# Patient Record
Sex: FEMALE | Race: WHITE | Employment: OTHER | URBAN - METROPOLITAN AREA
[De-identification: names, ages, dates, MRNs, and addresses within clinical notes are randomized per-mention and may not be internally consistent; named-entity substitution may affect disease eponyms.]

---

## 2017-01-23 ENCOUNTER — OFFICE VISIT (OUTPATIENT)
Dept: INTERNAL MEDICINE CLINIC | Age: 72
End: 2017-01-23

## 2017-01-23 VITALS
WEIGHT: 188 LBS | SYSTOLIC BLOOD PRESSURE: 130 MMHG | DIASTOLIC BLOOD PRESSURE: 78 MMHG | HEART RATE: 90 BPM | BODY MASS INDEX: 35.5 KG/M2 | OXYGEN SATURATION: 96 % | HEIGHT: 61 IN

## 2017-01-23 DIAGNOSIS — Z76.89 ENCOUNTER TO ESTABLISH CARE: Primary | ICD-10-CM

## 2017-01-23 DIAGNOSIS — N18.2 CHRONIC KIDNEY DISEASE, STAGE II (MILD): ICD-10-CM

## 2017-01-23 DIAGNOSIS — R21 RASH OF BODY: ICD-10-CM

## 2017-01-23 DIAGNOSIS — I10 ESSENTIAL HYPERTENSION: ICD-10-CM

## 2017-01-23 DIAGNOSIS — E78.00 HIGH CHOLESTEROL: ICD-10-CM

## 2017-01-23 DIAGNOSIS — E03.9 ACQUIRED HYPOTHYROIDISM: ICD-10-CM

## 2017-01-23 DIAGNOSIS — N18.2 DIABETES MELLITUS DUE TO UNDERLYING CONDITION WITH STAGE 2 CHRONIC KIDNEY DISEASE, WITHOUT LONG-TERM CURRENT USE OF INSULIN (HCC): ICD-10-CM

## 2017-01-23 DIAGNOSIS — E08.22 DIABETES MELLITUS DUE TO UNDERLYING CONDITION WITH STAGE 2 CHRONIC KIDNEY DISEASE, WITHOUT LONG-TERM CURRENT USE OF INSULIN (HCC): ICD-10-CM

## 2017-01-23 PROBLEM — E11.69 DIABETES MELLITUS TYPE 2 IN OBESE (HCC): Status: ACTIVE | Noted: 2017-01-23

## 2017-01-23 PROBLEM — E66.9 DIABETES MELLITUS TYPE 2 IN OBESE (HCC): Status: ACTIVE | Noted: 2017-01-23

## 2017-01-23 PROCEDURE — G0008 ADMIN INFLUENZA VIRUS VAC: HCPCS | Performed by: NURSE PRACTITIONER

## 2017-01-23 PROCEDURE — 90670 PCV13 VACCINE IM: CPT | Performed by: NURSE PRACTITIONER

## 2017-01-23 PROCEDURE — 90662 IIV NO PRSV INCREASED AG IM: CPT | Performed by: NURSE PRACTITIONER

## 2017-01-23 PROCEDURE — G0009 ADMIN PNEUMOCOCCAL VACCINE: HCPCS | Performed by: NURSE PRACTITIONER

## 2017-01-23 PROCEDURE — 99203 OFFICE O/P NEW LOW 30 MIN: CPT | Performed by: NURSE PRACTITIONER

## 2017-01-23 RX ORDER — SPIRONOLACTONE 25 MG/1
25 TABLET ORAL DAILY
COMMUNITY
End: 2017-01-23 | Stop reason: SDUPTHER

## 2017-01-23 RX ORDER — POTASSIUM CHLORIDE 20 MEQ/1
20 TABLET, EXTENDED RELEASE ORAL 2 TIMES DAILY
Qty: 180 TABLET | Refills: 1 | Status: SHIPPED | OUTPATIENT
Start: 2017-01-23 | End: 2017-12-19 | Stop reason: SDUPTHER

## 2017-01-23 RX ORDER — GABAPENTIN 100 MG/1
100 CAPSULE ORAL DAILY
Qty: 90 CAPSULE | Refills: 1 | Status: SHIPPED | OUTPATIENT
Start: 2017-01-23 | End: 2019-02-18

## 2017-01-23 RX ORDER — GABAPENTIN 100 MG/1
100 CAPSULE ORAL 3 TIMES DAILY
COMMUNITY
End: 2017-01-23 | Stop reason: SDUPTHER

## 2017-01-23 RX ORDER — TORSEMIDE 20 MG/1
20 TABLET ORAL DAILY
COMMUNITY
End: 2017-05-08 | Stop reason: SDUPTHER

## 2017-01-23 RX ORDER — GABAPENTIN 100 MG/1
100 CAPSULE ORAL DAILY
Qty: 90 CAPSULE | Refills: 3 | Status: SHIPPED | OUTPATIENT
Start: 2017-01-23 | End: 2017-12-19 | Stop reason: SDUPTHER

## 2017-01-23 RX ORDER — LEVOTHYROXINE SODIUM 0.15 MG/1
150 TABLET ORAL DAILY
COMMUNITY
End: 2017-03-06 | Stop reason: SDUPTHER

## 2017-01-23 RX ORDER — SPIRONOLACTONE 25 MG/1
25 TABLET ORAL DAILY
Qty: 90 TABLET | Refills: 1 | Status: SHIPPED | OUTPATIENT
Start: 2017-01-23 | End: 2017-07-17 | Stop reason: SDUPTHER

## 2017-01-23 RX ORDER — ALLOPURINOL 100 MG/1
100 TABLET ORAL DAILY
COMMUNITY
End: 2017-09-07 | Stop reason: SDUPTHER

## 2017-01-23 RX ORDER — ATENOLOL 25 MG/1
25 TABLET ORAL DAILY
Qty: 90 TABLET | Refills: 1 | Status: SHIPPED | OUTPATIENT
Start: 2017-01-23 | End: 2017-12-04 | Stop reason: SDUPTHER

## 2017-01-23 RX ORDER — IRBESARTAN AND HYDROCHLOROTHIAZIDE 150; 12.5 MG/1; MG/1
1 TABLET, FILM COATED ORAL DAILY
COMMUNITY
End: 2017-12-04 | Stop reason: SDUPTHER

## 2017-01-23 RX ORDER — ATENOLOL 25 MG/1
25 TABLET ORAL DAILY
COMMUNITY
End: 2017-01-23 | Stop reason: SDUPTHER

## 2017-01-24 ENCOUNTER — TELEPHONE (OUTPATIENT)
Dept: INTERNAL MEDICINE CLINIC | Age: 72
End: 2017-01-24

## 2017-01-25 DIAGNOSIS — N18.2 DIABETES MELLITUS DUE TO UNDERLYING CONDITION WITH STAGE 2 CHRONIC KIDNEY DISEASE, WITHOUT LONG-TERM CURRENT USE OF INSULIN (HCC): ICD-10-CM

## 2017-01-25 DIAGNOSIS — E03.9 ACQUIRED HYPOTHYROIDISM: ICD-10-CM

## 2017-01-25 DIAGNOSIS — Z76.89 ENCOUNTER TO ESTABLISH CARE: ICD-10-CM

## 2017-01-25 DIAGNOSIS — E08.22 DIABETES MELLITUS DUE TO UNDERLYING CONDITION WITH STAGE 2 CHRONIC KIDNEY DISEASE, WITHOUT LONG-TERM CURRENT USE OF INSULIN (HCC): ICD-10-CM

## 2017-01-25 DIAGNOSIS — N18.2 CHRONIC KIDNEY DISEASE, STAGE II (MILD): ICD-10-CM

## 2017-01-25 LAB
ALBUMIN SERPL-MCNC: 4.5 G/DL (ref 3.4–5)
ANION GAP SERPL CALCULATED.3IONS-SCNC: 18 MMOL/L (ref 3–16)
BASOPHILS ABSOLUTE: 0 K/UL (ref 0–0.2)
BASOPHILS RELATIVE PERCENT: 0.5 %
BUN BLDV-MCNC: 52 MG/DL (ref 7–20)
CALCIUM SERPL-MCNC: 9.7 MG/DL (ref 8.3–10.6)
CHLORIDE BLD-SCNC: 102 MMOL/L (ref 99–110)
CHOLESTEROL, TOTAL: 199 MG/DL (ref 0–199)
CO2: 21 MMOL/L (ref 21–32)
CREAT SERPL-MCNC: 1.7 MG/DL (ref 0.6–1.2)
CREATININE URINE: 67.6 MG/DL (ref 28–259)
EOSINOPHILS ABSOLUTE: 0.3 K/UL (ref 0–0.6)
EOSINOPHILS RELATIVE PERCENT: 3.6 %
ESTIMATED AVERAGE GLUCOSE: 142.7 MG/DL
GFR AFRICAN AMERICAN: 36
GFR NON-AFRICAN AMERICAN: 30
GLUCOSE BLD-MCNC: 125 MG/DL (ref 70–99)
HBA1C MFR BLD: 6.6 %
HCT VFR BLD CALC: 38.4 % (ref 36–48)
HDLC SERPL-MCNC: 38 MG/DL (ref 40–60)
HEMOGLOBIN: 12 G/DL (ref 12–16)
HEPATITIS C ANTIBODY INTERPRETATION: NORMAL
LDL CHOLESTEROL CALCULATED: ABNORMAL MG/DL
LDL CHOLESTEROL DIRECT: 108 MG/DL
LYMPHOCYTES ABSOLUTE: 1.8 K/UL (ref 1–5.1)
LYMPHOCYTES RELATIVE PERCENT: 22 %
MCH RBC QN AUTO: 27.8 PG (ref 26–34)
MCHC RBC AUTO-ENTMCNC: 31.3 G/DL (ref 31–36)
MCV RBC AUTO: 89 FL (ref 80–100)
MICROALBUMIN UR-MCNC: 23.7 MG/DL
MICROALBUMIN/CREAT UR-RTO: 350.6 MG/G (ref 0–30)
MONOCYTES ABSOLUTE: 0.7 K/UL (ref 0–1.3)
MONOCYTES RELATIVE PERCENT: 8.4 %
NEUTROPHILS ABSOLUTE: 5.4 K/UL (ref 1.7–7.7)
NEUTROPHILS RELATIVE PERCENT: 65.5 %
PDW BLD-RTO: 16.5 % (ref 12.4–15.4)
PHOSPHORUS: 5 MG/DL (ref 2.5–4.9)
PLATELET # BLD: 266 K/UL (ref 135–450)
PMV BLD AUTO: 9.1 FL (ref 5–10.5)
POTASSIUM SERPL-SCNC: 4.2 MMOL/L (ref 3.5–5.1)
RBC # BLD: 4.32 M/UL (ref 4–5.2)
SODIUM BLD-SCNC: 141 MMOL/L (ref 136–145)
T3 TOTAL: 1.05 NG/ML (ref 0.8–2)
T4 FREE: 1.7 NG/DL (ref 0.9–1.8)
TRIGL SERPL-MCNC: 442 MG/DL (ref 0–150)
TSH REFLEX: 0.11 UIU/ML (ref 0.27–4.2)
URIC ACID, SERUM: 6.3 MG/DL (ref 2.6–6)
VLDLC SERPL CALC-MCNC: ABNORMAL MG/DL
WBC # BLD: 8.3 K/UL (ref 4–11)

## 2017-01-26 ENCOUNTER — TELEPHONE (OUTPATIENT)
Dept: DERMATOLOGY | Age: 72
End: 2017-01-26

## 2017-01-31 ENCOUNTER — OFFICE VISIT (OUTPATIENT)
Dept: DERMATOLOGY | Age: 72
End: 2017-01-31

## 2017-01-31 DIAGNOSIS — L82.1 SK (SEBORRHEIC KERATOSIS): ICD-10-CM

## 2017-01-31 DIAGNOSIS — I83.813 VARICOSE VEINS OF BOTH LOWER EXTREMITIES WITH PAIN: ICD-10-CM

## 2017-01-31 DIAGNOSIS — D48.5 NEOPLASM OF UNCERTAIN BEHAVIOR OF SKIN: ICD-10-CM

## 2017-01-31 DIAGNOSIS — L57.0 AK (ACTINIC KERATOSIS): Primary | ICD-10-CM

## 2017-01-31 PROCEDURE — 11100 PR BIOPSY OF SKIN LESION: CPT | Performed by: DERMATOLOGY

## 2017-01-31 PROCEDURE — 11101 PR BIOPSY, EACH ADDED LESION: CPT | Performed by: DERMATOLOGY

## 2017-01-31 PROCEDURE — 99202 OFFICE O/P NEW SF 15 MIN: CPT | Performed by: DERMATOLOGY

## 2017-01-31 PROCEDURE — 17000 DESTRUCT PREMALG LESION: CPT | Performed by: DERMATOLOGY

## 2017-01-31 PROCEDURE — 17003 DESTRUCT PREMALG LES 2-14: CPT | Performed by: DERMATOLOGY

## 2017-02-03 RX ORDER — TRIAMCINOLONE ACETONIDE 1 MG/G
CREAM TOPICAL
Qty: 30 G | Refills: 0 | Status: SHIPPED | OUTPATIENT
Start: 2017-02-03 | End: 2017-02-28 | Stop reason: SDUPTHER

## 2017-02-28 RX ORDER — TRIAMCINOLONE ACETONIDE 1 MG/G
CREAM TOPICAL
Qty: 30 G | Refills: 0 | Status: SHIPPED | OUTPATIENT
Start: 2017-02-28 | End: 2017-03-03 | Stop reason: SDUPTHER

## 2017-03-03 ENCOUNTER — PROCEDURE VISIT (OUTPATIENT)
Dept: DERMATOLOGY | Age: 72
End: 2017-03-03

## 2017-03-03 VITALS — HEART RATE: 74 BPM | DIASTOLIC BLOOD PRESSURE: 70 MMHG | SYSTOLIC BLOOD PRESSURE: 126 MMHG

## 2017-03-03 DIAGNOSIS — C44.519 BCC (BASAL CELL CARCINOMA), TRUNK: Primary | ICD-10-CM

## 2017-03-03 DIAGNOSIS — L57.0 AK (ACTINIC KERATOSIS): ICD-10-CM

## 2017-03-03 DIAGNOSIS — D04.72 CARCINOMA IN SITU OF SKIN OF LEFT LEG: ICD-10-CM

## 2017-03-03 PROCEDURE — 17000 DESTRUCT PREMALG LESION: CPT | Performed by: DERMATOLOGY

## 2017-03-03 PROCEDURE — 17261 DSTRJ MAL LES T/A/L .6-1.0CM: CPT | Performed by: DERMATOLOGY

## 2017-03-03 PROCEDURE — 12032 INTMD RPR S/A/T/EXT 2.6-7.5: CPT | Performed by: DERMATOLOGY

## 2017-03-03 PROCEDURE — 11602 EXC TR-EXT MAL+MARG 1.1-2 CM: CPT | Performed by: DERMATOLOGY

## 2017-03-03 RX ORDER — TRIAMCINOLONE ACETONIDE 1 MG/G
CREAM TOPICAL
Qty: 60 G | Refills: 0 | Status: SHIPPED | OUTPATIENT
Start: 2017-03-03 | End: 2017-06-06 | Stop reason: SDUPTHER

## 2017-03-06 ENCOUNTER — OFFICE VISIT (OUTPATIENT)
Dept: INTERNAL MEDICINE CLINIC | Age: 72
End: 2017-03-06

## 2017-03-06 VITALS
SYSTOLIC BLOOD PRESSURE: 136 MMHG | OXYGEN SATURATION: 99 % | HEIGHT: 61 IN | WEIGHT: 189 LBS | DIASTOLIC BLOOD PRESSURE: 70 MMHG | HEART RATE: 74 BPM | BODY MASS INDEX: 35.68 KG/M2

## 2017-03-06 DIAGNOSIS — I10 ESSENTIAL HYPERTENSION: ICD-10-CM

## 2017-03-06 DIAGNOSIS — E66.9 DIABETES MELLITUS TYPE 2 IN OBESE (HCC): ICD-10-CM

## 2017-03-06 DIAGNOSIS — Z00.00 WELL ADULT EXAM: Primary | ICD-10-CM

## 2017-03-06 DIAGNOSIS — K58.0 IRRITABLE BOWEL SYNDROME WITH DIARRHEA: ICD-10-CM

## 2017-03-06 DIAGNOSIS — M54.2 CERVICAL PAIN (NECK): ICD-10-CM

## 2017-03-06 DIAGNOSIS — E03.9 ACQUIRED HYPOTHYROIDISM: ICD-10-CM

## 2017-03-06 DIAGNOSIS — E11.69 DIABETES MELLITUS TYPE 2 IN OBESE (HCC): ICD-10-CM

## 2017-03-06 PROCEDURE — 99214 OFFICE O/P EST MOD 30 MIN: CPT | Performed by: NURSE PRACTITIONER

## 2017-03-06 RX ORDER — LEVOTHYROXINE SODIUM 175 UG/1
175 TABLET ORAL DAILY
Qty: 30 TABLET | Refills: 3 | Status: SHIPPED | OUTPATIENT
Start: 2017-03-06 | End: 2017-05-08 | Stop reason: SDUPTHER

## 2017-03-06 RX ORDER — LANCETS 23 GAUGE
1 EACH MISCELLANEOUS 2 TIMES DAILY
Qty: 100 EACH | Refills: 3 | Status: SHIPPED | OUTPATIENT
Start: 2017-03-06 | End: 2021-03-22 | Stop reason: ALTCHOICE

## 2017-03-06 RX ORDER — BLOOD-GLUCOSE METER
1 KIT MISCELLANEOUS DAILY PRN
Qty: 1 KIT | Refills: 0 | Status: SHIPPED | OUTPATIENT
Start: 2017-03-06 | End: 2019-10-28

## 2017-03-06 ASSESSMENT — ENCOUNTER SYMPTOMS: DIARRHEA: 1

## 2017-03-08 ENCOUNTER — TELEPHONE (OUTPATIENT)
Dept: DERMATOLOGY | Age: 72
End: 2017-03-08

## 2017-03-17 ENCOUNTER — NURSE ONLY (OUTPATIENT)
Dept: DERMATOLOGY | Age: 72
End: 2017-03-17

## 2017-03-17 DIAGNOSIS — K58.0 IRRITABLE BOWEL SYNDROME WITH DIARRHEA: ICD-10-CM

## 2017-03-17 DIAGNOSIS — L98.9 SKIN LESION: Primary | ICD-10-CM

## 2017-03-27 PROBLEM — Z00.00 WELL ADULT EXAM: Status: ACTIVE | Noted: 2017-03-27

## 2017-05-08 DIAGNOSIS — N18.2 CHRONIC KIDNEY DISEASE, STAGE II (MILD): ICD-10-CM

## 2017-05-08 RX ORDER — TORSEMIDE 20 MG/1
20 TABLET ORAL DAILY
Qty: 30 TABLET | Refills: 2 | Status: SHIPPED | OUTPATIENT
Start: 2017-05-08 | End: 2017-05-31 | Stop reason: SDUPTHER

## 2017-05-08 RX ORDER — LEVOTHYROXINE SODIUM 175 UG/1
175 TABLET ORAL DAILY
Qty: 30 TABLET | Refills: 3 | Status: SHIPPED | OUTPATIENT
Start: 2017-05-08 | End: 2017-07-14 | Stop reason: SDUPTHER

## 2017-05-31 ENCOUNTER — TELEPHONE (OUTPATIENT)
Dept: INTERNAL MEDICINE CLINIC | Age: 72
End: 2017-05-31

## 2017-05-31 ENCOUNTER — OFFICE VISIT (OUTPATIENT)
Dept: INTERNAL MEDICINE CLINIC | Age: 72
End: 2017-05-31

## 2017-05-31 VITALS
WEIGHT: 191 LBS | OXYGEN SATURATION: 98 % | HEART RATE: 82 BPM | SYSTOLIC BLOOD PRESSURE: 134 MMHG | BODY MASS INDEX: 36.69 KG/M2 | DIASTOLIC BLOOD PRESSURE: 80 MMHG

## 2017-05-31 DIAGNOSIS — N18.2 CHRONIC KIDNEY DISEASE, STAGE II (MILD): ICD-10-CM

## 2017-05-31 DIAGNOSIS — M19.90 ARTHRITIS: ICD-10-CM

## 2017-05-31 DIAGNOSIS — E11.9 DIABETES MELLITUS TYPE 2 IN NONOBESE (HCC): Primary | ICD-10-CM

## 2017-05-31 LAB — GLUCOSE BLD-MCNC: ABNORMAL MG/DL

## 2017-05-31 PROCEDURE — 99213 OFFICE O/P EST LOW 20 MIN: CPT | Performed by: NURSE PRACTITIONER

## 2017-05-31 PROCEDURE — 82962 GLUCOSE BLOOD TEST: CPT | Performed by: NURSE PRACTITIONER

## 2017-05-31 RX ORDER — TORSEMIDE 20 MG/1
20 TABLET ORAL DAILY
Qty: 90 TABLET | Refills: 1 | Status: SHIPPED | OUTPATIENT
Start: 2017-05-31 | End: 2017-12-04 | Stop reason: SDUPTHER

## 2017-05-31 RX ORDER — TORSEMIDE 20 MG/1
20 TABLET ORAL DAILY
Qty: 10 TABLET | Refills: 0 | Status: SHIPPED | OUTPATIENT
Start: 2017-05-31 | End: 2017-12-04 | Stop reason: SDUPTHER

## 2017-06-01 LAB
ESTIMATED AVERAGE GLUCOSE: 139.9 MG/DL
HBA1C MFR BLD: 6.5 %

## 2017-06-06 RX ORDER — TRIAMCINOLONE ACETONIDE 1 MG/G
CREAM TOPICAL
Qty: 60 G | Refills: 0 | Status: SHIPPED | OUTPATIENT
Start: 2017-06-06 | End: 2017-07-21 | Stop reason: SDUPTHER

## 2017-07-10 ENCOUNTER — OFFICE VISIT (OUTPATIENT)
Dept: INTERNAL MEDICINE CLINIC | Age: 72
End: 2017-07-10

## 2017-07-10 ENCOUNTER — TELEPHONE (OUTPATIENT)
Dept: INTERNAL MEDICINE CLINIC | Age: 72
End: 2017-07-10

## 2017-07-10 ENCOUNTER — HOSPITAL ENCOUNTER (OUTPATIENT)
Dept: OTHER | Age: 72
Discharge: HOME | End: 2017-07-10
Attending: FAMILY MEDICINE | Admitting: FAMILY MEDICINE

## 2017-07-10 VITALS
WEIGHT: 191 LBS | DIASTOLIC BLOOD PRESSURE: 80 MMHG | HEIGHT: 60 IN | HEART RATE: 83 BPM | TEMPERATURE: 98.3 F | OXYGEN SATURATION: 98 % | BODY MASS INDEX: 37.5 KG/M2 | SYSTOLIC BLOOD PRESSURE: 140 MMHG | RESPIRATION RATE: 22 BRPM

## 2017-07-10 DIAGNOSIS — S93.601A FOOT SPRAIN, RIGHT, INITIAL ENCOUNTER: ICD-10-CM

## 2017-07-10 DIAGNOSIS — M84.474A METATARSAL FRACTURE, PATHOLOGIC, RIGHT, INITIAL ENCOUNTER: Primary | ICD-10-CM

## 2017-07-10 DIAGNOSIS — S93.601A FOOT SPRAIN, RIGHT, INITIAL ENCOUNTER: Primary | ICD-10-CM

## 2017-07-10 PROCEDURE — 99213 OFFICE O/P EST LOW 20 MIN: CPT | Performed by: FAMILY MEDICINE

## 2017-07-12 ENCOUNTER — OFFICE VISIT (OUTPATIENT)
Dept: ORTHOPEDIC SURGERY | Age: 72
End: 2017-07-12

## 2017-07-12 VITALS — HEIGHT: 60 IN | RESPIRATION RATE: 15 BRPM | WEIGHT: 191 LBS | BODY MASS INDEX: 37.5 KG/M2

## 2017-07-12 DIAGNOSIS — M79.671 RIGHT FOOT PAIN: ICD-10-CM

## 2017-07-12 DIAGNOSIS — S92.354A CLOSED NONDISPLACED FRACTURE OF FIFTH METATARSAL BONE OF RIGHT FOOT, INITIAL ENCOUNTER: Primary | ICD-10-CM

## 2017-07-12 PROCEDURE — E0114 CRUTCH UNDERARM PAIR NO WOOD: HCPCS | Performed by: INTERNAL MEDICINE

## 2017-07-12 PROCEDURE — 99203 OFFICE O/P NEW LOW 30 MIN: CPT | Performed by: INTERNAL MEDICINE

## 2017-07-12 PROCEDURE — L4361 PNEUMA/VAC WALK BOOT PRE OTS: HCPCS | Performed by: INTERNAL MEDICINE

## 2017-07-14 DIAGNOSIS — E03.9 ACQUIRED HYPOTHYROIDISM: Primary | ICD-10-CM

## 2017-07-16 RX ORDER — SPIRONOLACTONE 25 MG/1
25 TABLET ORAL DAILY
Qty: 90 TABLET | Refills: 1 | OUTPATIENT
Start: 2017-07-16

## 2017-07-16 RX ORDER — LEVOTHYROXINE SODIUM 175 UG/1
175 TABLET ORAL DAILY
Qty: 30 TABLET | Refills: 3 | OUTPATIENT
Start: 2017-07-16

## 2017-07-17 RX ORDER — SPIRONOLACTONE 25 MG/1
25 TABLET ORAL DAILY
Qty: 90 TABLET | Refills: 1 | Status: SHIPPED | OUTPATIENT
Start: 2017-07-17 | End: 2017-12-04 | Stop reason: SDUPTHER

## 2017-07-17 RX ORDER — LEVOTHYROXINE SODIUM 175 UG/1
175 TABLET ORAL DAILY
Qty: 30 TABLET | Refills: 3 | Status: SHIPPED | OUTPATIENT
Start: 2017-07-17 | End: 2017-08-25 | Stop reason: SDUPTHER

## 2017-07-18 ENCOUNTER — TELEPHONE (OUTPATIENT)
Dept: INTERNAL MEDICINE CLINIC | Age: 72
End: 2017-07-18

## 2017-07-21 RX ORDER — TRIAMCINOLONE ACETONIDE 1 MG/G
CREAM TOPICAL
Qty: 60 G | Refills: 0 | Status: SHIPPED | OUTPATIENT
Start: 2017-07-21 | End: 2017-10-06 | Stop reason: SDUPTHER

## 2017-07-24 ENCOUNTER — OFFICE VISIT (OUTPATIENT)
Dept: ORTHOPEDIC SURGERY | Age: 72
End: 2017-07-24

## 2017-07-24 VITALS — WEIGHT: 195 LBS | BODY MASS INDEX: 38.28 KG/M2 | HEIGHT: 60 IN

## 2017-07-24 DIAGNOSIS — M79.671 FOOT PAIN, RIGHT: ICD-10-CM

## 2017-07-24 DIAGNOSIS — S92.354A CLOSED NONDISPLACED FRACTURE OF FIFTH METATARSAL BONE OF RIGHT FOOT, INITIAL ENCOUNTER: Primary | ICD-10-CM

## 2017-07-24 PROCEDURE — 99213 OFFICE O/P EST LOW 20 MIN: CPT | Performed by: INTERNAL MEDICINE

## 2017-07-24 PROCEDURE — 73630 X-RAY EXAM OF FOOT: CPT | Performed by: INTERNAL MEDICINE

## 2017-07-25 PROBLEM — S92.354A CLOSED NONDISPLACED FRACTURE OF FIFTH METATARSAL BONE OF RIGHT FOOT: Status: ACTIVE | Noted: 2017-07-25

## 2017-08-09 ENCOUNTER — OFFICE VISIT (OUTPATIENT)
Dept: ORTHOPEDIC SURGERY | Age: 72
End: 2017-08-09

## 2017-08-09 DIAGNOSIS — S92.354D CLOSED NONDISPLACED FRACTURE OF FIFTH METATARSAL BONE OF RIGHT FOOT WITH ROUTINE HEALING, SUBSEQUENT ENCOUNTER: Primary | ICD-10-CM

## 2017-08-09 PROCEDURE — 99213 OFFICE O/P EST LOW 20 MIN: CPT | Performed by: INTERNAL MEDICINE

## 2017-08-09 PROCEDURE — MISC968 TURF TOE STEEL SHANKS EACH: Performed by: INTERNAL MEDICINE

## 2017-08-09 PROCEDURE — 73630 X-RAY EXAM OF FOOT: CPT | Performed by: INTERNAL MEDICINE

## 2017-08-23 ENCOUNTER — OFFICE VISIT (OUTPATIENT)
Dept: INTERNAL MEDICINE CLINIC | Age: 72
End: 2017-08-23

## 2017-08-23 VITALS
TEMPERATURE: 97.8 F | OXYGEN SATURATION: 98 % | WEIGHT: 193 LBS | BODY MASS INDEX: 37.69 KG/M2 | DIASTOLIC BLOOD PRESSURE: 78 MMHG | SYSTOLIC BLOOD PRESSURE: 140 MMHG | HEART RATE: 85 BPM

## 2017-08-23 DIAGNOSIS — R30.0 DYSURIA: ICD-10-CM

## 2017-08-23 DIAGNOSIS — J30.2 SEASONAL ALLERGIC RHINITIS, UNSPECIFIED ALLERGIC RHINITIS TRIGGER: ICD-10-CM

## 2017-08-23 DIAGNOSIS — N30.01 ACUTE CYSTITIS WITH HEMATURIA: Primary | ICD-10-CM

## 2017-08-23 DIAGNOSIS — R53.83 FATIGUE, UNSPECIFIED TYPE: ICD-10-CM

## 2017-08-23 LAB
BILIRUBIN, POC: ABNORMAL
BLOOD URINE, POC: ABNORMAL
CLARITY, POC: ABNORMAL
COLOR, POC: YELLOW
GLUCOSE URINE, POC: ABNORMAL
KETONES, POC: ABNORMAL
LEUKOCYTE EST, POC: ABNORMAL
NITRITE, POC: ABNORMAL
PH, POC: 6.5
PROTEIN, POC: ABNORMAL
SPECIFIC GRAVITY, POC: 1.02
UROBILINOGEN, POC: 0.2

## 2017-08-23 PROCEDURE — 99213 OFFICE O/P EST LOW 20 MIN: CPT | Performed by: NURSE PRACTITIONER

## 2017-08-23 PROCEDURE — 81002 URINALYSIS NONAUTO W/O SCOPE: CPT | Performed by: NURSE PRACTITIONER

## 2017-08-23 RX ORDER — CIPROFLOXACIN 500 MG/1
500 TABLET, FILM COATED ORAL 2 TIMES DAILY
Qty: 20 TABLET | Refills: 0 | Status: SHIPPED | OUTPATIENT
Start: 2017-08-23 | End: 2017-09-02

## 2017-08-23 ASSESSMENT — PATIENT HEALTH QUESTIONNAIRE - PHQ9
2. FEELING DOWN, DEPRESSED OR HOPELESS: 0
SUM OF ALL RESPONSES TO PHQ9 QUESTIONS 1 & 2: 0
1. LITTLE INTEREST OR PLEASURE IN DOING THINGS: 0
SUM OF ALL RESPONSES TO PHQ QUESTIONS 1-9: 0

## 2017-08-24 DIAGNOSIS — R53.83 FATIGUE, UNSPECIFIED TYPE: ICD-10-CM

## 2017-08-24 LAB
BASOPHILS ABSOLUTE: 0 K/UL (ref 0–0.2)
BASOPHILS RELATIVE PERCENT: 0.4 %
EOSINOPHILS ABSOLUTE: 0.2 K/UL (ref 0–0.6)
EOSINOPHILS RELATIVE PERCENT: 1.8 %
HCT VFR BLD CALC: 37.1 % (ref 36–48)
HEMOGLOBIN: 11.9 G/DL (ref 12–16)
LYMPHOCYTES ABSOLUTE: 1.7 K/UL (ref 1–5.1)
LYMPHOCYTES RELATIVE PERCENT: 14.5 %
MCH RBC QN AUTO: 28.2 PG (ref 26–34)
MCHC RBC AUTO-ENTMCNC: 32.1 G/DL (ref 31–36)
MCV RBC AUTO: 87.9 FL (ref 80–100)
MONOCYTES ABSOLUTE: 1.1 K/UL (ref 0–1.3)
MONOCYTES RELATIVE PERCENT: 9.5 %
NEUTROPHILS ABSOLUTE: 8.4 K/UL (ref 1.7–7.7)
NEUTROPHILS RELATIVE PERCENT: 73.8 %
PDW BLD-RTO: 17.1 % (ref 12.4–15.4)
PLATELET # BLD: 309 K/UL (ref 135–450)
PMV BLD AUTO: 8.8 FL (ref 5–10.5)
RBC # BLD: 4.22 M/UL (ref 4–5.2)
T3 TOTAL: 1 NG/ML (ref 0.8–2)
T4 FREE: 1.7 NG/DL (ref 0.9–1.8)
TSH REFLEX: 0.01 UIU/ML (ref 0.27–4.2)
WBC # BLD: 11.4 K/UL (ref 4–11)

## 2017-08-25 ENCOUNTER — TELEPHONE (OUTPATIENT)
Dept: INTERNAL MEDICINE CLINIC | Age: 72
End: 2017-08-25

## 2017-08-25 RX ORDER — LEVOTHYROXINE SODIUM 88 UG/1
TABLET ORAL
Qty: 30 TABLET | Refills: 3 | Status: SHIPPED | OUTPATIENT
Start: 2017-08-25 | End: 2017-12-04 | Stop reason: SDUPTHER

## 2017-09-07 RX ORDER — ALLOPURINOL 100 MG/1
100 TABLET ORAL DAILY
Qty: 30 TABLET | Refills: 2 | Status: SHIPPED | OUTPATIENT
Start: 2017-09-07 | End: 2017-11-14 | Stop reason: SDUPTHER

## 2017-09-27 ENCOUNTER — OFFICE VISIT (OUTPATIENT)
Dept: DERMATOLOGY | Age: 72
End: 2017-09-27

## 2017-09-27 DIAGNOSIS — R21 RASH: Primary | ICD-10-CM

## 2017-09-27 DIAGNOSIS — L57.0 AK (ACTINIC KERATOSIS): ICD-10-CM

## 2017-09-27 PROCEDURE — 11100 PR BIOPSY OF SKIN LESION: CPT | Performed by: DERMATOLOGY

## 2017-09-27 PROCEDURE — 11101 PR BIOPSY, EACH ADDED LESION: CPT | Performed by: DERMATOLOGY

## 2017-09-27 PROCEDURE — 99213 OFFICE O/P EST LOW 20 MIN: CPT | Performed by: DERMATOLOGY

## 2017-09-27 RX ORDER — BETAMETHASONE DIPROPIONATE 0.5 MG/G
CREAM TOPICAL
Qty: 90 G | Refills: 1 | Status: SHIPPED | OUTPATIENT
Start: 2017-09-27 | End: 2017-10-02 | Stop reason: SDUPTHER

## 2017-10-02 ENCOUNTER — TELEPHONE (OUTPATIENT)
Dept: DERMATOLOGY | Age: 72
End: 2017-10-02

## 2017-10-02 RX ORDER — BETAMETHASONE DIPROPIONATE 0.5 MG/G
CREAM TOPICAL
Qty: 180 G | Refills: 1 | Status: SHIPPED | OUTPATIENT
Start: 2017-10-02 | End: 2019-10-28

## 2017-10-04 ENCOUNTER — TELEPHONE (OUTPATIENT)
Dept: DERMATOLOGY | Age: 72
End: 2017-10-04

## 2017-10-04 NOTE — TELEPHONE ENCOUNTER
Pt c/b [phone]  Pt states:   - returning call for biopsy results  Please call to discuss thanks

## 2017-10-05 ENCOUNTER — TELEPHONE (OUTPATIENT)
Dept: DERMATOLOGY | Age: 72
End: 2017-10-05

## 2017-10-06 RX ORDER — TRIAMCINOLONE ACETONIDE 1 MG/G
CREAM TOPICAL
Qty: 160 G | Refills: 1 | Status: SHIPPED | OUTPATIENT
Start: 2017-10-06 | End: 2018-01-05 | Stop reason: SDUPTHER

## 2017-10-06 NOTE — TELEPHONE ENCOUNTER
Let's go back to the triamcinolone cream.  Does she have enough at home?
Patient called and the betamethasone dipropionate (DIPROLENE) 0.05 % cream is over $300. She can't afford it.     Call back # [phone]
Rx signed.
Spoke to pt and advised her that she should go back to using TAC, pt states she has some at home but would like the refill sent to [facility] [place], [place], please sign rx
no white count  not pregnant  urine = wnl

## 2017-11-01 ENCOUNTER — OFFICE VISIT (OUTPATIENT)
Dept: INTERNAL MEDICINE CLINIC | Age: 72
End: 2017-11-01

## 2017-11-01 VITALS
WEIGHT: 188.4 LBS | HEIGHT: 60 IN | DIASTOLIC BLOOD PRESSURE: 60 MMHG | TEMPERATURE: 97.8 F | OXYGEN SATURATION: 98 % | RESPIRATION RATE: 18 BRPM | BODY MASS INDEX: 36.99 KG/M2 | SYSTOLIC BLOOD PRESSURE: 140 MMHG | HEART RATE: 66 BPM

## 2017-11-01 DIAGNOSIS — G89.29 CHRONIC PAIN OF RIGHT KNEE: Primary | ICD-10-CM

## 2017-11-01 DIAGNOSIS — Z13.820 SCREENING FOR OSTEOPOROSIS: ICD-10-CM

## 2017-11-01 DIAGNOSIS — Z23 NEED FOR INFLUENZA VACCINATION: ICD-10-CM

## 2017-11-01 DIAGNOSIS — M89.9 DISORDER OF BONE: ICD-10-CM

## 2017-11-01 DIAGNOSIS — M25.561 CHRONIC PAIN OF RIGHT KNEE: Primary | ICD-10-CM

## 2017-11-01 PROCEDURE — 4040F PNEUMOC VAC/ADMIN/RCVD: CPT | Performed by: FAMILY MEDICINE

## 2017-11-01 PROCEDURE — 3017F COLORECTAL CA SCREEN DOC REV: CPT | Performed by: FAMILY MEDICINE

## 2017-11-01 PROCEDURE — G8417 CALC BMI ABV UP PARAM F/U: HCPCS | Performed by: FAMILY MEDICINE

## 2017-11-01 PROCEDURE — G8427 DOCREV CUR MEDS BY ELIG CLIN: HCPCS | Performed by: FAMILY MEDICINE

## 2017-11-01 PROCEDURE — 1090F PRES/ABSN URINE INCON ASSESS: CPT | Performed by: FAMILY MEDICINE

## 2017-11-01 PROCEDURE — 1123F ACP DISCUSS/DSCN MKR DOCD: CPT | Performed by: FAMILY MEDICINE

## 2017-11-01 PROCEDURE — G0008 ADMIN INFLUENZA VIRUS VAC: HCPCS | Performed by: FAMILY MEDICINE

## 2017-11-01 PROCEDURE — 99213 OFFICE O/P EST LOW 20 MIN: CPT | Performed by: FAMILY MEDICINE

## 2017-11-01 PROCEDURE — G8400 PT W/DXA NO RESULTS DOC: HCPCS | Performed by: FAMILY MEDICINE

## 2017-11-01 PROCEDURE — 90662 IIV NO PRSV INCREASED AG IM: CPT | Performed by: FAMILY MEDICINE

## 2017-11-01 PROCEDURE — 3014F SCREEN MAMMO DOC REV: CPT | Performed by: FAMILY MEDICINE

## 2017-11-01 PROCEDURE — 1036F TOBACCO NON-USER: CPT | Performed by: FAMILY MEDICINE

## 2017-11-01 PROCEDURE — G8484 FLU IMMUNIZE NO ADMIN: HCPCS | Performed by: FAMILY MEDICINE

## 2017-11-01 RX ORDER — HYDROCODONE BITARTRATE AND ACETAMINOPHEN 7.5; 325 MG/1; MG/1
1 TABLET ORAL EVERY 6 HOURS PRN
Qty: 28 TABLET | Refills: 0 | Status: SHIPPED | OUTPATIENT
Start: 2017-11-01 | End: 2017-11-08

## 2017-11-01 NOTE — PROGRESS NOTES
medications, allergies, past medical, surgical, social and family histories were reviewed and updated as appropriate. Medications side effects were discussed with patient. Pt confirms understanding. Educational material was given for pt to read on diagnosis.

## 2017-11-03 ENCOUNTER — HOSPITAL ENCOUNTER (OUTPATIENT)
Dept: OTHER | Age: 72
Discharge: HOME | End: 2017-11-03
Attending: FAMILY MEDICINE | Admitting: FAMILY MEDICINE

## 2017-11-03 DIAGNOSIS — G89.29 CHRONIC PAIN OF RIGHT KNEE: ICD-10-CM

## 2017-11-03 DIAGNOSIS — M25.561 CHRONIC PAIN OF RIGHT KNEE: ICD-10-CM

## 2017-11-08 ENCOUNTER — OFFICE VISIT (OUTPATIENT)
Dept: DERMATOLOGY | Age: 72
End: 2017-11-08

## 2017-11-08 ENCOUNTER — HOSPITAL ENCOUNTER (OUTPATIENT)
Dept: GENERAL RADIOLOGY | Age: 72
Discharge: HOME | End: 2017-11-08
Attending: FAMILY MEDICINE | Admitting: FAMILY MEDICINE

## 2017-11-08 DIAGNOSIS — D48.5 NEOPLASM OF UNCERTAIN BEHAVIOR OF SKIN: ICD-10-CM

## 2017-11-08 DIAGNOSIS — Z13.820 SCREENING FOR OSTEOPOROSIS: ICD-10-CM

## 2017-11-08 DIAGNOSIS — M89.9 DISORDER OF BONE: ICD-10-CM

## 2017-11-08 DIAGNOSIS — L40.9 PSORIASIS: Primary | ICD-10-CM

## 2017-11-08 DIAGNOSIS — L72.0 EPIDERMOID CYST: ICD-10-CM

## 2017-11-08 DIAGNOSIS — M89.9 BONE DISORDER: ICD-10-CM

## 2017-11-08 PROCEDURE — 11100 PR BIOPSY OF SKIN LESION: CPT | Performed by: DERMATOLOGY

## 2017-11-08 PROCEDURE — 99213 OFFICE O/P EST LOW 20 MIN: CPT | Performed by: DERMATOLOGY

## 2017-11-08 PROCEDURE — G8427 DOCREV CUR MEDS BY ELIG CLIN: HCPCS | Performed by: DERMATOLOGY

## 2017-11-08 PROCEDURE — G8417 CALC BMI ABV UP PARAM F/U: HCPCS | Performed by: DERMATOLOGY

## 2017-11-08 PROCEDURE — 1123F ACP DISCUSS/DSCN MKR DOCD: CPT | Performed by: DERMATOLOGY

## 2017-11-08 PROCEDURE — 3014F SCREEN MAMMO DOC REV: CPT | Performed by: DERMATOLOGY

## 2017-11-08 PROCEDURE — G8484 FLU IMMUNIZE NO ADMIN: HCPCS | Performed by: DERMATOLOGY

## 2017-11-08 PROCEDURE — 1090F PRES/ABSN URINE INCON ASSESS: CPT | Performed by: DERMATOLOGY

## 2017-11-08 PROCEDURE — G8399 PT W/DXA RESULTS DOCUMENT: HCPCS | Performed by: DERMATOLOGY

## 2017-11-08 PROCEDURE — 4040F PNEUMOC VAC/ADMIN/RCVD: CPT | Performed by: DERMATOLOGY

## 2017-11-08 PROCEDURE — 3017F COLORECTAL CA SCREEN DOC REV: CPT | Performed by: DERMATOLOGY

## 2017-11-08 PROCEDURE — 1036F TOBACCO NON-USER: CPT | Performed by: DERMATOLOGY

## 2017-11-08 NOTE — PATIENT INSTRUCTIONS
Biopsy Wound Care Instructions    · Keep the bandage in place for 24 hours. · Cleanse the wound with mild soapy water daily   Gently dry the area.  Apply Vaseline or petroleum jelly to the wound using a cotton tipped applicator.  Cover with a clean bandage.  Repeat this process until the biopsy site is healed.  If you had stitches placed, continue treating the site until the stitches are removed. Remember to make an appointment to return to have your stitches removed by our staff.  You may shower and bathe as usual.       ** Biopsy results generally take around 7 business days to come back. If you have not heard from us by then, please call the office at [phone] between 8AM and 4PM Monday through Friday.

## 2017-11-08 NOTE — PROGRESS NOTES
[facility]  Bebe York MD  [phone]      Susan Anna  1945    66 y.o. female     Date of Visit: 11/8/2017    Chief Complaint: psoriasis and skin lesion    History of Present Illness:    1. She returns today to follow-up for a several month history of a burning and pruritic eruption on the lower extremities. Biopsy at last visit on the right leg was highly suggestive of acute evolving psoriasis. She markedly improved with Diprolene cream but has switched back to triamcinolone cream with maintained improvement. 2.  She complains of a new onset painful lesion on the right lower leg. 3.  She also complains of a newly noted lesion on the back of the neck. Review of Systems:  Skin: No new or changing moles. Past Medical History, Family History, Surgical History, Medications and Allergies reviewed. Past Medical History:   Diagnosis Date    Diabetes ([address]     Hypertension     Kidney disease      Past Surgical History:   Procedure Laterality Date    APPENDECTOMY      CHOLECYSTECTOMY      HYSTERECTOMY      MASTECTOMY, BILATERAL      preventive    ROTATOR CUFF REPAIR         Allergies   Allergen Reactions    Tetracyclines & Related Anaphylaxis    Amoxicillin     Codeine     Penicillins      Outpatient Prescriptions Marked as Taking for the 11/8/17 encounter (Office Visit) with Zaria Govea MD   Medication Sig Dispense Refill    HYDROcodone-acetaminophen ([address]) 7.5-325 MG per tablet Take 1 tablet by mouth every 6 hours as needed for Pain . 28 tablet 0    triamcinolone (KENALOG) 0.1 % cream Apply topically 2 times daily. 160 g 1    betamethasone dipropionate (DIPROLENE) 0.05 % cream Apply to affected areas on the legs twice daily until improved.  180 g 1    allopurinol (ZYLOPRIM) 100 MG tablet Take 1 tablet by mouth daily 30 tablet 2    levothyroxine (SYNTHROID) 88 MCG tablet 1 tab daily before breakfast 30 tablet 3    spironolactone (ALDACTONE) 25 MG tablet Take 1 tablet by mouth daily 90 tablet 1    torsemide (DEMADEX) 20 MG tablet Take 1 tablet by mouth daily 90 tablet 1    metFORMIN (GLUCOPHAGE) 500 MG tablet Take 1 tablet by mouth 2 times daily (with meals) 180 tablet 1    torsemide (DEMADEX) 20 MG tablet Take 1 tablet by mouth daily 10 tablet 0    Dulaglutide (TRULICITY) 0.62 DN/4.5JA SOPN Inject 0.75 mg into the skin once a week 4 Pen 0    Dulaglutide (TRULICITY) 2.13 JF/9.4VT SOPN Inject 0.75 mg into the skin once a week 4 Pen 5    Eluxadoline (VIBERZI) 75 MG TABS Take 1 tablet by mouth 2 times daily 16 tablet 1    glucose monitoring kit (FREESTYLE) monitoring kit 1 kit by Does not apply route daily as needed (one touch preferred) 1 kit 0    Lancets 65N MISC 1 applicator by Does not apply route 2 times daily 100 each 3    glucose blood VI test strips (ASCENSIA AUTODISC VI;ONE TOUCH ULTRA TEST VI) strip 1 each by In Vitro route 2 times daily One Touch preferred 100 each 3    Multiple Vitamin (MULTI-VITAMIN PO) Take by mouth      irbesartan-hydrochlorothiazide (AVALIDE) 150-12.5 MG per tablet Take 1 tablet by mouth daily      INDOMETHACIN PO Take by mouth      gabapentin (NEURONTIN) 100 MG capsule Take 1 capsule by mouth daily 90 capsule 1    atenolol (TENORMIN) 25 MG tablet Take 1 tablet by mouth daily 90 tablet 1    potassium chloride (KLOR-CON M) 20 MEQ extended release tablet Take 1 tablet by mouth 2 times daily 180 tablet 1    gabapentin (NEURONTIN) 100 MG capsule Take 1 capsule by mouth daily 90 capsule 3         Physical Examination       The following were examined and determined to be normal: Psych/Neuro, Head/face, Conjunctivae/eyelids, Gums/teeth/lips and Neck. The following were examined and determined to be abnormal: RLE and LLE. Well appearing. 1.  Legs with few scattered scaly erythematous patches and thin plaques. 2.  Right posterior lower leg with a 1.2 cm keratotic pink plaque.         3.  Left posterior lateral neck with a round skin colored to white papule with overlying punctum. Assessment and Plan     1. Psoriasis of the legs - markedly improved, 1% BSA    Continue use of triamcinolone 0.1% cream.       2. Neoplasm of uncertain behavior of skin, right lower leg - r/o SCC    Discussed possible diagnosis; patient agreeable to biopsy (verbal consent obtained). The area(s) to be biopsied were marked with a surgical pen. Alcohol was used to cleanse the site. Local anesthesia was acheived with 1% lidocaine with epinephrine. Shave biopsy was performed using a razor blade. Hemostasis was achieved with aluminum chloride. The wound(s) were dressed with petrolatum and covered with a bandage. Wound care instructions were reviewed. 1 Specimen (s) sent to pathology. The specimen bottles were appropriately labeled. We also reviewed the risks of bleeding, scar, and infection. 3. Epidermoid cyst     Reassurance.

## 2017-11-13 ENCOUNTER — TELEPHONE (OUTPATIENT)
Dept: DERMATOLOGY | Age: 72
End: 2017-11-13

## 2017-11-14 RX ORDER — ALLOPURINOL 100 MG/1
100 TABLET ORAL DAILY
Qty: 30 TABLET | Refills: 2 | Status: SHIPPED | OUTPATIENT
Start: 2017-11-14 | End: 2017-12-04 | Stop reason: SDUPTHER

## 2017-11-15 ENCOUNTER — OFFICE VISIT (OUTPATIENT)
Dept: ORTHOPEDIC SURGERY | Age: 72
End: 2017-11-15

## 2017-11-15 DIAGNOSIS — G89.29 CHRONIC PAIN OF RIGHT KNEE: ICD-10-CM

## 2017-11-15 DIAGNOSIS — M25.561 CHRONIC PAIN OF RIGHT KNEE: ICD-10-CM

## 2017-11-15 DIAGNOSIS — M70.51 BURSITIS OF OTHER BURSA OF RIGHT KNEE: ICD-10-CM

## 2017-11-15 DIAGNOSIS — M25.561 ACUTE PAIN OF BOTH KNEES: ICD-10-CM

## 2017-11-15 DIAGNOSIS — M25.461 EFFUSION OF RIGHT KNEE: ICD-10-CM

## 2017-11-15 DIAGNOSIS — M25.562 ACUTE PAIN OF BOTH KNEES: ICD-10-CM

## 2017-11-15 DIAGNOSIS — M17.0 PRIMARY OSTEOARTHRITIS OF BOTH KNEES: Primary | ICD-10-CM

## 2017-11-15 PROBLEM — M17.10 ARTHRITIS OF KNEE: Status: ACTIVE | Noted: 2017-11-15

## 2017-11-15 PROBLEM — M71.9 BURSITIS: Status: ACTIVE | Noted: 2017-11-15

## 2017-11-15 PROBLEM — M17.11 ARTHRITIS OF RIGHT KNEE: Status: ACTIVE | Noted: 2017-11-15

## 2017-11-15 PROCEDURE — G8484 FLU IMMUNIZE NO ADMIN: HCPCS | Performed by: INTERNAL MEDICINE

## 2017-11-15 PROCEDURE — 20611 DRAIN/INJ JOINT/BURSA W/US: CPT | Performed by: INTERNAL MEDICINE

## 2017-11-15 PROCEDURE — 3014F SCREEN MAMMO DOC REV: CPT | Performed by: INTERNAL MEDICINE

## 2017-11-15 PROCEDURE — 1123F ACP DISCUSS/DSCN MKR DOCD: CPT | Performed by: INTERNAL MEDICINE

## 2017-11-15 PROCEDURE — 73562 X-RAY EXAM OF KNEE 3: CPT | Performed by: INTERNAL MEDICINE

## 2017-11-15 PROCEDURE — 1090F PRES/ABSN URINE INCON ASSESS: CPT | Performed by: INTERNAL MEDICINE

## 2017-11-15 PROCEDURE — G8417 CALC BMI ABV UP PARAM F/U: HCPCS | Performed by: INTERNAL MEDICINE

## 2017-11-15 PROCEDURE — L1812 KO ELASTIC W/JOINTS PRE OTS: HCPCS | Performed by: INTERNAL MEDICINE

## 2017-11-15 PROCEDURE — 1036F TOBACCO NON-USER: CPT | Performed by: INTERNAL MEDICINE

## 2017-11-15 PROCEDURE — G8399 PT W/DXA RESULTS DOCUMENT: HCPCS | Performed by: INTERNAL MEDICINE

## 2017-11-15 PROCEDURE — 4040F PNEUMOC VAC/ADMIN/RCVD: CPT | Performed by: INTERNAL MEDICINE

## 2017-11-15 PROCEDURE — G8427 DOCREV CUR MEDS BY ELIG CLIN: HCPCS | Performed by: INTERNAL MEDICINE

## 2017-11-15 PROCEDURE — 3017F COLORECTAL CA SCREEN DOC REV: CPT | Performed by: INTERNAL MEDICINE

## 2017-11-15 PROCEDURE — 99214 OFFICE O/P EST MOD 30 MIN: CPT | Performed by: INTERNAL MEDICINE

## 2017-11-15 RX ORDER — TRAMADOL HYDROCHLORIDE 50 MG/1
50 TABLET ORAL EVERY 6 HOURS PRN
Qty: 30 TABLET | Refills: 0 | Status: SHIPPED | OUTPATIENT
Start: 2017-11-15 | End: 2017-11-25

## 2017-11-16 ENCOUNTER — TELEPHONE (OUTPATIENT)
Dept: ORTHOPEDIC SURGERY | Age: 72
End: 2017-11-16

## 2017-11-16 NOTE — TELEPHONE ENCOUNTER
due to MRI not being authorized yet and schedule conflict next week patient would like to reschedule MRI for after Thanksgiving.

## 2017-11-27 ENCOUNTER — TELEPHONE (OUTPATIENT)
Dept: ORTHOPEDIC SURGERY | Age: 72
End: 2017-11-27

## 2017-12-04 ENCOUNTER — OFFICE VISIT (OUTPATIENT)
Dept: INTERNAL MEDICINE CLINIC | Age: 72
End: 2017-12-04

## 2017-12-04 VITALS
OXYGEN SATURATION: 98 % | DIASTOLIC BLOOD PRESSURE: 82 MMHG | HEART RATE: 64 BPM | SYSTOLIC BLOOD PRESSURE: 138 MMHG | BODY MASS INDEX: 36.91 KG/M2 | WEIGHT: 189 LBS

## 2017-12-04 DIAGNOSIS — E66.9 DIABETES MELLITUS TYPE 2 IN OBESE (HCC): Primary | ICD-10-CM

## 2017-12-04 DIAGNOSIS — M94.8X9 CHONDRITIS: ICD-10-CM

## 2017-12-04 DIAGNOSIS — N18.2 CHRONIC KIDNEY DISEASE, STAGE II (MILD): ICD-10-CM

## 2017-12-04 DIAGNOSIS — E11.69 DIABETES MELLITUS TYPE 2 IN OBESE (HCC): Primary | ICD-10-CM

## 2017-12-04 PROCEDURE — G8417 CALC BMI ABV UP PARAM F/U: HCPCS | Performed by: NURSE PRACTITIONER

## 2017-12-04 PROCEDURE — 1123F ACP DISCUSS/DSCN MKR DOCD: CPT | Performed by: NURSE PRACTITIONER

## 2017-12-04 PROCEDURE — 3017F COLORECTAL CA SCREEN DOC REV: CPT | Performed by: NURSE PRACTITIONER

## 2017-12-04 PROCEDURE — 3044F HG A1C LEVEL LT 7.0%: CPT | Performed by: NURSE PRACTITIONER

## 2017-12-04 PROCEDURE — G8484 FLU IMMUNIZE NO ADMIN: HCPCS | Performed by: NURSE PRACTITIONER

## 2017-12-04 PROCEDURE — 1036F TOBACCO NON-USER: CPT | Performed by: NURSE PRACTITIONER

## 2017-12-04 PROCEDURE — 1090F PRES/ABSN URINE INCON ASSESS: CPT | Performed by: NURSE PRACTITIONER

## 2017-12-04 PROCEDURE — 99213 OFFICE O/P EST LOW 20 MIN: CPT | Performed by: NURSE PRACTITIONER

## 2017-12-04 PROCEDURE — 4040F PNEUMOC VAC/ADMIN/RCVD: CPT | Performed by: NURSE PRACTITIONER

## 2017-12-04 PROCEDURE — G8399 PT W/DXA RESULTS DOCUMENT: HCPCS | Performed by: NURSE PRACTITIONER

## 2017-12-04 PROCEDURE — G8427 DOCREV CUR MEDS BY ELIG CLIN: HCPCS | Performed by: NURSE PRACTITIONER

## 2017-12-04 PROCEDURE — 3014F SCREEN MAMMO DOC REV: CPT | Performed by: NURSE PRACTITIONER

## 2017-12-04 RX ORDER — ATENOLOL 25 MG/1
25 TABLET ORAL DAILY
Qty: 90 TABLET | Refills: 1 | Status: SHIPPED | OUTPATIENT
Start: 2017-12-04 | End: 2018-03-12 | Stop reason: ALTCHOICE

## 2017-12-04 RX ORDER — IRBESARTAN AND HYDROCHLOROTHIAZIDE 150; 12.5 MG/1; MG/1
1 TABLET, FILM COATED ORAL DAILY
Qty: 30 TABLET | Refills: 11 | Status: SHIPPED | OUTPATIENT
Start: 2017-12-04 | End: 2018-11-06 | Stop reason: ALTCHOICE

## 2017-12-04 RX ORDER — SPIRONOLACTONE 25 MG/1
25 TABLET ORAL DAILY
Qty: 90 TABLET | Refills: 1 | Status: SHIPPED | OUTPATIENT
Start: 2017-12-04 | End: 2017-12-04 | Stop reason: SDUPTHER

## 2017-12-04 RX ORDER — TORSEMIDE 20 MG/1
20 TABLET ORAL DAILY
Qty: 10 TABLET | Refills: 0 | Status: SHIPPED | OUTPATIENT
Start: 2017-12-04 | End: 2017-12-04 | Stop reason: SDUPTHER

## 2017-12-04 RX ORDER — SPIRONOLACTONE 25 MG/1
25 TABLET ORAL DAILY
Qty: 90 TABLET | Refills: 1 | Status: SHIPPED | OUTPATIENT
Start: 2017-12-04 | End: 2018-01-11 | Stop reason: SDUPTHER

## 2017-12-04 RX ORDER — TORSEMIDE 20 MG/1
20 TABLET ORAL DAILY
Qty: 10 TABLET | Refills: 0 | Status: SHIPPED | OUTPATIENT
Start: 2017-12-04 | End: 2017-12-19 | Stop reason: SDUPTHER

## 2017-12-04 RX ORDER — ALLOPURINOL 100 MG/1
100 TABLET ORAL DAILY
Qty: 30 TABLET | Refills: 2 | Status: SHIPPED | OUTPATIENT
Start: 2017-12-04 | End: 2017-12-04 | Stop reason: SDUPTHER

## 2017-12-04 RX ORDER — ATENOLOL 25 MG/1
25 TABLET ORAL DAILY
Qty: 90 TABLET | Refills: 1 | Status: SHIPPED | OUTPATIENT
Start: 2017-12-04 | End: 2017-12-04 | Stop reason: SDUPTHER

## 2017-12-04 RX ORDER — LEVOTHYROXINE SODIUM 88 UG/1
TABLET ORAL
Qty: 30 TABLET | Refills: 3 | Status: SHIPPED | OUTPATIENT
Start: 2017-12-04 | End: 2017-12-04 | Stop reason: SDUPTHER

## 2017-12-04 RX ORDER — LEVOTHYROXINE SODIUM 88 UG/1
TABLET ORAL
Qty: 30 TABLET | Refills: 3 | Status: SHIPPED | OUTPATIENT
Start: 2017-12-04 | End: 2018-01-11 | Stop reason: SDUPTHER

## 2017-12-04 RX ORDER — IRBESARTAN AND HYDROCHLOROTHIAZIDE 150; 12.5 MG/1; MG/1
1 TABLET, FILM COATED ORAL DAILY
Qty: 30 TABLET | Refills: 11 | Status: SHIPPED | OUTPATIENT
Start: 2017-12-04 | End: 2017-12-04 | Stop reason: SDUPTHER

## 2017-12-04 RX ORDER — ALLOPURINOL 100 MG/1
100 TABLET ORAL DAILY
Qty: 30 TABLET | Refills: 2 | Status: SHIPPED | OUTPATIENT
Start: 2017-12-04 | End: 2018-03-22 | Stop reason: SDUPTHER

## 2017-12-04 NOTE — PROGRESS NOTES
Subjective:      Patient ID: Fallon Summers is a 66 y.o. female. Patient presents with:  Results: Pt would like to go over Bone Density test and MRI  Referral - General: Pt need a referral for a colonoscopy    Presents today to discuss bone density tests, MRI, and lab work. States that she is concerned about the metformin due to the side effect noted, will discontinue has not been taken her Trulicity due to cost will locate another agent to provide for her for management of her diabetes        Review of Systems   Musculoskeletal: Positive for joint swelling (right knee). Vitals:    12/04/17 0948   BP: 138/82   Pulse: 64   SpO2: 98%     Wt Readings from Last 3 Encounters:   12/04/17 189 lb (85.7 kg)   11/01/17 188 lb 6.4 oz (85.5 kg)   08/23/17 193 lb (87.5 kg)       Objective:   Physical Exam   Constitutional: She is oriented to person, place, and time. She appears well-developed. Musculoskeletal:        Legs:  Neurological: She is alert and oriented to person, place, and time.        Assessment:      Chondritis right knee  Diabetes 2: Metformin discontinued due to low GFR      Plan:     chondritis right knee    Over the counter Osteo Biflex  Extra blanket/wrap over knees at night     diabetes type 2    Stop Metformin, watch diet and exercise  Watch starch intake and portion sizes  Call next week for Trulicity

## 2017-12-05 ENCOUNTER — TELEPHONE (OUTPATIENT)
Dept: INTERNAL MEDICINE CLINIC | Age: 72
End: 2017-12-05

## 2017-12-11 ENCOUNTER — TELEPHONE (OUTPATIENT)
Dept: INTERNAL MEDICINE CLINIC | Age: 72
End: 2017-12-11

## 2017-12-11 NOTE — TELEPHONE ENCOUNTER
Please inform pt that we are out of Trulicity 7.98 mcg. Therefore, we do not have any samples to provide her.

## 2017-12-15 ENCOUNTER — PROCEDURE VISIT (OUTPATIENT)
Dept: DERMATOLOGY | Age: 72
End: 2017-12-15

## 2017-12-15 DIAGNOSIS — C44.722 SCC (SQUAMOUS CELL CARCINOMA), LEG, RIGHT: Primary | ICD-10-CM

## 2017-12-15 DIAGNOSIS — L57.0 AK (ACTINIC KERATOSIS): ICD-10-CM

## 2017-12-15 PROCEDURE — 17261 DSTRJ MAL LES T/A/L .6-1.0CM: CPT | Performed by: DERMATOLOGY

## 2017-12-15 PROCEDURE — 17003 DESTRUCT PREMALG LES 2-14: CPT | Performed by: DERMATOLOGY

## 2017-12-15 PROCEDURE — 17000 DESTRUCT PREMALG LESION: CPT | Performed by: DERMATOLOGY

## 2017-12-15 NOTE — PROGRESS NOTES
[facility]  Chanell Briggs MD  [phone]      Vivi Ge  1945    66 y.o. female     Date of Visit: 12/15/2017    Chief Complaint: SCC    History of Present Illness:    1. 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 2.  She also complains of 2 painful lesions on the left leg. Review of Systems:  Gen: Feels well, good sense of health. Heme: No abnormal bruising or bleeding. Past Medical History, Family History, Surgical History, Medications and Allergies reviewed. Past Medical History:   Diagnosis Date    Diabetes ([facility] [address]     Hypertension     Kidney disease      Past Surgical History:   Procedure Laterality Date    APPENDECTOMY      CHOLECYSTECTOMY      HYSTERECTOMY      MASTECTOMY, BILATERAL      preventive    ROTATOR CUFF REPAIR         Allergies   Allergen Reactions    Tetracyclines & Related Anaphylaxis    Amoxicillin     Codeine     Penicillins      Outpatient Prescriptions Marked as Taking for the 12/15/17 encounter (Procedure visit) with Edvin Garcia MD   Medication Sig Dispense Refill    allopurinol (ZYLOPRIM) 100 MG tablet Take 1 tablet by mouth daily 30 tablet 2    levothyroxine (SYNTHROID) 88 MCG tablet 1 tab daily before breakfast 30 tablet 3    atenolol (TENORMIN) 25 MG tablet Take 1 tablet by mouth daily 90 tablet 1    torsemide (DEMADEX) 20 MG tablet Take 1 tablet by mouth daily 10 tablet 0    spironolactone (ALDACTONE) 25 MG tablet Take 1 tablet by mouth daily 90 tablet 1    irbesartan-hydrochlorothiazide (AVALIDE) 150-12.5 MG per tablet Take 1 tablet by mouth daily 30 tablet 11    triamcinolone (KENALOG) 0.1 % cream Apply topically 2 times daily. 160 g 1    betamethasone dipropionate (DIPROLENE) 0.05 % cream Apply to affected areas on the legs twice daily until improved.  180 g 1    Dulaglutide (TRULICITY) 8.30 BW/4.6EH SOPN Inject 0.75 mg into the skin once

## 2017-12-19 ENCOUNTER — TELEPHONE (OUTPATIENT)
Dept: INTERNAL MEDICINE CLINIC | Age: 72
End: 2017-12-19

## 2017-12-19 RX ORDER — TORSEMIDE 20 MG/1
20 TABLET ORAL DAILY
Qty: 10 TABLET | Refills: 0 | Status: CANCELLED | OUTPATIENT
Start: 2017-12-19

## 2017-12-19 RX ORDER — TORSEMIDE 20 MG/1
20 TABLET ORAL DAILY
Qty: 10 TABLET | Refills: 0 | Status: SHIPPED | OUTPATIENT
Start: 2017-12-19 | End: 2017-12-29 | Stop reason: SDUPTHER

## 2017-12-19 RX ORDER — TORSEMIDE 20 MG/1
20 TABLET ORAL DAILY
Qty: 10 TABLET | Refills: 0 | Status: SHIPPED | OUTPATIENT
Start: 2017-12-19 | End: 2017-12-19 | Stop reason: SDUPTHER

## 2017-12-19 RX ORDER — POTASSIUM CHLORIDE 20 MEQ/1
20 TABLET, EXTENDED RELEASE ORAL 2 TIMES DAILY
Qty: 180 TABLET | Refills: 1 | Status: SHIPPED | OUTPATIENT
Start: 2017-12-19 | End: 2019-02-18

## 2017-12-19 RX ORDER — GABAPENTIN 100 MG/1
100 CAPSULE ORAL DAILY
Qty: 90 CAPSULE | Refills: 3 | Status: SHIPPED | OUTPATIENT
Start: 2017-12-19 | End: 2019-02-18

## 2017-12-19 NOTE — TELEPHONE ENCOUNTER
Patient called and stated that she was to  a sample of TRUCILITY. Patient also stated that she is leaving out of town tomorrow and needs her refill for torsemide (DEMADEX) 20 MG tablet called into [facility]. Patient needs refills on all other medications and she stated this was discussed at her last appointment.

## 2018-01-03 RX ORDER — TORSEMIDE 20 MG/1
TABLET ORAL
Qty: 10 TABLET | Refills: 0 | Status: SHIPPED | OUTPATIENT
Start: 2018-01-03 | End: 2018-01-11 | Stop reason: SDUPTHER

## 2018-01-05 ENCOUNTER — TELEPHONE (OUTPATIENT)
Dept: DERMATOLOGY | Age: 73
End: 2018-01-05

## 2018-01-05 RX ORDER — TRIAMCINOLONE ACETONIDE 1 MG/G
CREAM TOPICAL
Qty: 160 G | Refills: 1 | Status: SHIPPED | OUTPATIENT
Start: 2018-01-05 | End: 2018-04-21 | Stop reason: SDUPTHER

## 2018-01-10 ENCOUNTER — OFFICE VISIT (OUTPATIENT)
Dept: ORTHOPEDIC SURGERY | Age: 73
End: 2018-01-10

## 2018-01-10 VITALS — BODY MASS INDEX: 30.82 KG/M2 | HEIGHT: 65 IN | WEIGHT: 185 LBS

## 2018-01-10 DIAGNOSIS — M17.0 PRIMARY OSTEOARTHRITIS OF BOTH KNEES: Primary | ICD-10-CM

## 2018-01-10 DIAGNOSIS — I83.92 VARICOSE VEINS OF LEFT LOWER EXTREMITY: ICD-10-CM

## 2018-01-10 DIAGNOSIS — R60.0 LOWER EXTREMITY EDEMA: ICD-10-CM

## 2018-01-10 DIAGNOSIS — M70.51 BURSITIS OF OTHER BURSA OF RIGHT KNEE: ICD-10-CM

## 2018-01-10 PROCEDURE — G8399 PT W/DXA RESULTS DOCUMENT: HCPCS | Performed by: INTERNAL MEDICINE

## 2018-01-10 PROCEDURE — 3017F COLORECTAL CA SCREEN DOC REV: CPT | Performed by: INTERNAL MEDICINE

## 2018-01-10 PROCEDURE — 76882 US LMTD JT/FCL EVL NVASC XTR: CPT | Performed by: INTERNAL MEDICINE

## 2018-01-10 PROCEDURE — 1036F TOBACCO NON-USER: CPT | Performed by: INTERNAL MEDICINE

## 2018-01-10 PROCEDURE — 1090F PRES/ABSN URINE INCON ASSESS: CPT | Performed by: INTERNAL MEDICINE

## 2018-01-10 PROCEDURE — 4040F PNEUMOC VAC/ADMIN/RCVD: CPT | Performed by: INTERNAL MEDICINE

## 2018-01-10 PROCEDURE — 99214 OFFICE O/P EST MOD 30 MIN: CPT | Performed by: INTERNAL MEDICINE

## 2018-01-10 PROCEDURE — G8417 CALC BMI ABV UP PARAM F/U: HCPCS | Performed by: INTERNAL MEDICINE

## 2018-01-10 PROCEDURE — G8427 DOCREV CUR MEDS BY ELIG CLIN: HCPCS | Performed by: INTERNAL MEDICINE

## 2018-01-10 PROCEDURE — G8484 FLU IMMUNIZE NO ADMIN: HCPCS | Performed by: INTERNAL MEDICINE

## 2018-01-10 PROCEDURE — 3014F SCREEN MAMMO DOC REV: CPT | Performed by: INTERNAL MEDICINE

## 2018-01-10 PROCEDURE — 1123F ACP DISCUSS/DSCN MKR DOCD: CPT | Performed by: INTERNAL MEDICINE

## 2018-01-10 NOTE — PROGRESS NOTES
Constitutional: Patient is adequately groomed with no evidence of malnutrition    Physical Exam: bilateral knee      Primary Exam:    Inspection:  Mild asymmetric soft tissue swelling left lower extremity/proximal calf there are varicosities evident in the proximal calf-right resolution of intra-articular effusion-left knee      Palpation:  No focal tenderness      Range of Motion:  Symmetric and full range low-grade discomfort and range flexion-      Strength:  Normal      Special Tests:  Medial lateral Salome's negative bilateral      Skin: There are no rashes, ulcerations or lesions. Gait: Nonantalgic      Reflex intact lower     Additional Comments:        Additional Examinations:         Neurolgic -Light touch sensation and manual muscle testing normal L2-S1. No fasiculations. Pattella tendon and Achilles tendon reflexes +2 bilaterally. Seated SLR negative  Vascular-varicosities proximal calf-left           Office Imaging Results/Procedures PerformedToday:     Limited ultrasound evaluation-left knee    Logic E ultrasound  8-10 MHz    The patient was position prone on examination table and the knee hyperextended. The popliteal fossa was evaluated extensively using short axis along axis technique. There was evidence of varicosities superficially within the subcutaneous tissue and these were easily collapsible. No evidence of thrombosis. The neurovascular bundle was identified in the popliteal fossa the popliteal artery was identified there is no evidence of aneurysmal change. Specifically the posterior articular recess of the knee was evaluated medially and laterally no evidence of interarticular effusion. There was no evidence of Bakers cyst in the popliteal fossa. No evidence of soft tissue mass or other cystic lesions.   No other soft tissue or osseous abnormalities       Office Procedures:     Orders Placed This Encounter   Procedures    US Extremity Non Vascular Limited     Order Specific Question:   Reason for exam:     Answer:   pain           Other Outside Imaging and Testing Personally Reviewed:      MRI reviewed by myself and corroborated with the report       [address]   null   [place], [address]           Patient Name: Saundra Peterson   Case ID: [id number]   Patient : 1945   Referring Physician: Kaveh Tejeda MD   Exam Date: 2017   Exam Description: MR Right Knee w/o Contrast            HISTORY: 27-year-old female with knee pain, evaluate medial meniscal tear.        TECHNICAL FACTORS: Long- and short-axis fat- and water-weighted images were performed. 1.5T    High Field Oval.        FINDINGS: No acute bony macrofracture or contusion. Approximately 1cm thin chronic flap tear    involving outer half of midbody medial meniscus. Lateral meniscus intact. No high grade    chondromalacia in the femorotibial compartments.        No acute sprain of ACL or PCL. No acute sprain of medial or lateral collateral ligament    complexes.        Lateral tilting and mild subluxation of the patella. Near full thickness chondral loss of the    apex and medial facet of the patella. No high grade chondromalacia of trochlea. Quadriceps and    patellar tendons intact.        A small to moderate amount of joint effusion. No Baker's cyst. No acute muscle strain.        CONCLUSION:    1. 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 2. A small thin chronic flap tear involving outer half of midbody medial meniscus. 3. Mild capsulitis with a small effusion.            Thank you for the opportunity to provide your interpretation.       Mohinder Bourgeois M.D.       RACHANA/ana cristina   D: [address] 2017 1:59 PM           Assessment   Impression: . Encounter Diagnoses   Name Primary?     Primary osteoarthritis of both knees Yes    Bursitis of other bursa of right knee     Varicose veins of left lower extremity     Lower

## 2018-01-11 RX ORDER — SPIRONOLACTONE 25 MG/1
25 TABLET ORAL DAILY
Qty: 90 TABLET | Refills: 1 | Status: SHIPPED | OUTPATIENT
Start: 2018-01-11 | End: 2018-06-27 | Stop reason: SDUPTHER

## 2018-01-11 RX ORDER — TORSEMIDE 20 MG/1
TABLET ORAL
Qty: 90 TABLET | Refills: 1 | Status: SHIPPED | OUTPATIENT
Start: 2018-01-11 | End: 2018-05-31 | Stop reason: SDUPTHER

## 2018-01-11 RX ORDER — LEVOTHYROXINE SODIUM 88 UG/1
TABLET ORAL
Qty: 30 TABLET | Refills: 3 | Status: SHIPPED | OUTPATIENT
Start: 2018-01-11 | End: 2018-04-09 | Stop reason: SDUPTHER

## 2018-01-12 ENCOUNTER — HOSPITAL ENCOUNTER (OUTPATIENT)
Dept: ULTRASOUND IMAGING | Age: 73
Discharge: HOME | End: 2018-01-12
Attending: INTERNAL MEDICINE | Admitting: INTERNAL MEDICINE

## 2018-01-12 DIAGNOSIS — I10 ESSENTIAL HYPERTENSION: ICD-10-CM

## 2018-01-12 DIAGNOSIS — N18.30 CKD (CHRONIC KIDNEY DISEASE), STAGE III (HCC): ICD-10-CM

## 2018-01-12 DIAGNOSIS — N18.30 CHRONIC KIDNEY DISEASE, STAGE III (MODERATE) (HCC): ICD-10-CM

## 2018-01-12 LAB
ANION GAP SERPL CALCULATED.3IONS-SCNC: 15 MMOL/L (ref 3–16)
BASOPHILS ABSOLUTE: 0.1 K/UL (ref 0–0.2)
BASOPHILS RELATIVE PERCENT: 1.1 %
BUN BLDV-MCNC: 41 MG/DL (ref 7–20)
CALCIUM SERPL-MCNC: 9.4 MG/DL (ref 8.3–10.6)
CHLORIDE BLD-SCNC: 104 MMOL/L (ref 99–110)
CO2: 23 MMOL/L (ref 21–32)
CREAT SERPL-MCNC: 1.8 MG/DL (ref 0.6–1.2)
CREATININE URINE: 95.9 MG/DL (ref 28–259)
EOSINOPHILS ABSOLUTE: 0.2 K/UL (ref 0–0.6)
EOSINOPHILS RELATIVE PERCENT: 2.6 %
GFR AFRICAN AMERICAN: 33
GFR NON-AFRICAN AMERICAN: 28
GLUCOSE BLD-MCNC: 116 MG/DL (ref 70–99)
HCT VFR BLD CALC: 39.7 % (ref 36–48)
HEMOGLOBIN: 13 G/DL (ref 12–16)
LYMPHOCYTES ABSOLUTE: 2 K/UL (ref 1–5.1)
LYMPHOCYTES RELATIVE PERCENT: 21.3 %
MCH RBC QN AUTO: 29.1 PG (ref 26–34)
MCHC RBC AUTO-ENTMCNC: 32.8 G/DL (ref 31–36)
MCV RBC AUTO: 88.6 FL (ref 80–100)
MICROALBUMIN UR-MCNC: 174.1 MG/DL
MICROALBUMIN/CREAT UR-RTO: 1815.4 MG/G (ref 0–30)
MONOCYTES ABSOLUTE: 0.8 K/UL (ref 0–1.3)
MONOCYTES RELATIVE PERCENT: 8.3 %
NEUTROPHILS ABSOLUTE: 6.2 K/UL (ref 1.7–7.7)
NEUTROPHILS RELATIVE PERCENT: 66.7 %
PDW BLD-RTO: 15.6 % (ref 12.4–15.4)
PLATELET # BLD: 317 K/UL (ref 135–450)
PMV BLD AUTO: 9.4 FL (ref 5–10.5)
POTASSIUM SERPL-SCNC: 4.7 MMOL/L (ref 3.5–5.1)
RBC # BLD: 4.48 M/UL (ref 4–5.2)
SODIUM BLD-SCNC: 142 MMOL/L (ref 136–145)
WBC # BLD: 9.3 K/UL (ref 4–11)

## 2018-01-30 NOTE — PROGRESS NOTES
Results for the following test have been finalized and entered into the patients chart
Site: AMMY knee     Betamethasone  Lot MIQUZ  NDC#  3060-7300-71([id number])    Dayna  NDC# 2985-4870-94  Lot number: [id number]
process      The nature of the finding, probable diagnosis and likely treatment was thoroughly discussed with the patient. The options, risks, complications, alternative treatment as well as some of the differential diagnosis was discussed. The patient was thoroughly informed and all questions were answered. the patient indicated understanding and satisfaction with the discussion. Orders:        Orders Placed This Encounter   Procedures    Knee Bilateral Standard     Order Specific Question:   Reason for exam:     Answer:   pain    US Guided Needle Placement     Order Specific Question:   Reason for exam:     Answer:   R knee pain    MRI Knee Right WO Contrast     Standing Status:   Future     Standing Expiration Date:   11/15/2018     Scheduling Instructions:      Irwin Xavier 11/17/17 @ 7am     Order Specific Question:   Reason for exam:     Answer:   r/o OCD and MMT    MA BETAMETHASONE ACET&SOD PHOSP    MA ARTHROCENTESIS ASPIR&/INJ MAJOR JT/BURSA W/O US    DJO Economy Hinged Knee Brace     Patient was prescribed a DJO Hinged Knee brace. The right knee will require stabilization / immobilization from this semi-rigid / rigid orthosis to improve their function. The orthosis will assist in protecting the affected area, provide functional support and facilitate healing. The patient was educated and fit by a healthcare professional with expert knowledge and specialization in brace application while under the direct supervision of the physician. Verbal and written instructions for the use of and application of this item were provided. They were instructed to contact the office immediately should the brace result in increased pain, decreased sensation, increased swelling or worsening of the condition. Disclaimer: \"This note was dictated with voice recognition software. Though review and correction are routine, we apologize for any errors. \"

## 2018-03-06 ENCOUNTER — HOSPITAL ENCOUNTER (OUTPATIENT)
Dept: OTHER | Age: 73
Discharge: HOME | End: 2018-03-06
Attending: INTERNAL MEDICINE | Admitting: INTERNAL MEDICINE

## 2018-03-06 DIAGNOSIS — I10 ESSENTIAL HYPERTENSION: ICD-10-CM

## 2018-03-06 DIAGNOSIS — N18.30 CKD (CHRONIC KIDNEY DISEASE), STAGE III (HCC): ICD-10-CM

## 2018-03-06 LAB
PARATHYROID HORMONE INTACT: 94.4 PG/ML (ref 14–72)
PHOSPHORUS: 4.6 MG/DL (ref 2.5–4.9)

## 2018-03-23 RX ORDER — ALLOPURINOL 100 MG/1
TABLET ORAL
Qty: 90 TABLET | Refills: 2 | Status: SHIPPED | OUTPATIENT
Start: 2018-03-23 | End: 2018-11-05 | Stop reason: SDUPTHER

## 2018-03-29 ENCOUNTER — HOSPITAL ENCOUNTER (OUTPATIENT)
Dept: OTHER | Age: 73
Discharge: HOME | End: 2018-03-29
Attending: INTERNAL MEDICINE | Admitting: INTERNAL MEDICINE

## 2018-03-29 DIAGNOSIS — N18.30 CKD (CHRONIC KIDNEY DISEASE), STAGE III (HCC): ICD-10-CM

## 2018-03-29 LAB
ANION GAP SERPL CALCULATED.3IONS-SCNC: 14 MMOL/L (ref 3–16)
BUN BLDV-MCNC: 37 MG/DL (ref 7–20)
CALCIUM SERPL-MCNC: 9.7 MG/DL (ref 8.3–10.6)
CHLORIDE BLD-SCNC: 100 MMOL/L (ref 99–110)
CO2: 25 MMOL/L (ref 21–32)
CREAT SERPL-MCNC: 1.9 MG/DL (ref 0.6–1.2)
GFR AFRICAN AMERICAN: 31
GFR NON-AFRICAN AMERICAN: 26
GLUCOSE BLD-MCNC: 126 MG/DL (ref 70–99)
HCT VFR BLD CALC: 38.1 % (ref 36–48)
HEMOGLOBIN: 12.6 G/DL (ref 12–16)
IRON SATURATION: 18 % (ref 15–50)
IRON: 64 UG/DL (ref 37–145)
MCH RBC QN AUTO: 29.9 PG (ref 26–34)
MCHC RBC AUTO-ENTMCNC: 33 G/DL (ref 31–36)
MCV RBC AUTO: 90.4 FL (ref 80–100)
PDW BLD-RTO: 15.6 % (ref 12.4–15.4)
PLATELET # BLD: 307 K/UL (ref 135–450)
PMV BLD AUTO: 9.5 FL (ref 5–10.5)
POTASSIUM SERPL-SCNC: 4.6 MMOL/L (ref 3.5–5.1)
RBC # BLD: 4.21 M/UL (ref 4–5.2)
SODIUM BLD-SCNC: 139 MMOL/L (ref 136–145)
T4 FREE: 1.1 NG/DL (ref 0.9–1.8)
TOTAL IRON BINDING CAPACITY: 351 UG/DL (ref 260–445)
TSH REFLEX: 5.83 UIU/ML (ref 0.27–4.2)
WBC # BLD: 9.5 K/UL (ref 4–11)

## 2018-04-02 ENCOUNTER — TELEPHONE (OUTPATIENT)
Dept: INTERNAL MEDICINE CLINIC | Age: 73
End: 2018-04-02

## 2018-04-03 ENCOUNTER — TELEPHONE (OUTPATIENT)
Dept: ADMINISTRATIVE | Age: 73
End: 2018-04-03

## 2018-04-04 ENCOUNTER — OFFICE VISIT (OUTPATIENT)
Dept: INTERNAL MEDICINE CLINIC | Age: 73
End: 2018-04-04

## 2018-04-04 VITALS
OXYGEN SATURATION: 96 % | HEART RATE: 72 BPM | BODY MASS INDEX: 36.03 KG/M2 | SYSTOLIC BLOOD PRESSURE: 126 MMHG | DIASTOLIC BLOOD PRESSURE: 70 MMHG | WEIGHT: 197 LBS

## 2018-04-04 DIAGNOSIS — N30.01 ACUTE CYSTITIS WITH HEMATURIA: Primary | ICD-10-CM

## 2018-04-04 DIAGNOSIS — R30.0 DYSURIA: ICD-10-CM

## 2018-04-04 PROCEDURE — 1090F PRES/ABSN URINE INCON ASSESS: CPT | Performed by: NURSE PRACTITIONER

## 2018-04-04 PROCEDURE — G8399 PT W/DXA RESULTS DOCUMENT: HCPCS | Performed by: NURSE PRACTITIONER

## 2018-04-04 PROCEDURE — G8417 CALC BMI ABV UP PARAM F/U: HCPCS | Performed by: NURSE PRACTITIONER

## 2018-04-04 PROCEDURE — 1036F TOBACCO NON-USER: CPT | Performed by: NURSE PRACTITIONER

## 2018-04-04 PROCEDURE — 81002 URINALYSIS NONAUTO W/O SCOPE: CPT | Performed by: NURSE PRACTITIONER

## 2018-04-04 PROCEDURE — 3014F SCREEN MAMMO DOC REV: CPT | Performed by: NURSE PRACTITIONER

## 2018-04-04 PROCEDURE — 3017F COLORECTAL CA SCREEN DOC REV: CPT | Performed by: NURSE PRACTITIONER

## 2018-04-04 PROCEDURE — 99213 OFFICE O/P EST LOW 20 MIN: CPT | Performed by: NURSE PRACTITIONER

## 2018-04-04 PROCEDURE — G8427 DOCREV CUR MEDS BY ELIG CLIN: HCPCS | Performed by: NURSE PRACTITIONER

## 2018-04-04 PROCEDURE — 4040F PNEUMOC VAC/ADMIN/RCVD: CPT | Performed by: NURSE PRACTITIONER

## 2018-04-04 PROCEDURE — 1123F ACP DISCUSS/DSCN MKR DOCD: CPT | Performed by: NURSE PRACTITIONER

## 2018-04-04 RX ORDER — SULFAMETHOXAZOLE AND TRIMETHOPRIM 800; 160 MG/1; MG/1
1 TABLET ORAL 2 TIMES DAILY
Qty: 6 TABLET | Refills: 0 | Status: SHIPPED | OUTPATIENT
Start: 2018-04-04 | End: 2018-04-07

## 2018-04-06 LAB
ORGANISM: ABNORMAL
URINE CULTURE, ROUTINE: ABNORMAL
URINE CULTURE, ROUTINE: ABNORMAL

## 2018-04-10 RX ORDER — LEVOTHYROXINE SODIUM 88 UG/1
TABLET ORAL
Qty: 90 TABLET | Refills: 3 | Status: SHIPPED | OUTPATIENT
Start: 2018-04-10 | End: 2019-05-13 | Stop reason: SDUPTHER

## 2018-04-23 RX ORDER — TRIAMCINOLONE ACETONIDE 1 MG/G
CREAM TOPICAL
Qty: 160 G | Refills: 1 | Status: SHIPPED | OUTPATIENT
Start: 2018-04-23 | End: 2018-05-14 | Stop reason: SDUPTHER

## 2018-05-14 ENCOUNTER — OFFICE VISIT (OUTPATIENT)
Dept: DERMATOLOGY | Age: 73
End: 2018-05-14

## 2018-05-14 DIAGNOSIS — L40.0 PLAQUE PSORIASIS: ICD-10-CM

## 2018-05-14 DIAGNOSIS — Z85.828 HISTORY OF SCC (SQUAMOUS CELL CARCINOMA) OF SKIN: ICD-10-CM

## 2018-05-14 DIAGNOSIS — D48.5 NEOPLASM OF UNCERTAIN BEHAVIOR OF SKIN: ICD-10-CM

## 2018-05-14 DIAGNOSIS — L57.0 AK (ACTINIC KERATOSIS): Primary | ICD-10-CM

## 2018-05-14 DIAGNOSIS — L82.1 SK (SEBORRHEIC KERATOSIS): ICD-10-CM

## 2018-05-14 PROCEDURE — 1090F PRES/ABSN URINE INCON ASSESS: CPT | Performed by: DERMATOLOGY

## 2018-05-14 PROCEDURE — G8417 CALC BMI ABV UP PARAM F/U: HCPCS | Performed by: DERMATOLOGY

## 2018-05-14 PROCEDURE — 1036F TOBACCO NON-USER: CPT | Performed by: DERMATOLOGY

## 2018-05-14 PROCEDURE — 99213 OFFICE O/P EST LOW 20 MIN: CPT | Performed by: DERMATOLOGY

## 2018-05-14 PROCEDURE — G8399 PT W/DXA RESULTS DOCUMENT: HCPCS | Performed by: DERMATOLOGY

## 2018-05-14 PROCEDURE — 3017F COLORECTAL CA SCREEN DOC REV: CPT | Performed by: DERMATOLOGY

## 2018-05-14 PROCEDURE — 4040F PNEUMOC VAC/ADMIN/RCVD: CPT | Performed by: DERMATOLOGY

## 2018-05-14 PROCEDURE — 17000 DESTRUCT PREMALG LESION: CPT | Performed by: DERMATOLOGY

## 2018-05-14 PROCEDURE — G8427 DOCREV CUR MEDS BY ELIG CLIN: HCPCS | Performed by: DERMATOLOGY

## 2018-05-14 PROCEDURE — 1123F ACP DISCUSS/DSCN MKR DOCD: CPT | Performed by: DERMATOLOGY

## 2018-05-14 PROCEDURE — 17003 DESTRUCT PREMALG LES 2-14: CPT | Performed by: DERMATOLOGY

## 2018-05-14 PROCEDURE — 11100 PR BIOPSY OF SKIN LESION: CPT | Performed by: DERMATOLOGY

## 2018-05-14 RX ORDER — TRIAMCINOLONE ACETONIDE 1 MG/G
CREAM TOPICAL
Qty: 160 G | Refills: 1 | Status: SHIPPED | OUTPATIENT
Start: 2018-05-14 | End: 2019-10-03 | Stop reason: SDUPTHER

## 2018-05-17 ENCOUNTER — TELEPHONE (OUTPATIENT)
Dept: DERMATOLOGY | Age: 73
End: 2018-05-17

## 2018-05-30 ENCOUNTER — TELEPHONE (OUTPATIENT)
Dept: INTERNAL MEDICINE CLINIC | Age: 73
End: 2018-05-30

## 2018-05-30 NOTE — TELEPHONE ENCOUNTER
Patient called and stated Human contacted her and stated that she needs a new script for her torsemide (DEMADEX) 20 MG tablet     Patient Phone  [phone]

## 2018-05-31 RX ORDER — TORSEMIDE 20 MG/1
TABLET ORAL
Qty: 90 TABLET | Refills: 1 | Status: SHIPPED | OUTPATIENT
Start: 2018-05-31 | End: 2019-01-10 | Stop reason: SDUPTHER

## 2018-06-08 RX ORDER — DULAGLUTIDE 0.75 MG/.5ML
INJECTION, SOLUTION SUBCUTANEOUS
Qty: 12 PEN | Refills: 4 | Status: SHIPPED | OUTPATIENT
Start: 2018-06-08 | End: 2018-08-08 | Stop reason: SDUPTHER

## 2018-06-20 ENCOUNTER — OFFICE VISIT (OUTPATIENT)
Dept: INTERNAL MEDICINE CLINIC | Age: 73
End: 2018-06-20

## 2018-06-20 VITALS
BODY MASS INDEX: 35.3 KG/M2 | HEART RATE: 96 BPM | DIASTOLIC BLOOD PRESSURE: 72 MMHG | SYSTOLIC BLOOD PRESSURE: 140 MMHG | TEMPERATURE: 97.6 F | WEIGHT: 193 LBS | OXYGEN SATURATION: 97 %

## 2018-06-20 DIAGNOSIS — J02.9 PHARYNGITIS, UNSPECIFIED ETIOLOGY: Primary | ICD-10-CM

## 2018-06-20 DIAGNOSIS — J01.40 SUBACUTE PANSINUSITIS: ICD-10-CM

## 2018-06-20 LAB — S PYO AG THROAT QL: NORMAL

## 2018-06-20 PROCEDURE — G8417 CALC BMI ABV UP PARAM F/U: HCPCS | Performed by: NURSE PRACTITIONER

## 2018-06-20 PROCEDURE — 4040F PNEUMOC VAC/ADMIN/RCVD: CPT | Performed by: NURSE PRACTITIONER

## 2018-06-20 PROCEDURE — 1036F TOBACCO NON-USER: CPT | Performed by: NURSE PRACTITIONER

## 2018-06-20 PROCEDURE — 1123F ACP DISCUSS/DSCN MKR DOCD: CPT | Performed by: NURSE PRACTITIONER

## 2018-06-20 PROCEDURE — G8427 DOCREV CUR MEDS BY ELIG CLIN: HCPCS | Performed by: NURSE PRACTITIONER

## 2018-06-20 PROCEDURE — 1090F PRES/ABSN URINE INCON ASSESS: CPT | Performed by: NURSE PRACTITIONER

## 2018-06-20 PROCEDURE — G8399 PT W/DXA RESULTS DOCUMENT: HCPCS | Performed by: NURSE PRACTITIONER

## 2018-06-20 PROCEDURE — 3017F COLORECTAL CA SCREEN DOC REV: CPT | Performed by: NURSE PRACTITIONER

## 2018-06-20 PROCEDURE — 99213 OFFICE O/P EST LOW 20 MIN: CPT | Performed by: NURSE PRACTITIONER

## 2018-06-20 PROCEDURE — 87880 STREP A ASSAY W/OPTIC: CPT | Performed by: NURSE PRACTITIONER

## 2018-06-20 RX ORDER — DEXTROMETHORPHAN POLISTIREX 30 MG/5ML
60 SUSPENSION ORAL 2 TIMES DAILY PRN
Qty: 148 ML | Refills: 0 | Status: SHIPPED | OUTPATIENT
Start: 2018-06-20 | End: 2018-06-30

## 2018-06-20 RX ORDER — SULFAMETHOXAZOLE AND TRIMETHOPRIM 800; 160 MG/1; MG/1
1 TABLET ORAL 2 TIMES DAILY
Qty: 20 TABLET | Refills: 0 | Status: SHIPPED | OUTPATIENT
Start: 2018-06-20 | End: 2018-06-30

## 2018-06-20 ASSESSMENT — ENCOUNTER SYMPTOMS: COUGH: 1

## 2018-06-27 ENCOUNTER — TELEPHONE (OUTPATIENT)
Dept: INTERNAL MEDICINE CLINIC | Age: 73
End: 2018-06-27

## 2018-06-27 RX ORDER — SPIRONOLACTONE 25 MG/1
TABLET ORAL
Qty: 90 TABLET | Refills: 1 | Status: SHIPPED | OUTPATIENT
Start: 2018-06-27 | End: 2018-11-05 | Stop reason: SDUPTHER

## 2018-07-02 ENCOUNTER — OFFICE VISIT (OUTPATIENT)
Dept: INTERNAL MEDICINE CLINIC | Age: 73
End: 2018-07-02

## 2018-07-02 VITALS
WEIGHT: 194 LBS | DIASTOLIC BLOOD PRESSURE: 60 MMHG | SYSTOLIC BLOOD PRESSURE: 120 MMHG | TEMPERATURE: 98 F | OXYGEN SATURATION: 96 % | HEART RATE: 80 BPM | BODY MASS INDEX: 35.48 KG/M2

## 2018-07-02 DIAGNOSIS — J01.40 SUBACUTE PANSINUSITIS: Primary | ICD-10-CM

## 2018-07-02 DIAGNOSIS — J20.9 ACUTE BRONCHITIS, UNSPECIFIED ORGANISM: ICD-10-CM

## 2018-07-02 DIAGNOSIS — E03.9 ACQUIRED HYPOTHYROIDISM: ICD-10-CM

## 2018-07-02 PROCEDURE — 1123F ACP DISCUSS/DSCN MKR DOCD: CPT | Performed by: INTERNAL MEDICINE

## 2018-07-02 PROCEDURE — 4040F PNEUMOC VAC/ADMIN/RCVD: CPT | Performed by: INTERNAL MEDICINE

## 2018-07-02 PROCEDURE — 1036F TOBACCO NON-USER: CPT | Performed by: INTERNAL MEDICINE

## 2018-07-02 PROCEDURE — 1090F PRES/ABSN URINE INCON ASSESS: CPT | Performed by: INTERNAL MEDICINE

## 2018-07-02 PROCEDURE — 36415 COLL VENOUS BLD VENIPUNCTURE: CPT | Performed by: INTERNAL MEDICINE

## 2018-07-02 PROCEDURE — 99213 OFFICE O/P EST LOW 20 MIN: CPT | Performed by: INTERNAL MEDICINE

## 2018-07-02 PROCEDURE — G8399 PT W/DXA RESULTS DOCUMENT: HCPCS | Performed by: INTERNAL MEDICINE

## 2018-07-02 PROCEDURE — G8427 DOCREV CUR MEDS BY ELIG CLIN: HCPCS | Performed by: INTERNAL MEDICINE

## 2018-07-02 PROCEDURE — 3017F COLORECTAL CA SCREEN DOC REV: CPT | Performed by: INTERNAL MEDICINE

## 2018-07-02 PROCEDURE — G8417 CALC BMI ABV UP PARAM F/U: HCPCS | Performed by: INTERNAL MEDICINE

## 2018-07-02 RX ORDER — FLUCONAZOLE 150 MG/1
150 TABLET ORAL ONCE
Qty: 3 TABLET | Refills: 0 | Status: SHIPPED | OUTPATIENT
Start: 2018-07-02 | End: 2018-07-02

## 2018-07-02 RX ORDER — LEVOFLOXACIN 750 MG/1
750 TABLET ORAL DAILY
Qty: 10 TABLET | Refills: 0 | Status: SHIPPED | OUTPATIENT
Start: 2018-07-02 | End: 2018-07-12

## 2018-07-02 RX ORDER — BENZONATATE 200 MG/1
200 CAPSULE ORAL 3 TIMES DAILY PRN
Qty: 30 CAPSULE | Refills: 0 | Status: SHIPPED | OUTPATIENT
Start: 2018-07-02 | End: 2018-07-09

## 2018-07-02 ASSESSMENT — ENCOUNTER SYMPTOMS
COUGH: 1
SHORTNESS OF BREATH: 1
RHINORRHEA: 0
WHEEZING: 1
EYES NEGATIVE: 1
GASTROINTESTINAL NEGATIVE: 1
CHEST TIGHTNESS: 1
SINUS PRESSURE: 1
SINUS PAIN: 1

## 2018-07-02 NOTE — PROGRESS NOTES
glucose monitoring kit (FREESTYLE) monitoring kit 1 kit by Does not apply route daily as needed (one touch preferred) 1 kit 0    Lancets 61A MISC 1 applicator by Does not apply route 2 times daily 100 each 3    glucose blood VI test strips (ASCENSIA AUTODISC VI;ONE TOUCH ULTRA TEST VI) strip 1 each by In Vitro route 2 times daily One Touch preferred 100 each 3    Multiple Vitamin (MULTI-VITAMIN PO) Take by mouth      gabapentin (NEURONTIN) 100 MG capsule Take 1 capsule by mouth daily 90 capsule 1     No current facility-administered medications on file prior to visit. Review of Systems:    Review of Systems   Constitutional: Positive for fatigue. HENT: Positive for congestion, sinus pain and sinus pressure. Negative for rhinorrhea. Eyes: Negative. Respiratory: Positive for cough, chest tightness, shortness of breath and wheezing. Cardiovascular: Negative. Gastrointestinal: Negative. Genitourinary: Negative. Musculoskeletal: Negative. Objective:    Vitals:    07/02/18 1610   BP: 120/60   Pulse: 80   Temp: 98 °F (36.7 °C)   TempSrc: Oral   SpO2: 96%   Weight: 194 lb (88 kg)     Wt Readings from Last 3 Encounters:   07/02/18 194 lb (88 kg)   06/20/18 193 lb (87.5 kg)   06/18/18 195 lb 9.6 oz (88.7 kg)       Body mass index is 35.48 kg/m². Physical Exam   Constitutional: She appears well-developed and well-nourished. She does not have a sickly appearance. HENT:   Head: Atraumatic. Right Ear: Hearing, tympanic membrane, external ear and ear canal normal.   Left Ear: Hearing, tympanic membrane, external ear and ear canal normal.   Nose: No mucosal edema or rhinorrhea. Right sinus exhibits maxillary sinus tenderness and frontal sinus tenderness. Left sinus exhibits maxillary sinus tenderness and frontal sinus tenderness. Eyes: Pupils are equal, round, and reactive to light. No scleral icterus. Neck: Trachea normal. No thyroid mass and no thyromegaly present. Cardiovascular: Normal rate, regular rhythm, S1 normal, S2 normal, normal heart sounds and intact distal pulses. No murmur heard. Pulmonary/Chest: Effort normal and breath sounds normal. No respiratory distress. She has no wheezes. She has no rhonchi. She has no rales. Abdominal: Soft. Bowel sounds are normal. There is no hepatosplenomegaly. There is no tenderness. Musculoskeletal: She exhibits no edema. Lymphadenopathy:     She has no cervical adenopathy. Neurological: She is alert. No cranial nerve deficit. She exhibits normal muscle tone. Gait normal.   Skin: Skin is warm and dry. No rash noted. Assessment:    1. Subacute pansinusitis  Start levofloxacin  - levofloxacin (LEVAQUIN) 750 MG tablet; Take 1 tablet by mouth daily for 10 days  Dispense: 10 tablet; Refill: 0    2. Acute bronchitis, unspecified organism  Since patient has had a cough for 3 weeks, recommend checking chest x-ray  Start Tessalon and levofloxacin  - XR CHEST STANDARD (2 VW); Future  - levofloxacin (LEVAQUIN) 750 MG tablet; Take 1 tablet by mouth daily for 10 days  Dispense: 10 tablet; Refill: 0  - benzonatate (TESSALON) 200 MG capsule; Take 1 capsule by mouth 3 times daily as needed for Cough  Dispense: 30 capsule; Refill: 0    3. Acquired hypothyroidism  Patient reporting fatigue. Most recent TSH was slightly elevated. Repeat today.  - TSH with Reflex         Plan/Patient Instructions:    Patient Instructions   Start Levofloxacin   Take tessalon Capsules as needed   Take fluconazole for ANY signs of yeast infection    Fatigue  Check TSH       Return in about 2 months (around 9/2/2018) for Diabetes . [address]       Documentation was done using voice recognition dragon software. Every effort was made to ensure accuracy; however, inadvertent, unintentional computerized transcription errors may be present.

## 2018-07-02 NOTE — PATIENT INSTRUCTIONS
Start Levofloxacin   Take tessalon Capsules as needed   Take fluconazole for ANY signs of yeast infection    Fatigue  Check TSH

## 2018-07-03 LAB — TSH REFLEX: 4.09 UIU/ML (ref 0.27–4.2)

## 2018-07-05 ENCOUNTER — HOSPITAL ENCOUNTER (OUTPATIENT)
Dept: OTHER | Age: 73
Discharge: HOME | End: 2018-07-05
Attending: INTERNAL MEDICINE | Admitting: INTERNAL MEDICINE

## 2018-07-05 DIAGNOSIS — J20.9 ACUTE BRONCHITIS, UNSPECIFIED ORGANISM: ICD-10-CM

## 2018-07-12 ENCOUNTER — TELEPHONE (OUTPATIENT)
Dept: INTERNAL MEDICINE CLINIC | Age: 73
End: 2018-07-12

## 2018-07-12 NOTE — TELEPHONE ENCOUNTER
Pt calling to let Dr. Yadira Pena know she is still having headaches and severe coughing spells and problems breathing that wait her up at night. Please advise pt of what to do. LOV: 7-3-18. This has been going on past 3 wks.   Contact pt at [phone]

## 2018-08-10 ENCOUNTER — HOSPITAL ENCOUNTER (OUTPATIENT)
Dept: OTHER | Age: 73
Discharge: HOME | End: 2018-08-10
Attending: INTERNAL MEDICINE | Admitting: INTERNAL MEDICINE

## 2018-08-10 DIAGNOSIS — N18.30 CKD (CHRONIC KIDNEY DISEASE), STAGE III (HCC): ICD-10-CM

## 2018-08-10 LAB
ANION GAP SERPL CALCULATED.3IONS-SCNC: 11 MMOL/L (ref 3–16)
BACTERIA: ABNORMAL /HPF
BILIRUBIN URINE: NEGATIVE
BLOOD, URINE: NEGATIVE
BUN BLDV-MCNC: 39 MG/DL (ref 7–20)
CALCIUM SERPL-MCNC: 10.1 MG/DL (ref 8.3–10.6)
CHLORIDE BLD-SCNC: 101 MMOL/L (ref 99–110)
CLARITY: CLEAR
CO2: 27 MMOL/L (ref 21–32)
COLOR: YELLOW
CREAT SERPL-MCNC: 1.8 MG/DL (ref 0.6–1.2)
EPITHELIAL CELLS, UA: 3 /HPF (ref 0–5)
GFR AFRICAN AMERICAN: 33
GFR NON-AFRICAN AMERICAN: 28
GLUCOSE BLD-MCNC: 140 MG/DL (ref 70–99)
GLUCOSE URINE: NEGATIVE MG/DL
HCT VFR BLD CALC: 36.6 % (ref 36–48)
HEMOGLOBIN: 12.2 G/DL (ref 12–16)
HYALINE CASTS: 5 /LPF (ref 0–8)
KETONES, URINE: NEGATIVE MG/DL
LEUKOCYTE ESTERASE, URINE: NEGATIVE
MCH RBC QN AUTO: 30 PG (ref 26–34)
MCHC RBC AUTO-ENTMCNC: 33.2 G/DL (ref 31–36)
MCV RBC AUTO: 90.4 FL (ref 80–100)
MICROSCOPIC EXAMINATION: YES
NITRITE, URINE: NEGATIVE
PDW BLD-RTO: 15.9 % (ref 12.4–15.4)
PH UA: 6
PLATELET # BLD: 329 K/UL (ref 135–450)
PMV BLD AUTO: 9.2 FL (ref 5–10.5)
POTASSIUM SERPL-SCNC: 4.6 MMOL/L (ref 3.5–5.1)
PROTEIN UA: 100 MG/DL
RBC # BLD: 4.05 M/UL (ref 4–5.2)
RBC UA: 2 /HPF (ref 0–4)
SODIUM BLD-SCNC: 139 MMOL/L (ref 136–145)
SPECIFIC GRAVITY UA: 1.01
UROBILINOGEN, URINE: 0.2 E.U./DL
WBC # BLD: 9.9 K/UL (ref 4–11)
WBC UA: 1 /HPF (ref 0–5)

## 2018-08-15 ENCOUNTER — OFFICE VISIT (OUTPATIENT)
Dept: DERMATOLOGY | Age: 73
End: 2018-08-15

## 2018-08-15 DIAGNOSIS — D48.5 NEOPLASM OF UNCERTAIN BEHAVIOR OF SKIN: Primary | ICD-10-CM

## 2018-08-15 PROCEDURE — 11100 PR BIOPSY OF SKIN LESION: CPT | Performed by: DERMATOLOGY

## 2018-08-15 NOTE — PROGRESS NOTES
[facility]  Marleen Mcmillan MD  [phone]      Jamie Barnes  1945    67 y.o. female     Date of Visit: 8/15/2018    Chief Complaint: skin lesion    History of Present Illness:    1. She complains of a newly noted painful lesion on the right calf. 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 Review of Systems:  None. Past Medical History, Family History, Surgical History, Medications and Allergies reviewed.     Past Medical History:   Diagnosis Date    Diabetes ([address]     Hypertension     Kidney disease      Past Surgical History:   Procedure Laterality Date    APPENDECTOMY      CHOLECYSTECTOMY      HYSTERECTOMY      MASTECTOMY, BILATERAL      preventive    ROTATOR CUFF REPAIR         Allergies   Allergen Reactions    Demeclocycline Anaphylaxis    Tetracyclines & Related Anaphylaxis    Amoxicillin     Codeine     Penicillins      Outpatient Prescriptions Marked as Taking for the 8/15/18 encounter (Office Visit) with Leanna Li MD   Medication Sig Dispense Refill    Dulaglutide (TRULICITY) 1.02 TA/5.9RZ SOPN INJECT 0.75 MG SUBCUTANEOUSLY ONE TIME WEEKLY 12 pen 4    spironolactone (ALDACTONE) 25 MG tablet TAKE 1 TABLET EVERY DAY 90 tablet 1    metoprolol succinate (TOPROL XL) 25 MG extended release tablet Take 1 tablet by mouth daily 90 tablet 0    torsemide (DEMADEX) 20 MG tablet TAKE 1 TABLET EVERY DAY 90 tablet 1    triamcinolone (KENALOG) 0.1 % cream APPLY TOPICALLY TWICE DAILY AS NEEDED FOR PSORIASIS 160 g 1    levothyroxine (SYNTHROID) 88 MCG tablet TAKE 1 TABLET EVERY DAY BEFORE BREAKFAST 90 tablet 3    allopurinol (ZYLOPRIM) 100 MG tablet TAKE 1 TABLET EVERY DAY 90 tablet 2    potassium chloride (KLOR-CON M) 20 MEQ extended release tablet Take 1 tablet by mouth 2 times daily 180 tablet 1    gabapentin (NEURONTIN) 100 MG capsule Take 1 capsule by mouth daily 90 capsule 3    irbesartan-hydrochlorothiazide (AVALIDE) 150-12.5 MG per tablet Take 1 tablet by mouth daily 30 tablet 11    betamethasone dipropionate (DIPROLENE) 0.05 % cream Apply to affected areas on the legs twice daily until improved. 180 g 1    Eluxadoline (VIBERZI) 75 MG TABS Take 1 tablet by mouth 2 times daily 16 tablet 1    glucose monitoring kit (FREESTYLE) monitoring kit 1 kit by Does not apply route daily as needed (one touch preferred) 1 kit 0    Lancets 15E MISC 1 applicator by Does not apply route 2 times daily 100 each 3    glucose blood VI test strips (ASCENSIA AUTODISC VI;ONE TOUCH ULTRA TEST VI) strip 1 each by In Vitro route 2 times daily One Touch preferred 100 each 3    Multiple Vitamin (MULTI-VITAMIN PO) Take by mouth      gabapentin (NEURONTIN) 100 MG capsule Take 1 capsule by mouth daily 90 capsule 1         Physical Examination       Well appearing. 1.  Right lateral calf - tender keratotic erythematous nodule. Assessment and Plan     1. Neoplasm of uncertain behavior of skin, right calf - r/o SCC    Discussed possible diagnosis; patient agreeable to biopsy (verbal consent obtained). The area(s) to be biopsied were marked with a surgical pen. Alcohol was used to cleanse the site. Local anesthesia was acheived with 1% lidocaine with epinephrine. Shave biopsy was performed using a razor blade. Hemostasis was achieved with aluminum chloride. The wound(s) were dressed with petrolatum and covered with a bandage. Wound care instructions were reviewed. 1 Specimen (s) sent to pathology. The specimen bottles were appropriately labeled. We also reviewed the risks of bleeding, scar, and infection.

## 2018-08-17 LAB — DERMATOLOGY PATHOLOGY REPORT: NORMAL

## 2018-09-06 ENCOUNTER — OFFICE VISIT (OUTPATIENT)
Dept: INTERNAL MEDICINE CLINIC | Age: 73
End: 2018-09-06

## 2018-09-06 VITALS
DIASTOLIC BLOOD PRESSURE: 70 MMHG | HEART RATE: 86 BPM | WEIGHT: 186 LBS | OXYGEN SATURATION: 99 % | SYSTOLIC BLOOD PRESSURE: 114 MMHG | BODY MASS INDEX: 36.33 KG/M2

## 2018-09-06 DIAGNOSIS — N10 ACUTE PYELONEPHRITIS: Primary | ICD-10-CM

## 2018-09-06 PROCEDURE — 1036F TOBACCO NON-USER: CPT | Performed by: NURSE PRACTITIONER

## 2018-09-06 PROCEDURE — 1123F ACP DISCUSS/DSCN MKR DOCD: CPT | Performed by: NURSE PRACTITIONER

## 2018-09-06 PROCEDURE — 99213 OFFICE O/P EST LOW 20 MIN: CPT | Performed by: NURSE PRACTITIONER

## 2018-09-06 PROCEDURE — 1090F PRES/ABSN URINE INCON ASSESS: CPT | Performed by: NURSE PRACTITIONER

## 2018-09-06 PROCEDURE — 4040F PNEUMOC VAC/ADMIN/RCVD: CPT | Performed by: NURSE PRACTITIONER

## 2018-09-06 PROCEDURE — G8427 DOCREV CUR MEDS BY ELIG CLIN: HCPCS | Performed by: NURSE PRACTITIONER

## 2018-09-06 PROCEDURE — G8399 PT W/DXA RESULTS DOCUMENT: HCPCS | Performed by: NURSE PRACTITIONER

## 2018-09-06 PROCEDURE — 1111F DSCHRG MED/CURRENT MED MERGE: CPT | Performed by: NURSE PRACTITIONER

## 2018-09-06 PROCEDURE — 1101F PT FALLS ASSESS-DOCD LE1/YR: CPT | Performed by: NURSE PRACTITIONER

## 2018-09-06 PROCEDURE — G8417 CALC BMI ABV UP PARAM F/U: HCPCS | Performed by: NURSE PRACTITIONER

## 2018-09-06 PROCEDURE — 3017F COLORECTAL CA SCREEN DOC REV: CPT | Performed by: NURSE PRACTITIONER

## 2018-09-06 ASSESSMENT — PATIENT HEALTH QUESTIONNAIRE - PHQ9
SUM OF ALL RESPONSES TO PHQ QUESTIONS 1-9: 0
1. LITTLE INTEREST OR PLEASURE IN DOING THINGS: 0
2. FEELING DOWN, DEPRESSED OR HOPELESS: 0
SUM OF ALL RESPONSES TO PHQ9 QUESTIONS 1 & 2: 0
SUM OF ALL RESPONSES TO PHQ QUESTIONS 1-9: 0

## 2018-09-06 ASSESSMENT — ENCOUNTER SYMPTOMS: BACK PAIN: 1

## 2018-09-06 NOTE — PROGRESS NOTES
gabapentin (NEURONTIN) 100 MG capsule  Take 1 capsule by mouth daily             gabapentin (NEURONTIN) 100 MG capsule  Take 1 capsule by mouth daily             glucose blood VI test strips (ASCENSIA AUTODISC VI;ONE TOUCH ULTRA TEST VI) strip  1 each by In Vitro route 2 times daily One Touch preferred             glucose monitoring kit (FREESTYLE) monitoring kit  1 kit by Does not apply route daily as needed (one touch preferred)             irbesartan-hydrochlorothiazide (AVALIDE) 150-12.5 MG per tablet  Take 1 tablet by mouth daily             Lancets 94M MISC  1 applicator by Does not apply route 2 times daily             levothyroxine (SYNTHROID) 88 MCG tablet  TAKE 1 TABLET EVERY DAY BEFORE BREAKFAST             metoprolol succinate (TOPROL XL) 25 MG extended release tablet  Take 1 tablet by mouth daily             Multiple Vitamin (MULTI-VITAMIN PO)  Take by mouth             ondansetron (ZOFRAN ODT) 4 MG disintegrating tablet  Take 1 tablet by mouth every 8 hours as needed for Nausea or Vomiting             potassium chloride (KLOR-CON M) 20 MEQ extended release tablet  Take 1 tablet by mouth 2 times daily             spironolactone (ALDACTONE) 25 MG tablet  TAKE 1 TABLET EVERY DAY             torsemide (DEMADEX) 20 MG tablet  TAKE 1 TABLET EVERY DAY             triamcinolone (KENALOG) 0.1 % cream  APPLY TOPICALLY TWICE DAILY AS NEEDED FOR PSORIASIS                   Medications marked \"taking\" at this time  Outpatient Prescriptions Marked as Taking for the 9/6/18 encounter (Office Visit) with MARCIE Virk CNP   Medication Sig Dispense Refill    cefUROXime (CEFTIN) 250 MG tablet Take 1 tablet by mouth 2 times daily for 7 days 14 tablet 0    ondansetron (ZOFRAN ODT) 4 MG disintegrating tablet Take 1 tablet by mouth every 8 hours as needed for Nausea or Vomiting 20 tablet 0    Dulaglutide (TRULICITY) 0.00 PF/7.5EK SOPN INJECT 0.75 MG SUBCUTANEOUSLY ONE TIME WEEKLY 12 pen 4    spironolactone (ALDACTONE) 25 MG tablet TAKE 1 TABLET EVERY DAY 90 tablet 1    metoprolol succinate (TOPROL XL) 25 MG extended release tablet Take 1 tablet by mouth daily 90 tablet 0    torsemide (DEMADEX) 20 MG tablet TAKE 1 TABLET EVERY DAY 90 tablet 1    triamcinolone (KENALOG) 0.1 % cream APPLY TOPICALLY TWICE DAILY AS NEEDED FOR PSORIASIS 160 g 1    levothyroxine (SYNTHROID) 88 MCG tablet TAKE 1 TABLET EVERY DAY BEFORE BREAKFAST 90 tablet 3    allopurinol (ZYLOPRIM) 100 MG tablet TAKE 1 TABLET EVERY DAY 90 tablet 2    potassium chloride (KLOR-CON M) 20 MEQ extended release tablet Take 1 tablet by mouth 2 times daily 180 tablet 1    gabapentin (NEURONTIN) 100 MG capsule Take 1 capsule by mouth daily 90 capsule 3    irbesartan-hydrochlorothiazide (AVALIDE) 150-12.5 MG per tablet Take 1 tablet by mouth daily 30 tablet 11    betamethasone dipropionate (DIPROLENE) 0.05 % cream Apply to affected areas on the legs twice daily until improved. 180 g 1    Eluxadoline (VIBERZI) 75 MG TABS Take 1 tablet by mouth 2 times daily 16 tablet 1    glucose monitoring kit (FREESTYLE) monitoring kit 1 kit by Does not apply route daily as needed (one touch preferred) 1 kit 0    Lancets 04E MISC 1 applicator by Does not apply route 2 times daily 100 each 3    glucose blood VI test strips (ASCENSIA AUTODISC VI;ONE TOUCH ULTRA TEST VI) strip 1 each by In Vitro route 2 times daily One Touch preferred 100 each 3    Multiple Vitamin (MULTI-VITAMIN PO) Take by mouth      gabapentin (NEURONTIN) 100 MG capsule Take 1 capsule by mouth daily 90 capsule 1        Medications patient taking as of now reconciled against medications ordered at time of hospital discharge: Yes    Chief Complaint   Patient presents with    Follow-Up from Hospital     F/U-Hospital       History of Present illness - Follow up of Hospital diagnosis(es): pyleonephritis    Inpatient course: Discharge summary reviewed- see chart.     Interval history/Current

## 2018-09-11 ENCOUNTER — OFFICE VISIT (OUTPATIENT)
Dept: INTERNAL MEDICINE CLINIC | Age: 73
End: 2018-09-11

## 2018-09-11 VITALS
HEART RATE: 100 BPM | WEIGHT: 186 LBS | SYSTOLIC BLOOD PRESSURE: 120 MMHG | OXYGEN SATURATION: 98 % | DIASTOLIC BLOOD PRESSURE: 70 MMHG | BODY MASS INDEX: 36.33 KG/M2

## 2018-09-11 DIAGNOSIS — R53.83 FATIGUE, UNSPECIFIED TYPE: Primary | ICD-10-CM

## 2018-09-11 DIAGNOSIS — J98.11 ATELECTASIS OF LEFT LUNG: ICD-10-CM

## 2018-09-11 DIAGNOSIS — R05.9 COUGH: ICD-10-CM

## 2018-09-11 PROCEDURE — 1036F TOBACCO NON-USER: CPT | Performed by: NURSE PRACTITIONER

## 2018-09-11 PROCEDURE — 3017F COLORECTAL CA SCREEN DOC REV: CPT | Performed by: NURSE PRACTITIONER

## 2018-09-11 PROCEDURE — 99213 OFFICE O/P EST LOW 20 MIN: CPT | Performed by: NURSE PRACTITIONER

## 2018-09-11 PROCEDURE — 1101F PT FALLS ASSESS-DOCD LE1/YR: CPT | Performed by: NURSE PRACTITIONER

## 2018-09-11 PROCEDURE — G8399 PT W/DXA RESULTS DOCUMENT: HCPCS | Performed by: NURSE PRACTITIONER

## 2018-09-11 PROCEDURE — 1123F ACP DISCUSS/DSCN MKR DOCD: CPT | Performed by: NURSE PRACTITIONER

## 2018-09-11 PROCEDURE — 1090F PRES/ABSN URINE INCON ASSESS: CPT | Performed by: NURSE PRACTITIONER

## 2018-09-11 PROCEDURE — 4040F PNEUMOC VAC/ADMIN/RCVD: CPT | Performed by: NURSE PRACTITIONER

## 2018-09-11 PROCEDURE — G8417 CALC BMI ABV UP PARAM F/U: HCPCS | Performed by: NURSE PRACTITIONER

## 2018-09-11 PROCEDURE — G8427 DOCREV CUR MEDS BY ELIG CLIN: HCPCS | Performed by: NURSE PRACTITIONER

## 2018-09-11 RX ORDER — DEXTROMETHORPHAN POLISTIREX 30 MG/5ML
60 SUSPENSION ORAL 2 TIMES DAILY PRN
Qty: 148 ML | Refills: 0 | Status: SHIPPED | OUTPATIENT
Start: 2018-09-11 | End: 2018-09-21

## 2018-09-11 RX ORDER — AZITHROMYCIN 250 MG/1
TABLET, FILM COATED ORAL
Qty: 1 PACKET | Refills: 0 | Status: SHIPPED | OUTPATIENT
Start: 2018-09-11 | End: 2018-09-15

## 2018-09-11 ASSESSMENT — ENCOUNTER SYMPTOMS: COUGH: 1

## 2018-09-11 NOTE — PATIENT INSTRUCTIONS
Rest for the next 3 days  Cough medicine as needed  Drink plenty of water  Take 10 deep breathes every 4 hours

## 2018-09-20 ENCOUNTER — OFFICE VISIT (OUTPATIENT)
Dept: INTERNAL MEDICINE CLINIC | Age: 73
End: 2018-09-20

## 2018-09-20 VITALS
BODY MASS INDEX: 37.11 KG/M2 | OXYGEN SATURATION: 98 % | SYSTOLIC BLOOD PRESSURE: 138 MMHG | WEIGHT: 190 LBS | HEART RATE: 77 BPM | DIASTOLIC BLOOD PRESSURE: 70 MMHG

## 2018-09-20 DIAGNOSIS — R14.0 ABDOMINAL BLOATING: ICD-10-CM

## 2018-09-20 DIAGNOSIS — R53.83 FATIGUE, UNSPECIFIED TYPE: Primary | ICD-10-CM

## 2018-09-20 PROCEDURE — 1123F ACP DISCUSS/DSCN MKR DOCD: CPT | Performed by: NURSE PRACTITIONER

## 2018-09-20 PROCEDURE — 4040F PNEUMOC VAC/ADMIN/RCVD: CPT | Performed by: NURSE PRACTITIONER

## 2018-09-20 PROCEDURE — 1090F PRES/ABSN URINE INCON ASSESS: CPT | Performed by: NURSE PRACTITIONER

## 2018-09-20 PROCEDURE — 99213 OFFICE O/P EST LOW 20 MIN: CPT | Performed by: NURSE PRACTITIONER

## 2018-09-20 PROCEDURE — 1101F PT FALLS ASSESS-DOCD LE1/YR: CPT | Performed by: NURSE PRACTITIONER

## 2018-09-20 PROCEDURE — G8399 PT W/DXA RESULTS DOCUMENT: HCPCS | Performed by: NURSE PRACTITIONER

## 2018-09-20 PROCEDURE — G8427 DOCREV CUR MEDS BY ELIG CLIN: HCPCS | Performed by: NURSE PRACTITIONER

## 2018-09-20 PROCEDURE — G8417 CALC BMI ABV UP PARAM F/U: HCPCS | Performed by: NURSE PRACTITIONER

## 2018-09-20 PROCEDURE — 1036F TOBACCO NON-USER: CPT | Performed by: NURSE PRACTITIONER

## 2018-09-20 PROCEDURE — 3017F COLORECTAL CA SCREEN DOC REV: CPT | Performed by: NURSE PRACTITIONER

## 2018-09-20 ASSESSMENT — ENCOUNTER SYMPTOMS
ASSOCIATED PULMONARY SYMPTOMS: FATIGUE
DIFFICULTY BREATHING: 1

## 2018-09-20 NOTE — PROGRESS NOTES
Subjective:      Patient ID: Adam Rocha is a 67 y.o. female. Pneumonia   She complains of difficulty breathing. This is a new problem. The current episode started more than 1 month ago. The problem occurs intermittently. The problem has been gradually improving. Associated symptoms comments: fatigue. Her symptoms are aggravated by any activity. Her symptoms are alleviated by rest. She reports moderate improvement on treatment. There are no known risk factors for lung disease. Review of Systems  BP Readings from Last 3 Encounters:   09/20/18 138/70   09/11/18 120/70   09/10/18 (!) 148/77     Wt Readings from Last 3 Encounters:   09/20/18 190 lb (86.2 kg)   09/11/18 186 lb (84.4 kg)   09/10/18 187 lb 3.2 oz (84.9 kg)       Objective:   Physical Exam   Constitutional: She is oriented to person, place, and time. She appears well-developed and well-nourished. Cardiovascular: Normal rate, regular rhythm and normal heart sounds. Pulmonary/Chest: Effort normal and breath sounds normal.   Abdominal: She exhibits distension. She exhibits no shifting dullness, no pulsatile liver, no abdominal bruit, no pulsatile midline mass and no mass. Bowel sounds are increased. There is tenderness in the right lower quadrant, left upper quadrant and left lower quadrant. There is no rebound. States having involuntary liquid yellow stools and decreased appetite presently is only eat soup today   Neurological: She is alert and oriented to person, place, and time. Skin: Skin is warm and dry.        Assessment:      Pneumonia: Resolved  Abdominal pain: Generalized   fatigue   Plan:    referral to GI  Eat light foods and liquids for now  Eat yogurt daily    Fatigue    CBC is ordered        MARCIE Shen - CNP

## 2018-09-26 PROBLEM — Z00.00 WELL ADULT EXAM: Status: RESOLVED | Noted: 2017-03-27 | Resolved: 2018-09-26

## 2018-09-27 LAB
BASOPHILS ABSOLUTE: 0.1 K/UL (ref 0–0.2)
BASOPHILS RELATIVE PERCENT: 0.7 %
EOSINOPHILS ABSOLUTE: 0.3 K/UL (ref 0–0.6)
EOSINOPHILS RELATIVE PERCENT: 3 %
HCT VFR BLD CALC: 34.3 % (ref 36–48)
HEMOGLOBIN: 11 G/DL (ref 12–16)
LYMPHOCYTES ABSOLUTE: 1.9 K/UL (ref 1–5.1)
LYMPHOCYTES RELATIVE PERCENT: 19.9 %
MCH RBC QN AUTO: 29.3 PG (ref 26–34)
MCHC RBC AUTO-ENTMCNC: 32.2 G/DL (ref 31–36)
MCV RBC AUTO: 91.1 FL (ref 80–100)
MONOCYTES ABSOLUTE: 0.7 K/UL (ref 0–1.3)
MONOCYTES RELATIVE PERCENT: 7.6 %
NEUTROPHILS ABSOLUTE: 6.6 K/UL (ref 1.7–7.7)
NEUTROPHILS RELATIVE PERCENT: 68.8 %
PDW BLD-RTO: 17.1 % (ref 12.4–15.4)
PLATELET # BLD: 265 K/UL (ref 135–450)
PMV BLD AUTO: 9.1 FL (ref 5–10.5)
RBC # BLD: 3.77 M/UL (ref 4–5.2)
WBC # BLD: 9.5 K/UL (ref 4–11)

## 2018-10-01 ENCOUNTER — TELEPHONE (OUTPATIENT)
Dept: INTERNAL MEDICINE CLINIC | Age: 73
End: 2018-10-01

## 2018-10-03 ENCOUNTER — HOSPITAL ENCOUNTER (OUTPATIENT)
Age: 73
Discharge: HOME | End: 2018-10-03
Payer: MEDICARE

## 2018-10-03 DIAGNOSIS — N18.30 CKD (CHRONIC KIDNEY DISEASE), STAGE III (HCC): ICD-10-CM

## 2018-10-03 LAB
ANION GAP SERPL CALCULATED.3IONS-SCNC: 18 MMOL/L (ref 3–16)
BUN BLDV-MCNC: 70 MG/DL (ref 7–20)
CALCIUM SERPL-MCNC: 9.5 MG/DL (ref 8.3–10.6)
CHLORIDE BLD-SCNC: 105 MMOL/L (ref 99–110)
CO2: 21 MMOL/L (ref 21–32)
CREAT SERPL-MCNC: 2.3 MG/DL (ref 0.6–1.2)
GFR AFRICAN AMERICAN: 25
GFR NON-AFRICAN AMERICAN: 21
GLUCOSE BLD-MCNC: 116 MG/DL (ref 70–99)
POTASSIUM SERPL-SCNC: 4.2 MMOL/L (ref 3.5–5.1)
SODIUM BLD-SCNC: 144 MMOL/L (ref 136–145)

## 2018-10-03 PROCEDURE — 80048 BASIC METABOLIC PNL TOTAL CA: CPT

## 2018-10-03 PROCEDURE — 36415 COLL VENOUS BLD VENIPUNCTURE: CPT

## 2018-11-06 RX ORDER — ALLOPURINOL 100 MG/1
TABLET ORAL
Qty: 90 TABLET | Refills: 2 | Status: SHIPPED | OUTPATIENT
Start: 2018-11-06 | End: 2019-05-10 | Stop reason: SDUPTHER

## 2018-11-06 RX ORDER — SPIRONOLACTONE 25 MG/1
TABLET ORAL
Qty: 90 TABLET | Refills: 1 | Status: SHIPPED | OUTPATIENT
Start: 2018-11-06 | End: 2019-03-25 | Stop reason: SDUPTHER

## 2018-11-08 ENCOUNTER — TELEPHONE (OUTPATIENT)
Dept: DERMATOLOGY | Age: 73
End: 2018-11-08

## 2018-11-08 RX ORDER — TORSEMIDE 20 MG/1
TABLET ORAL
Qty: 90 TABLET | Refills: 1 | OUTPATIENT
Start: 2018-11-08

## 2018-11-08 NOTE — TELEPHONE ENCOUNTER
Pt c/b [phone]  Pt states:   - not able to make the appt schd for 11.20.18   - will be out of town   - will be returning after Thanksgiving 11.24.18   - had 2 spots that has come back in surface    - the spots was pre cancerous   - would like to be seen sooner than the next avail in Feb   Please call to discuss thanks

## 2018-12-03 ENCOUNTER — OFFICE VISIT (OUTPATIENT)
Dept: DERMATOLOGY | Age: 73
End: 2018-12-03
Payer: MEDICARE

## 2018-12-03 DIAGNOSIS — L57.0 AK (ACTINIC KERATOSIS): Primary | ICD-10-CM

## 2018-12-03 PROCEDURE — 17003 DESTRUCT PREMALG LES 2-14: CPT | Performed by: DERMATOLOGY

## 2018-12-03 PROCEDURE — 17000 DESTRUCT PREMALG LESION: CPT | Performed by: DERMATOLOGY

## 2019-01-10 ENCOUNTER — OFFICE VISIT (OUTPATIENT)
Dept: INTERNAL MEDICINE CLINIC | Age: 74
End: 2019-01-10
Payer: MEDICARE

## 2019-01-10 VITALS
SYSTOLIC BLOOD PRESSURE: 142 MMHG | TEMPERATURE: 97.6 F | BODY MASS INDEX: 37.11 KG/M2 | OXYGEN SATURATION: 98 % | DIASTOLIC BLOOD PRESSURE: 78 MMHG | WEIGHT: 190 LBS | HEART RATE: 79 BPM

## 2019-01-10 DIAGNOSIS — E11.69 DIABETES MELLITUS TYPE 2 IN OBESE (HCC): Primary | ICD-10-CM

## 2019-01-10 DIAGNOSIS — J30.89 ENVIRONMENTAL AND SEASONAL ALLERGIES: ICD-10-CM

## 2019-01-10 DIAGNOSIS — E66.9 DIABETES MELLITUS TYPE 2 IN OBESE (HCC): Primary | ICD-10-CM

## 2019-01-10 DIAGNOSIS — H60.502 ACUTE OTITIS EXTERNA OF LEFT EAR, UNSPECIFIED TYPE: ICD-10-CM

## 2019-01-10 PROCEDURE — G8399 PT W/DXA RESULTS DOCUMENT: HCPCS | Performed by: NURSE PRACTITIONER

## 2019-01-10 PROCEDURE — 1123F ACP DISCUSS/DSCN MKR DOCD: CPT | Performed by: NURSE PRACTITIONER

## 2019-01-10 PROCEDURE — 2022F DILAT RTA XM EVC RTNOPTHY: CPT | Performed by: NURSE PRACTITIONER

## 2019-01-10 PROCEDURE — 4040F PNEUMOC VAC/ADMIN/RCVD: CPT | Performed by: NURSE PRACTITIONER

## 2019-01-10 PROCEDURE — 3046F HEMOGLOBIN A1C LEVEL >9.0%: CPT | Performed by: NURSE PRACTITIONER

## 2019-01-10 PROCEDURE — 1101F PT FALLS ASSESS-DOCD LE1/YR: CPT | Performed by: NURSE PRACTITIONER

## 2019-01-10 PROCEDURE — G8417 CALC BMI ABV UP PARAM F/U: HCPCS | Performed by: NURSE PRACTITIONER

## 2019-01-10 PROCEDURE — G8484 FLU IMMUNIZE NO ADMIN: HCPCS | Performed by: NURSE PRACTITIONER

## 2019-01-10 PROCEDURE — 4130F TOPICAL PREP RX AOE: CPT | Performed by: NURSE PRACTITIONER

## 2019-01-10 PROCEDURE — 3017F COLORECTAL CA SCREEN DOC REV: CPT | Performed by: NURSE PRACTITIONER

## 2019-01-10 PROCEDURE — G8427 DOCREV CUR MEDS BY ELIG CLIN: HCPCS | Performed by: NURSE PRACTITIONER

## 2019-01-10 PROCEDURE — 1090F PRES/ABSN URINE INCON ASSESS: CPT | Performed by: NURSE PRACTITIONER

## 2019-01-10 PROCEDURE — 99214 OFFICE O/P EST MOD 30 MIN: CPT | Performed by: NURSE PRACTITIONER

## 2019-01-10 PROCEDURE — 1036F TOBACCO NON-USER: CPT | Performed by: NURSE PRACTITIONER

## 2019-01-10 RX ORDER — TORSEMIDE 20 MG/1
20 TABLET ORAL DAILY
Qty: 30 TABLET | Refills: 0 | Status: SHIPPED | OUTPATIENT
Start: 2019-01-10 | End: 2019-02-21 | Stop reason: SDUPTHER

## 2019-01-10 RX ORDER — TORSEMIDE 20 MG/1
TABLET ORAL
Qty: 60 TABLET | Refills: 1 | Status: SHIPPED | OUTPATIENT
Start: 2019-01-10 | End: 2019-02-25 | Stop reason: SDUPTHER

## 2019-01-10 RX ORDER — ALLOPURINOL 100 MG/1
TABLET ORAL
Qty: 90 TABLET | Refills: 2 | Status: CANCELLED | OUTPATIENT
Start: 2019-01-10

## 2019-01-10 ASSESSMENT — ENCOUNTER SYMPTOMS
COUGH: 1
ALLERGIC/IMMUNOLOGIC NEGATIVE: 1

## 2019-04-01 RX ORDER — SPIRONOLACTONE 25 MG/1
TABLET ORAL
Qty: 90 TABLET | Refills: 1 | Status: SHIPPED | OUTPATIENT
Start: 2019-04-01 | End: 2019-05-10 | Stop reason: SDUPTHER

## 2019-04-09 ENCOUNTER — APPOINTMENT (OUTPATIENT)
Dept: GENERAL RADIOLOGY | Age: 74
End: 2019-04-09
Payer: MEDICARE

## 2019-04-09 ENCOUNTER — HOSPITAL ENCOUNTER (EMERGENCY)
Age: 74
Discharge: HOME | End: 2019-04-09
Attending: EMERGENCY MEDICINE
Payer: MEDICARE

## 2019-04-09 VITALS
OXYGEN SATURATION: 98 % | RESPIRATION RATE: 21 BRPM | BODY MASS INDEX: 34.6 KG/M2 | DIASTOLIC BLOOD PRESSURE: 67 MMHG | TEMPERATURE: 98 F | HEIGHT: 62 IN | WEIGHT: 188 LBS | HEART RATE: 89 BPM | SYSTOLIC BLOOD PRESSURE: 181 MMHG

## 2019-04-09 DIAGNOSIS — R05.9 COUGH: Primary | ICD-10-CM

## 2019-04-09 DIAGNOSIS — M25.562 POSTERIOR KNEE PAIN, LEFT: ICD-10-CM

## 2019-04-09 LAB
A/G RATIO: 1.4 (ref 1.1–2.2)
ALBUMIN SERPL-MCNC: 4.4 G/DL (ref 3.4–5)
ALP BLD-CCNC: 83 U/L (ref 40–129)
ALT SERPL-CCNC: 19 U/L (ref 10–40)
ANION GAP SERPL CALCULATED.3IONS-SCNC: 17 MMOL/L (ref 3–16)
APTT: 30.3 SEC (ref 26–36)
AST SERPL-CCNC: 18 U/L (ref 15–37)
BASOPHILS ABSOLUTE: 0.1 K/UL (ref 0–0.2)
BASOPHILS RELATIVE PERCENT: 0.6 %
BILIRUB SERPL-MCNC: 0.3 MG/DL (ref 0–1)
BUN BLDV-MCNC: 36 MG/DL (ref 7–20)
CALCIUM SERPL-MCNC: 9.2 MG/DL (ref 8.3–10.6)
CHLORIDE BLD-SCNC: 104 MMOL/L (ref 99–110)
CO2: 19 MMOL/L (ref 21–32)
CREAT SERPL-MCNC: 2.1 MG/DL (ref 0.6–1.2)
EKG ATRIAL RATE: 88 BPM
EKG DIAGNOSIS: NORMAL
EKG P AXIS: 50 DEGREES
EKG P-R INTERVAL: 176 MS
EKG Q-T INTERVAL: 394 MS
EKG QRS DURATION: 84 MS
EKG QTC CALCULATION (BAZETT): 476 MS
EKG R AXIS: -13 DEGREES
EKG T AXIS: 71 DEGREES
EKG VENTRICULAR RATE: 88 BPM
EOSINOPHILS ABSOLUTE: 0.2 K/UL (ref 0–0.6)
EOSINOPHILS RELATIVE PERCENT: 2.8 %
GFR AFRICAN AMERICAN: 28
GFR NON-AFRICAN AMERICAN: 23
GLOBULIN: 3.1 G/DL
GLUCOSE BLD-MCNC: 98 MG/DL (ref 70–99)
HCT VFR BLD CALC: 36.5 % (ref 36–48)
HEMOGLOBIN: 12.1 G/DL (ref 12–16)
INR BLD: 0.96 (ref 0.86–1.14)
LACTIC ACID: 0.7 MMOL/L (ref 0.4–2)
LYMPHOCYTES ABSOLUTE: 0.9 K/UL (ref 1–5.1)
LYMPHOCYTES RELATIVE PERCENT: 10.5 %
MCH RBC QN AUTO: 29.6 PG (ref 26–34)
MCHC RBC AUTO-ENTMCNC: 33.2 G/DL (ref 31–36)
MCV RBC AUTO: 89.3 FL (ref 80–100)
MONOCYTES ABSOLUTE: 0.7 K/UL (ref 0–1.3)
MONOCYTES RELATIVE PERCENT: 8 %
NEUTROPHILS ABSOLUTE: 6.9 K/UL (ref 1.7–7.7)
NEUTROPHILS RELATIVE PERCENT: 78.1 %
PDW BLD-RTO: 15.7 % (ref 12.4–15.4)
PLATELET # BLD: 293 K/UL (ref 135–450)
PMV BLD AUTO: 8.6 FL (ref 5–10.5)
POTASSIUM SERPL-SCNC: 3.8 MMOL/L (ref 3.5–5.1)
PROTHROMBIN TIME: 11 SEC (ref 9.8–13)
RBC # BLD: 4.09 M/UL (ref 4–5.2)
SODIUM BLD-SCNC: 140 MMOL/L (ref 136–145)
TOTAL PROTEIN: 7.5 G/DL (ref 6.4–8.2)
TROPONIN: <0.01 NG/ML
WBC # BLD: 8.8 K/UL (ref 4–11)

## 2019-04-09 PROCEDURE — 80053 COMPREHEN METABOLIC PANEL: CPT

## 2019-04-09 PROCEDURE — 6370000000 HC RX 637 (ALT 250 FOR IP): Performed by: PHYSICIAN ASSISTANT

## 2019-04-09 PROCEDURE — 83605 ASSAY OF LACTIC ACID: CPT

## 2019-04-09 PROCEDURE — 93005 ELECTROCARDIOGRAM TRACING: CPT | Performed by: EMERGENCY MEDICINE

## 2019-04-09 PROCEDURE — 85025 COMPLETE CBC W/AUTO DIFF WBC: CPT

## 2019-04-09 PROCEDURE — 71046 X-RAY EXAM CHEST 2 VIEWS: CPT

## 2019-04-09 PROCEDURE — 85610 PROTHROMBIN TIME: CPT

## 2019-04-09 PROCEDURE — 93971 EXTREMITY STUDY: CPT

## 2019-04-09 PROCEDURE — 85730 THROMBOPLASTIN TIME PARTIAL: CPT

## 2019-04-09 PROCEDURE — 87040 BLOOD CULTURE FOR BACTERIA: CPT

## 2019-04-09 PROCEDURE — 84484 ASSAY OF TROPONIN QUANT: CPT

## 2019-04-09 PROCEDURE — 93010 ELECTROCARDIOGRAM REPORT: CPT | Performed by: INTERNAL MEDICINE

## 2019-04-09 PROCEDURE — 99285 EMERGENCY DEPT VISIT HI MDM: CPT

## 2019-04-09 RX ORDER — BENZONATATE 100 MG/1
100 CAPSULE ORAL 3 TIMES DAILY PRN
Qty: 20 CAPSULE | Refills: 0 | Status: SHIPPED | OUTPATIENT
Start: 2019-04-09 | End: 2019-10-28 | Stop reason: ALTCHOICE

## 2019-04-09 RX ORDER — ACETAMINOPHEN 500 MG
1000 TABLET ORAL ONCE
Status: COMPLETED | OUTPATIENT
Start: 2019-04-09 | End: 2019-04-09

## 2019-04-09 RX ADMIN — ACETAMINOPHEN 1000 MG: 500 TABLET ORAL at 15:53

## 2019-04-09 ASSESSMENT — ENCOUNTER SYMPTOMS
NAUSEA: 0
COUGH: 1
SHORTNESS OF BREATH: 1
DIARRHEA: 0
ABDOMINAL PAIN: 0
VOMITING: 0
WHEEZING: 0
BACK PAIN: 0
COLOR CHANGE: 0

## 2019-04-09 ASSESSMENT — PAIN SCALES - GENERAL: PAINLEVEL_OUTOF10: 10

## 2019-04-09 NOTE — ED NOTES
Pt returned from xray. Placed on monitors and BP set to cycle. Pt's daughter at bedside.       Riky Infante RN  04/09/19 [id number]

## 2019-04-09 NOTE — ED PROVIDER NOTES
I independently performed a history and physical on [facility]. All diagnostic, treatment, and disposition decisions were made by myself in conjunction with the advanced practice provider. I have participated in the medical decision making and directed the treatment plan and disposition of the patient. For further details of [facility] emergency department encounter, please see the advanced practice provider's documentation. CHIEF COMPLAINT  Chief Complaint   Patient presents with    Shortness of Breath     pt states was seen by PCP for possible broncitis; sent here for further workup to r/o DVT in left leg pain/ swelling- there for one month- feeling sob- for 2-3 days;        Briefly, [facility] is a 75 y.o. female  who presents to the ED complaining of several days of some mild cough and congestion. Sent here from urgent care. She was concerned about bronchitis. She denies any significant chest pains except for sometimes when coughing. She also reports that her left popliteal fossa has been a little swollen and tender for several months. She reports that has been going on particularly over the past month but no history of DVT in the past.    FOCUSED PHYSICAL EXAMINATION  BP (!) 159/65   Pulse 92   Temp 98 °F (36.7 °C) (Oral)   Resp 20   Ht 5' 2\" (1.575 m)   Wt 188 lb (85.3 kg)   SpO2 98%   BMI 34.39 kg/m²    Focused physical examination notable for no acute distress, well-appearing, well-nourished, normal speech and mentation without obvious facial droop, no obvious rash. No obvious cranial nerve deficits on my initial exam. L popliteal fossa with no palpable mass or cord but has mild tenderness.     The 12 lead EKG was interpreted by me as follows:  Rate: normal with a rate of 88  Rhythm: sinus  Axis: normal  Intervals: normal OR, narrow QRS, normal QTc  ST segments: no ST elevations or depressions  T waves: no abnormal inversions  Non-specific T wave changes: present  Prior EKG comparison: No prior is currently available for comparison    MDM:  Diagnostic considerations included DVT, dependent peripheral edema, CHF, lymphedema, venous stasis, cellulitis, abscess, trauma    ED course was notable for it appears to be a mild concomitant upper respiratory tract infection with no evidence of bacterial pneumonia, and a negative Doppler for DVT. Suspect she may have a subclinical popliteal fossa Baker's cyst.  Additionally, I clinically doubt pulmonary embolism given lack of tachycardia significant tachypnea or hypoxia or notable shortness of breath. Patient will be discharged with symptomatic management. During the patient's ED course, the patient was given:  Medications   acetaminophen (TYLENOL) tablet 1,000 mg (1,000 mg Oral Given 4/9/19 9533)        CLINICAL IMPRESSION  1. Cough    2. Posterior knee pain, left        DISPOSITION  Jg Kaplan was discharged to home in stable condition. I have discussed the findings of today's workup with the patient and addressed the patient's questions and concerns. Important warning signs as well as new or worsening symptoms which would necessitate immediate return to the ED were discussed. The plan is to discharge from the ED at this time, and the patient is in stable condition. The patient acknowledged understanding is agreeable with this plan. Patient was given scripts for the following medications. I counseled patient how to take these medications. New Prescriptions    BENZONATATE (TESSALON PERLES) 100 MG CAPSULE    Take 1 capsule by mouth 3 times daily as needed for Cough     Follow-up with:  MARCIE Piper - CNP  [facility]  [phone]          [facility] Emergency Department  [address] [facility]  [phone]    If symptoms worsen      This chart was created using Dragon dictation software.   Efforts were made by me to ensure accuracy, however some errors may be present due to limitations of this technology.             Rodger Miller MD  04/09/19 [id number]

## 2019-04-09 NOTE — ED PROVIDER NOTES
[address] [facility]  eMERGENCY dEPARTMENT eNCOUnter        Pt Name: Tyrone Burton  MRN: [id number]  [place] 1945  Date of evaluation: 4/9/2019  Provider: Anabela Emanuel PA-C  PCP: Gualberto Paris, APRN - CNP    This patient was seen and evaluated by the attending physician No att. providers found. CHIEF COMPLAINT       Chief Complaint   Patient presents with    Shortness of Breath     pt states was seen by PCP for possible broncitis; sent here for further workup to r/o DVT in left leg pain/ swelling- there for one month- feeling sob- for 2-3 days;        HISTORY OF PRESENT ILLNESS   (Location/Symptom, Timing/Onset, Context/Setting, Quality, Duration, Modifying Factors, Severity)  Note limiting factors. Tyrone Burton is a 75 y.o. female patient presents the emergency department from urgent care for possible DVT in her left leg. Patient states she's had pain and swelling in her left calf hurts worse with walking for the past month. Patient states she has recently become short of breath in the last week. Patient denies any cough, recent infection. Patient does not use anything containing estrogen. Patient states she drove here from [facility] which took 12 hours on Sunday. Patient states she has stage IV CKD and diabetes. Nursing Notes were all reviewed and agreed with or any disagreements were addressed  in the HPI. REVIEW OF SYSTEMS    (2-9 systems for level 4, 10 or more for level 5)     Review of Systems   Constitutional: Negative for fatigue and fever. HENT: Positive for congestion. Eyes: Negative for visual disturbance. Respiratory: Positive for cough and shortness of breath. Negative for wheezing. Cardiovascular: Negative for chest pain. Gastrointestinal: Negative for abdominal pain, diarrhea, nausea and vomiting. Genitourinary: Negative for dysuria. Musculoskeletal: Positive for myalgias.  Negative for arthralgias, back pain, gait problem, joint swelling, neck pain and neck stiffness. Skin: Negative for color change, pallor, rash and wound. Neurological: Negative for dizziness, syncope, light-headedness and headaches. Positives and Pertinent negatives as per HPI. Except as noted abovein the ROS, all other systems were reviewed and negative. PAST MEDICAL HISTORY     Past Medical History:   Diagnosis Date    Diabetes ([address]     Hypertension     Kidney disease          SURGICAL HISTORY     Past Surgical History:   Procedure Laterality Date    APPENDECTOMY      CHOLECYSTECTOMY      HYSTERECTOMY      MASTECTOMY, BILATERAL      preventive    ROTATOR CUFF REPAIR           CURRENTMEDICATIONS       Previous Medications    ALLOPURINOL (ZYLOPRIM) 100 MG TABLET    TAKE 1 TABLET EVERY DAY    BETAMETHASONE DIPROPIONATE (DIPROLENE) 0.05 % CREAM    Apply to affected areas on the legs twice daily until improved.     DULAGLUTIDE (TRULICITY) 9.76 OP/8.3DJ SOPN    INJECT 0.75 MG SUBCUTANEOUSLY ONE TIME WEEKLY    ELUXADOLINE (VIBERZI) 75 MG TABS    Take 1 tablet by mouth 2 times daily    EXENATIDE (BYDUREON) 2 MG PEN    Inject 1 pen into the skin once a week    GABAPENTIN (NEURONTIN) 100 MG CAPSULE    TAKE 1 CAPSULE EVERY DAY    GLUCOSE BLOOD VI TEST STRIPS (ASCENSIA AUTODISC VI;ONE TOUCH ULTRA TEST VI) STRIP    1 each by In Vitro route 2 times daily One Touch preferred    GLUCOSE MONITORING KIT (FREESTYLE) MONITORING KIT    1 kit by Does not apply route daily as needed (one touch preferred)    IRBESARTAN-HYDROCHLOROTHIAZIDE (AVALIDE) 150-12.5 MG PER TABLET    TAKE 1 TABLET EVERY DAY    LANCETS 63M MISC    1 applicator by Does not apply route 2 times daily    LEVOTHYROXINE (SYNTHROID) 88 MCG TABLET    TAKE 1 TABLET EVERY DAY BEFORE BREAKFAST    METOPROLOL SUCCINATE (TOPROL XL) 25 MG EXTENDED RELEASE TABLET    Take 1 tablet by mouth daily    MULTIPLE VITAMIN (MULTI-VITAMIN PO)    Take by mouth    ONDANSETRON (ZOFRAN ODT) 4 MG DISINTEGRATING TABLET    Take 1 tablet by mouth every 8 hours as needed for Nausea or Vomiting    SPIRONOLACTONE (ALDACTONE) 25 MG TABLET    TAKE 1 TABLET EVERY DAY    TORSEMIDE (DEMADEX) 20 MG TABLET    TAKE 1 TABLET BY MOUTH EVERY DAY    TRIAMCINOLONE (KENALOG) 0.1 % CREAM    APPLY TOPICALLY TWICE DAILY AS NEEDED FOR PSORIASIS         ALLERGIES     Demeclocycline; Tetracyclines & related; Amoxicillin; Codeine; and Penicillins    FAMILYHISTORY       Family History   Problem Relation Age of Onset   [place] Cancer Mother         lung, breast, liver    Stroke Father     Cancer Brother     Cancer Maternal Grandmother     No Known Problems Paternal Grandmother     No Known Problems Paternal Grandfather           SOCIAL HISTORY       Social History     Socioeconomic History    Marital status:      Spouse name: None    Number of children: None    Years of education: None    Highest education level: None   Occupational History    None   Social Needs    Financial resource strain: None    Food insecurity:     Worry: None     Inability: None    Transportation needs:     Medical: None     Non-medical: None   Tobacco Use    Smoking status: Former Smoker     Packs/day: 0.50     Years: 10.00     Pack years: 5.00     Last attempt to quit: 1978     Years since quittin.7    Smokeless tobacco: Never Used   Substance and Sexual Activity    Alcohol use:  Yes     Alcohol/week: 0.6 oz     Types: 1 Glasses of wine per week    Drug use: No    Sexual activity: Never   Lifestyle    Physical activity:     Days per week: None     Minutes per session: None    Stress: None   Relationships    Social connections:     Talks on phone: None     Gets together: None     Attends Nondenominational service: None     Active member of club or organization: None     Attends meetings of clubs or organizations: None     Relationship status: None    Intimate partner violence:     Fear of current or ex partner: None     Emotionally abused: None Physically abused: None     Forced sexual activity: None   Other Topics Concern    None   Social History Narrative    None       SCREENINGS             PHYSICAL EXAM    (up to 7 for level 4, 8 or more for level 5)     ED Triage Vitals [04/09/19 1402]   BP Temp Temp Source Pulse Resp SpO2 Height Weight   (!) 206/100 98.1 °F (36.7 °C) Infrared 95 16 98 % 5' 2\" (1.575 m) 188 lb (85.3 kg)       Physical Exam   Constitutional: She is oriented to person, place, and time. She appears well-developed and well-nourished. HENT:   Head: Normocephalic and atraumatic. Nose: Nose normal.   Mouth/Throat: Oropharynx is clear and moist.   Eyes: Conjunctivae and EOM are normal. Right eye exhibits no discharge. Left eye exhibits no discharge. Neck: Normal range of motion. Neck supple. Cardiovascular: Normal rate, regular rhythm and normal heart sounds. Exam reveals no gallop. No murmur heard. Pulses:       Dorsalis pedis pulses are 2+ on the right side, and 2+ on the left side. Posterior tibial pulses are 2+ on the right side, and 2+ on the left side. Pulmonary/Chest: Effort normal and breath sounds normal. No respiratory distress. She has no wheezes. She has no rales. Abdominal: Soft. There is no tenderness. Musculoskeletal: Normal range of motion. Right lower leg: Normal.        Left lower leg: She exhibits tenderness. She exhibits no swelling. Right foot: Normal.        Left foot: Normal.   Negative Homans sign. Tenderness to the left popliteal fossa/upper calf. No mass or palpable cord. Neurological: She is alert and oriented to person, place, and time. Skin: Skin is warm and dry. Capillary refill takes less than 2 seconds. No rash noted. She is not diaphoretic. No erythema. No pallor. Psychiatric: She has a normal mood and affect. Her behavior is normal.   Nursing note and vitals reviewed.       DIAGNOSTIC RESULTS   LABS:    Labs Reviewed   CBC WITH AUTO DIFFERENTIAL - Abnormal; Notable for the following components:       Result Value    RDW 15.7 (*)     Lymphocytes # 0.9 (*)     All other components within normal limits    Narrative:     Performed at:  [facility]  [place], [place]   Phone [phone]   COMPREHENSIVE METABOLIC PANEL - Abnormal; Notable for the following components:    CO2 19 (*)     Anion Gap 17 (*)     BUN 36 (*)     CREATININE 2.1 (*)     GFR Non- 23 (*)     GFR  28 (*)     All other components within normal limits    Narrative:     Performed at:  [facility] [address], [place]   Phone [phone]   CULTURE BLOOD #1   CULTURE BLOOD #2   PROTIME-INR    Narrative:     Performed at:  [facility] [address], [place]   Phone [phone]   APTT    Narrative:     Performed at:  [facility] [address], [place]   Phone [phone]   TROPONIN    Narrative:     Performed at:  [facility] [address], [place]   Phone [phone]   LACTIC ACID, PLASMA       All other labs were within normal range or not returned as of this dictation. EKG: All EKG's are interpreted by the Emergency Department Physician who either signs orCo-signs this chart in the absence of a cardiologist.  Please see their note for interpretation of EKG. RADIOLOGY:   Non-plain film images such as CT, Ultrasound and MRI are read by the radiologist. Maneul Granda radiographic images are visualized andpreliminarily interpreted by the  ED Provider with the below findings:        Interpretation perthe Radiologist below, if available at the time of this note:    VL Extremity Venous Left         XR CHEST STANDARD (2 VW)   Final Result   No acute process. No results found.       PROCEDURES   Unless otherwise noted below, Prescriptions    BENZONATATE (TESSALON PERLES) 100 MG CAPSULE    Take 1 capsule by mouth 3 times daily as needed for Cough       DISCONTINUED MEDICATIONS:  Discontinued Medications    No medications on file              (Please note that portions ofthis note were completed with a voice recognition program.  Efforts were made to edit the dictations but occasionally words are mis-transcribed.)    Debbie Mcbride PA-C (electronically signed)            Debbie Mcbride PA-C  04/09/19 [address]  04/13/19 8072

## 2019-04-13 ASSESSMENT — ENCOUNTER SYMPTOMS
BACK PAIN: 0
NAUSEA: 0
VOMITING: 0
SHORTNESS OF BREATH: 1
WHEEZING: 0
COLOR CHANGE: 0
COUGH: 1
DIARRHEA: 0
ABDOMINAL PAIN: 0

## 2019-04-14 LAB
BLOOD CULTURE, ROUTINE: NORMAL
CULTURE, BLOOD 2: NORMAL

## 2019-04-15 ENCOUNTER — OFFICE VISIT (OUTPATIENT)
Dept: DERMATOLOGY | Age: 74
End: 2019-04-15
Payer: MEDICARE

## 2019-04-15 DIAGNOSIS — L72.0 EPIDERMOID CYST: ICD-10-CM

## 2019-04-15 DIAGNOSIS — I83.892 VARICOSE VEINS OF LEFT LEG WITH EDEMA: ICD-10-CM

## 2019-04-15 DIAGNOSIS — L57.0 ACTINIC KERATOSIS: Primary | ICD-10-CM

## 2019-04-15 PROCEDURE — G8427 DOCREV CUR MEDS BY ELIG CLIN: HCPCS | Performed by: DERMATOLOGY

## 2019-04-15 PROCEDURE — 3017F COLORECTAL CA SCREEN DOC REV: CPT | Performed by: DERMATOLOGY

## 2019-04-15 PROCEDURE — 1090F PRES/ABSN URINE INCON ASSESS: CPT | Performed by: DERMATOLOGY

## 2019-04-15 PROCEDURE — 99213 OFFICE O/P EST LOW 20 MIN: CPT | Performed by: DERMATOLOGY

## 2019-04-15 PROCEDURE — 4040F PNEUMOC VAC/ADMIN/RCVD: CPT | Performed by: DERMATOLOGY

## 2019-04-15 PROCEDURE — 1123F ACP DISCUSS/DSCN MKR DOCD: CPT | Performed by: DERMATOLOGY

## 2019-04-15 PROCEDURE — 17000 DESTRUCT PREMALG LESION: CPT | Performed by: DERMATOLOGY

## 2019-04-15 PROCEDURE — 1036F TOBACCO NON-USER: CPT | Performed by: DERMATOLOGY

## 2019-04-15 PROCEDURE — G8417 CALC BMI ABV UP PARAM F/U: HCPCS | Performed by: DERMATOLOGY

## 2019-04-15 PROCEDURE — 17003 DESTRUCT PREMALG LES 2-14: CPT | Performed by: DERMATOLOGY

## 2019-04-15 PROCEDURE — G8399 PT W/DXA RESULTS DOCUMENT: HCPCS | Performed by: DERMATOLOGY

## 2019-04-15 NOTE — PROGRESS NOTES
[facility] Dermatology  [facility]  [phone]      Angelique Vazquez  1945    75 y.o. female     Date of Visit: 4/15/2019    Chief Complaint: f/u for AKs and skin cancer    History of Present Illness:    1. She presents today for persistent scaly lesions on the extremities. She has a history of actinic keratoses. 2.  She also complains of a lesion on the left side of the back of the neck. 3.  She complains of some tenderness to palpation on the left leg. Has been present for about 4 weeks now. Had venous duplex U/S on 4/9/19: no evidence of superficial or deep vein thrombosis. 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 Review of Systems:  Gen: Feels well, good sense of health. Skin: No new or changing moles, no history of keloids or hypertrophic scars. Heme: No abnormal bruising or bleeding. Past Medical History, Family History, Surgical History, Medications and Allergies reviewed.     Past Medical History:   Diagnosis Date    Diabetes ([address]     Hypertension     Kidney disease      Past Surgical History:   Procedure Laterality Date    APPENDECTOMY      CHOLECYSTECTOMY      HYSTERECTOMY      MASTECTOMY, BILATERAL      preventive    ROTATOR CUFF REPAIR         Allergies   Allergen Reactions    Demeclocycline Anaphylaxis    Tetracyclines & Related Anaphylaxis    Amoxicillin     Codeine     Penicillins      Outpatient Medications Marked as Taking for the 4/15/19 encounter (Office Visit) with Jackie Barbour MD   Medication Sig Dispense Refill    benzonatate (TESSALON PERLES) 100 MG capsule Take 1 capsule by mouth 3 times daily as needed for Cough 20 capsule 0    spironolactone (ALDACTONE) 25 MG tablet TAKE 1 TABLET EVERY DAY 90 tablet 1    irbesartan-hydrochlorothiazide (AVALIDE) 150-12.5 MG per tablet TAKE 1 TABLET EVERY DAY 90 tablet 3    gabapentin (NEURONTIN) 100 MG capsule TAKE 1 CAPSULE EVERY DAY 90 capsule 3    torsemide (DEMADEX) 20 MG tablet TAKE 1 TABLET BY MOUTH EVERY DAY 30 tablet 1    Exenatide (BYDUREON) 2 MG PEN Inject 1 pen into the skin once a week 4 pen 3    allopurinol (ZYLOPRIM) 100 MG tablet TAKE 1 TABLET EVERY DAY 90 tablet 2    metoprolol succinate (TOPROL XL) 25 MG extended release tablet Take 1 tablet by mouth daily 90 tablet 3    Dulaglutide (TRULICITY) 5.00 KT/4.1FQ SOPN INJECT 0.75 MG SUBCUTANEOUSLY ONE TIME WEEKLY 12 pen 4    triamcinolone (KENALOG) 0.1 % cream APPLY TOPICALLY TWICE DAILY AS NEEDED FOR PSORIASIS 160 g 1    levothyroxine (SYNTHROID) 88 MCG tablet TAKE 1 TABLET EVERY DAY BEFORE BREAKFAST 90 tablet 3    betamethasone dipropionate (DIPROLENE) 0.05 % cream Apply to affected areas on the legs twice daily until improved. 180 g 1    Eluxadoline (VIBERZI) 75 MG TABS Take 1 tablet by mouth 2 times daily 16 tablet 1    glucose monitoring kit (FREESTYLE) monitoring kit 1 kit by Does not apply route daily as needed (one touch preferred) 1 kit 0    Lancets 62P MISC 1 applicator by Does not apply route 2 times daily 100 each 3    glucose blood VI test strips (ASCENSIA AUTODISC VI;ONE TOUCH ULTRA TEST VI) strip 1 each by In Vitro route 2 times daily One Touch preferred 100 each 3    Multiple Vitamin (MULTI-VITAMIN PO) Take by mouth           Physical Examination       The following were examined and determined to be normal: Psych/Neuro, Scalp/hair, Head/face, Conjunctivae/eyelids, Gums/teeth/lips, Neck, Breast/axilla/chest, Abdomen, Back and LUE. The following were examined and determined to be abnormal: RUE, RLE and LLE. Well-appearing. 1.  Right elbow-1, right forearm-2, right hand-2, left leg-2, right shin-1: Several keratotic erythematous macules. 2.  Left posterior lateral lower neck with a small whitish smooth papule. 3.  Left upper calf/popliteal fossa - with multiple varicosities and tenderness to palpation. Assessment and Plan     1. Actinic keratoses - several    2 cycles of liquid nitrogen applied to 8 AKs: Right elbow-1, right forearm-2, right hand-2, left leg-2, right shin-1. Patient was educated regarding the potential risks of blister formation, discomfort, hypopigmentation, and scar. Wound care was discussed. 2. Epidermoid cyst of the left posterolateral neck -     Reassurance. 3. Varicose veins of left leg with edema and pain    Refer to Dr. Tami Abernathy. Return in about 6 months (around 10/15/2019).

## 2019-04-18 ENCOUNTER — TELEPHONE (OUTPATIENT)
Dept: DERMATOLOGY | Age: 74
End: 2019-04-18

## 2019-04-22 ENCOUNTER — TELEPHONE (OUTPATIENT)
Dept: VASCULAR SURGERY | Age: 74
End: 2019-04-22

## 2019-04-22 NOTE — TELEPHONE ENCOUNTER
----- Message from Helen Peralta MD sent at 2019  8:37 PM EDT -----  No.   ----- Message -----  From: Carole Cervantes LPN  Sent:    8:56 AM  To: Helen Peralta MD    This is patient I talked to you about yesterday. Had venous study (19) on left leg only. Would you want any other testing? [facility]- called to schedule a new patient appointment with Dr. Allan Gonzalez. The patient was originally given a referral from Alba Mcgowan MD to see Dr. Tami Yepez, but is not able to be seen for 2 months. The patient then called Alba Mcgowan MD back to see what she should do. She was told to see Dr. Allan Gonzalez. The referral and phone notes are in Andrea. Please call her back at [phone].

## 2019-04-25 ENCOUNTER — OFFICE VISIT (OUTPATIENT)
Dept: VASCULAR SURGERY | Age: 74
End: 2019-04-25
Payer: MEDICARE

## 2019-04-25 VITALS
SYSTOLIC BLOOD PRESSURE: 152 MMHG | TEMPERATURE: 96.9 F | WEIGHT: 193 LBS | HEIGHT: 62 IN | DIASTOLIC BLOOD PRESSURE: 75 MMHG | BODY MASS INDEX: 35.51 KG/M2

## 2019-04-25 DIAGNOSIS — N18.2 CHRONIC KIDNEY DISEASE, STAGE II (MILD): Primary | ICD-10-CM

## 2019-04-25 DIAGNOSIS — L02.416 ABSCESS OF LEG, LEFT: ICD-10-CM

## 2019-04-25 PROCEDURE — 1090F PRES/ABSN URINE INCON ASSESS: CPT | Performed by: SURGERY

## 2019-04-25 PROCEDURE — G8427 DOCREV CUR MEDS BY ELIG CLIN: HCPCS | Performed by: SURGERY

## 2019-04-25 PROCEDURE — 4040F PNEUMOC VAC/ADMIN/RCVD: CPT | Performed by: SURGERY

## 2019-04-25 PROCEDURE — G8417 CALC BMI ABV UP PARAM F/U: HCPCS | Performed by: SURGERY

## 2019-04-25 PROCEDURE — 1036F TOBACCO NON-USER: CPT | Performed by: SURGERY

## 2019-04-25 PROCEDURE — 3017F COLORECTAL CA SCREEN DOC REV: CPT | Performed by: SURGERY

## 2019-04-25 PROCEDURE — 99204 OFFICE O/P NEW MOD 45 MIN: CPT | Performed by: SURGERY

## 2019-04-25 PROCEDURE — G8399 PT W/DXA RESULTS DOCUMENT: HCPCS | Performed by: SURGERY

## 2019-04-25 PROCEDURE — 1123F ACP DISCUSS/DSCN MKR DOCD: CPT | Performed by: SURGERY

## 2019-04-25 NOTE — PROGRESS NOTES
[facility] Vascular Surgery  Ewa Powell MD, [facility], [address]    OUTPATIENT CONSULTATION    Date of Consultation:  4/25/2019    PCP:  MARCIE Schultz CNP       Chief Complaint: Left leg pain    History of Present Illness: We are asked to see this patient in consultation by Dr. Kaveh Paul regarding left leg pain. Nataly Esposito is a 75 y.o. female who presents today with her son and daughter-in-law. She reports a several month history of fullness and discomfort directly behind the left knee which culminated in her going to the emergency department on 4/9/2019. She had a venous duplex of the left leg which was negative for DVT or SVT. No indication of a Baker's cyst.  She does have some varicosities but does not complain of these. She does not wear compression stockings. She has occasional swelling in the legs when she is up on them and some discomfort related to this, but again this is not her complaint with which she presents. She now states that the \"knot\" and fullness are new longer present behind the left knee and symptoms are improved. As a side note, son states that patient recently moved from [place] and needs a new primary care physician and I suggested several local PCPs in the [address] [place] [address] system. Past Medical History:  Past Medical History:   Diagnosis Date    Diabetes ([address]     Hypertension     Kidney disease        Past Surgical History:  Past Surgical History:   Procedure Laterality Date    APPENDECTOMY      CHOLECYSTECTOMY      HYSTERECTOMY      MASTECTOMY, BILATERAL      preventive    ROTATOR CUFF REPAIR         Home Medications:   Prior to Admission medications    Medication Sig Start Date End Date Taking?  Authorizing Provider   benzonatate (TESSALON PERLES) 100 MG capsule Take 1 capsule by mouth 3 times daily as needed for Cough 4/9/19   Ching Singh PA-C   spironolactone (ALDACTONE) 25 MG tablet TAKE 1 TABLET EVERY DAY 4/1/19   Svetlana Marylee Schultze, APRN - CNP   irbesartan-hydrochlorothiazide (AVALIDE) 150-12.5 MG per tablet TAKE 1 TABLET EVERY DAY 3/6/19   MARCIE Smallwood - CNP   gabapentin (NEURONTIN) 100 MG capsule TAKE 1 CAPSULE EVERY DAY 3/6/19 6/4/19  Lucianne Spotted, MARCIE - CNP   torsemide (DEMADEX) 20 MG tablet TAKE 1 TABLET BY MOUTH EVERY DAY 2/25/19   Namrata Davila, MARCIE - CNP   Exenatide (BYDUREON) 2 MG PEN Inject 1 pen into the skin once a week 1/10/19   Jameelne Lola, MARCIE - CNP   allopurinol (ZYLOPRIM) 100 MG tablet TAKE 1 TABLET EVERY DAY 11/6/18   MARCIE Smallwood - CNP   metoprolol succinate (TOPROL XL) 25 MG extended release tablet Take 1 tablet by mouth daily 9/10/18   Bob Hsu MD   ondansetron (ZOFRAN ODT) 4 MG disintegrating tablet Take 1 tablet by mouth every 8 hours as needed for Nausea or Vomiting 9/1/18   Angelita Burgess PA-C   Dulaglutide (TRULICITY) 8.77 QH/8.9RI SOPN INJECT 0.75 MG SUBCUTANEOUSLY ONE TIME WEEKLY 8/8/18   MARCIE Smallwood CNP   triamcinolone (KENALOG) 0.1 % cream APPLY TOPICALLY TWICE DAILY AS NEEDED FOR PSORIASIS 5/14/18   Fabricio Ang MD   levothyroxine (SYNTHROID) 88 MCG tablet TAKE 1 TABLET EVERY DAY BEFORE BREAKFAST 4/10/18   MARCIE Smallwood - IVY   betamethasone dipropionate (DIPROLENE) 0.05 % cream Apply to affected areas on the legs twice daily until improved.  10/2/17   Fabricio Ang MD   Eluxadoline (VIBERZI) 75 MG TABS Take 1 tablet by mouth 2 times daily 3/17/17   MARCIE Smallwood - IVY   glucose monitoring kit (FREESTYLE) monitoring kit 1 kit by Does not apply route daily as needed (one touch preferred) 3/6/17   Luccassidyne MARCIE Davila - CNP   Lancets 51A MISC 1 applicator by Does not apply route 2 times daily 3/6/17   Luccassidyne SOBEIDA DavilaN - CNP   glucose blood VI test strips (ASCENSIA AUTODISC VI;ONE TOUCH ULTRA TEST VI) strip 1 each by In Vitro route 2 times daily One Touch preferred 3/6/17   Ivonne Narrow, APRN - CNP   Multiple Vitamin (MULTI-VITAMIN PO) Take by mouth    Historical Provider, MD        Allergies:  Demeclocycline; Tetracyclines & related; Amoxicillin; Codeine; and Penicillins     Social History:  She is , retired. Recently moved from [place] to live with son and daughter-in-law. Remote smoker. Fairly active. Family History:      Problem Relation Age of Onset   [facility] Cancer Mother         lung, breast, liver    Stroke Father     Cancer Brother     Cancer Maternal Grandmother     No Known Problems Paternal Grandmother     No Known Problems Paternal Grandfather        Review of Systems:  Review of Systems   All other systems reviewed and are negative. PhysicalExamination:    Vitals:    04/25/19 0920 04/25/19 0927   BP: (!) 147/74 (!) 152/75   Site: Left Upper Arm    Position: Sitting    Cuff Size: Large Adult    Temp: 96.9 °F (36.1 °C)    Weight: 193 lb (87.5 kg)    Height: 5' 2\" (1.575 m)      Body mass index is 35.3 kg/m². Physical Exam   Constitutional: She is oriented to person, place, and time. She appears well-developed and well-nourished. No distress. Appears younger than stated age. Cardiovascular: Normal rate, regular rhythm and normal heart sounds. Pulmonary/Chest: Effort normal and breath sounds normal. No respiratory distress. Neurological: She is alert and oriented to person, place, and time. Skin: Skin is warm and dry. Capillary refill takes less than 2 seconds. Psychiatric: She has a normal mood and affect. Her behavior is normal. Judgment and thought content normal.   Extremities: She is moderately obese. Bilateral lower extremities with chronic 1+ nonpitting edema, mild pigmentation and stasis dermatitis likely related to venous insufficiency. Multiple scattered telangiectasias throughout the lower legs, no bulging varicosities.   In the bilateral posterior popliteal spaces, there is no significant asymmetrical fullness and no masses. Pulses:    femoral popliteal DP PT   RIGHT 2 2 2 1   LEFT 2 2 2 1       MEDICAL DECISION MAKING/TESTING--I have personally reviewed images of the following surgery. Radiology Studies:  VL Extremity Venous Left 4/12/2019  Summary       Marlon De La Cruz is no evidence of acute DVT or SVT of the left lower extremity or the    right common femoral vein. Diagnosis:     Left leg pain--resolved      Plan:   She does have some evidence of chronic venous insufficiency, though her symptoms and exam were not consistent with pain secondary to this. She describes a fullness in her left posterior popliteal space, which may have been a Baker cyst versus other. It is not currently present. Regarding the chronic venous insufficiency, I have given her a prescription for compression stockings. I will be happy to see her on a when necessary basis.

## 2019-05-10 ENCOUNTER — OFFICE VISIT (OUTPATIENT)
Dept: FAMILY MEDICINE CLINIC | Age: 74
End: 2019-05-10
Payer: MEDICARE

## 2019-05-10 VITALS
DIASTOLIC BLOOD PRESSURE: 70 MMHG | SYSTOLIC BLOOD PRESSURE: 130 MMHG | HEART RATE: 72 BPM | BODY MASS INDEX: 35.88 KG/M2 | OXYGEN SATURATION: 98 % | WEIGHT: 195 LBS | HEIGHT: 62 IN

## 2019-05-10 DIAGNOSIS — E78.00 HIGH CHOLESTEROL: ICD-10-CM

## 2019-05-10 DIAGNOSIS — E66.9 DIABETES MELLITUS TYPE 2 IN OBESE (HCC): ICD-10-CM

## 2019-05-10 DIAGNOSIS — I10 ESSENTIAL HYPERTENSION: ICD-10-CM

## 2019-05-10 DIAGNOSIS — E11.69 DIABETES MELLITUS TYPE 2 IN OBESE (HCC): ICD-10-CM

## 2019-05-10 DIAGNOSIS — E11.22 CKD STAGE 4 DUE TO TYPE 2 DIABETES MELLITUS (HCC): ICD-10-CM

## 2019-05-10 DIAGNOSIS — R41.3 MEMORY LOSS: ICD-10-CM

## 2019-05-10 DIAGNOSIS — Z23 NEED FOR VACCINATION FOR STREP PNEUMONIAE: ICD-10-CM

## 2019-05-10 DIAGNOSIS — E03.9 ACQUIRED HYPOTHYROIDISM: ICD-10-CM

## 2019-05-10 DIAGNOSIS — R41.3 MEMORY CHANGE: ICD-10-CM

## 2019-05-10 DIAGNOSIS — R53.83 FATIGUE, UNSPECIFIED TYPE: Primary | ICD-10-CM

## 2019-05-10 DIAGNOSIS — N18.4 CKD STAGE 4 DUE TO TYPE 2 DIABETES MELLITUS (HCC): ICD-10-CM

## 2019-05-10 PROCEDURE — 1090F PRES/ABSN URINE INCON ASSESS: CPT | Performed by: FAMILY MEDICINE

## 2019-05-10 PROCEDURE — 3046F HEMOGLOBIN A1C LEVEL >9.0%: CPT | Performed by: FAMILY MEDICINE

## 2019-05-10 PROCEDURE — G8417 CALC BMI ABV UP PARAM F/U: HCPCS | Performed by: FAMILY MEDICINE

## 2019-05-10 PROCEDURE — G8427 DOCREV CUR MEDS BY ELIG CLIN: HCPCS | Performed by: FAMILY MEDICINE

## 2019-05-10 PROCEDURE — 4040F PNEUMOC VAC/ADMIN/RCVD: CPT | Performed by: FAMILY MEDICINE

## 2019-05-10 PROCEDURE — 99213 OFFICE O/P EST LOW 20 MIN: CPT | Performed by: FAMILY MEDICINE

## 2019-05-10 PROCEDURE — 1123F ACP DISCUSS/DSCN MKR DOCD: CPT | Performed by: FAMILY MEDICINE

## 2019-05-10 PROCEDURE — 3017F COLORECTAL CA SCREEN DOC REV: CPT | Performed by: FAMILY MEDICINE

## 2019-05-10 PROCEDURE — G8399 PT W/DXA RESULTS DOCUMENT: HCPCS | Performed by: FAMILY MEDICINE

## 2019-05-10 PROCEDURE — 1036F TOBACCO NON-USER: CPT | Performed by: FAMILY MEDICINE

## 2019-05-10 PROCEDURE — 2022F DILAT RTA XM EVC RTNOPTHY: CPT | Performed by: FAMILY MEDICINE

## 2019-05-10 PROCEDURE — G0009 ADMIN PNEUMOCOCCAL VACCINE: HCPCS | Performed by: FAMILY MEDICINE

## 2019-05-10 PROCEDURE — 90732 PPSV23 VACC 2 YRS+ SUBQ/IM: CPT | Performed by: FAMILY MEDICINE

## 2019-05-10 RX ORDER — METOPROLOL SUCCINATE 25 MG/1
25 TABLET, EXTENDED RELEASE ORAL DAILY
Qty: 90 TABLET | Refills: 3 | Status: SHIPPED | OUTPATIENT
Start: 2019-05-10 | End: 2020-09-01

## 2019-05-10 RX ORDER — SPIRONOLACTONE 25 MG/1
TABLET ORAL
Qty: 90 TABLET | Refills: 3 | Status: SHIPPED | OUTPATIENT
Start: 2019-05-10 | End: 2020-08-31

## 2019-05-10 RX ORDER — PRAVASTATIN SODIUM 10 MG
10 TABLET ORAL DAILY
COMMUNITY
End: 2021-03-22 | Stop reason: ALTCHOICE

## 2019-05-10 RX ORDER — ALLOPURINOL 100 MG/1
TABLET ORAL
Qty: 90 TABLET | Refills: 3 | Status: SHIPPED | OUTPATIENT
Start: 2019-05-10 | End: 2020-09-01

## 2019-05-10 RX ORDER — INDOMETHACIN 25 MG/1
25 CAPSULE ORAL 2 TIMES DAILY WITH MEALS
COMMUNITY
End: 2019-11-25

## 2019-05-10 RX ORDER — TORSEMIDE 20 MG/1
TABLET ORAL
Qty: 90 TABLET | Refills: 3 | Status: SHIPPED | OUTPATIENT
Start: 2019-05-10 | End: 2019-11-25 | Stop reason: DRUGHIGH

## 2019-05-10 NOTE — PROGRESS NOTES
Subjective:     Patient ID:Judy Peres is a 75 y.o. female. HPI      Treatment Adherence:   Medication compliance:  compliant most of the time  Diet compliance:  compliant most of the time  Weight trend: stable  Current exercise: walks 3 time(s) per week  Barriers: none    Diabetes Mellitus Type 2: Current symptoms/problems include none. Home blood sugar records: fasting range: 110  Any episodes of hypoglycemia? no  Daily Aspirin? Yes    Hypertension:  Home blood pressure monitoring: No.  She is adherent to a low sodium diet. Patient denies chest pain, shortness of breath, headache, lightheadedness, blurred vision, peripheral edema, palpitations, dry cough and fatigue. Antihypertensive medication side effects: no medication side effects noted. Use of agents associated with hypertension: none. Hyperlipidemia:  No new myalgias or GI upset on simvastatin (Zocor).     Allergies   Allergen Reactions    Demeclocycline Anaphylaxis    Tetracyclines & Related Anaphylaxis    Amoxicillin     Codeine     Penicillins        Current Outpatient Medications   Medication Sig Dispense Refill    indomethacin (INDOCIN) 25 MG capsule Take 25 mg by mouth 2 times daily (with meals)      pravastatin (PRAVACHOL) 10 MG tablet Take 10 mg by mouth daily      spironolactone (ALDACTONE) 25 MG tablet TAKE 1 TABLET EVERY DAY 90 tablet 3    torsemide (DEMADEX) 20 MG tablet TAKE 1 TABLET BY MOUTH EVERY DAY 90 tablet 3    allopurinol (ZYLOPRIM) 100 MG tablet TAKE 1 TABLET EVERY DAY 90 tablet 3    metoprolol succinate (TOPROL XL) 25 MG extended release tablet Take 1 tablet by mouth daily 90 tablet 3    irbesartan-hydrochlorothiazide (AVALIDE) 150-12.5 MG per tablet TAKE 1 TABLET EVERY DAY 90 tablet 3    levothyroxine (SYNTHROID) 88 MCG tablet TAKE 1 TABLET EVERY DAY BEFORE BREAKFAST 90 tablet 3    benzonatate (TESSALON PERLES) 100 MG capsule Take 1 capsule by mouth 3 times daily as needed for Cough 20 capsule 0    ondansetron (ZOFRAN ODT) 4 MG disintegrating tablet Take 1 tablet by mouth every 8 hours as needed for Nausea or Vomiting 20 tablet 0    Dulaglutide (TRULICITY) 7.29 VD/6.2NW SOPN INJECT 0.75 MG SUBCUTANEOUSLY ONE TIME WEEKLY 12 pen 4    triamcinolone (KENALOG) 0.1 % cream APPLY TOPICALLY TWICE DAILY AS NEEDED FOR PSORIASIS 160 g 1    betamethasone dipropionate (DIPROLENE) 0.05 % cream Apply to affected areas on the legs twice daily until improved. 180 g 1    Eluxadoline (VIBERZI) 75 MG TABS Take 1 tablet by mouth 2 times daily 16 tablet 1    glucose monitoring kit (FREESTYLE) monitoring kit 1 kit by Does not apply route daily as needed (one touch preferred) 1 kit 0    Lancets 52R MISC 1 applicator by Does not apply route 2 times daily 100 each 3    glucose blood VI test strips (ASCENSIA AUTODISC VI;ONE TOUCH ULTRA TEST VI) strip 1 each by In Vitro route 2 times daily One Touch preferred 100 each 3    Multiple Vitamin (MULTI-VITAMIN PO) Take by mouth       No current facility-administered medications for this visit. Past Medical History:   Diagnosis Date    Diabetes ([facility] [address]     Hypertension     Kidney disease        Past Surgical History:   Procedure Laterality Date    APPENDECTOMY      CHOLECYSTECTOMY      HYSTERECTOMY      MASTECTOMY, BILATERAL      preventive    ROTATOR CUFF REPAIR         Social History     Socioeconomic History    Marital status:       Spouse name: Not on file    Number of children: Not on file    Years of education: Not on file    Highest education level: Not on file   Occupational History    Not on file   Social Needs    Financial resource strain: Not on file    Food insecurity:     Worry: Not on file     Inability: Not on file    Transportation needs:     Medical: Not on file     Non-medical: Not on file   Tobacco Use    Smoking status: Former Smoker     Packs/day: 0.50     Years: 10.00     Pack years: 5.00     Last attempt to quit: 7/2/1978     Years since quittin.8    Smokeless tobacco: Never Used   Substance and Sexual Activity    Alcohol use: Yes     Alcohol/week: 0.6 oz     Types: 1 Glasses of wine per week    Drug use: No    Sexual activity: Never   Lifestyle    Physical activity:     Days per week: Not on file     Minutes per session: Not on file    Stress: Not on file   Relationships    Social connections:     Talks on phone: Not on file     Gets together: Not on file     Attends Religion service: Not on file     Active member of club or organization: Not on file     Attends meetings of clubs or organizations: Not on file     Relationship status: Not on file    Intimate partner violence:     Fear of current or ex partner: Not on file     Emotionally abused: Not on file     Physically abused: Not on file     Forced sexual activity: Not on file   Other Topics Concern    Not on file   Social History Narrative    Not on file       Family History   Problem Relation Age of Onset    Cancer Mother         lung, breast, liver    Stroke Father     Cancer Brother     Cancer Maternal Grandmother     No Known Problems Paternal Grandmother     No Known Problems Paternal Grandfather        Immunization History   Administered Date(s) Administered    Influenza, High Dose (Fluzone 65 yrs and older) 2017, 2017    Pneumococcal 13-valent Conjugate (Tiffany Habermann) 2017       Review of Systems  Review of Systems   Psychiatric/Behavioral:        Memory issues       Objective:   Physical Exam  Physical Exam   Constitutional: She is oriented to person, place, and time. She appears well-developed and well-nourished. No distress. HENT:   Mouth/Throat: Oropharynx is clear and moist.   Eyes: Pupils are equal, round, and reactive to light. Conjunctivae are normal.   Neck: No JVD present. No tracheal deviation present. No thyromegaly present. Cardiovascular: Normal rate, regular rhythm, normal heart sounds and intact distal pulses.  Exam reveals

## 2019-05-13 ENCOUNTER — TELEPHONE (OUTPATIENT)
Dept: FAMILY MEDICINE CLINIC | Age: 74
End: 2019-05-13

## 2019-05-13 RX ORDER — LEVOTHYROXINE SODIUM 88 UG/1
TABLET ORAL
Qty: 90 TABLET | Refills: 3 | Status: SHIPPED | OUTPATIENT
Start: 2019-05-13 | End: 2019-05-23

## 2019-05-13 NOTE — TELEPHONE ENCOUNTER
Synthroid 88mcg tabs daily is what she is taking. Cannot take the torsemide for kidney. Bumetanide solution shot or pill form, furosemide injection solution as well-either one would work per pt. Pt also forget to tell dr that she takes a vitamin b tablet for the kidneys. Is that ok? If not what is recommended.

## 2019-05-22 ENCOUNTER — HOSPITAL ENCOUNTER (OUTPATIENT)
Age: 74
Discharge: HOME | End: 2019-05-22
Payer: MEDICARE

## 2019-05-22 DIAGNOSIS — E66.9 DIABETES MELLITUS TYPE 2 IN OBESE (HCC): ICD-10-CM

## 2019-05-22 DIAGNOSIS — N18.4 CKD STAGE 4 DUE TO TYPE 2 DIABETES MELLITUS (HCC): ICD-10-CM

## 2019-05-22 DIAGNOSIS — E78.00 HIGH CHOLESTEROL: ICD-10-CM

## 2019-05-22 DIAGNOSIS — R41.3 MEMORY CHANGE: ICD-10-CM

## 2019-05-22 DIAGNOSIS — E11.22 CKD STAGE 4 DUE TO TYPE 2 DIABETES MELLITUS (HCC): ICD-10-CM

## 2019-05-22 DIAGNOSIS — N18.4 CKD (CHRONIC KIDNEY DISEASE), STAGE IV (HCC): ICD-10-CM

## 2019-05-22 DIAGNOSIS — E11.69 DIABETES MELLITUS TYPE 2 IN OBESE (HCC): ICD-10-CM

## 2019-05-22 DIAGNOSIS — E03.9 ACQUIRED HYPOTHYROIDISM: ICD-10-CM

## 2019-05-22 DIAGNOSIS — I10 ESSENTIAL HYPERTENSION: ICD-10-CM

## 2019-05-22 LAB
A/G RATIO: 1.2 (ref 1.1–2.2)
ALBUMIN SERPL-MCNC: 4.1 G/DL (ref 3.4–5)
ALP BLD-CCNC: 70 U/L (ref 40–129)
ALT SERPL-CCNC: 19 U/L (ref 10–40)
ANION GAP SERPL CALCULATED.3IONS-SCNC: 13 MMOL/L (ref 3–16)
AST SERPL-CCNC: 17 U/L (ref 15–37)
BASOPHILS ABSOLUTE: 0.1 K/UL (ref 0–0.2)
BASOPHILS RELATIVE PERCENT: 0.6 %
BILIRUB SERPL-MCNC: <0.2 MG/DL (ref 0–1)
BUN BLDV-MCNC: 35 MG/DL (ref 7–20)
CALCIUM SERPL-MCNC: 9.9 MG/DL (ref 8.3–10.6)
CHLORIDE BLD-SCNC: 107 MMOL/L (ref 99–110)
CHOLESTEROL, TOTAL: 299 MG/DL (ref 0–199)
CO2: 21 MMOL/L (ref 21–32)
CREAT SERPL-MCNC: 2.3 MG/DL (ref 0.6–1.2)
CREATININE URINE: 93.7 MG/DL (ref 28–259)
EOSINOPHILS ABSOLUTE: 0.3 K/UL (ref 0–0.6)
EOSINOPHILS RELATIVE PERCENT: 3.4 %
ESTIMATED AVERAGE GLUCOSE: 145.6 MG/DL
GFR AFRICAN AMERICAN: 25
GFR NON-AFRICAN AMERICAN: 21
GLOBULIN: 3.4 G/DL
GLUCOSE BLD-MCNC: 118 MG/DL (ref 70–99)
HBA1C MFR BLD: 6.7 %
HCT VFR BLD CALC: 38 % (ref 36–48)
HDLC SERPL-MCNC: 41 MG/DL (ref 40–60)
HEMOGLOBIN: 12.5 G/DL (ref 12–16)
LDL CHOLESTEROL CALCULATED: ABNORMAL MG/DL
LDL CHOLESTEROL DIRECT: 190 MG/DL
LYMPHOCYTES ABSOLUTE: 2.1 K/UL (ref 1–5.1)
LYMPHOCYTES RELATIVE PERCENT: 21.4 %
MCH RBC QN AUTO: 30.1 PG (ref 26–34)
MCHC RBC AUTO-ENTMCNC: 33 G/DL (ref 31–36)
MCV RBC AUTO: 91.3 FL (ref 80–100)
MONOCYTES ABSOLUTE: 0.7 K/UL (ref 0–1.3)
MONOCYTES RELATIVE PERCENT: 7.3 %
NEUTROPHILS ABSOLUTE: 6.4 K/UL (ref 1.7–7.7)
NEUTROPHILS RELATIVE PERCENT: 67.3 %
PDW BLD-RTO: 15.5 % (ref 12.4–15.4)
PLATELET # BLD: 344 K/UL (ref 135–450)
PMV BLD AUTO: 8.8 FL (ref 5–10.5)
POTASSIUM SERPL-SCNC: 4.4 MMOL/L (ref 3.5–5.1)
PROTEIN PROTEIN: 595 MG/DL
RBC # BLD: 4.16 M/UL (ref 4–5.2)
SODIUM BLD-SCNC: 141 MMOL/L (ref 136–145)
TOTAL PROTEIN: 7.5 G/DL (ref 6.4–8.2)
TRIGL SERPL-MCNC: 428 MG/DL (ref 0–150)
TSH SERPL DL<=0.05 MIU/L-ACNC: 11.31 UIU/ML (ref 0.27–4.2)
VITAMIN B-12: 571 PG/ML (ref 211–911)
VLDLC SERPL CALC-MCNC: ABNORMAL MG/DL
WBC # BLD: 9.6 K/UL (ref 4–11)

## 2019-05-22 PROCEDURE — 80061 LIPID PANEL: CPT

## 2019-05-22 PROCEDURE — 84156 ASSAY OF PROTEIN URINE: CPT

## 2019-05-22 PROCEDURE — 83036 HEMOGLOBIN GLYCOSYLATED A1C: CPT

## 2019-05-22 PROCEDURE — 85025 COMPLETE CBC W/AUTO DIFF WBC: CPT

## 2019-05-22 PROCEDURE — 83921 ORGANIC ACID SINGLE QUANT: CPT

## 2019-05-22 PROCEDURE — 82570 ASSAY OF URINE CREATININE: CPT

## 2019-05-22 PROCEDURE — 36415 COLL VENOUS BLD VENIPUNCTURE: CPT

## 2019-05-22 PROCEDURE — 82607 VITAMIN B-12: CPT

## 2019-05-22 PROCEDURE — 84443 ASSAY THYROID STIM HORMONE: CPT

## 2019-05-22 PROCEDURE — 80053 COMPREHEN METABOLIC PANEL: CPT

## 2019-05-22 PROCEDURE — 83721 ASSAY OF BLOOD LIPOPROTEIN: CPT

## 2019-05-23 RX ORDER — LEVOTHYROXINE SODIUM 0.1 MG/1
TABLET ORAL
Qty: 90 TABLET | Refills: 3 | Status: SHIPPED | OUTPATIENT
Start: 2019-05-23 | End: 2020-05-27 | Stop reason: SDUPTHER

## 2019-05-24 LAB — METHYLMALONIC ACID: 0.25 UMOL/L (ref 0–0.4)

## 2019-06-04 ENCOUNTER — HOSPITAL ENCOUNTER (OUTPATIENT)
Dept: NON INVASIVE DIAGNOSTICS | Age: 74
Discharge: HOME | End: 2019-06-04
Payer: MEDICARE

## 2019-06-04 DIAGNOSIS — R53.83 FATIGUE, UNSPECIFIED TYPE: ICD-10-CM

## 2019-06-04 LAB
LEFT VENTRICULAR EJECTION FRACTION HIGH VALUE: 55 %
LEFT VENTRICULAR EJECTION FRACTION MODE: NORMAL
LV EF: 55 %
LVEF MODALITY: NORMAL

## 2019-06-04 PROCEDURE — 93306 TTE W/DOPPLER COMPLETE: CPT

## 2019-06-10 ENCOUNTER — OFFICE VISIT (OUTPATIENT)
Dept: FAMILY MEDICINE CLINIC | Age: 74
End: 2019-06-10
Payer: MEDICARE

## 2019-06-10 VITALS
HEART RATE: 92 BPM | WEIGHT: 193 LBS | RESPIRATION RATE: 14 BRPM | HEIGHT: 62 IN | DIASTOLIC BLOOD PRESSURE: 84 MMHG | OXYGEN SATURATION: 96 % | SYSTOLIC BLOOD PRESSURE: 142 MMHG | BODY MASS INDEX: 35.51 KG/M2

## 2019-06-10 DIAGNOSIS — E11.69 DIABETES MELLITUS TYPE 2 IN OBESE (HCC): ICD-10-CM

## 2019-06-10 DIAGNOSIS — E78.00 HIGH CHOLESTEROL: ICD-10-CM

## 2019-06-10 DIAGNOSIS — E03.9 ACQUIRED HYPOTHYROIDISM: Primary | ICD-10-CM

## 2019-06-10 DIAGNOSIS — R60.0 BILATERAL LEG EDEMA: ICD-10-CM

## 2019-06-10 DIAGNOSIS — E66.9 DIABETES MELLITUS TYPE 2 IN OBESE (HCC): ICD-10-CM

## 2019-06-10 DIAGNOSIS — N18.4 CKD STAGE 4 DUE TO TYPE 2 DIABETES MELLITUS (HCC): ICD-10-CM

## 2019-06-10 DIAGNOSIS — R41.3 MEMORY LOSS: ICD-10-CM

## 2019-06-10 DIAGNOSIS — I10 ESSENTIAL HYPERTENSION: ICD-10-CM

## 2019-06-10 DIAGNOSIS — E11.22 CKD STAGE 4 DUE TO TYPE 2 DIABETES MELLITUS (HCC): ICD-10-CM

## 2019-06-10 PROCEDURE — G8417 CALC BMI ABV UP PARAM F/U: HCPCS | Performed by: FAMILY MEDICINE

## 2019-06-10 PROCEDURE — 1036F TOBACCO NON-USER: CPT | Performed by: FAMILY MEDICINE

## 2019-06-10 PROCEDURE — 2022F DILAT RTA XM EVC RTNOPTHY: CPT | Performed by: FAMILY MEDICINE

## 2019-06-10 PROCEDURE — 99214 OFFICE O/P EST MOD 30 MIN: CPT | Performed by: FAMILY MEDICINE

## 2019-06-10 PROCEDURE — 4040F PNEUMOC VAC/ADMIN/RCVD: CPT | Performed by: FAMILY MEDICINE

## 2019-06-10 PROCEDURE — G8399 PT W/DXA RESULTS DOCUMENT: HCPCS | Performed by: FAMILY MEDICINE

## 2019-06-10 PROCEDURE — 1090F PRES/ABSN URINE INCON ASSESS: CPT | Performed by: FAMILY MEDICINE

## 2019-06-10 PROCEDURE — G8427 DOCREV CUR MEDS BY ELIG CLIN: HCPCS | Performed by: FAMILY MEDICINE

## 2019-06-10 PROCEDURE — 3044F HG A1C LEVEL LT 7.0%: CPT | Performed by: FAMILY MEDICINE

## 2019-06-10 PROCEDURE — 3017F COLORECTAL CA SCREEN DOC REV: CPT | Performed by: FAMILY MEDICINE

## 2019-06-10 PROCEDURE — 1123F ACP DISCUSS/DSCN MKR DOCD: CPT | Performed by: FAMILY MEDICINE

## 2019-06-10 ASSESSMENT — ENCOUNTER SYMPTOMS
BLURRED VISION: 0
ORTHOPNEA: 0
SHORTNESS OF BREATH: 0

## 2019-06-10 ASSESSMENT — PATIENT HEALTH QUESTIONNAIRE - PHQ9
SUM OF ALL RESPONSES TO PHQ9 QUESTIONS 1 & 2: 0
SUM OF ALL RESPONSES TO PHQ QUESTIONS 1-9: 0
2. FEELING DOWN, DEPRESSED OR HOPELESS: 0
1. LITTLE INTEREST OR PLEASURE IN DOING THINGS: 0
SUM OF ALL RESPONSES TO PHQ QUESTIONS 1-9: 0

## 2019-06-10 NOTE — PROGRESS NOTES
Subjective:     Patient ID:Judy Martinez is a 75 y.o. female. Hypertension   This is a chronic problem. The current episode started today. The problem is unchanged. The problem is controlled. Pertinent negatives include no anxiety, blurred vision, chest pain, headaches, malaise/fatigue, neck pain, orthopnea, palpitations, peripheral edema, PND, shortness of breath or sweats. There are no associated agents to hypertension. There are no known risk factors for coronary artery disease. Past treatments include nothing. The current treatment provides moderate improvement. There are no compliance problems. There is no history of angina, kidney disease, CAD/MI, CVA, heart failure, left ventricular hypertrophy, PVD or retinopathy. Identifiable causes of hypertension include chronic renal disease and a thyroid problem. There is no history of coarctation of the aorta, hyperaldosteronism, hypercortisolism, hyperparathyroidism, a hypertension causing med, pheochromocytoma, renovascular disease or sleep apnea.        Allergies   Allergen Reactions    Demeclocycline Anaphylaxis    Tetracyclines & Related Anaphylaxis    Amoxicillin     Codeine     Penicillins        Current Outpatient Medications   Medication Sig Dispense Refill    levothyroxine (SYNTHROID) 100 MCG tablet TAKE 1 TABLET EVERY DAY BEFORE BREAKFAST 90 tablet 3    indomethacin (INDOCIN) 25 MG capsule Take 25 mg by mouth 2 times daily (with meals)      pravastatin (PRAVACHOL) 10 MG tablet Take 10 mg by mouth daily      spironolactone (ALDACTONE) 25 MG tablet TAKE 1 TABLET EVERY DAY 90 tablet 3    torsemide (DEMADEX) 20 MG tablet TAKE 1 TABLET BY MOUTH EVERY DAY 90 tablet 3    allopurinol (ZYLOPRIM) 100 MG tablet TAKE 1 TABLET EVERY DAY 90 tablet 3    metoprolol succinate (TOPROL XL) 25 MG extended release tablet Take 1 tablet by mouth daily 90 tablet 3    benzonatate (TESSALON PERLES) 100 MG capsule Take 1 capsule by mouth 3 times daily as needed for Cough 20 capsule 0    irbesartan-hydrochlorothiazide (AVALIDE) 150-12.5 MG per tablet TAKE 1 TABLET EVERY DAY 90 tablet 3    ondansetron (ZOFRAN ODT) 4 MG disintegrating tablet Take 1 tablet by mouth every 8 hours as needed for Nausea or Vomiting 20 tablet 0    triamcinolone (KENALOG) 0.1 % cream APPLY TOPICALLY TWICE DAILY AS NEEDED FOR PSORIASIS 160 g 1    betamethasone dipropionate (DIPROLENE) 0.05 % cream Apply to affected areas on the legs twice daily until improved. 180 g 1    Eluxadoline (VIBERZI) 75 MG TABS Take 1 tablet by mouth 2 times daily 16 tablet 1    glucose monitoring kit (FREESTYLE) monitoring kit 1 kit by Does not apply route daily as needed (one touch preferred) 1 kit 0    Lancets 62T MISC 1 applicator by Does not apply route 2 times daily 100 each 3    glucose blood VI test strips (ASCENSIA AUTODISC VI;ONE TOUCH ULTRA TEST VI) strip 1 each by In Vitro route 2 times daily One Touch preferred 100 each 3    Multiple Vitamin (MULTI-VITAMIN PO) Take by mouth       No current facility-administered medications for this visit. Past Medical History:   Diagnosis Date    Diabetes ([facility] [address]     Hypertension     Kidney disease        Past Surgical History:   Procedure Laterality Date    APPENDECTOMY      CHOLECYSTECTOMY      HYSTERECTOMY      MASTECTOMY, BILATERAL      preventive    ROTATOR CUFF REPAIR         Social History     Socioeconomic History    Marital status:       Spouse name: Not on file    Number of children: 3    Years of education: Not on file    Highest education level: Not on file   Occupational History    Occupation: retired clerical   Social Needs    Financial resource strain: Not on file    Food insecurity:     Worry: Not on file     Inability: Not on file   schoox needs:     Medical: Not on file     Non-medical: Not on file   Tobacco Use    Smoking status: Former Smoker     Packs/day: 0.50     Years: 10.00     Pack years: 5.00     Last attempt to quit: 1978     Years since quittin.9    Smokeless tobacco: Never Used   Substance and Sexual Activity    Alcohol use: Yes     Alcohol/week: 0.6 oz     Types: 1 Glasses of wine per week    Drug use: No    Sexual activity: Never   Lifestyle    Physical activity:     Days per week: Not on file     Minutes per session: Not on file    Stress: Not on file   Relationships    Social connections:     Talks on phone: Not on file     Gets together: Not on file     Attends Mu-ism service: Not on file     Active member of club or organization: Not on file     Attends meetings of clubs or organizations: Not on file     Relationship status:     Intimate partner violence:     Fear of current or ex partner: Not on file     Emotionally abused: Not on file     Physically abused: Not on file     Forced sexual activity: Not on file   Other Topics Concern    Not on file   Social History Narrative    Lives with son--recently moved from [facility] and now here    [facility]       Family History   Problem Relation Age of Onset    Cancer Mother         lung, breast, liver    Stroke Father     Stomach Cancer Brother     Cancer Maternal Grandmother     No Known Problems Paternal Grandmother     No Known Problems Paternal Grandfather        Immunization History   Administered Date(s) Administered    Influenza, High Dose (Fluzone 65 yrs and older) 2017, 2017    Pneumococcal 13-valent Conjugate ([id number]) 2017    Pneumococcal Polysaccharide ([id number]) 05/10/2019       Review of Systems  Review of Systems   Constitutional: Negative for malaise/fatigue. Eyes: Negative for blurred vision. Respiratory: Negative for shortness of breath. Cardiovascular: Negative for chest pain, palpitations, orthopnea and PND. Musculoskeletal: Negative for neck pain. Neurological: Negative for headaches.        Objective:   Physical Exam  Physical Exam   Constitutional: She is oriented to person, place, and time. She appears well-developed and well-nourished. No distress. HENT:   Mouth/Throat: Oropharynx is clear and moist.   Eyes: Pupils are equal, round, and reactive to light. Conjunctivae are normal.   Neck: No JVD present. No tracheal deviation present. No thyromegaly present. Cardiovascular: Normal rate, regular rhythm, normal heart sounds and intact distal pulses. Exam reveals no gallop. No murmur heard. Pulmonary/Chest: Effort normal and breath sounds normal. No stridor. No respiratory distress. She has no wheezes. She has no rales. She exhibits no tenderness. Abdominal: Soft. Bowel sounds are normal. She exhibits no distension and no mass. There is no tenderness. Musculoskeletal: She exhibits no edema or tenderness. Lymphadenopathy:     She has no cervical adenopathy. Neurological: She is alert and oriented to person, place, and time. She displays normal reflexes. No cranial nerve deficit. She exhibits normal muscle tone. Coordination normal.   Skin: Skin is warm and dry. No rash noted. No erythema. No pallor. Psychiatric: She has a normal mood and affect. Her behavior is normal. Judgment and thought content normal.   Vitals reviewed.       Assessment and Plan:     Memory loss  Better since on bigger dose of thyroid    Essential hypertension  better    Diabetes mellitus type 2 in obese ([address]  Can't afford trulicity and will try ozempic    CKD stage 4 due to type 2 diabetes mellitus ([address]  Also related to recurrent UTIs    Bilateral leg edema  Needs to wear support hose

## 2019-06-17 ENCOUNTER — TELEPHONE (OUTPATIENT)
Dept: FAMILY MEDICINE CLINIC | Age: 74
End: 2019-06-17

## 2019-06-17 NOTE — TELEPHONE ENCOUNTER
Pt called stating that Brent Alcala. BLAIR FOR NEW PRESCRIPTION FOR DIABETES (pt don't know the name of drug).  Pls contact [facility]

## 2019-06-20 ENCOUNTER — TELEPHONE (OUTPATIENT)
Dept: FAMILY MEDICINE CLINIC | Age: 74
End: 2019-06-20

## 2019-06-20 NOTE — TELEPHONE ENCOUNTER
[facility] is calling to get clarification on the Ozempic 1mg directions. Pen only delivers 1mg per dose rep said.  Pls contact at #[phone]

## 2019-06-20 NOTE — TELEPHONE ENCOUNTER
The order is 0.5mg per week. It is a once weekly dosing. The pen is a multidose and should last the patient 1 month. I do not understand the question.

## 2019-06-21 NOTE — TELEPHONE ENCOUNTER
[facility] called stating that pen only injects 1mg per dose for the script that was sent in. If pt needs 0.5mg per dose then script needs to be sent in that way for the other drug name ozempic 0.25mg/0.5mg.  Pls contact at #[phone]

## 2019-06-24 ENCOUNTER — TELEPHONE (OUTPATIENT)
Dept: FAMILY MEDICINE CLINIC | Age: 74
End: 2019-06-24

## 2019-07-01 ENCOUNTER — TELEPHONE (OUTPATIENT)
Dept: FAMILY MEDICINE CLINIC | Age: 74
End: 2019-07-01

## 2019-08-15 ENCOUNTER — TELEPHONE (OUTPATIENT)
Dept: FAMILY MEDICINE CLINIC | Age: 74
End: 2019-08-15

## 2019-08-15 ENCOUNTER — HOSPITAL ENCOUNTER (OUTPATIENT)
Age: 74
Discharge: HOME | End: 2019-08-15
Payer: MEDICARE

## 2019-08-15 DIAGNOSIS — E78.00 HIGH CHOLESTEROL: ICD-10-CM

## 2019-08-15 DIAGNOSIS — I10 ESSENTIAL HYPERTENSION: ICD-10-CM

## 2019-08-15 DIAGNOSIS — E03.9 ACQUIRED HYPOTHYROIDISM: ICD-10-CM

## 2019-08-15 LAB
ANION GAP SERPL CALCULATED.3IONS-SCNC: 17 MMOL/L (ref 3–16)
BUN BLDV-MCNC: 98 MG/DL (ref 7–20)
CALCIUM SERPL-MCNC: 9.8 MG/DL (ref 8.3–10.6)
CHLORIDE BLD-SCNC: 96 MMOL/L (ref 99–110)
CHOLESTEROL, TOTAL: 216 MG/DL (ref 0–199)
CO2: 21 MMOL/L (ref 21–32)
CREAT SERPL-MCNC: 3.9 MG/DL (ref 0.6–1.2)
CREATININE URINE: 105.1 MG/DL (ref 28–259)
GFR AFRICAN AMERICAN: 14
GFR NON-AFRICAN AMERICAN: 11
GLUCOSE BLD-MCNC: 114 MG/DL (ref 70–99)
HDLC SERPL-MCNC: 34 MG/DL (ref 40–60)
LDL CHOLESTEROL CALCULATED: ABNORMAL MG/DL
LDL CHOLESTEROL DIRECT: 124 MG/DL
POTASSIUM SERPL-SCNC: 3.6 MMOL/L (ref 3.5–5.1)
PROTEIN PROTEIN: 212 MG/DL
SODIUM BLD-SCNC: 134 MMOL/L (ref 136–145)
T4 FREE: 1.3 NG/DL (ref 0.9–1.8)
TRIGL SERPL-MCNC: 432 MG/DL (ref 0–150)
TSH SERPL DL<=0.05 MIU/L-ACNC: 2.64 UIU/ML (ref 0.27–4.2)
VLDLC SERPL CALC-MCNC: ABNORMAL MG/DL

## 2019-08-15 PROCEDURE — 82570 ASSAY OF URINE CREATININE: CPT

## 2019-08-15 PROCEDURE — 84156 ASSAY OF PROTEIN URINE: CPT

## 2019-08-15 PROCEDURE — 36415 COLL VENOUS BLD VENIPUNCTURE: CPT

## 2019-08-15 PROCEDURE — 80048 BASIC METABOLIC PNL TOTAL CA: CPT

## 2019-08-15 PROCEDURE — 83721 ASSAY OF BLOOD LIPOPROTEIN: CPT

## 2019-08-15 PROCEDURE — 84443 ASSAY THYROID STIM HORMONE: CPT

## 2019-08-15 PROCEDURE — 84439 ASSAY OF FREE THYROXINE: CPT

## 2019-08-15 PROCEDURE — 80061 LIPID PANEL: CPT

## 2019-08-16 ENCOUNTER — OFFICE VISIT (OUTPATIENT)
Dept: FAMILY MEDICINE CLINIC | Age: 74
End: 2019-08-16
Payer: MEDICARE

## 2019-08-16 VITALS
DIASTOLIC BLOOD PRESSURE: 70 MMHG | HEART RATE: 97 BPM | SYSTOLIC BLOOD PRESSURE: 112 MMHG | WEIGHT: 181.2 LBS | OXYGEN SATURATION: 98 % | BODY MASS INDEX: 34.24 KG/M2

## 2019-08-16 DIAGNOSIS — M17.12 PRIMARY OSTEOARTHRITIS OF LEFT KNEE: ICD-10-CM

## 2019-08-16 DIAGNOSIS — I10 ESSENTIAL HYPERTENSION: ICD-10-CM

## 2019-08-16 DIAGNOSIS — N18.4 CKD STAGE 4 DUE TO TYPE 2 DIABETES MELLITUS (HCC): ICD-10-CM

## 2019-08-16 DIAGNOSIS — E11.22 CKD STAGE 4 DUE TO TYPE 2 DIABETES MELLITUS (HCC): ICD-10-CM

## 2019-08-16 PROCEDURE — 1123F ACP DISCUSS/DSCN MKR DOCD: CPT | Performed by: FAMILY MEDICINE

## 2019-08-16 PROCEDURE — 1036F TOBACCO NON-USER: CPT | Performed by: FAMILY MEDICINE

## 2019-08-16 PROCEDURE — G8427 DOCREV CUR MEDS BY ELIG CLIN: HCPCS | Performed by: FAMILY MEDICINE

## 2019-08-16 PROCEDURE — 3017F COLORECTAL CA SCREEN DOC REV: CPT | Performed by: FAMILY MEDICINE

## 2019-08-16 PROCEDURE — G8399 PT W/DXA RESULTS DOCUMENT: HCPCS | Performed by: FAMILY MEDICINE

## 2019-08-16 PROCEDURE — 2022F DILAT RTA XM EVC RTNOPTHY: CPT | Performed by: FAMILY MEDICINE

## 2019-08-16 PROCEDURE — G8417 CALC BMI ABV UP PARAM F/U: HCPCS | Performed by: FAMILY MEDICINE

## 2019-08-16 PROCEDURE — 3044F HG A1C LEVEL LT 7.0%: CPT | Performed by: FAMILY MEDICINE

## 2019-08-16 PROCEDURE — 4040F PNEUMOC VAC/ADMIN/RCVD: CPT | Performed by: FAMILY MEDICINE

## 2019-08-16 PROCEDURE — 99213 OFFICE O/P EST LOW 20 MIN: CPT | Performed by: FAMILY MEDICINE

## 2019-08-16 PROCEDURE — 1090F PRES/ABSN URINE INCON ASSESS: CPT | Performed by: FAMILY MEDICINE

## 2019-08-16 ASSESSMENT — ENCOUNTER SYMPTOMS
BLURRED VISION: 0
SHORTNESS OF BREATH: 0
ORTHOPNEA: 0

## 2019-08-16 NOTE — ASSESSMENT & PLAN NOTE
Worse recently--will recheck in 2 w--this is due to recent doubling of diuretic--will go back to old dose and will discuss with Dr Kolton Cross

## 2019-08-23 ENCOUNTER — TELEPHONE (OUTPATIENT)
Dept: FAMILY MEDICINE CLINIC | Age: 74
End: 2019-08-23

## 2019-08-23 RX ORDER — LANCETS
EACH MISCELLANEOUS
Qty: 100 EACH | Refills: 3 | Status: SHIPPED | OUTPATIENT
Start: 2019-08-23 | End: 2021-03-22 | Stop reason: ALTCHOICE

## 2019-08-23 RX ORDER — BLOOD-GLUCOSE METER
EACH MISCELLANEOUS
Qty: 1 KIT | Refills: 5 | Status: SHIPPED | OUTPATIENT
Start: 2019-08-23 | End: 2020-02-04

## 2019-08-26 ENCOUNTER — HOSPITAL ENCOUNTER (OUTPATIENT)
Age: 74
Discharge: HOME | End: 2019-08-26
Payer: MEDICARE

## 2019-08-26 DIAGNOSIS — N18.4 CKD (CHRONIC KIDNEY DISEASE), STAGE IV (HCC): ICD-10-CM

## 2019-08-26 LAB
ANION GAP SERPL CALCULATED.3IONS-SCNC: 16 MMOL/L (ref 3–16)
BUN BLDV-MCNC: 72 MG/DL (ref 7–20)
CALCIUM SERPL-MCNC: 10 MG/DL (ref 8.3–10.6)
CHLORIDE BLD-SCNC: 101 MMOL/L (ref 99–110)
CO2: 22 MMOL/L (ref 21–32)
CREAT SERPL-MCNC: 3 MG/DL (ref 0.6–1.2)
GFR AFRICAN AMERICAN: 18
GFR NON-AFRICAN AMERICAN: 15
GLUCOSE BLD-MCNC: 99 MG/DL (ref 70–99)
POTASSIUM SERPL-SCNC: 4.1 MMOL/L (ref 3.5–5.1)
SODIUM BLD-SCNC: 139 MMOL/L (ref 136–145)

## 2019-08-26 PROCEDURE — 80048 BASIC METABOLIC PNL TOTAL CA: CPT

## 2019-08-26 PROCEDURE — 36415 COLL VENOUS BLD VENIPUNCTURE: CPT

## 2019-10-03 RX ORDER — TRIAMCINOLONE ACETONIDE 1 MG/G
CREAM TOPICAL
Qty: 160 G | Refills: 1 | Status: SHIPPED | OUTPATIENT
Start: 2019-10-03 | End: 2021-03-22

## 2019-10-28 ENCOUNTER — OFFICE VISIT (OUTPATIENT)
Dept: FAMILY MEDICINE CLINIC | Age: 74
End: 2019-10-28
Payer: MEDICARE

## 2019-10-28 VITALS
WEIGHT: 173 LBS | HEART RATE: 87 BPM | DIASTOLIC BLOOD PRESSURE: 84 MMHG | OXYGEN SATURATION: 99 % | BODY MASS INDEX: 32.69 KG/M2 | SYSTOLIC BLOOD PRESSURE: 120 MMHG

## 2019-10-28 DIAGNOSIS — R63.4 WEIGHT LOSS, UNINTENTIONAL: ICD-10-CM

## 2019-10-28 DIAGNOSIS — N30.01 ACUTE CYSTITIS WITH HEMATURIA: Primary | ICD-10-CM

## 2019-10-28 DIAGNOSIS — E11.22 CKD STAGE 4 DUE TO TYPE 2 DIABETES MELLITUS (HCC): ICD-10-CM

## 2019-10-28 DIAGNOSIS — Z23 NEED FOR INFLUENZA VACCINATION: ICD-10-CM

## 2019-10-28 DIAGNOSIS — E03.9 ACQUIRED HYPOTHYROIDISM: ICD-10-CM

## 2019-10-28 DIAGNOSIS — E66.9 DIABETES MELLITUS TYPE 2 IN OBESE (HCC): ICD-10-CM

## 2019-10-28 DIAGNOSIS — N18.4 CKD STAGE 4 DUE TO TYPE 2 DIABETES MELLITUS (HCC): ICD-10-CM

## 2019-10-28 DIAGNOSIS — E11.69 DIABETES MELLITUS TYPE 2 IN OBESE (HCC): ICD-10-CM

## 2019-10-28 PROCEDURE — 3044F HG A1C LEVEL LT 7.0%: CPT | Performed by: NURSE PRACTITIONER

## 2019-10-28 PROCEDURE — 2022F DILAT RTA XM EVC RTNOPTHY: CPT | Performed by: NURSE PRACTITIONER

## 2019-10-28 PROCEDURE — G8427 DOCREV CUR MEDS BY ELIG CLIN: HCPCS | Performed by: NURSE PRACTITIONER

## 2019-10-28 PROCEDURE — 90653 IIV ADJUVANT VACCINE IM: CPT | Performed by: NURSE PRACTITIONER

## 2019-10-28 PROCEDURE — 1123F ACP DISCUSS/DSCN MKR DOCD: CPT | Performed by: NURSE PRACTITIONER

## 2019-10-28 PROCEDURE — G0008 ADMIN INFLUENZA VIRUS VAC: HCPCS | Performed by: NURSE PRACTITIONER

## 2019-10-28 PROCEDURE — 99214 OFFICE O/P EST MOD 30 MIN: CPT | Performed by: NURSE PRACTITIONER

## 2019-10-28 PROCEDURE — 4040F PNEUMOC VAC/ADMIN/RCVD: CPT | Performed by: NURSE PRACTITIONER

## 2019-10-28 PROCEDURE — 81000 URINALYSIS NONAUTO W/SCOPE: CPT | Performed by: NURSE PRACTITIONER

## 2019-10-28 PROCEDURE — 1036F TOBACCO NON-USER: CPT | Performed by: NURSE PRACTITIONER

## 2019-10-28 PROCEDURE — G8482 FLU IMMUNIZE ORDER/ADMIN: HCPCS | Performed by: NURSE PRACTITIONER

## 2019-10-28 PROCEDURE — G8417 CALC BMI ABV UP PARAM F/U: HCPCS | Performed by: NURSE PRACTITIONER

## 2019-10-28 PROCEDURE — 1090F PRES/ABSN URINE INCON ASSESS: CPT | Performed by: NURSE PRACTITIONER

## 2019-10-28 PROCEDURE — G8399 PT W/DXA RESULTS DOCUMENT: HCPCS | Performed by: NURSE PRACTITIONER

## 2019-10-28 PROCEDURE — 3017F COLORECTAL CA SCREEN DOC REV: CPT | Performed by: NURSE PRACTITIONER

## 2019-10-28 RX ORDER — CIPROFLOXACIN 500 MG/1
500 TABLET, FILM COATED ORAL DAILY
Qty: 5 TABLET | Refills: 0 | Status: SHIPPED | OUTPATIENT
Start: 2019-10-28 | End: 2019-11-02

## 2019-10-28 RX ORDER — IRBESARTAN AND HYDROCHLOROTHIAZIDE 150; 12.5 MG/1; MG/1
TABLET, FILM COATED ORAL
COMMUNITY
Start: 2019-10-23 | End: 2020-05-27 | Stop reason: SDUPTHER

## 2019-10-29 LAB
CREATININE URINE: 201.9 MG/DL (ref 28–259)
MICROALBUMIN UR-MCNC: 109.6 MG/DL
MICROALBUMIN/CREAT UR-RTO: 542.8 MG/G (ref 0–30)

## 2019-10-30 ENCOUNTER — HOSPITAL ENCOUNTER (OUTPATIENT)
Age: 74
Discharge: HOME | End: 2019-10-30
Payer: MEDICARE

## 2019-10-30 ENCOUNTER — TELEPHONE (OUTPATIENT)
Dept: FAMILY MEDICINE CLINIC | Age: 74
End: 2019-10-30

## 2019-10-30 DIAGNOSIS — E11.22 CKD STAGE 4 DUE TO TYPE 2 DIABETES MELLITUS (HCC): ICD-10-CM

## 2019-10-30 DIAGNOSIS — N18.4 CKD STAGE 4 DUE TO TYPE 2 DIABETES MELLITUS (HCC): ICD-10-CM

## 2019-10-30 DIAGNOSIS — R63.4 WEIGHT LOSS, UNINTENTIONAL: ICD-10-CM

## 2019-10-30 DIAGNOSIS — E03.9 ACQUIRED HYPOTHYROIDISM: ICD-10-CM

## 2019-10-30 DIAGNOSIS — E66.9 DIABETES MELLITUS TYPE 2 IN OBESE (HCC): ICD-10-CM

## 2019-10-30 DIAGNOSIS — E11.69 DIABETES MELLITUS TYPE 2 IN OBESE (HCC): ICD-10-CM

## 2019-10-30 LAB
A/G RATIO: 1.3 (ref 1.1–2.2)
ALBUMIN SERPL-MCNC: 4.2 G/DL (ref 3.4–5)
ALP BLD-CCNC: 77 U/L (ref 40–129)
ALT SERPL-CCNC: 15 U/L (ref 10–40)
AMYLASE: 146 U/L (ref 25–115)
ANION GAP SERPL CALCULATED.3IONS-SCNC: 20 MMOL/L (ref 3–16)
AST SERPL-CCNC: 18 U/L (ref 15–37)
BASOPHILS ABSOLUTE: 0.1 K/UL (ref 0–0.2)
BASOPHILS RELATIVE PERCENT: 0.8 %
BILIRUB SERPL-MCNC: 0.3 MG/DL (ref 0–1)
BUN BLDV-MCNC: 92 MG/DL (ref 7–20)
CALCIUM SERPL-MCNC: 9.7 MG/DL (ref 8.3–10.6)
CHLORIDE BLD-SCNC: 95 MMOL/L (ref 99–110)
CO2: 23 MMOL/L (ref 21–32)
CREAT SERPL-MCNC: 3.9 MG/DL (ref 0.6–1.2)
EOSINOPHILS ABSOLUTE: 0.3 K/UL (ref 0–0.6)
EOSINOPHILS RELATIVE PERCENT: 2.7 %
GFR AFRICAN AMERICAN: 14
GFR NON-AFRICAN AMERICAN: 11
GLOBULIN: 3.3 G/DL
GLUCOSE BLD-MCNC: 113 MG/DL (ref 70–99)
HCT VFR BLD CALC: 33.2 % (ref 36–48)
HEMOGLOBIN: 11.1 G/DL (ref 12–16)
LIPASE: 235 U/L (ref 13–60)
LYMPHOCYTES ABSOLUTE: 1.5 K/UL (ref 1–5.1)
LYMPHOCYTES RELATIVE PERCENT: 16.2 %
MCH RBC QN AUTO: 30 PG (ref 26–34)
MCHC RBC AUTO-ENTMCNC: 33.3 G/DL (ref 31–36)
MCV RBC AUTO: 90.1 FL (ref 80–100)
MONOCYTES ABSOLUTE: 0.8 K/UL (ref 0–1.3)
MONOCYTES RELATIVE PERCENT: 8.4 %
NEUTROPHILS ABSOLUTE: 6.9 K/UL (ref 1.7–7.7)
NEUTROPHILS RELATIVE PERCENT: 71.9 %
PDW BLD-RTO: 15.6 % (ref 12.4–15.4)
PLATELET # BLD: 298 K/UL (ref 135–450)
PMV BLD AUTO: 9.3 FL (ref 5–10.5)
POTASSIUM SERPL-SCNC: 3.6 MMOL/L (ref 3.5–5.1)
RBC # BLD: 3.69 M/UL (ref 4–5.2)
SODIUM BLD-SCNC: 138 MMOL/L (ref 136–145)
TOTAL PROTEIN: 7.5 G/DL (ref 6.4–8.2)
TSH REFLEX FT4: 1.05 UIU/ML (ref 0.27–4.2)
WBC # BLD: 9.6 K/UL (ref 4–11)

## 2019-10-30 PROCEDURE — 84443 ASSAY THYROID STIM HORMONE: CPT

## 2019-10-30 PROCEDURE — 82150 ASSAY OF AMYLASE: CPT

## 2019-10-30 PROCEDURE — 83690 ASSAY OF LIPASE: CPT

## 2019-10-30 PROCEDURE — 83036 HEMOGLOBIN GLYCOSYLATED A1C: CPT

## 2019-10-30 PROCEDURE — 85025 COMPLETE CBC W/AUTO DIFF WBC: CPT

## 2019-10-30 PROCEDURE — 36415 COLL VENOUS BLD VENIPUNCTURE: CPT

## 2019-10-30 PROCEDURE — 80053 COMPREHEN METABOLIC PANEL: CPT

## 2019-10-31 LAB
ESTIMATED AVERAGE GLUCOSE: 122.6 MG/DL
HBA1C MFR BLD: 5.9 %
ORGANISM: ABNORMAL
URINE CULTURE, ROUTINE: ABNORMAL

## 2019-10-31 ASSESSMENT — ENCOUNTER SYMPTOMS
RESPIRATORY NEGATIVE: 1
ABDOMINAL PAIN: 0
VOMITING: 0
EYES NEGATIVE: 1
NAUSEA: 1

## 2019-11-11 ENCOUNTER — OFFICE VISIT (OUTPATIENT)
Dept: FAMILY MEDICINE CLINIC | Age: 74
End: 2019-11-11
Payer: MEDICARE

## 2019-11-11 VITALS
OXYGEN SATURATION: 97 % | BODY MASS INDEX: 33.14 KG/M2 | SYSTOLIC BLOOD PRESSURE: 134 MMHG | DIASTOLIC BLOOD PRESSURE: 70 MMHG | WEIGHT: 175.4 LBS | HEART RATE: 87 BPM

## 2019-11-11 DIAGNOSIS — E11.22 CKD STAGE 4 DUE TO TYPE 2 DIABETES MELLITUS (HCC): ICD-10-CM

## 2019-11-11 DIAGNOSIS — E11.22 CKD STAGE 4 DUE TO TYPE 2 DIABETES MELLITUS (HCC): Primary | ICD-10-CM

## 2019-11-11 DIAGNOSIS — N18.4 CKD STAGE 4 DUE TO TYPE 2 DIABETES MELLITUS (HCC): ICD-10-CM

## 2019-11-11 DIAGNOSIS — N18.4 CKD STAGE 4 DUE TO TYPE 2 DIABETES MELLITUS (HCC): Primary | ICD-10-CM

## 2019-11-11 LAB
ANION GAP SERPL CALCULATED.3IONS-SCNC: 15 MMOL/L (ref 3–16)
BUN BLDV-MCNC: 62 MG/DL (ref 7–20)
CALCIUM SERPL-MCNC: 9.5 MG/DL (ref 8.3–10.6)
CHLORIDE BLD-SCNC: 101 MMOL/L (ref 99–110)
CO2: 24 MMOL/L (ref 21–32)
CREAT SERPL-MCNC: 2.5 MG/DL (ref 0.6–1.2)
GFR AFRICAN AMERICAN: 23
GFR NON-AFRICAN AMERICAN: 19
GLUCOSE BLD-MCNC: 98 MG/DL (ref 70–99)
POTASSIUM SERPL-SCNC: 4.3 MMOL/L (ref 3.5–5.1)
SODIUM BLD-SCNC: 140 MMOL/L (ref 136–145)

## 2019-11-11 PROCEDURE — G8482 FLU IMMUNIZE ORDER/ADMIN: HCPCS | Performed by: NURSE PRACTITIONER

## 2019-11-11 PROCEDURE — G8399 PT W/DXA RESULTS DOCUMENT: HCPCS | Performed by: NURSE PRACTITIONER

## 2019-11-11 PROCEDURE — 3017F COLORECTAL CA SCREEN DOC REV: CPT | Performed by: NURSE PRACTITIONER

## 2019-11-11 PROCEDURE — 1123F ACP DISCUSS/DSCN MKR DOCD: CPT | Performed by: NURSE PRACTITIONER

## 2019-11-11 PROCEDURE — 2022F DILAT RTA XM EVC RTNOPTHY: CPT | Performed by: NURSE PRACTITIONER

## 2019-11-11 PROCEDURE — G8417 CALC BMI ABV UP PARAM F/U: HCPCS | Performed by: NURSE PRACTITIONER

## 2019-11-11 PROCEDURE — 4040F PNEUMOC VAC/ADMIN/RCVD: CPT | Performed by: NURSE PRACTITIONER

## 2019-11-11 PROCEDURE — 3044F HG A1C LEVEL LT 7.0%: CPT | Performed by: NURSE PRACTITIONER

## 2019-11-11 PROCEDURE — 1090F PRES/ABSN URINE INCON ASSESS: CPT | Performed by: NURSE PRACTITIONER

## 2019-11-11 PROCEDURE — 99213 OFFICE O/P EST LOW 20 MIN: CPT | Performed by: NURSE PRACTITIONER

## 2019-11-11 PROCEDURE — G8427 DOCREV CUR MEDS BY ELIG CLIN: HCPCS | Performed by: NURSE PRACTITIONER

## 2019-11-11 PROCEDURE — 1036F TOBACCO NON-USER: CPT | Performed by: NURSE PRACTITIONER

## 2019-11-12 ENCOUNTER — TELEPHONE (OUTPATIENT)
Dept: FAMILY MEDICINE CLINIC | Age: 74
End: 2019-11-12

## 2019-11-22 ENCOUNTER — HOSPITAL ENCOUNTER (OUTPATIENT)
Age: 74
Discharge: HOME | End: 2019-11-22
Payer: MEDICARE

## 2019-11-22 DIAGNOSIS — N18.4 CKD (CHRONIC KIDNEY DISEASE), STAGE IV (HCC): ICD-10-CM

## 2019-11-22 DIAGNOSIS — I10 ESSENTIAL HYPERTENSION: ICD-10-CM

## 2019-11-22 LAB
ANION GAP SERPL CALCULATED.3IONS-SCNC: 16 MMOL/L (ref 3–16)
BUN BLDV-MCNC: 51 MG/DL (ref 7–20)
CALCIUM SERPL-MCNC: 9.7 MG/DL (ref 8.3–10.6)
CHLORIDE BLD-SCNC: 103 MMOL/L (ref 99–110)
CO2: 21 MMOL/L (ref 21–32)
CREAT SERPL-MCNC: 2.4 MG/DL (ref 0.6–1.2)
GFR AFRICAN AMERICAN: 24
GFR NON-AFRICAN AMERICAN: 20
GLUCOSE BLD-MCNC: 105 MG/DL (ref 70–99)
POTASSIUM SERPL-SCNC: 4.1 MMOL/L (ref 3.5–5.1)
SODIUM BLD-SCNC: 140 MMOL/L (ref 136–145)

## 2019-11-22 PROCEDURE — 80048 BASIC METABOLIC PNL TOTAL CA: CPT

## 2019-11-22 PROCEDURE — 36415 COLL VENOUS BLD VENIPUNCTURE: CPT

## 2020-01-27 RX ORDER — TORSEMIDE 20 MG/1
TABLET ORAL
Qty: 90 TABLET | Refills: 3 | Status: SHIPPED | OUTPATIENT
Start: 2020-01-27 | End: 2021-03-22 | Stop reason: ALTCHOICE

## 2020-01-27 NOTE — TELEPHONE ENCOUNTER
Last appointment: 11/11/2019  Next appointment:   Future Appointments   Date Time Provider Rod Omeri   2/11/2020  1:45 PM Sigifredo Roa MD [facility]rolo

## 2020-02-04 ENCOUNTER — TELEPHONE (OUTPATIENT)
Dept: FAMILY MEDICINE CLINIC | Age: 75
End: 2020-02-04

## 2020-02-04 RX ORDER — BLOOD-GLUCOSE METER
EACH MISCELLANEOUS
Qty: 1 DEVICE | Refills: 0 | Status: SHIPPED | OUTPATIENT
Start: 2020-02-04 | End: 2021-03-22 | Stop reason: ALTCHOICE

## 2020-02-04 RX ORDER — LANCING DEVICE
EACH MISCELLANEOUS
Qty: 1 EACH | Refills: 1 | Status: SHIPPED | OUTPATIENT
Start: 2020-02-04 | End: 2021-03-22 | Stop reason: ALTCHOICE

## 2020-02-04 RX ORDER — BLOOD-GLUCOSE CONTROL, LOW
EACH MISCELLANEOUS
Qty: 1 EACH | Refills: 3 | Status: SHIPPED | OUTPATIENT
Start: 2020-02-04 | End: 2021-03-22 | Stop reason: ALTCHOICE

## 2020-02-04 NOTE — TELEPHONE ENCOUNTER
[facility] pharmacy calling stating patient is needs True Metrix Air-Glucose Meter Kit, True Draw lancing devise, True metrix Level 2 solution. States Accu-check will no longer be supported by insurance.      Please advise

## 2020-02-10 ENCOUNTER — HOSPITAL ENCOUNTER (OUTPATIENT)
Age: 75
Discharge: HOME | End: 2020-02-10
Payer: MEDICARE

## 2020-02-10 LAB
ANION GAP SERPL CALCULATED.3IONS-SCNC: 16 MMOL/L (ref 3–16)
BUN BLDV-MCNC: 66 MG/DL (ref 7–20)
CALCIUM SERPL-MCNC: 9.7 MG/DL (ref 8.3–10.6)
CHLORIDE BLD-SCNC: 103 MMOL/L (ref 99–110)
CO2: 23 MMOL/L (ref 21–32)
CREAT SERPL-MCNC: 3.1 MG/DL (ref 0.6–1.2)
GFR AFRICAN AMERICAN: 18
GFR NON-AFRICAN AMERICAN: 15
GLUCOSE BLD-MCNC: 117 MG/DL (ref 70–99)
PARATHYROID HORMONE INTACT: 124.3 PG/ML (ref 14–72)
POTASSIUM SERPL-SCNC: 4.6 MMOL/L (ref 3.5–5.1)
SODIUM BLD-SCNC: 142 MMOL/L (ref 136–145)

## 2020-02-10 PROCEDURE — 80048 BASIC METABOLIC PNL TOTAL CA: CPT

## 2020-02-10 PROCEDURE — 36415 COLL VENOUS BLD VENIPUNCTURE: CPT

## 2020-02-10 PROCEDURE — 83970 ASSAY OF PARATHORMONE: CPT

## 2020-03-23 ENCOUNTER — TELEPHONE (OUTPATIENT)
Dept: BARIATRICS/WEIGHT MGMT | Age: 75
End: 2020-03-23

## 2020-03-26 ENCOUNTER — INITIAL CONSULT (OUTPATIENT)
Dept: BARIATRICS/WEIGHT MGMT | Age: 75
End: 2020-03-26
Payer: MEDICARE

## 2020-03-26 VITALS
SYSTOLIC BLOOD PRESSURE: 128 MMHG | TEMPERATURE: 97.2 F | BODY MASS INDEX: 35.27 KG/M2 | HEART RATE: 89 BPM | DIASTOLIC BLOOD PRESSURE: 78 MMHG | HEIGHT: 61 IN | WEIGHT: 186.8 LBS

## 2020-03-26 PROCEDURE — 3017F COLORECTAL CA SCREEN DOC REV: CPT | Performed by: SURGERY

## 2020-03-26 PROCEDURE — 99203 OFFICE O/P NEW LOW 30 MIN: CPT | Performed by: SURGERY

## 2020-03-26 PROCEDURE — 1123F ACP DISCUSS/DSCN MKR DOCD: CPT | Performed by: SURGERY

## 2020-03-26 PROCEDURE — G8399 PT W/DXA RESULTS DOCUMENT: HCPCS | Performed by: SURGERY

## 2020-03-26 PROCEDURE — 1090F PRES/ABSN URINE INCON ASSESS: CPT | Performed by: SURGERY

## 2020-03-26 PROCEDURE — G8428 CUR MEDS NOT DOCUMENT: HCPCS | Performed by: SURGERY

## 2020-03-26 PROCEDURE — 1036F TOBACCO NON-USER: CPT | Performed by: SURGERY

## 2020-03-26 PROCEDURE — G8482 FLU IMMUNIZE ORDER/ADMIN: HCPCS | Performed by: SURGERY

## 2020-03-26 PROCEDURE — G8417 CALC BMI ABV UP PARAM F/U: HCPCS | Performed by: SURGERY

## 2020-03-26 PROCEDURE — 4040F PNEUMOC VAC/ADMIN/RCVD: CPT | Performed by: SURGERY

## 2020-03-27 NOTE — PROGRESS NOTES
[facility] / [facility] ([facility]) [address] [facility], [address]    Acknowledgment of Informed Consent for Surgical or Medical Procedure and Sedation  I agree to allow doctor(s) Shahab Roth and his/her associates or assistants, including residents and/or other qualified medical practitioner to perform the following medical treatment or procedure and to administer or direct the administration of sedation as necessary:  Procedure(s): Miriam 31  My doctor has explained the following regarding the proposed procedure:   the explanation of the procedure   the benefits of the procedure   the potential problems that might occur during recuperation   the risks and side effects of the procedure which could include but are not limited to severe blood loss, infection, stroke or death   the benefits, risks and side effect of alternative procedures including the consequences of declining this procedure or any alternative procedures   the likelihood of achieving satisfactory results. I acknowledge no guarantee or assurance has been made to me regarding the results. I understand that during the course of this treatment/procedure, unforeseen conditions can occur which require an additional or different procedure. I agree to allow my physician or assistants to perform such extension of the original procedure as they may find necessary. I understand that sedation will often result in temporary impairment of memory and fine motor skills and that sedation can occasionally progress to a state of deep sedation or general anesthesia. I understand the risks of anesthesia for surgery include, but are not limited to, sore throat, hoarseness, injury to face, mouth, or teeth; nausea; headache; injury to blood vessels or nerves; death, brain damage, or paralysis.     I understand that if I have a Limitation of Treatment order in effect during my hospitalization, the order

## 2020-03-30 ENCOUNTER — OFFICE VISIT (OUTPATIENT)
Dept: FAMILY MEDICINE CLINIC | Age: 75
End: 2020-03-30
Payer: MEDICARE

## 2020-03-30 VITALS
OXYGEN SATURATION: 97 % | WEIGHT: 184.4 LBS | BODY MASS INDEX: 34.81 KG/M2 | DIASTOLIC BLOOD PRESSURE: 80 MMHG | SYSTOLIC BLOOD PRESSURE: 130 MMHG | HEIGHT: 61 IN | HEART RATE: 82 BPM

## 2020-03-30 PROBLEM — R07.89 ATYPICAL CHEST PAIN: Status: ACTIVE | Noted: 2020-03-30

## 2020-03-30 PROBLEM — Z01.818 PRE-OP EXAM: Status: ACTIVE | Noted: 2020-03-30

## 2020-03-30 PROCEDURE — 1036F TOBACCO NON-USER: CPT | Performed by: FAMILY MEDICINE

## 2020-03-30 PROCEDURE — 1090F PRES/ABSN URINE INCON ASSESS: CPT | Performed by: FAMILY MEDICINE

## 2020-03-30 PROCEDURE — G8482 FLU IMMUNIZE ORDER/ADMIN: HCPCS | Performed by: FAMILY MEDICINE

## 2020-03-30 PROCEDURE — G8427 DOCREV CUR MEDS BY ELIG CLIN: HCPCS | Performed by: FAMILY MEDICINE

## 2020-03-30 PROCEDURE — 3017F COLORECTAL CA SCREEN DOC REV: CPT | Performed by: FAMILY MEDICINE

## 2020-03-30 PROCEDURE — 3046F HEMOGLOBIN A1C LEVEL >9.0%: CPT | Performed by: FAMILY MEDICINE

## 2020-03-30 PROCEDURE — 99214 OFFICE O/P EST MOD 30 MIN: CPT | Performed by: FAMILY MEDICINE

## 2020-03-30 PROCEDURE — 2022F DILAT RTA XM EVC RTNOPTHY: CPT | Performed by: FAMILY MEDICINE

## 2020-03-30 PROCEDURE — 1123F ACP DISCUSS/DSCN MKR DOCD: CPT | Performed by: FAMILY MEDICINE

## 2020-03-30 PROCEDURE — 4040F PNEUMOC VAC/ADMIN/RCVD: CPT | Performed by: FAMILY MEDICINE

## 2020-03-30 PROCEDURE — G8399 PT W/DXA RESULTS DOCUMENT: HCPCS | Performed by: FAMILY MEDICINE

## 2020-03-30 PROCEDURE — G8417 CALC BMI ABV UP PARAM F/U: HCPCS | Performed by: FAMILY MEDICINE

## 2020-03-30 ASSESSMENT — PATIENT HEALTH QUESTIONNAIRE - PHQ9
2. FEELING DOWN, DEPRESSED OR HOPELESS: 0
SUM OF ALL RESPONSES TO PHQ QUESTIONS 1-9: 0
1. LITTLE INTEREST OR PLEASURE IN DOING THINGS: 0
SUM OF ALL RESPONSES TO PHQ QUESTIONS 1-9: 0
SUM OF ALL RESPONSES TO PHQ9 QUESTIONS 1 & 2: 0

## 2020-03-30 NOTE — PROGRESS NOTES
Negative for activity change, appetite change, diaphoresis, fatigue, fever and unexpected weight change. HENT: Negative for congestion, facial swelling, mouth sores and nosebleeds. Eyes: Negative for discharge and visual disturbance. Respiratory: Positive for shortness of breath. Negative for cough, chest tightness and wheezing. Cardiovascular: Negative for chest pain, palpitations and leg swelling. Gastrointestinal: Negative for abdominal distention, abdominal pain, blood in stool and vomiting. Endocrine: Negative for cold intolerance, heat intolerance and polyuria. Genitourinary: Negative for difficulty urinating, dysuria, frequency and hematuria. Musculoskeletal: Negative for back pain, joint swelling, myalgias and neck pain. Skin: Negative for color change, pallor and rash. Allergic/Immunologic: Negative for immunocompromised state. Neurological: Negative for dizziness, syncope, weakness, light-headedness, numbness and headaches. Hematological: Negative for adenopathy. Does not bruise/bleed easily. Psychiatric/Behavioral: Negative for behavioral problems, confusion, decreased concentration and suicidal ideas. The patient is not nervous/anxious. Vitals:    04/01/20 1054   BP: 120/60   Pulse: 84   Temp: 97.8 °F (36.6 °C)    Weight: 185 lb (83.9 kg)       Vitals:    04/01/20 1054   BP: 120/60   Pulse: 84   Temp: 97.8 °F (36.6 °C)   Weight: 185 lb (83.9 kg)       BP Readings from Last 3 Encounters:   04/01/20 120/60   03/30/20 130/80   03/26/20 128/78       Wt Readings from Last 3 Encounters:   04/01/20 185 lb (83.9 kg)   03/30/20 184 lb 6.4 oz (83.6 kg)   03/26/20 186 lb 12.8 oz (84.7 kg)       Physical Exam  Constitutional:       General: She is not in acute distress. Appearance: She is well-developed. She is not diaphoretic. HENT:      Head: Normocephalic and atraumatic. Eyes:      Pupils: Pupils are equal, round, and reactive to light.    Neck:      Musculoskeletal: Normal range of motion. Thyroid: No thyromegaly. Vascular: No JVD. Cardiovascular:      Rate and Rhythm: Normal rate and regular rhythm. Chest Wall: PMI is not displaced. Heart sounds: Normal heart sounds, S1 normal and S2 normal. No murmur. No friction rub. No gallop. Pulmonary:      Effort: Pulmonary effort is normal. No respiratory distress. Breath sounds: Normal breath sounds. No stridor. No wheezing or rales. Chest:      Chest wall: No tenderness. Abdominal:      General: Bowel sounds are normal. There is no distension. Palpations: Abdomen is soft. Tenderness: There is no abdominal tenderness. There is no guarding or rebound. Musculoskeletal: Normal range of motion. General: No tenderness. Lymphadenopathy:      Cervical: No cervical adenopathy. Skin:     General: Skin is warm and dry. Findings: No erythema or rash. Neurological:      Mental Status: She is alert and oriented to person, place, and time. Coordination: Coordination normal.   Psychiatric:         Behavior: Behavior normal.         Thought Content:  Thought content normal.         Judgment: Judgment normal.         Labs:       Lab Results   Component Value Date    WBC 9.6 10/30/2019    HGB 11.1 (L) 10/30/2019    HCT 33.2 (L) 10/30/2019    MCV 90.1 10/30/2019     10/30/2019     Lab Results   Component Value Date     02/10/2020    K 4.6 02/10/2020     02/10/2020    CO2 23 02/10/2020    BUN 66 (H) 02/10/2020    CREATININE 3.1 (H) 02/10/2020    GLUCOSE 117 (H) 02/10/2020    CALCIUM 9.7 02/10/2020    PROT 7.5 10/30/2019    LABALBU 4.2 10/30/2019    BILITOT 0.3 10/30/2019    ALKPHOS 77 10/30/2019    AST 18 10/30/2019    ALT 15 10/30/2019    LABGLOM 15 (A) 02/10/2020    GFRAA 18 (A) 02/10/2020    AGRATIO 1.3 10/30/2019    GLOB 3.3 10/30/2019         Lab Results   Component Value Date    CHOL 216 (H) 08/15/2019    CHOL 299 (H) 05/22/2019    CHOL 199 01/25/2017     Lab

## 2020-03-30 NOTE — Clinical Note
Mahesh Le- Thanks for your kind care--she should be OK for above procedure hoping that she passes her GXT- isabel

## 2020-03-30 NOTE — PROGRESS NOTES
Subjective:      Shahbaz Avalos is a 75 y.o. female who presents to the office today for a preoperative consultation at the request of surgeon Dr Francisco Woody who plans on performing placement of peritoneal dialysis catheter(has progressive renal failure) on April 7. This consultation is requested for the specific conditions prompting preoperative evaluation (i.e. because of potential affect on operative risk): DM, CRF. Planned anesthesia is General.  The patient and family have no known anesthesia issues nor bleeding risks. Past Medical History:   Diagnosis Date    Diabetes ([address]     Hypertension     Kidney disease      Patient Active Problem List    Diagnosis Date Noted    Pre-op exam 03/30/2020    Atypical chest pain 03/30/2020    Kidney disease     Primary osteoarthritis of left knee 08/16/2019    Bilateral leg edema 06/10/2019    Memory loss 05/10/2019    Arthritis of right knee 11/15/2017    Acute pain of both knees 11/15/2017    Primary osteoarthritis of both knees 11/15/2017    Bursitis 11/15/2017    Effusion of right knee 11/15/2017    Closed nondisplaced fracture of fifth metatarsal bone of right foot 07/25/2017    Essential hypertension 01/23/2017    High cholesterol 01/23/2017    Acquired hypothyroidism 01/23/2017    Diabetes mellitus type 2 in obese ([address] 01/23/2017    CKD stage 4 due to type 2 diabetes mellitus ([address] 01/23/2017     Past Surgical History:   Procedure Laterality Date    APPENDECTOMY      CHOLECYSTECTOMY      HYSTERECTOMY      MASTECTOMY, BILATERAL      preventive    ROTATOR CUFF REPAIR       Family History   Problem Relation Age of Onset    Cancer Mother         lung, breast, liver    Stroke Father     Stomach Cancer Brother     Cancer Maternal Grandmother     No Known Problems Paternal Grandmother     No Known Problems Paternal Grandfather      Social History     Socioeconomic History    Marital status:       Spouse name: None    Number of children: respirations unlabored   Breasts:  No masses or tenderness   Heart:  Regular rate and rhythm, S1 and S2 normal, no murmur, rub, or gallop   Abdomen:   Soft, non-tender, bowel sounds active all four quadrants,  no masses, no organomegaly   Pelvic: Deferred   Extremities: Extremities normal, atraumatic, no cyanosis or edema   Pulses: 2+ and symmetric   Skin: Skin color, texture, turgor normal, no rashes or lesions   Lymph nodes: Cervical, supraclavicular, and axillary nodes normal   Neurologic: Normal         Predictors of intubation difficulty:   Morbid obesity? no   Anatomically abnormal facies? no   Prominent incisors? no   Receding mandible? no   Short, thick neck? no   Neck range of motion: normal   Mallampati score: I (soft palate, uvula, fauces, tonsillar pillars visible)   Thyromental distance: < 6cm   Mouth openincm   Dentition: No chipped, loose, or missing teeth. Cardiographics  ECG: await result of GXT  Echocardiogram: not done        Lab Review   Hospital Outpatient Visit on 02/10/2020   Component Date Value    PTH 02/10/2020 124.3*    Sodium 02/10/2020 142     Potassium 02/10/2020 4.6     Chloride 02/10/2020 103     CO2 02/10/2020 23     Anion Gap 02/10/2020 16     Glucose 02/10/2020 117*    BUN 02/10/2020 66*    CREATININE 02/10/2020 3.1*    GFR Non- 02/10/2020 15*    GFR  02/10/2020 18*    Calcium 02/10/2020 9.7          Assessment:        75 y.o. female with planned surgery as above--OKd for planned procedure as long as GXT is negative  Known risk factors for perioperative complications: Renal dysfunction    Difficulty with intubation is not anticipated. Cardiac Risk Estimation: per the Revised Cardiac Risk Index (Circ. 100:1043, 1999), the patient's risk factors for cardiac complications include serum Cr > 2.0 mg/dL, putting her in: RCI RISK CLASS II (1 risk factor, risk of major cardiac compl. appr.  1.3%)    Current medications which may produce

## 2020-04-01 ENCOUNTER — OFFICE VISIT (OUTPATIENT)
Dept: CARDIOLOGY CLINIC | Age: 75
End: 2020-04-01
Payer: MEDICARE

## 2020-04-01 VITALS
TEMPERATURE: 97.8 F | WEIGHT: 185 LBS | SYSTOLIC BLOOD PRESSURE: 120 MMHG | HEART RATE: 84 BPM | BODY MASS INDEX: 34.96 KG/M2 | DIASTOLIC BLOOD PRESSURE: 60 MMHG

## 2020-04-01 PROBLEM — Z01.810 PREOP CARDIOVASCULAR EXAM: Status: ACTIVE | Noted: 2020-04-01

## 2020-04-01 PROCEDURE — 1123F ACP DISCUSS/DSCN MKR DOCD: CPT | Performed by: INTERNAL MEDICINE

## 2020-04-01 PROCEDURE — 3017F COLORECTAL CA SCREEN DOC REV: CPT | Performed by: INTERNAL MEDICINE

## 2020-04-01 PROCEDURE — G8428 CUR MEDS NOT DOCUMENT: HCPCS | Performed by: INTERNAL MEDICINE

## 2020-04-01 PROCEDURE — G8399 PT W/DXA RESULTS DOCUMENT: HCPCS | Performed by: INTERNAL MEDICINE

## 2020-04-01 PROCEDURE — 93000 ELECTROCARDIOGRAM COMPLETE: CPT | Performed by: INTERNAL MEDICINE

## 2020-04-01 PROCEDURE — G8417 CALC BMI ABV UP PARAM F/U: HCPCS | Performed by: INTERNAL MEDICINE

## 2020-04-01 PROCEDURE — 1090F PRES/ABSN URINE INCON ASSESS: CPT | Performed by: INTERNAL MEDICINE

## 2020-04-01 PROCEDURE — 99203 OFFICE O/P NEW LOW 30 MIN: CPT | Performed by: INTERNAL MEDICINE

## 2020-04-01 PROCEDURE — 4040F PNEUMOC VAC/ADMIN/RCVD: CPT | Performed by: INTERNAL MEDICINE

## 2020-04-01 PROCEDURE — 1036F TOBACCO NON-USER: CPT | Performed by: INTERNAL MEDICINE

## 2020-04-01 ASSESSMENT — ENCOUNTER SYMPTOMS
VOMITING: 0
COLOR CHANGE: 0
ABDOMINAL PAIN: 0
EYE DISCHARGE: 0
ABDOMINAL DISTENTION: 0
COUGH: 0
SHORTNESS OF BREATH: 1
WHEEZING: 0
CHEST TIGHTNESS: 0
FACIAL SWELLING: 0
BACK PAIN: 0
BLOOD IN STOOL: 0

## 2020-04-02 ENCOUNTER — TELEPHONE (OUTPATIENT)
Dept: BARIATRICS/WEIGHT MGMT | Age: 75
End: 2020-04-02

## 2020-04-02 ENCOUNTER — HOSPITAL ENCOUNTER (OUTPATIENT)
Dept: NON INVASIVE DIAGNOSTICS | Age: 75
Discharge: HOME | End: 2020-04-02
Payer: MEDICARE

## 2020-04-02 ENCOUNTER — TELEPHONE (OUTPATIENT)
Dept: CARDIOLOGY CLINIC | Age: 75
End: 2020-04-02

## 2020-04-02 LAB
LV EF: 69 %
LVEF MODALITY: NORMAL

## 2020-04-02 PROCEDURE — A9502 TC99M TETROFOSMIN: HCPCS | Performed by: INTERNAL MEDICINE

## 2020-04-02 PROCEDURE — 3430000000 HC RX DIAGNOSTIC RADIOPHARMACEUTICAL: Performed by: INTERNAL MEDICINE

## 2020-04-02 PROCEDURE — 6360000002 HC RX W HCPCS: Performed by: INTERNAL MEDICINE

## 2020-04-02 PROCEDURE — 93017 CV STRESS TEST TRACING ONLY: CPT

## 2020-04-02 PROCEDURE — 2580000003 HC RX 258: Performed by: INTERNAL MEDICINE

## 2020-04-02 PROCEDURE — 78452 HT MUSCLE IMAGE SPECT MULT: CPT

## 2020-04-02 RX ORDER — SODIUM CHLORIDE 0.9 % (FLUSH) 0.9 %
10 SYRINGE (ML) INJECTION PRN
Status: DISCONTINUED | OUTPATIENT
Start: 2020-04-02 | End: 2020-04-03 | Stop reason: HOSPADM

## 2020-04-02 RX ADMIN — TETROFOSMIN 33.1 MILLICURIE: 1.38 INJECTION, POWDER, LYOPHILIZED, FOR SOLUTION INTRAVENOUS at 10:15

## 2020-04-02 RX ADMIN — TETROFOSMIN 9.8 MILLICURIE: 1.38 INJECTION, POWDER, LYOPHILIZED, FOR SOLUTION INTRAVENOUS at 08:30

## 2020-04-02 RX ADMIN — Medication 10 ML: at 10:16

## 2020-04-02 RX ADMIN — Medication 10 ML: at 08:30

## 2020-04-02 RX ADMIN — REGADENOSON 0.4 MG: 0.08 INJECTION, SOLUTION INTRAVENOUS at 10:15

## 2020-04-02 NOTE — LETTER
[facility] Cardiology - [address]  Phone: [phone]  Fax: [phone]    Ariel Epps MD        4/2/2020      Adali Ramsey YOB: 1945 has an acceptable risk for a non cardiac surgery. No further cardiac work up needed at this time. If there are any questions, please feel free to contact my office at [phone].       Sincerely,            Ariel Epps MD

## 2020-04-02 NOTE — TELEPHONE ENCOUNTER
Called and LVM with patient. Spoke with Nura Vega from Dr. Michele Mena office and let her know patient's stress test was normal and she is good to go for surgery. Letter placed.

## 2020-04-03 ENCOUNTER — HOSPITAL ENCOUNTER (OUTPATIENT)
Age: 75
Setting detail: OUTPATIENT SURGERY
Discharge: HOME | End: 2020-04-03
Attending: SURGERY | Admitting: SURGERY
Payer: MEDICARE

## 2020-04-03 ENCOUNTER — ANESTHESIA (OUTPATIENT)
Dept: OPERATING ROOM | Age: 75
End: 2020-04-03
Payer: MEDICARE

## 2020-04-03 ENCOUNTER — ANESTHESIA EVENT (OUTPATIENT)
Dept: OPERATING ROOM | Age: 75
End: 2020-04-03
Payer: MEDICARE

## 2020-04-03 VITALS — SYSTOLIC BLOOD PRESSURE: 118 MMHG | OXYGEN SATURATION: 98 % | TEMPERATURE: 92.7 F | DIASTOLIC BLOOD PRESSURE: 66 MMHG

## 2020-04-03 VITALS
RESPIRATION RATE: 22 BRPM | BODY MASS INDEX: 34.17 KG/M2 | SYSTOLIC BLOOD PRESSURE: 114 MMHG | HEART RATE: 80 BPM | WEIGHT: 181 LBS | OXYGEN SATURATION: 92 % | DIASTOLIC BLOOD PRESSURE: 63 MMHG | HEIGHT: 61 IN | TEMPERATURE: 97.6 F

## 2020-04-03 LAB
ANION GAP SERPL CALCULATED.3IONS-SCNC: 21 MMOL/L (ref 3–16)
BUN BLDV-MCNC: 85 MG/DL (ref 7–20)
CALCIUM SERPL-MCNC: 9.6 MG/DL (ref 8.3–10.6)
CHLORIDE BLD-SCNC: 103 MMOL/L (ref 99–110)
CO2: 17 MMOL/L (ref 21–32)
CREAT SERPL-MCNC: 3.5 MG/DL (ref 0.6–1.2)
GFR AFRICAN AMERICAN: 15
GFR NON-AFRICAN AMERICAN: 13
GLUCOSE BLD-MCNC: 113 MG/DL (ref 70–99)
GLUCOSE BLD-MCNC: 119 MG/DL (ref 70–99)
GLUCOSE BLD-MCNC: 141 MG/DL (ref 70–99)
PERFORMED ON: ABNORMAL
PERFORMED ON: ABNORMAL
POTASSIUM SERPL-SCNC: 4.6 MMOL/L (ref 3.5–5.1)
SODIUM BLD-SCNC: 141 MMOL/L (ref 136–145)

## 2020-04-03 PROCEDURE — 7100000001 HC PACU RECOVERY - ADDTL 15 MIN: Performed by: SURGERY

## 2020-04-03 PROCEDURE — 49326 LAP W/OMENTOPEXY ADD-ON: CPT | Performed by: SURGERY

## 2020-04-03 PROCEDURE — 49435 INSERT SUBQ EXTEN TO IP CATH: CPT | Performed by: SURGERY

## 2020-04-03 PROCEDURE — 6360000002 HC RX W HCPCS: Performed by: SURGERY

## 2020-04-03 PROCEDURE — 2580000003 HC RX 258: Performed by: ANESTHESIOLOGY

## 2020-04-03 PROCEDURE — 49324 LAP INSERT TUNNEL IP CATH: CPT | Performed by: SURGERY

## 2020-04-03 PROCEDURE — 80048 BASIC METABOLIC PNL TOTAL CA: CPT

## 2020-04-03 PROCEDURE — 2500000003 HC RX 250 WO HCPCS: Performed by: NURSE ANESTHETIST, CERTIFIED REGISTERED

## 2020-04-03 PROCEDURE — C1713 ANCHOR/SCREW BN/BN,TIS/BN: HCPCS | Performed by: SURGERY

## 2020-04-03 PROCEDURE — 2580000003 HC RX 258: Performed by: SURGERY

## 2020-04-03 PROCEDURE — 2720000010 HC SURG SUPPLY STERILE: Performed by: SURGERY

## 2020-04-03 PROCEDURE — 2709999900 HC NON-CHARGEABLE SUPPLY: Performed by: SURGERY

## 2020-04-03 PROCEDURE — 2500000003 HC RX 250 WO HCPCS: Performed by: SURGERY

## 2020-04-03 PROCEDURE — 3600000014 HC SURGERY LEVEL 4 ADDTL 15MIN: Performed by: SURGERY

## 2020-04-03 PROCEDURE — 7100000000 HC PACU RECOVERY - FIRST 15 MIN: Performed by: SURGERY

## 2020-04-03 PROCEDURE — 6360000002 HC RX W HCPCS: Performed by: NURSE ANESTHETIST, CERTIFIED REGISTERED

## 2020-04-03 PROCEDURE — 6370000000 HC RX 637 (ALT 250 FOR IP): Performed by: SURGERY

## 2020-04-03 PROCEDURE — 3700000001 HC ADD 15 MINUTES (ANESTHESIA): Performed by: SURGERY

## 2020-04-03 PROCEDURE — 3600000004 HC SURGERY LEVEL 4 BASE: Performed by: SURGERY

## 2020-04-03 PROCEDURE — C1750 CATH, HEMODIALYSIS,LONG-TERM: HCPCS | Performed by: SURGERY

## 2020-04-03 PROCEDURE — 3700000000 HC ANESTHESIA ATTENDED CARE: Performed by: SURGERY

## 2020-04-03 RX ORDER — HEPARIN SODIUM 5000 [USP'U]/ML
5000 INJECTION, SOLUTION INTRAVENOUS; SUBCUTANEOUS ONCE
Status: COMPLETED | OUTPATIENT
Start: 2020-04-03 | End: 2020-04-03

## 2020-04-03 RX ORDER — LIDOCAINE HYDROCHLORIDE 20 MG/ML
INJECTION, SOLUTION INFILTRATION; PERINEURAL PRN
Status: DISCONTINUED | OUTPATIENT
Start: 2020-04-03 | End: 2020-04-03 | Stop reason: SDUPTHER

## 2020-04-03 RX ORDER — SUCCINYLCHOLINE CHLORIDE 20 MG/ML
INJECTION INTRAMUSCULAR; INTRAVENOUS PRN
Status: DISCONTINUED | OUTPATIENT
Start: 2020-04-03 | End: 2020-04-03 | Stop reason: SDUPTHER

## 2020-04-03 RX ORDER — ROCURONIUM BROMIDE 10 MG/ML
INJECTION, SOLUTION INTRAVENOUS PRN
Status: DISCONTINUED | OUTPATIENT
Start: 2020-04-03 | End: 2020-04-03 | Stop reason: SDUPTHER

## 2020-04-03 RX ORDER — MAGNESIUM CARB/ALUMINUM HYDROX 105-160MG
TABLET,CHEWABLE ORAL PRN
Status: DISCONTINUED | OUTPATIENT
Start: 2020-04-03 | End: 2020-04-03 | Stop reason: ALTCHOICE

## 2020-04-03 RX ORDER — BUPIVACAINE HYDROCHLORIDE 5 MG/ML
INJECTION, SOLUTION EPIDURAL; INTRACAUDAL PRN
Status: DISCONTINUED | OUTPATIENT
Start: 2020-04-03 | End: 2020-04-03 | Stop reason: ALTCHOICE

## 2020-04-03 RX ORDER — GLYCOPYRROLATE 0.2 MG/ML
INJECTION INTRAMUSCULAR; INTRAVENOUS PRN
Status: DISCONTINUED | OUTPATIENT
Start: 2020-04-03 | End: 2020-04-03 | Stop reason: SDUPTHER

## 2020-04-03 RX ORDER — PROPOFOL 10 MG/ML
INJECTION, EMULSION INTRAVENOUS PRN
Status: DISCONTINUED | OUTPATIENT
Start: 2020-04-03 | End: 2020-04-03 | Stop reason: SDUPTHER

## 2020-04-03 RX ORDER — OXYCODONE HYDROCHLORIDE AND ACETAMINOPHEN 5; 325 MG/1; MG/1
1 TABLET ORAL EVERY 4 HOURS PRN
Qty: 12 TABLET | Refills: 0 | Status: SHIPPED | OUTPATIENT
Start: 2020-04-03 | End: 2020-04-06

## 2020-04-03 RX ORDER — ONDANSETRON 2 MG/ML
INJECTION INTRAMUSCULAR; INTRAVENOUS PRN
Status: DISCONTINUED | OUTPATIENT
Start: 2020-04-03 | End: 2020-04-03 | Stop reason: SDUPTHER

## 2020-04-03 RX ORDER — HEPARIN SODIUM (PORCINE) LOCK FLUSH IV SOLN 100 UNIT/ML 100 UNIT/ML
SOLUTION INTRAVENOUS PRN
Status: DISCONTINUED | OUTPATIENT
Start: 2020-04-03 | End: 2020-04-03 | Stop reason: ALTCHOICE

## 2020-04-03 RX ORDER — SODIUM CHLORIDE 9 MG/ML
INJECTION, SOLUTION INTRAVENOUS CONTINUOUS
Status: DISCONTINUED | OUTPATIENT
Start: 2020-04-03 | End: 2020-04-03 | Stop reason: HOSPADM

## 2020-04-03 RX ORDER — DOCUSATE SODIUM 100 MG/1
100 CAPSULE, LIQUID FILLED ORAL 2 TIMES DAILY PRN
Qty: 60 CAPSULE | Refills: 0 | Status: SHIPPED | OUTPATIENT
Start: 2020-04-03 | End: 2020-05-03

## 2020-04-03 RX ADMIN — LIDOCAINE HYDROCHLORIDE 100 MG: 20 INJECTION, SOLUTION INFILTRATION; PERINEURAL at 11:01

## 2020-04-03 RX ADMIN — GLYCOPYRROLATE 0.4 MG: 0.2 INJECTION, SOLUTION INTRAMUSCULAR; INTRAVENOUS at 11:49

## 2020-04-03 RX ADMIN — ONDANSETRON 4 MG: 2 INJECTION INTRAMUSCULAR; INTRAVENOUS at 11:45

## 2020-04-03 RX ADMIN — SUGAMMADEX 200 MG: 100 INJECTION, SOLUTION INTRAVENOUS at 12:16

## 2020-04-03 RX ADMIN — PHENYLEPHRINE HYDROCHLORIDE 100 MCG: 10 INJECTION, SOLUTION INTRAMUSCULAR; INTRAVENOUS; SUBCUTANEOUS at 11:49

## 2020-04-03 RX ADMIN — PROPOFOL 25 MG: 10 INJECTION, EMULSION INTRAVENOUS at 12:16

## 2020-04-03 RX ADMIN — SUCCINYLCHOLINE CHLORIDE 100 MG: 20 INJECTION, SOLUTION INTRAMUSCULAR; INTRAVENOUS; PARENTERAL at 11:06

## 2020-04-03 RX ADMIN — PROPOFOL 100 MG: 10 INJECTION, EMULSION INTRAVENOUS at 11:06

## 2020-04-03 RX ADMIN — HEPARIN SODIUM 5000 UNITS: 5000 INJECTION INTRAVENOUS; SUBCUTANEOUS at 10:45

## 2020-04-03 RX ADMIN — VANCOMYCIN HYDROCHLORIDE 1000 G: 10 INJECTION, POWDER, LYOPHILIZED, FOR SOLUTION INTRAVENOUS at 11:13

## 2020-04-03 RX ADMIN — ROCURONIUM BROMIDE 30 MG: 10 INJECTION, SOLUTION INTRAVENOUS at 11:15

## 2020-04-03 RX ADMIN — SODIUM CHLORIDE: 9 INJECTION, SOLUTION INTRAVENOUS at 10:58

## 2020-04-03 ASSESSMENT — PULMONARY FUNCTION TESTS
PIF_VALUE: 19
PIF_VALUE: 22
PIF_VALUE: 25
PIF_VALUE: 6
PIF_VALUE: 19
PIF_VALUE: 22
PIF_VALUE: 19
PIF_VALUE: 23
PIF_VALUE: 26
PIF_VALUE: 27
PIF_VALUE: 19
PIF_VALUE: 25
PIF_VALUE: 19
PIF_VALUE: 25
PIF_VALUE: 19
PIF_VALUE: 26
PIF_VALUE: 28
PIF_VALUE: 19
PIF_VALUE: 26
PIF_VALUE: 1
PIF_VALUE: 24
PIF_VALUE: 19
PIF_VALUE: 3
PIF_VALUE: 1
PIF_VALUE: 24
PIF_VALUE: 26
PIF_VALUE: 1
PIF_VALUE: 26
PIF_VALUE: 26
PIF_VALUE: 19
PIF_VALUE: 19
PIF_VALUE: 21
PIF_VALUE: 20
PIF_VALUE: 2
PIF_VALUE: 19
PIF_VALUE: 21
PIF_VALUE: 19
PIF_VALUE: 19
PIF_VALUE: 1
PIF_VALUE: 23
PIF_VALUE: 19
PIF_VALUE: 19
PIF_VALUE: 1
PIF_VALUE: 21
PIF_VALUE: 4
PIF_VALUE: 3
PIF_VALUE: 1
PIF_VALUE: 19
PIF_VALUE: 21
PIF_VALUE: 22
PIF_VALUE: 20
PIF_VALUE: 2
PIF_VALUE: 1
PIF_VALUE: 21
PIF_VALUE: 25
PIF_VALUE: 24
PIF_VALUE: 16
PIF_VALUE: 26
PIF_VALUE: 20
PIF_VALUE: 25
PIF_VALUE: 25
PIF_VALUE: 2
PIF_VALUE: 21
PIF_VALUE: 19
PIF_VALUE: 21
PIF_VALUE: 19
PIF_VALUE: 1
PIF_VALUE: 19
PIF_VALUE: 18
PIF_VALUE: 26
PIF_VALUE: 19
PIF_VALUE: 19
PIF_VALUE: 20
PIF_VALUE: 27
PIF_VALUE: 19
PIF_VALUE: 3
PIF_VALUE: 21
PIF_VALUE: 19
PIF_VALUE: 19
PIF_VALUE: 3
PIF_VALUE: 2
PIF_VALUE: 24
PIF_VALUE: 19
PIF_VALUE: 21

## 2020-04-03 ASSESSMENT — PAIN - FUNCTIONAL ASSESSMENT: PAIN_FUNCTIONAL_ASSESSMENT: 0-10

## 2020-04-03 ASSESSMENT — PAIN SCALES - GENERAL
PAINLEVEL_OUTOF10: 3

## 2020-04-03 NOTE — H&P
Milton Hogan    [id number]    [facility] Same Day Surgery Update H & P  Department of General Surgery   Surgical Service   Pre-operative History and Physical  Last H & P within the last 30 days. DIAGNOSIS:   Chronic kidney disease, stage IV (severe) (HCC) [N18.4]  Type 2 diabetes mellitus with stage 4 chronic kidney disease, unspecified whether long term insulin use ([address] [E11.22, N18.4]    PROCEDURE:  IA LAP INSERTION TUNNELED INTRAPERITONEAL CATHETER [26338] (LAPAROSCOPIC PERITONEAL DIALYSIS CATHETER PLACEMENT)      HISTORY OF PRESENT ILLNESS:   Patient with CKD presents today for PD catheter placement. Past Medical History:        Diagnosis Date    Diabetes ([address]     Hypertension     Kidney disease     Thyroid disease      Past Surgical History:        Procedure Laterality Date    APPENDECTOMY      CHOLECYSTECTOMY      HYSTERECTOMY      MASTECTOMY, BILATERAL      preventive    ROTATOR CUFF REPAIR       Past Social History:  Social History     Socioeconomic History    Marital status:      Spouse name: None    Number of children: 3    Years of education: None    Highest education level: None   Occupational History    Occupation: retired clerical   Social Needs    Financial resource strain: None    Food insecurity     Worry: None     Inability: None    Transportation needs     Medical: None     Non-medical: None   Tobacco Use    Smoking status: Former Smoker     Packs/day: 0.50     Years: 10.00     Pack years: 5.00     Last attempt to quit: 1978     Years since quittin.7    Smokeless tobacco: Never Used   Substance and Sexual Activity    Alcohol use:  Yes     Alcohol/week: 1.0 standard drinks     Types: 1 Glasses of wine per week    Drug use: No    Sexual activity: Never   Lifestyle    Physical activity     Days per week: None     Minutes per session: None    Stress: None   Relationships    Social connections     Talks on phone: None     Gets together: None Attends Anabaptism service: None     Active member of club or organization: None     Attends meetings of clubs or organizations: None     Relationship status:     Intimate partner violence     Fear of current or ex partner: None     Emotionally abused: None     Physically abused: None     Forced sexual activity: None   Other Topics Concern    None   Social History Narrative    Lives with son--recently moved from [address] and now here    sews         Medications Prior to Admission:      Prior to Admission medications    Medication Sig Start Date End Date Taking?  Authorizing Provider   torsemide (DEMADEX) 20 MG tablet TAKE 1 TABLET BY MOUTH EVERY DAY 1/27/20  Yes Arun Montgomery MD   irbesartan-hydrochlorothiazide (AVALIDE) 150-12.5 MG per tablet  10/23/19  Yes Historical Provider, MD   levothyroxine (SYNTHROID) 100 MCG tablet TAKE 1 TABLET EVERY DAY BEFORE BREAKFAST 5/23/19  Yes Arun Montgomery MD   pravastatin (PRAVACHOL) 10 MG tablet Take 10 mg by mouth daily   Yes Historical Provider, MD   spironolactone (ALDACTONE) 25 MG tablet TAKE 1 TABLET EVERY DAY 5/10/19  Yes Arun Montgomery MD   allopurinol (ZYLOPRIM) 100 MG tablet TAKE 1 TABLET EVERY DAY 5/10/19  Yes Arun Montgomery MD   metoprolol succinate (TOPROL XL) 25 MG extended release tablet Take 1 tablet by mouth daily 5/10/19  Yes Arun Montgomery MD   TRUE METRIX BLOOD GLUCOSE TEST strip CHECK FOUR TIMES DAILY 2/19/20   Arun Montgomery MD   calcitRIOL (ROCALTROL) 0.25 MCG capsule Take 1 capsule by mouth daily 2/11/20   Iggy Rivero MD   Blood Glucose Monitoring Suppl (TRUE METRIX AIR GLUCOSE METER) BRYAN Check bid E11.69 2/4/20   Arun Montgomery MD   Blood Glucose Calibration (TRUE METRIX LEVEL 2) Normal SOLN Check weekly 2/4/20   Arun Montgomery MD   Lancet Devices (TRUEDRAW LANCING DEVICE) MISC Check bid 2/4/20   Arun Montgomery MD   triamcinolone (KENALOG) 0.1 % cream APPLY TOPICALLY TWICE DAILY AS NEEDED

## 2020-04-03 NOTE — BRIEF OP NOTE
Brief Postoperative Note      Patient: Quintin Medley  YOB: 1945  MRN: [id number]    Date of Procedure: 4/3/2020    Pre-Op Diagnosis: Chronic kidney disease, stage IV (severe) ([facility] [N18.4] Type 2 diabetes mellitus with stage 4 chronic kidney disease, unspecified whether long term insulin use ([facility] [E11.22,    Post-Op Diagnosis: Same       Procedure(s):  LAPAROSCOPIC LYSIS OF ADHESIONS, LAPAROSCOPIC PERITONEAL DIALYSIS CATHETER PLACEMENT, LAPAROSCOPIC OMENTOPEXY    Surgeon(s):   Sybil Kennedy DO    Assistant:  Resident: Germain Diana MD    Anesthesia: General    Estimated Blood Loss (mL): Minimal    Complications: None    Specimens:   * No specimens in log *    Implants:  * No implants in log *      Drains: * No LDAs found *    Findings: SEE OP NOTE    Electronically signed by Germain Diana MD on 4/3/2020 at 12:21 PM

## 2020-04-10 NOTE — PROGRESS NOTES
[facility]) Physicians   General & Laparoscopic Surgery  [employer]       HPI:  Yanet Gregorio is a very pleasant 75 y.o. female who is here to discuss PD catheter placement      Past Medical History:   Diagnosis Date    Diabetes ([address]     Hypertension     Kidney disease     Thyroid disease      Past Surgical History:   Procedure Laterality Date    APPENDECTOMY      CHOLECYSTECTOMY      HYSTERECTOMY      LAPAROSCOPY INSERTION PERITONEAL CATHETER N/A 4/3/2020    LAPAROSCOPIC LYSIS OF ADHESIONS, LAPAROSCOPIC PERITONEAL DIALYSIS CATHETER PLACEMENT, LAPAROSCOPIC OMENTOPEXY performed by Alex Muñiz DO at [facility], BILATERAL      preventive    ROTATOR CUFF REPAIR       Family History   Problem Relation Age of Onset    Cancer Mother         lung, breast, liver    Stroke Father     Stomach Cancer Brother     Cancer Maternal Grandmother     No Known Problems Paternal Grandmother     No Known Problems Paternal Grandfather      Social History     Tobacco Use    Smoking status: Former Smoker     Packs/day: 0.50     Years: 10.00     Pack years: 5.00     Last attempt to quit: 1978     Years since quittin.8    Smokeless tobacco: Never Used   Substance Use Topics    Alcohol use: Yes     Alcohol/week: 1.0 standard drinks     Types: 1 Glasses of wine per week     I counseled the patient on the importance of not smoking and risks of ETOH. Allergies   Allergen Reactions    Demeclocycline Anaphylaxis    Tetracyclines & Related Anaphylaxis    Amoxicillin     Codeine     Ozempic (0.25 Or 0.5 Mg-Dose) [Semaglutide(0.25 Or 0.5mg-Dos)]     Penicillins      Vitals:    20 1326   BP: 128/78   Pulse: 89   Temp: 97.2 °F (36.2 °C)   Weight: 186 lb 12.8 oz (84.7 kg)   Height: 5' 1\" (1.549 m)       Body mass index is 35.3 kg/m².       Current Outpatient Medications:     TRUE METRIX BLOOD GLUCOSE TEST strip, CHECK FOUR TIMES DAILY, Disp: 350 strip, Rfl: 3    calcitRIOL (ROCALTROL) 0.25 MCG capsule, Take 1 capsule by mouth daily, Disp: 30 capsule, Rfl: 3    Blood Glucose Monitoring Suppl (TRUE METRIX AIR GLUCOSE METER) BRYAN, Check bid E11.69, Disp: 1 Device, Rfl: 0    Blood Glucose Calibration (TRUE METRIX LEVEL 2) Normal SOLN, Check weekly, Disp: 1 each, Rfl: 3    Lancet Devices (TRUEDRAW LANCING DEVICE) MISC, Check bid, Disp: 1 each, Rfl: 1    torsemide (DEMADEX) 20 MG tablet, TAKE 1 TABLET BY MOUTH EVERY DAY, Disp: 90 tablet, Rfl: 3    irbesartan-hydrochlorothiazide (AVALIDE) 150-12.5 MG per tablet, , Disp: , Rfl:     triamcinolone (KENALOG) 0.1 % cream, APPLY TOPICALLY TWICE DAILY AS NEEDED FOR PSORIASIS, Disp: 160 g, Rfl: 1    ACCU-CHEK SOFTCLIX LANCETS MISC, Check BID, Disp: 100 each, Rfl: 3    diclofenac sodium 1 % GEL, Apply 4 g topically 4 times daily, Disp: 2 Tube, Rfl: 5    levothyroxine (SYNTHROID) 100 MCG tablet, TAKE 1 TABLET EVERY DAY BEFORE BREAKFAST, Disp: 90 tablet, Rfl: 3    pravastatin (PRAVACHOL) 10 MG tablet, Take 10 mg by mouth daily, Disp: , Rfl:     spironolactone (ALDACTONE) 25 MG tablet, TAKE 1 TABLET EVERY DAY, Disp: 90 tablet, Rfl: 3    allopurinol (ZYLOPRIM) 100 MG tablet, TAKE 1 TABLET EVERY DAY, Disp: 90 tablet, Rfl: 3    metoprolol succinate (TOPROL XL) 25 MG extended release tablet, Take 1 tablet by mouth daily, Disp: 90 tablet, Rfl: 3    Lancets 94F MISC, 1 applicator by Does not apply route 2 times daily, Disp: 100 each, Rfl: 3    docusate sodium (COLACE) 100 MG capsule, Take 1 capsule by mouth 2 times daily as needed for Constipation, Disp: 60 capsule, Rfl: 0    ROS  Review of Systems - History obtained from the patient  General ROS: negative  Psychological ROS: negative  Ophthalmic ROS: negative  Neurological ROS: negative  Respiratory ROS: negative  Cardiovascular ROS: negative  Gastrointestinal ROS:negative  Genito-Urinary ROS: negative  Musculoskeletal ROS: negative   Skin ROS: negative        Physical Exam   Constitutional: Patient is oriented to person, place, and time. Vital signs are normal. Patient  appears well-developed and well-nourished. Patient  is active and cooperative. Non-toxic appearance. No distress. HENT:   Head: Normocephalic and atraumatic. Head is without laceration. Right Ear: External ear normal. No lacerations. No drainage, swelling or tenderness. Left Ear: External ear normal. No lacerations. No drainage, swelling or tenderness. Nose: Nose normal. No nose lacerations or nasal deformity. Mouth/Throat: Uvula is midline, oropharynx is clear and moist and mucous membranes are normal. No oropharyngeal exudate. Eyes: Conjunctivae, EOM and lids are normal. Pupils are equal, round, and reactive to light. Right eye exhibits no discharge. No foreign body present in the right eye. Left eye exhibits no discharge. No foreign body present in the left eye. No scleral icterus. Neck: Trachea normal and normal range of motion. Neck supple. No JVD present. No tracheal tenderness present. Carotid bruit is not present. No rigidity. No tracheal deviation and no edema present. No thyromegaly present. Cardiovascular: Normal rate, regular rhythm, normal heart sounds, intact distal pulses and normal pulses. Pulmonary/Chest: Effort normal and breath sounds normal. No stridor. No respiratory distress. Patient  has no wheezes. Patient has no rales. Patient exhibits no tenderness and no crepitus. Abdominal: Soft. Normal appearance and bowel sounds are normal. Patient exhibits no distension, no abdominal bruit, no ascites and no mass. There is no hepatosplenomegaly. There is no tenderness. There is no rigidity, no rebound, no guarding and no CVA tenderness. No hernia. Hernia confirmed negative in the ventral area. Musculoskeletal: Normal range of motion. Patient exhibits no edema or tenderness. .   Neurological: Patient is alert and oriented to person, place, and time. Patient has normal strength.  Coordination and gait

## 2020-04-16 ENCOUNTER — OFFICE VISIT (OUTPATIENT)
Dept: BARIATRICS/WEIGHT MGMT | Age: 75
End: 2020-04-16

## 2020-04-16 PROCEDURE — 99024 POSTOP FOLLOW-UP VISIT: CPT | Performed by: SURGERY

## 2020-04-16 NOTE — PROGRESS NOTES
Telephone visit    Patient doing well  No N/V/F/C  Tolerating diet  Having regular BM's    Pain well controlled    Incisions C/D/I    2 weeks s/p PD cath placement    OK to use    F/U ROSAN    Ludmila Valerio

## 2020-04-29 PROBLEM — Z01.818 PRE-OP EXAM: Status: RESOLVED | Noted: 2020-03-30 | Resolved: 2020-04-29

## 2020-05-01 PROBLEM — Z01.810 PREOP CARDIOVASCULAR EXAM: Status: RESOLVED | Noted: 2020-04-01 | Resolved: 2020-05-01

## 2020-05-27 RX ORDER — LEVOTHYROXINE SODIUM 0.1 MG/1
TABLET ORAL
Qty: 90 TABLET | Refills: 3 | Status: SHIPPED | OUTPATIENT
Start: 2020-05-27 | End: 2021-03-22 | Stop reason: ALTCHOICE

## 2020-05-27 RX ORDER — IRBESARTAN AND HYDROCHLOROTHIAZIDE 150; 12.5 MG/1; MG/1
1 TABLET, FILM COATED ORAL DAILY
Qty: 90 TABLET | Refills: 3 | Status: SHIPPED | OUTPATIENT
Start: 2020-05-27 | End: 2021-03-22 | Stop reason: ALTCHOICE

## 2020-07-08 ENCOUNTER — VIRTUAL VISIT (OUTPATIENT)
Dept: FAMILY MEDICINE CLINIC | Age: 75
End: 2020-07-08
Payer: MEDICARE

## 2020-07-08 PROBLEM — R51.9 UNILATERAL HEADACHE: Status: ACTIVE | Noted: 2020-07-08

## 2020-07-08 PROCEDURE — G8427 DOCREV CUR MEDS BY ELIG CLIN: HCPCS | Performed by: FAMILY MEDICINE

## 2020-07-08 PROCEDURE — 4040F PNEUMOC VAC/ADMIN/RCVD: CPT | Performed by: FAMILY MEDICINE

## 2020-07-08 PROCEDURE — 3017F COLORECTAL CA SCREEN DOC REV: CPT | Performed by: FAMILY MEDICINE

## 2020-07-08 PROCEDURE — 99213 OFFICE O/P EST LOW 20 MIN: CPT | Performed by: FAMILY MEDICINE

## 2020-07-08 PROCEDURE — 1123F ACP DISCUSS/DSCN MKR DOCD: CPT | Performed by: FAMILY MEDICINE

## 2020-07-08 PROCEDURE — 1090F PRES/ABSN URINE INCON ASSESS: CPT | Performed by: FAMILY MEDICINE

## 2020-07-08 PROCEDURE — G8399 PT W/DXA RESULTS DOCUMENT: HCPCS | Performed by: FAMILY MEDICINE

## 2020-07-08 RX ORDER — AMLODIPINE BESYLATE 10 MG/1
TABLET ORAL
COMMUNITY
Start: 2020-05-07 | End: 2020-09-03

## 2020-07-08 ASSESSMENT — ENCOUNTER SYMPTOMS
FACIAL SWEATING: 0
BACK PAIN: 0
SCALP TENDERNESS: 0
EYE REDNESS: 0
SWOLLEN GLANDS: 0
RHINORRHEA: 0
NAUSEA: 0
EYE PAIN: 0
SINUS PRESSURE: 0
VOMITING: 0
ABDOMINAL PAIN: 0
EYE WATERING: 0
PHOTOPHOBIA: 0
BLURRED VISION: 0
COUGH: 0
VISUAL CHANGE: 0
SORE THROAT: 0
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
## 2020-07-18 ENCOUNTER — HOSPITAL ENCOUNTER (OUTPATIENT)
Dept: MRI IMAGING | Age: 75
Discharge: HOME | End: 2020-07-18
Payer: MEDICARE

## 2020-07-18 PROCEDURE — 70551 MRI BRAIN STEM W/O DYE: CPT

## 2020-07-20 ENCOUNTER — TELEPHONE (OUTPATIENT)
Dept: FAMILY MEDICINE CLINIC | Age: 75
End: 2020-07-20
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
## 2020-07-20 NOTE — TELEPHONE ENCOUNTER
NANY: Patient's son called and stated that his mother had an  MRI done on Saturday and wanted to let you know that she is still having memory loss, now she is not wanting to take showers and keep up with personal hygiene.

## 2020-08-17 ENCOUNTER — TELEPHONE (OUTPATIENT)
Dept: FAMILY MEDICINE CLINIC | Age: 75
End: 2020-08-17

## 2020-08-27 RX ORDER — GLUCOSAM/CHON-MSM1/C/MANG/BOSW 500-416.6
TABLET ORAL
Qty: 100 EACH | Refills: 3 | Status: SHIPPED | OUTPATIENT
Start: 2020-08-27 | End: 2021-03-22 | Stop reason: ALTCHOICE

## 2020-08-28 ENCOUNTER — TELEPHONE (OUTPATIENT)
Dept: FAMILY MEDICINE CLINIC | Age: 75
End: 2020-08-28

## 2020-08-28 NOTE — TELEPHONE ENCOUNTER
Called and spoke to patient, Dr. Neal Acevedo @ [phone] was given, patient verbally understood.

## 2020-08-31 RX ORDER — SPIRONOLACTONE 25 MG/1
TABLET ORAL
Qty: 90 TABLET | Refills: 3 | Status: ON HOLD | OUTPATIENT
Start: 2020-08-31 | End: 2021-08-31 | Stop reason: HOSPADM

## 2020-09-01 RX ORDER — METOPROLOL SUCCINATE 25 MG/1
TABLET, EXTENDED RELEASE ORAL
Qty: 90 TABLET | Refills: 3 | Status: SHIPPED | OUTPATIENT
Start: 2020-09-01 | End: 2021-03-22 | Stop reason: ALTCHOICE

## 2020-09-01 RX ORDER — ALLOPURINOL 100 MG/1
TABLET ORAL
Qty: 90 TABLET | Refills: 3 | Status: SHIPPED | OUTPATIENT
Start: 2020-09-01 | End: 2021-09-08

## 2020-10-02 ENCOUNTER — NURSE TRIAGE (OUTPATIENT)
Dept: OTHER | Facility: CLINIC | Age: 75
End: 2020-10-02

## 2020-10-03 LAB
AVERAGE GLUCOSE: NORMAL
HBA1C MFR BLD: 6 %

## 2020-10-15 ENCOUNTER — HOSPITAL ENCOUNTER (OUTPATIENT)
Dept: CT IMAGING | Age: 75
Discharge: HOME | End: 2020-10-15
Payer: MEDICARE

## 2020-10-15 PROCEDURE — 73700 CT LOWER EXTREMITY W/O DYE: CPT

## 2020-10-24 ENCOUNTER — HOSPITAL ENCOUNTER (OUTPATIENT)
Dept: GENERAL RADIOLOGY | Age: 75
Discharge: HOME | End: 2020-10-24
Payer: MEDICARE

## 2020-10-24 ENCOUNTER — HOSPITAL ENCOUNTER (OUTPATIENT)
Age: 75
Discharge: HOME | End: 2020-10-24
Payer: MEDICARE

## 2020-10-24 PROCEDURE — 74018 RADEX ABDOMEN 1 VIEW: CPT

## 2020-11-20 NOTE — PROGRESS NOTES
Patient not reached. Preop instructions left on voice mail. [id number]_[phone]________  Patient instructed to get their COVID-19 test done as directed by their doctor (5-7 days prior to procedure)  or patient states will get on ___recommended 11/18-19_______. Patient was notified that they need to have an appointment,number to call provided. The day the COVID test is done is considered day one. Instructed to self quarantine after test until DOS. There is a one visitor policy at [facility] for all surgeries and endoscopies. Whether the visitor can stay or will be asked to wait in the car will depend on the current policy and if social distancing can be maintained. The policy is subject to change at any time. Please make sure the visitor has a cell phone that is on,charged and able to accept calls, as this may be the way that the staff communicates with them. Pain management is NO VISITOR policyThe patients ride is expected to remain in the car with a cell phone for communication. If the ride is leaving the hospital grounds please make sure they are back in time for pickup. Have the patient inform the staff on arrival what their rides plans are while the patient is in the facility. At the MAIN there is one visitor allowed. Please note that the visitor policy is subject to change. DATE__11/24/20______ TIME__0730______ARRIVAL__0600_______      Nothing to eat or drink after midnight the night before. Follow any instructions your doctors office has given you including what medications to take the AM of your procedure and which ones to hold. You may use your inhalers. If you take a long acting insulin the jess prior please cut the dose in half and take no diabetic medications that AM.Follow specific doctors office instructions regarding blood thinners and if they want you to hold and for how long. If you are on a blood thinner and have no instructions please contact the office and ask.     Dress comfortably,bring your insurance card,picture ID,and a complete list of medications, including supplements. You must have a responsible adult to stay with you during the procedure,drive you home and stay with you. [facility] phone number [phone] for any questions.

## 2020-11-24 ENCOUNTER — ANESTHESIA (OUTPATIENT)
Dept: ENDOSCOPY | Age: 75
End: 2020-11-24
Payer: MEDICARE

## 2020-11-24 ENCOUNTER — ANESTHESIA EVENT (OUTPATIENT)
Dept: ENDOSCOPY | Age: 75
End: 2020-11-24
Payer: MEDICARE

## 2020-11-24 ENCOUNTER — HOSPITAL ENCOUNTER (OUTPATIENT)
Age: 75
Setting detail: OUTPATIENT SURGERY
Discharge: HOME | End: 2020-11-24
Attending: INTERNAL MEDICINE | Admitting: INTERNAL MEDICINE
Payer: MEDICARE

## 2020-11-24 VITALS
HEART RATE: 98 BPM | RESPIRATION RATE: 16 BRPM | DIASTOLIC BLOOD PRESSURE: 72 MMHG | HEIGHT: 62 IN | OXYGEN SATURATION: 94 % | TEMPERATURE: 98.4 F | SYSTOLIC BLOOD PRESSURE: 130 MMHG | BODY MASS INDEX: 33.31 KG/M2 | WEIGHT: 181 LBS

## 2020-11-24 VITALS
DIASTOLIC BLOOD PRESSURE: 80 MMHG | OXYGEN SATURATION: 100 % | RESPIRATION RATE: 20 BRPM | SYSTOLIC BLOOD PRESSURE: 136 MMHG

## 2020-11-24 LAB
GLUCOSE BLD-MCNC: 117 MG/DL (ref 70–99)
GLUCOSE BLD-MCNC: 121 MG/DL (ref 70–99)
PERFORMED ON: ABNORMAL
PERFORMED ON: ABNORMAL
SARS-COV-2, NAAT: NOT DETECTED

## 2020-11-24 PROCEDURE — 2709999900 HC NON-CHARGEABLE SUPPLY: Performed by: INTERNAL MEDICINE

## 2020-11-24 PROCEDURE — 3700000000 HC ANESTHESIA ATTENDED CARE: Performed by: INTERNAL MEDICINE

## 2020-11-24 PROCEDURE — 2500000003 HC RX 250 WO HCPCS: Performed by: NURSE ANESTHETIST, CERTIFIED REGISTERED

## 2020-11-24 PROCEDURE — 88305 TISSUE EXAM BY PATHOLOGIST: CPT

## 2020-11-24 PROCEDURE — 3609012400 HC EGD TRANSORAL BIOPSY SINGLE/MULTIPLE: Performed by: INTERNAL MEDICINE

## 2020-11-24 PROCEDURE — 3609010600 HC COLONOSCOPY POLYPECTOMY SNARE/COLD BIOPSY: Performed by: INTERNAL MEDICINE

## 2020-11-24 PROCEDURE — 7100000010 HC PHASE II RECOVERY - FIRST 15 MIN: Performed by: INTERNAL MEDICINE

## 2020-11-24 PROCEDURE — U0002 COVID-19 LAB TEST NON-CDC: HCPCS

## 2020-11-24 PROCEDURE — 6360000002 HC RX W HCPCS: Performed by: NURSE ANESTHETIST, CERTIFIED REGISTERED

## 2020-11-24 PROCEDURE — 7100000011 HC PHASE II RECOVERY - ADDTL 15 MIN: Performed by: INTERNAL MEDICINE

## 2020-11-24 PROCEDURE — 3700000001 HC ADD 15 MINUTES (ANESTHESIA): Performed by: INTERNAL MEDICINE

## 2020-11-24 PROCEDURE — 2580000003 HC RX 258: Performed by: ANESTHESIOLOGY

## 2020-11-24 PROCEDURE — 6370000000 HC RX 637 (ALT 250 FOR IP): Performed by: INTERNAL MEDICINE

## 2020-11-24 RX ORDER — SODIUM CHLORIDE 9 MG/ML
INJECTION, SOLUTION INTRAVENOUS CONTINUOUS
Status: DISCONTINUED | OUTPATIENT
Start: 2020-11-24 | End: 2020-11-24 | Stop reason: HOSPADM

## 2020-11-24 RX ORDER — GABAPENTIN 100 MG/1
100 CAPSULE ORAL NIGHTLY
COMMUNITY
End: 2021-04-08

## 2020-11-24 RX ORDER — PROPOFOL 10 MG/ML
INJECTION, EMULSION INTRAVENOUS PRN
Status: DISCONTINUED | OUTPATIENT
Start: 2020-11-24 | End: 2020-11-24 | Stop reason: SDUPTHER

## 2020-11-24 RX ORDER — LIDOCAINE HYDROCHLORIDE 20 MG/ML
INJECTION, SOLUTION INFILTRATION; PERINEURAL PRN
Status: DISCONTINUED | OUTPATIENT
Start: 2020-11-24 | End: 2020-11-24 | Stop reason: SDUPTHER

## 2020-11-24 RX ADMIN — PROPOFOL 50 MG: 10 INJECTION, EMULSION INTRAVENOUS at 07:44

## 2020-11-24 RX ADMIN — PROPOFOL 50 MG: 10 INJECTION, EMULSION INTRAVENOUS at 07:49

## 2020-11-24 RX ADMIN — SODIUM CHLORIDE: 9 INJECTION, SOLUTION INTRAVENOUS at 06:57

## 2020-11-24 RX ADMIN — PROPOFOL 50 MG: 10 INJECTION, EMULSION INTRAVENOUS at 07:36

## 2020-11-24 RX ADMIN — PROPOFOL 50 MG: 10 INJECTION, EMULSION INTRAVENOUS at 07:33

## 2020-11-24 RX ADMIN — PROPOFOL 50 MG: 10 INJECTION, EMULSION INTRAVENOUS at 07:58

## 2020-11-24 RX ADMIN — PROPOFOL 80 MG: 10 INJECTION, EMULSION INTRAVENOUS at 07:53

## 2020-11-24 RX ADMIN — LIDOCAINE HYDROCHLORIDE 100 MG: 20 INJECTION, SOLUTION INFILTRATION; PERINEURAL at 07:30

## 2020-11-24 RX ADMIN — PROPOFOL 50 MG: 10 INJECTION, EMULSION INTRAVENOUS at 07:39

## 2020-11-24 RX ADMIN — PROPOFOL 50 MG: 10 INJECTION, EMULSION INTRAVENOUS at 08:03

## 2020-11-24 RX ADMIN — PROPOFOL 100 MG: 10 INJECTION, EMULSION INTRAVENOUS at 07:30

## 2020-11-24 RX ADMIN — PHENYLEPHRINE HYDROCHLORIDE 100 MCG: 10 INJECTION INTRAVENOUS at 07:38

## 2020-11-24 RX ADMIN — PROPOFOL 70 MG: 10 INJECTION, EMULSION INTRAVENOUS at 07:43

## 2020-11-24 ASSESSMENT — PAIN - FUNCTIONAL ASSESSMENT: PAIN_FUNCTIONAL_ASSESSMENT: 0-10

## 2020-11-24 NOTE — ANESTHESIA POSTPROCEDURE EVALUATION
Department of Anesthesiology  Postprocedure Note    Patient: Karuna Warner  MRN: [id number]  YOB: 1945  Date of evaluation: 11/24/2020  Time:  8:27 AM     Procedure Summary     Date:  11/24/20 Room / Location:  [address]    Anesthesia Start:  5984 Anesthesia Stop:  0815    Procedures:       EGD BIOPSY (N/A Abdomen)      COLONOSCOPY POLYPECTOMY SNARE/COLD BIOPSY Diagnosis:  (PRE-TRANSPLANT EVALUATION FOR END STAGE RENAL DISEASE Z01.818)    Surgeon:  Jessi Olivo MD Responsible Provider:  Neal Guardado MD    Anesthesia Type:  MAC ASA Status:  2          Anesthesia Type: MAC    Nanette Phase I: Nanette Score: 10    Nanette Phase II: Nanette Score: 9    Last vitals: Reviewed and per EMR flowsheets.        Anesthesia Post Evaluation    Level of consciousness: awake  Complications: no

## 2020-11-24 NOTE — PROGRESS NOTES
R. Hip grounding site clean following removal of hot snare in right colon, no redness or skin redness

## 2020-11-24 NOTE — PROGRESS NOTES
Received in Endo Phase 2 Recovery from Procedure Room. Drowsy. Responds to verbal command. Respirations easy on room air. VSS. Denies any discomfort.

## 2020-11-24 NOTE — H&P
[facility]    Pre-operative History and Physical    Patient: Cullen Cannon  : 1945  CSN:     History Obtained From:   Patient or guardian. HISTORY OF PRESENT ILLNESS:    The patient is a 75 y.o. female here for Endoscopy. Past Medical History:    Past Medical History:   Diagnosis Date    Arthritis     Diabetes ([address]     Hyperlipidemia     Hypertension     Kidney disease     Thyroid disease      Past Surgical History:    Past Surgical History:   Procedure Laterality Date    APPENDECTOMY      CHOLECYSTECTOMY      HYSTERECTOMY      LAPAROSCOPY INSERTION PERITONEAL CATHETER N/A 4/3/2020    LAPAROSCOPIC LYSIS OF ADHESIONS, LAPAROSCOPIC PERITONEAL DIALYSIS CATHETER PLACEMENT, LAPAROSCOPIC OMENTOPEXY performed by Angel Morse DO at [facility], BILATERAL      preventive    ROTATOR CUFF REPAIR       Medications Prior to Admission:   No current facility-administered medications on file prior to encounter. Current Outpatient Medications on File Prior to Encounter   Medication Sig Dispense Refill    INDOMETHACIN PO Take by mouth daily      gabapentin (NEURONTIN) 100 MG capsule Take 100 mg by mouth 3 times daily.       amLODIPine (NORVASC) 10 MG tablet TAKE 1 TABLET BY MOUTH EVERY DAY 90 tablet 1    allopurinol (ZYLOPRIM) 100 MG tablet TAKE 1 TABLET EVERY DAY 90 tablet 3    spironolactone (ALDACTONE) 25 MG tablet TAKE 1 TABLET EVERY DAY 90 tablet 3    TRUEplus Lancets 30G MISC CHECK FOUR TIMES DAILY 100 each 3    levothyroxine (SYNTHROID) 100 MCG tablet TAKE 1 TABLET EVERY DAY BEFORE BREAKFAST (Patient taking differently: 150 mcg TAKE 1 TABLET EVERY DAY BEFORE BREAKFAST) 90 tablet 3    irbesartan-hydrochlorothiazide (AVALIDE) 150-12.5 MG per tablet Take 1 tablet by mouth daily 90 tablet 3    TRUE METRIX BLOOD GLUCOSE TEST strip CHECK FOUR TIMES DAILY 350 strip 3    Blood Glucose Monitoring Suppl (TRUE METRIX AIR GLUCOSE METER) BRYAN Check bid E11.69 1 MAC sedation. 2.  Procedure options, risks and benefits reviewed with patient and/or guardian. They express understanding.

## 2020-11-24 NOTE — ANESTHESIA PRE PROCEDURE
Department of Anesthesiology  Preprocedure Note       Name:  Endy Nunez   Age:  75 y.o.  :  1945                                          MRN:  [id number]         Date:  2020      Surgeon: Poppy Dorantes):  Cassie Estrada MD    Procedure: Procedure(s):  EGD DIAGNOSTIC ONLY  COLONOSCOPY DIAGNOSTIC    Medications prior to admission:   Prior to Admission medications    Medication Sig Start Date End Date Taking?  Authorizing Provider   amLODIPine (NORVASC) 10 MG tablet TAKE 1 TABLET BY MOUTH EVERY DAY 9/3/20   Lul Benítez MD   allopurinol (ZYLOPRIM) 100 MG tablet TAKE 1 TABLET EVERY DAY 20   Marcia Arnold MD   metoprolol succinate (TOPROL XL) 25 MG extended release tablet TAKE 1 TABLET EVERY DAY 20   Marcia Arnold MD   spironolactone (ALDACTONE) 25 MG tablet TAKE 1 TABLET EVERY DAY 20   Marcia Arnold MD   TRUEplus Lancets 30G MISC CHECK FOUR TIMES DAILY 20   Marcia Arnold MD   levothyroxine (SYNTHROID) 100 MCG tablet TAKE 1 TABLET EVERY DAY BEFORE BREAKFAST 20   Marcia Arnold MD   irbesartan-hydrochlorothiazide (AVALIDE) 150-12.5 MG per tablet Take 1 tablet by mouth daily 20  Marcia Arnold MD   calcitRIOL (ROCALTROL) 0.25 MCG capsule TAKE 1 CAPSULE BY MOUTH EVERY DAY 20   Lul Benítez MD   TRUE METRIX BLOOD GLUCOSE TEST strip CHECK FOUR TIMES DAILY 20   Marcia Arnold MD   Blood Glucose Monitoring Suppl (TRUE METRIX AIR GLUCOSE METER) BRYAN Check bid E11.69 20   Marcia Arnold MD   Blood Glucose Calibration (TRUE METRIX LEVEL 2) Normal SOLN Check weekly 20   Marcia Arnold MD   Lancet Devices (TRUEDRAW LANCING DEVICE) MISC Check bid 20   Marcia Arnold MD   torsemide (DEMADEX) 20 MG tablet TAKE 1 TABLET BY MOUTH EVERY DAY 20   Marcia Arnold MD   triamcinolone (KENALOG) 0.1 % cream APPLY TOPICALLY TWICE DAILY AS NEEDED FOR PSORIASIS 10/3/19   MARCIE Amaya - CNP ACCU-CHEK SOFTCLIX LANCETS MISC Check BID 8/23/19   Marcin Cerda MD   diclofenac sodium 1 % GEL Apply 4 g topically 4 times daily 8/16/19   Marcin Cerda MD   pravastatin (PRAVACHOL) 10 MG tablet Take 10 mg by mouth daily    Historical Provider, MD   Lancets 43U MISC 1 applicator by Does not apply route 2 times daily 3/6/17   Сергей Rendon, APRN - CNP       Current medications:    Current Facility-Administered Medications   Medication Dose Route Frequency Provider Last Rate Last Dose    0.9 % sodium chloride infusion   Intravenous Continuous Eugenio Curiel MD           Allergies:     Allergies   Allergen Reactions    Demeclocycline Anaphylaxis    Tetracyclines & Related Anaphylaxis    Amoxicillin     Codeine     Ozempic (0.25 Or 0.5 Mg-Dose) [Semaglutide(0.25 Or 0.5mg-Dos)]     Penicillins        Problem List:    Patient Active Problem List   Diagnosis Code    Essential hypertension I10    High cholesterol E78.00    Acquired hypothyroidism E03.9    Diabetes mellitus type 2 in obese ([facility] E11.69, E66.9    CKD stage 4 due to type 2 diabetes mellitus ([facility] E11.22, N18.4    Closed nondisplaced fracture of fifth metatarsal bone of right foot S92.354A    Arthritis of right knee M17.11    Acute pain of both knees M25.561, M25.562    Primary osteoarthritis of both knees M17.0    Bursitis M71.9    Effusion of right knee M25.461    Memory loss R41.3    Bilateral leg edema R60.0    Primary osteoarthritis of left knee M17.12    Kidney disease N28.9    Atypical chest pain R07.89    Unilateral headache R51.9       Past Medical History:        Diagnosis Date    Diabetes ([facility]     Hypertension     Kidney disease     Thyroid disease        Past Surgical History:        Procedure Laterality Date    APPENDECTOMY      CHOLECYSTECTOMY      HYSTERECTOMY      LAPAROSCOPY INSERTION PERITONEAL CATHETER N/A 4/3/2020    LAPAROSCOPIC LYSIS OF ADHESIONS, LAPAROSCOPIC PERITONEAL the last 72 hours. Coags:   Lab Results   Component Value Date    PROTIME 11.0 04/09/2019    INR 0.96 04/09/2019    APTT 30.3 04/09/2019       HCG (If Applicable): No results found for: PREGTESTUR, PREGSERUM, HCG, HCGQUANT     ABGs: No results found for: PHART, PO2ART, ATC3PBX, YLC4HNW, BEART, I0ZAKBGY     Type & Screen (If Applicable):  No results found for: LABABO, LABRH    Drug/Infectious Status (If Applicable):  No results found for: HIV, HEPCAB    COVID-19 Screening (If Applicable): No results found for: COVID19      Anesthesia Evaluation    Airway: Mallampati: II  TM distance: >3 FB   Neck ROM: full  Mouth opening: > = 3 FB Dental:          Pulmonary:                              Cardiovascular:    (+) hypertension:,         Rhythm: regular  Rate: normal                    Neuro/Psych:               GI/Hepatic/Renal:   (+) renal disease: ESRD,           Endo/Other:    (+) Diabetes, hypothyroidism::., .                 Abdominal:           Vascular:                                      Anesthesia Plan      MAC     ASA 3       Induction: intravenous. Anesthetic plan and risks discussed with patient. Plan discussed with CRNA.                 Mary Espitia MD   11/24/2020

## 2020-11-25 PROBLEM — D12.6 TUBULAR ADENOMA OF COLON: Status: ACTIVE | Noted: 2020-11-25

## 2020-12-07 ENCOUNTER — TELEPHONE (OUTPATIENT)
Dept: FAMILY MEDICINE CLINIC | Age: 75
End: 2020-12-07

## 2021-01-11 ENCOUNTER — TELEPHONE (OUTPATIENT)
Dept: FAMILY MEDICINE CLINIC | Age: 76
End: 2021-01-11

## 2021-02-04 ENCOUNTER — TELEPHONE (OUTPATIENT)
Dept: FAMILY MEDICINE CLINIC | Age: 76
End: 2021-02-04

## 2021-02-04 NOTE — TELEPHONE ENCOUNTER
Daughter is calling stating she has occulted vein in leg and is not throwing up, not waking up, not staying active, loss of appetite, hard time breathing and shaking of hands. Daughter is rushing home, mother has been sleep on and off for 24 hours. Tramadol was given for neck pain and Cipro for leg pain in leg. Daughter is very concerned and would like a call back asap.

## 2021-02-05 ENCOUNTER — TELEPHONE (OUTPATIENT)
Dept: FAMILY MEDICINE CLINIC | Age: 76
End: 2021-02-05

## 2021-02-12 ENCOUNTER — TELEPHONE (OUTPATIENT)
Dept: FAMILY MEDICINE CLINIC | Age: 76
End: 2021-02-12

## 2021-02-12 NOTE — TELEPHONE ENCOUNTER
Patient son, Hector Francis is calling stating patient is coming home next weekend and will drive back with Covid. Son does not know what to do with patient when she comes home because she lives with him. States they do not fully understand what is going on with [facility] while she's in the hospital. Health is declining with mother. Son would like you to  Call the hospital, believes maybe she should be in nursing home for a little while. Would like you to call hx, mother was JUST dx(while we were on the phone) with Gastroparesis. Kidneys are also failing. Patient [facility] in [address].     Please advise

## 2021-02-13 ENCOUNTER — TELEPHONE (OUTPATIENT)
Dept: ADMINISTRATIVE | Age: 76
End: 2021-02-13

## 2021-02-13 NOTE — TELEPHONE ENCOUNTER
Dr. Melissa Jansen called as requested by patient to give PCP Dr. Ray Moody a medical update. Patient was admitted into [address] 2/5/21, and is still admitted. Please return phone call to the personal line of Dr. Melissa Jansen at [address] [facility] at: [phone] as soon as possible.

## 2021-02-16 ENCOUNTER — TELEPHONE (OUTPATIENT)
Dept: FAMILY MEDICINE CLINIC | Age: 76
End: 2021-02-16

## 2021-02-16 NOTE — TELEPHONE ENCOUNTER
----- Message from [address] sent at 2/15/2021  3:24 PM EST -----  Subject: Appointment Request    Reason for Call: Urgent (Patient Request) Hospital Follow Up    QUESTIONS  Type of Appointment? Established Patient  Reason for appointment request? Other - no ecc scheduling  Additional Information for Provider? patient discharged from [address] [facility] in [facility] on 2/14   admitted 2/5   vomiting and diarrhea 3 weeks   leg pain and swelling   patient needs a script for Home Health Nurse care   and needs to speak with PCP Vanesa Damico immediately   asking for Vanesa Damico to call patient urgently  ---------------------------------------------------------------------------  --------------  CALL BACK INFO  What is the best way for the office to contact you? OK to leave message on   voicemail  Preferred Call Back Phone Number? [phone]  ---------------------------------------------------------------------------  --------------  SCRIPT ANSWERS  Relationship to Patient? Other  Representative Name? Gabriela  Additional information verified (besides Name and Date of Birth)? Address  Appointment reason? Well Care/Follow Ups  Select a Well Care/Follow Ups appointment reason? Adult Hospital Follow Up   [facility]  (Patient requests to see provider urgently. )? Yes  (Has the patient been discharged from the hospital within 2 business days   AND does not have a Telephone Encounter  Follow Up From [address] [place] [address]   documented in [address]?)? Yes  Do you have any questions for your primary care provider that need to be   answered prior to your appointment? (Use RN Triage if question pertains to   anything on the red flag list)? Yes  (Patient needs follow up visit after hospital discharge) Book first   available appointment within 7 days OF DISCHARGE   if no appt   proceed to book the next available time slot within 14 days OF DISCHARGE   AND Send Message to Provider.  [address] Follow Up appointment cannot be booked beyond 14 Days and should result in a Message to Provider. ? Yes   Have you been diagnosed with   tested for   or told that you are suspected of having COVID-19 (Coronavirus)? Yes  Did your symptoms begin or have you been tested for COVID-19 in the last   10 days?  Yes

## 2021-02-16 NOTE — TELEPHONE ENCOUNTER
[facility] received a call from:    Name of Caller: Melinda Cespedes    Relationship to patient:     Organization name: -    Best contact number: [phone]    Reason for call: Pt has been dcd from hospital-needs referral for home health care

## 2021-02-16 NOTE — TELEPHONE ENCOUNTER
----- Message from Gayle Sheridan sent at 2/15/2021  3:34 PM EST -----  Subject: Message to Provider    QUESTIONS  Information for Provider? pt's son Lucille Pittman called to f/u regarding Dr. Micaela Pallas contacting staff at the hospital that pt is in in [place]; wants to   know if there is any update (was not able to locate addtn'l notes per   encounter) unable to get answer at [facility]; will await callback  ---------------------------------------------------------------------------  --------------  [address]  What is the best way for the office to contact you? OK to leave message on   voicemail  Preferred Call Back Phone Number? [phone]  ---------------------------------------------------------------------------  --------------  SCRIPT ANSWERS  Relationship to Patient? Other  Representative Name? Dedra Pinon  Is the Representative on the appropriate HIPAA document in Epic?  Yes

## 2021-02-18 NOTE — TELEPHONE ENCOUNTER
Talked to DR BOBY BATRES [facility], . She is going to fax again the request for home health care in [facility] along with the names and fax numbers of two local agencies. We may have to do a telephone visit to qualify.

## 2021-03-08 ENCOUNTER — TELEPHONE (OUTPATIENT)
Dept: FAMILY MEDICINE CLINIC | Age: 76
End: 2021-03-08

## 2021-03-08 NOTE — TELEPHONE ENCOUNTER
----- Message from Milliejennifer Wells sent at 3/8/2021 10:05 AM EST -----  Subject: Appointment Request    Reason for Call: Urgent (Patient Request) No Script    QUESTIONS  Type of Appointment? Established Patient  Reason for appointment request? Available appointments did not meet   patient need  Additional Information for Provider? Patient is experiencing diarrhea for   the last 2-3 months and lost 30 pounds. She is currently in [facility] so   could not book a VV but needs advise on how to stop it. She comes home on   the 23rd of the month. She has COVID late February. Please advise.   ---------------------------------------------------------------------------  --------------  CALL BACK INFO  What is the best way for the office to contact you? OK to leave message on   voicemail  Preferred Call Back Phone Number? [phone]  ---------------------------------------------------------------------------  --------------  SCRIPT ANSWERS  Relationship to Patient? Self  Appointment reason? Symptomatic  Select script based on patient symptoms? Adult No Script   (Is the patient requesting to see the provider for a procedure?)? No  (Is the patient requesting to see the provider urgently  today or   tomorrow. )? Yes  Have you been diagnosed with   tested for   or told that you are suspected of having COVID-19 (Coronavirus)? Yes  Did your symptoms begin or have you been tested for COVID-19 in the last   10 days? No  Have you had a fever or taken medication to treat a fever within the past   3 days? No  Have you had a cough   shortness of breath or flu-like symptoms within the past 3 days? No  Do you currently have flu-like symptoms including fever or chills   cough   shortness of breath   or difficulty breathing   or new loss of taste or smell? No  (Service Expert  click yes below to proceed with [employer] As Usual   Scheduling)?  Yes

## 2021-03-22 ENCOUNTER — APPOINTMENT (OUTPATIENT)
Dept: GENERAL RADIOLOGY | Age: 76
DRG: 640 | End: 2021-03-22
Payer: MEDICARE

## 2021-03-22 ENCOUNTER — HOSPITAL ENCOUNTER (INPATIENT)
Age: 76
LOS: 1 days | Discharge: HOME | DRG: 640 | End: 2021-03-23
Attending: STUDENT IN AN ORGANIZED HEALTH CARE EDUCATION/TRAINING PROGRAM | Admitting: INTERNAL MEDICINE
Payer: MEDICARE

## 2021-03-22 DIAGNOSIS — R06.00 DYSPNEA, UNSPECIFIED TYPE: Primary | ICD-10-CM

## 2021-03-22 DIAGNOSIS — N18.6 ESRD (END STAGE RENAL DISEASE) (HCC): ICD-10-CM

## 2021-03-22 PROBLEM — T82.898A PROBLEM WITH DIALYSIS ACCESS (HCC): Status: ACTIVE | Noted: 2021-03-22

## 2021-03-22 LAB
ANION GAP SERPL CALCULATED.3IONS-SCNC: 18 MMOL/L (ref 3–16)
ANISOCYTOSIS: ABNORMAL
BANDED NEUTROPHILS RELATIVE PERCENT: 1 % (ref 0–7)
BASOPHILS ABSOLUTE: 0.1 K/UL (ref 0–0.2)
BASOPHILS RELATIVE PERCENT: 1 %
BUN BLDV-MCNC: 75 MG/DL (ref 7–20)
CALCIUM SERPL-MCNC: 9.8 MG/DL (ref 8.3–10.6)
CHLORIDE BLD-SCNC: 101 MMOL/L (ref 99–110)
CO2: 17 MMOL/L (ref 21–32)
CREAT SERPL-MCNC: 9.7 MG/DL (ref 0.6–1.2)
EOSINOPHILS ABSOLUTE: 0 K/UL (ref 0–0.6)
EOSINOPHILS RELATIVE PERCENT: 0 %
GFR AFRICAN AMERICAN: 5
GFR NON-AFRICAN AMERICAN: 4
GLUCOSE BLD-MCNC: 83 MG/DL (ref 70–99)
GLUCOSE BLD-MCNC: 94 MG/DL (ref 70–99)
HCT VFR BLD CALC: 24.4 % (ref 36–48)
HEMOGLOBIN: 7.7 G/DL (ref 12–16)
HYPOCHROMIA: ABNORMAL
LYMPHOCYTES ABSOLUTE: 1 K/UL (ref 1–5.1)
LYMPHOCYTES RELATIVE PERCENT: 8 %
MACROCYTES: ABNORMAL
MCH RBC QN AUTO: 31 PG (ref 26–34)
MCHC RBC AUTO-ENTMCNC: 31.4 G/DL (ref 31–36)
MCV RBC AUTO: 98.7 FL (ref 80–100)
METAMYELOCYTES RELATIVE PERCENT: 1 %
MONOCYTES ABSOLUTE: 1 K/UL (ref 0–1.3)
MONOCYTES RELATIVE PERCENT: 8 %
MYELOCYTE PERCENT: 1 %
NEUTROPHILS ABSOLUTE: 9.9 K/UL (ref 1.7–7.7)
NEUTROPHILS RELATIVE PERCENT: 80 %
PDW BLD-RTO: 18 % (ref 12.4–15.4)
PERFORMED ON: NORMAL
PLATELET # BLD: 174 K/UL (ref 135–450)
PLATELET SLIDE REVIEW: ADEQUATE
PMV BLD AUTO: 8.3 FL (ref 5–10.5)
POLYCHROMASIA: ABNORMAL
POTASSIUM REFLEX MAGNESIUM: 4.9 MMOL/L (ref 3.5–5.1)
PRO-BNP: ABNORMAL PG/ML (ref 0–449)
RBC # BLD: 2.47 M/UL (ref 4–5.2)
SLIDE REVIEW: ABNORMAL
SODIUM BLD-SCNC: 136 MMOL/L (ref 136–145)
TEAR DROP CELLS: ABNORMAL
TROPONIN: 0.02 NG/ML
WBC # BLD: 11.9 K/UL (ref 4–11)

## 2021-03-22 PROCEDURE — G0378 HOSPITAL OBSERVATION PER HR: HCPCS

## 2021-03-22 PROCEDURE — 6360000002 HC RX W HCPCS: Performed by: STUDENT IN AN ORGANIZED HEALTH CARE EDUCATION/TRAINING PROGRAM

## 2021-03-22 PROCEDURE — 96372 THER/PROPH/DIAG INJ SC/IM: CPT

## 2021-03-22 PROCEDURE — 84484 ASSAY OF TROPONIN QUANT: CPT

## 2021-03-22 PROCEDURE — 80048 BASIC METABOLIC PNL TOTAL CA: CPT

## 2021-03-22 PROCEDURE — 96374 THER/PROPH/DIAG INJ IV PUSH: CPT

## 2021-03-22 PROCEDURE — 5A1D70Z PERFORMANCE OF URINARY FILTRATION, INTERMITTENT, LESS THAN 6 HOURS PER DAY: ICD-10-PCS | Performed by: STUDENT IN AN ORGANIZED HEALTH CARE EDUCATION/TRAINING PROGRAM

## 2021-03-22 PROCEDURE — 71045 X-RAY EXAM CHEST 1 VIEW: CPT

## 2021-03-22 PROCEDURE — 6370000000 HC RX 637 (ALT 250 FOR IP): Performed by: PHYSICIAN ASSISTANT

## 2021-03-22 PROCEDURE — 6360000002 HC RX W HCPCS: Performed by: PHYSICIAN ASSISTANT

## 2021-03-22 PROCEDURE — 99283 EMERGENCY DEPT VISIT LOW MDM: CPT

## 2021-03-22 PROCEDURE — 2500000003 HC RX 250 WO HCPCS: Performed by: STUDENT IN AN ORGANIZED HEALTH CARE EDUCATION/TRAINING PROGRAM

## 2021-03-22 PROCEDURE — 83880 ASSAY OF NATRIURETIC PEPTIDE: CPT

## 2021-03-22 PROCEDURE — 85025 COMPLETE CBC W/AUTO DIFF WBC: CPT

## 2021-03-22 PROCEDURE — 93005 ELECTROCARDIOGRAM TRACING: CPT | Performed by: STUDENT IN AN ORGANIZED HEALTH CARE EDUCATION/TRAINING PROGRAM

## 2021-03-22 RX ORDER — LEVOTHYROXINE SODIUM 0.15 MG/1
150 TABLET ORAL
COMMUNITY
End: 2021-09-08 | Stop reason: SDUPTHER

## 2021-03-22 RX ORDER — ATENOLOL 25 MG/1
25 TABLET ORAL DAILY
Status: DISCONTINUED | OUTPATIENT
Start: 2021-03-23 | End: 2021-03-23 | Stop reason: HOSPADM

## 2021-03-22 RX ORDER — SODIUM CHLORIDE 0.9 % (FLUSH) 0.9 %
10 SYRINGE (ML) INJECTION EVERY 12 HOURS SCHEDULED
Status: DISCONTINUED | OUTPATIENT
Start: 2021-03-22 | End: 2021-03-23 | Stop reason: HOSPADM

## 2021-03-22 RX ORDER — PRAVASTATIN SODIUM 20 MG
20 TABLET ORAL NIGHTLY
Status: ON HOLD | COMMUNITY
End: 2021-07-08 | Stop reason: HOSPADM

## 2021-03-22 RX ORDER — BUMETANIDE 0.25 MG/ML
4 INJECTION, SOLUTION INTRAMUSCULAR; INTRAVENOUS ONCE
Status: DISCONTINUED | OUTPATIENT
Start: 2021-03-22 | End: 2021-03-23 | Stop reason: HOSPADM

## 2021-03-22 RX ORDER — ONDANSETRON 2 MG/ML
4 INJECTION INTRAMUSCULAR; INTRAVENOUS EVERY 6 HOURS PRN
Status: DISCONTINUED | OUTPATIENT
Start: 2021-03-22 | End: 2021-03-23 | Stop reason: HOSPADM

## 2021-03-22 RX ORDER — TORSEMIDE 20 MG/1
80 TABLET ORAL DAILY
Status: DISCONTINUED | OUTPATIENT
Start: 2021-03-23 | End: 2021-03-23 | Stop reason: HOSPADM

## 2021-03-22 RX ORDER — ALLOPURINOL 100 MG/1
100 TABLET ORAL DAILY
Status: DISCONTINUED | OUTPATIENT
Start: 2021-03-23 | End: 2021-03-23 | Stop reason: HOSPADM

## 2021-03-22 RX ORDER — ATENOLOL 25 MG/1
25 TABLET ORAL DAILY
COMMUNITY
End: 2021-04-08 | Stop reason: ALTCHOICE

## 2021-03-22 RX ORDER — SENNA PLUS 8.6 MG/1
1 TABLET ORAL DAILY PRN
Status: DISCONTINUED | OUTPATIENT
Start: 2021-03-22 | End: 2021-03-23 | Stop reason: HOSPADM

## 2021-03-22 RX ORDER — SEVELAMER CARBONATE 800 MG/1
1600 TABLET, FILM COATED ORAL
Status: DISCONTINUED | OUTPATIENT
Start: 2021-03-23 | End: 2021-03-23 | Stop reason: HOSPADM

## 2021-03-22 RX ORDER — CALCITRIOL 0.25 UG/1
0.25 CAPSULE, LIQUID FILLED ORAL DAILY
Status: DISCONTINUED | OUTPATIENT
Start: 2021-03-23 | End: 2021-03-23 | Stop reason: HOSPADM

## 2021-03-22 RX ORDER — HEPARIN SODIUM 5000 [USP'U]/ML
5000 INJECTION, SOLUTION INTRAVENOUS; SUBCUTANEOUS EVERY 8 HOURS SCHEDULED
Status: DISCONTINUED | OUTPATIENT
Start: 2021-03-22 | End: 2021-03-23 | Stop reason: HOSPADM

## 2021-03-22 RX ORDER — PRAVASTATIN SODIUM 20 MG
20 TABLET ORAL NIGHTLY
Status: DISCONTINUED | OUTPATIENT
Start: 2021-03-22 | End: 2021-03-23 | Stop reason: HOSPADM

## 2021-03-22 RX ORDER — AMLODIPINE BESYLATE 5 MG/1
10 TABLET ORAL DAILY
Status: DISCONTINUED | OUTPATIENT
Start: 2021-03-23 | End: 2021-03-23 | Stop reason: HOSPADM

## 2021-03-22 RX ORDER — TORSEMIDE 20 MG/1
20 TABLET ORAL
Status: ON HOLD | COMMUNITY
End: 2021-08-31 | Stop reason: HOSPADM

## 2021-03-22 RX ORDER — SPIRONOLACTONE 25 MG/1
25 TABLET ORAL DAILY
Status: DISCONTINUED | OUTPATIENT
Start: 2021-03-23 | End: 2021-03-23 | Stop reason: HOSPADM

## 2021-03-22 RX ORDER — BUTALBITAL, ACETAMINOPHEN AND CAFFEINE 50; 325; 40 MG/1; MG/1; MG/1
1 TABLET ORAL ONCE
Status: COMPLETED | OUTPATIENT
Start: 2021-03-23 | End: 2021-03-22

## 2021-03-22 RX ORDER — SODIUM CHLORIDE 0.9 % (FLUSH) 0.9 %
10 SYRINGE (ML) INJECTION PRN
Status: DISCONTINUED | OUTPATIENT
Start: 2021-03-22 | End: 2021-03-23 | Stop reason: HOSPADM

## 2021-03-22 RX ORDER — GABAPENTIN 100 MG/1
100 CAPSULE ORAL NIGHTLY
Status: DISCONTINUED | OUTPATIENT
Start: 2021-03-22 | End: 2021-03-23 | Stop reason: HOSPADM

## 2021-03-22 RX ORDER — ACETAMINOPHEN 325 MG/1
650 TABLET ORAL EVERY 6 HOURS PRN
Status: DISCONTINUED | OUTPATIENT
Start: 2021-03-22 | End: 2021-03-23 | Stop reason: HOSPADM

## 2021-03-22 RX ORDER — LEVOTHYROXINE SODIUM 0.15 MG/1
150 TABLET ORAL
Status: DISCONTINUED | OUTPATIENT
Start: 2021-03-23 | End: 2021-03-23 | Stop reason: HOSPADM

## 2021-03-22 RX ORDER — METOCLOPRAMIDE 5 MG/1
5 TABLET ORAL
COMMUNITY
Start: 2021-02-14 | End: 2021-03-22 | Stop reason: ALTCHOICE

## 2021-03-22 RX ORDER — METOCLOPRAMIDE 10 MG/1
5 TABLET ORAL
Status: CANCELLED | OUTPATIENT
Start: 2021-03-22

## 2021-03-22 RX ORDER — SEVELAMER CARBONATE FOR ORAL SUSPENSION 800 MG/1
1600 POWDER, FOR SUSPENSION ORAL
Status: ON HOLD | COMMUNITY
Start: 2021-02-14 | End: 2021-03-22

## 2021-03-22 RX ADMIN — ACETAMINOPHEN 650 MG: 325 TABLET ORAL at 21:22

## 2021-03-22 RX ADMIN — GABAPENTIN 100 MG: 100 CAPSULE ORAL at 23:20

## 2021-03-22 RX ADMIN — SODIUM BICARBONATE 50 MEQ: 84 INJECTION, SOLUTION INTRAVENOUS at 20:09

## 2021-03-22 RX ADMIN — ALTEPLASE 1 MG: 2.2 INJECTION, POWDER, LYOPHILIZED, FOR SOLUTION INTRAVENOUS at 18:03

## 2021-03-22 RX ADMIN — HEPARIN SODIUM 5000 UNITS: 5000 INJECTION INTRAVENOUS; SUBCUTANEOUS at 23:20

## 2021-03-22 RX ADMIN — PRAVASTATIN SODIUM 20 MG: 20 TABLET ORAL at 23:20

## 2021-03-22 RX ADMIN — BUTALBITAL, ACETAMINOPHEN, AND CAFFEINE 1 TABLET: 50; 325; 40 TABLET ORAL at 23:56

## 2021-03-22 ASSESSMENT — ENCOUNTER SYMPTOMS
SORE THROAT: 0
NAUSEA: 0
ABDOMINAL PAIN: 0
SINUS PRESSURE: 0
PHOTOPHOBIA: 0
VOMITING: 0
SHORTNESS OF BREATH: 1
COUGH: 0

## 2021-03-22 ASSESSMENT — PAIN SCALES - GENERAL: PAINLEVEL_OUTOF10: 8

## 2021-03-22 ASSESSMENT — PAIN DESCRIPTION - PAIN TYPE: TYPE: ACUTE PAIN

## 2021-03-22 NOTE — ED NOTES
Patient with complaints of SOB with audible wheezing heard at bedside. Patient with vasc cath that had cath tracey administered by previous RN. This RN not comfortable or certified to access vasc cath. Charge RN Chrissy notified. ICU RN called to get info/advice. Info given to have nephrology care for vasc cath at this time.  RN will notify MD.     Nora Rendon RN  03/22/21 1948       Nora Rendon RN  03/22/21 0281

## 2021-03-22 NOTE — ED NOTES
Bed: 22  Expected date:   Expected time:   Means of arrival:   Comments:  coby Salazar, RN  03/22/21 3231

## 2021-03-22 NOTE — H&P
Hospital Medicine History & Physical      Patient Name: Jerri Ahumada    : 1945    PCP: Nohemi Singh MD    Date of Service:  Patient seen and examined on 3/22/2021     Chief Complaint:  Shortness of breath    History Of Present Illness:    Jerri Ahumada is a 67 y.o. female who presented to ED with complaint of shortness of breath which has progressively worsened for the last few days. Patient has ESRD on HD MWF. She has not received dialysis since last , due to clotted vascath. She also reports increased swelling in both her legs. She reports shortness of breath is primarily with exertion and improves with rest. She states she spoke with Dr. Lissy Carbone today and was told to go to the ED. She denies fever, chills, dizziness, chest pain, cough, abdominal pain, N/V/D/C, urinary symptoms. Patient does still make urine. Past Medical History:    Patient has a past medical history of Arthritis, Diabetes ([address] Hyperlipidemia, Hypertension, Kidney disease, and Thyroid disease. Past Surgical History:    Patient has a past surgical history that includes Appendectomy; Mastectomy, bilateral; Hysterectomy; Rotator cuff repair; Cholecystectomy; LAPAROSCOPY INSERTION PERITONEAL CATHETER (N/A, 4/3/2020); Upper gastrointestinal endoscopy (N/A, 2020); and Colonoscopy (2020). Medications Prior to Admission:      Prior to Admission medications    Medication Sig Start Date End Date Taking? Authorizing Provider   INDOMETHACIN PO Take by mouth daily   Yes Historical Provider, MD   gabapentin (NEURONTIN) 100 MG capsule Take 100 mg by mouth 3 times daily.    Yes Historical Provider, MD   amLODIPine (NORVASC) 10 MG tablet TAKE 1 TABLET BY MOUTH EVERY DAY 9/3/20  Yes Clement Springer MD   allopurinol (ZYLOPRIM) 100 MG tablet TAKE 1 TABLET EVERY DAY 20  Yes Nohemi Singh MD   metoprolol succinate (TOPROL XL) 25 MG extended release tablet TAKE 1 TABLET EVERY DAY 20 Yes Kevin Doherty MD   spironolactone (ALDACTONE) 25 MG tablet TAKE 1 TABLET EVERY DAY 8/31/20  Yes Kevin Doherty MD   TRUEplus Lancets 30G MISC CHECK FOUR TIMES DAILY 8/27/20  Yes Kevin Doherty MD   levothyroxine (SYNTHROID) 100 MCG tablet TAKE 1 TABLET EVERY DAY BEFORE BREAKFAST  Patient taking differently: 150 mcg TAKE 1 TABLET EVERY DAY BEFORE BREAKFAST 5/27/20  Yes Kevin Doherty MD   calcitRIOL (ROCALTROL) 0.25 MCG capsule TAKE 1 CAPSULE BY MOUTH EVERY DAY 4/22/20  Yes Pierre Sotelo MD   Blood Glucose Monitoring Suppl (TRUE METRIX AIR GLUCOSE METER) BRYAN Check bid E11.69 2/4/20  Yes Kevin Doherty MD   Lancet Devices (TRUEDRAW LANCING DEVICE) MISC Check bid 2/4/20  Yes Kevin Doherty MD   torsemide (DEMADEX) 20 MG tablet TAKE 1 TABLET BY MOUTH EVERY DAY 1/27/20  Yes Kevin Doherty MD   ACCU-CHEK SOFTCLIX LANCETS MISC Check BID 8/23/19  Yes Kevin Doherty MD   pravastatin (PRAVACHOL) 10 MG tablet Take 10 mg by mouth daily   Yes Stan Fajardo MD   Lancets 37G MISC 1 applicator by Does not apply route 2 times daily 3/6/17  Yes MARCIE Issa - CNP   irbesartan-hydrochlorothiazide (AVALIDE) 150-12.5 MG per tablet Take 1 tablet by mouth daily 5/27/20 11/24/20  Kevin Doherty MD   TRUE METRIX BLOOD GLUCOSE TEST strip CHECK FOUR TIMES DAILY 2/19/20   Kevin Doherty MD   Blood Glucose Calibration (TRUE METRIX LEVEL 2) Normal SOLN Check weekly 2/4/20   Kevin Doherty MD   triamcinolone (KENALOG) 0.1 % cream APPLY TOPICALLY TWICE DAILY AS NEEDED FOR PSORIASIS 10/3/19   MARCIE Camilo CNP   diclofenac sodium 1 % GEL Apply 4 g topically 4 times daily 8/16/19   Kevin Doherty MD       Allergies:  Demeclocycline, Tetracyclines & related, Amoxicillin, Codeine, Ozempic (0.25 or 0.5 mg-dose) [semaglutide(0.25 or 0.5mg-dos)], and Penicillins    Social History:      TOBACCO:   reports that she quit smoking about 42 years ago. She has a 5.00 pack-year smoking history. She has never used smokeless tobacco.  ETOH:   reports previous alcohol use. DRUGS:  reports no history of drug use. Family History:      Reviewed in detail positive as follows:        Problem Relation Age of Onset    Cancer Mother         lung, breast, liver    Stroke Father     Stomach Cancer Brother     Cancer Maternal Grandmother     No Known Problems Paternal Grandmother     No Known Problems Paternal Grandfather        REVIEW OF SYSTEMS:   Pertinent positives as noted in the HPI. All other systems reviewed and negative. PHYSICAL EXAM PERFORMED:    BP (!) 164/85   Pulse 85   Temp 97.3 °F (36.3 °C) (Temporal)   Resp 28   Ht 5' 2\" (1.575 m)   Wt 181 lb (82.1 kg)   LMP  (LMP Unknown)   SpO2 92%   BMI 33.11 kg/m²     General appearance:  Awake, alert, no apparent distress  HEENT:  Normocephalic, atraumatic without obvious deformity. PERRL. EOM intact. Conjunctivae/corneas clear. Neck: Supple, with full range of motion. No JVD. Trachea midline. Respiratory:  Clear to auscultation bilaterally without rales, wheezes, or rhonchi. Normal respiratory effort. Cardiovascular:  Regular rate and rhythm without murmurs, rubs or gallops. Right chest vascath in place without surrounding tenderness or erythema  Abdomen: Soft, NT, ND, without rebound or guarding. Normal bowel sounds. Extremities:  +BLE edema. No clubbing or cyanosis bilaterally. Full range of motion without deformity. +2 palpable pulses, equal bilaterally. Capillary refill brisk,< 3 seconds   Skin: No rashes or lesions. Warm/dry. Neurologic:  Neurovascularly intact without any focal sensory/motor deficits. Cranial nerves: II-XII intact, grossly non-focal. Alert and oriented x 3. Normal speech. Psychiatric:  Thought content appropriate, normal insight.     Labs:   CBC   Recent Labs     03/22/21  1705   WBC 11.9*   HGB 7.7*   HCT 24.4*           RENAL  Recent Labs     03/22/21  1705    K 4.9      CO2 17*   BUN 75*   CREATININE 9.7*       LFTS  No results for input(s): AST, ALT, ALB, BILIDIR, BILITOT, ALKPHOS in the last 72 hours. COAG  No results for input(s): INR in the last 72 hours. CARDIAC ENZYMES  Recent Labs     03/22/21  1705   TROPONINI 0.02*       LIPIDS  Cholesterol, Total   Date/Time Value Ref Range Status   08/15/2019 09:26  (H) 0 - 199 mg/dL Final     Triglycerides   Date/Time Value Ref Range Status   08/15/2019 09:26  (H) 0 - 150 mg/dL Final     HDL   Date/Time Value Ref Range Status   08/15/2019 09:26 AM 34 (L) 40 - 60 mg/dL Final     LDL Calculated   Date/Time Value Ref Range Status   08/15/2019 09:26 AM see below <100 mg/dL Final     Comment:     When the triglyceride is <30 mg/dL or >300 mg/dL the  calculated LDL and  VLDL are not valid and a measured  LDL is performed. Radiology:     XR CHEST PORTABLE   Final Result   1. Cardiomegaly with vascular congestion and interstitial infiltrates likely   representing edema and congestive failure. IR REPLACE TUNNELED CVC W SQ PORT SAME ACCESS    (Results Pending)       EKG:   Read by ED physician in the absence of a cardiologist:  \"normal sinus rhythm with a rate of 84  Axis is   Normal  QTc is  normal  Intervals and Durations are unremarkable. No specific ST-T wave changes appreciated. No evidence of acute ischemia. No significant change from prior EKG dated 4/1/20\"      ASSESSMENT/PLAN:    Problem with dialysis access  Vascath clotted. Did not resolve with alteplase  IR consulted in ED with plans for Vascath replacement in AM    ESRD on HD  HD MWF. Last HD 3/17/21 due to above  Continue home sevelamer, torsemide, spironolactone, calcitriol  Nephrology consulted in ED. Appreciate recommendations  Clinically appears fluid overloaded. Bumex 4 mg IV administered in ED  Plan to dialyze in AM after new vascath placed    High anion gap metabolic acidosis  AG 18.  Bicarb 17. 1 amp bicarb

## 2021-03-22 NOTE — ED PROVIDER NOTES
vaginal pain. Musculoskeletal: Negative for arthralgias and neck stiffness. Skin: Negative for rash and wound. Neurological: Negative for dizziness and headaches. Psychiatric/Behavioral: Negative for agitation and confusion. Except as noted above the remainder of the review of systems was reviewed and negative. PAST MEDICAL HISTORY     Past Medical History:   Diagnosis Date    Arthritis     Diabetes ([facility]     Hyperlipidemia     Hypertension     Kidney disease     Thyroid disease          SURGICAL HISTORY       Past Surgical History:   Procedure Laterality Date    APPENDECTOMY      CHOLECYSTECTOMY      COLONOSCOPY  11/24/2020    COLONOSCOPY POLYPECTOMY SNARE/COLD BIOPSY performed by Raleigh Scott MD at [place] [address] N/A 4/3/2020    LAPAROSCOPIC LYSIS OF ADHESIONS, LAPAROSCOPIC PERITONEAL DIALYSIS CATHETER PLACEMENT, LAPAROSCOPIC OMENTOPEXY performed by Artur Castro DO at [facility], BILATERAL      preventive    ROTATOR CUFF REPAIR      UPPER GASTROINTESTINAL ENDOSCOPY N/A 11/24/2020    EGD BIOPSY performed by Raleigh Scott MD at [facility]       Previous Medications    ACCU-CHEK SOFTCLIX LANCETS MISC    Check BID    ALLOPURINOL (ZYLOPRIM) 100 MG TABLET    TAKE 1 TABLET EVERY DAY    AMLODIPINE (NORVASC) 10 MG TABLET    TAKE 1 TABLET BY MOUTH EVERY DAY    BLOOD GLUCOSE CALIBRATION (TRUE METRIX LEVEL 2) NORMAL SOLN    Check weekly    BLOOD GLUCOSE MONITORING SUPPL (TRUE METRIX AIR GLUCOSE METER) BRYAN    Check bid E11.69    CALCITRIOL (ROCALTROL) 0.25 MCG CAPSULE    TAKE 1 CAPSULE BY MOUTH EVERY DAY    DICLOFENAC SODIUM 1 % GEL    Apply 4 g topically 4 times daily    GABAPENTIN (NEURONTIN) 100 MG CAPSULE    Take 100 mg by mouth 3 times daily.     INDOMETHACIN PO    Take by mouth daily    IRBESARTAN-HYDROCHLOROTHIAZIDE (AVALIDE) 150-12.5 MG PER TABLET    Take 1 tablet by mouth daily    LANCET DEVICES (TRUEDRAW LANCING DEVICE) MISC    Check bid    LANCETS 00G MISC    1 applicator by Does not apply route 2 times daily    LEVOTHYROXINE (SYNTHROID) 100 MCG TABLET    TAKE 1 TABLET EVERY DAY BEFORE BREAKFAST    METOPROLOL SUCCINATE (TOPROL XL) 25 MG EXTENDED RELEASE TABLET    TAKE 1 TABLET EVERY DAY    PRAVASTATIN (PRAVACHOL) 10 MG TABLET    Take 10 mg by mouth daily    SPIRONOLACTONE (ALDACTONE) 25 MG TABLET    TAKE 1 TABLET EVERY DAY    TORSEMIDE (DEMADEX) 20 MG TABLET    TAKE 1 TABLET BY MOUTH EVERY DAY    TRIAMCINOLONE (KENALOG) 0.1 % CREAM    APPLY TOPICALLY TWICE DAILY AS NEEDED FOR PSORIASIS    TRUE METRIX BLOOD GLUCOSE TEST STRIP    CHECK FOUR TIMES DAILY    TRUEPLUS LANCETS 30G MISC    CHECK FOUR TIMES DAILY       ALLERGIES     Demeclocycline, Tetracyclines & related, Amoxicillin, Codeine, Ozempic (0.25 or 0.5 mg-dose) [semaglutide(0.25 or 0.5mg-dos)], and Penicillins    FAMILY HISTORY       Family History   Problem Relation Age of Onset    Cancer Mother         lung, breast, liver    Stroke Father     Stomach Cancer Brother     Cancer Maternal Grandmother     No Known Problems Paternal Grandmother     No Known Problems Paternal Grandfather           SOCIAL HISTORY       Social History     Socioeconomic History    Marital status:       Spouse name: None    Number of children: 3    Years of education: None    Highest education level: None   Occupational History    Occupation: retired clerical   Social Needs    Financial resource strain: None    Food insecurity     Worry: None     Inability: None    Transportation needs     Medical: None     Non-medical: None   Tobacco Use    Smoking status: Former Smoker     Packs/day: 0.50     Years: 10.00     Pack years: 5.00     Quit date: 1978     Years since quittin.7    Smokeless tobacco: Never Used   Substance and Sexual Activity    Alcohol use: Not Currently    Drug use: No    Sexual activity: Never Lifestyle    Physical activity     Days per week: None     Minutes per session: None    Stress: None   Relationships    Social connections     Talks on phone: None     Gets together: None     Attends Christian service: None     Active member of club or organization: None     Attends meetings of clubs or organizations: None     Relationship status:     Intimate partner violence     Fear of current or ex partner: None     Emotionally abused: None     Physically abused: None     Forced sexual activity: None   Other Topics Concern    None   Social History Narrative    Lives with son--recently moved from [facility] and now here    sews       SCREENINGS                        PHYSICAL EXAM    (up to 7 for level 4, 8 or more for level 5)     ED Triage Vitals [03/22/21 1627]   BP Temp Temp Source Pulse Resp SpO2 Height Weight   (!) 182/97 97.3 °F (36.3 °C) Temporal 84 16 95 % 5' 2\" (1.575 m) 181 lb (82.1 kg)       Physical Exam  Constitutional:       Appearance: Normal appearance. HENT:      Head: Normocephalic and atraumatic. Eyes:      Conjunctiva/sclera: Conjunctivae normal.   Neck:      Musculoskeletal: Normal range of motion. No neck rigidity. Cardiovascular:      Rate and Rhythm: Normal rate and regular rhythm. Pulses: Normal pulses. Heart sounds: Normal heart sounds. Comments: Right chest Vas-Cath in place without surrounding tenderness or mass  Pulmonary:      Effort: Pulmonary effort is normal.      Comments: Crackles at bilateral bases, no increased work of breathing. No accessory muscle use. Patient remains on room air. Abdominal:      General: Abdomen is flat. There is no distension. Palpations: Abdomen is soft. There is no mass. Tenderness: There is no abdominal tenderness. Musculoskeletal:      Comments: Symmetric pitting edema to bilateral calfs without tenderness   Skin:     General: Skin is warm and dry.       Capillary Refill: Capillary refill takes less than 2 seconds. Neurological:      Mental Status: She is alert and oriented to person, place, and time. Psychiatric:         Mood and Affect: Mood normal.         Behavior: Behavior normal.         DIAGNOSTIC RESULTS     EKG: All EKG's are interpreted by the Emergency Department Physician who either signs or Co-signs this chart in the absence of a cardiologist.  The Ekg interpreted by me in the absence of a cardiologist shows. normal sinus rhythm with a rate of 84  Axis is   Normal  QTc is  normal  Intervals and Durations are unremarkable. No specific ST-T wave changes appreciated. No evidence of acute ischemia. No significant change from prior EKG dated 4/1/20        RADIOLOGY:   Non-plain film images such as CT, Ultrasound and MRI are read by the radiologist. Plain radiographic images are visualized and preliminarily interpreted by the emergency physician with the below findings:      Interpretation per the Radiologist below, if available at the time of this note:    XR CHEST PORTABLE   Final Result   1. Cardiomegaly with vascular congestion and interstitial infiltrates likely   representing edema and congestive failure.          IR REPLACE TUNNELED CVC W SQ PORT SAME ACCESS    (Results Pending)         LABS:  Labs Reviewed   CBC WITH AUTO DIFFERENTIAL - Abnormal; Notable for the following components:       Result Value    WBC 11.9 (*)     RBC 2.47 (*)     Hemoglobin 7.7 (*)     Hematocrit 24.4 (*)     RDW 18.0 (*)     Neutrophils Absolute 9.9 (*)     Metamyelocytes Relative 1 (*)     Myelocyte Percent 1 (*)     Anisocytosis Occasional (*)     Macrocytes Occasional (*)     Polychromasia Occasional (*)     Hypochromia Occasional (*)     Tear Drop Cells Occasional (*)     All other components within normal limits    Narrative:     Performed at:  [facility] [address], [place], Formerly named [facility] [address]   Phone [phone]   BASIC METABOLIC PANEL W/ REFLEX TO MG FOR LOW K - exertion. At rest she is comfortable and remains on room air. She does appear clinically overloaded. She has not had dialysis since last Wednesday. She does not have acute hyperkalemia. We did trial Cathflo here to try to declog the Vas-Cath without success. I spoke with Dr. Ophelia Li with interventional radiology 6:54 PM who states that they can place a new catheter first thing in the morning. I did speak with nephrology as well Dr. Gene Desir who recommends 1 amp bicarb here and will plan to dialyze tomorrow am.  We will admit patient for establishment of dialysis access and dialysis. Patient and daughter are agreeable. FINAL IMPRESSION      1. Dyspnea, unspecified type    2. ESRD (end stage renal disease) ([facility] [address]          DISPOSITION/PLAN   DISPOSITION        PATIENT REFERRED TO:  No follow-up provider specified. DISCHARGE MEDICATIONS:  New Prescriptions    No medications on file     Controlled Substances Monitoring:     No flowsheet data found.     (Please note that portions of this note were completed with a voice recognition program.  Efforts were made to edit the dictations but occasionally words are mis-transcribed.)    Debbei Kay MD (electronically signed)  Attending Emergency Physician         Radha Rangel MD  03/22/21 2002

## 2021-03-22 NOTE — ED NOTES
Attempted to aspirate cath tracey from each lumen. Unsuccessful.   Notified dr Trent Reddy, RN  03/22/21 1910

## 2021-03-23 ENCOUNTER — APPOINTMENT (OUTPATIENT)
Dept: INTERVENTIONAL RADIOLOGY/VASCULAR | Age: 76
DRG: 640 | End: 2021-03-23
Payer: MEDICARE

## 2021-03-23 VITALS
DIASTOLIC BLOOD PRESSURE: 74 MMHG | BODY MASS INDEX: 32.25 KG/M2 | WEIGHT: 175.27 LBS | SYSTOLIC BLOOD PRESSURE: 151 MMHG | HEIGHT: 62 IN | HEART RATE: 86 BPM | OXYGEN SATURATION: 91 % | RESPIRATION RATE: 20 BRPM | TEMPERATURE: 98.5 F

## 2021-03-23 PROBLEM — T82.49XA CLOTTED DIALYSIS ACCESS (HCC): Status: ACTIVE | Noted: 2021-03-23

## 2021-03-23 LAB
ALBUMIN SERPL-MCNC: 3.6 G/DL (ref 3.4–5)
ANION GAP SERPL CALCULATED.3IONS-SCNC: 18 MMOL/L (ref 3–16)
APTT: 27 SEC (ref 24.2–36.2)
BUN BLDV-MCNC: 77 MG/DL (ref 7–20)
CALCIUM SERPL-MCNC: 9.6 MG/DL (ref 8.3–10.6)
CHLORIDE BLD-SCNC: 102 MMOL/L (ref 99–110)
CO2: 17 MMOL/L (ref 21–32)
CREAT SERPL-MCNC: 9.9 MG/DL (ref 0.6–1.2)
EKG ATRIAL RATE: 84 BPM
EKG DIAGNOSIS: NORMAL
EKG P AXIS: 35 DEGREES
EKG P-R INTERVAL: 142 MS
EKG Q-T INTERVAL: 404 MS
EKG QRS DURATION: 76 MS
EKG QTC CALCULATION (BAZETT): 477 MS
EKG R AXIS: -10 DEGREES
EKG T AXIS: 42 DEGREES
EKG VENTRICULAR RATE: 84 BPM
GFR AFRICAN AMERICAN: 5
GFR NON-AFRICAN AMERICAN: 4
GLUCOSE BLD-MCNC: 71 MG/DL (ref 70–99)
GLUCOSE BLD-MCNC: 71 MG/DL (ref 70–99)
GLUCOSE BLD-MCNC: 82 MG/DL (ref 70–99)
GLUCOSE BLD-MCNC: 84 MG/DL (ref 70–99)
GLUCOSE BLD-MCNC: 86 MG/DL (ref 70–99)
HBV SURFACE AB TITR SER: 465.6 MIU/ML
HCT VFR BLD CALC: 23.5 % (ref 36–48)
HEMOGLOBIN: 7.4 G/DL (ref 12–16)
HEPATITIS B SURFACE ANTIGEN INTERPRETATION: NORMAL
INR BLD: 1.03 (ref 0.86–1.14)
MCH RBC QN AUTO: 31.1 PG (ref 26–34)
MCHC RBC AUTO-ENTMCNC: 31.5 G/DL (ref 31–36)
MCV RBC AUTO: 98.6 FL (ref 80–100)
PDW BLD-RTO: 18 % (ref 12.4–15.4)
PERFORMED ON: NORMAL
PHOSPHORUS: 8.8 MG/DL (ref 2.5–4.9)
PLATELET # BLD: 162 K/UL (ref 135–450)
PMV BLD AUTO: 8 FL (ref 5–10.5)
POTASSIUM SERPL-SCNC: 5.8 MMOL/L (ref 3.5–5.1)
PROTHROMBIN TIME: 11.9 SEC (ref 10–13.2)
RBC # BLD: 2.38 M/UL (ref 4–5.2)
SODIUM BLD-SCNC: 137 MMOL/L (ref 136–145)
WBC # BLD: 12.5 K/UL (ref 4–11)

## 2021-03-23 PROCEDURE — 6360000002 HC RX W HCPCS: Performed by: RADIOLOGY

## 2021-03-23 PROCEDURE — 2500000003 HC RX 250 WO HCPCS: Performed by: RADIOLOGY

## 2021-03-23 PROCEDURE — 99152 MOD SED SAME PHYS/QHP 5/>YRS: CPT

## 2021-03-23 PROCEDURE — 36581 REPLACE TUNNELED CV CATH: CPT

## 2021-03-23 PROCEDURE — 2500000003 HC RX 250 WO HCPCS: Performed by: INTERNAL MEDICINE

## 2021-03-23 PROCEDURE — 36415 COLL VENOUS BLD VENIPUNCTURE: CPT

## 2021-03-23 PROCEDURE — 86704 HEP B CORE ANTIBODY TOTAL: CPT

## 2021-03-23 PROCEDURE — 6360000002 HC RX W HCPCS: Performed by: INTERNAL MEDICINE

## 2021-03-23 PROCEDURE — 85610 PROTHROMBIN TIME: CPT

## 2021-03-23 PROCEDURE — 77001 FLUOROGUIDE FOR VEIN DEVICE: CPT

## 2021-03-23 PROCEDURE — 2580000003 HC RX 258: Performed by: PHYSICIAN ASSISTANT

## 2021-03-23 PROCEDURE — 80069 RENAL FUNCTION PANEL: CPT

## 2021-03-23 PROCEDURE — 90935 HEMODIALYSIS ONE EVALUATION: CPT

## 2021-03-23 PROCEDURE — 85730 THROMBOPLASTIN TIME PARTIAL: CPT

## 2021-03-23 PROCEDURE — 1200000000 HC SEMI PRIVATE

## 2021-03-23 PROCEDURE — 87340 HEPATITIS B SURFACE AG IA: CPT

## 2021-03-23 PROCEDURE — 85027 COMPLETE CBC AUTOMATED: CPT

## 2021-03-23 PROCEDURE — 6370000000 HC RX 637 (ALT 250 FOR IP): Performed by: PHYSICIAN ASSISTANT

## 2021-03-23 PROCEDURE — 86706 HEP B SURFACE ANTIBODY: CPT

## 2021-03-23 PROCEDURE — 93010 ELECTROCARDIOGRAM REPORT: CPT | Performed by: INTERNAL MEDICINE

## 2021-03-23 PROCEDURE — C1881 DIALYSIS ACCESS SYSTEM: HCPCS

## 2021-03-23 PROCEDURE — 96375 TX/PRO/DX INJ NEW DRUG ADDON: CPT

## 2021-03-23 RX ORDER — NICOTINE POLACRILEX 4 MG
15 LOZENGE BUCCAL PRN
Status: DISCONTINUED | OUTPATIENT
Start: 2021-03-23 | End: 2021-03-23 | Stop reason: HOSPADM

## 2021-03-23 RX ORDER — LIDOCAINE HYDROCHLORIDE AND EPINEPHRINE BITARTRATE 20; .01 MG/ML; MG/ML
20 INJECTION, SOLUTION SUBCUTANEOUS ONCE
Status: COMPLETED | OUTPATIENT
Start: 2021-03-23 | End: 2021-03-23

## 2021-03-23 RX ORDER — FENTANYL CITRATE 50 UG/ML
INJECTION, SOLUTION INTRAMUSCULAR; INTRAVENOUS
Status: COMPLETED | OUTPATIENT
Start: 2021-03-23 | End: 2021-03-23

## 2021-03-23 RX ORDER — HEPARIN SODIUM 200 [USP'U]/100ML
7 INJECTION, SOLUTION INTRAVENOUS CONTINUOUS
Status: DISPENSED | OUTPATIENT
Start: 2021-03-23 | End: 2021-03-23

## 2021-03-23 RX ORDER — HEPARIN SODIUM 1000 [USP'U]/ML
6000 INJECTION, SOLUTION INTRAVENOUS; SUBCUTANEOUS ONCE
Status: COMPLETED | OUTPATIENT
Start: 2021-03-23 | End: 2021-03-23

## 2021-03-23 RX ORDER — CLINDAMYCIN PHOSPHATE 300 MG/50ML
INJECTION INTRAVENOUS CONTINUOUS PRN
Status: COMPLETED | OUTPATIENT
Start: 2021-03-23 | End: 2021-03-23

## 2021-03-23 RX ORDER — DEXTROSE MONOHYDRATE 50 MG/ML
100 INJECTION, SOLUTION INTRAVENOUS PRN
Status: DISCONTINUED | OUTPATIENT
Start: 2021-03-23 | End: 2021-03-23 | Stop reason: HOSPADM

## 2021-03-23 RX ORDER — MIDAZOLAM HYDROCHLORIDE 1 MG/ML
INJECTION INTRAMUSCULAR; INTRAVENOUS
Status: COMPLETED | OUTPATIENT
Start: 2021-03-23 | End: 2021-03-23

## 2021-03-23 RX ORDER — LIDOCAINE HYDROCHLORIDE 10 MG/ML
20 INJECTION, SOLUTION EPIDURAL; INFILTRATION; INTRACAUDAL; PERINEURAL ONCE
Status: COMPLETED | OUTPATIENT
Start: 2021-03-23 | End: 2021-03-23

## 2021-03-23 RX ORDER — DEXTROSE MONOHYDRATE 25 G/50ML
12.5 INJECTION, SOLUTION INTRAVENOUS PRN
Status: DISCONTINUED | OUTPATIENT
Start: 2021-03-23 | End: 2021-03-23 | Stop reason: HOSPADM

## 2021-03-23 RX ORDER — HEPARIN SODIUM 1000 [USP'U]/ML
3800 INJECTION, SOLUTION INTRAVENOUS; SUBCUTANEOUS PRN
Status: DISCONTINUED | OUTPATIENT
Start: 2021-03-23 | End: 2021-03-23 | Stop reason: HOSPADM

## 2021-03-23 RX ADMIN — TORSEMIDE 80 MG: 20 TABLET ORAL at 08:10

## 2021-03-23 RX ADMIN — ALLOPURINOL 100 MG: 100 TABLET ORAL at 08:10

## 2021-03-23 RX ADMIN — SPIRONOLACTONE 25 MG: 25 TABLET ORAL at 08:10

## 2021-03-23 RX ADMIN — HEPARIN SODIUM 3800 UNITS: 1000 INJECTION INTRAVENOUS; SUBCUTANEOUS at 15:44

## 2021-03-23 RX ADMIN — SEVELAMER CARBONATE 1600 MG: 800 TABLET, FILM COATED ORAL at 08:10

## 2021-03-23 RX ADMIN — HEPARIN SODIUM 7 UNITS/KG/HR: 200 INJECTION, SOLUTION INTRAVENOUS at 09:20

## 2021-03-23 RX ADMIN — LIDOCAINE HYDROCHLORIDE 8 ML: 10 INJECTION, SOLUTION EPIDURAL; INFILTRATION; INTRACAUDAL; PERINEURAL at 09:33

## 2021-03-23 RX ADMIN — MIDAZOLAM 0.5 MG: 1 INJECTION INTRAMUSCULAR; INTRAVENOUS at 09:10

## 2021-03-23 RX ADMIN — CALCITRIOL 0.25 MCG: 0.25 CAPSULE ORAL at 08:10

## 2021-03-23 RX ADMIN — ATENOLOL 25 MG: 25 TABLET ORAL at 08:10

## 2021-03-23 RX ADMIN — AMLODIPINE BESYLATE 10 MG: 5 TABLET ORAL at 08:10

## 2021-03-23 RX ADMIN — LEVOTHYROXINE SODIUM 150 MCG: 0.15 TABLET ORAL at 06:06

## 2021-03-23 RX ADMIN — LIDOCAINE HYDROCHLORIDE,EPINEPHRINE BITARTRATE 20 ML: 20; .01 INJECTION, SOLUTION INFILTRATION; PERINEURAL at 12:05

## 2021-03-23 RX ADMIN — Medication 10 ML: at 08:18

## 2021-03-23 RX ADMIN — HEPARIN SODIUM 3800 UNITS: 1000 INJECTION INTRAVENOUS; SUBCUTANEOUS at 09:28

## 2021-03-23 RX ADMIN — CLINDAMYCIN PHOSPHATE 600 MG: 300 INJECTION, SOLUTION INTRAVENOUS at 09:02

## 2021-03-23 RX ADMIN — FENTANYL CITRATE 25 MCG: 50 INJECTION, SOLUTION INTRAMUSCULAR; INTRAVENOUS at 09:10

## 2021-03-23 ASSESSMENT — PAIN SCALES - GENERAL
PAINLEVEL_OUTOF10: 0

## 2021-03-23 NOTE — CARE COORDINATION
CM called [facility] Nephrology clinic and spoke to Jamin Ashton and informed her pt will d/c today but is still in HD.      Rashaun Denis RN, BSN  [phone]

## 2021-03-23 NOTE — DISCHARGE INSTR - COC
Continuity of Care Form    Patient Name: Fernanod Lamb   :  1945  MRN:  [id number]    Admit date:  3/22/2021  Discharge date:  ***    Code Status Order: Full Code   Advance Directives:   Advance Care Flowsheet Documentation     Date/Time Healthcare Directive Type of Healthcare Directive Copy in [address] Agent's Name Healthcare Agent's Phone Number    [phone]  Yes, patient has an advance directive for healthcare treatment  Durable power of  for health care; Health care treatment directive  Yes, copy in chart  Adult [address]          Admitting Physician:  Kathe Wolf MD  PCP: Addison Morales MD    Discharging Nurse: [facility] Unit/Room#: 0QN-8840/0521-70  Discharging Unit Phone Number: ***    Emergency Contact:   Extended Emergency Contact Information  Primary Emergency Contact: [address] [place] [address] Phone: [phone]  Relation: Child    Past Surgical History:  Past Surgical History:   Procedure Laterality Date    APPENDECTOMY      CHOLECYSTECTOMY      COLONOSCOPY  2020    COLONOSCOPY POLYPECTOMY SNARE/COLD BIOPSY performed by Rosalind Childress MD at [place] [address] N/A 4/3/2020    LAPAROSCOPIC LYSIS OF ADHESIONS, LAPAROSCOPIC PERITONEAL DIALYSIS CATHETER PLACEMENT, LAPAROSCOPIC OMENTOPEXY performed by Frida Gan DO at [facility], BILATERAL      preventive    ROTATOR CUFF REPAIR      UPPER GASTROINTESTINAL ENDOSCOPY N/A 2020    EGD BIOPSY performed by Rosalind Childress MD at [address] [place] [address] ENDOSCOPY       Immunization History:   Immunization History   Administered Date(s) Administered    Influenza, High Dose (Fluzone 65 yrs and older) 2017, 2017    Influenza, Triv, inactivated, subunit, adjuvanted, IM (Fluad 65 yrs and older) 10/28/2019    Pneumococcal Conjugate 13-valent ([id number]) 2017    Pneumococcal Polysaccharide ([id number]) 05/10/2019       Active Problems:  Patient Active Problem List   Diagnosis Code    Essential hypertension I10    High cholesterol E78.00    Acquired hypothyroidism E03.9    Diabetes mellitus type 2 in obese ([facility]) E11.69, E66.9    CKD stage 4 due to type 2 diabetes mellitus ([facility] [address] E11.22, N18.4    Closed nondisplaced fracture of fifth metatarsal bone of right foot S92.354A    Arthritis of right knee M17.11    Acute pain of both knees M25.561, M25.562    Primary osteoarthritis of both knees M17.0    Bursitis M71.9    Effusion of right knee M25.461    Memory loss R41.3    Bilateral leg edema R60.0    Primary osteoarthritis of left knee M17.12    Kidney disease N28.9    Atypical chest pain R07.89    Unilateral headache R51.9    Tubular adenoma of colon D12.6    Problem with dialysis access [facility]) T82.898A       Isolation/Infection:   Isolation          C Diff Contact        Patient Infection Status     Infection Onset Added Last Indicated Last Indicated By Review Planned Expiration Resolved Resolved By    C-diff Rule Out 03/22/21 03/22/21 03/22/21 Clostridium difficile toxin/antigen (Ordered)        Resolved    COVID-19 Rule Out 11/24/20 11/24/20 11/24/20 COVID-19 (Ordered)   11/24/20 Rule-Out Test Resulted          Nurse Assessment:  Last Vital Signs: BP (!) 153/91   Pulse 82   Temp 98.4 °F (36.9 °C) (Oral)   Resp 16   Ht 5' 2\" (1.575 m)   Wt 196 lb 8 oz (89.1 kg)   LMP  (LMP Unknown)   SpO2 94%   BMI 35.94 kg/m²     Last documented pain score (0-10 scale): Pain Level: 0  Last Weight:   Wt Readings from Last 1 Encounters:   03/23/21 196 lb 8 oz (89.1 kg)     Mental Status:  {IP PT MENTAL STATUS:27528}    IV Access:  {[facility] IV ACCESS:414889485}    Nursing Mobility/ADLs:  Walking   {P DME GZZY:160667531}  Transfer  {P DME ZNNF:284114109}  Bathing  {P DME FBEY:109205567}  Dressing  {P DME RAFQ:944990120}  Toileting  {CHP DME MJGU:844358335}  Feeding  {CHUCK HOFFMAN NTDX:445642127} Med Admin  {P DME AEBP:427638140}  Med Delivery   [address] MED Delivery:756884024}    Wound Care Documentation and Therapy:        Elimination:  Continence:   · Bowel: {YES / GA:17149}  · Bladder: {YES / MK:10946}  Urinary Catheter: {Urinary Catheter:212773472}   Colostomy/Ileostomy/Ileal Conduit: {YES / ZV:59244}       Date of Last BM: ***  No intake or output data in the 24 hours ending 21 1128  No intake/output data recorded. Safety Concerns:     [address] Safety Concerns:462285471}    Impairments/Disabilities:      [address] Impairments/Disabilities:754039573}    Nutrition Therapy:  Current Nutrition Therapy:   [address] Diet List:900880427}    Routes of Feeding: {CHP DME Other Feedings:044017148}  Liquids: {Slp liquid thickness:92005}  Daily Fluid Restriction: {CHP DME Yes amt example:434909256}  Last Modified Barium Swallow with Video (Video Swallowing Test): {Done Not Done JQIL:207409779}    Treatments at the Time of Hospital Discharge:   Respiratory Treatments: ***  Oxygen Therapy:  {Therapy; copd oxygen:43610}  Ventilator:    { CC Vent BHJJ:630967119}    Rehab Therapies: {THERAPEUTIC INTERVENTION:7328848489}  Weight Bearing Status/Restrictions: [address]  Weight Bearin}  Other Medical Equipment (for information only, NOT a DME order):  {EQUIPMENT:190215030}  Other Treatments: ***    Patient's personal belongings (please select all that are sent with patient):  {[facility] DME Belongings:769609401}    RN SIGNATURE:  {Esignature:882630463}    CASE MANAGEMENT/SOCIAL WORK SECTION    Inpatient Status Date: 3/23/2021    Readmission Risk Assessment Score:  Readmission Risk              Risk of Unplanned Readmission:        0           Discharging to Facility/ Agency   · Name:   · Address:  · Phone:  · Fax:    Dialysis Facility (if applicable)   · [facility]   · [place] [address], [address]  · Dialysis Schedule: Mon/Wed/Fri at 12:15   · Phone:[phone]  · Fax:[phone]    / signature: Edyta Best RN, BSN  [phone]       PHYSICIAN SECTION    Prognosis: {Prognosis:9458682862}    Condition at Discharge: Casey Jon Patient Condition:372834790}    Rehab Potential (if transferring to Rehab): {Prognosis:2131898970}    Recommended Labs or Other Treatments After Discharge: ***    Physician Certification: I certify the above information and transfer of Rosemarie De La Cruz  is necessary for the continuing treatment of the diagnosis listed and that she requires {Admit to Appropriate Level of Care:40682} for {GREATER/LESS:166157031} 30 days.      Update Admission H&P: {CHP DME Changes in GSAEP:134903762}    PHYSICIAN SIGNATURE:  {Esignature:679265662}

## 2021-03-23 NOTE — CONSULTS
Office : [phone]     Fax :[phone]       Nephrology Consult Note      Patient's Name: Renee Orellana  9:50 AM  3/23/2021    Reason for Consult:  ESRD      Requesting Physician:  Jean Hernandez MD      Chief Complaint:    Chief Complaint   Patient presents with    Shortness of Breath     pt sent for sob - pt states has not had dialysis since last Wed d/t rt chest vascath being clogged -- states sees Dr. Beni Ramirez-          History of Present Ilness:    Renee Orellana is a 67 y.o. female with history of end-stage renal disease secondary to diabetes who has an dialysis last 1 year and was doing peritoneal dialysis at home. She went to stay with her daughter in [facility] and got sick with COVID-19 infection. She ended up staying in [facility] for almost 3 months and was not able to do her peritoneal dialysis so was switched to hemodialysis. She came in yesterday because of shortness of breath and has not got any dialysis since Wednesday. Last dialysis was on Wednesday last week in [facility]. She had nonfunctioning tunneled dialysis catheter. No intake/output data recorded.     Past Medical History:   Diagnosis Date    Arthritis     Diabetes ([address]     Hyperlipidemia     Hypertension     Kidney disease     Thyroid disease        Past Surgical History:   Procedure Laterality Date    APPENDECTOMY      CHOLECYSTECTOMY      COLONOSCOPY  11/24/2020    COLONOSCOPY POLYPECTOMY SNARE/COLD BIOPSY performed by Kentrell Young MD at [facility] 94 N/A 4/3/2020    LAPAROSCOPIC LYSIS OF ADHESIONS, LAPAROSCOPIC PERITONEAL DIALYSIS CATHETER PLACEMENT, LAPAROSCOPIC OMENTOPEXY performed by Zaid Duran DO at [address] MASTECTOMY, BILATERAL      preventive    ROTATOR CUFF REPAIR      UPPER GASTROINTESTINAL ENDOSCOPY N/A 11/24/2020    EGD BIOPSY performed by Maurice Johnson MD at [facility]       Family History   Problem Relation Age of Onset   [facility] Cancer Mother         lung, breast, liver    Stroke Father     Stomach Cancer Brother     Cancer Maternal Grandmother     No Known Problems Paternal Grandmother     No Known Problems Paternal Grandfather         reports that she quit smoking about 42 years ago. She has a 5.00 pack-year smoking history. She has never used smokeless tobacco. She reports previous alcohol use. She reports that she does not use drugs.         Allergies:  Demeclocycline, Tetracyclines & related, Amoxicillin, Codeine, Ozempic (0.25 or 0.5 mg-dose) [semaglutide(0.25 or 0.5mg-dos)], and Penicillins    Current Medications:    glucose (GLUTOSE) 40 % oral gel 15 g, PRN  dextrose 50 % IV solution, PRN  glucagon (rDNA) injection 1 mg, PRN  dextrose 5 % solution, PRN  lidocaine-EPINEPHrine 2 percent-1:[id number] injection 20 mL, Once  heparin infusion 2 units/mL in 0.9% NaCl, Continuous  sodium chloride flush 0.9 % injection 10 mL, 2 times per day  sodium chloride flush 0.9 % injection 10 mL, PRN  heparin (porcine) injection 5,000 Units, 3 times per day  acetaminophen (TYLENOL) tablet 650 mg, Q6H PRN  ondansetron (ZOFRAN) injection 4 mg, Q6H PRN  senna (SENOKOT) tablet 8.6 mg, Daily PRN  allopurinol (ZYLOPRIM) tablet 100 mg, Daily  gabapentin (NEURONTIN) capsule 100 mg, Nightly  levothyroxine (SYNTHROID) tablet 150 mcg, QAM AC  pravastatin (PRAVACHOL) tablet 20 mg, Nightly  sevelamer (RENVELA) tablet 1,600 mg, TID WC  bumetanide (BUMEX) injection 4 mg, Once  amLODIPine (NORVASC) tablet 10 mg, Daily  atenolol (TENORMIN) tablet 25 mg, Daily  calcitRIOL (ROCALTROL) capsule 0.25 mcg, Daily  spironolactone (ALDACTONE) tablet 25 mg, Daily  torsemide (DEMADEX) tablet 80 mg, Daily        Review of Systems:   14 point ROS obtained but were negative except mentioned in HPI      Physical exam:     Vitals:  BP (!) 153/91   Pulse 82   Temp 98.4 °F (36.9 °C) (Oral)   Resp 16   Ht 5' 2\" (1.575 m)   Wt 196 lb 8 oz (89.1 kg)   LMP  (LMP Unknown)   SpO2 94%   BMI 35.94 kg/m²   Constitutional:  OAA X3 NAD  Skin: no rash, turgor wnl  Heent:  eomi, mmm  Neck: no bruits or jvd noted  Cardiovascular:  S1, S2 without m/r/g  Respiratory: CTA B without w/r/r  Abdomen:  +bs, soft, nt, nd  Ext: Mild lower extremity edema  Psychiatric: mood and affect appropriate  Musculoskeletal:  Rom, muscular strength intact    Labs:  CBC:   Recent Labs     03/22/21  1705 03/23/21  0458   WBC 11.9* 12.5*   HGB 7.7* 7.4*    162     BMP:    Recent Labs     03/22/21  1705 03/23/21  0458    137   K 4.9 5.8*    102   CO2 17* 17*   BUN 75* 77*   CREATININE 9.7* 9.9*   GLUCOSE 94 86     Ca/Mg/Phos:   Recent Labs     03/22/21  1705 03/23/21  0458   CALCIUM 9.8 9.6   PHOS  --  8.8*     Hepatic: No results for input(s): AST, ALT, ALB, BILITOT, ALKPHOS in the last 72 hours. Troponin:   Recent Labs     03/22/21  1705   TROPONINI 0.02*     BNP: No results for input(s): BNP in the last 72 hours. Lipids: No results for input(s): CHOL, TRIG, HDL, LDLCALC, LABVLDL in the last 72 hours. ABGs: No results for input(s): PHART, PO2ART, SZF6EDS in the last 72 hours. INR:   Recent Labs     03/23/21  0756   INR 1.03     UA:No results for input(s): Rozella Grace, GLUCOSEU, BILIRUBINUR, Syl Ephr[place], BLOODU, PHUR, PROTEINU, UROBILINOGEN, NITRU, LEUKOCYTESUR, Glee Kappa in the last 72 hours. Urine Microscopic: No results for input(s): LABCAST, BACTERIA, COMU, HYALCAST, WBCUA, RBCUA, EPIU in the last 72 hours. Urine Culture: No results for input(s): LABURIN in the last 72 hours. Urine Chemistry: No results for input(s): Olga Tovar, PROTEINSHIVA, NAUR in the last 72 hours. IMAGING:  XR CHEST PORTABLE   Final Result   1.  Cardiomegaly with vascular congestion and interstitial infiltrates likely   representing edema and congestive failure. IR REPLACE TUNNELED CVC W SQ PORT SAME ACCESS    (Results Pending)                       Assessment/Plan :      1. ESRD secondary to diabetic nephropathy. Now on hemodialysis. Still has PD catheter. Site looks clean. Had nonfunctioning right tunneled dialysis catheter. Sent to IR to change it    We will do dialysis after that    2. HTN. Controlled continue with current medications    3. Anemia of chronic kidney disease. Will give Aranesp 100 mcg today    4. Acid- base disorder. Will do correction with dialysis    5. Electrolytes. Potassium 5.8. Will correct with dialysis      Discussed with dialysis nurse. She already has outpatient dialysis unit set up [facility].   Will start dialysis there tomorrow    Okay to discharge from nephrology standpoint after dialysis today    Thank you for allowing us to participate in care of [facility]         Electronically signed by: Cinthya Bynum MD, 3/23/2021, 9:50 AM      [facility] [address]  Office : [phone]  Fax :[phone]

## 2021-03-23 NOTE — PROGRESS NOTES
Patient admitted to room 557 at this time. Ambulated to bathroom at this time and tolerated well. Oriented to room and call light system at this time. Assessment complete. See flow sheet. Patient noted to have SOB with ambulation. 90% on RA. O2 sats % with O2 on at 2L per NC. Vas cath noted to right chest and peritoneal dialysis access noted to abdomen. Patient states is no longer receiving peritoneal dialysis. Patient still complaining of headache at this time. Message sent to MD and awaiting response. Patient denies needs at this time. The care plan and education has been reviewed and mutually agreed upon with the patient.

## 2021-03-23 NOTE — FLOWSHEET NOTE
03/23/21 1140 03/23/21 1540   Vital Signs   /64 (!) 138/46   Temp 97.3 °F (36.3 °C) 98.4 °F (36.9 °C)   Pulse 77 91   Resp 20 20     Treatment time: 4 hours    Net UF: 2 K    Pre weight: 81.6 kg (standing scale)  Post weight: 79.5 kg (standing scale)  EDW: TBD    Access used: RIJ HD tunneled cath  Access function: No problems    Medications or blood products given: None    Regular outpatient schedule: [facility] MWF upon discharge    Summary of response to treatment: Tolerated tx without difficulty. VSS. Copy of dialysis treatment record placed in chart, to be scanned into EMR. Report called to Albert Barney RN.

## 2021-03-23 NOTE — CARE COORDINATION
CM notified by Louis Luis with [facility] that pt needs to be upgraded to inpt status. CM sent message to physician and he upgraded pt status.      Isiah Bauer RN, BSN  [phone]

## 2021-03-23 NOTE — PROGRESS NOTES
4 Eyes Skin Assessment     NAME:  Rajeev Smith  YOB: 1945  MEDICAL RECORD NUMBER:  [id number]    The patient is being assess for  Admission    I agree that 2 RN's have performed a thorough Head to Toe Skin Assessment on the patient. ALL assessment sites listed below have been assessed. Areas assessed by both nurses:    Head, Face, Ears, Shoulders, Back, Chest, Arms, Elbows, Hands, Sacrum. Buttock, Coccyx, Ischium and Legs. Feet and Heels        Does the Patient have a Wound?  No noted wound(s)       Zhou Prevention initiated:  NA   Wound Care Orders initiated:  NA    Pressure Injury (Stage 3,4, Unstageable, DTI, NWPT, and Complex wounds) if present place consult order under [de-identified] NA    New and Established Ostomies if present place consult order under : NA      Nurse 1 eSignature: Electronically signed by Evelyn Akhtar RN on 3/23/21 at 4:18 AM EDT    **SHARE this note so that the co-signing nurse is able to place an eSignature**    Nurse 2 eSignature: {Esignature:[id number]}

## 2021-03-23 NOTE — PROGRESS NOTES
Shift assessment complete. Vital signs obtained. AM medications administered per MAR. Pt denies pain and is resting in bed at this time. Pt expresses no further needs. Daughter at bedside. Call light in reach. Fall precautions in place.

## 2021-03-23 NOTE — ED NOTES
RN to RN report provided to Ratna Oconnor on 5T via virtual.     Nallely Stephenson RN  03/22/21 1798

## 2021-03-23 NOTE — PROGRESS NOTES
Pt back up to unit from HD. Vitals obtained. Pt denies pain and expresses no needs at this time. Daughter at bedside.

## 2021-03-23 NOTE — ED NOTES
Patient expressed wishes to have a fistula placed vs vasc cath.      Santa Barker, RN  03/22/21 1232

## 2021-03-23 NOTE — PRE SEDATION
Sedation Pre-Procedure Note    Patient Name: Jana Godoy   YOB: 1945  Room/Bed: [facility]-8596/4803-41  Medical Record Number: [id number]  Date: 3/23/2021   Time: 8:51 AM       Indication:  Replace tunneled dialysis catheter    Consent: I have discussed with the patient and/or the patient representative the indication, alternatives, and the possible risks and/or complications of the planned procedure and the anesthesia methods. The patient and/or patient representative appear to understand and agree to proceed. Vital Signs:   Vitals:    03/23/21 0745   BP: 129/82   Pulse: 88   Resp: 18   Temp: 98.3 °F (36.8 °C)   SpO2: 96%       Past Medical History:   has a past medical history of Arthritis, Diabetes ([address] Hyperlipidemia, Hypertension, Kidney disease, and Thyroid disease. Past Surgical History:   has a past surgical history that includes Appendectomy; Mastectomy, bilateral; Hysterectomy; Rotator cuff repair; Cholecystectomy; LAPAROSCOPY INSERTION PERITONEAL CATHETER (N/A, 4/3/2020); Upper gastrointestinal endoscopy (N/A, 11/24/2020); and Colonoscopy (11/24/2020). Medications:   Scheduled Meds:    sodium chloride flush  10 mL Intravenous 2 times per day    heparin (porcine)  5,000 Units Subcutaneous 3 times per day    allopurinol  100 mg Oral Daily    gabapentin  100 mg Oral Nightly    levothyroxine  150 mcg Oral QAM AC    pravastatin  20 mg Oral Nightly    sevelamer  1,600 mg Oral TID WC    bumetanide  4 mg Intravenous Once    amLODIPine  10 mg Oral Daily    atenolol  25 mg Oral Daily    calcitRIOL  0.25 mcg Oral Daily    spironolactone  25 mg Oral Daily    torsemide  80 mg Oral Daily     Continuous Infusions:    dextrose       PRN Meds: glucose, dextrose, glucagon (rDNA), dextrose, sodium chloride flush, acetaminophen, ondansetron, senna  Home Meds:   Prior to Admission medications    Medication Sig Start Date End Date Taking?  Authorizing Provider   levothyroxine (SYNTHROID) 150 MCG tablet Take 150 mcg by mouth every morning (before breakfast)   Yes Historical Provider, MD   pravastatin (PRAVACHOL) 20 MG tablet Take 20 mg by mouth nightly   Yes Historical Provider, MD   atenolol (TENORMIN) 25 MG tablet Take 25 mg by mouth daily   Yes Historical Provider, MD   torsemide (DEMADEX) 20 MG tablet Take 80 mg by mouth daily   Yes Historical Provider, MD   gabapentin (NEURONTIN) 100 MG capsule Take 100 mg by mouth nightly. Yes Historical Provider, MD   allopurinol (ZYLOPRIM) 100 MG tablet TAKE 1 TABLET EVERY DAY 9/1/20  Yes Cricket Fung MD   spironolactone (ALDACTONE) 25 MG tablet TAKE 1 TABLET EVERY DAY 8/31/20  Yes Cricket Fung MD     Coumadin Use Last 7 Days:  no  Antiplatelet drug therapy use last 7 days: no  Other anticoagulant use last 7 days: no  Additional Medication Information:  na      Pre-Sedation Documentation and Exam:   I have reviewed the patient's history and review of systems.     Mallampati Airway Assessment:  Mallampati Class II - (soft palate, fauces & uvula are visible)    Prior History of Anesthesia Complications:   none    ASA Classification:  Class 2 - A normal healthy patient with mild systemic disease    Sedation/ Anesthesia Plan:   intravenous sedation    Medications Planned:   midazolam (Versed) intravenously and fentanyl intravenously    Patient is an appropriate candidate for plan of sedation: yes    Electronically signed by Anil Shields MD on 3/23/2021 at 8:51 AM

## 2021-03-23 NOTE — PROGRESS NOTES
Pt back to unit from IR. Vitals obtained. Pt expresses no needs at this time. Vas cath dressing c/d/i.

## 2021-03-24 LAB — HEPATITIS B CORE TOTAL ANTIBODY: NEGATIVE

## 2021-03-24 NOTE — PROGRESS NOTES
Physician Progress Note      PATIENT:               Vincenzo Xavier  CSN #:                  [id number]  :                       1945  ADMIT DATE:       3/22/2021 4:22 PM  [address] DATE:        3/23/2021 5:01 PM  RESPONDING  PROVIDER #:        Myriam Shafer MD          QUERY TEXT:    Dear Provider,    Pt admitted with SOB, increased LE edema with last HD treatment on 3/17/2021   due to clotted vascath. Pt noted to have CXR finding of vascular congestion   and interstitial infiltrates likely representing edema and congestive failure. If possible, please document in the progress notes and discharge summary if   you are evaluating and/or treating any of the following: The medical record reflects the following:  Risk Factors: Hx HFpEF, Hypertension, ESRD on HD MWF, Missed Hemodialysis for   2 treatments ( last treatment 6 days prior to admission)  Clinical Indicators:  Patient w/ SOB, increased Bilat LE edema. She has not   received dialysis since last , due to clotted vascath. CXR:.Cardiomegaly with vascular congestion and interstitial infiltrates likely   representing edema and congestive failure  Pro BNP 31,609  Per care every where  Cardiology 20  HFpEF, Echo: LVEF 55-60%, Grade I   Diastolic Dysfunction  Treatment: HD treatment,Norvasc, Tenormin,  Demadex 80 mg daily, Aldactone,Pt   refused IV Bumex x 1 ordered, I & O per nursing  Options provided:  -- Acute pulmonary edema due to HFpEF exacerbation, POA  -- Chronic pulmonary edema due to HFpEF exacerbation, POA  -- Noncardiogenic acute pulmonary edema due to ESRD, POA  -- Other - I will add my own diagnosis  -- Disagree - Not applicable / Not valid  -- Disagree - Clinically unable to determine / Unknown  -- Refer to Clinical Documentation Reviewer    PROVIDER RESPONSE TEXT:    This patient has noncardiogenic acute pulmonary edema due to ESRD, POA.     Query created by: Lanora Dubin on 3/23/2021 1:37 PM      Electronically signed by:  Natalie John MD 3/24/2021 1:29 PM

## 2021-03-24 NOTE — PROGRESS NOTES
Patients chart was reviewed post Tunneled HD catheter placement procedure. No complications were noted post procedure.

## 2021-03-25 ENCOUNTER — TELEPHONE (OUTPATIENT)
Dept: FAMILY MEDICINE CLINIC | Age: 76
End: 2021-03-25

## 2021-03-25 NOTE — TELEPHONE ENCOUNTER
[facility] Transitions Initial Follow Up Call    Outreach made within 2 business days of discharge: Yes    Patient: Dennie Edouard Patient : 1945   MRN: [id number]  Reason for Admission: There are no discharge diagnoses documented for the most recent discharge.   Discharge Date: 3/23/21       Spoke with: Left voicemail for patient to call office with questions,  Discharge department/facility: [facility]    Scheduled appointment with PCP within 7-14 days    Follow Up  Future Appointments   Date Time Provider Rod Plummer   2021  1:00 PM Suly Fung MD [address]

## 2021-03-25 NOTE — DISCHARGE SUMMARY
Rales/Wheezes/Rhonchi. Cardiovascular:  Regular rate and rhythm with normal S1/S2 without murmurs, rubs or gallops. Abdomen: Soft, non-tender, non-distended with normal bowel sounds. Musculoskeletal:  No clubbing, cyanosis or edema bilaterally. Full range of motion without deformity. Skin: Skin color, texture, turgor normal.  No rashes or lesions. Neurologic:  Neurovascularly intact without any focal sensory/motor deficits. Cranial nerves: II-XII intact, grossly non-focal.  Psychiatric:  Alert and oriented, thought content appropriate, normal insight  Capillary Refill: Brisk,< 3 seconds   Peripheral Pulses: +2 palpable, equal bilaterally       Labs: For convenience and continuity at follow-up the following most recent labs are provided:      CBC:    Lab Results   Component Value Date    WBC 12.5 03/23/2021    HGB 7.4 03/23/2021    HCT 23.5 03/23/2021     03/23/2021       Renal:    Lab Results   Component Value Date     03/23/2021    K 5.8 03/23/2021    K 4.9 03/22/2021     03/23/2021    CO2 17 03/23/2021    BUN 77 03/23/2021    CREATININE 9.9 03/23/2021    CALCIUM 9.6 03/23/2021    PHOS 8.8 03/23/2021         Significant Diagnostic Studies    Radiology:   IR REPLACE TUNNELED CVC W SQ PORT SAME ACCESS   Final Result   Successful fluoroscopic guided exchange of a right IJ tunneled dialysis   catheter. XR CHEST PORTABLE   Final Result   1. Cardiomegaly with vascular congestion and interstitial infiltrates likely   representing edema and congestive failure.                 Consults:     IP CONSULT TO NEPHROLOGY  IP CONSULT TO INTERVENTIONAL RADIOLOGY  IP CONSULT TO SOCIAL WORK    Disposition: Home    Condition at Discharge: Stable    Discharge Instructions/Follow-up: PCP  Nephrology    Code Status:  Prior     Activity: activity as tolerated    Diet: renal diet      Discharge Medications:     Discharge Medication List as of 3/23/2021  4:22 PM           Details   levothyroxine (SYNTHROID) 150 MCG tablet Take 150 mcg by mouth every morning (before breakfast)Historical Med      pravastatin (PRAVACHOL) 20 MG tablet Take 20 mg by mouth nightlyHistorical Med      atenolol (TENORMIN) 25 MG tablet Take 25 mg by mouth dailyHistorical Med      torsemide (DEMADEX) 20 MG tablet Take 80 mg by mouth dailyHistorical Med      gabapentin (NEURONTIN) 100 MG capsule Take 100 mg by mouth nightly. Historical Med      allopurinol (ZYLOPRIM) 100 MG tablet TAKE 1 TABLET EVERY DAY, Disp-90 tablet,R-3Normal      spironolactone (ALDACTONE) 25 MG tablet TAKE 1 TABLET EVERY DAY, Disp-90 tablet,R-3Normal             Time Spent on discharge is more than 30 minutes in the examination, evaluation, counseling and review of medications and discharge plan.       Signed:    Electronically signed by Meliza Valverde MD on 3/25/2021 at 6:11 PM

## 2021-04-05 ENCOUNTER — TELEPHONE (OUTPATIENT)
Dept: FAMILY MEDICINE CLINIC | Age: 76
End: 2021-04-05

## 2021-04-05 NOTE — TELEPHONE ENCOUNTER
----- Message from Dariel Jj sent at 4/2/2021 10:23 AM EDT -----  Subject: Referral Request    QUESTIONS   Reason for referral request? Patient would like to get a referral to an   opthamologist. She would like to get one close to home if possible. Has the physician seen you for this condition before? No   Preferred Specialist (if applicable)? Do you already have an appointment scheduled? No  Additional Information for Provider?   ---------------------------------------------------------------------------  --------------  CALL BACK INFO  What is the best way for the office to contact you? OK to leave message on   voicemail  Preferred Call Back Phone Number?  [phone]

## 2021-04-08 ENCOUNTER — VIRTUAL VISIT (OUTPATIENT)
Dept: FAMILY MEDICINE CLINIC | Age: 76
End: 2021-04-08
Payer: MEDICARE

## 2021-04-08 DIAGNOSIS — R19.7 DIARRHEA OF PRESUMED INFECTIOUS ORIGIN: ICD-10-CM

## 2021-04-08 DIAGNOSIS — E11.22 CHRONIC KIDNEY DISEASE WITH END STAGE RENAL DISEASE ON DIALYSIS DUE TO TYPE 2 DIABETES MELLITUS (HCC): ICD-10-CM

## 2021-04-08 DIAGNOSIS — E66.9 DIABETES MELLITUS TYPE 2 IN OBESE (HCC): ICD-10-CM

## 2021-04-08 DIAGNOSIS — N18.6 CHRONIC KIDNEY DISEASE WITH END STAGE RENAL DISEASE ON DIALYSIS DUE TO TYPE 2 DIABETES MELLITUS (HCC): ICD-10-CM

## 2021-04-08 DIAGNOSIS — E11.42 DIABETIC POLYNEUROPATHY ASSOCIATED WITH TYPE 2 DIABETES MELLITUS (HCC): ICD-10-CM

## 2021-04-08 DIAGNOSIS — R41.3 MEMORY LOSS: ICD-10-CM

## 2021-04-08 DIAGNOSIS — Z99.2 CHRONIC KIDNEY DISEASE WITH END STAGE RENAL DISEASE ON DIALYSIS DUE TO TYPE 2 DIABETES MELLITUS (HCC): ICD-10-CM

## 2021-04-08 DIAGNOSIS — F51.01 PRIMARY INSOMNIA: ICD-10-CM

## 2021-04-08 DIAGNOSIS — E11.69 DIABETES MELLITUS TYPE 2 IN OBESE (HCC): ICD-10-CM

## 2021-04-08 DIAGNOSIS — U07.1 COVID-19 VIRUS INFECTION: ICD-10-CM

## 2021-04-08 PROBLEM — M25.461 EFFUSION OF RIGHT KNEE: Status: RESOLVED | Noted: 2017-11-15 | Resolved: 2021-04-08

## 2021-04-08 PROBLEM — T82.898A PROBLEM WITH DIALYSIS ACCESS (HCC): Status: RESOLVED | Noted: 2021-03-22 | Resolved: 2021-04-08

## 2021-04-08 PROBLEM — R51.9 UNILATERAL HEADACHE: Status: RESOLVED | Noted: 2020-07-08 | Resolved: 2021-04-08

## 2021-04-08 PROBLEM — T82.49XA CLOTTED DIALYSIS ACCESS (HCC): Status: RESOLVED | Noted: 2021-03-23 | Resolved: 2021-04-08

## 2021-04-08 PROCEDURE — 1123F ACP DISCUSS/DSCN MKR DOCD: CPT | Performed by: FAMILY MEDICINE

## 2021-04-08 PROCEDURE — 4040F PNEUMOC VAC/ADMIN/RCVD: CPT | Performed by: FAMILY MEDICINE

## 2021-04-08 PROCEDURE — 1111F DSCHRG MED/CURRENT MED MERGE: CPT | Performed by: FAMILY MEDICINE

## 2021-04-08 PROCEDURE — 1090F PRES/ABSN URINE INCON ASSESS: CPT | Performed by: FAMILY MEDICINE

## 2021-04-08 PROCEDURE — G8427 DOCREV CUR MEDS BY ELIG CLIN: HCPCS | Performed by: FAMILY MEDICINE

## 2021-04-08 PROCEDURE — 99214 OFFICE O/P EST MOD 30 MIN: CPT | Performed by: FAMILY MEDICINE

## 2021-04-08 PROCEDURE — G8399 PT W/DXA RESULTS DOCUMENT: HCPCS | Performed by: FAMILY MEDICINE

## 2021-04-08 RX ORDER — GABAPENTIN 100 MG/1
200 CAPSULE ORAL NIGHTLY
Qty: 60 CAPSULE | Refills: 3 | Status: SHIPPED | OUTPATIENT
Start: 2021-04-08 | End: 2021-07-07 | Stop reason: SDUPTHER

## 2021-04-08 RX ORDER — METOCLOPRAMIDE 5 MG/1
5 TABLET ORAL 3 TIMES DAILY
COMMUNITY
End: 2021-05-10

## 2021-04-08 SDOH — ECONOMIC STABILITY: FOOD INSECURITY: WITHIN THE PAST 12 MONTHS, THE FOOD YOU BOUGHT JUST DIDN'T LAST AND YOU DIDN'T HAVE MONEY TO GET MORE.: NEVER TRUE

## 2021-04-08 SDOH — ECONOMIC STABILITY: TRANSPORTATION INSECURITY
IN THE PAST 12 MONTHS, HAS LACK OF TRANSPORTATION KEPT YOU FROM MEETINGS, WORK, OR FROM GETTING THINGS NEEDED FOR DAILY LIVING?: NO

## 2021-04-08 SDOH — ECONOMIC STABILITY: TRANSPORTATION INSECURITY
IN THE PAST 12 MONTHS, HAS THE LACK OF TRANSPORTATION KEPT YOU FROM MEDICAL APPOINTMENTS OR FROM GETTING MEDICATIONS?: NO

## 2021-04-08 SDOH — ECONOMIC STABILITY: FOOD INSECURITY: WITHIN THE PAST 12 MONTHS, YOU WORRIED THAT YOUR FOOD WOULD RUN OUT BEFORE YOU GOT MONEY TO BUY MORE.: NEVER TRUE

## 2021-04-08 ASSESSMENT — ENCOUNTER SYMPTOMS
FLATUS: 0
VOMITING: 0
BLOATING: 0
BACK PAIN: 0
SHORTNESS OF BREATH: 0
BOWEL INCONTINENCE: 0
COUGH: 0
ABDOMINAL PAIN: 0
DIARRHEA: 1
NAUSEA: 0
VISUAL CHANGE: 0

## 2021-04-08 ASSESSMENT — PATIENT HEALTH QUESTIONNAIRE - PHQ9
SUM OF ALL RESPONSES TO PHQ QUESTIONS 1-9: 0
1. LITTLE INTEREST OR PLEASURE IN DOING THINGS: 0
SUM OF ALL RESPONSES TO PHQ QUESTIONS 1-9: 0

## 2021-04-08 NOTE — LETTER
[address] [place]  Phone: [phone]  Fax: [phone]    Doc Palma MD        April 8, 2021     Patient: Ivonne Grimes   YOB: 1945   Date of Visit: 4/8/2021       To Whom It May Concern: It is my medical opinion that Manny Fink requires a disability parking placard for the following reasons:  She cannot walk without assistance from another person or the use of an assistance device (cane, crutch, prosthetic device, wheelchair, etc.). Duration of need: permanent    If you have any questions or concerns, please don't hesitate to call.     Sincerely,          Doc Palma MD

## 2021-04-09 ENCOUNTER — TELEPHONE (OUTPATIENT)
Dept: BARIATRICS/WEIGHT MGMT | Age: 76
End: 2021-04-09

## 2021-04-09 NOTE — TELEPHONE ENCOUNTER
Attempted to contact pt about PD Cath removal consult. Phone just rang and rang.  Will try on Monday 4/12/21

## 2021-04-15 ENCOUNTER — HOSPITAL ENCOUNTER (OUTPATIENT)
Dept: VASCULAR LAB | Age: 76
Discharge: HOME | End: 2021-04-15
Payer: MEDICARE

## 2021-04-15 DIAGNOSIS — N18.6 END STAGE RENAL DISEASE (HCC): ICD-10-CM

## 2021-04-15 PROCEDURE — 93985 DUP-SCAN HEMO COMPL BI STD: CPT

## 2021-04-29 ENCOUNTER — OFFICE VISIT (OUTPATIENT)
Dept: BARIATRICS/WEIGHT MGMT | Age: 76
End: 2021-04-29
Payer: MEDICARE

## 2021-04-29 VITALS
HEART RATE: 79 BPM | OXYGEN SATURATION: 98 % | WEIGHT: 172.2 LBS | SYSTOLIC BLOOD PRESSURE: 162 MMHG | BODY MASS INDEX: 31.69 KG/M2 | DIASTOLIC BLOOD PRESSURE: 78 MMHG | RESPIRATION RATE: 16 BRPM | HEIGHT: 62 IN

## 2021-04-29 DIAGNOSIS — N18.6 ESRD ON DIALYSIS (HCC): Primary | ICD-10-CM

## 2021-04-29 DIAGNOSIS — Z99.2 ESRD ON DIALYSIS (HCC): Primary | ICD-10-CM

## 2021-04-29 PROCEDURE — 99213 OFFICE O/P EST LOW 20 MIN: CPT | Performed by: SURGERY

## 2021-04-29 PROCEDURE — 1090F PRES/ABSN URINE INCON ASSESS: CPT | Performed by: SURGERY

## 2021-04-29 PROCEDURE — 4040F PNEUMOC VAC/ADMIN/RCVD: CPT | Performed by: SURGERY

## 2021-04-29 PROCEDURE — 1036F TOBACCO NON-USER: CPT | Performed by: SURGERY

## 2021-04-29 PROCEDURE — 1123F ACP DISCUSS/DSCN MKR DOCD: CPT | Performed by: SURGERY

## 2021-04-29 PROCEDURE — G8399 PT W/DXA RESULTS DOCUMENT: HCPCS | Performed by: SURGERY

## 2021-04-29 PROCEDURE — G8428 CUR MEDS NOT DOCUMENT: HCPCS | Performed by: SURGERY

## 2021-04-29 PROCEDURE — G8417 CALC BMI ABV UP PARAM F/U: HCPCS | Performed by: SURGERY

## 2021-04-29 NOTE — PROGRESS NOTES
[facility]   General & Laparoscopic Surgery  [employer]       HPI:  Lizandro Kapoor is a very pleasant 67 y.o. female who is here to discuss PD catheter removal    Has been on HD and prefers HD over PD now              Past Medical History:   Diagnosis Date    Arthritis     Diabetes ([address]     Hyperlipidemia     Hypertension     Kidney disease     Thyroid disease      Past Surgical History:   Procedure Laterality Date    APPENDECTOMY      CHOLECYSTECTOMY      COLONOSCOPY  2020    COLONOSCOPY POLYPECTOMY SNARE/COLD BIOPSY performed by Tracey Nichole MD at [place] [address] N/A 4/3/2020    LAPAROSCOPIC LYSIS OF ADHESIONS, LAPAROSCOPIC PERITONEAL DIALYSIS CATHETER PLACEMENT, LAPAROSCOPIC OMENTOPEXY performed by Rosalinda Fragoso DO at [facility], BILATERAL      preventive    ROTATOR CUFF REPAIR      UPPER GASTROINTESTINAL ENDOSCOPY N/A 2020    EGD BIOPSY performed by Tracey Nichole MD at [address] [place] [address]     Family History   Problem Relation Age of Onset    Cancer Mother         lung, breast, liver    Stroke Father     Stomach Cancer Brother     Cancer Maternal Grandmother     No Known Problems Paternal Grandmother     No Known Problems Paternal Grandfather      Social History     Tobacco Use    Smoking status: Former Smoker     Packs/day: 0.50     Years: 10.00     Pack years: 5.00     Quit date: 1978     Years since quittin.8    Smokeless tobacco: Never Used   Substance Use Topics    Alcohol use: Not Currently     I counseled the patient on the importance of not smoking and risks of ETOH.    Allergies   Allergen Reactions    Demeclocycline Anaphylaxis    Tetracyclines & Related Anaphylaxis    Amoxicillin     Codeine     Ozempic (0.25 Or 0.5 Mg-Dose) [Semaglutide(0.25 Or 0.5mg-Dos)]     Penicillins      Vitals:    21 1116   BP: (!) 162/78   Pulse: 79   Resp: 16 SpO2: 98%   Weight: 172 lb 3.2 oz (78.1 kg)   Height: 5' 2\" (1.575 m)       Body mass index is 31.5 kg/m². Current Outpatient Medications:     metoprolol tartrate (LOPRESSOR) 25 MG tablet, Take 12.5 mg by mouth 2 times daily, Disp: , Rfl:     metoclopramide (REGLAN) 5 MG tablet, Take 5 mg by mouth 3 times daily, Disp: , Rfl:     gabapentin (NEURONTIN) 100 MG capsule, Take 2 capsules by mouth nightly for 30 days. , Disp: 60 capsule, Rfl: 3    levothyroxine (SYNTHROID) 150 MCG tablet, Take 150 mcg by mouth every morning (before breakfast), Disp: , Rfl:     pravastatin (PRAVACHOL) 20 MG tablet, Take 20 mg by mouth nightly, Disp: , Rfl:     torsemide (DEMADEX) 20 MG tablet, Take 80 mg by mouth daily, Disp: , Rfl:     allopurinol (ZYLOPRIM) 100 MG tablet, TAKE 1 TABLET EVERY DAY, Disp: 90 tablet, Rfl: 3    spironolactone (ALDACTONE) 25 MG tablet, TAKE 1 TABLET EVERY DAY, Disp: 90 tablet, Rfl: 3    ROS  Review of Systems - History obtained from the patient  General ROS: negative  Psychological ROS: negative  Endocrine ROS: negative  Respiratory ROS: negative  Cardiovascular ROS: negative  Gastrointestinal ROS:negative  Genito-Urinary ROS: negative  Musculoskeletal ROS: negative   Skin ROS:negative        Physical Exam   Constitutional: Patient is oriented to person, place, and time. Vital signs are normal. Patient  appears well-developed and well-nourished. Patient  is active and cooperative. Non-toxic appearance. No distress. Neck: Trachea normal and normal range of motion. Neck supple. No JVD present. No tracheal tenderness present. Carotid bruit is not present. No rigidity. No tracheal deviation and no edema present. No thyromegaly present. Cardiovascular: Normal rate, regular rhythm, normal heart sounds, intact distal pulses and normal pulses. Pulmonary/Chest: Effort normal and breath sounds normal. No stridor. No respiratory distress. Patient  has no wheezes. Patient has no rales.  Patient exhibits

## 2021-05-07 LAB
CATARACTS: POSITIVE
DIABETIC RETINOPATHY: NEGATIVE
GLAUCOMA: NEGATIVE
INTRAOCULAR PRESSURE EYE: NORMAL
VISUAL ACUITY DISTANCE LEFT EYE: NORMAL
VISUAL ACUITY DISTANCE RIGHT EYE: NORMAL

## 2021-05-10 ENCOUNTER — TELEPHONE (OUTPATIENT)
Dept: BARIATRICS/WEIGHT MGMT | Age: 76
End: 2021-05-10

## 2021-05-10 RX ORDER — AMLODIPINE BESYLATE 10 MG/1
10 TABLET ORAL DAILY
COMMUNITY
Start: 2020-05-07 | End: 2021-06-29

## 2021-05-10 NOTE — PROGRESS NOTES
Abnormal labs from 3/23/21 K level 5.8 Bun/crea 77/9.9 along with H&H 7.4/23.5 routed to Dr. Marisol Barnhart and anesthesia Dr. Jean Moraes informed. Dr. Jean Moraes wanted mainly repeat K level but informed Dr. Marisol Barnhart has an order for BMP day of surgery of which he was ok with; no other new orders.  -DB

## 2021-05-10 NOTE — PROGRESS NOTES
PAT call done with patient. Patient will be going to [place] 5/12/21 for covid testing. -db    Regarding patient having required H & P pre  Op exam she states she went from \"Dr. Dasia Briceno office and walked over to Dr. Carole Carbajal office and dropped it off\". I explained we needed an actual exam done (within 30days of surgery) to clear her for surgery as requested by Dr. Corbin Mcleod. She was not aware that a pre op EXAM needed to take place and stated it was not made clear to her and with her schedule with dialysis and she does not drive it would be difficult for her and for me to contact either Dr. Carole Carbajal office and/or Dr. Dasia Briceno office. -DB    I contacted Holli Potter at Dr. Dasia Briceno office and informed her of above.  (there is a request in epic for her to obtain H & P pre op exam within 30 days) she will look into this.  -DB

## 2021-05-10 NOTE — PROGRESS NOTES
[address] patients having surgery or anesthesia are required to be Covid tested. You will need to quarantine from the time you are tested until your surgery. PRIOR TO PROCEDURE DATE:        1. PLEASE FOLLOW ANY  GUIDELINES/ INSTRUCTIONS PRIOR TO YOUR PROCEDURE AS ADVISED BY YOUR SURGEON. 2. Arrange for someone to drive you home and be with you for the first 24 hours after discharge for your safety after your procedure for which you received sedation. Ensure it is someone we can share information with regarding your discharge. 3. You must contact your surgeon for instructions IF:   You are taking any blood thinners, aspirin, anti-inflammatory or vitamin E.   There is a change in your physical condition such as a cold, fever, rash, cuts, sores or any other infection, especially near your surgical site. 4. Do not drink alcohol the day before or day of your procedure. 5. A Pre-op History and Physical for surgery MUST be completed by your Physician or Urgent Care within 30 days of your procedure date. Please bring a copy with you on the day of your procedure and along with any other testing performed. THE DAY OF YOUR PROCEDURE:  1. Follow instructions for ARRIVAL TIME as DIRECTED BY YOUR SURGEON. 2. Enter the MAIN entrance from [address] [place] [address] and follow the signs to the free [employer] or [employer] parking (offered free of charge 6am-5pm). 3. Enter the Main Entrance of the hospital (do not enter from the lower level of the parking garage). Upon entrance, check in with the  at the main desk on your left. If no one is available at the desk, proceed into the [facility] Waiting Room and go through the door directly into the [facility]. There is a Check-in desk ACROSS from Room 5 (marked with a sign hanging from the ceiling).  The phone number for the surgery center is [phone]. 4. Please call [phone] option #2 option #2 if you have not been preregistered yet. On the day of your procedure bring your insurance card and photo ID. You will be registered at your bedside once brought back to your room. 5. DO NOT EAT ANYTHING eight hours prior to your arrival for surgery. May have 8 ounces of water 4 hours prior to your arrival for surgery. NOTE: ALL Gastric, Bariatric and Bowel surgery patients MUST follow their surgeon's instructions. 6. MEDICATIONS    Take the following medications with a SMALL sip of water: amlodipine, levothyroxine, metoprolol    Use your usual dose of inhalers the morning of surgery. BRING your rescue inhaler with you to hospital.    Anesthesia does NOT want you to take insulin the morning of surgery. They will control your blood sugar while you are at the hospital. Please contact your ordering physician for instructions regarding your insulin the night before your procedure. If you have an insulin pump, please keep it set on basal rate. 7. Do not swallow water when brushing teeth. No gum, candy, mints or ice chips. Refrain from smoking or at least decrease the amount. 8. Dress in loose, comfortable clothing appropriate for redressing after your procedure. Do not wear jewelry (including body piercings), make-up (especially NO eye make-up), fingernail polish (NO toenail polish if foot/leg surgery), lotion, powders or metal hairclips. 9. Dentures, glasses, or contacts will need to be removed before your procedure. Bring cases for your glasses, contacts, dentures, or hearing aids to protect them while you are in surgery. 10. If you use a CPAP, please bring it with you on the day of your procedure. 11. We recommend that valuable personal  belongings such as cash, cell phones, e-tablets or jewelry, be left at home during your stay. The hospital will not be responsible for valuables that are not secured in the hospital safe. However, if your insurance requires a co-pay, you may want to bring a method of payment, i.e. Check or credit card, if you wish to pay your co-pay the day of surgery. 12. If you are to stay overnight, you may bring a bag with personal items. Please have any large items you may need brought in by your family after your arrival to your hospital room. 15. If you have a Living Will or Durable Power of , please bring a copy on the day of your procedure. 15. With your permission, one family member may accompany you while you are being prepared for surgery. Once you are ready, additional family members may join you. HOW WE KEEP YOU SAFE and WORK TO PREVENT SURGICAL SITE INFECTIONS:  1. Health care workers should always check your ID bracelet to verify your name and birth date. You will be asked many times to state your name, date of birth, and allergies. 2. Health care workers should always clean their hands with soap or alcohol gel before providing care to you. It is okay to ask anyone if they cleaned their hands before they touch you. 3. You will be actively involved in verifying the type of procedure you are having and ensuring the correct surgical site. This will be confirmed multiple times prior to your procedure. Do NOT yamila your surgery site UNLESS instructed to by your surgeon. 4. Do not shave or wax for 72 hours prior to procedure near your operative site. Shaving with a razor can irritate your skin and make it easier to develop an infection. On the day of your procedure, any hair that needs to be removed near the surgical site will be clipped by a healthcare worker using a special clippers designed to avoid skin irritation. 5. When you are in the operating room, your surgical site will be cleansed with a special soap, and in most cases, you will be given an antibiotic before the surgery begins. What to expect AFTER YOUR PROCEDURE:  1.  Immediately following your procedure, your will be taken to the PACU for the first phase of your recovery. Your nurse will help you recover from any potential side effects of anesthesia, such as extreme drowsiness, changes in your vital signs or breathing patterns. Nausea, headache, muscle aches, or sore throat may also occur after anesthesia. Your nurse will help you manage these potential side effects. 2. For comfort and safety, arrange to have someone at home with you for the first 24 hours after discharge. 3. You and your family will be given written instructions about your diet, activity, dressing care, medications, and return visits. 4. Once at home, should issues with nausea, pain, or bleeding occur, or should you notice any signs of infection, you should call your surgeon. 5. Always clean your hands before and after caring for your wound. Do not let your family touch your surgery site without cleaning their hands. 6. Narcotic pain medications can cause significant constipation. You may want to add a stool softener to your postoperative medication schedule or speak to your surgeon on how best to manage this SIDE EFFECT. SPECIAL INSTRUCTIONS patient acknowledges understanding of pre op instructions. Thank you for allowing us to care for you. We strive to exceed your expectations in the delivery of care and service provided to you and your family. If you need to contact the [place] [address] staff for any reason, please call us at [phone]    Instructions reviewed with patient during preadmission testing phone interview. Seth Peck. 5/10/2021 .2:43 PM      ADDITIONAL EDUCATIONAL INFORMATION REVIEWED PER PHONE WITH YOU AND/OR YOUR FAMILY:  No Hibiclens® Bathing Instructions   Yes Antibacterial Soap

## 2021-05-12 ENCOUNTER — OFFICE VISIT (OUTPATIENT)
Dept: PRIMARY CARE CLINIC | Age: 76
End: 2021-05-12
Payer: MEDICARE

## 2021-05-12 ENCOUNTER — TELEPHONE (OUTPATIENT)
Dept: BARIATRICS/WEIGHT MGMT | Age: 76
End: 2021-05-12

## 2021-05-12 ENCOUNTER — OFFICE VISIT (OUTPATIENT)
Dept: FAMILY MEDICINE CLINIC | Age: 76
End: 2021-05-12
Payer: MEDICARE

## 2021-05-12 VITALS
BODY MASS INDEX: 33.23 KG/M2 | TEMPERATURE: 97.7 F | WEIGHT: 176 LBS | DIASTOLIC BLOOD PRESSURE: 72 MMHG | HEIGHT: 61 IN | OXYGEN SATURATION: 99 % | HEART RATE: 79 BPM | SYSTOLIC BLOOD PRESSURE: 142 MMHG

## 2021-05-12 DIAGNOSIS — Z99.2 CHRONIC KIDNEY DISEASE WITH END STAGE RENAL DISEASE ON DIALYSIS DUE TO TYPE 2 DIABETES MELLITUS (HCC): Primary | ICD-10-CM

## 2021-05-12 DIAGNOSIS — Z01.818 PRE-OP EXAMINATION: Primary | ICD-10-CM

## 2021-05-12 DIAGNOSIS — E11.22 CHRONIC KIDNEY DISEASE WITH END STAGE RENAL DISEASE ON DIALYSIS DUE TO TYPE 2 DIABETES MELLITUS (HCC): Primary | ICD-10-CM

## 2021-05-12 DIAGNOSIS — N18.6 CHRONIC KIDNEY DISEASE WITH END STAGE RENAL DISEASE ON DIALYSIS DUE TO TYPE 2 DIABETES MELLITUS (HCC): Primary | ICD-10-CM

## 2021-05-12 DIAGNOSIS — Z01.810 PREOP CARDIOVASCULAR EXAM: ICD-10-CM

## 2021-05-12 DIAGNOSIS — Z99.2 PERITONEAL DIALYSIS CATHETER IN PLACE (HCC): ICD-10-CM

## 2021-05-12 PROCEDURE — G8399 PT W/DXA RESULTS DOCUMENT: HCPCS | Performed by: NURSE PRACTITIONER

## 2021-05-12 PROCEDURE — 1090F PRES/ABSN URINE INCON ASSESS: CPT | Performed by: NURSE PRACTITIONER

## 2021-05-12 PROCEDURE — 99214 OFFICE O/P EST MOD 30 MIN: CPT | Performed by: NURSE PRACTITIONER

## 2021-05-12 PROCEDURE — 99421 OL DIG E/M SVC 5-10 MIN: CPT | Performed by: NURSE PRACTITIONER

## 2021-05-12 PROCEDURE — 1036F TOBACCO NON-USER: CPT | Performed by: NURSE PRACTITIONER

## 2021-05-12 PROCEDURE — G8417 CALC BMI ABV UP PARAM F/U: HCPCS | Performed by: NURSE PRACTITIONER

## 2021-05-12 PROCEDURE — 4040F PNEUMOC VAC/ADMIN/RCVD: CPT | Performed by: NURSE PRACTITIONER

## 2021-05-12 PROCEDURE — G8427 DOCREV CUR MEDS BY ELIG CLIN: HCPCS | Performed by: NURSE PRACTITIONER

## 2021-05-12 PROCEDURE — 1123F ACP DISCUSS/DSCN MKR DOCD: CPT | Performed by: NURSE PRACTITIONER

## 2021-05-12 ASSESSMENT — PATIENT HEALTH QUESTIONNAIRE - PHQ9
1. LITTLE INTEREST OR PLEASURE IN DOING THINGS: 0
SUM OF ALL RESPONSES TO PHQ9 QUESTIONS 1 & 2: 0
SUM OF ALL RESPONSES TO PHQ QUESTIONS 1-9: 0
2. FEELING DOWN, DEPRESSED OR HOPELESS: 0

## 2021-05-12 NOTE — PROGRESS NOTES
Requesting surgeon: Dr Yasir Dominguez  Reason for Consult: Preoperative Evaluation of Risk  Surgery location: [facility]  Surgery date:5/17/21    HPI:   Myranda Kam is a 67 y.o. female with history of ESRD, DM2. Presents for pre op evaluation for removal of PD catheter under MAC sedation   Denies fever, chills, or current illness. Chronic diarrhea IF NOT TAKING IMMODIUM. 2-3x day x 8 months  Denies personal or family history of anesthesia complications. Medications:  Current Outpatient Medications   Medication Sig Dispense Refill    amLODIPine (NORVASC) 10 MG tablet Take 10 mg by mouth daily      metoprolol tartrate (LOPRESSOR) 25 MG tablet Take 12.5 mg by mouth 2 times daily      levothyroxine (SYNTHROID) 150 MCG tablet Take 150 mcg by mouth every morning (before breakfast)      pravastatin (PRAVACHOL) 20 MG tablet Take 20 mg by mouth nightly      torsemide (DEMADEX) 20 MG tablet Take 80 mg by mouth daily      allopurinol (ZYLOPRIM) 100 MG tablet TAKE 1 TABLET EVERY DAY 90 tablet 3    spironolactone (ALDACTONE) 25 MG tablet TAKE 1 TABLET EVERY DAY (Patient taking differently: nightly TAKE 1 TABLET EVERY DAY) 90 tablet 3    gabapentin (NEURONTIN) 100 MG capsule Take 2 capsules by mouth nightly for 30 days. 60 capsule 3     No current facility-administered medications for this visit.       Allergies:  Amoxicillin, Demeclocycline, Penicillins, Tetracyclines & related, Ozempic (0.25 or 0.5 mg-dose) [semaglutide(0.25 or 0.5mg-dos)], and Codeine  History:  Past Medical History:   Diagnosis Date    Arthritis     Diabetes ([facility] [address]     Hemodialysis patient ([facility]     Hyperlipidemia     Hypertension     Kidney disease     Thyroid disease      Family:  Family History   Problem Relation Age of Onset   Diamond Mons Cancer Mother         lung, breast, liver    Stroke Father     Stomach Cancer Brother     Cancer Maternal Grandmother     No Known Problems Paternal Grandmother     No Known Problems Paternal Grandfather      Social history:  Social History     Socioeconomic History    Marital status:      Spouse name: Not on file    Number of children: 3    Years of education: Not on file    Highest education level: Not on file   Occupational History    Occupation: retired clerical   Social Needs    Financial resource strain: Not hard at all   Niels-Sergio insecurity     Worry: Never true     Inability: Never true   [employer] needs     Medical: No     Non-medical: No   Tobacco Use    Smoking status: Former Smoker     Packs/day: 0.50     Years: 10.00     Pack years: 5.00     Types: Cigarettes     Quit date: 1978     Years since quittin.8    Smokeless tobacco: Never Used   Substance and Sexual Activity    Alcohol use: Not Currently    Drug use: No    Sexual activity: Not Currently   Lifestyle    Physical activity     Days per week: Not on file     Minutes per session: Not on file    Stress: Not on file   Relationships    Social connections     Talks on phone: Not on file     Gets together: Not on file     Attends Orthodox service: Not on file     Active member of club or organization: Not on file     Attends meetings of clubs or organizations: Not on file     Relationship status:      Intimate partner violence     Fear of current or ex partner: Not on file     Emotionally abused: Not on file     Physically abused: Not on file     Forced sexual activity: Not on file   Other Topics Concern    Not on file   Social History Narrative    Lives with son--recently moved from [address] and now here    [facility]     Surgical history:  Past Surgical History:   Procedure Laterality Date    APPENDECTOMY      CHOLECYSTECTOMY      COLONOSCOPY  2020    COLONOSCOPY POLYPECTOMY SNARE/COLD BIOPSY performed by Lisa Mix MD at [facility] N/A 4/3/2020    LAPAROSCOPIC LYSIS OF ADHESIONS, Miriam Pope, LAPAROSCOPIC OMENTOPEXY performed by Keny Patterson DO at [facility], BILATERAL      preventive    ROTATOR CUFF REPAIR Left     UPPER GASTROINTESTINAL ENDOSCOPY N/A 11/24/2020    EGD BIOPSY performed by Lela Macario MD at [address]:  Constitutional: Denies unexplained weight loss. Skin-Denies rashes or unhealing wounds  Neuro- Denies dizziness, headache, or seizures. HEENT- Denies vision disturbances, tinnitus, vertigo, sinus congestion, or sore throat  Cardiovascular: Denies chest pain, palpitations, dyspnea, or syncope. Respiratory- Denies SOB, wheezing, hemoptysis, or difficulty breathing. - Denies dysuria or hematuria   GI- Denies abdominal pain, nausea/vomiting, or dysphagia. Musculoskeletal: Denies joint pain  Other-No recent increase in SOB or activity intolerance  Physical Exam:  Vital signs:   Vitals:    05/12/21 1035   BP: (!) 142/72   Pulse: 79   Temp: 97.7 °F (36.5 °C)   SpO2: 99%   Weight: 176 lb (79.8 kg)   Height: 5' 1\" (1.549 m)     Constitutional: Alert and oriented x 3, no apparent distress  HEENT: PERRL, EOMI, moist mucus membranes  Neck: Supple. Resp: CTA bilaterally, no wheezes or rhonchi  Cardio: RRR without MRG. GI: Soft, nontender, nondistended, BS+  Extremities: No edema  Neurological: Grossly intact. Skin: Warm & dry    Additional testing:         Assessment:   Diagnosis Orders   1. Chronic kidney 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 ([facility] [address]     2. Peritoneal dialysis catheter in place [facility])     3. Preop cardiovascular exam         Functional capacity: > 4 METs    Inherent cardiac risk of planned procedure: High (>5%): Intermediate (1-5%); Low (<1%)    Revised Cardiac Index Score:   1 point for each of the following: high-risk surgery, CAD, insulin, CKD/Cr>2, history of stroke/TIA, and CHF.  Risk for cardiac complications (ischemia, MI, arrhythmia)    0 points=0.4%, 1 point=0.9%, 2 points=4%, 3+ points=9%. Patient score: 0.9%  . Labs to be drawn at dialysis today  EKG on file- SR      Plan:   1. - Risk/benefit analysis favors proceding with the planned operation.      Electronically signed by: Michelle Tong, 5/12/2021, 4:03 PM

## 2021-05-12 NOTE — TELEPHONE ENCOUNTER
Called pt to remind her to get covid testing completed for DOS 5/17/21. Pt states she is at her PCP office now getting her H&P done and will stop over afterwards.

## 2021-05-13 LAB — SARS-COV-2: NOT DETECTED

## 2021-05-13 NOTE — PROGRESS NOTES
[facility] / [facility] [place], [address]    Acknowledgment of Informed Consent for Surgical or Medical Procedure and Sedation  I agree to allow doctor(s)  Kajal Mcadams and his/her associates or assistants, including residents and/or other qualified medical practitioner to perform the following medical treatment or procedure and to administer or direct the administration of sedation as necessary:  Procedure(s): PERITONEAL DIALYSIS CATHETER REMOVAL    My doctor has explained the following regarding the proposed procedure:   the explanation of the procedure   the benefits of the procedure   the potential problems that might occur during recuperation   the risks and side effects of the procedure which could include but are not limited to severe blood loss, infection, stroke or death   the benefits, risks and side effect of alternative procedures including the consequences of declining this procedure or any alternative procedures   the likelihood of achieving satisfactory results. I acknowledge no guarantee or assurance has been made to me regarding the results. I understand that during the course of this treatment/procedure, unforeseen conditions can occur which require an additional or different procedure. I agree to allow my physician or assistants to perform such extension of the original procedure as they may find necessary. I understand that sedation will often result in temporary impairment of memory and fine motor skills and that sedation can occasionally progress to a state of deep sedation or general anesthesia. I understand the risks of anesthesia for surgery include, but are not limited to, sore throat, hoarseness, injury to face, mouth, or teeth; nausea; headache; injury to blood vessels or nerves; death, brain damage, or paralysis.     I understand that if I have a Limitation of Treatment order in effect during my hospitalization, the order may or may not be in effect during this procedure. I give my doctor permission to give me blood or blood products. I understand that there are risks with receiving blood such as hepatitis, AIDS, fever, or allergic reaction. I acknowledge that the risks, benefits, and alternatives of this treatment have been explained to me and that no express or implied warranty has been given by the hospital, any blood bank, or any person or entity as to the blood or blood components transfused. At the discretion of my doctor, I agree to allow observers, equipment/product representatives and allow photographing, and/or televising of the procedure, provided my name or identity is maintained confidentially. I agree the hospital may dispose of or use for scientific or educational purposes any tissue, fluid, or body parts which may be removed.     ________________________________Date________Time______ am/pm  ([place] [facility])  Patient or Signature of Closest Relative or Legal Guardian    ________________________________Date________Time______am/pm      Page 1 of  1  Witness

## 2021-05-14 ENCOUNTER — ANESTHESIA EVENT (OUTPATIENT)
Dept: OPERATING ROOM | Age: 76
End: 2021-05-14
Payer: MEDICARE

## 2021-05-14 ENCOUNTER — TELEPHONE (OUTPATIENT)
Dept: BARIATRICS/WEIGHT MGMT | Age: 76
End: 2021-05-14

## 2021-05-14 NOTE — TELEPHONE ENCOUNTER
LM for pt to confirm her 5:30 am arrival for her 7:15 am surgery on 5/17/21. Pt reminded to be NPO. Pt obtained PCP clearance on 5/12/21. Pt was asked to call the office to confirm receipt of this message.

## 2021-05-17 ENCOUNTER — HOSPITAL ENCOUNTER (OUTPATIENT)
Age: 76
Setting detail: OUTPATIENT SURGERY
Discharge: HOME | End: 2021-05-17
Attending: SURGERY | Admitting: SURGERY
Payer: MEDICARE

## 2021-05-17 ENCOUNTER — ANESTHESIA (OUTPATIENT)
Dept: OPERATING ROOM | Age: 76
End: 2021-05-17
Payer: MEDICARE

## 2021-05-17 VITALS
OXYGEN SATURATION: 95 % | HEART RATE: 77 BPM | SYSTOLIC BLOOD PRESSURE: 131 MMHG | RESPIRATION RATE: 14 BRPM | DIASTOLIC BLOOD PRESSURE: 70 MMHG | HEIGHT: 61 IN | WEIGHT: 171 LBS | BODY MASS INDEX: 32.28 KG/M2 | TEMPERATURE: 97.8 F

## 2021-05-17 VITALS — SYSTOLIC BLOOD PRESSURE: 115 MMHG | OXYGEN SATURATION: 100 % | DIASTOLIC BLOOD PRESSURE: 56 MMHG

## 2021-05-17 DIAGNOSIS — E11.22 CHRONIC KIDNEY DISEASE WITH END STAGE RENAL DISEASE ON DIALYSIS DUE TO TYPE 2 DIABETES MELLITUS (HCC): Primary | ICD-10-CM

## 2021-05-17 DIAGNOSIS — N18.6 END STAGE RENAL DISEASE (HCC): ICD-10-CM

## 2021-05-17 DIAGNOSIS — N18.6 CHRONIC KIDNEY DISEASE WITH END STAGE RENAL DISEASE ON DIALYSIS DUE TO TYPE 2 DIABETES MELLITUS (HCC): Primary | ICD-10-CM

## 2021-05-17 DIAGNOSIS — Z99.2 CHRONIC KIDNEY DISEASE WITH END STAGE RENAL DISEASE ON DIALYSIS DUE TO TYPE 2 DIABETES MELLITUS (HCC): Primary | ICD-10-CM

## 2021-05-17 LAB
ANION GAP SERPL CALCULATED.3IONS-SCNC: 18 MMOL/L (ref 3–16)
BUN BLDV-MCNC: 63 MG/DL (ref 7–20)
CALCIUM SERPL-MCNC: 10.3 MG/DL (ref 8.3–10.6)
CHLORIDE BLD-SCNC: 102 MMOL/L (ref 99–110)
CO2: 21 MMOL/L (ref 21–32)
CREAT SERPL-MCNC: 9.2 MG/DL (ref 0.6–1.2)
GFR AFRICAN AMERICAN: 5
GFR NON-AFRICAN AMERICAN: 4
GLUCOSE BLD-MCNC: 100 MG/DL (ref 70–99)
GLUCOSE BLD-MCNC: 85 MG/DL (ref 70–99)
GLUCOSE BLD-MCNC: 93 MG/DL (ref 70–99)
PERFORMED ON: NORMAL
PERFORMED ON: NORMAL
POTASSIUM SERPL-SCNC: 5.7 MMOL/L (ref 3.5–5.1)
SODIUM BLD-SCNC: 141 MMOL/L (ref 136–145)

## 2021-05-17 PROCEDURE — 3700000000 HC ANESTHESIA ATTENDED CARE: Performed by: SURGERY

## 2021-05-17 PROCEDURE — 7100000010 HC PHASE II RECOVERY - FIRST 15 MIN: Performed by: SURGERY

## 2021-05-17 PROCEDURE — 80048 BASIC METABOLIC PNL TOTAL CA: CPT

## 2021-05-17 PROCEDURE — 3600000002 HC SURGERY LEVEL 2 BASE: Performed by: SURGERY

## 2021-05-17 PROCEDURE — 6370000000 HC RX 637 (ALT 250 FOR IP)

## 2021-05-17 PROCEDURE — 6360000002 HC RX W HCPCS: Performed by: NURSE ANESTHETIST, CERTIFIED REGISTERED

## 2021-05-17 PROCEDURE — 7100000011 HC PHASE II RECOVERY - ADDTL 15 MIN: Performed by: SURGERY

## 2021-05-17 PROCEDURE — 2500000003 HC RX 250 WO HCPCS: Performed by: SURGERY

## 2021-05-17 PROCEDURE — 2500000003 HC RX 250 WO HCPCS: Performed by: NURSE ANESTHETIST, CERTIFIED REGISTERED

## 2021-05-17 PROCEDURE — 7100000000 HC PACU RECOVERY - FIRST 15 MIN: Performed by: SURGERY

## 2021-05-17 PROCEDURE — 6370000000 HC RX 637 (ALT 250 FOR IP): Performed by: ANESTHESIOLOGY

## 2021-05-17 PROCEDURE — 3600000012 HC SURGERY LEVEL 2 ADDTL 15MIN: Performed by: SURGERY

## 2021-05-17 PROCEDURE — 49422 REMOVE TUNNELED IP CATH: CPT | Performed by: SURGERY

## 2021-05-17 PROCEDURE — 2580000003 HC RX 258: Performed by: ANESTHESIOLOGY

## 2021-05-17 PROCEDURE — 6360000002 HC RX W HCPCS: Performed by: SURGERY

## 2021-05-17 PROCEDURE — 88300 SURGICAL PATH GROSS: CPT

## 2021-05-17 PROCEDURE — 2709999900 HC NON-CHARGEABLE SUPPLY: Performed by: SURGERY

## 2021-05-17 PROCEDURE — 88305 TISSUE EXAM BY PATHOLOGIST: CPT

## 2021-05-17 PROCEDURE — 3700000001 HC ADD 15 MINUTES (ANESTHESIA): Performed by: SURGERY

## 2021-05-17 PROCEDURE — 7100000001 HC PACU RECOVERY - ADDTL 15 MIN: Performed by: SURGERY

## 2021-05-17 PROCEDURE — 2580000003 HC RX 258: Performed by: SURGERY

## 2021-05-17 RX ORDER — ENALAPRILAT 2.5 MG/2ML
1.25 INJECTION INTRAVENOUS
Status: DISCONTINUED | OUTPATIENT
Start: 2021-05-17 | End: 2021-05-17 | Stop reason: HOSPADM

## 2021-05-17 RX ORDER — PROPOFOL 10 MG/ML
INJECTION, EMULSION INTRAVENOUS PRN
Status: DISCONTINUED | OUTPATIENT
Start: 2021-05-17 | End: 2021-05-17 | Stop reason: SDUPTHER

## 2021-05-17 RX ORDER — LIDOCAINE HCL/PF 100 MG/5ML
SYRINGE (ML) INJECTION PRN
Status: DISCONTINUED | OUTPATIENT
Start: 2021-05-17 | End: 2021-05-17 | Stop reason: SDUPTHER

## 2021-05-17 RX ORDER — LIDOCAINE HYDROCHLORIDE 10 MG/ML
INJECTION, SOLUTION EPIDURAL; INFILTRATION; INTRACAUDAL; PERINEURAL PRN
Status: DISCONTINUED | OUTPATIENT
Start: 2021-05-17 | End: 2021-05-17 | Stop reason: ALTCHOICE

## 2021-05-17 RX ORDER — OXYCODONE HYDROCHLORIDE 5 MG/1
5 TABLET ORAL EVERY 6 HOURS PRN
Qty: 20 TABLET | Refills: 0 | Status: SHIPPED | OUTPATIENT
Start: 2021-05-17 | End: 2021-05-22

## 2021-05-17 RX ORDER — SODIUM CHLORIDE 0.9 % (FLUSH) 0.9 %
10 SYRINGE (ML) INJECTION PRN
Status: DISCONTINUED | OUTPATIENT
Start: 2021-05-17 | End: 2021-05-17 | Stop reason: HOSPADM

## 2021-05-17 RX ORDER — HYDRALAZINE HYDROCHLORIDE 20 MG/ML
5 INJECTION INTRAMUSCULAR; INTRAVENOUS EVERY 5 MIN PRN
Status: DISCONTINUED | OUTPATIENT
Start: 2021-05-17 | End: 2021-05-17 | Stop reason: HOSPADM

## 2021-05-17 RX ORDER — OXYCODONE HYDROCHLORIDE 5 MG/1
TABLET ORAL
Status: COMPLETED
Start: 2021-05-17 | End: 2021-05-17

## 2021-05-17 RX ORDER — LIDOCAINE HYDROCHLORIDE 10 MG/ML
1 INJECTION, SOLUTION EPIDURAL; INFILTRATION; INTRACAUDAL; PERINEURAL
Status: DISCONTINUED | OUTPATIENT
Start: 2021-05-17 | End: 2021-05-17 | Stop reason: HOSPADM

## 2021-05-17 RX ORDER — SODIUM CHLORIDE, SODIUM LACTATE, POTASSIUM CHLORIDE, CALCIUM CHLORIDE 600; 310; 30; 20 MG/100ML; MG/100ML; MG/100ML; MG/100ML
INJECTION, SOLUTION INTRAVENOUS CONTINUOUS
Status: DISCONTINUED | OUTPATIENT
Start: 2021-05-17 | End: 2021-05-17 | Stop reason: HOSPADM

## 2021-05-17 RX ORDER — SODIUM CHLORIDE 0.9 % (FLUSH) 0.9 %
10 SYRINGE (ML) INJECTION EVERY 12 HOURS SCHEDULED
Status: DISCONTINUED | OUTPATIENT
Start: 2021-05-17 | End: 2021-05-17 | Stop reason: HOSPADM

## 2021-05-17 RX ORDER — SODIUM CHLORIDE 9 MG/ML
25 INJECTION, SOLUTION INTRAVENOUS PRN
Status: DISCONTINUED | OUTPATIENT
Start: 2021-05-17 | End: 2021-05-17 | Stop reason: HOSPADM

## 2021-05-17 RX ORDER — ATENOLOL 25 MG/1
1 TABLET ORAL DAILY
Status: ON HOLD | COMMUNITY
End: 2021-07-08 | Stop reason: HOSPADM

## 2021-05-17 RX ORDER — FENTANYL CITRATE 50 UG/ML
25 INJECTION, SOLUTION INTRAMUSCULAR; INTRAVENOUS EVERY 5 MIN PRN
Status: DISCONTINUED | OUTPATIENT
Start: 2021-05-17 | End: 2021-05-17 | Stop reason: HOSPADM

## 2021-05-17 RX ORDER — FENTANYL CITRATE 50 UG/ML
INJECTION, SOLUTION INTRAMUSCULAR; INTRAVENOUS PRN
Status: DISCONTINUED | OUTPATIENT
Start: 2021-05-17 | End: 2021-05-17 | Stop reason: SDUPTHER

## 2021-05-17 RX ORDER — LOPERAMIDE HYDROCHLORIDE 2 MG/1
2 CAPSULE ORAL 4 TIMES DAILY PRN
COMMUNITY
Start: 2021-02-14 | End: 2022-06-14

## 2021-05-17 RX ORDER — OXYCODONE HYDROCHLORIDE 5 MG/1
5 TABLET ORAL ONCE
Status: COMPLETED | OUTPATIENT
Start: 2021-05-17 | End: 2021-05-17

## 2021-05-17 RX ORDER — SODIUM CHLORIDE 9 MG/ML
INJECTION, SOLUTION INTRAVENOUS CONTINUOUS
Status: DISCONTINUED | OUTPATIENT
Start: 2021-05-17 | End: 2021-05-17 | Stop reason: HOSPADM

## 2021-05-17 RX ORDER — MAGNESIUM HYDROXIDE 1200 MG/15ML
LIQUID ORAL CONTINUOUS PRN
Status: COMPLETED | OUTPATIENT
Start: 2021-05-17 | End: 2021-05-17

## 2021-05-17 RX ORDER — FENTANYL CITRATE 50 UG/ML
50 INJECTION, SOLUTION INTRAMUSCULAR; INTRAVENOUS EVERY 5 MIN PRN
Status: DISCONTINUED | OUTPATIENT
Start: 2021-05-17 | End: 2021-05-17 | Stop reason: HOSPADM

## 2021-05-17 RX ORDER — DOCUSATE SODIUM 100 MG/1
100 CAPSULE, LIQUID FILLED ORAL 2 TIMES DAILY PRN
Qty: 28 CAPSULE | Refills: 0 | Status: SHIPPED | OUTPATIENT
Start: 2021-05-17 | End: 2021-05-31

## 2021-05-17 RX ORDER — ONDANSETRON 2 MG/ML
4 INJECTION INTRAMUSCULAR; INTRAVENOUS
Status: DISCONTINUED | OUTPATIENT
Start: 2021-05-17 | End: 2021-05-17 | Stop reason: HOSPADM

## 2021-05-17 RX ORDER — LABETALOL HYDROCHLORIDE 5 MG/ML
5 INJECTION, SOLUTION INTRAVENOUS EVERY 10 MIN PRN
Status: DISCONTINUED | OUTPATIENT
Start: 2021-05-17 | End: 2021-05-17 | Stop reason: HOSPADM

## 2021-05-17 RX ORDER — IRBESARTAN AND HYDROCHLOROTHIAZIDE 150; 12.5 MG/1; MG/1
1 TABLET, FILM COATED ORAL DAILY
COMMUNITY
End: 2021-05-28

## 2021-05-17 RX ADMIN — OXYCODONE HYDROCHLORIDE 5 MG: 5 TABLET ORAL at 09:50

## 2021-05-17 RX ADMIN — SODIUM CHLORIDE: 9 INJECTION, SOLUTION INTRAVENOUS at 06:42

## 2021-05-17 RX ADMIN — VANCOMYCIN HYDROCHLORIDE 1000 MG: 10 INJECTION, POWDER, LYOPHILIZED, FOR SOLUTION INTRAVENOUS at 07:04

## 2021-05-17 RX ADMIN — Medication 50 MG: at 07:16

## 2021-05-17 RX ADMIN — PROPOFOL 50 MG: 10 INJECTION, EMULSION INTRAVENOUS at 07:16

## 2021-05-17 RX ADMIN — FENTANYL CITRATE 50 MCG: 50 INJECTION, SOLUTION INTRAMUSCULAR; INTRAVENOUS at 07:16

## 2021-05-17 RX ADMIN — PROPOFOL 50 MCG/KG/MIN: 10 INJECTION, EMULSION INTRAVENOUS at 07:16

## 2021-05-17 RX ADMIN — FENTANYL CITRATE 25 MCG: 50 INJECTION, SOLUTION INTRAMUSCULAR; INTRAVENOUS at 07:26

## 2021-05-17 RX ADMIN — METOPROLOL TARTRATE 12.5 MG: 25 TABLET, FILM COATED ORAL at 06:57

## 2021-05-17 ASSESSMENT — PULMONARY FUNCTION TESTS
PIF_VALUE: 2
PIF_VALUE: 3
PIF_VALUE: 2
PIF_VALUE: 0
PIF_VALUE: 2
PIF_VALUE: 3
PIF_VALUE: 2
PIF_VALUE: 2
PIF_VALUE: 3
PIF_VALUE: 3
PIF_VALUE: 5
PIF_VALUE: 0
PIF_VALUE: 2
PIF_VALUE: 3
PIF_VALUE: 2
PIF_VALUE: 1
PIF_VALUE: 2

## 2021-05-17 ASSESSMENT — PAIN SCALES - GENERAL: PAINLEVEL_OUTOF10: 7

## 2021-05-17 NOTE — BRIEF OP NOTE
Brief Postoperative Note      Patient: Floy Romberg  YOB: 1945  MRN: [id number]    Date of Procedure: 5/17/2021    Pre-Op Diagnosis: End stage renal disease    Post-Op Diagnosis: Same       Procedure(s):  PERITONEAL DIALYSIS CATHETER REMOVAL. [address]. Surgeon(s): Italia Akins DO    Assistant:  Resident: Fadumo Austin DO    Anesthesia: Monitor Anesthesia Care    Estimated Blood Loss (mL): 10    Complications: None    Specimens:   ID Type Source Tests Collected by Time Destination   1 : 1) PD CATHETER Catheter Tip Tissue SURGICAL PATHOLOGY Italia Akins DO 5/17/2021 0740    A : A) ABDOMINAL CITRIX Tissue Tissue SURGICAL PATHOLOGY Italia Akins DO 5/17/2021 4897        Implants:  * No implants in log *      Drains: * No LDAs found *    Findings: PD catheter removed in its entirety. Abdominal wall cicatrix.     Electronically signed by Fadumo Austin DO on 5/17/2021 at 7:52 AM

## 2021-05-17 NOTE — PROGRESS NOTES
Ambulatory Surgery/Procedure Discharge Note  Pt alert and oriented  Julio Earing in place  Ice to OR area  Suture line clean dry and intact  Pain scale 7 (pain pill given as ordered)  IV dcd fluid taken without nausea  Verbal and written discharge instructions given to pt and family member  Pt stable , up getting dressed with help  dcd per wheelchair to car with family member present    Vitals:    05/17/21 0916   BP: 131/70   Pulse: 77   Resp: 14   Temp: 97.8 °F (36.6 °C)   SpO2: 95%       In: 70 [I.V.:70]  Out: -     Restroom use offered before discharge. Yes    Pain assessment:  level of pain (1-10, 10 severe),   Pain Level: 7        Patient discharged to home/self care.  Patient discharged via wheel chair by transporter to waiting family/S.O.       5/17/2021 9:58 AM

## 2021-05-17 NOTE — PROGRESS NOTES
Pt admitted to PACU s/p PERITONEAL DIALYSIS CATHETER REMOVAL.  EXCISSION OF ABDOMINAL CITRIX. resp fulleasy PO 96 % on RA, pr drowsy but responds approp, abd binder on - Old PD cath inc site CDI w surgi glue

## 2021-05-17 NOTE — ANESTHESIA PRE PROCEDURE
right foot S92.354A    Arthritis of right knee M17.11    Acute pain of both knees M25.561, M25.562    Primary osteoarthritis of both knees M17.0    Bursitis M71.9    Memory loss R41.3    Bilateral leg edema R60.0    Primary osteoarthritis of left knee M17.12    Atypical chest pain R07.89    Tubular adenoma of colon D12.6    Primary insomnia F51.01    Diarrhea of presumed infectious origin R19.7    Diabetic polyneuropathy associated with type 2 diabetes mellitus (HCC) E11.42    COVID-19 virus infection U07.1       Past Medical History:        Diagnosis Date    Arthritis     [facility]     Hemodialysis patient ([facility]     Hyperlipidemia     Hypertension     Kidney disease     Neuropathy     Pneumonia     IN , with COVID    Thyroid disease        Past Surgical History:        Procedure Laterality Date    APPENDECTOMY      CHOLECYSTECTOMY      COLONOSCOPY  2020    COLONOSCOPY POLYPECTOMY SNARE/COLD BIOPSY performed by Jake Judd MD at [address] [place] [address] N/A 4/3/2020    LAPAROSCOPIC LYSIS OF ADHESIONS, LAPAROSCOPIC PERITONEAL DIALYSIS CATHETER PLACEMENT, LAPAROSCOPIC OMENTOPEXY performed by Jose Macias DO at [facility], BILATERAL      preventive    ROTATOR CUFF REPAIR Left     UPPER GASTROINTESTINAL ENDOSCOPY N/A 2020    EGD BIOPSY performed by Jake Judd MD at [address] [place] [address]       Social History:    Social History     Tobacco Use    Smoking status: Former Smoker     Packs/day: 0.50     Years: 10.00     Pack years: 5.00     Types: Cigarettes     Quit date: 1978     Years since quittin.9    Smokeless tobacco: Never Used   Substance Use Topics    Alcohol use: Yes     Comment: every other week one glass of wine                                Counseling given: Not Answered      Vital Signs (Current):   Vitals:    05/10/21 1328 05/10/21 1359 21 0545   BP: [place] ) 146/81   Pulse:   75   Resp:   18   Temp:   98.1 °F (36.7 °C)   TempSrc:   Oral   SpO2:   99%   Weight: 172 lb (78 kg) 172 lb (78 kg) 171 lb (77.6 kg)   Height: 5' 2\" (1.575 m) 5' 1.5\" (1.562 m) 5' 1\" (1.549 m)                                              BP Readings from Last 3 Encounters:   05/17/21 (!) 146/81   05/12/21 (!) 142/72   04/29/21 (!) 162/78       NPO Status: Time of last liquid consumption: 2000                        Time of last solid consumption: 2000                        Date of last liquid consumption: 05/16/21                        Date of last solid food consumption: 05/16/21    BMI:   Wt Readings from Last 3 Encounters:   05/17/21 171 lb (77.6 kg)   05/12/21 176 lb (79.8 kg)   04/29/21 172 lb 3.2 oz (78.1 kg)     Body mass index is 32.31 kg/m².     CBC:   Lab Results   Component Value Date    WBC 12.5 03/23/2021    RBC 2.38 03/23/2021    HGB 7.4 03/23/2021    HCT 23.5 03/23/2021    MCV 98.6 03/23/2021    RDW 18.0 03/23/2021     03/23/2021       CMP:   Lab Results   Component Value Date     03/23/2021    K 5.8 03/23/2021    K 4.9 03/22/2021     03/23/2021    CO2 17 03/23/2021    BUN 77 03/23/2021    CREATININE 9.9 03/23/2021    GFRAA 5 03/23/2021    AGRATIO 1.3 10/30/2019    LABGLOM 4 03/23/2021    GLUCOSE 86 03/23/2021    PROT 7.5 10/30/2019    CALCIUM 9.6 03/23/2021    BILITOT 0.3 10/30/2019    ALKPHOS 77 10/30/2019    AST 18 10/30/2019    ALT 15 10/30/2019       POC Tests:   Recent Labs     05/17/21  0633   POCGLU 85       Coags:   Lab Results   Component Value Date    PROTIME 11.9 03/23/2021    INR 1.03 03/23/2021    APTT 27.0 03/23/2021       HCG (If Applicable): No results found for: PREGTESTUR, PREGSERUM, HCG, HCGQUANT     ABGs: No results found for: PHART, PO2ART, VFS1OZU, WHR0PSU, BEART, K1WGHIXL     Type & Screen (If Applicable):  No results found for: LABABO, LABRH    Drug/Infectious Status (If Applicable):  No results found for: HIV, HEPCAB    COVID-19 Screening (If Applicable):   Lab Results   Component Value Date    COVID19 Not Detected 05/12/2021           Anesthesia Evaluation  Patient summary reviewed history of anesthetic complications:   Airway: Mallampati: III  TM distance: >3 FB   Neck ROM: full  Mouth opening: > = 3 FB Dental: normal exam         Pulmonary:                              Cardiovascular:    (+) hypertension:,                   Neuro/Psych:                ROS comment: Neuropathy in feet  GI/Hepatic/Renal:   (+) renal disease: ESRD and dialysis,           Endo/Other:    (+) Diabetes, hypothyroidism: arthritis:., .                 Abdominal:           Vascular:                                        Anesthesia Plan      general     ASA 3       Induction: intravenous. Anesthetic plan and risks discussed with patient.                       Terrence Recinos MD   5/17/2021

## 2021-05-17 NOTE — ANESTHESIA POSTPROCEDURE EVALUATION
Department of Anesthesiology  Postprocedure Note    Patient: Jayson Fox  MRN: [id number]  YOB: 1945  Date of evaluation: 5/17/2021  Time:  1:21 PM     Procedure Summary     Date: 05/17/21 Room / Location: [facility] [address] [place] [address] / [facility]    Anesthesia Start: 2785 Anesthesia Stop: 0801    Procedure: PERITONEAL DIALYSIS CATHETER REMOVAL. EXCISSION OF ABDOMINAL CITRIX. (N/A Abdomen) Diagnosis:       End stage renal disease ([facility]      (End stage renal disease)    Surgeons: Mitchell Olivas DO Responsible Provider: Leela Phelps MD    Anesthesia Type: general ASA Status: 3          Anesthesia Type: general    Nanette Phase I: Nanette Score: 10    Nanette Phase II: Nanette Score: 10    Last vitals: Reviewed and per EMR flowsheets.        Anesthesia Post Evaluation    Patient participation: complete - patient participated  Level of consciousness: awake  Airway patency: patent  Nausea & Vomiting: no nausea and no vomiting  Complications: no  Cardiovascular status: hemodynamically stable  Respiratory status: acceptable  Hydration status: stable

## 2021-05-17 NOTE — H&P
Yecenia Youngblood    [id number]    [facility] Same Day Surgery Update H & P  Department of General Surgery   Surgical Service   Pre-operative History and Physical  Last H & P within the last 30 days. DIAGNOSIS:   End stage renal disease ([address] [N18.6]    Procedure(s):  PERITONEAL DIALYSIS CATHETER REMOVAL     HISTORY OF PRESENT ILLNESS:   Patient, who prefers hemodialysis to peritoneal dialysis, presents today for removal of PD catheter. Covid 19:  Patient denies fever, chills, cough or known exposure to Covid-19. Past Medical History:        Diagnosis Date    Arthritis     Diabetes ([address]     Hemodialysis patient ([facility] [facility]     Hyperlipidemia     Hypertension     Kidney disease     Pneumonia     IN , with COVID    Thyroid disease      Past Surgical History:        Procedure Laterality Date    APPENDECTOMY      CHOLECYSTECTOMY      COLONOSCOPY  2020    COLONOSCOPY POLYPECTOMY SNARE/COLD BIOPSY performed by Gary Higuera MD at [address] [place] [address] N/A 4/3/2020    LAPAROSCOPIC LYSIS OF ADHESIONS, LAPAROSCOPIC PERITONEAL DIALYSIS CATHETER PLACEMENT, LAPAROSCOPIC OMENTOPEXY performed by Patricia Diaz DO at [facility], BILATERAL      preventive    ROTATOR CUFF REPAIR Left     UPPER GASTROINTESTINAL ENDOSCOPY N/A 2020    EGD BIOPSY performed by Gary Higuera MD at [facility] ENDOSCOPY     Past Social History:  Social History     Socioeconomic History    Marital status:       Spouse name: None    Number of children: 3    Years of education: None    Highest education level: None   Occupational History    Occupation: retired clerical   Tobacco Use    Smoking status: Former Smoker     Packs/day: 0.50     Years: 10.00     Pack years: 5.00     Types: Cigarettes     Quit date: 1978     Years since quittin.9    Smokeless tobacco: Never Used   Vaping Use    Vaping Use: Never used   Substance and Sexual Activity    Alcohol use: Yes     Comment: every other week one glass of wine    Drug use: Never    Sexual activity: Not Currently   Other Topics Concern    None   Social History Narrative    Lives with son--recently moved from [facility] and now here    [facility]     Social Determinants of Health     Financial Resource Strain: Low Risk     Difficulty of Paying Living Expenses: Not hard at all   Food Insecurity: No Food Insecurity    Worried About Running Out of Food in the Last Year: Never true    Devante of Food in the Last Year: Never true   Transportation Needs: No Transportation Needs    Lack of Transportation (Medical): No    Lack of Transportation (Non-Medical): No   Physical Activity:     Days of Exercise per Week:     Minutes of Exercise per Session:    Stress:     Feeling of Stress :    Social Connections:     Frequency of Communication with Friends and Family:     Frequency of Social Gatherings with Friends and Family:     Attends Buddhist Services:     Active Member of Clubs or Organizations:     Attends Club or Organization Meetings:     Marital Status:    Intimate Partner Violence:     Fear of Current or Ex-Partner:     Emotionally Abused:     Physically Abused:     Sexually Abused:          Medications Prior to Admission:      Prior to Admission medications    Medication Sig Start Date End Date Taking? Authorizing Provider   irbesartan-hydroCHLOROthiazide (AVALIDE) 150-12.5 MG per tablet Take 1 tablet by mouth daily   Yes Historical Provider, MD   atenolol (TENORMIN) 25 MG tablet Take 1 tablet by mouth daily   Yes Historical Provider, MD   loperamide (IMODIUM) 2 MG capsule Take 2 mg by mouth 4 times daily as needed 2/14/21  Yes Historical Provider, MD   docusate sodium (COLACE) 100 MG capsule Take 1 capsule by mouth 2 times daily as needed for Constipation (Please take while taking narcotic pain medicine.  If you develop loose or watery stools, then stop sounds and no masses palpated    ASSESSMENT AND PLAN     Patient is a 67 y.o. female with above specified procedure planned. 1.  The patients history and physical was obtained and signed off by the pre-admission testing department. Patient seen and focused exam done today- no new changes since last physical exam on 5/12/21    2. Access to ancillary services are available per request of the provider.     Eduardo Nageotte, APRN - CNP     5/17/2021

## 2021-05-17 NOTE — OP NOTE
DATE OF PROCEDURE:  5/17/21     PREOPERATIVE DIAGNOSES:  1.  End-stage renal disease. POSTOPERATIVE DIAGNOSES:  1.  End-stage renal disease. 2.  Abdominal wall cicatrix     PROCEDURES PERFORMED:  1. Removal of tunneled intraperitoneal catheter. 2.  Excision of abdominal wall cicatrix measuring 3.5cm     SURGEON:  Obdulia Dinero DO     ASSISTANT:  Adal Katz     ANESTHESIA:  Local with sedation. ESTIMATED BLOOD LOSS:  10 mL. INDICATION FOR PROCEDURE:  The patient is a 67year old who's PD catheter needed removal as they no longer use it     DESCRIPTION OF PROCEDURE:  The patient was brought to the operating suite, laid in the supine position with arms outstretched, and after sedation was administered, the patient was prepped and draped in the usual sterile manner. Local anesthetic was injected around the exit site of the catheter, and an elliptical incision was made using a #15 blade to completely excise the old cicatrix. This was carried down to the subcutaneous tissue, and Bovie electrocautery was used to completely circumferentially dissect this free. Bilateral subcutaneous undermining was then performed to  allow for a tension-free closure. At that point, dissection was done to free up the superficial cuff of the peritoneal dialysis catheter as well as a deep cuff. A counter incision was needed close to umbilicus to free deep cuff. The PD catheter was then sent for gross pathology. A figure-of-eight 0 Vicryl suture was used to close the entrance into the fascia. Irrigation was performed. Closure of the wounds was again performed by doing bilateral subcutaneous undermining to assist with a tension-free closure and then closing the wound in multiple layers using 3-0 Vicryl, and 4-0 Vicryl suture. Skin glue was used on top. The patient tolerated the procedure well and was brought to the postanesthesia care unit in stable condition.      All sponge, needle, and instrument counts were correct x2.

## 2021-05-28 RX ORDER — IRBESARTAN AND HYDROCHLOROTHIAZIDE 150; 12.5 MG/1; MG/1
TABLET, FILM COATED ORAL
Qty: 90 TABLET | Refills: 1 | Status: SHIPPED | OUTPATIENT
Start: 2021-05-28 | End: 2021-07-26 | Stop reason: ALTCHOICE

## 2021-06-02 ENCOUNTER — CLINICAL DOCUMENTATION (OUTPATIENT)
Dept: OTHER | Age: 76
End: 2021-06-02

## 2021-06-07 ENCOUNTER — OFFICE VISIT (OUTPATIENT)
Dept: VASCULAR SURGERY | Age: 76
End: 2021-06-07
Payer: MEDICARE

## 2021-06-07 VITALS
HEIGHT: 62 IN | SYSTOLIC BLOOD PRESSURE: 118 MMHG | BODY MASS INDEX: 34.04 KG/M2 | WEIGHT: 185 LBS | DIASTOLIC BLOOD PRESSURE: 77 MMHG

## 2021-06-07 DIAGNOSIS — Z99.2 CHRONIC KIDNEY DISEASE WITH END STAGE RENAL DISEASE ON DIALYSIS DUE TO TYPE 2 DIABETES MELLITUS (HCC): Primary | ICD-10-CM

## 2021-06-07 DIAGNOSIS — N18.6 CHRONIC KIDNEY DISEASE WITH END STAGE RENAL DISEASE ON DIALYSIS DUE TO TYPE 2 DIABETES MELLITUS (HCC): Primary | ICD-10-CM

## 2021-06-07 DIAGNOSIS — E11.22 CHRONIC KIDNEY DISEASE WITH END STAGE RENAL DISEASE ON DIALYSIS DUE TO TYPE 2 DIABETES MELLITUS (HCC): Primary | ICD-10-CM

## 2021-06-07 PROCEDURE — 1036F TOBACCO NON-USER: CPT | Performed by: SURGERY

## 2021-06-07 PROCEDURE — 1090F PRES/ABSN URINE INCON ASSESS: CPT | Performed by: SURGERY

## 2021-06-07 PROCEDURE — G8399 PT W/DXA RESULTS DOCUMENT: HCPCS | Performed by: SURGERY

## 2021-06-07 PROCEDURE — G8417 CALC BMI ABV UP PARAM F/U: HCPCS | Performed by: SURGERY

## 2021-06-07 PROCEDURE — 4040F PNEUMOC VAC/ADMIN/RCVD: CPT | Performed by: SURGERY

## 2021-06-07 PROCEDURE — 1123F ACP DISCUSS/DSCN MKR DOCD: CPT | Performed by: SURGERY

## 2021-06-07 PROCEDURE — G8427 DOCREV CUR MEDS BY ELIG CLIN: HCPCS | Performed by: SURGERY

## 2021-06-07 PROCEDURE — 99214 OFFICE O/P EST MOD 30 MIN: CPT | Performed by: SURGERY

## 2021-06-07 NOTE — PROGRESS NOTES
[address] [facility] Vascular and Endovascular Surgery  Consultation Note    Nephrologist:  Quan Nunez  Dialysis center:  [place]    Chief Complaint / Reason for Consultation  ESRD    History of Present Illness  Patient is a 67 y.o. female with history of end-stage renal disease on hemodialysis, diabetes, hypertension and hyperlipidemia. Referred for new access creation. She was initially started on peritoneal dialysis approximately 1 year ago. She states she had issues with this and this was recently removed in May. She currently on hemodialysis with a right chest wall tunnel dialysis catheter since February of this year. She is right-hand dominant. She's had no arm access in the past.  Ultrasound vein mapping was performed in April. Review of Systems     Denies fevers, chills, chest pain, shortness of breath, nausea, vomiting, hematemesis, diarrhea, constipation, melena, hematochezia, wt changes, vision problems, blindness, hearing problems, facial droop, slurred speech, extremity weakness, extremity numbness, dysuria. Past Medical History:   Diagnosis Date    Arthritis     Diabetes ([address]     Hemodialysis patient ([facility]     Hyperlipidemia     Hypertension     Kidney disease     Neuropathy     Pneumonia     IN February, 2021, with COVID    Thyroid disease        Past Surgical History:   Procedure Laterality Date    APPENDECTOMY      CHOLECYSTECTOMY      COLONOSCOPY  11/24/2020    COLONOSCOPY POLYPECTOMY SNARE/COLD BIOPSY performed by Amador Andrade MD at [address] 5/17/2021    PERITONEAL DIALYSIS CATHETER REMOVAL.  [address] performed by Kevan Crowe DO at [address] N/A 4/3/2020    LAPAROSCOPIC LYSIS OF ADHESIONS, LAPAROSCOPIC PERITONEAL DIALYSIS CATHETER PLACEMENT, LAPAROSCOPIC OMENTOPEXY performed by Kevan Crowe DO at [facility], BILATERAL      preventive    Gatherings with Friends and Family:     Attends [employer] Services:     Active Member of Clubs or Organizations:     Attends Club or Organization Meetings:     Marital Status:    Intimate Partner Violence:     Fear of Current or Ex-Partner:     Emotionally Abused:     Physically Abused:     Sexually Abused:        Family History   Problem Relation Age of Onset    Cancer Mother         lung, breast, liver    Stroke Father     Stomach Cancer Brother     Cancer Maternal Grandmother     No Known Problems Paternal Grandmother     No Known Problems Paternal Grandfather      - No history of bleeding or clotting disorders    Vital Signs  Vitals:    06/07/21 0833   BP: 118/77   Weight: 185 lb (83.9 kg)   Height: 5' 2\" (1.575 m)       Physical Examination  General:  no apparent distress  Psychiatric: affect appropriate  Head/Eyes/Ears/Nose/Throat:  Atraumatic, vision and hearing intact, face symmetric  Neck:  supple  Chest/Lungs: clear to auscultation bilaterally  Cardiac:  Regular rate and rhythm  Abdomen: soft, nontender  Extremities: warm and well perfused  Palpable brachial and radial pulse. Labs  Lab Results   Component Value Date    WBC 12.5 03/23/2021    HGB 7.4 03/23/2021    HCT 23.5 03/23/2021    MCV 98.6 03/23/2021     03/23/2021     Lab Results   Component Value Date     05/17/2021    K 5.7 05/17/2021    K 4.9 03/22/2021     05/17/2021    CO2 21 05/17/2021    PHOS 8.8 03/23/2021    BUN 63 05/17/2021    CREATININE 9.2 05/17/2021      No components found for: GLU    Imaging:     Ultrasound vein mapping was reviewed    Assessment:   End-stage renal disease on hemodialysis      Plan:  76-year-old female with end-stage renal disease on hemodialysis with right IJ tunneled dialysis catheter. Her left arm vein mapping would suggest no suitable veins for primary fistula creation.   Would recommend repeat intraoperative duplex evaluation of the cephalic and basilic vein to ensure no options. Discussed with her today the likelihood of left forearm AV graft. All risks of graft and fistula placement were discussed in detail with the patient. All questions answered. Adamaris Damon M.D., SHRUTHI.  6/7/2021  8:40 AM

## 2021-06-07 NOTE — Clinical Note
I saw Karyna Peterson today and appreciate the referral.  Her ultrasound vein mapping would suggest no good veins for primary fistula in the left. However, she does have an apparently good upper arm cephalic on the right for fistula.   My thought would be to move forward with left forearm AV graft to limit contact catheter time but happy to place the fistula to think that would be best for her now  Thanks  rich

## 2021-06-08 ENCOUNTER — PREP FOR PROCEDURE (OUTPATIENT)
Dept: VASCULAR SURGERY | Age: 76
End: 2021-06-08

## 2021-06-08 DIAGNOSIS — N18.6 CHRONIC KIDNEY DISEASE WITH END STAGE RENAL DISEASE ON DIALYSIS DUE TO TYPE 2 DIABETES MELLITUS (HCC): Primary | ICD-10-CM

## 2021-06-08 DIAGNOSIS — Z99.2 CHRONIC KIDNEY DISEASE WITH END STAGE RENAL DISEASE ON DIALYSIS DUE TO TYPE 2 DIABETES MELLITUS (HCC): Primary | ICD-10-CM

## 2021-06-08 DIAGNOSIS — E11.22 CHRONIC KIDNEY DISEASE WITH END STAGE RENAL DISEASE ON DIALYSIS DUE TO TYPE 2 DIABETES MELLITUS (HCC): Primary | ICD-10-CM

## 2021-06-08 RX ORDER — SODIUM CHLORIDE 0.9 % (FLUSH) 0.9 %
10 SYRINGE (ML) INJECTION PRN
Status: CANCELLED | OUTPATIENT
Start: 2021-06-08

## 2021-06-08 RX ORDER — SODIUM CHLORIDE 9 MG/ML
25 INJECTION, SOLUTION INTRAVENOUS PRN
Status: CANCELLED | OUTPATIENT
Start: 2021-06-08

## 2021-06-08 RX ORDER — SODIUM CHLORIDE 9 MG/ML
INJECTION, SOLUTION INTRAVENOUS CONTINUOUS
Status: CANCELLED | OUTPATIENT
Start: 2021-06-08

## 2021-06-08 RX ORDER — SODIUM CHLORIDE 0.9 % (FLUSH) 0.9 %
10 SYRINGE (ML) INJECTION EVERY 12 HOURS SCHEDULED
Status: CANCELLED | OUTPATIENT
Start: 2021-06-08

## 2021-06-24 ENCOUNTER — TELEPHONE (OUTPATIENT)
Dept: VASCULAR SURGERY | Age: 76
End: 2021-06-24

## 2021-06-24 NOTE — TELEPHONE ENCOUNTER
I called patient to confirm her surgery is canceled today with Dr Venus Bowling. Patient has been having spells of \"passing out\". She will be getting a call to make an appointment with Dr. Jim Son.

## 2021-06-28 NOTE — PROGRESS NOTES
[facility]   Cardiac Consultation    Referring Provider:  Lise Ozuna MD     Chief Complaint   Patient presents with    New Patient    Hypotension        History of Present Illness:  Patient is a 67 y.o. female with history of end-stage renal disease on hemodialysis, diabetes, hypertension and hyperlipidemia. She reports she feels chest heaviness with steps, her grandson has many steps at his house, she will have to stop and rest due to the heaviness. Though still experiences heaviness after a few steps at her home. She has had some episodes of hypotension during dialysis, she has had a couple episodes where she was difficult to arouse, BP was low at the time. She reports she generally sleeps during dialysis. Women on her mom's side  of cancer,her aunt heart attack at 61. Hospitalized in [facility] in April for Covid. Previously denied for Kidney transplant through . Past Medical History:   has a past medical history of Arthritis, Diabetes ([facility] [address] Hemodialysis patient ([facility] [address] Hyperlipidemia, Hypertension, Kidney disease, Neuropathy, Pneumonia, and Thyroid disease. Surgical History:   has a past surgical history that includes Appendectomy; Mastectomy, bilateral; Rotator cuff repair (Left); Cholecystectomy; LAPAROSCOPY INSERTION PERITONEAL CATHETER (N/A, 4/3/2020); Upper gastrointestinal endoscopy (N/A, 2020); Colonoscopy (2020); Hysterectomy; and Dialysis Catheter Removal (N/A, 2021). Social History:   reports that she quit smoking about 43 years ago. Her smoking use included cigarettes. She has a 5.00 pack-year smoking history. She has never used smokeless tobacco. She reports current alcohol use. She reports that she does not use drugs. Family History:  family history includes Cancer in her maternal grandmother and mother; No Known Problems in her paternal grandfather and paternal grandmother; Stomach Cancer in her brother; Stroke in her father.      Home Medications:  Prior to Admission medications    Medication Sig Start Date End Date Taking? Authorizing Provider   UNABLE TO FIND Dialysis mon wed fri   Yes Historical Provider, MD   irbesartan-hydroCHLOROthiazide (AVALIDE) 150-12.5 MG per tablet TAKE 1 TABLET EVERY DAY  Patient taking differently: daily  5/28/21  Yes Rozina Sanchez MD   atenolol (TENORMIN) 25 MG tablet Take 1 tablet by mouth daily   Yes Historical Provider, MD   loperamide (IMODIUM) 2 MG capsule Take 2 mg by mouth 4 times daily as needed 2/14/21  Yes Historical Provider, MD   metoprolol tartrate (LOPRESSOR) 25 MG tablet Take 12.5 mg by mouth 2 times daily 2/14/21 2/14/22 Yes Historical Provider, MD   gabapentin (NEURONTIN) 100 MG capsule Take 2 capsules by mouth nightly for 30 days. 4/8/21 6/29/21 Yes Rozina Sanchez MD   levothyroxine (SYNTHROID) 150 MCG tablet Take 150 mcg by mouth every morning (before breakfast)   Yes Historical Provider, MD   pravastatin (PRAVACHOL) 20 MG tablet Take 20 mg by mouth nightly   Yes Historical Provider, MD   torsemide (DEMADEX) 20 MG tablet Take 20 mg by mouth 3 tablets in am, 3 tablets in pm   Yes Historical Provider, MD   allopurinol (ZYLOPRIM) 100 MG tablet TAKE 1 TABLET EVERY DAY 9/1/20  Yes Rozina Sanchez MD   spironolactone (ALDACTONE) 25 MG tablet TAKE 1 TABLET EVERY DAY  Patient taking differently: nightly TAKE 1 TABLET EVERY DAY 8/31/20  Yes Rozina Sanchez MD        Allergies:  Amoxicillin, Demeclocycline, Penicillins, Tetracyclines & related, Ozempic (0.25 or 0.5 mg-dose) [semaglutide(0.25 or 0.5mg-dos)], and Codeine     Review of Systems:   · Constitutional: there has been no unanticipated weight loss. There's been no change in energy level, sleep pattern, or activity level. · Eyes: No visual changes or diplopia. No scleral icterus. · ENT: No Headaches, hearing loss or vertigo. No mouth sores or sore throat.   · Cardiovascular: Reviewed in HPI  · Respiratory: No cough or

## 2021-06-29 ENCOUNTER — OFFICE VISIT (OUTPATIENT)
Dept: CARDIOLOGY CLINIC | Age: 76
End: 2021-06-29
Payer: MEDICARE

## 2021-06-29 VITALS
BODY MASS INDEX: 33.31 KG/M2 | WEIGHT: 181 LBS | DIASTOLIC BLOOD PRESSURE: 78 MMHG | OXYGEN SATURATION: 98 % | HEIGHT: 62 IN | SYSTOLIC BLOOD PRESSURE: 138 MMHG | HEART RATE: 90 BPM

## 2021-06-29 DIAGNOSIS — E78.5 HYPERLIPIDEMIA, UNSPECIFIED HYPERLIPIDEMIA TYPE: ICD-10-CM

## 2021-06-29 DIAGNOSIS — E11.69 DIABETES MELLITUS TYPE 2 IN OBESE (HCC): ICD-10-CM

## 2021-06-29 DIAGNOSIS — R07.9 CHEST PAIN, UNSPECIFIED TYPE: ICD-10-CM

## 2021-06-29 DIAGNOSIS — I10 ESSENTIAL HYPERTENSION: Primary | ICD-10-CM

## 2021-06-29 DIAGNOSIS — R06.02 SOB (SHORTNESS OF BREATH): ICD-10-CM

## 2021-06-29 DIAGNOSIS — E66.9 DIABETES MELLITUS TYPE 2 IN OBESE (HCC): ICD-10-CM

## 2021-06-29 PROCEDURE — 4040F PNEUMOC VAC/ADMIN/RCVD: CPT | Performed by: INTERNAL MEDICINE

## 2021-06-29 PROCEDURE — 1090F PRES/ABSN URINE INCON ASSESS: CPT | Performed by: INTERNAL MEDICINE

## 2021-06-29 PROCEDURE — 1123F ACP DISCUSS/DSCN MKR DOCD: CPT | Performed by: INTERNAL MEDICINE

## 2021-06-29 PROCEDURE — G8399 PT W/DXA RESULTS DOCUMENT: HCPCS | Performed by: INTERNAL MEDICINE

## 2021-06-29 PROCEDURE — G8427 DOCREV CUR MEDS BY ELIG CLIN: HCPCS | Performed by: INTERNAL MEDICINE

## 2021-06-29 PROCEDURE — G8417 CALC BMI ABV UP PARAM F/U: HCPCS | Performed by: INTERNAL MEDICINE

## 2021-06-29 PROCEDURE — 99204 OFFICE O/P NEW MOD 45 MIN: CPT | Performed by: INTERNAL MEDICINE

## 2021-06-29 PROCEDURE — 1036F TOBACCO NON-USER: CPT | Performed by: INTERNAL MEDICINE

## 2021-07-07 RX ORDER — GABAPENTIN 100 MG/1
200 CAPSULE ORAL NIGHTLY
Qty: 60 CAPSULE | Refills: 3 | Status: SHIPPED | OUTPATIENT
Start: 2021-07-07 | End: 2022-02-01 | Stop reason: SDUPTHER

## 2021-07-08 ENCOUNTER — HOSPITAL ENCOUNTER (OUTPATIENT)
Dept: CARDIAC CATH/INVASIVE PROCEDURES | Age: 76
Discharge: HOME | End: 2021-07-08
Attending: INTERNAL MEDICINE | Admitting: INTERNAL MEDICINE
Payer: MEDICARE

## 2021-07-08 ENCOUNTER — TELEPHONE (OUTPATIENT)
Dept: CARDIOLOGY CLINIC | Age: 76
End: 2021-07-08

## 2021-07-08 VITALS
WEIGHT: 181 LBS | HEIGHT: 62 IN | BODY MASS INDEX: 33.31 KG/M2 | DIASTOLIC BLOOD PRESSURE: 90 MMHG | SYSTOLIC BLOOD PRESSURE: 170 MMHG

## 2021-07-08 LAB
ANION GAP SERPL CALCULATED.3IONS-SCNC: 16 MMOL/L (ref 3–16)
BUN BLDV-MCNC: 39 MG/DL (ref 7–20)
CALCIUM SERPL-MCNC: 10.7 MG/DL (ref 8.3–10.6)
CHLORIDE BLD-SCNC: 98 MMOL/L (ref 99–110)
CO2: 24 MMOL/L (ref 21–32)
CREAT SERPL-MCNC: 6.2 MG/DL (ref 0.6–1.2)
EKG ATRIAL RATE: 90 BPM
EKG DIAGNOSIS: NORMAL
EKG P AXIS: 59 DEGREES
EKG P-R INTERVAL: 152 MS
EKG Q-T INTERVAL: 386 MS
EKG QRS DURATION: 80 MS
EKG QTC CALCULATION (BAZETT): 472 MS
EKG R AXIS: -17 DEGREES
EKG T AXIS: 61 DEGREES
EKG VENTRICULAR RATE: 90 BPM
GFR AFRICAN AMERICAN: 8
GFR NON-AFRICAN AMERICAN: 7
GLUCOSE BLD-MCNC: 101 MG/DL (ref 70–99)
HCT VFR BLD CALC: 36.6 % (ref 36–48)
HEMOGLOBIN: 11.9 G/DL (ref 12–16)
LEFT VENTRICULAR EJECTION FRACTION MODE: NORMAL
LV EF: 60 %
MCH RBC QN AUTO: 31.1 PG (ref 26–34)
MCHC RBC AUTO-ENTMCNC: 32.7 G/DL (ref 31–36)
MCV RBC AUTO: 95.2 FL (ref 80–100)
PDW BLD-RTO: 17.6 % (ref 12.4–15.4)
PLATELET # BLD: 262 K/UL (ref 135–450)
PMV BLD AUTO: 8.2 FL (ref 5–10.5)
POC ACT LR: 220 SEC
POC ACT LR: 228 SEC
POC ACT LR: 229 SEC
POTASSIUM SERPL-SCNC: 5.1 MMOL/L (ref 3.5–5.1)
RBC # BLD: 3.84 M/UL (ref 4–5.2)
SODIUM BLD-SCNC: 138 MMOL/L (ref 136–145)
WBC # BLD: 7.5 K/UL (ref 4–11)

## 2021-07-08 PROCEDURE — 2500000003 HC RX 250 WO HCPCS

## 2021-07-08 PROCEDURE — 85347 COAGULATION TIME ACTIVATED: CPT

## 2021-07-08 PROCEDURE — 6370000000 HC RX 637 (ALT 250 FOR IP)

## 2021-07-08 PROCEDURE — 93458 L HRT ARTERY/VENTRICLE ANGIO: CPT

## 2021-07-08 PROCEDURE — C1887 CATHETER, GUIDING: HCPCS

## 2021-07-08 PROCEDURE — 93010 ELECTROCARDIOGRAM REPORT: CPT | Performed by: INTERNAL MEDICINE

## 2021-07-08 PROCEDURE — C9600 PERC DRUG-EL COR STENT SING: HCPCS

## 2021-07-08 PROCEDURE — C1760 CLOSURE DEV, VASC: HCPCS

## 2021-07-08 PROCEDURE — 92928 PRQ TCAT PLMT NTRAC ST 1 LES: CPT

## 2021-07-08 PROCEDURE — 36415 COLL VENOUS BLD VENIPUNCTURE: CPT

## 2021-07-08 PROCEDURE — C1725 CATH, TRANSLUMIN NON-LASER: HCPCS

## 2021-07-08 PROCEDURE — 2709999900 HC NON-CHARGEABLE SUPPLY

## 2021-07-08 PROCEDURE — C1894 INTRO/SHEATH, NON-LASER: HCPCS

## 2021-07-08 PROCEDURE — 93005 ELECTROCARDIOGRAM TRACING: CPT | Performed by: INTERNAL MEDICINE

## 2021-07-08 PROCEDURE — C1874 STENT, COATED/COV W/DEL SYS: HCPCS

## 2021-07-08 PROCEDURE — 99152 MOD SED SAME PHYS/QHP 5/>YRS: CPT | Performed by: INTERNAL MEDICINE

## 2021-07-08 PROCEDURE — 93458 L HRT ARTERY/VENTRICLE ANGIO: CPT | Performed by: INTERNAL MEDICINE

## 2021-07-08 PROCEDURE — 99152 MOD SED SAME PHYS/QHP 5/>YRS: CPT

## 2021-07-08 PROCEDURE — 6360000002 HC RX W HCPCS

## 2021-07-08 PROCEDURE — C1769 GUIDE WIRE: HCPCS

## 2021-07-08 PROCEDURE — 92921 HC PRQ CARDIAC ANGIO ADDL ART: CPT

## 2021-07-08 PROCEDURE — 85027 COMPLETE CBC AUTOMATED: CPT

## 2021-07-08 PROCEDURE — 92928 PRQ TCAT PLMT NTRAC ST 1 LES: CPT | Performed by: INTERNAL MEDICINE

## 2021-07-08 PROCEDURE — 80048 BASIC METABOLIC PNL TOTAL CA: CPT

## 2021-07-08 PROCEDURE — 99153 MOD SED SAME PHYS/QHP EA: CPT

## 2021-07-08 PROCEDURE — 6360000004 HC RX CONTRAST MEDICATION: Performed by: INTERNAL MEDICINE

## 2021-07-08 RX ORDER — ROSUVASTATIN CALCIUM 20 MG/1
20 TABLET, COATED ORAL DAILY
Qty: 90 TABLET | Refills: 1 | Status: SHIPPED | OUTPATIENT
Start: 2021-07-08 | End: 2021-11-29

## 2021-07-08 RX ORDER — CLOPIDOGREL BISULFATE 75 MG/1
75 TABLET ORAL DAILY
Qty: 90 TABLET | Refills: 3 | Status: SHIPPED | OUTPATIENT
Start: 2021-07-08 | End: 2022-03-29 | Stop reason: SDUPTHER

## 2021-07-08 RX ORDER — ASPIRIN 81 MG/1
81 TABLET ORAL DAILY
Qty: 90 TABLET | Refills: 1 | Status: SHIPPED | OUTPATIENT
Start: 2021-07-08 | End: 2021-11-29

## 2021-07-08 RX ADMIN — IOPAMIDOL 178 ML: 755 INJECTION, SOLUTION INTRAVENOUS at 10:33

## 2021-07-08 NOTE — TELEPHONE ENCOUNTER
[facility] should be seen in 2 weeks with NP. NPTS has availability on 7/22 at 9:30. Please call patient and get her in.

## 2021-07-08 NOTE — TELEPHONE ENCOUNTER
Cath lab calling to make 1 week follow up with NP post cath [facility]. Patient must have next Tuesday or Thursday but there are no NP appts .  Please call patient to offer work in appt

## 2021-07-08 NOTE — OP NOTE
Patient:  Naa Amaro   :   1945    Procedural Summary  ~Consent:   Obtained written and verbal consent      Risks/benefits explained in detail  ~Procedure:    Left Heart Catheterization  ~Medications:    Procedural sedation with minimal conscious sedation  ~Complications:   None  ~Blood Loss:    <10cc  ~Specimens:    None obtained  ~Pre-sedation re-evaluation: Performed immediately prior to procedure. Medication and Procedural Reconciliation:  An independent trained observer pushed medications at my direction. We monitored the patient's level of consciousness and vital signs/physiologic status throughout the procedure duration (see start and stop times below). Sedation: 2.5 mg Versed, 100 mcg Fentanyl  Sedation start: 909  Sedation stop:     Cardiac Cath LVG, PCI:  Anatomy:   LM-distal 20%  LAD-mid 80%  Cx-nml  OM- nml  RCA-distal 80%  RPDA- nml  LVEF- 60  LVG- nml  LVEDP- 6    Intervention  ~Successful PCI to LAD with 2.75x38 MAX. PD with 3.0x15 NC. Prox D1 99% stent half-way. Resolved with 1.5x12 balloon angioplasty. Successful PCI to RCA with 3.25x23 MAX to 14atm. Excellent Result. Contrast: 178  Flouro Time: 15.7  Access: R CFA. Ultrasound guidance used to determine aforementioned artery patency, size (>2mm), anatomic variations and ideal puncture location. Real-time ultrasound utilized concurrent with vascular needle entry into the artery. Image(s) permanently recorded and reported in the patient chart. Perc stick safe zone    Impression  ~Coronary Angiography w/ severe 2VD. ~LVG with LVEF of 60 and no regional wall motion abnormalities  ~Successful complex angioplasty and stenting of LAD/RCA        Recommendation  ~Aggressive medical treatment and risk factor modification  ~1. Post cath IVF. Bedrest.   2. Recommend beta blocker, high potency statin, aspirin and plavix for 6-12 months. No ace/arb required given normal LVEF.   3. Referral to outpatient cardiac rehab phase II will be deferred until patient follow-up in office and then determine patient safety and appropriateness to proceed  4. Patient has been advised on the importance of regular exercise of at least 20-30 minutes daily. 5. Patient counseled about and offered assistance for smoking cessation   6.  Follow up in 2 weeks with cardiology            Mauricio Hoff MD, MD 7/8/2021 11:26 AM

## 2021-07-08 NOTE — PRE SEDATION
Brief Pre-Op Note/Sedation Assessment      Efren Sherman  1945  Cath/NONE      [id number]  9:04 AM    Planned Procedure: Cardiac Catheterization Procedure    Post Procedure Plan: Return to same level of care    Consent: I have discussed with the patient and/or the patient representative the indication, alternatives, and the possible risks and/or complications of the planned procedure and the anesthesia methods. The patient and/or patient representative appear to understand and agree to proceed. Chief Complaint: Chest Pain/Pressure  Anginal Equivalent      Indications for Cath Procedure:  New Onset Angina <= 2 months, Worsening Angina and Suspected CAD  Anginal Classification within 2 weeks:  CCS IV - Inability to perform any activity without angina or angina at rest, i.e., severe limitation  NYHA Heart Failure Class within 2 weeks: No symptoms  Is Cath Lab Visit Valve-related?: No  Surgical Risk: High Risk: Vascular  Functional Type: >= 4 METS with symptoms    Anti- Anginal Meds within 2 weeks:   Yes: Beta Blockers, Aspirin and Statin (Any)    Stress or Imaging Studies Performed:  None     Vital Signs:  BP (!) 170/90   Ht 5' 2\" (1.575 m)   Wt 181 lb (82.1 kg)   LMP  (LMP Unknown)   BMI 33.11 kg/m²     Allergies:   Allergies   Allergen Reactions    Amoxicillin Anaphylaxis    Demeclocycline Anaphylaxis    Penicillins Anaphylaxis    Tetracyclines & Related Anaphylaxis    Ozempic (0.25 Or 0.5 Mg-Dose) [Semaglutide(0.25 Or 0.5mg-Dos)] Other (See Comments)     Lost large amount weight and loss  Of appetite    Codeine Itching and Rash       Past Medical History:  Past Medical History:   Diagnosis Date    Arthritis     Diabetes ([facility] [address]     Hemodialysis patient ([facility] [address]     Hyperlipidemia     Hypertension     Kidney disease     Neuropathy     Pneumonia     IN February, 2021, with COVID    Thyroid disease          Surgical History:  Past Surgical History:   Procedure Laterality Date    APPENDECTOMY  CHOLECYSTECTOMY      COLONOSCOPY  11/24/2020    COLONOSCOPY POLYPECTOMY SNARE/COLD BIOPSY performed by Cassie Estrada MD at [address] 5/17/2021    PERITONEAL DIALYSIS CATHETER REMOVAL. EXCISSION OF ABDOMINAL CITRIX. performed by Brady Dobson DO at [address] N/A 4/3/2020    LAPAROSCOPIC LYSIS OF ADHESIONS, LAPAROSCOPIC PERITONEAL DIALYSIS CATHETER PLACEMENT, LAPAROSCOPIC OMENTOPEXY performed by Brady Dobson DO at [facility], BILATERAL      preventive    ROTATOR CUFF REPAIR Left     UPPER GASTROINTESTINAL ENDOSCOPY N/A 11/24/2020    EGD BIOPSY performed by Cassie Estrada MD at [address] [place] [address]         Medications:  No current facility-administered medications for this encounter. Pre-Sedation:    Pre-Sedation Documentation and Exam:  I have personally completed a history, physical exam & review of systems for this patient (see notes). Prior History of Anesthesia Complications:   none    Modified Mallampati:  II (soft palate, uvula, fauces visible)    ASA Classification:  Class 2 - A normal healthy patient with mild systemic disease      Nanette Scale: Activity:  2 - Able to move 4 extremities voluntarily on command  Respiration:  2 - Able to breathe deeply and cough freely  Circulation:  2 - BP+/- 20mmHg of normal  Consciousness:  2 - Fully awake  Oxygen Saturation (color):  2 - Able to maintain oxygen saturation >92% on room air    Sedation/Anesthesia Plan:  Guard the patient's safety and welfare. Minimize physical discomfort and pain. Minimize negative psychological responses to treatment by providing sedation and analgesia and maximize the potential amnesia. Patient to meet pre-procedure discharge plan.     Medication Planned:  midazolam intravenously and fentanyl intravenously    Patient is an appropriate candidate for plan of sedation: yes      Electronically signed by Robert Lowery MD on 7/8/2021 at 9:04 AM

## 2021-07-08 NOTE — H&P
[place] [address]   Cardiac Consultation    Referring Provider:  Brittney Kimbrough MD     No chief complaint on file. History of Present Illness:  Patient is a 67 y.o. female with history of end-stage renal disease on hemodialysis, diabetes, hypertension and hyperlipidemia. She reports she feels chest heaviness with steps, her grandson has many steps at his house, she will have to stop and rest due to the heaviness. Though still experiences heaviness after a few steps at her home. She has had some episodes of hypotension during dialysis, she has had a couple episodes where she was difficult to arouse, BP was low at the time. She reports she generally sleeps during dialysis. Women on her mom's side  of cancer,her aunt heart attack at 61. Hospitalized in [facility] in April for Covid. Previously denied for Kidney transplant through . Past Medical History:   has a past medical history of Arthritis, Diabetes ([facility] [address] Hemodialysis patient ([facility] [address] Hyperlipidemia, Hypertension, Kidney disease, Neuropathy, Pneumonia, and Thyroid disease. Surgical History:   has a past surgical history that includes Appendectomy; Mastectomy, bilateral; Rotator cuff repair (Left); Cholecystectomy; LAPAROSCOPY INSERTION PERITONEAL CATHETER (N/A, 4/3/2020); Upper gastrointestinal endoscopy (N/A, 2020); Colonoscopy (2020); Hysterectomy; and Dialysis Catheter Removal (N/A, 2021). Social History:   reports that she quit smoking about 43 years ago. Her smoking use included cigarettes. She has a 5.00 pack-year smoking history. She has never used smokeless tobacco. She reports current alcohol use. She reports that she does not use drugs. Family History:  family history includes Cancer in her maternal grandmother and mother; No Known Problems in her paternal grandfather and paternal grandmother; Stomach Cancer in her brother; Stroke in her father.      Home Medications:  Prior to Admission medications Medication Sig Start Date End Date Taking? Authorizing Provider   gabapentin (NEURONTIN) 100 MG capsule Take 2 capsules by mouth nightly for 30 days. 7/7/21 8/6/21 Yes Veronica Oquendo MD   UNABLE TO FIND Dialysis mon wed fri   Yes Historical Provider, MD   irbesartan-hydroCHLOROthiazide (AVALIDE) 150-12.5 MG per tablet TAKE 1 TABLET EVERY DAY  Patient taking differently: daily  5/28/21  Yes Veronica Oquendo MD   atenolol (TENORMIN) 25 MG tablet Take 1 tablet by mouth daily   Yes Historical Provider, MD   loperamide (IMODIUM) 2 MG capsule Take 2 mg by mouth 4 times daily as needed 2/14/21  Yes Historical Provider, MD   metoprolol tartrate (LOPRESSOR) 25 MG tablet Take 12.5 mg by mouth 2 times daily 2/14/21 2/14/22 Yes Historical Provider, MD   levothyroxine (SYNTHROID) 150 MCG tablet Take 150 mcg by mouth every morning (before breakfast)   Yes Historical Provider, MD   pravastatin (PRAVACHOL) 20 MG tablet Take 20 mg by mouth nightly   Yes Historical Provider, MD   torsemide (DEMADEX) 20 MG tablet Take 20 mg by mouth 3 tablets in am, 3 tablets in pm   Yes Historical Provider, MD   allopurinol (ZYLOPRIM) 100 MG tablet TAKE 1 TABLET EVERY DAY 9/1/20  Yes Veronica Oquendo MD   spironolactone (ALDACTONE) 25 MG tablet TAKE 1 TABLET EVERY DAY  Patient taking differently: nightly TAKE 1 TABLET EVERY DAY 8/31/20  Yes Veronica Oquendo MD        Allergies:  Amoxicillin, Demeclocycline, Penicillins, Tetracyclines & related, Ozempic (0.25 or 0.5 mg-dose) [semaglutide(0.25 or 0.5mg-dos)], and Codeine     Review of Systems:   · Constitutional: there has been no unanticipated weight loss. There's been no change in energy level, sleep pattern, or activity level. · Eyes: No visual changes or diplopia. No scleral icterus. · ENT: No Headaches, hearing loss or vertigo. No mouth sores or sore throat. · Cardiovascular: Reviewed in HPI  · Respiratory: No cough or wheezing, no sputum production. No hematemesis. · Gastrointestinal: No abdominal pain, appetite loss, blood in stools. No change in bowel or bladder habits. · Genitourinary: No dysuria, trouble voiding, or hematuria. · Musculoskeletal:  No gait disturbance, weakness or joint complaints. · Integumentary: No rash or pruritis. · Neurological: No headache, diplopia, change in muscle strength, numbness or tingling. No change in gait, balance, coordination, mood, affect, memory, mentation, behavior. · Psychiatric: No anxiety, no depression. · Endocrine: No malaise, fatigue or temperature intolerance. No excessive thirst, fluid intake, or urination. No tremor. · Hematologic/Lymphatic: No abnormal bruising or bleeding, blood clots or swollen lymph nodes. · Allergic/Immunologic: No nasal congestion or hives. Physical Examination:    Vitals:    07/08/21 0810   BP: (!) 170/90        Wt Readings from Last 1 Encounters:   07/08/21 181 lb (82.1 kg)       Constitutional and General Appearance: NAD   Respiratory:  · Normal excursion and expansion without use of accessory muscles  · Resp Auscultation: Normal breath sounds without dullness  Cardiovascular:  · The apical impulses not displaced  · Heart tones are crisp and normal  · Cervical veins are not engorged  · The carotid upstroke is normal in amplitude and contour without delay or bruit  · Peripheral pulses are symmetrical and full  · There is no clubbing, cyanosis of the extremities. · No edema  · Femoral Arteries: 2+ and equal  · Pedal Pulses: 2+ and equal   Abdomen:  · No masses or tenderness  · Liver/Spleen: No Abnormalities Noted  Neurological/Psychiatric:  · Alert and oriented in all spheres  · Moves all extremities well  · Exhibits normal gait balance and coordination  · No abnormalities of mood, affect, memory, mentation, or behavior are noted      Assessment/Plan:    1.  Hypertension - Blood pressure 138/78, pulse 90, height 5' 2\" (1.575 m), weight 181 lb (82.1 kg), SpO2 98 %  -no change in BP sitting

## 2021-07-19 ENCOUNTER — TELEPHONE (OUTPATIENT)
Dept: FAMILY MEDICINE CLINIC | Age: 76
End: 2021-07-19

## 2021-07-19 ENCOUNTER — NURSE TRIAGE (OUTPATIENT)
Dept: OTHER | Facility: CLINIC | Age: 76
End: 2021-07-19

## 2021-07-19 NOTE — TELEPHONE ENCOUNTER
----- Message from Ralph Potter sent at 7/19/2021 11:35 AM EDT -----  Subject: Appointment Request    Reason for Call: Routine Return from RN Triage    QUESTIONS  Type of Appointment? Established Patient  Reason for appointment request? Available appointments did not meet   patient need  Additional Information for Provider? Per NT pt needs to be seen within 2   weeks for chronic fatigue, weakness and leg issues. Screened Red due to   cough. Offered to schedule VV on 7/27 (pts preferred date), however pt   requested to be seen in office. ---------------------------------------------------------------------------  --------------  Elvi Grass INFO  What is the best way for the office to contact you? OK to leave message on   voicemail  Preferred Call Back Phone Number? [phone]  ---------------------------------------------------------------------------  --------------  SCRIPT ANSWERS  Patient can be seen for a routine visit? Yes  Nurse Name? Benjamin Merino  Have you been diagnosed with, awaiting test results for, or told that you   are suspected of having COVID-19 (Coronavirus)? (If patient has tested   negative or was tested as a requirement for work, school, or travel and   not based on symptoms, answer no)? Yes  Did your symptoms begin within the past 14 days or was your positive test   result within the past 14 days? No  Do you currently have flu-like symptoms including fever or chills, cough,   shortness of breath, difficulty breathing, or new loss of taste or smell?    Yes

## 2021-07-19 NOTE — TELEPHONE ENCOUNTER
Reason for Disposition   Fatigue is a chronic symptom (recurrent or ongoing AND present > 4 weeks)    Answer Assessment - Initial Assessment Questions  1. DESCRIPTION: \"Describe how you are feeling. \"      Feels \"tired just walking from the table to the bathroom\"    2. SEVERITY: \"How bad is it? \"  \"Can you stand and walk? \"    - MILD - Feels weak or tired, but does not interfere with work, school or normal activities    - [facility] to stand and walk; weakness interferes with work, school, or normal activities    - SEVERE - Unable to stand or walk      Mild/Moderate    3. ONSET:  \"When did the weakness begin? \"    Last night- kidney failure    4. CAUSE: \"What do you think is causing the weakness? \"      Vomiting and nausea last night    5. MEDICINES: Vedia Gather you recently started a new medicine or had a change in the amount of a medicine? \"      Skipped dialysis today, going to dialysis tomorrow. 6. OTHER SYMPTOMS: \"Do you have any other symptoms? \" (e.g., chest pain, fever, cough, SOB, vomiting, diarrhea, bleeding, other areas of pain)      Nausea and vomiting last night- has happened before. Lack of sleep. 7. PREGNANCY: \"Is there any chance you are pregnant? \" \"When was your last menstrual period? \"      N/A    Protocols used: WEAKNESS (GENERALIZED) AND FATIGUE-ADULT-OH     Received call from [facility] with Red Flag Complaint.     Brief description of triage: See above.     Triage indicates for patient to be seen in the next two weeks. Patient reporting chronic issues with left leg, fatigue, weakness, and difficulty sleeping.   Daughter is coming to visit next week and patient is requesting appointment for next Tuesday.     Care advice provided, patient verbalizes understanding; denies any other questions or concerns; instructed to call back for any new or worsening symptoms.     Writer provided warm transfer to [facility] for appointment scheduling.     Attention Provider: Thank you for allowing me to participate in the care of your patient. The patient was connected to triage in response to information provided to the ECC. Please do not respond through this encounter as the response is not directed to a shared pool.

## 2021-07-19 NOTE — TELEPHONE ENCOUNTER
Reason for Disposition   Fatigue is a chronic symptom (recurrent or ongoing AND present > 4 weeks)    Answer Assessment - Initial Assessment Questions  1. DESCRIPTION: \"Describe how you are feeling. \"      Feels \"tired just walking from the table to the bathroom\"    2. SEVERITY: \"How bad is it? \"  \"Can you stand and walk? \"    - MILD - Feels weak or tired, but does not interfere with work, school or normal activities    - [facility] to stand and walk; weakness interferes with work, school, or normal activities    - SEVERE - Unable to stand or walk      Mild/Moderate    3. ONSET:  \"When did the weakness begin? \"    Last night- kidney failure    4. CAUSE: \"What do you think is causing the weakness? \"      Vomiting and nausea last night    5. MEDICINES: Laverta Moriarty you recently started a new medicine or had a change in the amount of a medicine? \"      Skipped dialysis today, going to dialysis tomorrow. 6. OTHER SYMPTOMS: \"Do you have any other symptoms? \" (e.g., chest pain, fever, cough, SOB, vomiting, diarrhea, bleeding, other areas of pain)      Nausea and vomiting last night- has happened before. Lack of sleep. 7. PREGNANCY: \"Is there any chance you are pregnant? \" \"When was your last menstrual period? \"      N/A    Protocols used: WEAKNESS (GENERALIZED) AND FATIGUE-ADULT-OH    Received call from [facility] with Red Flag Complaint. Brief description of triage: See above. Triage indicates for patient to be seen in the next two weeks. Patient reporting chronic issues with left leg, fatigue, weakness, and difficulty sleeping. Daughter is coming to visit next week and patient is requesting appointment for next Tuesday. Care advice provided, patient verbalizes understanding; denies any other questions or concerns; instructed to call back for any new or worsening symptoms. Writer provided warm transfer to [place] [facility] for appointment scheduling.     Attention Provider: Thank you for allowing me to participate in the care of your patient. The patient was connected to triage in response to information provided to the ECC. Please do not respond through this encounter as the response is not directed to a shared pool.

## 2021-07-22 ENCOUNTER — OFFICE VISIT (OUTPATIENT)
Dept: CARDIOLOGY CLINIC | Age: 76
End: 2021-07-22
Payer: MEDICARE

## 2021-07-22 VITALS
HEART RATE: 80 BPM | WEIGHT: 179.8 LBS | SYSTOLIC BLOOD PRESSURE: 142 MMHG | DIASTOLIC BLOOD PRESSURE: 70 MMHG | OXYGEN SATURATION: 96 % | BODY MASS INDEX: 32.89 KG/M2

## 2021-07-22 DIAGNOSIS — I10 ESSENTIAL HYPERTENSION: ICD-10-CM

## 2021-07-22 DIAGNOSIS — I25.10 CORONARY ARTERY DISEASE INVOLVING NATIVE CORONARY ARTERY OF NATIVE HEART WITHOUT ANGINA PECTORIS: Primary | ICD-10-CM

## 2021-07-22 DIAGNOSIS — E78.2 MIXED HYPERLIPIDEMIA: ICD-10-CM

## 2021-07-22 PROCEDURE — 99214 OFFICE O/P EST MOD 30 MIN: CPT | Performed by: NURSE PRACTITIONER

## 2021-07-22 NOTE — PROGRESS NOTES
[facility]     Outpatient Follow Up Note    CHIEF COMPLAINT / HPI: Hospital Follow Up secondary to status post coronary artery stenting    Hospital record has been reviewed  Hospital Course progressed as follows per discharge summary: Elective procedure d/t complaints of chest heaviness. She had undergone a successful PTCA to the LAD & RCA with POBA diag-1     Kendal Lobato is 67 y.o. female who presents today for a routine follow up after a recent hospitalization related to the above mentioned issues. She recalls not feeling bad bad but every once in awhile she'd feel a heaviness in her chest  Subjective:   Since the time of discharge, the patient admits their symptoms have improved. She feels good. She denies recurrence of chest heaviness. She still feels winded after going up 8 steps. At times she feels SOB walking longer distances yet she did ok today walking in from the parking lot. She sleeps with 3 pillows to breath easier otherwise her sinus stop up. She denies PND. She hasn't slept well the past few nights. She thinks she may have RLS. She is unaware of snoring. She denies significant chest pain. The patient is not experiencing palpitations. She has times of feeling light headed when standing up. Her home BP : 160/80 to 118/78. She has not need to hold any of her meds on dialysis days. Her dry wt is around 81 kg. She doesn't gain much b/w runs. She continues to void daily in good amts. These symptoms are improving over the last many days. With regard to medication therapy the patient has been compliant with prescribed regimen. They have tolerated therapy to date.      Positive for COVID-19 April '21 with subsequent PNA and diarrhea    Past Medical History:   Diagnosis Date    Arthritis     [facility] ([facility] [address] [facility]     Hemodialysis patient ([facility]     Hyperlipidemia     Hypertension     Kidney disease     Neuropathy     Pneumonia     IN February, 2021, with COVID    Thyroid disease      Social History:    Social History     Tobacco Use   Smoking Status Former Smoker    Packs/day: 0.50    Years: 10.00    Pack years: 5.00    Types: Cigarettes    Quit date: 1978    Years since quittin.0   Smokeless Tobacco Never Used     Current Medications:  Current Outpatient Medications   Medication Sig Dispense Refill    rosuvastatin (CRESTOR) 20 MG tablet Take 1 tablet by mouth daily 90 tablet 1    clopidogrel (PLAVIX) 75 MG tablet Take 1 tablet by mouth daily 90 tablet 3    aspirin EC 81 MG EC tablet Take 1 tablet by mouth daily 90 tablet 1    gabapentin (NEURONTIN) 100 MG capsule Take 2 capsules by mouth nightly for 30 days. 60 capsule 3    UNABLE TO FIND Dialysis       irbesartan-hydroCHLOROthiazide (AVALIDE) 150-12.5 MG per tablet TAKE 1 TABLET EVERY DAY (Patient taking differently: daily ) 90 tablet 1    loperamide (IMODIUM) 2 MG capsule Take 2 mg by mouth 4 times daily as needed      metoprolol tartrate (LOPRESSOR) 25 MG tablet Take 12.5 mg by mouth 2 times daily      levothyroxine (SYNTHROID) 150 MCG tablet Take 150 mcg by mouth every morning (before breakfast)      torsemide (DEMADEX) 20 MG tablet Take 20 mg by mouth 3 tablets in am, 3 tablets in pm      allopurinol (ZYLOPRIM) 100 MG tablet TAKE 1 TABLET EVERY DAY 90 tablet 3    spironolactone (ALDACTONE) 25 MG tablet TAKE 1 TABLET EVERY DAY (Patient taking differently: 25 mg daily ) 90 tablet 3     No current facility-administered medications for this visit. REVIEW OF SYSTEMS:   CONSTITUTIONAL: No major weight gain or loss, fatigue, weakness, night sweats or fever. There's been no change in energy level, sleep pattern, or activity level. HEENT: No new vision difficulties or ringing in the ears. RESPIRATORY: No new SOB, PND, orthopnea or cough. CARDIOVASCULAR: See HPI  GI: No nausea, vomiting, + diarrhea, - constipation, abdominal pain or changes in bowel habits.   : No urinary frequency, urgency, incontinence hematuria or dysuria. SKIN: No cyanosis or skin lesions. MUSCULOSKELETAL: No new muscle or joint pain. NEUROLOGICAL: No syncope or TIA-like symptoms. PSYCHIATRIC: No anxiety, pain, insomnia or depression    Objective:   PHYSICAL EXAM:       Vitals:    07/22/21 0906 07/22/21 0925   BP: (!) 140/80 (!) 142/70   Site: Right Upper Arm    Position: Sitting    Cuff Size: Large Adult    Pulse: 80    SpO2: 96%    Weight: 179 lb 12.8 oz (81.6 kg)         VITALS:  BP (!) 140/80 (Site: Right Upper Arm, Position: Sitting, Cuff Size: Large Adult)   Pulse 80   Wt 179 lb 12.8 oz (81.6 kg)   LMP  (LMP Unknown)   SpO2 96%   BMI 32.89 kg/m²     CONSTITUTIONAL: Cooperative, no apparent distress, and appears well nourished / developed  NEUROLOGIC:  Awake and orientated to person, place and time. PSYCH: Calm affect. SKIN: Warm and dry; Rt groin unremarkable . HEENT: Sclera non-icteric, normocephalic, neck supple, no elevation of JVP, normal carotid pulses with no bruits and thyroid normal size. LUNGS:  No increased work of breathing and clear to auscultation, no crackles or wheezing. CARDIOVASCULAR:  Regular rate and rhythm with no murmurs, gallops, rubs, or abnormal heart sounds, normal PMI. The apical impulses not displaced. Heart tones are crisp and normal                                                                                            Cervical veins are not engorged                 JVP less than 8 cm H2O                                                                              The carotid upstroke is normal in amplitude and contour without delay or bruit    ABDOMEN:  Normal bowel sounds, non-distended and non-tender to palpation   EXT: No edema, no calf tenderness. Pulses are present bilaterally.     DATA:    Lab Results   Component Value Date    ALT 15 10/30/2019    AST 18 10/30/2019    ALKPHOS 77 10/30/2019    BILITOT 0.3 10/30/2019     Lab Results Component Value Date    CREATININE 6.2 (HH) 07/08/2021    BUN 39 (H) 07/08/2021     07/08/2021    K 5.1 07/08/2021    CL 98 (L) 07/08/2021    CO2 24 07/08/2021     Lab Results   Component Value Date    TSH 2.64 08/15/2019    K0ZANPP 1.00 08/24/2017     Lab Results   Component Value Date    WBC 7.5 07/08/2021    HGB 11.9 (L) 07/08/2021    HCT 36.6 07/08/2021    MCV 95.2 07/08/2021     07/08/2021     No components found for: CHLPL  Lab Results   Component Value Date    TRIG 432 (H) 08/15/2019    TRIG 428 (H) 05/22/2019    TRIG 442 (H) 01/25/2017     Lab Results   Component Value Date    HDL 34 (L) 08/15/2019    HDL 41 05/22/2019    HDL 38 (L) 01/25/2017     Lab Results   Component Value Date    LDLCALC see below 08/15/2019    [address] see below 05/22/2019    [address] see below 01/25/2017     Lab Results   Component Value Date    LABVLDL see below 08/15/2019    LABVLDL see below 05/22/2019    LABVLDL see below 01/25/2017     Radiology Review:  Pertinent images / reports were reviewed as a part of this visit and reveals the following:    Myoview: April '20:  Summary    There is normal isotope uptake at stress and rest. There is no evidence of    myocardial ischemia or scar.  LVEF is normal at 69% with normal LV wall motion.         Last Echo: Jun '19:   Summary   -Normal left ventricle size and systolic function with an estimated ejection   fraction of 55%. -There is mild concentric left ventricular hypertrophy.   - No regional wall motion abnormalities are seen. -E/e\"= 13.3 .   -Normal filling pressure. -Mild tricuspid regurgitation. Last Angiogram: 7/8/21:  LM-distal 20%  LAD-mid 80%  Cx-nml  OM- nml  RCA-distal 80%  RPDA- nml  LVEF- 60  LVG- nml  LVEDP- 6  Intervention  ~Successful PCI to LAD with 2.75x38 MAX. PD with 3.0x15 NC. Prox D1 99% stent correction. Resolved with 1.5x12 balloon angioplasty. Successful PCI to RCA with 3.25x23 MAX to 14atm.  Excellent Result    Assessment:      Diagnosis Orders   1. Coronary artery disease involving native coronary artery of native heart without angina pectoris   ~s/p PTCA LAD, POBA diag and PTCA RCA  ~denies recurrence of chest heaviness  ~continues to feel SOB at long distances and steps and ongoing since COVID PNA in April . Neg to exam for crackles and fluid management via hemodialysis  ~normal LVEF  ~DAPT / statin / BB  ~walks routinely     2. Essential hypertension   ~reasonably controlled in office today  ~variable by home readings: 118 - 160/     3. Mixed hyperlipidemia   ~crestor new  ~baseline lipid profile unavailable : we will attempt to obtain her results believed to be done at [facility]'s office Lipid Panel    Hepatic Function Panel     Patient  is stable since hospital discharge. Plan: Lipid profile / LFTs in the next couple of weeks    F/U in 3 months      I have addressed the patient's cardiac risk factors and adjusted pharmacologic treatment as needed. In addition, I have reinforced the need for patient directed risk factor modification. Further evaluation will be based upon the patient's clinical course and testing results. All questions and concerns were addressed to the patient. Alternatives to  treatment were discussed. The patient  currently  is not smoking: remote. The risks related to smoking were reviewed with the patient. Recommend maintaining a smoke-free lifestyle. Daily weight, low sodium diet were discussed. Patient instructed to call the office with a weight gain: > 3 # over night or 5# in one week; swelling, SOB/orthopnea/PND    Dual Antiplatelet therapy has been recommended / prescribed for this patient. Education conducted on adverse reactions including bleeding was discussed. The patient verbalizes understanding. Pt is on a BB  Pt is on an ace-i/ARB : ESRD / hemodialysis  Pt is on a statin      Saturated fat diet discussed : diet controlled diabetic.  Last A1c 4.1%  Exercise program discussed : walks daily ; defer CR II d/t limited transportation and amt of walking she does routinely    Thank you for allowing to us to participate in the care of [employer].       [address]  Documentation of today's visit sent to PCP

## 2021-07-22 NOTE — PATIENT INSTRUCTIONS
Fasting cholesterol levels in the next couple of weeks since you now take crestor    appt in 3 months

## 2021-07-23 ENCOUNTER — TELEPHONE (OUTPATIENT)
Dept: CARDIOLOGY CLINIC | Age: 76
End: 2021-07-23

## 2021-07-26 ENCOUNTER — OFFICE VISIT (OUTPATIENT)
Dept: FAMILY MEDICINE CLINIC | Age: 76
End: 2021-07-26
Payer: MEDICARE

## 2021-07-26 VITALS
HEART RATE: 71 BPM | SYSTOLIC BLOOD PRESSURE: 130 MMHG | DIASTOLIC BLOOD PRESSURE: 80 MMHG | OXYGEN SATURATION: 98 % | WEIGHT: 183 LBS | BODY MASS INDEX: 33.68 KG/M2 | HEIGHT: 62 IN

## 2021-07-26 DIAGNOSIS — M79.662 PAIN OF LEFT CALF: ICD-10-CM

## 2021-07-26 DIAGNOSIS — E78.00 HIGH CHOLESTEROL: ICD-10-CM

## 2021-07-26 DIAGNOSIS — E11.69 DIABETES MELLITUS TYPE 2 IN OBESE (HCC): ICD-10-CM

## 2021-07-26 DIAGNOSIS — E11.22 CHRONIC KIDNEY DISEASE WITH END STAGE RENAL DISEASE ON DIALYSIS DUE TO TYPE 2 DIABETES MELLITUS (HCC): ICD-10-CM

## 2021-07-26 DIAGNOSIS — N18.6 CHRONIC KIDNEY DISEASE WITH END STAGE RENAL DISEASE ON DIALYSIS DUE TO TYPE 2 DIABETES MELLITUS (HCC): ICD-10-CM

## 2021-07-26 DIAGNOSIS — I25.118 CORONARY ARTERY DISEASE OF NATIVE ARTERY OF NATIVE HEART WITH STABLE ANGINA PECTORIS (HCC): ICD-10-CM

## 2021-07-26 DIAGNOSIS — E11.42 DIABETIC POLYNEUROPATHY ASSOCIATED WITH TYPE 2 DIABETES MELLITUS (HCC): ICD-10-CM

## 2021-07-26 DIAGNOSIS — Z00.00 ROUTINE GENERAL MEDICAL EXAMINATION AT A HEALTH CARE FACILITY: Primary | ICD-10-CM

## 2021-07-26 DIAGNOSIS — Z99.2 CHRONIC KIDNEY DISEASE WITH END STAGE RENAL DISEASE ON DIALYSIS DUE TO TYPE 2 DIABETES MELLITUS (HCC): ICD-10-CM

## 2021-07-26 DIAGNOSIS — R41.3 MEMORY LOSS: ICD-10-CM

## 2021-07-26 DIAGNOSIS — I10 ESSENTIAL HYPERTENSION: ICD-10-CM

## 2021-07-26 DIAGNOSIS — E66.9 DIABETES MELLITUS TYPE 2 IN OBESE (HCC): ICD-10-CM

## 2021-07-26 PROCEDURE — 4040F PNEUMOC VAC/ADMIN/RCVD: CPT | Performed by: FAMILY MEDICINE

## 2021-07-26 PROCEDURE — 1123F ACP DISCUSS/DSCN MKR DOCD: CPT | Performed by: FAMILY MEDICINE

## 2021-07-26 PROCEDURE — G0438 PPPS, INITIAL VISIT: HCPCS | Performed by: FAMILY MEDICINE

## 2021-07-26 RX ORDER — NORTRIPTYLINE HYDROCHLORIDE 10 MG/1
10 CAPSULE ORAL NIGHTLY
Qty: 30 CAPSULE | Refills: 3 | Status: SHIPPED | OUTPATIENT
Start: 2021-07-26 | End: 2021-08-17

## 2021-07-26 ASSESSMENT — LIFESTYLE VARIABLES
HOW OFTEN DURING THE LAST YEAR HAVE YOU FAILED TO DO WHAT WAS NORMALLY EXPECTED FROM YOU BECAUSE OF DRINKING: 0
HOW OFTEN DO YOU HAVE A DRINK CONTAINING ALCOHOL: 1
AUDIT-C TOTAL SCORE: 1
HAVE YOU OR SOMEONE ELSE BEEN INJURED AS A RESULT OF YOUR DRINKING: 0
HOW OFTEN DURING THE LAST YEAR HAVE YOU NEEDED AN ALCOHOLIC DRINK FIRST THING IN THE MORNING TO GET YOURSELF GOING AFTER A NIGHT OF HEAVY DRINKING: 0
HOW OFTEN DURING THE LAST YEAR HAVE YOU FOUND THAT YOU WERE NOT ABLE TO STOP DRINKING ONCE YOU HAD STARTED: 0
HAS A RELATIVE, FRIEND, DOCTOR, OR ANOTHER HEALTH PROFESSIONAL EXPRESSED CONCERN ABOUT YOUR DRINKING OR SUGGESTED YOU CUT DOWN: 0
HOW OFTEN DURING THE LAST YEAR HAVE YOU HAD A FEELING OF GUILT OR REMORSE AFTER DRINKING: 0
HOW MANY STANDARD DRINKS CONTAINING ALCOHOL DO YOU HAVE ON A TYPICAL DAY: 0
HOW OFTEN DO YOU HAVE SIX OR MORE DRINKS ON ONE OCCASION: 0
HOW OFTEN DURING THE LAST YEAR HAVE YOU BEEN UNABLE TO REMEMBER WHAT HAPPENED THE NIGHT BEFORE BECAUSE YOU HAD BEEN DRINKING: 0
AUDIT TOTAL SCORE: 1

## 2021-07-26 ASSESSMENT — PATIENT HEALTH QUESTIONNAIRE - PHQ9
1. LITTLE INTEREST OR PLEASURE IN DOING THINGS: 0
SUM OF ALL RESPONSES TO PHQ QUESTIONS 1-9: 0
2. FEELING DOWN, DEPRESSED OR HOPELESS: 0
SUM OF ALL RESPONSES TO PHQ QUESTIONS 1-9: 0
SUM OF ALL RESPONSES TO PHQ9 QUESTIONS 1 & 2: 0
SUM OF ALL RESPONSES TO PHQ QUESTIONS 1-9: 0

## 2021-07-26 NOTE — PROGRESS NOTES
Medicare Annual Wellness Visit  Name: Sabas Short Date: 2021   MRN: [id number] Sex: Female   Age: 67 y.o. Ethnicity: Non- / Non    : 1945 Race: White (non-)      Endy Nunez is here for Medicare AWV, Other (lump on back of left leg- been there for over a year- changes in size), Insomnia (rls and neuropathy pain- ), and Other (couple weeks ago had a couple stents put in)    Screenings for behavioral, psychosocial and functional/safety risks, and cognitive dysfunction are all negative except as indicated below. These results, as well as other patient data from the [address] form, are documented in Flowsheets linked to this Encounter. Allergies   Allergen Reactions    Amoxicillin Anaphylaxis    Demeclocycline Anaphylaxis    Penicillins Anaphylaxis    Tetracyclines & Related Anaphylaxis    Ozempic (0.25 Or 0.5 Mg-Dose) [Semaglutide(0.25 Or 0.5mg-Dos)] Other (See Comments)     Lost large amount weight and loss  Of appetite    Codeine Itching and Rash       Prior to Visit Medications    Medication Sig Taking? Authorizing Provider   clopidogrel (PLAVIX) 75 MG tablet Take 1 tablet by mouth daily Yes Robert Lowery MD   aspirin EC 81 MG EC tablet Take 1 tablet by mouth daily Yes Robert Lowery MD   gabapentin (NEURONTIN) 100 MG capsule Take 2 capsules by mouth nightly for 30 days.  Yes Marcia Arnold MD   UNABLE TO FIND Dialysis  Yes Historical Provider, MD   loperamide (IMODIUM) 2 MG capsule Take 2 mg by mouth 4 times daily as needed Yes Historical Provider, MD   metoprolol tartrate (LOPRESSOR) 25 MG tablet Take 12.5 mg by mouth 2 times daily Yes Historical Provider, MD   levothyroxine (SYNTHROID) 150 MCG tablet Take 150 mcg by mouth every morning (before breakfast) Yes Historical Provider, MD   torsemide (DEMADEX) 20 MG tablet Take 20 mg by mouth 3 tablets in am, 3 tablets in pm Yes Historical Provider, MD   allopurinol (ZYLOPRIM) 100 MG tablet TAKE 1 TABLET EVERY DAY Yes Heladio Chance MD   spironolactone (ALDACTONE) 25 MG tablet TAKE 1 TABLET EVERY DAY  Patient taking differently: 25 mg daily  Yes Heladio Chance MD   rosuvastatin (CRESTOR) 20 MG tablet Take 1 tablet by mouth daily  Patient not taking: Reported on 7/26/2021  Bill Farias MD       Past Medical History:   Diagnosis Date    Arthritis     Diabetes [facility])     Hemod[facility] patient ([facility] [address]     Hyperlipidemia     Hypertension     Kidney disease     Neuropathy     Pneumonia     IN February, 2021, with COVID    Thyroid disease        Past Surgical History:   Procedure Laterality Date    APPENDECTOMY      CHOLECYSTECTOMY      COLONOSCOPY  11/24/2020    COLONOSCOPY POLYPECTOMY SNARE/COLD BIOPSY performed by Helio Solano MD at [address] 5/17/2021    PERITONEAL DIALYSIS CATHETER REMOVAL.  [address] performed by Angel Morse DO at [address] N/A 4/3/2020    LAPAROSCOPIC LYSIS OF ADHESIONS, LAPAROSCOPIC PERITONEAL DIALYSIS CATHETER PLACEMENT, LAPAROSCOPIC OMENTOPEXY performed by Angel Morse DO at [facility], BILATERAL      preventive    ROTATOR CUFF REPAIR Left     UPPER GASTROINTESTINAL ENDOSCOPY N/A 11/24/2020    EGD BIOPSY performed by Helio Solano MD at [facility]       Family History   Problem Relation Age of Onset    Cancer Mother         lung, breast, liver    Stroke Father     Stomach Cancer Brother     Cancer Maternal Grandmother     No Known Problems Paternal Grandmother     No Known Problems Paternal Grandfather        CareTeam (Including outside providers/suppliers regularly involved in providing care):   Patient Care Team:  Heladio Chance MD as PCP - General (Family Medicine)  Heladio Chance MD as PCP - [facility]  Anthony Sher MD as Consulting Physician (Nephrology)  Annette Hawley DO as Surgeon (Bariatric Surgery)  Brigida Villafuerte MD as Consulting Physician (Vascular Surgery)    Wt Readings from Last 3 Encounters:   07/26/21 183 lb (83 kg)   07/22/21 179 lb 12.8 oz (81.6 kg)   07/08/21 181 lb (82.1 kg)     Vitals:    07/26/21 1638   BP: 130/80   Pulse: 71   SpO2: 98%   Weight: 183 lb (83 kg)   Height: 5' 2\" (1.575 m)     Body mass index is 33.47 kg/m². Based upon direct observation of the patient, evaluation of cognition reveals remote memory intact, recent memory impaired. General Appearance: alert and oriented to person, place and time, well developed and well- nourished, in no acute distress  Skin: warm and dry, no rash or erythema  Head: normocephalic and atraumatic  Eyes: pupils equal, round, and reactive to light, extraocular eye movements intact, conjunctivae normal  ENT: tympanic membrane, external ear and ear canal normal bilaterally, nose without deformity, nasal mucosa and turbinates normal without polyps  Neck: supple and non-tender without mass, no thyromegaly or thyroid nodules, no cervical lymphadenopathy  Pulmonary/Chest: clear to auscultation bilaterally- no wheezes, rales or rhonchi, normal air movement, no respiratory distress  Cardiovascular: normal rate, regular rhythm, normal S1 and S2, no murmurs, rubs, clicks, or gallops, distal pulses intact, no carotid bruits  Abdomen: soft, non-tender, non-distended, normal bowel sounds, no masses or organomegaly  Extremities: no cyanosis, clubbing or edema  Musculoskeletal: normal range of motion, no joint swelling, deformity or tenderness  Neurologic: reflexes normal and symmetric, no cranial nerve deficit, gait, coordination and speech normal  Swollen and tender L calf    Patient's complete Health Risk Assessment and screening values have been reviewed and are found in Flowsheets.  The following problems were reviewed today and where indicated follow up appointments were made and/or referrals ordered. Positive Risk Factor Screenings with Interventions:     Fall Risk:  Timed Up and Go Test > 12 seconds? (Complete if either Fall Risk answers are Yes): no  2 or more falls in past year?: (!) yes  Fall with injury in past year?: no  Fall Risk Interventions:    · Home safety tips provided        General Health and ACP:  General  In general, how would you say your health is?: Fair  In the past 7 days, have you experienced any of the following? New or Increased Pain, New or Increased Fatigue, Loneliness, Social Isolation, Stress or Anger?: (!) New or Increased Pain, New or Increased Fatigue, Stress  Do you get the social and emotional support that you need?: Yes  Do you have a Living Will?: Yes  Advance Directives     Power of  Living Will ACP-Advance Directive ACP-Power of     Not on File Filed on 07/18/20 Filed Not on File      General Health Risk Interventions:  · none    Health Habits/Nutrition:  Health Habits/Nutrition  Do you exercise for at least 20 minutes 2-3 times per week?: (!) No  Have you lost any weight without trying in the past 3 months?: No  Do you eat only one meal per day?: (!) Yes (depends on her mood)  Have you seen the dentist within the past year?: Yes  Body mass index: (!) 33.47  Health Habits/Nutrition Interventions:  · none      ADL:  ADLs  In the past 7 days, did you need help from others to perform any of the following everyday activities? Eating, dressing, grooming, bathing, toileting, or walking/balance?: None  In the past 7 days, did you need help from others to take care of any of the following?  Laundry, housekeeping, banking/finances, shopping, telephone use, food preparation, transportation, or taking medications?: (!) Shopping  ADL Interventions:  · Referred for Care Coordination    Personalized Preventive Plan   Current Health Maintenance Status  Immunization History   Administered Date(s) Administered    Influenza, High Dose (Fluzone 65 yrs and older)

## 2021-07-26 NOTE — LETTER
[address] [address] Thea Hirsch  Phone: [phone]  Fax: [phone]    Pollo Miguel MD         July 26, 2021     Patient: Eddi Fowler   YOB: 1945   Date of Visit: 7/26/2021       To Whom It May Concern: It is my medical opinion that Nikolas Dupont requires a disability parking placard for the following reasons:  She cannot walk 200 feet without stopping to rest.  Duration of need: permanent    If you have any questions or concerns, please don't hesitate to call.     Sincerely,          Pollo Miguel MD

## 2021-07-26 NOTE — PATIENT INSTRUCTIONS
Personalized Preventive Plan for Janna Hull - 7/26/2021  Medicare offers a range of preventive health benefits. Some of the tests and screenings are paid in full while other may be subject to a deductible, co-insurance, and/or copay. Some of these benefits include a comprehensive review of your medical history including lifestyle, illnesses that may run in your family, and various assessments and screenings as appropriate. After reviewing your medical record and screening and assessments performed today your provider may have ordered immunizations, labs, imaging, and/or referrals for you. A list of these orders (if applicable) as well as your Preventive Care list are included within your After Visit Summary for your review. Other Preventive Recommendations:    · A preventive eye exam performed by an eye specialist is recommended every 1-2 years to screen for glaucoma; cataracts, macular degeneration, and other eye disorders. · A preventive dental visit is recommended every 6 months. · Try to get at least 150 minutes of exercise per week or 10,000 steps per day on a pedometer . · Order or download the FREE \"Exercise & Physical Activity: Your Everyday Guide\" from [employer]. Call 3-663.881.8871 or search [employer] online. · You need 5533-7893 mg of calcium and 8702-6310 IU of vitamin D per day. It is possible to meet your calcium requirement with diet alone, but a vitamin D supplement is usually necessary to meet this goal.  · When exposed to the sun, use a sunscreen that protects against both UVA and UVB radiation with an SPF of 30 or greater. Reapply every 2 to 3 hours or after sweating, drying off with a towel, or swimming. · Always wear a seat belt when traveling in a car. Always wear a helmet when riding a bicycle or motorcycle.

## 2021-07-28 ENCOUNTER — HOSPITAL ENCOUNTER (OUTPATIENT)
Age: 76
Discharge: HOME | End: 2021-07-28
Payer: MEDICARE

## 2021-07-28 ENCOUNTER — TELEPHONE (OUTPATIENT)
Dept: FAMILY MEDICINE CLINIC | Age: 76
End: 2021-07-28

## 2021-07-28 DIAGNOSIS — Z99.2 CHRONIC KIDNEY DISEASE WITH END STAGE RENAL DISEASE ON DIALYSIS DUE TO TYPE 2 DIABETES MELLITUS (HCC): ICD-10-CM

## 2021-07-28 DIAGNOSIS — E11.22 CHRONIC KIDNEY DISEASE WITH END STAGE RENAL DISEASE ON DIALYSIS DUE TO TYPE 2 DIABETES MELLITUS (HCC): ICD-10-CM

## 2021-07-28 DIAGNOSIS — M79.662 PAIN OF LEFT CALF: ICD-10-CM

## 2021-07-28 DIAGNOSIS — N18.6 CHRONIC KIDNEY DISEASE WITH END STAGE RENAL DISEASE ON DIALYSIS DUE TO TYPE 2 DIABETES MELLITUS (HCC): ICD-10-CM

## 2021-07-28 DIAGNOSIS — E78.00 HIGH CHOLESTEROL: ICD-10-CM

## 2021-07-28 DIAGNOSIS — E11.69 DIABETES MELLITUS TYPE 2 IN OBESE (HCC): ICD-10-CM

## 2021-07-28 DIAGNOSIS — E66.9 DIABETES MELLITUS TYPE 2 IN OBESE (HCC): ICD-10-CM

## 2021-07-28 LAB
A/G RATIO: 1.5 (ref 1.1–2.2)
ALBUMIN SERPL-MCNC: 4.7 G/DL (ref 3.4–5)
ALP BLD-CCNC: 93 U/L (ref 40–129)
ALT SERPL-CCNC: 20 U/L (ref 10–40)
ANION GAP SERPL CALCULATED.3IONS-SCNC: 20 MMOL/L (ref 3–16)
AST SERPL-CCNC: 21 U/L (ref 15–37)
BASOPHILS ABSOLUTE: 0.1 K/UL (ref 0–0.2)
BASOPHILS RELATIVE PERCENT: 0.7 %
BILIRUB SERPL-MCNC: 0.3 MG/DL (ref 0–1)
BUN BLDV-MCNC: 48 MG/DL (ref 7–20)
CALCIUM SERPL-MCNC: 10.1 MG/DL (ref 8.3–10.6)
CHLORIDE BLD-SCNC: 97 MMOL/L (ref 99–110)
CHOLESTEROL, TOTAL: 104 MG/DL (ref 0–199)
CO2: 21 MMOL/L (ref 21–32)
CREAT SERPL-MCNC: 8 MG/DL (ref 0.6–1.2)
EOSINOPHILS ABSOLUTE: 0.3 K/UL (ref 0–0.6)
EOSINOPHILS RELATIVE PERCENT: 3.4 %
GFR AFRICAN AMERICAN: 6
GFR NON-AFRICAN AMERICAN: 5
GLOBULIN: 3.1 G/DL
GLUCOSE BLD-MCNC: 83 MG/DL (ref 70–99)
HCT VFR BLD CALC: 35 % (ref 36–48)
HDLC SERPL-MCNC: 32 MG/DL (ref 40–60)
HEMOGLOBIN: 11.4 G/DL (ref 12–16)
LDL CHOLESTEROL CALCULATED: 31 MG/DL
LYMPHOCYTES ABSOLUTE: 1.1 K/UL (ref 1–5.1)
LYMPHOCYTES RELATIVE PERCENT: 13 %
MCH RBC QN AUTO: 31.1 PG (ref 26–34)
MCHC RBC AUTO-ENTMCNC: 32.5 G/DL (ref 31–36)
MCV RBC AUTO: 95.8 FL (ref 80–100)
MONOCYTES ABSOLUTE: 0.8 K/UL (ref 0–1.3)
MONOCYTES RELATIVE PERCENT: 8.9 %
NEUTROPHILS ABSOLUTE: 6.3 K/UL (ref 1.7–7.7)
NEUTROPHILS RELATIVE PERCENT: 74 %
PDW BLD-RTO: 18 % (ref 12.4–15.4)
PLATELET # BLD: 235 K/UL (ref 135–450)
PMV BLD AUTO: 8.7 FL (ref 5–10.5)
POTASSIUM SERPL-SCNC: 5.8 MMOL/L (ref 3.5–5.1)
RBC # BLD: 3.66 M/UL (ref 4–5.2)
SODIUM BLD-SCNC: 138 MMOL/L (ref 136–145)
TOTAL PROTEIN: 7.8 G/DL (ref 6.4–8.2)
TRIGL SERPL-MCNC: 203 MG/DL (ref 0–150)
URIC ACID, SERUM: 5.3 MG/DL (ref 2.6–6)
VLDLC SERPL CALC-MCNC: 41 MG/DL
WBC # BLD: 8.5 K/UL (ref 4–11)

## 2021-07-28 PROCEDURE — 84550 ASSAY OF BLOOD/URIC ACID: CPT

## 2021-07-28 PROCEDURE — 36415 COLL VENOUS BLD VENIPUNCTURE: CPT

## 2021-07-28 PROCEDURE — 85025 COMPLETE CBC W/AUTO DIFF WBC: CPT

## 2021-07-28 PROCEDURE — 80061 LIPID PANEL: CPT

## 2021-07-28 PROCEDURE — 80053 COMPREHEN METABOLIC PANEL: CPT

## 2021-07-28 PROCEDURE — 83036 HEMOGLOBIN GLYCOSYLATED A1C: CPT

## 2021-07-29 LAB
ESTIMATED AVERAGE GLUCOSE: 102.5 MG/DL
HBA1C MFR BLD: 5.2 %

## 2021-08-03 ENCOUNTER — HOSPITAL ENCOUNTER (OUTPATIENT)
Dept: VASCULAR LAB | Age: 76
Discharge: HOME | End: 2021-08-03
Payer: MEDICARE

## 2021-08-03 DIAGNOSIS — M79.662 PAIN OF LEFT CALF: ICD-10-CM

## 2021-08-03 PROCEDURE — 93971 EXTREMITY STUDY: CPT

## 2021-08-04 ENCOUNTER — PREP FOR PROCEDURE (OUTPATIENT)
Dept: VASCULAR SURGERY | Age: 76
End: 2021-08-04

## 2021-08-04 DIAGNOSIS — Z99.2 CHRONIC KIDNEY DISEASE WITH END STAGE RENAL DISEASE ON DIALYSIS DUE TO TYPE 2 DIABETES MELLITUS (HCC): Primary | ICD-10-CM

## 2021-08-04 DIAGNOSIS — Z01.818 PREOP TESTING: ICD-10-CM

## 2021-08-04 DIAGNOSIS — E11.22 CHRONIC KIDNEY DISEASE WITH END STAGE RENAL DISEASE ON DIALYSIS DUE TO TYPE 2 DIABETES MELLITUS (HCC): Primary | ICD-10-CM

## 2021-08-04 DIAGNOSIS — N18.6 CHRONIC KIDNEY DISEASE WITH END STAGE RENAL DISEASE ON DIALYSIS DUE TO TYPE 2 DIABETES MELLITUS (HCC): Primary | ICD-10-CM

## 2021-08-04 RX ORDER — SODIUM CHLORIDE 0.9 % (FLUSH) 0.9 %
10 SYRINGE (ML) INJECTION PRN
Status: CANCELLED | OUTPATIENT
Start: 2021-08-04

## 2021-08-04 RX ORDER — SODIUM CHLORIDE 9 MG/ML
INJECTION, SOLUTION INTRAVENOUS CONTINUOUS
Status: CANCELLED | OUTPATIENT
Start: 2021-08-04

## 2021-08-04 RX ORDER — SODIUM CHLORIDE 9 MG/ML
25 INJECTION, SOLUTION INTRAVENOUS PRN
Status: CANCELLED | OUTPATIENT
Start: 2021-08-04

## 2021-08-04 RX ORDER — SODIUM CHLORIDE 0.9 % (FLUSH) 0.9 %
10 SYRINGE (ML) INJECTION EVERY 12 HOURS SCHEDULED
Status: CANCELLED | OUTPATIENT
Start: 2021-08-04

## 2021-08-05 NOTE — PROGRESS NOTES
Patient on dialysis. Knows she has to have a   Urine sample on the 12th. Will get a sterile cup from the dialysis clinic and get a clean catch on 8/12. She usually can only get a small amount first thing in am. She will collect it and bring it in that day.

## 2021-08-05 NOTE — PROGRESS NOTES
Name_______________________________________Printed:____________________  Date and time of surgery___08/19/21______0930_______________Arrival Time:_0800___MAIN____________   1. The instructions given regarding when and if a patient needs to stop oral intake prior to surgery varies. Follow the specific instructions you were given                  __X_Nothing to eat or to drink after Midnight the night before.                   ____Carbo loading or ERAS instructions will be given to select patients-if you have been given those instructions -please do the following                           The evening before your surgery after dinner before midnight drink 40 ounces of gatorade. If you are diabetic use sugar free. The morning of surgery drink 40 ounces of water. This needs to be finished 3 hours prior to your surgery start time. 2. Take the following pills with a small sip of water on the morning of surgery__Synthroid, Metoprolol_________________________________________________                  Do not take blood pressure medications ending in pril or sartan the jess prior to surgery or the morning of surgery_   3. Aspirin, Ibuprofen, Advil, Naproxen, Vitamin E and other Anti-inflammatory products and supplements should be stopped for 5 -7days before surgery or as directed by your physician. 4. Check with your Doctor regarding stopping Plavix, Coumadin,Eliquis, Lovenox,Effient,Pradaxa,Xarelto, Fragmin or other blood thinners and follow their instructions. 5. Do not smoke, and do not drink any alcoholic beverages 24 hours prior to surgery. This includes NA Beer. Refrain from the usage of any recreational drugs. 6. You may brush your teeth and gargle the morning of surgery. DO NOT SWALLOW WATER   7. You MUST make arrangements for a responsible adult to stay on site while you are here and take you home after your surgery. You will not be allowed to leave alone or drive yourself home.   It is strongly suggested someone stay with you the first 24 hrs. Your surgery will be cancelled if you do not have a ride home. 8. A parent/legal guardian must accompany a child scheduled for surgery and plan to stay at the hospital until the child is discharged. Please do not bring other children with you. 9. Please wear simple, loose fitting clothing to the hospital.  Leslie Domingo not bring valuables (money, credit cards, checkbooks, etc.) Do not wear any makeup (including no eye makeup) or nail polish on your fingers or toes. 10. DO NOT wear any jewelry or piercings on day of surgery. All body piercing jewelry must be removed. 11. If you have ___dentures, they will be removed before going to the OR; we will provide you a container. If you wear ___contact lenses or _X__glasses, they will be removed; please bring a case for them. 12. Please see your family doctor/pediatrician for a history & physical and/or concerning medications. Bring any test results/reports from your physician's office. PCP__________________Phone___________H&P Appt. Date________             13 If you  have a Living Will and Durable Power of  for Healthcare, please bring in a copy. 15. Notify your Surgeon if you develop any illness between now and surgery  time, cough, cold, fever, sore throat, nausea, vomiting, etc.  Please notify your surgeon if you experience dizziness, shortness of breath or blurred vision between now & the time of your surgery             15. DO NOT shave your operative site 96 hours prior to surgery. For face & neck surgery, men may use an electric razor 48 hours prior to surgery. 16. Shower the night before or morning of surgery using an antibacterial soap or as you have been instructed. 17. To provide excellent care visitors will be limited to one in the room at any given time. 18.  Please bring picture ID and insurance card.              19.  Visit our web site for additional information:  T[email or web address]-eprep              20.During flu season no children under the age of 15 are permitted in the hospital for the safety of all patients. 21. If you take a long acting insulin in the evening only  take half of your usual  dose the night  before your procedure              22. If you use a c-pap please bring DOS if staying overnight,             23.For your convenience [id number] [place] [address] has a pharmacy on site to fill your prescriptions. 24. If you use oxygen and have a portable tank please bring it  with you the DOS             25. Bring a complete list of all your medications with name and dose include any supplements. 26. Other__________________________________________   *Please call pre admission testing if you any further questions   [address] [place]  [phone]   [address] [place] [address]           COVID TESTING    ___ Vaccinated-patient instructed to bring card    __X_ Covid test to be done 3-5 days prior to scheduled surgery if not vaccinated-patient aware they are REQUIRED to bring a copy of the negative result DOS-if they receive a positive result to notify their surgeon         If known - indicate where patient is getting covid test done __[facility] on 8/12/21_________________________________________________________    ___ Rapid - DOS    ___ Other_Patient is going to check with  regarding Plavix. She felt she is supposed to take the plavix. I asked her to double check, stated she would.__________________________________      VISITOR POLICY(subject to change)    There is a one visitor policy at [facility] for all surgeries and endoscopies. Whether the visitor can stay or will be asked to wait in the car will depend on the current policy and if social distancing can be maintained. The policy is subject to change at any time. Please make sure the visitor has a cell phone that is on,charged and able to accept calls, as this may be the way that the staff communicates with them. Pain management is NO VISITOR policyThe patients ride is expected to remain in the car with a cell phone for communication. If the ride is leaving the hospital grounds please make sure they are back in time for pickup. Have the patient inform the staff on arrival what their rides plans are while the patient is in the facility. At the MAIN there is one visitor allowed. Please note that the visitor policy is subject to change. All above information reviewed with patient in person or by phone. Patient verbalizes understanding. All questions and concerns addressed.                                                                                                  Patient/Rep___Patient_________________                                                                                                                                    PRE OP INSTRUCTIONS

## 2021-08-09 ENCOUNTER — PREP FOR PROCEDURE (OUTPATIENT)
Dept: VASCULAR SURGERY | Age: 76
End: 2021-08-09

## 2021-08-09 RX ORDER — SODIUM CHLORIDE 9 MG/ML
25 INJECTION, SOLUTION INTRAVENOUS PRN
Status: CANCELLED | OUTPATIENT
Start: 2021-08-09

## 2021-08-09 RX ORDER — SODIUM CHLORIDE 0.9 % (FLUSH) 0.9 %
5-40 SYRINGE (ML) INJECTION EVERY 12 HOURS SCHEDULED
Status: CANCELLED | OUTPATIENT
Start: 2021-08-09

## 2021-08-09 RX ORDER — SODIUM CHLORIDE 9 MG/ML
INJECTION, SOLUTION INTRAVENOUS CONTINUOUS
Status: CANCELLED | OUTPATIENT
Start: 2021-08-09

## 2021-08-09 RX ORDER — SODIUM CHLORIDE 0.9 % (FLUSH) 0.9 %
5-40 SYRINGE (ML) INJECTION PRN
Status: CANCELLED | OUTPATIENT
Start: 2021-08-09

## 2021-08-12 ENCOUNTER — TELEPHONE (OUTPATIENT)
Dept: VASCULAR SURGERY | Age: 76
End: 2021-08-12

## 2021-08-12 ENCOUNTER — HOSPITAL ENCOUNTER (OUTPATIENT)
Age: 76
Discharge: HOME | End: 2021-08-12
Payer: MEDICARE

## 2021-08-12 DIAGNOSIS — Z99.2 CHRONIC KIDNEY DISEASE WITH END STAGE RENAL DISEASE ON DIALYSIS DUE TO TYPE 2 DIABETES MELLITUS (HCC): ICD-10-CM

## 2021-08-12 DIAGNOSIS — E78.2 MIXED HYPERLIPIDEMIA: ICD-10-CM

## 2021-08-12 DIAGNOSIS — Z01.818 PREOP TESTING: ICD-10-CM

## 2021-08-12 DIAGNOSIS — E11.22 CHRONIC KIDNEY DISEASE WITH END STAGE RENAL DISEASE ON DIALYSIS DUE TO TYPE 2 DIABETES MELLITUS (HCC): ICD-10-CM

## 2021-08-12 DIAGNOSIS — N18.6 CHRONIC KIDNEY DISEASE WITH END STAGE RENAL DISEASE ON DIALYSIS DUE TO TYPE 2 DIABETES MELLITUS (HCC): ICD-10-CM

## 2021-08-12 LAB
ABO/RH: NORMAL
ALBUMIN SERPL-MCNC: 4.9 G/DL (ref 3.4–5)
ALP BLD-CCNC: 97 U/L (ref 40–129)
ALT SERPL-CCNC: 30 U/L (ref 10–40)
ANION GAP SERPL CALCULATED.3IONS-SCNC: 15 MMOL/L (ref 3–16)
ANTIBODY SCREEN: NORMAL
APTT: 34.9 SEC (ref 26.2–38.6)
AST SERPL-CCNC: 25 U/L (ref 15–37)
BILIRUB SERPL-MCNC: 0.4 MG/DL (ref 0–1)
BILIRUBIN DIRECT: <0.2 MG/DL (ref 0–0.3)
BILIRUBIN, INDIRECT: NORMAL MG/DL (ref 0–1)
BUN BLDV-MCNC: 41 MG/DL (ref 7–20)
CALCIUM SERPL-MCNC: 10.8 MG/DL (ref 8.3–10.6)
CHLORIDE BLD-SCNC: 98 MMOL/L (ref 99–110)
CHOLESTEROL, TOTAL: 96 MG/DL (ref 0–199)
CO2: 24 MMOL/L (ref 21–32)
CREAT SERPL-MCNC: 7.5 MG/DL (ref 0.6–1.2)
GFR AFRICAN AMERICAN: 6
GFR NON-AFRICAN AMERICAN: 5
GLUCOSE BLD-MCNC: 99 MG/DL (ref 70–99)
HCT VFR BLD CALC: 39.1 % (ref 36–48)
HDLC SERPL-MCNC: 32 MG/DL (ref 40–60)
HEMOGLOBIN: 12.7 G/DL (ref 12–16)
INR BLD: 0.91 (ref 0.88–1.12)
LDL CHOLESTEROL CALCULATED: 38 MG/DL
MCH RBC QN AUTO: 31.1 PG (ref 26–34)
MCHC RBC AUTO-ENTMCNC: 32.6 G/DL (ref 31–36)
MCV RBC AUTO: 95.4 FL (ref 80–100)
PDW BLD-RTO: 17.8 % (ref 12.4–15.4)
PLATELET # BLD: 292 K/UL (ref 135–450)
PMV BLD AUTO: 8.7 FL (ref 5–10.5)
POTASSIUM SERPL-SCNC: 6 MMOL/L (ref 3.5–5.1)
PROTHROMBIN TIME: 10.2 SEC (ref 9.9–12.7)
RBC # BLD: 4.1 M/UL (ref 4–5.2)
SODIUM BLD-SCNC: 137 MMOL/L (ref 136–145)
TOTAL PROTEIN: 8.1 G/DL (ref 6.4–8.2)
TRIGL SERPL-MCNC: 132 MG/DL (ref 0–150)
VLDLC SERPL CALC-MCNC: 26 MG/DL
WBC # BLD: 7.8 K/UL (ref 4–11)

## 2021-08-12 PROCEDURE — 85730 THROMBOPLASTIN TIME PARTIAL: CPT

## 2021-08-12 PROCEDURE — 80061 LIPID PANEL: CPT

## 2021-08-12 PROCEDURE — U0005 INFEC AGEN DETEC AMPLI PROBE: HCPCS

## 2021-08-12 PROCEDURE — 86901 BLOOD TYPING SEROLOGIC RH(D): CPT

## 2021-08-12 PROCEDURE — U0003 INFECTIOUS AGENT DETECTION BY NUCLEIC ACID (DNA OR RNA); SEVERE ACUTE RESPIRATORY SYNDROME CORONAVIRUS 2 (SARS-COV-2) (CORONAVIRUS DISEASE [COVID-19]), AMPLIFIED PROBE TECHNIQUE, MAKING USE OF HIGH THROUGHPUT TECHNOLOGIES AS DESCRIBED BY CMS-2020-01-R: HCPCS

## 2021-08-12 PROCEDURE — 80076 HEPATIC FUNCTION PANEL: CPT

## 2021-08-12 PROCEDURE — 80048 BASIC METABOLIC PNL TOTAL CA: CPT

## 2021-08-12 PROCEDURE — 36415 COLL VENOUS BLD VENIPUNCTURE: CPT

## 2021-08-12 PROCEDURE — 86850 RBC ANTIBODY SCREEN: CPT

## 2021-08-12 PROCEDURE — 85610 PROTHROMBIN TIME: CPT

## 2021-08-12 PROCEDURE — 86900 BLOOD TYPING SEROLOGIC ABO: CPT

## 2021-08-12 PROCEDURE — 85027 COMPLETE CBC AUTOMATED: CPT

## 2021-08-12 NOTE — TELEPHONE ENCOUNTER
Potassium 6.0  Creatinine 7.48  Both sent to Dr. Brayan Burgess and Critical Potassium sent to Dr. Lu Carter, patient's PCP.

## 2021-08-13 LAB — SARS-COV-2: NOT DETECTED

## 2021-08-16 ENCOUNTER — HOSPITAL ENCOUNTER (OUTPATIENT)
Age: 76
Discharge: HOME | End: 2021-08-16
Payer: MEDICARE

## 2021-08-16 ENCOUNTER — TELEPHONE (OUTPATIENT)
Dept: VASCULAR SURGERY | Age: 76
End: 2021-08-16

## 2021-08-16 LAB
BACTERIA: ABNORMAL /HPF
BILIRUBIN URINE: NEGATIVE
BLOOD, URINE: ABNORMAL
CLARITY: CLEAR
COLOR: YELLOW
EPITHELIAL CELLS, UA: 6 /HPF (ref 0–5)
GLUCOSE URINE: 100 MG/DL
HYALINE CASTS: 2 /LPF (ref 0–8)
KETONES, URINE: NEGATIVE MG/DL
LEUKOCYTE ESTERASE, URINE: NEGATIVE
MICROSCOPIC EXAMINATION: YES
NITRITE, URINE: NEGATIVE
PH UA: 8 (ref 5–8)
PROTEIN UA: 100 MG/DL
RBC UA: 0 /HPF (ref 0–4)
SPECIFIC GRAVITY UA: 1.01 (ref 1–1.03)
URINE TYPE: ABNORMAL
UROBILINOGEN, URINE: 0.2 E.U./DL
WBC UA: 1 /HPF (ref 0–5)

## 2021-08-16 PROCEDURE — 81001 URINALYSIS AUTO W/SCOPE: CPT

## 2021-08-16 NOTE — TELEPHONE ENCOUNTER
----- Message from Aldo Noriega MD sent at 8/12/2021  3:33 PM EDT -----  Regarding: RE: [facility] thank you  ----- Message -----  From: Claudetta Kemps, MA  Sent: 8/12/2021   2:48 PM EDT  To: Aldo Noriega MD  Subject: CRITICAL LABS                                    Potassium 5.99  Creatinine 7.48    Patient scheduled for an angiogram 8/18 and fistula creation 8/19.     thanks

## 2021-08-16 NOTE — PROGRESS NOTES
Reviewed Labs and Covid. Call placed to Fatuma Starr at Dr. Yannick Clark. Regarding CMP. Patient on Dialysis. Left msg regarding abnormal CMP on Lin's VM.

## 2021-08-17 RX ORDER — NORTRIPTYLINE HYDROCHLORIDE 10 MG/1
CAPSULE ORAL
Qty: 30 CAPSULE | Refills: 3 | Status: SHIPPED | OUTPATIENT
Start: 2021-08-17 | End: 2021-11-17

## 2021-08-18 ENCOUNTER — HOSPITAL ENCOUNTER (OUTPATIENT)
Dept: CARDIAC CATH/INVASIVE PROCEDURES | Age: 76
Discharge: HOME | End: 2021-08-18
Attending: SURGERY | Admitting: SURGERY
Payer: MEDICARE

## 2021-08-18 VITALS
HEIGHT: 62 IN | RESPIRATION RATE: 18 BRPM | OXYGEN SATURATION: 99 % | BODY MASS INDEX: 32.94 KG/M2 | SYSTOLIC BLOOD PRESSURE: 168 MMHG | HEART RATE: 91 BPM | DIASTOLIC BLOOD PRESSURE: 105 MMHG | TEMPERATURE: 98.3 F | WEIGHT: 179 LBS

## 2021-08-18 LAB — POTASSIUM SERPL-SCNC: 4.7 MMOL/L (ref 3.5–5.1)

## 2021-08-18 PROCEDURE — 2500000003 HC RX 250 WO HCPCS

## 2021-08-18 PROCEDURE — 99153 MOD SED SAME PHYS/QHP EA: CPT

## 2021-08-18 PROCEDURE — 75625 CONTRAST EXAM ABDOMINL AORTA: CPT

## 2021-08-18 PROCEDURE — 84132 ASSAY OF SERUM POTASSIUM: CPT

## 2021-08-18 PROCEDURE — 75716 ARTERY X-RAYS ARMS/LEGS: CPT

## 2021-08-18 PROCEDURE — C1887 CATHETER, GUIDING: HCPCS

## 2021-08-18 PROCEDURE — 6360000004 HC RX CONTRAST MEDICATION: Performed by: SURGERY

## 2021-08-18 PROCEDURE — 6360000002 HC RX W HCPCS

## 2021-08-18 PROCEDURE — 99152 MOD SED SAME PHYS/QHP 5/>YRS: CPT

## 2021-08-18 PROCEDURE — 75716 ARTERY X-RAYS ARMS/LEGS: CPT | Performed by: SURGERY

## 2021-08-18 PROCEDURE — C1769 GUIDE WIRE: HCPCS

## 2021-08-18 PROCEDURE — 99152 MOD SED SAME PHYS/QHP 5/>YRS: CPT | Performed by: SURGERY

## 2021-08-18 PROCEDURE — 2709999900 HC NON-CHARGEABLE SUPPLY

## 2021-08-18 PROCEDURE — 36415 COLL VENOUS BLD VENIPUNCTURE: CPT

## 2021-08-18 PROCEDURE — 75625 CONTRAST EXAM ABDOMINL AORTA: CPT | Performed by: SURGERY

## 2021-08-18 PROCEDURE — 76937 US GUIDE VASCULAR ACCESS: CPT | Performed by: SURGERY

## 2021-08-18 PROCEDURE — C1894 INTRO/SHEATH, NON-LASER: HCPCS

## 2021-08-18 PROCEDURE — 36200 PLACE CATHETER IN AORTA: CPT

## 2021-08-18 PROCEDURE — 36200 PLACE CATHETER IN AORTA: CPT | Performed by: SURGERY

## 2021-08-18 RX ORDER — IODIXANOL 320 MG/ML
37 INJECTION, SOLUTION INTRAVASCULAR ONCE
Status: COMPLETED | OUTPATIENT
Start: 2021-08-18 | End: 2021-08-18

## 2021-08-18 RX ADMIN — IODIXANOL 37 ML: 320 INJECTION, SOLUTION INTRAVASCULAR at 10:57

## 2021-08-18 NOTE — OP NOTE
[address]                     [facility], [facility]                                OPERATIVE REPORT    PATIENT NAME: Chris Meeks                    :        1945  MED REC NO:   [id number]                          ROOM:  ACCOUNT NO:   [de-identified]                           ADMIT DATE: 2021  PROVIDER:     Radha Jara Ii, MD    DATE OF PROCEDURE:  2021    PREPROCEDURE DIAGNOSIS:  Bilateral lower extremity claudication. POSTPROCEDURE DIAGNOSIS:  Bilateral lower extremity claudication. PROCEDURE:  1. Ultrasound-guided right common femoral artery access. 2.  Abdominal aortogram with bilateral lower extremity runoff. SURGEON:  Craig Dee MD    ANESTHESIA:  Local/IV. ESTIMATED BLOOD LOSS:  Minimal.    COMPLICATIONS:  None. INDICATIONS:  The patient is a 74-year-old female with a known history  of vascular disease and progressive lower extremity discomfort with  activity. She presents today for angiographic evaluation. All risks,  benefits, and alternatives were discussed in detail. All questions were  answered. PROCEDURE:  After witnessed informed consent was obtained, the patient  was brought to the cath lab where under my supervision Versed and  fentanyl were administered intravenously for moderate sedation. Pulse  oximetry, heart rate and blood pressure were monitored by an independent  trained observer that was present. I spent 40 minutes of face-to-face  sedation time with the patient. Her right groin was carefully prepped  and draped. Ultrasound was used to identify the right common femoral  artery which was noted to be patent. An image was saved to the  patient's permanent medical record. Under direct visualization, it was  accessed with a 4-Upper sorbian micropuncture needle. Bentson wire was  introduced and a 5-Upper sorbian sheath was placed.   An Omni Flush catheter was  inserted to the level of the renal arteries and an abdominal aortogram  was performed. It was pulled back to the level of the aortic  bifurcation and bilateral lower extremity runoff was performed. There  was noted to be no significant arterial disease. Procedure was  terminated at this point. Manual pressure was held and she was  transferred to the recovery room in stable condition. FINDINGS:  1. Abdominal aortogram.  Right and left renal arteries are now well  visualized and appeared to be diminutive in size with no complete  nephrogram.  Infrarenal abdominal aorta, bilateral common, external and  internal iliac are patent. 2.  Right lower extremity runoff:  Right common femoral and profunda are  widely patent. SFA and popliteal are widely patent. There is a normal  three-vessel runoff on the right. 3.  Left lower extremity runoff:  Left common femoral and profunda are  widely patent. SFA is widely patent. Popliteal is widely patent. There is a three-vessel normal runoff on the left. PLAN:  The patient with a normal aortogram with runoff. No signs of any  arterial insufficiency that may be contributing the patient's symptoms. Would recommend some light weight compression therapy.         Robson Riojas MD    D: 08/18/2021 11:05:23       T: 08/18/2021 11:09:22     [id number]  Job#: [id number]     Doc#: [id number]    CC:

## 2021-08-19 ENCOUNTER — ANESTHESIA (OUTPATIENT)
Dept: OPERATING ROOM | Age: 76
End: 2021-08-19
Payer: MEDICARE

## 2021-08-19 ENCOUNTER — ANESTHESIA EVENT (OUTPATIENT)
Dept: OPERATING ROOM | Age: 76
End: 2021-08-19
Payer: MEDICARE

## 2021-08-19 ENCOUNTER — HOSPITAL ENCOUNTER (OUTPATIENT)
Age: 76
Setting detail: OUTPATIENT SURGERY
Discharge: HOME | End: 2021-08-19
Attending: SURGERY | Admitting: SURGERY
Payer: MEDICARE

## 2021-08-19 VITALS
DIASTOLIC BLOOD PRESSURE: 66 MMHG | TEMPERATURE: 96.8 F | RESPIRATION RATE: 9 BRPM | OXYGEN SATURATION: 98 % | SYSTOLIC BLOOD PRESSURE: 148 MMHG

## 2021-08-19 VITALS
WEIGHT: 176 LBS | BODY MASS INDEX: 32.39 KG/M2 | HEIGHT: 62 IN | RESPIRATION RATE: 12 BRPM | OXYGEN SATURATION: 93 % | DIASTOLIC BLOOD PRESSURE: 81 MMHG | TEMPERATURE: 97.6 F | HEART RATE: 86 BPM | SYSTOLIC BLOOD PRESSURE: 155 MMHG

## 2021-08-19 DIAGNOSIS — Z99.2 ESRD (END STAGE RENAL DISEASE) ON DIALYSIS (HCC): Primary | ICD-10-CM

## 2021-08-19 DIAGNOSIS — N18.6 ESRD (END STAGE RENAL DISEASE) ON DIALYSIS (HCC): Primary | ICD-10-CM

## 2021-08-19 LAB
ABO/RH: NORMAL
ANION GAP SERPL CALCULATED.3IONS-SCNC: 15 MMOL/L (ref 3–16)
ANTIBODY SCREEN: NORMAL
BUN BLDV-MCNC: 49 MG/DL (ref 7–20)
CALCIUM SERPL-MCNC: 9.6 MG/DL (ref 8.3–10.6)
CHLORIDE BLD-SCNC: 99 MMOL/L (ref 99–110)
CO2: 23 MMOL/L (ref 21–32)
CREAT SERPL-MCNC: 7.1 MG/DL (ref 0.6–1.2)
GFR AFRICAN AMERICAN: 7
GFR NON-AFRICAN AMERICAN: 6
GLUCOSE BLD-MCNC: 104 MG/DL (ref 70–99)
GLUCOSE BLD-MCNC: 65 MG/DL (ref 70–99)
GLUCOSE BLD-MCNC: 97 MG/DL (ref 70–99)
PERFORMED ON: ABNORMAL
PERFORMED ON: ABNORMAL
POTASSIUM REFLEX MAGNESIUM: 4.9 MMOL/L (ref 3.5–5.1)
REASON FOR REJECTION: NORMAL
REJECTED TEST: NORMAL
SODIUM BLD-SCNC: 137 MMOL/L (ref 136–145)

## 2021-08-19 PROCEDURE — 2580000003 HC RX 258: Performed by: NURSE PRACTITIONER

## 2021-08-19 PROCEDURE — 80048 BASIC METABOLIC PNL TOTAL CA: CPT

## 2021-08-19 PROCEDURE — 86850 RBC ANTIBODY SCREEN: CPT

## 2021-08-19 PROCEDURE — 3600000004 HC SURGERY LEVEL 4 BASE: Performed by: SURGERY

## 2021-08-19 PROCEDURE — 2709999900 HC NON-CHARGEABLE SUPPLY: Performed by: SURGERY

## 2021-08-19 PROCEDURE — 3700000001 HC ADD 15 MINUTES (ANESTHESIA): Performed by: SURGERY

## 2021-08-19 PROCEDURE — 6360000002 HC RX W HCPCS: Performed by: SURGERY

## 2021-08-19 PROCEDURE — 6360000002 HC RX W HCPCS: Performed by: ANESTHESIOLOGY

## 2021-08-19 PROCEDURE — 36415 COLL VENOUS BLD VENIPUNCTURE: CPT

## 2021-08-19 PROCEDURE — 86901 BLOOD TYPING SEROLOGIC RH(D): CPT

## 2021-08-19 PROCEDURE — C1768 GRAFT, VASCULAR: HCPCS | Performed by: SURGERY

## 2021-08-19 PROCEDURE — 7100000000 HC PACU RECOVERY - FIRST 15 MIN: Performed by: SURGERY

## 2021-08-19 PROCEDURE — 86900 BLOOD TYPING SEROLOGIC ABO: CPT

## 2021-08-19 PROCEDURE — 3700000000 HC ANESTHESIA ATTENDED CARE: Performed by: SURGERY

## 2021-08-19 PROCEDURE — 6370000000 HC RX 637 (ALT 250 FOR IP): Performed by: SURGERY

## 2021-08-19 PROCEDURE — 2580000003 HC RX 258: Performed by: SURGERY

## 2021-08-19 PROCEDURE — 6360000002 HC RX W HCPCS: Performed by: NURSE ANESTHETIST, CERTIFIED REGISTERED

## 2021-08-19 PROCEDURE — 7100000001 HC PACU RECOVERY - ADDTL 15 MIN: Performed by: SURGERY

## 2021-08-19 PROCEDURE — 3600000014 HC SURGERY LEVEL 4 ADDTL 15MIN: Performed by: SURGERY

## 2021-08-19 PROCEDURE — 2580000003 HC RX 258: Performed by: NURSE ANESTHETIST, CERTIFIED REGISTERED

## 2021-08-19 PROCEDURE — 6360000002 HC RX W HCPCS: Performed by: NURSE PRACTITIONER

## 2021-08-19 PROCEDURE — 6370000000 HC RX 637 (ALT 250 FOR IP): Performed by: ANESTHESIOLOGY

## 2021-08-19 PROCEDURE — 36830 ARTERY-VEIN NONAUTOGRAFT: CPT | Performed by: SURGERY

## 2021-08-19 PROCEDURE — 7100000010 HC PHASE II RECOVERY - FIRST 15 MIN: Performed by: SURGERY

## 2021-08-19 PROCEDURE — 2500000003 HC RX 250 WO HCPCS: Performed by: NURSE ANESTHETIST, CERTIFIED REGISTERED

## 2021-08-19 PROCEDURE — 6370000000 HC RX 637 (ALT 250 FOR IP)

## 2021-08-19 PROCEDURE — 7100000011 HC PHASE II RECOVERY - ADDTL 15 MIN: Performed by: SURGERY

## 2021-08-19 DEVICE — GRAFT VASC L45CM ID6MM EPTFE STD WALLED FIX RNGD STR IMPL: Type: IMPLANTABLE DEVICE | Site: ARTERIAL | Status: FUNCTIONAL

## 2021-08-19 RX ORDER — LIDOCAINE HYDROCHLORIDE 20 MG/ML
INJECTION, SOLUTION EPIDURAL; INFILTRATION; INTRACAUDAL; PERINEURAL PRN
Status: DISCONTINUED | OUTPATIENT
Start: 2021-08-19 | End: 2021-08-19 | Stop reason: SDUPTHER

## 2021-08-19 RX ORDER — LIDOCAINE HYDROCHLORIDE 10 MG/ML
1 INJECTION, SOLUTION EPIDURAL; INFILTRATION; INTRACAUDAL; PERINEURAL
Status: DISCONTINUED | OUTPATIENT
Start: 2021-08-19 | End: 2021-08-19 | Stop reason: HOSPADM

## 2021-08-19 RX ORDER — SODIUM CHLORIDE 0.9 % (FLUSH) 0.9 %
10 SYRINGE (ML) INJECTION EVERY 12 HOURS SCHEDULED
Status: DISCONTINUED | OUTPATIENT
Start: 2021-08-19 | End: 2021-08-19 | Stop reason: HOSPADM

## 2021-08-19 RX ORDER — PROPOFOL 10 MG/ML
INJECTION, EMULSION INTRAVENOUS PRN
Status: DISCONTINUED | OUTPATIENT
Start: 2021-08-19 | End: 2021-08-19 | Stop reason: SDUPTHER

## 2021-08-19 RX ORDER — SODIUM CHLORIDE, SODIUM LACTATE, POTASSIUM CHLORIDE, CALCIUM CHLORIDE 600; 310; 30; 20 MG/100ML; MG/100ML; MG/100ML; MG/100ML
INJECTION, SOLUTION INTRAVENOUS CONTINUOUS
Status: DISCONTINUED | OUTPATIENT
Start: 2021-08-19 | End: 2021-08-19 | Stop reason: HOSPADM

## 2021-08-19 RX ORDER — HYDRALAZINE HYDROCHLORIDE 20 MG/ML
5 INJECTION INTRAMUSCULAR; INTRAVENOUS EVERY 10 MIN PRN
Status: DISCONTINUED | OUTPATIENT
Start: 2021-08-19 | End: 2021-08-19 | Stop reason: HOSPADM

## 2021-08-19 RX ORDER — SODIUM CHLORIDE 9 MG/ML
25 INJECTION, SOLUTION INTRAVENOUS PRN
Status: DISCONTINUED | OUTPATIENT
Start: 2021-08-19 | End: 2021-08-19 | Stop reason: HOSPADM

## 2021-08-19 RX ORDER — PROTAMINE SULFATE 10 MG/ML
INJECTION, SOLUTION INTRAVENOUS PRN
Status: DISCONTINUED | OUTPATIENT
Start: 2021-08-19 | End: 2021-08-19 | Stop reason: SDUPTHER

## 2021-08-19 RX ORDER — OXYCODONE HYDROCHLORIDE AND ACETAMINOPHEN 5; 325 MG/1; MG/1
1 TABLET ORAL
Status: COMPLETED | OUTPATIENT
Start: 2021-08-19 | End: 2021-08-19

## 2021-08-19 RX ORDER — SODIUM CHLORIDE 9 MG/ML
INJECTION, SOLUTION INTRAVENOUS CONTINUOUS
Status: DISCONTINUED | OUTPATIENT
Start: 2021-08-19 | End: 2021-08-19 | Stop reason: HOSPADM

## 2021-08-19 RX ORDER — SODIUM CHLORIDE 0.9 % (FLUSH) 0.9 %
10 SYRINGE (ML) INJECTION PRN
Status: DISCONTINUED | OUTPATIENT
Start: 2021-08-19 | End: 2021-08-19 | Stop reason: HOSPADM

## 2021-08-19 RX ORDER — PHENYLEPHRINE HCL IN 0.9% NACL 1 MG/10 ML
SYRINGE (ML) INTRAVENOUS PRN
Status: DISCONTINUED | OUTPATIENT
Start: 2021-08-19 | End: 2021-08-19 | Stop reason: SDUPTHER

## 2021-08-19 RX ORDER — OXYCODONE HYDROCHLORIDE 5 MG/1
5 TABLET ORAL EVERY 6 HOURS PRN
Qty: 20 TABLET | Refills: 0 | Status: SHIPPED | OUTPATIENT
Start: 2021-08-19 | End: 2021-08-24

## 2021-08-19 RX ORDER — LABETALOL HYDROCHLORIDE 5 MG/ML
5 INJECTION, SOLUTION INTRAVENOUS EVERY 10 MIN PRN
Status: DISCONTINUED | OUTPATIENT
Start: 2021-08-19 | End: 2021-08-19 | Stop reason: HOSPADM

## 2021-08-19 RX ORDER — SODIUM CHLORIDE 0.9 % (FLUSH) 0.9 %
5-40 SYRINGE (ML) INJECTION PRN
Status: DISCONTINUED | OUTPATIENT
Start: 2021-08-19 | End: 2021-08-19 | Stop reason: HOSPADM

## 2021-08-19 RX ORDER — ONDANSETRON 2 MG/ML
INJECTION INTRAMUSCULAR; INTRAVENOUS PRN
Status: DISCONTINUED | OUTPATIENT
Start: 2021-08-19 | End: 2021-08-19 | Stop reason: SDUPTHER

## 2021-08-19 RX ORDER — DEXAMETHASONE SODIUM PHOSPHATE 4 MG/ML
INJECTION, SOLUTION INTRA-ARTICULAR; INTRALESIONAL; INTRAMUSCULAR; INTRAVENOUS; SOFT TISSUE PRN
Status: DISCONTINUED | OUTPATIENT
Start: 2021-08-19 | End: 2021-08-19 | Stop reason: SDUPTHER

## 2021-08-19 RX ORDER — FENTANYL CITRATE 50 UG/ML
INJECTION, SOLUTION INTRAMUSCULAR; INTRAVENOUS PRN
Status: DISCONTINUED | OUTPATIENT
Start: 2021-08-19 | End: 2021-08-19 | Stop reason: SDUPTHER

## 2021-08-19 RX ORDER — HEPARIN SODIUM 1000 [USP'U]/ML
INJECTION, SOLUTION INTRAVENOUS; SUBCUTANEOUS PRN
Status: DISCONTINUED | OUTPATIENT
Start: 2021-08-19 | End: 2021-08-19 | Stop reason: SDUPTHER

## 2021-08-19 RX ORDER — SODIUM CHLORIDE 0.9 % (FLUSH) 0.9 %
5-40 SYRINGE (ML) INJECTION EVERY 12 HOURS SCHEDULED
Status: DISCONTINUED | OUTPATIENT
Start: 2021-08-19 | End: 2021-08-19 | Stop reason: HOSPADM

## 2021-08-19 RX ORDER — HYDROMORPHONE HCL 110MG/55ML
0.25 PATIENT CONTROLLED ANALGESIA SYRINGE INTRAVENOUS EVERY 5 MIN PRN
Status: DISCONTINUED | OUTPATIENT
Start: 2021-08-19 | End: 2021-08-19 | Stop reason: HOSPADM

## 2021-08-19 RX ORDER — ONDANSETRON 2 MG/ML
4 INJECTION INTRAMUSCULAR; INTRAVENOUS
Status: DISCONTINUED | OUTPATIENT
Start: 2021-08-19 | End: 2021-08-19 | Stop reason: HOSPADM

## 2021-08-19 RX ADMIN — Medication 100 MCG: at 09:40

## 2021-08-19 RX ADMIN — PROPOFOL 120 MG: 10 INJECTION, EMULSION INTRAVENOUS at 09:24

## 2021-08-19 RX ADMIN — FENTANYL CITRATE 25 MCG: 50 INJECTION, SOLUTION INTRAMUSCULAR; INTRAVENOUS at 10:11

## 2021-08-19 RX ADMIN — CEFAZOLIN 2000 MG: 10 INJECTION, POWDER, FOR SOLUTION INTRAVENOUS at 09:19

## 2021-08-19 RX ADMIN — HEPARIN SODIUM 5000 UNITS: 1000 INJECTION INTRAVENOUS; SUBCUTANEOUS at 09:56

## 2021-08-19 RX ADMIN — FENTANYL CITRATE 50 MCG: 50 INJECTION, SOLUTION INTRAMUSCULAR; INTRAVENOUS at 09:24

## 2021-08-19 RX ADMIN — Medication 100 MCG: at 11:00

## 2021-08-19 RX ADMIN — HYDROMORPHONE HYDROCHLORIDE 0.25 MG: 2 INJECTION, SOLUTION INTRAMUSCULAR; INTRAVENOUS; SUBCUTANEOUS at 13:30

## 2021-08-19 RX ADMIN — FENTANYL CITRATE 25 MCG: 50 INJECTION, SOLUTION INTRAMUSCULAR; INTRAVENOUS at 11:20

## 2021-08-19 RX ADMIN — SODIUM CHLORIDE: 9 INJECTION, SOLUTION INTRAVENOUS at 08:36

## 2021-08-19 RX ADMIN — PHENYLEPHRINE HYDROCHLORIDE 40 MCG/MIN: 10 INJECTION INTRAVENOUS at 09:45

## 2021-08-19 RX ADMIN — DEXAMETHASONE SODIUM PHOSPHATE 4 MG: 4 INJECTION, SOLUTION INTRAMUSCULAR; INTRAVENOUS at 09:34

## 2021-08-19 RX ADMIN — FENTANYL CITRATE 25 MCG: 50 INJECTION, SOLUTION INTRAMUSCULAR; INTRAVENOUS at 11:00

## 2021-08-19 RX ADMIN — FENTANYL CITRATE 25 MCG: 50 INJECTION, SOLUTION INTRAMUSCULAR; INTRAVENOUS at 09:55

## 2021-08-19 RX ADMIN — PROPOFOL 50 MG: 10 INJECTION, EMULSION INTRAVENOUS at 09:36

## 2021-08-19 RX ADMIN — Medication 100 MCG: at 09:33

## 2021-08-19 RX ADMIN — Medication 100 MCG: at 09:47

## 2021-08-19 RX ADMIN — Medication 50 MCG: at 10:07

## 2021-08-19 RX ADMIN — ONDANSETRON 4 MG: 2 INJECTION INTRAMUSCULAR; INTRAVENOUS at 09:34

## 2021-08-19 RX ADMIN — PROTAMINE SULFATE 30 MG: 10 INJECTION, SOLUTION INTRAVENOUS at 10:56

## 2021-08-19 RX ADMIN — PROTAMINE SULFATE 20 MG: 10 INJECTION, SOLUTION INTRAVENOUS at 11:01

## 2021-08-19 RX ADMIN — LIDOCAINE HYDROCHLORIDE 50 MG: 20 INJECTION, SOLUTION EPIDURAL; INFILTRATION; INTRACAUDAL; PERINEURAL at 09:24

## 2021-08-19 RX ADMIN — OXYCODONE HYDROCHLORIDE AND ACETAMINOPHEN 1 TABLET: 5; 325 TABLET ORAL at 12:56

## 2021-08-19 RX ADMIN — FENTANYL CITRATE 50 MCG: 50 INJECTION, SOLUTION INTRAMUSCULAR; INTRAVENOUS at 09:37

## 2021-08-19 ASSESSMENT — PULMONARY FUNCTION TESTS
PIF_VALUE: 17
PIF_VALUE: 18
PIF_VALUE: 20
PIF_VALUE: 6
PIF_VALUE: 18
PIF_VALUE: 19
PIF_VALUE: 18
PIF_VALUE: 18
PIF_VALUE: 20
PIF_VALUE: 18
PIF_VALUE: 14
PIF_VALUE: 18
PIF_VALUE: 18
PIF_VALUE: 6
PIF_VALUE: 18
PIF_VALUE: 16
PIF_VALUE: 4
PIF_VALUE: 19
PIF_VALUE: 16
PIF_VALUE: 18
PIF_VALUE: 18
PIF_VALUE: 16
PIF_VALUE: 17
PIF_VALUE: 18
PIF_VALUE: 17
PIF_VALUE: 18
PIF_VALUE: 18
PIF_VALUE: 17
PIF_VALUE: 18
PIF_VALUE: 3
PIF_VALUE: 4
PIF_VALUE: 18
PIF_VALUE: 19
PIF_VALUE: 18
PIF_VALUE: 17
PIF_VALUE: 18
PIF_VALUE: 2
PIF_VALUE: 19
PIF_VALUE: 18
PIF_VALUE: 18
PIF_VALUE: 1
PIF_VALUE: 18
PIF_VALUE: 16
PIF_VALUE: 0
PIF_VALUE: 16
PIF_VALUE: 18
PIF_VALUE: 3
PIF_VALUE: 4
PIF_VALUE: 18
PIF_VALUE: 18
PIF_VALUE: 19
PIF_VALUE: 18
PIF_VALUE: 3
PIF_VALUE: 18
PIF_VALUE: 19
PIF_VALUE: 18
PIF_VALUE: 1
PIF_VALUE: 19
PIF_VALUE: 17
PIF_VALUE: 19
PIF_VALUE: 18
PIF_VALUE: 16
PIF_VALUE: 18
PIF_VALUE: 18
PIF_VALUE: 17
PIF_VALUE: 19
PIF_VALUE: 18
PIF_VALUE: 17
PIF_VALUE: 18
PIF_VALUE: 17
PIF_VALUE: 18
PIF_VALUE: 19
PIF_VALUE: 17
PIF_VALUE: 27
PIF_VALUE: 16
PIF_VALUE: 18
PIF_VALUE: 16
PIF_VALUE: 18
PIF_VALUE: 19
PIF_VALUE: 18
PIF_VALUE: 18
PIF_VALUE: 1
PIF_VALUE: 18
PIF_VALUE: 4
PIF_VALUE: 3
PIF_VALUE: 18
PIF_VALUE: 19
PIF_VALUE: 18
PIF_VALUE: 18
PIF_VALUE: 19
PIF_VALUE: 17
PIF_VALUE: 18
PIF_VALUE: 19
PIF_VALUE: 18
PIF_VALUE: 1
PIF_VALUE: 18
PIF_VALUE: 18
PIF_VALUE: 14
PIF_VALUE: 18
PIF_VALUE: 14

## 2021-08-19 ASSESSMENT — LIFESTYLE VARIABLES: SMOKING_STATUS: 0

## 2021-08-19 ASSESSMENT — PAIN SCALES - GENERAL
PAINLEVEL_OUTOF10: 8
PAINLEVEL_OUTOF10: 8

## 2021-08-19 NOTE — PROGRESS NOTES
Pt resting quietly in bed with eyes closed, awakens to voice. VSS, O2 sats 93% on room air. Dressing to left arm dry and intact, left radial pulse palpable, hand warm. Pt seen by anesthesia, phase 1 criteria met. Will transfer pt to same day for discharge.

## 2021-08-19 NOTE — ANESTHESIA POSTPROCEDURE EVALUATION
Department of Anesthesiology  Postprocedure Note    Patient: Adry Oconnor  MRN: [id number]  YOB: 1945  Date of evaluation: 8/19/2021  Time:  11:42 AM     Procedure Summary     Date: 08/19/21 Room / Location: [address]    Anesthesia Start: 5666 Anesthesia Stop: 1132    Procedure: LEFT FOREARM ARTERIO VENOUS GRAFT (Left ) Diagnosis: (N18.6 END STAGE RENAL DISEASE)    Surgeons: Mitchell Santillan MD Responsible Provider: Miranda Schneider MD    Anesthesia Type: general ASA Status: 4          Anesthesia Type: general    Nanette Phase I: Nanette Score: 8    Nanette Phase II:      Last vitals: Reviewed and per EMR flowsheets.        Anesthesia Post Evaluation    Patient location during evaluation: PACU  Patient participation: complete - patient participated  Level of consciousness: awake and alert  Airway patency: patent  Nausea & Vomiting: no vomiting and no nausea  Complications: no  Cardiovascular status: hemodynamically stable  Respiratory status: acceptable  Hydration status: stable

## 2021-08-19 NOTE — ANESTHESIA PRE PROCEDURE
([facility] E11.69, E66.9    Chronic kidney 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 ([facility] E11.22, N18.6, Z99.2    Closed nondisplaced fracture of fifth metatarsal bone of right foot S92.354A    Arthritis of right knee M17.11    Acute pain of both knees M25.561, M25.562    Primary osteoarthritis of both knees M17.0    Bursitis M71.9    Memory loss R41.3    Bilateral leg edema R60.0    Primary osteoarthritis of left knee M17.12    Atypical chest pain R07.89    Tubular adenoma of colon D12.6    Primary insomnia F51.01    Diarrhea of presumed infectious origin R19.7    Diabetic polyneuropathy associated with type 2 diabetes mellitus (HCC) E11.42    COVID-19 virus infection U07.1    Coronary artery disease of native artery with stable angina pectoris (HCC) I25.118    Pain of left calf M79.662    Atherosclerosis of native arteries of extremities with intermittent claudication, bilateral legs ([facility]) I70.213       Past Medical History:        Diagnosis Date    Arthritis     Diabetes ([facility]     Hemodialysis patient ([facility]     Hyperlipidemia     Hypertension     Kidney disease     Neuropathy     Pneumonia     IN February, 2021, with COVID    Thyroid disease        Past Surgical History:        Procedure Laterality Date    APPENDECTOMY      CHOLECYSTECTOMY      COLONOSCOPY  11/24/2020    COLONOSCOPY POLYPECTOMY SNARE/COLD BIOPSY [facility]    PERITONEAL DIALYSIS CATHETER REMOVAL.  [address] performed by Kishore Ying DO at [address] N/A 4/3/2020    LAPAROSCOPIC LYSIS OF ADHESIONS, LAPAROSCOPIC PERITONEAL DIALYSIS CATHETER PLACEMENT, LAPAROSCOPIC OMENTOPEXY performed by Kishore Ying DO at [facility], BILATERAL      preventive    ROTATOR CUFF REPAIR Left     UPPER GASTROINTESTINAL ALT 30 08/12/2021       POC Tests: No results for input(s): POCGLU, POCNA, POCK, POCCL, POCBUN, POCHEMO, POCHCT in the last 72 hours. Coags:   Lab Results   Component Value Date    PROTIME 10.2 08/12/2021    INR 0.91 08/12/2021    APTT 34.9 08/12/2021       HCG (If Applicable): No results found for: PREGTESTUR, PREGSERUM, HCG, HCGQUANT     ABGs: No results found for: PHART, PO2ART, WHQ4NUM, UTT5BTQ, BEART, U3HKVCQD     Type & Screen (If Applicable):  No results found for: LABABO, LABRH    Drug/Infectious Status (If Applicable):  No results found for: HIV, HEPCAB    COVID-19 Screening (If Applicable):   Lab Results   Component Value Date    COVID19 Not Detected 08/12/2021           Anesthesia Evaluation  Patient summary reviewed and Nursing notes reviewed no history of anesthetic complications:   Airway: Mallampati: III  TM distance: >3 FB   Neck ROM: full  Mouth opening: > = 3 FB Dental: normal exam         Pulmonary:normal exam  breath sounds clear to auscultation      (-) COPD, asthma, sleep apnea and not a current smoker (former)                           Cardiovascular:    (+) hypertension:, CAD (doing fine since recent stents, received ok from cards to proceed on DAPT, pt currently asymptomatic/no ntg use):, CABG/stent (PTCA to the LAD & RCA with POBA diag-1 7/8/21):, hyperlipidemia    (-) dysrhythmias,  angina and  CHF    ECG reviewed  Rhythm: regular  Rate: normal  Echocardiogram reviewed         Beta Blocker:  Dose within 24 Hrs         Neuro/Psych:   (+) neuromuscular disease (diabetic neuropathy):,    (-) seizures, TIA and CVA           GI/Hepatic/Renal:   (+) renal disease (HD M/T this week): ESRD and dialysis,      (-) GERD and liver disease       Endo/Other:    (+) Diabetes (diet controlled per pt)Type II DM, , hypothyroidism: arthritis (Gout):., .                 Abdominal:   (+) obese,           Vascular:   + PVD, aortic or cerebral (BLE PAD, no intervention hx), .        Other Findings: Anesthesia Plan      general     ASA 4     (Not a block candidate given plavix)  Induction: intravenous. MIPS: Postoperative opioids intended and Prophylactic antiemetics administered. Anesthetic plan and risks discussed with patient. Plan discussed with CRNA.                   Mulugeta Lopez MD   8/19/2021

## 2021-08-19 NOTE — H&P
H&P Update    Patient's History and Physical from August 18,  was reviewed. Patient examined. There has been no change. Fredy Minaya M.D., FACS.   8/19/2021  8:08 AM

## 2021-08-19 NOTE — PROGRESS NOTES
Pt admitted to PACU, no yet fully awake, RR easy and regular, simple mask 6Lnc in place, vss, LFA av graft with sravani CD&I, NSR on tele, will monitor

## 2021-08-19 NOTE — OP NOTE
Operative Note      Patient: Joie Rhodes  YOB: 1945  MRN: [id number]    Date of Procedure: 8/19/2021    Pre-Op Diagnosis: N18.6 END STAGE RENAL DISEASE    Post-Op Diagnosis: Same       Procedure(s):  LEFT FOREARM ARTERIO VENOUS GRAFT    Surgeon(s):  Karlee Zamora MD    Assistant:   First Assistant: Augusto Albert    Anesthesia: General    Estimated Blood Loss (mL): less than 968     Complications: None    Specimens:   * No specimens in log *    Implants:  Implant Name Type Inv. Item Serial No.  Lot No. LRB No. Used Action   GRAFT VASC L45CM ID6MM EPTFE STD WALLED FIX RNGD STR IMPL - [id number]  GRAFT VASC L45CM ID6MM EPTFE STD WALLED FIX RNGD STR IMPL [id number] [employer]  Left 1 Implanted         Drains: * No LDAs found *    Findings: as above    Detailed Description of Procedure: Indications:   Joie Rhodes is a 67 y.o. female with ESRD on HD          Nephrologist:  Meeta           Hand Dominance:  right  Antibiotics: Ancef    Procedure:  Left forearm brachial artery to brachial vein AV graft    After witnessed informed consent was obtained the patient was brought to the operating room and placed in the supine position. GET anesthesia was administered and found to be adequate. The left upper extremity was prepped and draped in a sterile fashion. Local anesthesia was injected. An incision was made at the antecubital level to expose the underlying vascular structures. The brachial vein was exposed. The venous tributaries were ligated and divided to allow for complete mobilization of the vein over a 5 cm length. The brachial artery was identified and mobilized. Small branches were ligated and divided. A 6 mm propaten graft was brought into the operative field. The graft was placed in a loop configuration in the left forearm with the addition of a counter incision to facilitate the tunneling process.   A mechanical tunneling device was used to direct the superficial placement of the graft. Heparin 5000 units were given. Arterial and Venous anastomoses were made using 6-0 Prolene sutures in a parachute technique. The Vein anastomosis was done first and the graft was clamped. The Arterial anastomosis followed. Removal of the distal arterial clamp was to inspect for hemostasis. The proximal arterial clamp was removed to allow flow through the graft. Hemostasis was obtained. The course of the outflow vein was checked to assure the absence of compression, kinks, or twists. Herparin was reversed. Wound closure was accomplished using subcutaneous 3-0 Vicryl suture and cutaneous 4-0 Vicryl sutures in all incisions. Gauze and Tegaderm dressings were applied. The patient was transferred to the [address] [place] [address]. Unit in good condition. Kerby Baumgarten Mel Pollen M.D., FACS.   8/19/2021  11:11 AM      Electronically signed by Mitchell Santillan MD on 8/19/2021 at 11:10 AM

## 2021-08-19 NOTE — PROGRESS NOTES
Pt. Now with minimal pain, dressing intact, prescription delivered, vas cath deaccessed,  andDischarge instructions reviewed with patient/responsible adult. All home medications have been reviewed, questions answered and patient verbalized understanding. Discharge instructions signed and copies given.

## 2021-08-19 NOTE — PROGRESS NOTES
Pt discharged home per private vehicle with a  responsible adult who states they will be with them for the next 24 hours.   Wheeled to front of the hospital

## 2021-08-23 ENCOUNTER — HOSPITAL ENCOUNTER (INPATIENT)
Age: 76
LOS: 1 days | Discharge: HOME | DRG: 314 | End: 2021-08-24
Attending: STUDENT IN AN ORGANIZED HEALTH CARE EDUCATION/TRAINING PROGRAM
Payer: MEDICARE

## 2021-08-23 DIAGNOSIS — T82.7XXA INFECTION OF ARTERIOVENOUS FISTULA, INITIAL ENCOUNTER (HCC): Primary | ICD-10-CM

## 2021-08-23 PROCEDURE — 96367 TX/PROPH/DG ADDL SEQ IV INF: CPT

## 2021-08-23 PROCEDURE — 96365 THER/PROPH/DIAG IV INF INIT: CPT

## 2021-08-23 PROCEDURE — 5A1D70Z PERFORMANCE OF URINARY FILTRATION, INTERMITTENT, LESS THAN 6 HOURS PER DAY: ICD-10-PCS | Performed by: INTERNAL MEDICINE

## 2021-08-23 PROCEDURE — 99284 EMERGENCY DEPT VISIT MOD MDM: CPT

## 2021-08-23 ASSESSMENT — PAIN DESCRIPTION - PAIN TYPE: TYPE: ACUTE PAIN

## 2021-08-23 NOTE — Clinical Note
Patient Class: Inpatient [101]   REQUIRED: Diagnosis: Arteriovenous fistula infection, initial encounter ([facility] [[id number]]   Estimated Length of Stay: Estimated stay of more than 2 midnights

## 2021-08-24 VITALS
BODY MASS INDEX: 32.76 KG/M2 | DIASTOLIC BLOOD PRESSURE: 89 MMHG | OXYGEN SATURATION: 97 % | TEMPERATURE: 99 F | HEIGHT: 62 IN | RESPIRATION RATE: 16 BRPM | SYSTOLIC BLOOD PRESSURE: 133 MMHG | WEIGHT: 178 LBS | HEART RATE: 95 BPM

## 2021-08-24 PROBLEM — T82.7XXA ARTERIOVENOUS FISTULA INFECTION, INITIAL ENCOUNTER (HCC): Status: ACTIVE | Noted: 2021-08-24

## 2021-08-24 LAB
ANION GAP SERPL CALCULATED.3IONS-SCNC: 16 MMOL/L (ref 3–16)
APTT: 28.9 SEC (ref 26.2–38.6)
BASOPHILS ABSOLUTE: 0 K/UL (ref 0–0.2)
BASOPHILS RELATIVE PERCENT: 0.3 %
BUN BLDV-MCNC: 47 MG/DL (ref 7–20)
CALCIUM SERPL-MCNC: 9.9 MG/DL (ref 8.3–10.6)
CHLORIDE BLD-SCNC: 94 MMOL/L (ref 99–110)
CO2: 25 MMOL/L (ref 21–32)
CREAT SERPL-MCNC: 6.8 MG/DL (ref 0.6–1.2)
EOSINOPHILS ABSOLUTE: 0.3 K/UL (ref 0–0.6)
EOSINOPHILS RELATIVE PERCENT: 1.7 %
GFR AFRICAN AMERICAN: 7
GFR NON-AFRICAN AMERICAN: 6
GLUCOSE BLD-MCNC: 123 MG/DL (ref 70–99)
HCT VFR BLD CALC: 32.2 % (ref 36–48)
HEMOGLOBIN: 10.2 G/DL (ref 12–16)
INR BLD: 0.98 (ref 0.88–1.12)
LACTIC ACID, SEPSIS: 1.1 MMOL/L (ref 0.4–1.9)
LYMPHOCYTES ABSOLUTE: 1 K/UL (ref 1–5.1)
LYMPHOCYTES RELATIVE PERCENT: 6 %
MCH RBC QN AUTO: 30.2 PG (ref 26–34)
MCHC RBC AUTO-ENTMCNC: 31.6 G/DL (ref 31–36)
MCV RBC AUTO: 95.7 FL (ref 80–100)
MONOCYTES ABSOLUTE: 0.9 K/UL (ref 0–1.3)
MONOCYTES RELATIVE PERCENT: 5.7 %
NEUTROPHILS ABSOLUTE: 13.9 K/UL (ref 1.7–7.7)
NEUTROPHILS RELATIVE PERCENT: 86.3 %
PDW BLD-RTO: 17.8 % (ref 12.4–15.4)
PLATELET # BLD: 209 K/UL (ref 135–450)
PMV BLD AUTO: 8.2 FL (ref 5–10.5)
POTASSIUM REFLEX MAGNESIUM: 4.4 MMOL/L (ref 3.5–5.1)
PROTHROMBIN TIME: 11.1 SEC (ref 9.9–12.7)
RBC # BLD: 3.36 M/UL (ref 4–5.2)
SODIUM BLD-SCNC: 135 MMOL/L (ref 136–145)
WBC # BLD: 16.1 K/UL (ref 4–11)

## 2021-08-24 PROCEDURE — 6360000002 HC RX W HCPCS: Performed by: STUDENT IN AN ORGANIZED HEALTH CARE EDUCATION/TRAINING PROGRAM

## 2021-08-24 PROCEDURE — 83605 ASSAY OF LACTIC ACID: CPT

## 2021-08-24 PROCEDURE — APPSS60 APP SPLIT SHARED TIME 46-60 MINUTES: Performed by: NURSE PRACTITIONER

## 2021-08-24 PROCEDURE — 85730 THROMBOPLASTIN TIME PARTIAL: CPT

## 2021-08-24 PROCEDURE — 85025 COMPLETE CBC W/AUTO DIFF WBC: CPT

## 2021-08-24 PROCEDURE — 85610 PROTHROMBIN TIME: CPT

## 2021-08-24 PROCEDURE — 36415 COLL VENOUS BLD VENIPUNCTURE: CPT

## 2021-08-24 PROCEDURE — 1200000000 HC SEMI PRIVATE

## 2021-08-24 PROCEDURE — 87040 BLOOD CULTURE FOR BACTERIA: CPT

## 2021-08-24 PROCEDURE — 6370000000 HC RX 637 (ALT 250 FOR IP): Performed by: STUDENT IN AN ORGANIZED HEALTH CARE EDUCATION/TRAINING PROGRAM

## 2021-08-24 PROCEDURE — 2580000003 HC RX 258: Performed by: STUDENT IN AN ORGANIZED HEALTH CARE EDUCATION/TRAINING PROGRAM

## 2021-08-24 PROCEDURE — APPNB30 APP NON BILLABLE TIME 0-30 MINS: Performed by: NURSE PRACTITIONER

## 2021-08-24 PROCEDURE — 6370000000 HC RX 637 (ALT 250 FOR IP): Performed by: PEDIATRICS

## 2021-08-24 PROCEDURE — 80048 BASIC METABOLIC PNL TOTAL CA: CPT

## 2021-08-24 RX ORDER — SODIUM CHLORIDE 0.9 % (FLUSH) 0.9 %
5-40 SYRINGE (ML) INJECTION EVERY 12 HOURS SCHEDULED
Status: DISCONTINUED | OUTPATIENT
Start: 2021-08-24 | End: 2021-08-24 | Stop reason: HOSPADM

## 2021-08-24 RX ORDER — GABAPENTIN 100 MG/1
200 CAPSULE ORAL NIGHTLY
Status: DISCONTINUED | OUTPATIENT
Start: 2021-08-24 | End: 2021-08-24 | Stop reason: HOSPADM

## 2021-08-24 RX ORDER — SODIUM CHLORIDE 0.9 % (FLUSH) 0.9 %
5-40 SYRINGE (ML) INJECTION PRN
Status: DISCONTINUED | OUTPATIENT
Start: 2021-08-24 | End: 2021-08-24 | Stop reason: HOSPADM

## 2021-08-24 RX ORDER — CLOPIDOGREL BISULFATE 75 MG/1
75 TABLET ORAL DAILY
Status: DISCONTINUED | OUTPATIENT
Start: 2021-08-24 | End: 2021-08-24 | Stop reason: HOSPADM

## 2021-08-24 RX ORDER — LEVOTHYROXINE SODIUM 0.1 MG/1
150 TABLET ORAL
Status: DISCONTINUED | OUTPATIENT
Start: 2021-08-24 | End: 2021-08-24 | Stop reason: HOSPADM

## 2021-08-24 RX ORDER — TORSEMIDE 20 MG/1
80 TABLET ORAL DAILY
Status: DISCONTINUED | OUTPATIENT
Start: 2021-08-24 | End: 2021-08-24 | Stop reason: HOSPADM

## 2021-08-24 RX ORDER — OXYCODONE HYDROCHLORIDE AND ACETAMINOPHEN 5; 325 MG/1; MG/1
2 TABLET ORAL ONCE
Status: COMPLETED | OUTPATIENT
Start: 2021-08-24 | End: 2021-08-24

## 2021-08-24 RX ORDER — ASPIRIN 81 MG/1
81 TABLET ORAL DAILY
Status: DISCONTINUED | OUTPATIENT
Start: 2021-08-24 | End: 2021-08-24 | Stop reason: HOSPADM

## 2021-08-24 RX ORDER — SPIRONOLACTONE 25 MG/1
25 TABLET ORAL DAILY
Status: DISCONTINUED | OUTPATIENT
Start: 2021-08-24 | End: 2021-08-24 | Stop reason: HOSPADM

## 2021-08-24 RX ORDER — OXYCODONE HYDROCHLORIDE 5 MG/1
5 TABLET ORAL EVERY 6 HOURS PRN
Status: DISCONTINUED | OUTPATIENT
Start: 2021-08-24 | End: 2021-08-24 | Stop reason: HOSPADM

## 2021-08-24 RX ORDER — NORTRIPTYLINE HYDROCHLORIDE 10 MG/1
10 CAPSULE ORAL NIGHTLY
Status: DISCONTINUED | OUTPATIENT
Start: 2021-08-24 | End: 2021-08-24 | Stop reason: HOSPADM

## 2021-08-24 RX ORDER — ACETAMINOPHEN 650 MG/1
650 SUPPOSITORY RECTAL EVERY 6 HOURS PRN
Status: DISCONTINUED | OUTPATIENT
Start: 2021-08-24 | End: 2021-08-24 | Stop reason: HOSPADM

## 2021-08-24 RX ORDER — ALLOPURINOL 100 MG/1
100 TABLET ORAL DAILY
Status: DISCONTINUED | OUTPATIENT
Start: 2021-08-24 | End: 2021-08-24 | Stop reason: HOSPADM

## 2021-08-24 RX ORDER — VANCOMYCIN HYDROCHLORIDE 1 G/200ML
1000 INJECTION, SOLUTION INTRAVENOUS ONCE
Status: DISCONTINUED | OUTPATIENT
Start: 2021-08-24 | End: 2021-08-24 | Stop reason: DRUGHIGH

## 2021-08-24 RX ORDER — ACETAMINOPHEN 325 MG/1
650 TABLET ORAL EVERY 6 HOURS PRN
Status: DISCONTINUED | OUTPATIENT
Start: 2021-08-24 | End: 2021-08-24 | Stop reason: HOSPADM

## 2021-08-24 RX ORDER — ONDANSETRON 4 MG/1
4 TABLET, ORALLY DISINTEGRATING ORAL EVERY 8 HOURS PRN
Status: DISCONTINUED | OUTPATIENT
Start: 2021-08-24 | End: 2021-08-24 | Stop reason: HOSPADM

## 2021-08-24 RX ORDER — POLYETHYLENE GLYCOL 3350 17 G/17G
17 POWDER, FOR SOLUTION ORAL DAILY PRN
Status: DISCONTINUED | OUTPATIENT
Start: 2021-08-24 | End: 2021-08-24 | Stop reason: HOSPADM

## 2021-08-24 RX ORDER — ROSUVASTATIN CALCIUM 10 MG/1
20 TABLET, COATED ORAL NIGHTLY
Status: DISCONTINUED | OUTPATIENT
Start: 2021-08-24 | End: 2021-08-24 | Stop reason: HOSPADM

## 2021-08-24 RX ORDER — ONDANSETRON 2 MG/ML
4 INJECTION INTRAMUSCULAR; INTRAVENOUS EVERY 6 HOURS PRN
Status: DISCONTINUED | OUTPATIENT
Start: 2021-08-24 | End: 2021-08-24 | Stop reason: HOSPADM

## 2021-08-24 RX ORDER — TORSEMIDE 20 MG/1
40 TABLET ORAL 2 TIMES DAILY
Status: DISCONTINUED | OUTPATIENT
Start: 2021-08-24 | End: 2021-08-24

## 2021-08-24 RX ORDER — SODIUM CHLORIDE 9 MG/ML
25 INJECTION, SOLUTION INTRAVENOUS PRN
Status: DISCONTINUED | OUTPATIENT
Start: 2021-08-24 | End: 2021-08-24 | Stop reason: HOSPADM

## 2021-08-24 RX ADMIN — VANCOMYCIN HYDROCHLORIDE 1250 MG: 10 INJECTION, POWDER, LYOPHILIZED, FOR SOLUTION INTRAVENOUS at 01:16

## 2021-08-24 RX ADMIN — MEROPENEM 1000 MG: 1 INJECTION, POWDER, FOR SOLUTION INTRAVENOUS at 00:36

## 2021-08-24 RX ADMIN — OXYCODONE HYDROCHLORIDE AND ACETAMINOPHEN 2 TABLET: 5; 325 TABLET ORAL at 00:18

## 2021-08-24 RX ADMIN — OXYCODONE 5 MG: 5 TABLET ORAL at 11:47

## 2021-08-24 ASSESSMENT — PAIN SCALES - GENERAL
PAINLEVEL_OUTOF10: 0
PAINLEVEL_OUTOF10: 3
PAINLEVEL_OUTOF10: 7

## 2021-08-24 ASSESSMENT — ENCOUNTER SYMPTOMS: COLOR CHANGE: 1

## 2021-08-24 NOTE — PLAN OF CARE
Discharge instructions reviewed with patient/responsible adult. All home medications have been reviewed, questions answered and patient verbalized understanding. Discharge instructions signed and copies given. Patient discharged to home with belongings and vancomycin prescription to give to hemodialysis.

## 2021-08-24 NOTE — DISCHARGE SUMMARY
Hospital Medicine Discharge Summary    Patient ID: Romel Mcfadden      Patient's PCP: Rogerio Rivas MD    Admit Date: 8/23/2021     Discharge Date:  8/24/2021    Admitting Physician: No admitting provider for patient encounter. Discharge Physician: Seven Alejandro MD     Discharge Diagnoses: Active Hospital Problems    Diagnosis     [facility] infection, [facility] ([facility] Diane. 7XXA]        The patient was seen and examined on day of discharge and this discharge summary is in conjunction with any daily progress note from day of discharge. Hospital Course:     13-year-old female with a history of diabetes mellitus, coronary artery disease, Covid infection February 2021,  ESRD had   AV fistula placed 1 week ago. She came in as the AV fistula area was dark and little tender. No fever or leukocytosis upon admission. Vascular surgery consulted. Advised IV vancomycin with dialysis for a week. Plan was discussed with nephrology as well. Patient was discharged home in stable condition with a plan for IV antibiotics with dialysis      Physical Exam Performed:     /89   Pulse 95   Temp 99 °F (37.2 °C) (Oral)   Resp 16   Ht 5' 2\" (1.575 m)   Wt 178 lb (80.7 kg)   LMP  (LMP Unknown)   SpO2 97%   BMI 32.56 kg/m²       General appearance:  No apparent distress, appears stated age and cooperative. HEENT:  Normal cephalic, atraumatic without obvious deformity. Pupils equal, round, and reactive to light. Extra ocular muscles intact. Conjunctivae/corneas clear. Neck: Supple, with full range of motion. No jugular venous distention. Trachea midline. Respiratory:  Normal respiratory effort. Clear to auscultation, bilaterally without Rales/Wheezes/Rhonchi. Cardiovascular:  Regular rate and rhythm with normal S1/S2 without murmurs, rubs or gallops. Abdomen: Soft, non-tender, non-distended with normal bowel sounds.   Musculoskeletal:  No clubbing, cyanosis or nortriptyline (PAMELOR) 10 MG capsule TAKE 1 CAPSULE BY MOUTH EVERY EVENING  Qty: 30 capsule, Refills: 3      rosuvastatin (CRESTOR) 20 MG tablet Take 1 tablet by mouth daily  Qty: 90 tablet, Refills: 1      clopidogrel (PLAVIX) 75 MG tablet Take 1 tablet by mouth daily  Qty: 90 tablet, Refills: 3      aspirin EC 81 MG EC tablet Take 1 tablet by mouth daily  Qty: 90 tablet, Refills: 1      gabapentin (NEURONTIN) 100 MG capsule Take 2 capsules by mouth nightly for 30 days. Qty: 60 capsule, Refills: 3      UNABLE TO FIND Dialysis mon wed fri      loperamide (IMODIUM) 2 MG capsule Take 2 mg by mouth 4 times daily as needed      metoprolol tartrate (LOPRESSOR) 25 MG tablet Take 12.5 mg by mouth 2 times daily      levothyroxine (SYNTHROID) 150 MCG tablet Take 150 mcg by mouth every morning (before breakfast)      torsemide (DEMADEX) 20 MG tablet Take 20 mg by mouth 3 tablets in am, 3 tablets in pm      allopurinol (ZYLOPRIM) 100 MG tablet TAKE 1 TABLET EVERY DAY  Qty: 90 tablet, Refills: 3      spironolactone (ALDACTONE) 25 MG tablet TAKE 1 TABLET EVERY DAY  Qty: 90 tablet, Refills: 3             Time Spent on discharge is more than 20 minutes in the examination, evaluation, counseling and review of medications and discharge plan. Signed:    Seven Alejandro MD   8/24/2021      Thank you Rogerio Rivas MD for the opportunity to be involved in this patient's care. If you have any questions or concerns please feel free to contact me at [phone].

## 2021-08-24 NOTE — H&P
Hospital Medicine History & Physical      PCP: Sofi Ornelas MD    Date of Admission: 8/23/2021    Date of Service: Pt seen/examined on 08/24/21  and Admitted to Inpatient with expected LOS greater than two midnights due to medical therapy. Chief Complaint:  Her fistula got really swollen and turned black        History Of Present Illness:       67 y.o. female who presented to [facility] with ESRD, s/p fistula placement in her left arm about a week ago. She reports it initially appeared dark to begin with but yesterday she reports it got real dark 8/23, and reports pain at site increased 2 days ago. Pain has been mild but when she took percocet for the pain she reports she felt relaxed but it didn't help the pain at all. ROS:   No fevers   She reports numbness in her left hand   No nausea   + vomiting 2 days ago (Sunday)   No dyspnea   No dysuria     SocHx:   - does not drink   - does not smoke   - lives at home alone   - has 3 children       Past Medical History:          Diagnosis Date    Arthritis     [facility] ([facility]     Hemodialysis patient ([facility]     Hyperlipidemia     Hypertension     Kidney disease     Neuropathy     Pneumonia     IN February, 2021, with COVID    Thyroid disease        Past Surgical History:          Procedure Laterality Date    APPENDECTOMY      CHOLECYSTECTOMY      COLONOSCOPY  11/24/2020    COLONOSCOPY POLYPECTOMY SNARE/COLD BIOPSY performed by Yash Luis MD at [facility] 5/17/2021    PERITONEAL DIALYSIS CATHETER REMOVAL.  [address]. performed by Annette Hawley DO at [address] N/A 4/3/2020    LAPAROSCOPIC LYSIS OF ADHESIONS, LAPAROSCOPIC PERITONEAL DIALYSIS CATHETER PLACEMENT, LAPAROSCOPIC OMENTOPEXY performed by Annette Hawley DO at [facility], BILATERAL      preventive    ROTATOR CUFF REPAIR Left     SHUNT REVISION Left 8/19/2021    LEFT FOREARM ARTERIO VENOUS GRAFT performed by Talia Cardoso MD at [address] 11/24/2020    EGD BIOPSY performed by Татьяна Sims MD at [address] [place] [address]       Medications Prior to Admission:      Prior to Admission medications    Medication Sig Start Date End Date Taking? Authorizing Provider   oxyCODONE (ROXICODONE) 5 MG immediate release tablet Take 1 tablet by mouth every 6 hours as needed for Pain for up to 5 days. Intended supply: 5 days. Take lowest dose possible to manage pain 8/19/21 8/24/21 Yes Sharad Palencia II, MD   nortriptyline (PAMELOR) 10 MG capsule TAKE 1 CAPSULE BY MOUTH EVERY EVENING 8/17/21  Yes Ely Spatz, MD   rosuvastatin (CRESTOR) 20 MG tablet Take 1 tablet by mouth daily 7/8/21  Yes Nikki Rao MD   clopidogrel (PLAVIX) 75 MG tablet Take 1 tablet by mouth daily 7/8/21  Yes Nikki Rao MD   aspirin EC 81 MG EC tablet Take 1 tablet by mouth daily 7/8/21  Yes Nikki Rao MD   gabapentin (NEURONTIN) 100 MG capsule Take 2 capsules by mouth nightly for 30 days.  7/7/21 8/23/21 Yes Ely Spatz, MD   UNABLE TO FIND Dialysis mon wed fri   Yes Historical Provider, MD   loperamide (IMODIUM) 2 MG capsule Take 2 mg by mouth 4 times daily as needed 2/14/21  Yes Historical Provider, MD   metoprolol tartrate (LOPRESSOR) 25 MG tablet Take 12.5 mg by mouth 2 times daily 2/14/21 2/14/22 Yes Historical Provider, MD   levothyroxine (SYNTHROID) 150 MCG tablet Take 150 mcg by mouth every morning (before breakfast)   Yes Historical Provider, MD   torsemide (DEMADEX) 20 MG tablet Take 20 mg by mouth 3 tablets in am, 3 tablets in pm   Yes Historical Provider, MD   allopurinol (ZYLOPRIM) 100 MG tablet TAKE 1 TABLET EVERY DAY 9/1/20  Yes Ely Spatz, MD   spironolactone (ALDACTONE) 25 MG tablet TAKE 1 TABLET EVERY DAY  Patient taking differently: 25 mg daily  8/31/20  Yes Ely Spatz, MD       Allergies: Amoxicillin, Demeclocycline, Penicillins, Tetracyclines & related, Ozempic (0.25 or 0.5 mg-dose) [semaglutide(0.25 or 0.5mg-dos)], and Codeine    Social History:      The patient currently lives at home alone     TOBACCO:   reports that she quit smoking about 43 years ago. Her smoking use included cigarettes. She has a 5.00 pack-year smoking history. She has never used smokeless tobacco.  ETOH:   reports current alcohol use. E-Cigarettes/Vaping Use     Questions Responses    E-Cigarette/Vaping Use Never User    Start Date     Passive Exposure     Quit Date     Counseling Given Yes    Comments             Family History:        Problem Relation Age of Onset    Cancer Mother         lung, breast, liver    Stroke Father     Stomach Cancer Brother     Cancer Maternal Grandmother     No Known Problems Paternal Grandmother     No Known Problems Paternal Grandfather        REVIEW OF SYSTEMS COMPLETED:   Pertinent positives as noted in the HPI. All other systems reviewed and negative. PHYSICAL EXAM PERFORMED:    /63   Pulse 82   Temp 99 °F (37.2 °C) (Oral)   Resp 11   Ht 5' 2\" (1.575 m)   Wt 178 lb (80.7 kg)   LMP  (LMP Unknown)   SpO2 95%   BMI 32.56 kg/m²     General appearance:  No apparent distress, appears stated age and cooperative. HEENT:  Normal cephalic, atraumatic without obvious deformity. Pupils equal, round, and reactive to light. Extra ocular muscles intact. Conjunctivae/corneas clear. Neck: Supple, with full range of motion. No jugular venous distention. Trachea midline. Respiratory:  Normal respiratory effort. Clear to auscultation, bilaterally without Rales/Wheezes/Rhonchi. Cardiovascular:  Regular rate and rhythm with normal S1/S2 without murmurs, rubs or gallops. Abdomen: Soft, non-tender, non-distended with normal bowel sounds.   Musculoskeletal:  No clubbing, cyanosis or edema bilaterally   Skin: Skin color, texture, turgor normal   Neurologic:    Speech clear   No facial droop  Moves ext x 4 . Pt denies loss of sensation in her hands   Psychiatric:  Alert and oriented   Capillary Refill: Brisk,3 seconds, normal  Peripheral Pulses: +2 palpable, equal bilaterally       Labs:     Recent Labs     08/24/21 0034   WBC 16.1*   HGB 10.2*   HCT 32.2*        Recent Labs     08/24/21 0034   *   K 4.4   CL 94*   CO2 25   BUN 47*   CREATININE 6.8*   CALCIUM 9.9     No results for input(s): AST, ALT, BILIDIR, BILITOT, ALKPHOS in the last 72 hours. Recent Labs     08/24/21 0034   INR 0.98     No results for input(s): Victorino Altbabs in the last 72 hours. Urinalysis:      Lab Results   Component Value Date    NITRU Negative 08/16/2021    WBCUA 1 08/16/2021    BACTERIA RARE 08/16/2021    RBCUA 0 08/16/2021    BLOODU TRACE 08/16/2021    SPECGRAV 1.012 08/16/2021    GLUCOSEU 100 08/16/2021            No orders to display       ASSESSMENT:    Active Hospital Problems    Diagnosis Date Noted    Arteriovenous fistula infection, initial encounter ([facility] [T82. 7XXA] 08/24/2021     Joie Rhodes is a 67 y.o. female      Concern for Fistula site infection:   - meropenem IV   - vancomycin IV   - vascular surgery consulted (Dr. Pak Code placed her fistula 8/19)     ESRD on IHD:   - torsemide 20 mg po daily   - nephrology consulted     DM2:   - per hx   - last a1c of 5.2 in July 2021   - SSI     CAD:   - aspirin 81 mg po daily   - clopidogrel 75 mg po daily   - metoprolol 12.5 mg po BID   - spironolactone 25 mg po daily     Dyslipidemia:   - rosuvastatin --> formulary atorvastatin - daily     Neuropathy:   - gabapentin 100 mg po qhs     Hypothyroidism:   - levothyroxine 150 mcg po qAM     Gout:   - allopurinol 100 mg po daily     Sleep aid:   - nortriptyline 10 mg po qhs     Pain:   - oxycodone 5 mg po q6 hours prn     Note:   Had covid pneumonia in [facility] back in Jan-Feb 2021    DVT Prophylaxis:  SCD   Diet: No diet orders on file  Code Status: Prior - DNR - CCA     Alternative decision maker - daughter, Lexie Messer     PT/OT Eval Status: not consulted     Dispo - pending improvement        Adan Burroughs MD    Thank you Evelio Cr MD for the opportunity to be involved in this patient's care.

## 2021-08-24 NOTE — ED PROVIDER NOTES
[facility]      Pt Name: Babita Pedroza  MRN: [id number]  [place] 1945  Date of evaluation: 8/23/2021  Provider: Valentina Lopez MD    CHIEF COMPLAINT       Chief Complaint   Patient presents with    Post-op Problem     had fistula placed thursday by doctor Hattie John. c/o swelling and redness in left hand          HISTORY OF PRESENT ILLNESS   (Location/Symptom, Timing/Onset, Context/Setting, Quality, Duration, Modifying Factors, Severity)  Note limiting factors. Babita Pedroza is a 67 y.o. female who presents to the emergency department with arm pain and swelling over the last 24 hours. Patient had fistula paced by Dr. Hattie John on the 18th. She states that she has not taken down the dressing to this upper extremity but she is having swelling and severe pain to the forearm. No fevers or chills. She is getting dialysis currently through her right chest dialysis port. HPI    Nursing Notes were reviewed. REVIEW OF SYSTEMS    (2-9 systems for level 4, 10 or more for level 5)     Review of Systems   Cardiovascular: Negative for chest pain and palpitations. Upper extremity edema. Skin: Positive for color change and wound. Negative for rash. Psychiatric/Behavioral: Negative for agitation and confusion. Except as noted above the remainder of the review of systems was reviewed and negative.        PAST MEDICAL HISTORY     Past Medical History:   Diagnosis Date    Arthritis     [facility] ([facility]     Hemodialysis patient ([facility]     Hyperlipidemia     Hypertension     Kidney disease     Neuropathy     Pneumonia     IN February, 2021, with COVID    Thyroid disease          SURGICAL HISTORY       Past Surgical History:   Procedure Laterality Date    APPENDECTOMY      CHOLECYSTECTOMY      COLONOSCOPY  11/24/2020    COLONOSCOPY POLYPECTOMY SNARE/COLD BIOPSY performed by Sherrill Alvarez MD at [address] CATHETER REMOVAL N/A 5/17/2021    PERITONEAL DIALYSIS CATHETER REMOVAL. [address]. performed by Gaye Bloch, DO at [address] N/A 4/3/2020    LAPAROSCOPIC LYSIS OF ADHESIONS, LAPAROSCOPIC PERITONEAL DIALYSIS CATHETER PLACEMENT, LAPAROSCOPIC OMENTOPEXY performed by Gaye Bloch, DO at [address], BILATERAL      preventive    ROTATOR CUFF REPAIR Left     SHUNT REVISION Left 8/19/2021    LEFT FOREARM ARTERIO VENOUS GRAFT performed by Sharon Monge MD at [address] 11/24/2020    EGD BIOPSY performed by Dina Laguerre MD at [address]       Previous Medications    ALLOPURINOL (ZYLOPRIM) 100 MG TABLET    TAKE 1 TABLET EVERY DAY    ASPIRIN EC 81 MG EC TABLET    Take 1 tablet by mouth daily    CLOPIDOGREL (PLAVIX) 75 MG TABLET    Take 1 tablet by mouth daily    GABAPENTIN (NEURONTIN) 100 MG CAPSULE    Take 2 capsules by mouth nightly for 30 days. LEVOTHYROXINE (SYNTHROID) 150 MCG TABLET    Take 150 mcg by mouth every morning (before breakfast)    LOPERAMIDE (IMODIUM) 2 MG CAPSULE    Take 2 mg by mouth 4 times daily as needed    METOPROLOL TARTRATE (LOPRESSOR) 25 MG TABLET    Take 12.5 mg by mouth 2 times daily    NORTRIPTYLINE (PAMELOR) 10 MG CAPSULE    TAKE 1 CAPSULE BY MOUTH EVERY EVENING    OXYCODONE (ROXICODONE) 5 MG IMMEDIATE RELEASE TABLET    Take 1 tablet by mouth every 6 hours as needed for Pain for up to 5 days. Intended supply: 5 days.  Take lowest dose possible to manage pain    ROSUVASTATIN (CRESTOR) 20 MG TABLET    Take 1 tablet by mouth daily    SPIRONOLACTONE (ALDACTONE) 25 MG TABLET    TAKE 1 TABLET EVERY DAY    TORSEMIDE (DEMADEX) 20 MG TABLET    Take 20 mg by mouth 3 tablets in am, 3 tablets in pm    UNABLE TO FIND    Dialysis mon wed fri       ALLERGIES     Amoxicillin, Demeclocycline, Penicillins, Tetracyclines & related, Ozempic (0.25 or 0.5 mg-dose) [semaglutide(0.25 or 0.5mg-dos)], and Codeine    FAMILY HISTORY       Family History   Problem Relation Age of Onset    Cancer Mother         lung, breast, liver    Stroke Father     Stomach Cancer Brother     Cancer Maternal Grandmother     No Known Problems Paternal Grandmother     No Known Problems Paternal Grandfather           SOCIAL HISTORY       Social History     Socioeconomic History    Marital status:      Spouse name: None    Number of children: 3    Years of education: None    Highest education level: None   Occupational History    Occupation: retired clerical   Tobacco Use    Smoking status: Former Smoker     Packs/day: 0.50     Years: 10.00     Pack years: 5.00     Types: Cigarettes     Quit date: 1978     Years since quittin.1    Smokeless tobacco: Never Used   Vaping Use    Vaping Use: Never used   Substance and Sexual Activity    Alcohol use: Yes     Comment: every other week one glass of wine    Drug use: Never    Sexual activity: Not Currently   Other Topics Concern    None   Social History Narrative    Lives with son--recently moved from [facility] and now here    [facility]     Social Determinants of Health     Financial Resource Strain: Low Risk     Difficulty of Paying Living Expenses: Not hard at all   Food Insecurity: No Food Insecurity    Worried About [address] in the Last Year: Never true    [address] in the Last Year: Never true   Transportation Needs: No Transportation Needs    Lack of Transportation (Medical): No    Lack of Transportation (Non-Medical):  No   Physical Activity:     Days of Exercise per Week:     Minutes of Exercise per Session:    Stress:     Feeling of Stress :    Social Connections:     Frequency of Communication with Friends and Family:     Frequency of Social Gatherings with Friends and Family:     Attends [employer] Services:     Active Member of Clubs or Organizations:     Attends Club or Organization Meetings:     Marital Status:    Intimate Partner Violence:     Fear of Current or Ex-Partner:     Emotionally Abused:     Physically Abused:     Sexually Abused:        SCREENINGS                        PHYSICAL EXAM    (up to 7 for level 4, 8 or more for level 5)     ED Triage Vitals [08/23/21 2143]   BP Temp Temp Source Pulse Resp SpO2 Height Weight   (!) 149/71 99 °F (37.2 °C) Oral 119 16 96 % 5' 2\" (1.575 m) 178 lb (80.7 kg)       Physical Exam  Constitutional:       Appearance: Normal appearance. HENT:      Head: Normocephalic and atraumatic. Eyes:      Conjunctiva/sclera: Conjunctivae normal.   Cardiovascular:      Rate and Rhythm: Regular rhythm. Tachycardia present. Pulses: Normal pulses. Heart sounds: Normal heart sounds. Pulmonary:      Effort: Pulmonary effort is normal.      Breath sounds: Normal breath sounds. Musculoskeletal:      Cervical back: Normal range of motion. No rigidity. Skin:     Capillary Refill: Capillary refill takes less than 2 seconds. Comments: Left upper extremity AV fistula site is tender with firmness. There is edema circumferentially to the left forearm. There is no active bleeding. The left forearm is warm to the touch compared to the right forearm. There is no pain with passive extension or flexion of the fingers. There are intact distal pulses on the left. Neurological:      Mental Status: She is alert and oriented to person, place, and time.    Psychiatric:         Mood and Affect: Mood normal.         Behavior: Behavior normal.         DIAGNOSTIC RESULTS       RADIOLOGY:   Non-plain film images such as CT, Ultrasound and MRI are read by the radiologist. Plain radiographic images are visualized and preliminarily interpreted by the emergency physician with the below findings:        Interpretation per the Radiologist below, if available at the time of this note:    No orders to display         LABS:  Labs Reviewed   CULTURE, BLOOD 1   CULTURE, BLOOD 2   CBC WITH AUTO DIFFERENTIAL   BASIC METABOLIC PANEL W/ REFLEX TO MG FOR LOW K   LACTATE, SEPSIS   LACTATE, SEPSIS       All other labs were within normal range or not returned as of this dictation. EMERGENCY DEPARTMENT COURSE and DIFFERENTIAL DIAGNOSIS/MDM:   Vitals:    Vitals:    08/23/21 2143   BP: (!) 149/71   Pulse: 119   Resp: 16   Temp: 99 °F (37.2 °C)   TempSrc: Oral   SpO2: 96%   Weight: 178 lb (80.7 kg)   Height: 5' 2\" (1.575 m)     Medications   oxyCODONE-acetaminophen (PERCOCET) 5-325 MG per tablet 2 tablet (has no administration in time range)   vancomycin (VANCOCIN) 1000 mg in dextrose 5% 200 mL IVPB (has no administration in time range)   meropenem (MERREM) 1,000 mg in sodium chloride 0.9 % 100 mL IVPB (mini-bag) (has no administration in time range)       Course and MDM:    Patient is 21-year-old female status post left upper extremity fistula placement on the 18th presenting with fistula pain and left upper extremity edema over the last 24 hours. On arrival she is tachycardic with a stable blood pressure. Exam of the left arm is concerning for infected versus clotted fistula. she does have intact distal pulses though the left arm is warm to the touch and very tender. Currently her exam is not consistent with acute compartment syndrome. This case was discussed with Dr. Getachew Kiran with vascular surgery. she is agreeing with admission for broad-spectrum antibiotics, blood cultures with plan for further imaging and possible OR in the morning. She is agreeable to admission for the above. The total Critical Care time is 35 minutes which excludes separately billable procedures. PROCEDURES:  Unless otherwise noted below, none     Procedures        FINAL IMPRESSION      1. Infection [facility]ous [facility]          DISPOSITION/PLAN   DISPOSITION        PATIENT REFERRED TO:  No follow-up provider specified.     DISCHARGE MEDICATIONS:      New Prescriptions    No medications on file       Controlled Substances Monitoring:    If the patient was prescribed a controlled substance today, the PDMP was reviewed as documented below. No flowsheet data found.     (Please note that portions of this note were completed with a voice recognition program.  Efforts were made to edit the dictations but occasionally words are mis-transcribed.)    Irene Ruvalcaba MD (electronically signed)  Attending Emergency Physician         Jessica Quesada MD  08/24/21 5663

## 2021-08-24 NOTE — CONSULTS
[facility]  Consultation Note    Chief Complaint / Reason for Consultation  Possible AV graft infection     History of Present Illness  Patient is a 67 y.o. female with past medical history of ESRD on HD, HTN, HLD and DM who presented to the ED with complaints of pain, swelling and bruising to her left arm. Patient is s/p left forearm AV graft on 8/19/21 with Dr. Marylen Furnace. She reports since surgery she has had pain and swelling to her forearm with discoloration. She has a right IJ tunneled dialysis catheter that she is currently getting dialysis from. Her last treatment was yesterday. She is a M/W/F dialysis patient. She denies any fever or chills. She denies chest pain or SOB. She denies any drainage from surgical incisions. She denies numbness or weakness to her left hand. We have been consulted for further recommendations and plan. Review of Systems  + left forearm swelling and pain and bruising. Denies fevers, chills, chest pain, shortness of breath, nausea, vomiting, hematemesis, diarrhea, constipation, melena, hematochezia, wt changes, vision problems, blindness, hearing problems, facial droop, slurred speech, extremity weakness, extremity numbness, dysuria. Past Medical History:   Diagnosis Date    Arthritis     Diabetes [facility]     Hemodialysis patient ([facility]     Hyperlipidemia     Hypertension     Kidney disease     Neuropathy     Pneumonia     IN February, 2021, with COVID    Thyroid disease        Past Surgical History:   Procedure Laterality Date    APPENDECTOMY      CHOLECYSTECTOMY      COLONOSCOPY  11/24/2020    COLONOSCOPY POLYPECTOMY SNARE/COLD BIOPSY performed by Helio Solano MD at [address] 5/17/2021    PERITONEAL DIALYSIS CATHETER REMOVAL.  EXCISSION OF ABDOMINAL CITRIX. performed by Angel Morse DO at [address] N/A 4/3/2020 of Transportation (Medical): No    Lack of Transportation (Non-Medical): No   Physical Activity:     Days of Exercise per Week:     Minutes of Exercise per Session:    Stress:     Feeling of Stress :    Social Connections:     Frequency of Communication with Friends and Family:     Frequency of Social Gatherings with Friends and Family:     Attends Nondenominational Services:     Active Member of Clubs or Organizations:     Attends Club or Organization Meetings:     Marital Status:    Intimate Partner Violence:     Fear of Current or Ex-Partner:     Emotionally Abused:     Physically Abused:     Sexually Abused:        Family History   Problem Relation Age of Onset    Cancer Mother         lung, breast, liver    Stroke Father     Stomach Cancer Brother     Cancer Maternal Grandmother     No Known Problems Paternal Grandmother     No Known Problems Paternal Grandfather      - No history of bleeding or clotting disorders    Vital Signs  Vitals:    08/24/21 0900 08/24/21 0915 08/24/21 0930 08/24/21 0945   BP: 124/63      Pulse: 80 82 87 83   Resp: 13 13 17 18   Temp:       TempSrc:       SpO2:       Weight:       Height:           Physical Examination  General: no apparent distress, appears stated age  Psychiatric: affect appropriate  Head/Eyes/Ears/Nose/Throat:  Normocephalic, atraumatic, PERRLA, face symmetric  Neck:  no adenopathy, no carotid bruit, no JVD, supple, symmetrical, trachea midline, thyroid not enlarged, no tenderness/mass/nodules  Chest/Lungs: clear to auscultation bilaterally, no accessory muscle use  Cardiac:  regular rate and rhythm, S1, S2 normal, no murmur, click, rub or gallop  Abdomen: soft, nontender, active bowel sounds  Vascular exam:  - R radial: 2+  - L radial: 2+  Extremities: left hand warm with motor and sensory intact. Left forearm edema 1+ with some ecchymosis. No drainage noted from incisions. Left forearm AV graft + bruit/thrill.           Labs  Lab Results   Component Value Date    WBC 16.1 08/24/2021    HGB 10.2 08/24/2021    HCT 32.2 08/24/2021    MCV 95.7 08/24/2021     08/24/2021     Lab Results   Component Value Date     08/24/2021    K 4.4 08/24/2021    CL 94 08/24/2021    CO2 25 08/24/2021    PHOS 8.8 03/23/2021    BUN 47 08/24/2021    CREATININE 6.8 08/24/2021      No components found for: GLU    Scheduled Meds:    torsemide  20 mg Oral Daily    spironolactone  25 mg Oral Daily    rosuvastatin  20 mg Oral Daily    nortriptyline  10 mg Oral Nightly    metoprolol tartrate  12.5 mg Oral BID    levothyroxine  150 mcg Oral QAM AC    gabapentin  200 mg Oral Nightly    clopidogrel  75 mg Oral Daily    aspirin EC  81 mg Oral Daily    allopurinol  100 mg Oral Daily    sodium chloride flush  5-40 mL Intravenous 2 times per day     Continuous Infusions:    sodium chloride         Assessment:   S/p left forearm AV graft 8/19/21 - swelling and pain expected post-op, no drainage noted from incisions    ESRD on HD - MWF  HTN   DM type 2  HLD    Plan:  Recommend compression with ACE wrap to left arm from hand to elbow. Elevate left arm. Will start on course of abx - would like patient to get 1 gram of vancomycin IV with dialysis for the next week. Okay to discharge from vascular standpoint. Follow up with Dr. Ellen Paredes in 1 week. D/w Dr. Ellen Paredes. Thank you for the consultation. Patient educated on plan of care and disease process. All questions answered.         Electronically signed by MARCIE Pike CNP on 8/24/2021 at 10:35 AM

## 2021-08-25 ENCOUNTER — APPOINTMENT (OUTPATIENT)
Dept: CT IMAGING | Age: 76
DRG: 871 | End: 2021-08-25
Payer: MEDICARE

## 2021-08-25 ENCOUNTER — HOSPITAL ENCOUNTER (INPATIENT)
Age: 76
LOS: 7 days | Discharge: HOME | DRG: 871 | End: 2021-09-01
Attending: EMERGENCY MEDICINE | Admitting: INTERNAL MEDICINE
Payer: MEDICARE

## 2021-08-25 ENCOUNTER — APPOINTMENT (OUTPATIENT)
Dept: GENERAL RADIOLOGY | Age: 76
DRG: 871 | End: 2021-08-25
Payer: MEDICARE

## 2021-08-25 DIAGNOSIS — J18.9 PNEUMONIA DUE TO INFECTIOUS ORGANISM, UNSPECIFIED LATERALITY, UNSPECIFIED PART OF LUNG: ICD-10-CM

## 2021-08-25 DIAGNOSIS — A41.9 SEPTICEMIA (HCC): Primary | ICD-10-CM

## 2021-08-25 DIAGNOSIS — Z99.2 ESRD ON DIALYSIS (HCC): ICD-10-CM

## 2021-08-25 DIAGNOSIS — R29.6 FREQUENT FALLS: ICD-10-CM

## 2021-08-25 DIAGNOSIS — N18.6 ESRD ON DIALYSIS (HCC): ICD-10-CM

## 2021-08-25 DIAGNOSIS — E87.5 HYPERKALEMIA: ICD-10-CM

## 2021-08-25 LAB
A/G RATIO: 0.9 (ref 1.1–2.2)
ALBUMIN SERPL-MCNC: 3.6 G/DL (ref 3.4–5)
ALP BLD-CCNC: 149 U/L (ref 40–129)
ALT SERPL-CCNC: 30 U/L (ref 10–40)
ANION GAP SERPL CALCULATED.3IONS-SCNC: 18 MMOL/L (ref 3–16)
APTT: 24.7 SEC (ref 26.2–38.6)
AST SERPL-CCNC: 37 U/L (ref 15–37)
BASOPHILS ABSOLUTE: 0.1 K/UL (ref 0–0.2)
BASOPHILS RELATIVE PERCENT: 0.6 %
BILIRUB SERPL-MCNC: 0.5 MG/DL (ref 0–1)
BUN BLDV-MCNC: 71 MG/DL (ref 7–20)
CALCIUM SERPL-MCNC: 10 MG/DL (ref 8.3–10.6)
CHLORIDE BLD-SCNC: 91 MMOL/L (ref 99–110)
CO2: 19 MMOL/L (ref 21–32)
CREAT SERPL-MCNC: 8.6 MG/DL (ref 0.6–1.2)
EKG ATRIAL RATE: 110 BPM
EKG DIAGNOSIS: NORMAL
EKG P AXIS: 45 DEGREES
EKG P-R INTERVAL: 150 MS
EKG Q-T INTERVAL: 330 MS
EKG QRS DURATION: 86 MS
EKG QTC CALCULATION (BAZETT): 446 MS
EKG R AXIS: -26 DEGREES
EKG T AXIS: 20 DEGREES
EKG VENTRICULAR RATE: 110 BPM
EOSINOPHILS ABSOLUTE: 0.1 K/UL (ref 0–0.6)
EOSINOPHILS RELATIVE PERCENT: 0.4 %
GFR AFRICAN AMERICAN: 5
GFR NON-AFRICAN AMERICAN: 4
GLOBULIN: 3.9 G/DL
GLUCOSE BLD-MCNC: 146 MG/DL (ref 70–99)
HCT VFR BLD CALC: 31.4 % (ref 36–48)
HEMOGLOBIN: 10.2 G/DL (ref 12–16)
INR BLD: 0.97 (ref 0.88–1.12)
LACTIC ACID, SEPSIS: 1.5 MMOL/L (ref 0.4–1.9)
LYMPHOCYTES ABSOLUTE: 0.8 K/UL (ref 1–5.1)
LYMPHOCYTES RELATIVE PERCENT: 4.5 %
MCH RBC QN AUTO: 31.1 PG (ref 26–34)
MCHC RBC AUTO-ENTMCNC: 32.6 G/DL (ref 31–36)
MCV RBC AUTO: 95.4 FL (ref 80–100)
MONOCYTES ABSOLUTE: 1.1 K/UL (ref 0–1.3)
MONOCYTES RELATIVE PERCENT: 6 %
NEUTROPHILS ABSOLUTE: 16 K/UL (ref 1.7–7.7)
NEUTROPHILS RELATIVE PERCENT: 88.5 %
PDW BLD-RTO: 17.8 % (ref 12.4–15.4)
PLATELET # BLD: 231 K/UL (ref 135–450)
PMV BLD AUTO: 8.3 FL (ref 5–10.5)
POTASSIUM SERPL-SCNC: 4 MMOL/L (ref 3.5–5.1)
POTASSIUM SERPL-SCNC: 5.9 MMOL/L (ref 3.5–5.1)
POTASSIUM SERPL-SCNC: 6 MMOL/L (ref 3.5–5.1)
PRO-BNP: 5929 PG/ML (ref 0–449)
PROCALCITONIN: 1.08 NG/ML (ref 0–0.15)
PROTHROMBIN TIME: 11 SEC (ref 9.9–12.7)
RBC # BLD: 3.29 M/UL (ref 4–5.2)
REASON FOR REJECTION: NORMAL
REJECTED TEST: NORMAL
SODIUM BLD-SCNC: 128 MMOL/L (ref 136–145)
TOTAL CK: 243 U/L (ref 26–192)
TOTAL PROTEIN: 7.5 G/DL (ref 6.4–8.2)
TROPONIN: 0.02 NG/ML
TROPONIN: 0.03 NG/ML
WBC # BLD: 18 K/UL (ref 4–11)

## 2021-08-25 PROCEDURE — 70450 CT HEAD/BRAIN W/O DYE: CPT

## 2021-08-25 PROCEDURE — 93005 ELECTROCARDIOGRAM TRACING: CPT | Performed by: EMERGENCY MEDICINE

## 2021-08-25 PROCEDURE — 93005 ELECTROCARDIOGRAM TRACING: CPT | Performed by: PHYSICIAN ASSISTANT

## 2021-08-25 PROCEDURE — U0003 INFECTIOUS AGENT DETECTION BY NUCLEIC ACID (DNA OR RNA); SEVERE ACUTE RESPIRATORY SYNDROME CORONAVIRUS 2 (SARS-COV-2) (CORONAVIRUS DISEASE [COVID-19]), AMPLIFIED PROBE TECHNIQUE, MAKING USE OF HIGH THROUGHPUT TECHNOLOGIES AS DESCRIBED BY CMS-2020-01-R: HCPCS

## 2021-08-25 PROCEDURE — 72131 CT LUMBAR SPINE W/O DYE: CPT

## 2021-08-25 PROCEDURE — 82550 ASSAY OF CK (CPK): CPT

## 2021-08-25 PROCEDURE — 83605 ASSAY OF LACTIC ACID: CPT

## 2021-08-25 PROCEDURE — 6370000000 HC RX 637 (ALT 250 FOR IP): Performed by: PHYSICIAN ASSISTANT

## 2021-08-25 PROCEDURE — 84484 ASSAY OF TROPONIN QUANT: CPT

## 2021-08-25 PROCEDURE — 84132 ASSAY OF SERUM POTASSIUM: CPT

## 2021-08-25 PROCEDURE — 80053 COMPREHEN METABOLIC PANEL: CPT

## 2021-08-25 PROCEDURE — 2700000000 HC OXYGEN THERAPY PER DAY

## 2021-08-25 PROCEDURE — 6370000000 HC RX 637 (ALT 250 FOR IP): Performed by: INTERNAL MEDICINE

## 2021-08-25 PROCEDURE — 2580000003 HC RX 258: Performed by: INTERNAL MEDICINE

## 2021-08-25 PROCEDURE — 72125 CT NECK SPINE W/O DYE: CPT

## 2021-08-25 PROCEDURE — 99284 EMERGENCY DEPT VISIT MOD MDM: CPT

## 2021-08-25 PROCEDURE — 85730 THROMBOPLASTIN TIME PARTIAL: CPT

## 2021-08-25 PROCEDURE — 6360000002 HC RX W HCPCS: Performed by: INTERNAL MEDICINE

## 2021-08-25 PROCEDURE — 99223 1ST HOSP IP/OBS HIGH 75: CPT | Performed by: INTERNAL MEDICINE

## 2021-08-25 PROCEDURE — 71045 X-RAY EXAM CHEST 1 VIEW: CPT

## 2021-08-25 PROCEDURE — U0005 INFEC AGEN DETEC AMPLI PROBE: HCPCS

## 2021-08-25 PROCEDURE — 87040 BLOOD CULTURE FOR BACTERIA: CPT

## 2021-08-25 PROCEDURE — 84145 PROCALCITONIN (PCT): CPT

## 2021-08-25 PROCEDURE — 93010 ELECTROCARDIOGRAM REPORT: CPT | Performed by: INTERNAL MEDICINE

## 2021-08-25 PROCEDURE — 94760 N-INVAS EAR/PLS OXIMETRY 1: CPT

## 2021-08-25 PROCEDURE — 85025 COMPLETE CBC W/AUTO DIFF WBC: CPT

## 2021-08-25 PROCEDURE — 36415 COLL VENOUS BLD VENIPUNCTURE: CPT

## 2021-08-25 PROCEDURE — 83880 ASSAY OF NATRIURETIC PEPTIDE: CPT

## 2021-08-25 PROCEDURE — 1200000000 HC SEMI PRIVATE

## 2021-08-25 PROCEDURE — 85610 PROTHROMBIN TIME: CPT

## 2021-08-25 RX ORDER — HEPARIN SODIUM 1000 [USP'U]/ML
3800 INJECTION, SOLUTION INTRAVENOUS; SUBCUTANEOUS PRN
Status: DISCONTINUED | OUTPATIENT
Start: 2021-08-25 | End: 2021-09-01 | Stop reason: HOSPADM

## 2021-08-25 RX ORDER — DEXTROSE MONOHYDRATE 25 G/50ML
12.5 INJECTION, SOLUTION INTRAVENOUS PRN
Status: DISCONTINUED | OUTPATIENT
Start: 2021-08-25 | End: 2021-09-01 | Stop reason: HOSPADM

## 2021-08-25 RX ORDER — SODIUM CHLORIDE 9 MG/ML
25 INJECTION, SOLUTION INTRAVENOUS PRN
Status: DISCONTINUED | OUTPATIENT
Start: 2021-08-25 | End: 2021-09-01 | Stop reason: HOSPADM

## 2021-08-25 RX ORDER — LANOLIN ALCOHOL/MO/W.PET/CERES
3 CREAM (GRAM) TOPICAL NIGHTLY PRN
Status: DISCONTINUED | OUTPATIENT
Start: 2021-08-25 | End: 2021-09-01 | Stop reason: HOSPADM

## 2021-08-25 RX ORDER — DEXTROSE MONOHYDRATE 25 G/50ML
25 INJECTION, SOLUTION INTRAVENOUS ONCE
Status: DISCONTINUED | OUTPATIENT
Start: 2021-08-25 | End: 2021-08-25

## 2021-08-25 RX ORDER — 0.9 % SODIUM CHLORIDE 0.9 %
500 INTRAVENOUS SOLUTION INTRAVENOUS ONCE
Status: DISCONTINUED | OUTPATIENT
Start: 2021-08-25 | End: 2021-08-25

## 2021-08-25 RX ORDER — LEVOTHYROXINE SODIUM 0.15 MG/1
150 TABLET ORAL
Status: DISCONTINUED | OUTPATIENT
Start: 2021-08-26 | End: 2021-09-01 | Stop reason: HOSPADM

## 2021-08-25 RX ORDER — ACETAMINOPHEN 500 MG
1000 TABLET ORAL ONCE
Status: COMPLETED | OUTPATIENT
Start: 2021-08-25 | End: 2021-08-25

## 2021-08-25 RX ORDER — ROSUVASTATIN CALCIUM 20 MG/1
20 TABLET, COATED ORAL DAILY
Status: DISCONTINUED | OUTPATIENT
Start: 2021-08-26 | End: 2021-09-01 | Stop reason: HOSPADM

## 2021-08-25 RX ORDER — CLOPIDOGREL BISULFATE 75 MG/1
75 TABLET ORAL DAILY
Status: DISCONTINUED | OUTPATIENT
Start: 2021-08-26 | End: 2021-09-01 | Stop reason: HOSPADM

## 2021-08-25 RX ORDER — METHOCARBAMOL 500 MG/1
750 TABLET, FILM COATED ORAL ONCE
Status: COMPLETED | OUTPATIENT
Start: 2021-08-25 | End: 2021-08-25

## 2021-08-25 RX ORDER — DEXTROSE MONOHYDRATE 50 MG/ML
100 INJECTION, SOLUTION INTRAVENOUS PRN
Status: DISCONTINUED | OUTPATIENT
Start: 2021-08-25 | End: 2021-09-01 | Stop reason: HOSPADM

## 2021-08-25 RX ORDER — HEPARIN SODIUM 5000 [USP'U]/ML
5000 INJECTION, SOLUTION INTRAVENOUS; SUBCUTANEOUS EVERY 8 HOURS SCHEDULED
Status: DISCONTINUED | OUTPATIENT
Start: 2021-08-25 | End: 2021-09-01 | Stop reason: HOSPADM

## 2021-08-25 RX ORDER — NICOTINE POLACRILEX 4 MG
15 LOZENGE BUCCAL PRN
Status: DISCONTINUED | OUTPATIENT
Start: 2021-08-25 | End: 2021-09-01 | Stop reason: HOSPADM

## 2021-08-25 RX ORDER — SODIUM CHLORIDE 0.9 % (FLUSH) 0.9 %
5-40 SYRINGE (ML) INJECTION PRN
Status: DISCONTINUED | OUTPATIENT
Start: 2021-08-25 | End: 2021-09-01 | Stop reason: HOSPADM

## 2021-08-25 RX ORDER — ACETAMINOPHEN 650 MG/1
650 SUPPOSITORY RECTAL EVERY 6 HOURS PRN
Status: DISCONTINUED | OUTPATIENT
Start: 2021-08-25 | End: 2021-08-25

## 2021-08-25 RX ORDER — ASPIRIN 81 MG/1
81 TABLET ORAL DAILY
Status: DISCONTINUED | OUTPATIENT
Start: 2021-08-26 | End: 2021-09-01 | Stop reason: HOSPADM

## 2021-08-25 RX ORDER — SPIRONOLACTONE 25 MG/1
25 TABLET ORAL DAILY
Status: DISCONTINUED | OUTPATIENT
Start: 2021-08-26 | End: 2021-09-01 | Stop reason: HOSPADM

## 2021-08-25 RX ORDER — ALLOPURINOL 100 MG/1
100 TABLET ORAL DAILY
Status: DISCONTINUED | OUTPATIENT
Start: 2021-08-26 | End: 2021-09-01 | Stop reason: HOSPADM

## 2021-08-25 RX ORDER — SODIUM CHLORIDE 0.9 % (FLUSH) 0.9 %
5-40 SYRINGE (ML) INJECTION EVERY 12 HOURS SCHEDULED
Status: DISCONTINUED | OUTPATIENT
Start: 2021-08-25 | End: 2021-09-01 | Stop reason: HOSPADM

## 2021-08-25 RX ORDER — ACETAMINOPHEN 500 MG
1000 TABLET ORAL EVERY 6 HOURS PRN
Status: DISCONTINUED | OUTPATIENT
Start: 2021-08-25 | End: 2021-09-01 | Stop reason: HOSPADM

## 2021-08-25 RX ORDER — TORSEMIDE 20 MG/1
60 TABLET ORAL 2 TIMES DAILY
Status: DISCONTINUED | OUTPATIENT
Start: 2021-08-26 | End: 2021-09-01 | Stop reason: HOSPADM

## 2021-08-25 RX ORDER — ACETAMINOPHEN 325 MG/1
650 TABLET ORAL EVERY 6 HOURS PRN
Status: DISCONTINUED | OUTPATIENT
Start: 2021-08-25 | End: 2021-08-25

## 2021-08-25 RX ADMIN — ACETAMINOPHEN 1000 MG: 500 TABLET, FILM COATED ORAL at 21:19

## 2021-08-25 RX ADMIN — METHOCARBAMOL 750 MG: 500 TABLET ORAL at 21:19

## 2021-08-25 RX ADMIN — ACETAMINOPHEN 1000 MG: 500 TABLET, FILM COATED ORAL at 12:15

## 2021-08-25 RX ADMIN — METOPROLOL TARTRATE 12.5 MG: 25 TABLET, FILM COATED ORAL at 22:49

## 2021-08-25 RX ADMIN — MELATONIN TAB 3 MG 3 MG: 3 TAB at 22:49

## 2021-08-25 RX ADMIN — Medication 10 ML: at 21:20

## 2021-08-25 RX ADMIN — HEPARIN SODIUM 5000 UNITS: 5000 INJECTION INTRAVENOUS; SUBCUTANEOUS at 21:23

## 2021-08-25 RX ADMIN — VANCOMYCIN HYDROCHLORIDE 1000 MG: 1 INJECTION, POWDER, LYOPHILIZED, FOR SOLUTION INTRAVENOUS at 21:22

## 2021-08-25 RX ADMIN — MEROPENEM 1000 MG: 1 INJECTION, POWDER, FOR SOLUTION INTRAVENOUS at 22:47

## 2021-08-25 RX ADMIN — SODIUM CHLORIDE 25 ML: 9 INJECTION, SOLUTION INTRAVENOUS at 21:21

## 2021-08-25 ASSESSMENT — ENCOUNTER SYMPTOMS
SHORTNESS OF BREATH: 0
BACK PAIN: 1
NAUSEA: 0
ABDOMINAL PAIN: 0
COUGH: 1
RHINORRHEA: 0
DIARRHEA: 0
VOMITING: 0

## 2021-08-25 ASSESSMENT — PAIN DESCRIPTION - LOCATION
LOCATION: NECK;BACK
LOCATION: CHEST;BACK

## 2021-08-25 ASSESSMENT — PAIN DESCRIPTION - ORIENTATION: ORIENTATION: MID

## 2021-08-25 ASSESSMENT — PAIN SCALES - GENERAL
PAINLEVEL_OUTOF10: 0
PAINLEVEL_OUTOF10: 10
PAINLEVEL_OUTOF10: 8
PAINLEVEL_OUTOF10: 10

## 2021-08-25 ASSESSMENT — PAIN DESCRIPTION - PAIN TYPE: TYPE: ACUTE PAIN

## 2021-08-25 ASSESSMENT — PAIN DESCRIPTION - DESCRIPTORS: DESCRIPTORS: ACHING;SHOOTING

## 2021-08-25 NOTE — ED NOTES
Bed: 24  Expected date:   Expected time:   Means of arrival:   Comments:   Micky Rolon RN  08/25/21 4673

## 2021-08-25 NOTE — FLOWSHEET NOTE
with  ml/min    Medications or blood products given: none    Regular outpatient schedule: MWF    Summary of response to treatment: Tolerated well. Copy of dialysis treatment record placed in chart, to be scanned into EMR.

## 2021-08-25 NOTE — ED PROVIDER NOTES
[facility]        Pt Name: Coreen Manzo  MRN: [id number]  [place] 1945  Date of evaluation: 8/25/2021  Provider: Dee Baumgarten, PA-C  PCP: Kory Ibrahim MD  Note Started: 12:45 PM EDT        I have seen and evaluated this patient with my supervising physician Elvi Devi DO.    CHIEF COMPLAINT       Chief Complaint   Patient presents with    Altered Mental Status     Pt. arrived via EMS. Pt. states that she slipped out of bed this am and paramedics came and assisted pt. back into bed. Pt. had another fall this am.  Son arrived and called EMS who brought pt. her. Pt. is on dialysis Q Mon, Wed, and Fri. and was not able to go to dialysis today D/T confusion. Pt. also states neck and back pain       HISTORY OF PRESENT ILLNESS   (Location, Timing/Onset, Context/Setting, Quality, Duration, Modifying Factors, Severity, Associated Signs and Symptoms)  Note limiting factors. Chief Complaint: Sisi Franklin is a 67 y.o. female who presents to the emergency department today for evaluation for a fall. The patient tells me that she slipped out of bed this morning. Son is at bedside and states that the patient actually is fallen multiple times today, he states that he the paramedics came, assisted the patient back to bed, and he states that when he went over to give her her medications she was found lying on the floor in the family room, and the patient was unsure how she got there. He states that he has not seen the patient several weeks but apparently patient has had frequent falls over the past several days. Patient is a dialysis patient, she receives dialysis Monday Wednesday Friday, she did not get dialysis today. The patient's history of hypertension, hyperlipidemia as well as diabetes.   The patient states that she has had a cough, and she states that she is just overall not been feeling well for the past DIALYSIS CATHETER REMOVAL N/A 5/17/2021    PERITONEAL DIALYSIS CATHETER REMOVAL. [address]. performed by Yesenia Carlisle DO at [address] [facility] [address] N/A 4/3/2020    LAPAROSCOPIC LYSIS OF ADHESIONS, LAPAROSCOPIC PERITONEAL DIALYSIS CATHETER PLACEMENT, LAPAROSCOPIC OMENTOPEXY performed by Yesenia Carlisle DO at [place], BILATERAL      preventive    ROTATOR CUFF REPAIR Left     SHUNT REVISION Left 8/19/2021    LEFT FOREARM ARTERIO VENOUS GRAFT performed by Shalini Izaguirre MD at [address] [place] [address] 11/24/2020    EGD BIOPSY performed by Sherrill Alvarez MD at [address]       Previous Medications    ALLOPURINOL (ZYLOPRIM) 100 MG TABLET    TAKE 1 TABLET EVERY DAY    ASPIRIN EC 81 MG EC TABLET    Take 1 tablet by mouth daily    CLOPIDOGREL (PLAVIX) 75 MG TABLET    Take 1 tablet by mouth daily    GABAPENTIN (NEURONTIN) 100 MG CAPSULE    Take 2 capsules by mouth nightly for 30 days. LEVOTHYROXINE (SYNTHROID) 150 MCG TABLET    Take 150 mcg by mouth every morning (before breakfast)    LOPERAMIDE (IMODIUM) 2 MG CAPSULE    Take 2 mg by mouth 4 times daily as needed    METOPROLOL TARTRATE (LOPRESSOR) 25 MG TABLET    Take 12.5 mg by mouth 2 times daily    NORTRIPTYLINE (PAMELOR) 10 MG CAPSULE    TAKE 1 CAPSULE BY MOUTH EVERY EVENING    ROSUVASTATIN (CRESTOR) 20 MG TABLET    Take 1 tablet by mouth daily    SPIRONOLACTONE (ALDACTONE) 25 MG TABLET    TAKE 1 TABLET EVERY DAY    TORSEMIDE (DEMADEX) 20 MG TABLET    Take 20 mg by mouth 3 tablets in am, 3 tablets in pm    UNABLE TO FIND    Dialysis mon wed fri    VANCOMYCIN (VANCOCIN) INFUSION    Infuse 1,000 mg intravenously every 48 hours for 7 days Please infuse 1,000 mg Vancomycin with hemodialysis on Wednesday (8/25), Friday (8/27) and Monday (8/30). Compound per protocol.          ALLERGIES     Amoxicillin, Demeclocycline, Penicillins, Tetracyclines & related, Ozempic (0.25 or 0.5 mg-dose) [semaglutide(0.25 or 0.5mg-dos)], and Codeine    FAMILYHISTORY       Family History   Problem Relation Age of Onset    Cancer Mother         lung, breast, liver    Stroke Father     Stomach Cancer Brother     Cancer Maternal Grandmother     No Known Problems Paternal Grandmother     No Known Problems Paternal Grandfather           SOCIAL HISTORY       Social History     Tobacco Use    Smoking status: Former Smoker     Packs/day: 0.50     Years: 10.00     Pack years: 5.00     Types: Cigarettes     Quit date: 1978     Years since quittin.1    Smokeless tobacco: Never Used   Vaping Use    Vaping Use: Never used   Substance Use Topics    Alcohol use: Yes     Comment: every other week one glass of wine    Drug use: Never       SCREENINGS             PHYSICAL EXAM    (up to 7 for level 4, 8 or more for level 5)     ED Triage Vitals [21 1048]   BP Temp Temp Source Pulse Resp SpO2 Height Weight   (!) 156/65 99.5 °F (37.5 °C) Oral 118 18 98 % 5' 2\" (1.575 m) 176 lb (79.8 kg)       Physical Exam  Vitals and nursing note reviewed. Constitutional:       Appearance: She is well-developed. She is not diaphoretic. HENT:      Head: Normocephalic and atraumatic. Right Ear: External ear normal.      Left Ear: External ear normal.      Nose: Nose normal.   Eyes:      General:         Right eye: No discharge. Left eye: No discharge. Neck:      Trachea: No tracheal deviation. Cardiovascular:      Rate and Rhythm: Regular rhythm. Tachycardia present. Pulmonary:      Effort: Pulmonary effort is normal. No respiratory distress. Breath sounds: Rales present. Comments: Rales noted to bilateral bases  Abdominal:      General: Bowel sounds are normal. There is no distension. Palpations: Abdomen is soft. Tenderness: There is no abdominal tenderness. There is no guarding.    Musculoskeletal:         General: Normal range of motion. Cervical back: Normal range of motion and neck supple. Comments: There is no midline cervical spine tenderness. She does have diffuse tenderness across the lower lumbar spine, increasing over the paraspinous muscles on the right. No bony step-offs or crepitus   Skin:     General: Skin is warm and dry. Neurological:      General: No focal deficit present. Mental Status: She is alert and oriented to person, place, and time.    Psychiatric:         Behavior: Behavior normal.         DIAGNOSTIC RESULTS   LABS:    Labs Reviewed   COMPREHENSIVE METABOLIC PANEL - Abnormal; Notable for the following components:       Result Value    Sodium 128 (*)     Potassium 5.9 (*)     Chloride 91 (*)     CO2 19 (*)     Anion Gap 18 (*)     Glucose 146 (*)     BUN 71 (*)     CREATININE 8.6 (*)     GFR Non- 4 (*)     GFR African American 5 (*)     Albumin/Globulin Ratio 0.9 (*)     Alkaline Phosphatase 149 (*)     All other components within normal limits    Narrative:     CALL  [place]  [facility] tel. [phone],  Rejected Test cbc/Called to:tatiana stovall, 08/25/2021 12:35, by Goldy Rollbritton  [facility] tel. [phone],  Previous panic on this admission - call not needed per SOP, 08/25/2021 12:47,  by [facility]  Performed at:  [facility] [address], [facility], [address]   Phone [phone]   TROPONIN - Abnormal; Notable for the following components:    Troponin 0.03 (*)     All other components within normal limits    Narrative:     CALL  [place]  [facility] tel. [phone],  Rejected Test cbc/Called to:tatiana stovall, 08/25/2021 12:35, by Goldy Rollbritton  [facility] tel. [phone],  Previous panic on this admission - call not needed per SOP, 08/25/2021 12:47,  by [facility]  Performed at:  [facility] [address], [facility], [address]   Phone [phone]   BRAIN NATRIURETIC PEPTIDE - Abnormal; Notable for the following components:    Pro-BNP 5,929 (*)     All other components within normal limits    Narrative:     CALL  Wilson  [facility] tel. [phone],  Rejected Test cbc/Called to:tatiana stovall, 08/25/2021 12:35, by Odalys Sarah  [facility] tel. [phone],  Previous panic on this admission - call not needed per SOP, 08/25/2021 12:47,  by [facility]  Performed at:  [facility] [address] [address] [place] [place]   Phone [phone]   APTT - Abnormal; Notable for the following components:    aPTT 24.7 (*)     All other components within normal limits    Narrative:     Ruel Millan  [facility] tel. [phone],  Rejected Test cbc/Called to:tatiana stovall, 08/25/2021 12:35, by Fabio Chisholm  Performed at:  [facility] [address], [place], [address]   Phone [phone]   CK - Abnormal; Notable for the following components:     Total  (*)     All other components within normal limits    Narrative:     CALL  Wilson  [facility] tel. [phone],  Rejected Test cbc/Called to:tatiana stovall, 08/25/2021 12:35, by Odalys Sarah  [facility] tel. [phone],  Previous panic on this admission - call not needed per SOP, 08/25/2021 12:47,  by [facility]  Performed at:  [facility] [address], [place], [address]   Phone [phone]   CBC WITH AUTO DIFFERENTIAL - Abnormal; Notable for the following components:    WBC 18.0 (*)     RBC 3.29 (*)     Hemoglobin 10.2 (*)     Hematocrit 31.4 (*)     RDW 17.8 (*)     Neutrophils Absolute 16.0 (*)     Lymphocytes Absolute 0.8 (*)     All other components within normal limits    Narrative:     Performed at:  [facility] [address] [facility] [place]   Phone [phone]   POTASSIUM - Abnormal; Notable for the following components:    Potassium 6.0 (*)     All other components within normal limits    Narrative:     Ruel Millan  [facility] tel. [phone],  Chemistry results called findings:        Interpretation per the Radiologist below, if available at the time of this note:    Williamrt   Final Result   Negative for lumbar spine fracture         CT CERVICAL SPINE WO CONTRAST   Final Result   No acute abnormality of the cervical spine. CT HEAD WO CONTRAST   Final Result   No acute intracranial abnormality. Diffuse atrophic changes with findings suggesting chronic microvascular   ischemia         XR CHEST PORTABLE   Final Result   Improved aeration of the lungs bilaterally. A few scattered opacities remain. CT HEAD WO CONTRAST    Result Date: 8/25/2021  EXAMINATION: CT OF THE HEAD WITHOUT CONTRAST  8/25/2021 11:31 am TECHNIQUE: CT of the head was performed without the administration of intravenous contrast. Dose modulation, iterative reconstruction, and/or weight based adjustment of the mA/kV was utilized to reduce the radiation dose to as low as reasonably achievable. COMPARISON: None. HISTORY: ORDERING SYSTEM PROVIDED HISTORY: fall TECHNOLOGIST PROVIDED HISTORY: Reason for exam:->fall Has a \"code stroke\" or \"stroke alert\" been called? ->No Decision Support Exception - unselect if not a suspected or confirmed emergency medical condition->Emergency Medical Condition (MA) Reason for Exam: fall Acuity: Acute Type of Exam: Initial FINDINGS: BRAIN/VENTRICLES: The ventricles and sulci are diffusely enlarged. Low attenuation is seen in the periventricular and subcortical white matter. No acute intracranial hemorrhage or acute infarct is identified. ORBITS: The visualized portion of the orbits demonstrate no acute abnormality. SINUSES: The visualized paranasal sinuses and mastoid air cells demonstrate no acute abnormality. SOFT TISSUES/SKULL:  No acute abnormality of the visualized skull or soft tissues. No acute intracranial abnormality.  Diffuse atrophic changes with findings suggesting chronic microvascular ischemia     CT CERVICAL SPINE WO resulting in minimal L4 anterolisthesis and mild central canal stenosis due to facet hypertrophy/ligamentum flavum buckling SOFT TISSUES/RETROPERITONEUM: No paraspinal mass is seen. Negative for lumbar spine fracture     XR CHEST PORTABLE    Result Date: 8/25/2021  EXAMINATION: ONE XRAY VIEW OF THE CHEST 8/25/2021 11:28 am COMPARISON: 03/22/2021 HISTORY: ORDERING SYSTEM PROVIDED HISTORY: SOB TECHNOLOGIST PROVIDED HISTORY: Reason for exam:->SOB FINDINGS: Lung volumes are low. Heart size is enlarged. There is heterogeneous opacity seen throughout the lungs bilaterally, decreased compared to prior Tip of dialysis catheter projects in the region of the right ventricle. This low lying position may be exaggerated by patient expiration Spurring is seen in the spine. Spurring is seen in the shoulder joints. Improved aeration of the lungs bilaterally. A few scattered opacities remain. PROCEDURES   Unless otherwise noted below, none     Procedures    CRITICAL CARE TIME   Critical Care  There was a high probability of life-threatening clinical deterioration in the patient's condition requiring my urgent intervention. Total critical care time with the patient was 45 minutes excluding separately reportable procedures.   Critical care required due to patients sepsis, requiring extensive work-up, broad-spectrum antibiotics, hyperkalemia requiring reversal medication, consultation and nephrology and admission    CONSULTS:  IP CONSULT TO NEPHROLOGY  IP Rosette Lang and DIFFERENTIAL DIAGNOSIS/MDM:   Vitals:    Vitals:    08/25/21 1048 08/25/21 1400   BP: (!) 156/65 116/88   Pulse: 118    Resp: 18    Temp: 99.5 °F (37.5 °C)    TempSrc: Oral    SpO2: 98%    Weight: 176 lb (79.8 kg)    Height: 5' 2\" (1.575 m)        Patient was given the following medications:  Medications   vancomycin 1000 mg IVPB in 250 mL D5W addavial (has no administration in time range)   cefepime (MAXIPIME) 1000 mg IVPB minibag (has no administration in time range)   sodium bicarbonate 8.4 % injection 50 mEq (has no administration in time range)   insulin regular (HUMULIN R;NOVOLIN R) injection 10 Units (has no administration in time range)     And   dextrose 50 % IV solution (has no administration in time range)   glucose (GLUTOSE) 40 % oral gel 15 g (has no administration in time range)   dextrose 50 % IV solution (has no administration in time range)   glucagon (rDNA) injection 1 mg (has no administration in time range)   dextrose 5 % solution (has no administration in time range)   sodium zirconium cyclosilicate (LOKELMA) oral suspension 10 g (has no administration in time range)   acetaminophen (TYLENOL) tablet 1,000 mg (1,000 mg Oral Given 8/25/21 1215)           Briefly, this is a 58-year-old female who presents to the emergency department today with her son for evaluation for frequent falls. Son states that the patient slipped out of bed this morning and he states that when he went to go give her medication he found her lying on the floor in the family room. Seems to be a little more confused, unsure of last known normal    On examination, patient is alert and oriented x3. She is tachycardic, she does have rales to bilateral bases. EKG is documented by my attending, please see his note for further details    She has a leukocytosis of 18,000, there is a mild anemia, stable. Her lactic acid is normal.  CMP does show chronic kidney disease, potassium was 6.0 this was disc troponin is elevated likely secondary to patient's renal disease. Chest x-ray ussed with Dr. Christiane Torres, nephrology, plan to give bicarb, insulin low, he will arrange for dialysis later today. Chest x-ray does show few scattered opacities remaining, will treat with hospital-acquired pneumonia with vancomycin and cefepime.   The patient CTs are normal.  She will need to be admitted for further care and evaluation, hospitalist has agreed for admission, stable for admission    SEP-1 CORE MEASURE DATA    Classification: sepsis    Amount of fluids ordered: less than 30mL/kg because of a history of ESRD or stage IV/V CKD. Instead, 500 ml was ordered. More fluid initially would be potentially detrimental to the patient    Time at which sepsis was identified: 3:24 PM    Broad-spectrum antibiotics chosen: vancomycin and cefepime based on suspected source of: Pulmonary - Nosocomial    Repeat lactate level: not indicated due to initial lactate < 2    On reassessment after fluid resuscitation:   pending, to be completed by inpatient team      FINAL IMPRESSION      1. Septicemia ([facility] [address]    2. Pneumonia due to infectious organism, unspecified laterality, unspecified part of lung    3. Frequent falls    4. Hyperkalemia    5. ESRD on dialysis [facility])          DISPOSITION/PLAN   DISPOSITION Admitted 08/25/2021 03:18:36 PM      PATIENT REFERRED TO:  No follow-up provider specified.     DISCHARGE MEDICATIONS:  New Prescriptions    No medications on file       DISCONTINUED MEDICATIONS:  Discontinued Medications    No medications on file              (Please note that portions of this note were completed with a voice recognition program.  Efforts were made to edit the dictations but occasionally words are mis-transcribed.)    Jodi Valdez PA-C (electronically signed)            Jodi Valdez PA-C  08/25/21 8892

## 2021-08-25 NOTE — ED NOTES
Spoke with dialysis nurse about treatment. No further questions at this time.       Darnell De La Fuente RN  08/25/21 5964

## 2021-08-25 NOTE — CONSULTS
Office : [phone]     Fax :[phone]       Nephrology Consult Note      Patient's Name: Anu Bellamy  5:31 PM  8/25/2021    Reason for Consult:  ESRD management , hyperkalemia       Requesting Physician:  Brittney Kimbrough MD      Chief Complaint:    Chief Complaint   Patient presents with    Altered Mental Status     Pt. arrived via EMS. Pt. states that she slipped out of bed this am and paramedics came and assisted pt. back into bed. Pt. had another fall this am.  Son arrived and called EMS who brought pt. her. Pt. is on dialysis Q Mon, Wed, and Fri. and was not able to go to dialysis today D/T confusion. Pt. also states neck and back pain         History of Present iIlness:    Anu Bellamy is a 67 y.o. female with past h/o ESRD, diabetes, hypertension, hyperlipidemia, thyroid disease recently admitted for new fistula/graft formed earlier this month. She comes in from home in view of several falls. ED note states that patient altered, however, patient is alert and oriented to the time of my interaction. Patient states that she was trying to  the phone and slid out of bed. Was not able to get up after that due to weakness. She denies any confusion loss of consciousness headache blurry vision palpitations chest pain shortness of breath. In the ED patient found to have significant leukocytosis. Broad-spectrum antibiotics initiated. Has leukocytosis   K is 6.0       No intake/output data recorded.     Past Medical History:   Diagnosis Date    Arthritis     [facility]     Hemodialysis patient ([facility]     Hyperlipidemia     Hypertension     Kidney disease     Neuropathy     Pneumonia     IN February, 2021, with COVID    Thyroid disease        Past Surgical History:   Procedure Laterality Date    APPENDECTOMY      CHOLECYSTECTOMY      COLONOSCOPY  11/24/2020    COLONOSCOPY POLYPECTOMY SNARE/COLD BIOPSY performed by Keegan Ramos MD at [address]  5/17/2021    PERITONEAL DIALYSIS CATHETER REMOVAL. EXCISSION OF ABDOMINAL CITRIX. performed by Eder Talley DO at [address] N/A 4/3/2020    LAPAROSCOPIC LYSIS OF ADHESIONS, LAPAROSCOPIC PERITONEAL DIALYSIS CATHETER PLACEMENT, LAPAROSCOPIC OMENTOPEXY performed by Eder Talley DO at [place], BILATERAL      preventive    ROTATOR CUFF REPAIR Left     SHUNT REVISION Left 8/19/2021    LEFT FOREARM ARTERIO VENOUS GRAFT performed by Viviana Vigil MD at [address] N/A 11/24/2020    EGD BIOPSY performed by Keegan Ramos MD at [facility]       Family History   Problem Relation Age of Onset    Cancer Mother         lung, breast, liver    Stroke Father     Stomach Cancer Brother     Cancer Maternal Grandmother     No Known Problems Paternal Grandmother     No Known Problems Paternal Grandfather         reports that she quit smoking about 43 years ago. Her smoking use included cigarettes. She has a 5.00 pack-year smoking history. She has never used smokeless tobacco. She reports current alcohol use. She reports that she does not use drugs.         Allergies:  Amoxicillin, Demeclocycline, Penicillins, Tetracyclines & related, Ozempic (0.25 or 0.5 mg-dose) [semaglutide(0.25 or 0.5mg-dos)], and Codeine    Current Medications:    vancomycin 1000 mg IVPB in 250 mL D5W addavial, Once  cefepime (MAXIPIME) 1000 mg IVPB minibag, Once  sodium bicarbonate 8.4 % injection 50 mEq, Once  insulin regular (HUMULIN R;NOVOLIN R) injection 10 Units, Once   And  dextrose 50 % IV solution, Once  glucose (GLUTOSE) 40 % oral gel 15 g, PRN  dextrose 50 % IV solution, PRN  glucagon (rDNA) injection 1 mg, PRN  dextrose 5 % solution, PRN  sodium zirconium cyclosilicate (LOKELMA) oral suspension 10 g, Once  0.9 % sodium chloride bolus, Once        Review of Systems:   14 point ROS obtained but were negative except mentioned in HPI      Physical exam:     Vitals:  /64   Pulse 105   Temp 97.7 °F (36.5 °C) (Temporal)   Resp 18   Ht 5' 2\" (1.575 m)   Wt 179 lb 0.2 oz (81.2 kg)   LMP  (LMP Unknown)   SpO2 98%   BMI 32.74 kg/m²   Constitutional:  OAA X3 NAD  Skin: no rash, turgor wnl  Heent:  eomi, mmm  Neck: no bruits or jvd noted  Cardiovascular:  S1, S2 without m/r/g  Respiratory: CTA B without w/r/r  Abdomen:  +bs, soft, nt, nd  Ext:no  lower extremity edema  Psychiatric: mood and affect appropriate  Musculoskeletal:  Rom, muscular strength intact    Labs:  CBC:   Recent Labs     08/24/21  0034 08/25/21  1344   WBC 16.1* 18.0*   HGB 10.2* 10.2*    231     BMP:    Recent Labs     08/24/21  0034 08/25/21  1215 08/25/21  1344   * 128*  --    K 4.4 5.9* 6.0*   CL 94* 91*  --    CO2 25 19*  --    BUN 47* 71*  --    CREATININE 6.8* 8.6*  --    GLUCOSE 123* 146*  --      Ca/Mg/Phos:   Recent Labs     08/24/21  0034 08/25/21  1215   CALCIUM 9.9 10.0     Hepatic:   Recent Labs     08/25/21  1215   AST 37   ALT 30   BILITOT 0.5   ALKPHOS 149*     Troponin:   Recent Labs     08/25/21  1215   TROPONINI 0.03*     BNP: No results for input(s): BNP in the last 72 hours. Lipids: No results for input(s): CHOL, TRIG, HDL, LDLCALC, LABVLDL in the last 72 hours. ABGs: No results for input(s): PHART, PO2ART, FNS8RQR in the last 72 hours. INR:   Recent Labs     08/24/21  0034 08/25/21  1215   INR 0.98 0.97     UA:No results for input(s): Odalys Cover, GLUCOSEU, BILIRUBINUR, KETUA, SPECGRAV, BLOODU, PHUR, PROTEINU, UROBILINOGEN, NITRU, LEUKOCYTESUR, [place] in the last 72 hours.    Urine Microscopic: No results for input(s): LABCAST, BACTERIA, COMU, HYALCAST,

## 2021-08-25 NOTE — H&P
Hospital Medicine History & Physical      PCP: Hasmukh Salazar MD    Date of Admission: 8/25/2021    Date of Service: Pt seen/examined on 8/25/2021 and Admitted to Inpatient with expected LOS greater than two midnights due to medical therapy. Chief Complaint: Fall      History Of Present Illness: 67 y.o. female with past medical history ESRD, diabetes, hypertension, hyperlipidemia, thyroid disease recently admitted for suspected infection of the dialysis fistula, new fistula/graft formed earlier this month. She comes in from home in view of several falls. ED note states that patient altered, however, patient is alert and oriented to the time of my interaction. Patient states that she was trying to  the phone and slid out of bed. Was not able to get up after that due to weakness. She denies any confusion loss of consciousness headache blurry vision palpitations chest pain shortness of breath. In the ED patient found to have significant leukocytosis. Broad-spectrum antibiotics initiated. Patient also with hyperkalemia, nephrology contacted and dialysis planned for later today. Past Medical History:          Diagnosis Date    Arthritis     Diabetes ([address]     Hemodialysis patient ([address]     Hyperlipidemia     Hypertension     Kidney disease     Neuropathy     Pneumonia     IN February, 2021, with COVID    Thyroid disease        Past Surgical History:          Procedure Laterality Date    APPENDECTOMY      CHOLECYSTECTOMY      COLONOSCOPY  11/24/2020    COLONOSCOPY POLYPECTOMY SNARE/COLD BIOPSY performed by Donta Woods MD at [address] 5/17/2021    PERITONEAL DIALYSIS CATHETER REMOVAL.  [address]. performed by Sara Palacios DO at [address] N/A 4/3/2020    LAPAROSCOPIC LYSIS OF ADHESIONS, LAPAROSCOPIC PERITONEAL DIALYSIS CATHETER PLACEMENT, LAPAROSCOPIC OMENTOPEXY performed by Kishore Ying DO at [facility], BILATERAL      preventive    ROTATOR CUFF REPAIR Left     SHUNT REVISION Left 8/19/2021    LEFT FOREARM ARTERIO VENOUS GRAFT performed by Eloy Morales MD at [address] 11/24/2020    EGD BIOPSY performed by Rachel Meier MD at [address] [place] [address]       Medications Prior to Admission:      Prior to Admission medications    Medication Sig Start Date End Date Taking? Authorizing Provider   nortriptyline (PAMELOR) 10 MG capsule TAKE 1 CAPSULE BY MOUTH EVERY EVENING 8/17/21  Yes Lu Carter MD   rosuvastatin (CRESTOR) 20 MG tablet Take 1 tablet by mouth daily 7/8/21  Yes Manisha Rodriguez MD   clopidogrel (PLAVIX) 75 MG tablet Take 1 tablet by mouth daily 7/8/21  Yes Manisha Rodriguez MD   aspirin EC 81 MG EC tablet Take 1 tablet by mouth daily 7/8/21  Yes Manisha Rodriguez MD   gabapentin (NEURONTIN) 100 MG capsule Take 2 capsules by mouth nightly for 30 days.  7/7/21 8/25/21 Yes Lu Carter MD   UNABLE TO FIND Dialysis mon wed fri   Yes Historical Provider, MD   loperamide (IMODIUM) 2 MG capsule Take 2 mg by mouth 4 times daily as needed 2/14/21  Yes Historical Provider, MD   metoprolol tartrate (LOPRESSOR) 25 MG tablet Take 12.5 mg by mouth 2 times daily 2/14/21 2/14/22 Yes Historical Provider, MD   levothyroxine (SYNTHROID) 150 MCG tablet Take 150 mcg by mouth every morning (before breakfast)   Yes Historical Provider, MD   torsemide (DEMADEX) 20 MG tablet Take 20 mg by mouth 3 tablets in am, 3 tablets in pm   Yes Historical Provider, MD   allopurinol (ZYLOPRIM) 100 MG tablet TAKE 1 TABLET EVERY DAY 9/1/20  Yes Lu Carter MD   spironolactone (ALDACTONE) 25 MG tablet TAKE 1 TABLET EVERY DAY  Patient taking differently: 25 mg daily  8/31/20  Yes Lu Carter MD   vancomycin (VANCOCIN) infusion Infuse 1,000 mg intravenously every 48 hours for 7 days Please infuse 1,000

## 2021-08-26 PROBLEM — E78.5 HYPERLIPIDEMIA: Status: ACTIVE | Noted: 2017-01-23

## 2021-08-26 LAB
BILIRUBIN URINE: NEGATIVE
BLOOD, URINE: ABNORMAL
CLARITY: CLEAR
COLOR: YELLOW
COMMENT UA: ABNORMAL
EKG ATRIAL RATE: 102 BPM
EKG DIAGNOSIS: NORMAL
EKG P AXIS: 71 DEGREES
EKG P-R INTERVAL: 148 MS
EKG Q-T INTERVAL: 360 MS
EKG QRS DURATION: 84 MS
EKG QTC CALCULATION (BAZETT): 469 MS
EKG R AXIS: -9 DEGREES
EKG T AXIS: 66 DEGREES
EKG VENTRICULAR RATE: 102 BPM
EPITHELIAL CELLS, UA: 2 /HPF (ref 0–5)
GLUCOSE BLD-MCNC: 60 MG/DL (ref 70–99)
GLUCOSE BLD-MCNC: 86 MG/DL (ref 70–99)
GLUCOSE URINE: NEGATIVE MG/DL
HYALINE CASTS: 2 /LPF (ref 0–8)
KETONES, URINE: NEGATIVE MG/DL
LEUKOCYTE ESTERASE, URINE: NEGATIVE
MICROSCOPIC EXAMINATION: YES
NITRITE, URINE: NEGATIVE
PERFORMED ON: ABNORMAL
PERFORMED ON: NORMAL
PH UA: 7.5 (ref 5–8)
PROTEIN UA: 100 MG/DL
RBC UA: 1 /HPF (ref 0–4)
SARS-COV-2: NOT DETECTED
SPECIFIC GRAVITY UA: 1.01 (ref 1–1.03)
URINE REFLEX TO CULTURE: ABNORMAL
URINE TYPE: ABNORMAL
UROBILINOGEN, URINE: 0.2 E.U./DL
VANCOMYCIN RANDOM: 25 UG/ML
WBC UA: 1 /HPF (ref 0–5)
YEAST: PRESENT /HPF

## 2021-08-26 PROCEDURE — 81001 URINALYSIS AUTO W/SCOPE: CPT

## 2021-08-26 PROCEDURE — 1200000000 HC SEMI PRIVATE

## 2021-08-26 PROCEDURE — 99233 SBSQ HOSP IP/OBS HIGH 50: CPT | Performed by: INTERNAL MEDICINE

## 2021-08-26 PROCEDURE — 93010 ELECTROCARDIOGRAM REPORT: CPT | Performed by: INTERNAL MEDICINE

## 2021-08-26 PROCEDURE — 99223 1ST HOSP IP/OBS HIGH 75: CPT | Performed by: INTERNAL MEDICINE

## 2021-08-26 PROCEDURE — 36415 COLL VENOUS BLD VENIPUNCTURE: CPT

## 2021-08-26 PROCEDURE — 6370000000 HC RX 637 (ALT 250 FOR IP): Performed by: PHYSICIAN ASSISTANT

## 2021-08-26 PROCEDURE — 6370000000 HC RX 637 (ALT 250 FOR IP): Performed by: INTERNAL MEDICINE

## 2021-08-26 PROCEDURE — 6360000002 HC RX W HCPCS: Performed by: INTERNAL MEDICINE

## 2021-08-26 PROCEDURE — 94760 N-INVAS EAR/PLS OXIMETRY 1: CPT

## 2021-08-26 PROCEDURE — 2580000003 HC RX 258: Performed by: INTERNAL MEDICINE

## 2021-08-26 PROCEDURE — 80202 ASSAY OF VANCOMYCIN: CPT

## 2021-08-26 RX ORDER — DIPHENHYDRAMINE HCL 25 MG
25 TABLET ORAL EVERY 6 HOURS PRN
Status: DISCONTINUED | OUTPATIENT
Start: 2021-08-26 | End: 2021-09-01 | Stop reason: HOSPADM

## 2021-08-26 RX ADMIN — CLOPIDOGREL BISULFATE 75 MG: 75 TABLET ORAL at 09:13

## 2021-08-26 RX ADMIN — MELATONIN TAB 3 MG 3 MG: 3 TAB at 21:02

## 2021-08-26 RX ADMIN — HEPARIN SODIUM 5000 UNITS: 5000 INJECTION INTRAVENOUS; SUBCUTANEOUS at 13:17

## 2021-08-26 RX ADMIN — ASPIRIN 81 MG: 81 TABLET, COATED ORAL at 09:13

## 2021-08-26 RX ADMIN — TORSEMIDE 60 MG: 20 TABLET ORAL at 09:13

## 2021-08-26 RX ADMIN — SPIRONOLACTONE 25 MG: 25 TABLET ORAL at 09:13

## 2021-08-26 RX ADMIN — ROSUVASTATIN 20 MG: 20 TABLET, FILM COATED ORAL at 09:13

## 2021-08-26 RX ADMIN — ACETAMINOPHEN 1000 MG: 500 TABLET, FILM COATED ORAL at 13:19

## 2021-08-26 RX ADMIN — METOPROLOL TARTRATE 12.5 MG: 25 TABLET, FILM COATED ORAL at 09:13

## 2021-08-26 RX ADMIN — DIPHENHYDRAMINE HYDROCHLORIDE 25 MG: 25 TABLET ORAL at 21:48

## 2021-08-26 RX ADMIN — MEROPENEM 500 MG: 500 INJECTION, POWDER, FOR SOLUTION INTRAVENOUS at 21:07

## 2021-08-26 RX ADMIN — ALLOPURINOL 100 MG: 100 TABLET ORAL at 21:48

## 2021-08-26 RX ADMIN — HEPARIN SODIUM 5000 UNITS: 5000 INJECTION INTRAVENOUS; SUBCUTANEOUS at 05:43

## 2021-08-26 RX ADMIN — TORSEMIDE 60 MG: 20 TABLET ORAL at 21:02

## 2021-08-26 RX ADMIN — HEPARIN SODIUM 5000 UNITS: 5000 INJECTION INTRAVENOUS; SUBCUTANEOUS at 20:58

## 2021-08-26 RX ADMIN — LEVOTHYROXINE SODIUM 150 MCG: 0.15 TABLET ORAL at 05:43

## 2021-08-26 RX ADMIN — METOPROLOL TARTRATE 12.5 MG: 25 TABLET, FILM COATED ORAL at 21:03

## 2021-08-26 RX ADMIN — Medication 10 ML: at 09:12

## 2021-08-26 RX ADMIN — ACETAMINOPHEN 1000 MG: 500 TABLET, FILM COATED ORAL at 21:02

## 2021-08-26 RX ADMIN — Medication 10 ML: at 21:03

## 2021-08-26 ASSESSMENT — PAIN DESCRIPTION - LOCATION
LOCATION: CHEST

## 2021-08-26 ASSESSMENT — PAIN SCALES - GENERAL
PAINLEVEL_OUTOF10: 0
PAINLEVEL_OUTOF10: 7
PAINLEVEL_OUTOF10: 3
PAINLEVEL_OUTOF10: 0
PAINLEVEL_OUTOF10: 8

## 2021-08-26 ASSESSMENT — ENCOUNTER SYMPTOMS
CHOKING: 0
TROUBLE SWALLOWING: 0
COUGH: 0
PHOTOPHOBIA: 0
ABDOMINAL PAIN: 0
APNEA: 0
COLOR CHANGE: 0
EYE REDNESS: 0
BLOOD IN STOOL: 0
STRIDOR: 0
RHINORRHEA: 0
SHORTNESS OF BREATH: 0
DIARRHEA: 0
NAUSEA: 0
CHEST TIGHTNESS: 0
EYE DISCHARGE: 0
FACIAL SWELLING: 0

## 2021-08-26 ASSESSMENT — PAIN DESCRIPTION - FREQUENCY
FREQUENCY: INTERMITTENT
FREQUENCY: INTERMITTENT

## 2021-08-26 ASSESSMENT — PAIN DESCRIPTION - PAIN TYPE
TYPE: ACUTE PAIN

## 2021-08-26 ASSESSMENT — PAIN DESCRIPTION - ORIENTATION
ORIENTATION: MID

## 2021-08-26 ASSESSMENT — PAIN DESCRIPTION - ONSET
ONSET: ON-GOING
ONSET: ON-GOING

## 2021-08-26 ASSESSMENT — PAIN DESCRIPTION - DESCRIPTORS
DESCRIPTORS: CRUSHING
DESCRIPTORS: CONSTANT

## 2021-08-26 NOTE — CONSULTS
Infectious Diseases   Consult Note        Admission Date: 8/25/2021  Hospital Day: Hospital Day: 2   Attending: Jackie Bynum MD  Date of service: 8/26/21     Reason for admission: Hyperkalemia [E87.5]  Septicemia ([facility] [A41.9]  ESRD on dialysis ([facility] [N18.6, Z99.2]  Frequent falls [R29.6]  Sepsis ([facility] [A41.9]  Pneumonia due to infectious organism, unspecified laterality, unspecified part of lung [J18.9]    Chief complaint/ Reason for consult: Severe sepsis    Microbiology:        I have reviewed allavailable micro lab data and cultures    · Blood culture (2/2) - collected on 8/25/2021: In process      Antibiotics and immunizations:       Current antibiotics: All antibiotics and their doses were reviewed by me    Recent Abx Admin                   meropenem (MERREM) 1,000 mg in sodium chloride 0.9 % 100 mL IVPB (mini-bag) (mg) 1,000 mg New Bag 08/25/21 2247    vancomycin 1000 mg IVPB in 250 mL D5W addavial (mg) 1,000 mg New Bag 08/25/21 2122                  Immunization History: All immunization history was reviewed by me today. Immunization History   Administered Date(s) Administered    Influenza, High Dose (Fluzone 65 yrs and older) 01/23/2017, 11/01/2017    Influenza, Triv, inactivated, subunit, adjuvanted, IM (Fluad 65 yrs and older) 10/28/2019    Pneumococcal Conjugate 13-valent ([id number]) 01/23/2017    Pneumococcal Polysaccharide ([id number]) 05/10/2019       Known drug allergies: All allergies were reviewed and updated    Allergies   Allergen Reactions    Amoxicillin Anaphylaxis    Demeclocycline Anaphylaxis    Penicillins Anaphylaxis    Tetracyclines & Related Anaphylaxis    Ozempic (0.25 Or 0.5 Mg-Dose) [Semaglutide(0.25 Or 0.5mg-Dos)] Other (See Comments)     Lost large amount weight and loss  Of appetite    Codeine Itching and Rash       Social history:     Social History:  All social andepidemiologic history was reviewed and updated by me today as needed.      · Tobacco use: has a new AV fistula in the right wrist area. Possibility of dialysis catheter associated bacteremia cannot be ruled out    Her encephalopathy is improving and she is quite awake at this time    Plan:     Diagnostic Workup:    · Agree with blood cultures  · Continue to follow fever curve, WBC count and blood cultures  · Follow up on liverand renal functions closely    Antimicrobials:    · Will continue empiric IV vancomycin  · Target vancomycin random level of 15-20  · The patient has a listed allergy of anaphylaxis with penicillins and tetracyclines, however, has been tolerating meropenem okay  · Will continue IV meropenem. Will change meropenem dose to dialysis dose of 500 mg every 24 hours  · We will follow up on the culture results and clinical progress and will make further recommendations accordingly. · Continue close vitals monitoring. · Maintain good glycemic control. · Fall precautions. Aspiration precautions. · Continue to watch for new fever or diarrhea. · DVT prophylaxis. · Discussed all above with patient and RN. Drug Monitoring:    · Continue serial monitoring for antibiotic toxicity as follows: Vancomycin level, CBC, CMP  · Continue to watch for following: new or worsening fever, hypotension, hives, lip swelling and redness or purulence at vascular access sites. I/v access Management:    · Continue to monitor i.v access sites for erythema, induration, discharge or tenderness. · As always, continue efforts to minimizetubes/lines/drains as clinically appropriate to reduce chances of line associated infections. Current isolation precautions: There are no current isolations documented for this patient. Level of complexity of consult: High     Risk of Complications/Morbidity: High     · Illness(es)/ Infection present that pose threat to life/bodily function. · There is potential for severe exacerbation of infection/side effects of treatment.   · Therapy requires intensive monitoring for antimicrobial agent toxicity. Thank you for involving me in the care of your patient. I will continue to follow. If you have any additional questions, please do not hesitate to contact me. Subjective:     Presenting complaint in ER:     Chief Complaint   Patient presents with    Altered Mental Status     Pt. arrived via EMS. Pt. states that she slipped out of bed this am and paramedics came and assisted pt. back into bed. Pt. had another fall this am.  Son arrived and called EMS who brought pt. her. Pt. is on dialysis Q Mon, Wed, and Fri. and was not able to go to dialysis today D/T confusion. Pt. also states neck and back pain        HPI: Kya Talley is a 67 y.o. female patient, who was seen at the request of Dr. Paco Talamantes MD.    History was obtained from chart review and the patient. The patient was admitted on 8/25/2021. I have been consulted to see the patient for above mentioned reason(s). The patient has multiple medical comorbidities, and presented to the ER originally after a fall. She is a dialysis patient and had reportedly fallen multiple times at home before presentation to the ER. The patient was also hospitalized just before that on 8/24/2021, was seen by vascular surgery and was discharged on IV vancomycin with dialysis for 1 week. She worsened after going home, had multiple falls and was brought back to the ER yesterday. In the ER, he was noted to have elevated white cell count of 18,100 and a fever of 99.5. She was also encephalopathic. The patient was hospitalized. Blood cultures were sent. She was started on IV vancomycin and IV meropenem. I have been asked for my opinion for management for this patient. Past Medical History: All past medical history reviewed today.     Past Medical History:   Diagnosis Date    Arthritis     [facility] ([facility]     Hemodialysis patient ([facility]     Hyperlipidemia     Hypertension     daily  aspirin EC 81 MG EC tablet, Take 1 tablet by mouth daily  gabapentin (NEURONTIN) 100 MG capsule, Take 2 capsules by mouth nightly for 30 days. UNABLE TO FIND, Dialysis mon wed fri  loperamide (IMODIUM) 2 MG capsule, Take 2 mg by mouth 4 times daily as needed  metoprolol tartrate (LOPRESSOR) 25 MG tablet, Take 12.5 mg by mouth 2 times daily  levothyroxine (SYNTHROID) 150 MCG tablet, Take 150 mcg by mouth every morning (before breakfast)  torsemide (DEMADEX) 20 MG tablet, Take 20 mg by mouth 3 tablets in am, 3 tablets in pm  allopurinol (ZYLOPRIM) 100 MG tablet, TAKE 1 TABLET EVERY DAY  spironolactone (ALDACTONE) 25 MG tablet, TAKE 1 TABLET EVERY DAY (Patient taking differently: 25 mg daily )     meropenem  500 mg IntraVENous Q24H    allopurinol  100 mg Oral Daily    aspirin EC  81 mg Oral Daily    clopidogrel  75 mg Oral Daily    levothyroxine  150 mcg Oral QAM AC    metoprolol tartrate  12.5 mg Oral BID    rosuvastatin  20 mg Oral Daily    spironolactone  25 mg Oral Daily    torsemide  60 mg Oral BID    sodium chloride flush  5-40 mL IntraVENous 2 times per day    heparin (porcine)  5,000 Units Subcutaneous 3 times per day    vancomycin (VANCOCIN) intermittent dosing (placeholder)   Other RX Placeholder          REVIEW OF SYSTEMS:       Review of Systems   Constitutional: Positive for fatigue. Negative for chills, diaphoresis and unexpected weight change. HENT: Negative for congestion, ear discharge, ear pain, facial swelling, hearing loss, rhinorrhea and trouble swallowing. Eyes: Negative for photophobia, discharge, redness and visual disturbance. Respiratory: Negative for apnea, cough, choking, chest tightness, shortness of breath and stridor. Cardiovascular: Negative for chest pain and palpitations. Gastrointestinal: Negative for abdominal pain, blood in stool, diarrhea and nausea. Endocrine: Negative for polydipsia, polyphagia and polyuria.    Genitourinary: Negative for difficulty urinating, dysuria, frequency, hematuria, menstrual problem and vaginal discharge. Musculoskeletal: Negative for arthralgias, joint swelling, myalgias and neck stiffness. Skin: Negative for color change and rash. Allergic/Immunologic: Negative for immunocompromised state. Neurological: Negative for dizziness, seizures, speech difficulty, light-headedness and headaches. Hematological: Negative for adenopathy. Psychiatric/Behavioral: Negative for agitation, hallucinations and suicidal ideas. Objective:       PHYSICAL EXAM:      Vitals:   Vitals:    08/25/21 2259 08/26/21 0530 08/26/21 0845 08/26/21 0935   BP: (!) 97/54 102/63 117/69    Pulse: 97 91 95    Resp: 16 16 16 16   Temp: 97.2 °F (36.2 °C) 97.8 °F (36.6 °C) 98.6 °F (37 °C)    TempSrc: Tympanic Oral Oral    SpO2: 96% 92% 94% 94%   Weight:  181 lb 4.8 oz (82.2 kg)     Height:           Physical Exam  Vitals and nursing note reviewed. Constitutional:       General: She is not in acute distress. Appearance: She is well-developed. She is not diaphoretic. HENT:      Head: Normocephalic. Right Ear: External ear normal.      Left Ear: External ear normal.      Nose: Nose normal.   Eyes:      General: No scleral icterus. Right eye: No discharge. Left eye: No discharge. Conjunctiva/sclera: Conjunctivae normal.      Pupils: Pupils are equal, round, and reactive to light. Cardiovascular:      Rate and Rhythm: Normal rate and regular rhythm. Heart sounds: No murmur heard. No friction rub. Pulmonary:      Effort: Pulmonary effort is normal.      Breath sounds: No stridor. No wheezing or rales. Chest:      Chest wall: No tenderness. Abdominal:      Palpations: Abdomen is soft. There is no mass. Tenderness: There is no abdominal tenderness. There is no guarding or rebound. Musculoskeletal:         General: No tenderness. Cervical back: Normal range of motion and neck supple. Lymphadenopathy:      Cervical: No cervical adenopathy. Skin:     General: Skin is warm and dry. Findings: No erythema or rash. Comments:   Dialysis catheter on right side of the chest, new AV fistula at the left wrist area. Neurological:      Mental Status: She is alert and oriented to person, place, and time. Motor: No abnormal muscle tone. Psychiatric:         Judgment: Judgment normal.           Lines and drains: All vascular access sites are healthy with no local erythema, discharge or tenderness. Intake and output:     I/O last 3 completed shifts: In: 1324.9 [I.V.:74.8; IV Piggyback:350]  Out: 900     Lab Data:   All available labs were reviewed by me today. CBC:   Recent Labs     08/24/21  0034 08/25/21  1344   WBC 16.1* 18.0*   RBC 3.36* 3.29*   HGB 10.2* 10.2*   HCT 32.2* 31.4*    231   MCV 95.7 95.4   MCH 30.2 31.1   MCHC 31.6 32.6   RDW 17.8* 17.8*        BMP:  Recent Labs     08/24/21  0034 08/25/21  1215 08/25/21  1344 08/25/21  2057   * 128*  --   --    K 4.4 5.9* 6.0* 4.0   CL 94* 91*  --   --    CO2 25 19*  --   --    BUN 47* 71*  --   --    CREATININE 6.8* 8.6*  --   --    CALCIUM 9.9 10.0  --   --    GLUCOSE 123* 146*  --   --         Hepatic FunctionPanel:   Lab Results   Component Value Date    ALKPHOS 149 08/25/2021    ALT 30 08/25/2021    AST 37 08/25/2021    PROT 7.5 08/25/2021    BILITOT 0.5 08/25/2021    BILIDIR <0.2 08/12/2021    IBILI see below 08/12/2021    LABALBU 3.6 08/25/2021       CPK:   Lab Results   Component Value Date    CKTOTAL 243 (H) 08/25/2021     ESR: No results found for: SEDRATE  CRP: No results found for: CRP      Imaging: All pertinent images and reports for the current visit were reviewed by meduring this visit. CT LUMBAR SPINE WO CONTRAST   Final Result   Negative for lumbar spine fracture         CT CERVICAL SPINE WO CONTRAST   Final Result   No acute abnormality of the cervical spine.          CT HEAD WO CONTRAST Final Result   No acute intracranial abnormality. Diffuse atrophic changes with findings suggesting chronic microvascular   ischemia         XR CHEST PORTABLE   Final Result   Improved aeration of the lungs bilaterally. A few scattered opacities remain. Outside records:    Labs, Microbiology, Radiology and pertinent results from Care everywhere, if available, were reviewed as a part ofthe consultation. Problem list:       Patient Active Problem List   Diagnosis Code    Essential hypertension I10    Hyperlipidemia E78.5    Acquired hypothyroidism E03.9    Diabetes mellitus type 2 in obese ([facility]) E11.69, E66.9    Chronic kidney 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 [facility] E11.22, N18.6, Z99.2    Closed nondisplaced fracture of fifth metatarsal bone of right foot S92.354A    Arthritis of right knee M17.11    Acute pain of both knees M25.561, M25.562    Primary osteoarthritis of both knees M17.0    Bursitis M71.9    Memory loss R41.3    Bilateral leg edema R60.0    Primary osteoarthritis of left knee M17.12    Atypical chest pain R07.89    Tubular adenoma of colon D12.6    Primary insomnia F51.01    Diarrhea of presumed infectious origin R19.7    Diabetic polyneuropathy associated with type 2 diabetes mellitus ([facility]) E11.42    COVID-19 virus infection U07.1    Coronary artery disease of native artery with stable angina pectoris ([facility]) I25.118    Pain of left calf M79.662    Atherosclerosis of native arteries of extremities with intermittent claudication, bilateral legs ([facility]) I70.213    ESRD on dialysis ([facility]) N18.6, Z99.2    Arteriovenous fistula infection, initial encounter ([facility] T82. 7XXA    Septicemia ([facility] A41.9    Pneumonia due to infectious organism J18.9    Frequent falls R29.6    Hyperkalemia E87.5    Hyponatremia E87.1    Ex-smoker Z87.891    Class 1 obesity due to excess calories with body mass index (BMI) of 33.0 to 33.9 in adult E66.09, Z68.33         Please note that this chart was generated using Dragon dictation software. Although every effort was made to ensure the accuracy of this automated transcription, some errors in transcription may have occurred inadvertently. If you may need any clarification, please do not hesitate to contact me through EPIC or at the phone number provided below with my electronic signature. Any pictures or media included in this note were obtained after taking informed verbal consent from the patient and with their approval to include those in the patient's medical record.       Ching Herrera MD, MPH  8/26/21, 12:50 PM EDT   [facility] Infectious Disease   [address] [place] [address], [address] [place] [address]  Office: [phone]  Fax: [phone]  Clinic days:  Tuesday & Thursday

## 2021-08-26 NOTE — PROGRESS NOTES
Pharmacy Vancomycin Consult     Speedy Turcios is a 67 y.o. female is receiving vancomycin. Received vancomycin 1000mg on 8/25/21@ 2122  Current Dosing: intermittent dosing with dialysis    Random Level:   Results for Sweta Cobos (MRN [id number]) as of 8/26/2021 10:00   Ref. Range 8/26/2021 07:30   Vancomycin Rm Latest Units: ug/mL 25.0 ()       Assessment/Plan:  No vancomycin dose today.   Random level ordered for 8/27/21 am  Will have hemodialysis on 8/27/21    Per pharmacy consult,  Leandro Torres PharmD

## 2021-08-26 NOTE — CARE COORDINATION
CM reviewed chart and pt goes to [facility] for HD. CM called clinic [phone] [facility] and Covid was negative. CM verified pt goes there on Mon/Wed/Fri at 12:15 and her HD runs for 3 hours. HD clinic information placed on jose martin.     Deangelo Murguia RN, BSN  [phone]

## 2021-08-26 NOTE — PROGRESS NOTES
Hospitalist Progress Note      PCP: Evelio Cr MD    Date of Admission: 8/25/2021    Chief Complaint: [facility] Course: 67 y.o. female with past medical history ESRD, diabetes, hypertension, hyperlipidemia, thyroid disease recently admitted for suspected infection of the dialysis fistula, new fistula/graft formed earlier this month. She comes in from home in view of several falls. ED note states that patient altered, however, patient is alert and oriented to the time of my interaction. Patient states that she was trying to  the phone and slid out of bed. Was not able to get up after that due to weakness. She denies any confusion loss of consciousness headache blurry vision palpitations chest pain shortness of breath. In the ED patient found to have significant leukocytosis. Broad-spectrum antibiotics initiated. Patient also with hyperkalemia, nephrology contacted and dialysis. Subjective: Patient seen and examined at bedside. States she is feeling better. She does appear to have significant short-term memory problems.       Medications:  Reviewed    Infusion Medications    dextrose      sodium chloride Stopped (08/25/21 6261)     Scheduled Medications    meropenem  500 mg IntraVENous Q24H    allopurinol  100 mg Oral Daily    aspirin EC  81 mg Oral Daily    clopidogrel  75 mg Oral Daily    levothyroxine  150 mcg Oral QAM AC    metoprolol tartrate  12.5 mg Oral BID    rosuvastatin  20 mg Oral Daily    spironolactone  25 mg Oral Daily    torsemide  60 mg Oral BID    sodium chloride flush  5-40 mL IntraVENous 2 times per day    heparin (porcine)  5,000 Units Subcutaneous 3 times per day    vancomycin (VANCOCIN) intermittent dosing (placeholder)   Other RX Placeholder     PRN Meds: glucose, dextrose, glucagon (rDNA), dextrose, sodium chloride flush, sodium chloride, heparin (porcine), acetaminophen, melatonin      Intake/Output Summary (Last 24 hours) at 8/26/2021 Umair Michaels filed at 8/26/2021 1300  Gross per 24 hour   Intake 1564.86 ml   Output 900 ml   Net 664.86 ml       Physical Exam Performed:    BP (!) 125/52   Pulse 97   Temp 98.7 °F (37.1 °C) (Oral)   Resp 16   Ht 5' 2\" (1.575 m)   Wt 181 lb 4.8 oz (82.2 kg)   LMP  (LMP Unknown)   SpO2 93%   BMI 33.16 kg/m²     General appearance: No apparent distress, appears stated age and cooperative. HEENT: Pupils equal, round, and reactive to light. Conjunctivae/corneas clear. Neck: Supple, with full range of motion. No jugular venous distention. Trachea midline. Respiratory:  Normal respiratory effort. Clear to auscultation, bilaterally without Rales/Wheezes/Rhonchi. Cardiovascular: Regular rate and rhythm with normal S1/S2 without murmurs, rubs or gallops. Abdomen: Soft, non-tender, non-distended with normal bowel sounds. Musculoskeletal: No clubbing, cyanosis or edema bilaterally. Full range of motion without deformity. Skin: Skin color, texture, turgor normal.  No rashes or lesions. Neurologic:  Neurovascularly intact without any focal sensory/motor deficits.  Cranial nerves: II-XII intact, grossly non-focal.  Psychiatric: Alert and oriented, thought content appropriate, normal insight  Capillary Refill: Brisk,< 3 seconds   Peripheral Pulses: +2 palpable, equal bilaterally       Labs:   Recent Labs     08/24/21  0034 08/25/21  1344   WBC 16.1* 18.0*   HGB 10.2* 10.2*   HCT 32.2* 31.4*    231     Recent Labs     08/24/21  0034 08/25/21  1215 08/25/21  1344 08/25/21  2057   * 128*  --   --    K 4.4 5.9* 6.0* 4.0   CL 94* 91*  --   --    CO2 25 19*  --   --    BUN 47* 71*  --   --    CREATININE 6.8* 8.6*  --   --    CALCIUM 9.9 10.0  --   --      Recent Labs     08/25/21  1215   AST 37   ALT 30   BILITOT 0.5   ALKPHOS 149*     Recent Labs     08/24/21  0034 08/25/21  1215   INR 0.98 0.97     Recent Labs     08/25/21  1215 08/25/21 2057   CKTOTAL 243*  --    TROPONINI 0.03* 0.02* Urinalysis:      Lab Results   Component Value Date    NITRU Negative 08/26/2021    WBCUA 1 08/26/2021    BACTERIA RARE 08/16/2021    RBCUA 1 08/26/2021    BLOODU TRACE 08/26/2021    SPECGRAV 1.010 08/26/2021    GLUCOSEU Negative 08/26/2021       Radiology:  CT LUMBAR SPINE WO CONTRAST   Final Result   Negative for lumbar spine fracture         CT CERVICAL SPINE WO CONTRAST   Final Result   No acute abnormality of the cervical spine. CT HEAD WO CONTRAST   Final Result   No acute intracranial abnormality. Diffuse atrophic changes with findings suggesting chronic microvascular   ischemia         XR CHEST PORTABLE   Final Result   Improved aeration of the lungs bilaterally. A few scattered opacities remain. Assessment/Plan:    Active Hospital Problems    Diagnosis     Pneumonia due to infectious organism [J18.9]     Frequent falls [R29.6]     Hyperkalemia [E87.5]     Hyponatremia [E87.1]     Ex-smoker [Z87.891]     Class 1 obesity due to excess calories with body mass index (BMI) of 33.0 to 33.9 in adult [E66.09, Z68.33]     Septicemia ([facility] [A41.9]     ESRD on dialysis ([facility] [N18.6, Z99.2]     Hyperlipidemia [E78.5]      Sepsis  Unknown origin  Possibility of infected graft  Patient also has Vas-Cath  Follow-up blood cultures  Follow blood cultures obtained from Vas-Cath  Patient allergic to penicillin-anaphylaxis (many decades ago when she was pregnant with one of her children)  Vancomycin meropenem  Consult ID     Hyperkalemia  No EKG changes  Dialysis patient  Per ER signout dialysis planned for later today     ESRD  Dialysis  Monday Wednesday Friday  Last dialysis on Monday     Hyponatremia  Hypervolemic  In need of dialysis     Nonzero cardiac biomarker  Likely related to ESRD    DVT Prophylaxis: Heparin  Diet: ADULT DIET;  Regular; Low Potassium (Less than 3000 mg/day)  Code Status: DNR-CCA    Electronically signed by Wander Echols MD on 8/26/2021 at 3:32 PM

## 2021-08-26 NOTE — DISCHARGE INSTR - COC
Continuity of Care Form    Patient Name: Marianna Osorio   :  1945  MRN:  [id number]    Admit date:  2021  Discharge date:  ***    Code Status Order: DNR-CCA   Advance Directives:     Admitting Physician:  Jennie Mckeon MD  PCP: Marcin Cerda MD    Discharging Nurse: [facility] Unit/Room#: 1NP-3796/2099-02  Discharging Unit Phone Number: ***    Emergency Contact:   Extended Emergency Contact Information  Primary Emergency Contact: [address] [place] [address] [place] Phone: [phone]  Relation: Child    Past Surgical History:  Past Surgical History:   Procedure Laterality Date    APPENDECTOMY      CHOLECYSTECTOMY      COLONOSCOPY  2020    COLONOSCOPY POLYPECTOMY SNARE/COLD BIOPSY performed by Ronnie Chauhan MD at [address] [facility] 2021    PERITONEAL DIALYSIS CATHETER REMOVAL.  EXCISSION OF ABDOMINAL CITRIX. performed by Manda Burroughs DO at [address] N/A 4/3/2020    LAPAROSCOPIC LYSIS OF ADHESIONS, LAPAROSCOPIC PERITONEAL DIALYSIS CATHETER PLACEMENT, LAPAROSCOPIC OMENTOPEXY performed by Manda Burroughs DO at [facility], BILATERAL      preventive    ROTATOR CUFF REPAIR Left     SHUNT REVISION Left 2021    LEFT FOREARM ARTERIO VENOUS GRAFT performed by Sue Lora MD at [address] [facility] N/A 2020    EGD BIOPSY performed by Ronnie Chauhan MD at [address] [facility] ENDOSCOPY       Immunization History:   Immunization History   Administered Date(s) Administered    Influenza, High Dose (Fluzone 65 yrs and older) 2017, 2017    Influenza, Triv, inactivated, subunit, adjuvanted, IM (Fluad 65 yrs and older) 10/28/2019    Pneumococcal Conjugate 13-valent ([id number]) 2017    Pneumococcal Polysaccharide (Pewphkgyd73) 05/10/2019       Active Problems:  Patient Active Problem List   Diagnosis Code    Essential hypertension I10    High cholesterol E78.00    Acquired hypothyroidism E03.9    Diabetes mellitus type 2 in obese ([facility]) E11.69, E66.9    Chronic kidney 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 ([facility] E11.22, N18.6, Z99.2    Closed nondisplaced fracture of fifth metatarsal bone of right foot S92.354A    Arthritis of right knee M17.11    Acute pain of both knees M25.561, M25.562    Primary osteoarthritis of both knees M17.0    Bursitis M71.9    Memory loss R41.3    Bilateral leg edema R60.0    Primary osteoarthritis of left knee M17.12    Atypical chest pain R07.89    Tubular adenoma of colon D12.6    Primary insomnia F51.01    Diarrhea of presumed infectious origin R19.7    Diabetic polyneuropathy associated with type 2 diabetes mellitus ([facility]) E11.42    COVID-19 virus infection U07.1    Coronary artery disease of native artery with stable angina pectoris ([facility]) I25.118    Pain of left calf M79.662    Atherosclerosis of native arteries of extremities with intermittent claudication, bilateral legs ([facility]) I70.213    ESRD (end stage renal disease) on dialysis ([facility]) N18.6, Z99.2    Arteriovenous fistula infection, initial encounter ([facility] T82. 7XXA    Sepsis ([facility] A41.9       Isolation/Infection:   Isolation          Droplet Plus        Patient Infection Status     Infection Onset Added Last Indicated Last Indicated By Review Planned Expiration Resolved Resolved By    None active    Resolved    COVID-19 Rule Out 08/25/21 08/25/21 08/25/21 COVID-19 (Ordered)   08/26/21 Rule-Out Test Resulted    COVID-19 Rule Out 08/12/21 08/12/21 08/12/21 COVID-19 (Ordered)   08/13/21 Rule-Out Test Resulted    C-diff Rule Out 03/22/21 03/22/21 03/22/21 Clostridium difficile toxin/antigen (Ordered)   05/17/21 Bethena Leyden Post, RN    This order is discontinued    COVID-19 Rule Out 11/24/20 11/24/20 11/24/20 COVID-19 (Ordered)   11/24/20 Rule-Out Test Resulted          Nurse Assessment:  Last INTERVENTION:[id number]}  Weight Bearing Status/Restrictions: 50Qamar Jon CC Weight Bearin}  Other Medical Equipment (for information only, NOT a DME order):  {EQUIPMENT:921933716}  Other Treatments: ***    Patient's personal belongings (please select all that are sent with patient):  {[facility] DME Belongings:314509850}    RN SIGNATURE:  {Esignature:997084533}    CASE MANAGEMENT/SOCIAL WORK SECTION    Inpatient Status Date: 2021    Readmission Risk Assessment Score:  Readmission Risk              Risk of Unplanned Readmission:  22           Discharging to Facility/ Agency   · Name:   · Address:  · Phone:  · Fax:    Dialysis Facility (if applicable)   · [address] [place] [address] Nephrology [facility]   · Address:  · Dialysis Schedule: Monday/Wednesday/Friday - 12:15 and pt runs for 3 hrs   · Phone:[phone]  · Fax:[phone]    / signature: Electronically signed by JOSE Hernández on 2021 at 5:18 PM    PHYSICIAN SECTION    Prognosis: {Prognosis:2014712169}    Condition at Discharge: Casey Jon Patient Condition:008428308}    Rehab Potential (if transferring to Rehab): {Prognosis:3884064923}    Recommended Labs or Other Treatments After Discharge: ***    Physician Certification: I certify the above information and transfer of Chioma Love  is necessary for the continuing treatment of the diagnosis listed and that she requires {Admit to Appropriate Level of Care:71317} for {GREATER/LESS:981171020} 30 days.      Update Admission H&P: {CHP DME Changes in [place]:341660183}    PHYSICIAN SIGNATURE:  {Esignature:404462970}

## 2021-08-26 NOTE — PROGRESS NOTES
Office : [phone]     Fax :[phone]       Nephrology progress Note      Patient's Name: Efren Sherman  8:13 AM  8/26/2021    Reason for Consult:  ESRD management , hyperkalemia       Requesting Physician:  Choco Rodriguez MD      Chief Complaint:    Chief Complaint   Patient presents with    Altered Mental Status     Pt. arrived via EMS. Pt. states that she slipped out of bed this am and paramedics came and assisted pt. back into bed. Pt. had another fall this am.  Son arrived and called EMS who brought pt. her. Pt. is on dialysis Q Mon, Wed, and Fri. and was not able to go to dialysis today D/T confusion. Pt. also states neck and back pain         History of Present iIlness:    Efren Sherman is a 67 y.o. female with past h/o ESRD, diabetes, hypertension, hyperlipidemia, thyroid disease recently admitted for new fistula/graft formed earlier this month. She comes in from home in view of several falls. ED note states that patient altered, however, patient is alert and oriented to the time of my interaction. Patient states that she was trying to  the phone and slid out of bed. Was not able to get up after that due to weakness. She denies any confusion loss of consciousness headache blurry vision palpitations chest pain shortness of breath. In the ED patient found to have significant leukocytosis. Broad-spectrum antibiotics initiated. Has leukocytosis   K is 6.0     Interval hx     Poor appetite   Denies any SOB   Left arm redness decreased   I/O last 3 completed shifts:   In: 1324.9 [I.V.:74.8; IV Piggyback:350]  Out: 900     Past Medical History:   Diagnosis Date    Arthritis     [facility]     Hemodialysis patient ([facility]     Hyperlipidemia     Hypertension     Kidney disease     Neuropathy     Pneumonia     IN February, 2021, with COVID    Thyroid disease        Past Surgical History:   Procedure Laterality Date    APPENDECTOMY      CHOLECYSTECTOMY      COLONOSCOPY  11/24/2020    COLONOSCOPY POLYPECTOMY SNARE/COLD BIOPSY performed by Ed Najera MD at [address] 5/17/2021    PERITONEAL DIALYSIS CATHETER REMOVAL.  EXCISSION OF ABDOMINAL CITRIX. performed by Twila Roberts DO at [address] N/A 4/3/2020    LAPAROSCOPIC LYSIS OF ADHESIONS, LAPAROSCOPIC PERITONEAL DIALYSIS CATHETER PLACEMENT, LAPAROSCOPIC OMENTOPEXY performed by Twila Roberts DO at [facility], BILATERAL      preventive    ROTATOR CUFF REPAIR Left     SHUNT REVISION Left 8/19/2021    LEFT FOREARM ARTERIO VENOUS GRAFT performed by James Marcelo MD at [address] N/A 11/24/2020    EGD BIOPSY performed by Ed Najera MD at [facility]       Family History   Problem Relation Age of Onset    Cancer Mother         lung, breast, liver    Stroke Father     Stomach Cancer Brother     Cancer Maternal Grandmother     No Known Problems Paternal Grandmother     No Known Problems Paternal Grandfather        Current Medications:    glucose (GLUTOSE) 40 % oral gel 15 g, PRN  dextrose 50 % IV solution, PRN  glucagon (rDNA) injection 1 mg, PRN  dextrose 5 % solution, PRN  allopurinol (ZYLOPRIM) tablet 100 mg, Daily  aspirin EC tablet 81 mg, Daily  clopidogrel (PLAVIX) tablet 75 mg, Daily  levothyroxine (SYNTHROID) tablet 150 mcg, QAM AC  metoprolol tartrate (LOPRESSOR) tablet 12.5 mg, BID  rosuvastatin (CRESTOR) tablet 20 mg, Daily  spironolactone (ALDACTONE) tablet 25 mg, Daily  torsemide (DEMADEX) tablet 60 mg, BID  sodium chloride flush 0.9 % injection 5-40 mL, 2 times per day  sodium chloride flush 0.9 % injection 5-40 mL, PRN  0.9 % sodium chloride infusion, PRN  heparin (porcine) injection 5,000 Units, 3 times per day  meropenem (MERREM) 1,000 mg in sodium chloride 0.9 % 100 mL IVPB (mini-bag), Q24H  heparin (porcine) injection 3,800 Units, PRN  vancomycin (VANCOCIN) intermittent dosing (placeholder), RX Placeholder  acetaminophen (TYLENOL) tablet 1,000 mg, Q6H PRN  melatonin tablet 3 mg, Nightly PRN      Physical exam:     Vitals:  /63   Pulse 91   Temp 97.8 °F (36.6 °C) (Oral)   Resp 16   Ht 5' 2\" (1.575 m)   Wt 181 lb 4.8 oz (82.2 kg)   LMP  (LMP Unknown)   SpO2 92%   BMI 33.16 kg/m²   Constitutional:  OAA X3 NAD  Skin: no rash, turgor wnl  Heent:  eomi, mmm  Neck: no bruits or jvd noted  Cardiovascular:  S1, S2 without m/r/g  Respiratory: CTA B without w/r/r  Abdomen:  +bs, soft, nt, nd  Ext:no  lower extremity edema      Labs:  CBC:   Recent Labs     08/24/21  0034 08/25/21  1344   WBC 16.1* 18.0*   HGB 10.2* 10.2*    231     BMP:    Recent Labs     08/24/21  0034 08/25/21  1215 08/25/21  1344 08/25/21 2057   * 128*  --   --    K 4.4 5.9* 6.0* 4.0   CL 94* 91*  --   --    CO2 25 19*  --   --    BUN 47* 71*  --   --    CREATININE 6.8* 8.6*  --   --    GLUCOSE 123* 146*  --   --      Ca/Mg/Phos:   Recent Labs     08/24/21  0034 08/25/21  1215   CALCIUM 9.9 10.0     Hepatic:   Recent Labs     08/25/21  1215   AST 37   ALT 30   BILITOT 0.5   ALKPHOS 149*     Troponin:   Recent Labs     08/25/21  1215 08/25/21 2057   TROPONINI 0.03* 0.02*     BNP: No results for input(s): BNP in the last 72 hours. Lipids: No results for input(s): CHOL, TRIG, HDL, LDLCALC, LABVLDL in the last 72 hours. ABGs: No results for input(s): PHART, PO2ART, BTV2YHU in the last 72 hours.   INR:   Recent Labs     08/24/21  0034 08/25/21  1215   INR 0.98 0.97     UA:No results for input(s): Terrence Garcia, GLUCOSEU, BILIRUBINUR, Melecio Joyner, BLOODU, PHUR, PROTEINU, UROBILINOGEN, Nika Crowell, Timothy Stewart in the last 72 hours. Urine Microscopic: No results for input(s): LABCAST, BACTERIA, COMU, HYALCAST, WBCUA, RBCUA, EPIU in the last 72 hours. Urine Culture: No results for input(s): LABURIN in the last 72 hours. Urine Chemistry: No results for input(s): Randolm Reins, PROTEINUR, NAUR in the last 72 hours. IMAGING:  CT LUMBAR SPINE WO CONTRAST   Final Result   Negative for lumbar spine fracture         CT CERVICAL SPINE WO CONTRAST   Final Result   No acute abnormality of the cervical spine. CT HEAD WO CONTRAST   Final Result   No acute intracranial abnormality. Diffuse atrophic changes with findings suggesting chronic microvascular   ischemia         XR CHEST PORTABLE   Final Result   Improved aeration of the lungs bilaterally. A few scattered opacities remain. Assessment/Plan :      1. ESRD   Gets HD mon/wed/friday   Has hyperkalemia   HAD HD yesterday   k is better now   No UF     2. HTN. Controlled     3. AMS likely 2/2 infection vs from pain medications   Empirical abx   vascular surgery following   AV access site has redness and bruising     4. Acid- base disorder. Metabolic acidosis  Correct with HD     5.  Hyperkalemia   Corrected with HD             D/w primary team      Thank you for allowing us to participate in care of [facility]         Electronically signed by: Artie Anderson MD, 8/26/2021, 8:13 AM      [facility] [address]  Office : [phone]  Fax :[phone]

## 2021-08-26 NOTE — CARE COORDINATION
Discharge Planning Assessment  Discharge Planning Assessment  RN/SW discharge planner met with patient/ (and family member) to discuss reason for admission, current living situation, and potential needs at the time of discharge    Demographics/Insurance verified Yes     Current type of dwelling:  Second floor apartment with 2 steps to enter building and 8 steps up to the second floor     Patient from ECF/SW confirmed with: n/a     Living arrangements: pt states lives alone but her daughter only lives 12 minutes away     Level of function/Support: pt states independent and that her daughter is supportive     PCP: Myles Hernandez     Last Visit to PCP: March 2021    DME:none     Active with any community resources/agencies/skilled home care:  HD at [address] on M/W/Fri at 12:15 and runs for 3 hours - CM verified with clinic Pt states she uses transportation service to HD     Medication compliance issues: Independent     Financial issues that could impact healthcare: none         Tentative discharge plan:home with possible hc     Discussed and provided facilities of choice if transition to a skilled nursing facility is required at the time of discharge no therapy ordered at this time. Discussed with patient and/or family that on the day of discharge home tentative time of discharge will be between 10 AM and noon.     Transportation at the time of discharge: Patient stated that her daughter could pick her up    Kirit Resendez RN, BSN  [phone]

## 2021-08-26 NOTE — ED PROVIDER NOTES
I independently performed a history and physical on [facility]. All diagnostic, treatment, and disposition decisions were made by myself in conjunction with the advanced practice provider. Briefly, this is a 67 y.o. female here for generalized weakness and frequent falls. Per patient's son she is fallen multiple times over the past few days. She has appeared confused. She missed dialysis today due to her confusion and was thus brought to the emergency department. On my evaluation patient is alert, she reports aching low back pain, she has difficulty describing how long this pain has been present. .    On exam, patient afebrile and nontoxic. No distress. Heart tachycardic, regular rhythm. Lungs with bibasilar crackles, no wheezes. Abdomen soft, nondistended, nontender to palpation in all quadrants. A&Ox4, perseverates with some questions and requires repeated prompting to answer. PERRL. Speech clear, face symmetric. CN 2-12 intact. 5/5 motor and sensation grossly intact all extremities. No pronator drift. Gait not tested. EKG  EKG was reviewed by emergency department physician in the absence of a cardiologist    Narrow complex sinus rhythm, rate 110, normal axis, normal UT and QRS intervals, normal Qtc, no ST depressions, less than 1 mm J-point elevation I and aVL, normal t-wave morphology, impression sinus tachycardia with nonspecific ST morphology, no STEMI      Screenings   Nasra Coma Scale  Eye Opening: Spontaneous  Best Verbal Response: Oriented  Best Motor Response: Obeys commands  Nasra Coma Scale Score: 15        MDM    Patient afebrile and nontoxic. She is in no distress. She is not hypoxic, no increased work of breathing and is protecting her airway. EKG is no STEMI, troponin is minimally elevated however at patient's baseline likely from ESRD, she denies chest pain, I have low suspicion for ACS.   EKG does have nonspecific ST morphology, no reciprocal changes, may be secondary to patient's pulmonary edema and electrolyte derangements. Neuro exam is nonfocal, presentation not consistent with CVA/TIA. Chest x-ray with potential infiltrates, does appear improved from previous however patient does report to me persistent cough and has leukocytosis, will treat for potential HCAP. Labs consistent with ESRD, potassium 6.0, case was discussed with Dr. Nela Echavarria for nephrology, will provide medical treatment for hyperkalemia with bicarbonate, insulin, dextrose and Speedy Seton, Dr. Nela Echavarria will arrange for dialysis today. On my reevaluation of patient she remained alert and hemodynamically stable. Case discussed with internal medicine team and will admit for further evaluation and care. Patient Referrals:  No follow-up provider specified. Discharge Medications:  Current Discharge Medication List          FINAL IMPRESSION  1. Septicemia ([facility]    2. Pneumonia due to infectious organism, unspecified laterality, unspecified part of lung    3. Frequent falls    4. Hyperkalemia    5. ESRD on dialysis ([facility]        Blood pressure 117/69, pulse 95, temperature 98.6 °F (37 °C), temperature source Oral, resp. rate 16, height 5' 2\" (1.575 m), weight 181 lb 4.8 oz (82.2 kg), SpO2 94 %, not currently breastfeeding. For further details of [facility] emergency department encounter, please see documentation by advanced practice provider, TRUNG Gross.     Carlyn Mcgarry DO (electronically signed)  Attending Emergency Physician       Carlyn Mcgarry DO  08/26/21 7884

## 2021-08-26 NOTE — PROGRESS NOTES
Patient arrived to the unit from dialysis in stable condition. Patient oriented to room and use of call light. Patient c/o chest pain 8/10 that's \"aching and constant. \" Hospitalist notified. STAT EKG, troponin, and potassium level ordered. Patient alert and oriented. On 2LNC. Call light within reach, will continue to monitor.

## 2021-08-26 NOTE — PROGRESS NOTES
4 Eyes Skin Assessment     The patient is being assess for  Admission    I agree that 2 RN's have performed a thorough Head to Toe Skin Assessment on the patient. ALL assessment sites listed below have been assessed. Areas assessed by both nurses:   [x]   Head, Face, and Ears   [x]   Shoulders, Back, and Chest  [x]   Arms, Elbows, and Hands   [x]   Coccyx, Sacrum, and IschIum  [x]   Legs, Feet, and Heels        Does the Patient have Skin Breakdown?   No         Zhou Prevention initiated:  NA   Wound Care Orders initiated:  NA      WOC nurse consulted for Pressure Injury (Stage 3,4, Unstageable, DTI, NWPT, and Complex wounds), New and Established Ostomies:  NA      Nurse 1 eSignature: Electronically signed by Joaquin Mc RN on 8/25/21 at 10:17 PM EDT    **SHARE this note so that the co-signing nurse is able to place an eSignature**    Nurse 2 eSignature: Electronically signed by Adan Moody RN on 8/25/21 at 11:07 PM EDT

## 2021-08-27 PROBLEM — Z71.3 WEIGHT LOSS COUNSELING, ENCOUNTER FOR: Status: ACTIVE | Noted: 2020-04-01

## 2021-08-27 LAB
ANION GAP SERPL CALCULATED.3IONS-SCNC: 20 MMOL/L (ref 3–16)
ANISOCYTOSIS: ABNORMAL
BANDED NEUTROPHILS RELATIVE PERCENT: 3 % (ref 0–7)
BASOPHILS ABSOLUTE: 0 K/UL (ref 0–0.2)
BASOPHILS RELATIVE PERCENT: 0 %
BUN BLDV-MCNC: 58 MG/DL (ref 7–20)
CALCIUM SERPL-MCNC: 10.1 MG/DL (ref 8.3–10.6)
CHLORIDE BLD-SCNC: 91 MMOL/L (ref 99–110)
CO2: 21 MMOL/L (ref 21–32)
CREAT SERPL-MCNC: 7.6 MG/DL (ref 0.6–1.2)
EOSINOPHILS ABSOLUTE: 0.9 K/UL (ref 0–0.6)
EOSINOPHILS RELATIVE PERCENT: 7 %
GFR AFRICAN AMERICAN: 6
GFR NON-AFRICAN AMERICAN: 5
GLUCOSE BLD-MCNC: 84 MG/DL (ref 70–99)
HCT VFR BLD CALC: 31.5 % (ref 36–48)
HEMOGLOBIN: 10.1 G/DL (ref 12–16)
LYMPHOCYTES ABSOLUTE: 0.7 K/UL (ref 1–5.1)
LYMPHOCYTES RELATIVE PERCENT: 5 %
MAGNESIUM: 2.8 MG/DL (ref 1.8–2.4)
MCH RBC QN AUTO: 30.6 PG (ref 26–34)
MCHC RBC AUTO-ENTMCNC: 32 G/DL (ref 31–36)
MCV RBC AUTO: 95.7 FL (ref 80–100)
METAMYELOCYTES RELATIVE PERCENT: 1 %
MONOCYTES ABSOLUTE: 0.8 K/UL (ref 0–1.3)
MONOCYTES RELATIVE PERCENT: 6 %
NEUTROPHILS ABSOLUTE: 11 K/UL (ref 1.7–7.7)
NEUTROPHILS RELATIVE PERCENT: 78 %
PDW BLD-RTO: 17.8 % (ref 12.4–15.4)
PHOSPHORUS: 7.8 MG/DL (ref 2.5–4.9)
PLATELET # BLD: 246 K/UL (ref 135–450)
PMV BLD AUTO: 8.6 FL (ref 5–10.5)
POTASSIUM SERPL-SCNC: 4.5 MMOL/L (ref 3.5–5.1)
RBC # BLD: 3.29 M/UL (ref 4–5.2)
SODIUM BLD-SCNC: 132 MMOL/L (ref 136–145)
VANCOMYCIN RANDOM: 22.8 UG/ML
WBC # BLD: 13.4 K/UL (ref 4–11)

## 2021-08-27 PROCEDURE — 84100 ASSAY OF PHOSPHORUS: CPT

## 2021-08-27 PROCEDURE — 80202 ASSAY OF VANCOMYCIN: CPT

## 2021-08-27 PROCEDURE — 99233 SBSQ HOSP IP/OBS HIGH 50: CPT | Performed by: INTERNAL MEDICINE

## 2021-08-27 PROCEDURE — 83735 ASSAY OF MAGNESIUM: CPT

## 2021-08-27 PROCEDURE — 36415 COLL VENOUS BLD VENIPUNCTURE: CPT

## 2021-08-27 PROCEDURE — 5A1D70Z PERFORMANCE OF URINARY FILTRATION, INTERMITTENT, LESS THAN 6 HOURS PER DAY: ICD-10-PCS | Performed by: INTERNAL MEDICINE

## 2021-08-27 PROCEDURE — 6360000002 HC RX W HCPCS: Performed by: INTERNAL MEDICINE

## 2021-08-27 PROCEDURE — 6370000000 HC RX 637 (ALT 250 FOR IP): Performed by: INTERNAL MEDICINE

## 2021-08-27 PROCEDURE — 90935 HEMODIALYSIS ONE EVALUATION: CPT | Performed by: INTERNAL MEDICINE

## 2021-08-27 PROCEDURE — 90935 HEMODIALYSIS ONE EVALUATION: CPT

## 2021-08-27 PROCEDURE — 2580000003 HC RX 258: Performed by: INTERNAL MEDICINE

## 2021-08-27 PROCEDURE — 1200000000 HC SEMI PRIVATE

## 2021-08-27 PROCEDURE — 6370000000 HC RX 637 (ALT 250 FOR IP): Performed by: PHYSICIAN ASSISTANT

## 2021-08-27 PROCEDURE — 80048 BASIC METABOLIC PNL TOTAL CA: CPT

## 2021-08-27 PROCEDURE — 85025 COMPLETE CBC W/AUTO DIFF WBC: CPT

## 2021-08-27 PROCEDURE — 94760 N-INVAS EAR/PLS OXIMETRY 1: CPT

## 2021-08-27 RX ORDER — ONDANSETRON 2 MG/ML
4 INJECTION INTRAMUSCULAR; INTRAVENOUS EVERY 6 HOURS PRN
Status: DISCONTINUED | OUTPATIENT
Start: 2021-08-27 | End: 2021-09-01 | Stop reason: HOSPADM

## 2021-08-27 RX ADMIN — METOPROLOL TARTRATE 12.5 MG: 25 TABLET, FILM COATED ORAL at 12:45

## 2021-08-27 RX ADMIN — MELATONIN TAB 3 MG 3 MG: 3 TAB at 21:57

## 2021-08-27 RX ADMIN — ASPIRIN 81 MG: 81 TABLET, COATED ORAL at 12:45

## 2021-08-27 RX ADMIN — HEPARIN SODIUM 5000 UNITS: 5000 INJECTION INTRAVENOUS; SUBCUTANEOUS at 14:46

## 2021-08-27 RX ADMIN — SPIRONOLACTONE 25 MG: 25 TABLET ORAL at 12:46

## 2021-08-27 RX ADMIN — LEVOTHYROXINE SODIUM 150 MCG: 0.15 TABLET ORAL at 06:34

## 2021-08-27 RX ADMIN — TORSEMIDE 60 MG: 20 TABLET ORAL at 19:59

## 2021-08-27 RX ADMIN — ONDANSETRON 4 MG: 2 INJECTION INTRAMUSCULAR; INTRAVENOUS at 15:21

## 2021-08-27 RX ADMIN — CLOPIDOGREL BISULFATE 75 MG: 75 TABLET ORAL at 12:46

## 2021-08-27 RX ADMIN — Medication 10 ML: at 20:01

## 2021-08-27 RX ADMIN — ALLOPURINOL 100 MG: 100 TABLET ORAL at 19:59

## 2021-08-27 RX ADMIN — HEPARIN SODIUM 5000 UNITS: 5000 INJECTION INTRAVENOUS; SUBCUTANEOUS at 22:01

## 2021-08-27 RX ADMIN — MEROPENEM 500 MG: 500 INJECTION, POWDER, FOR SOLUTION INTRAVENOUS at 20:03

## 2021-08-27 RX ADMIN — DIPHENHYDRAMINE HYDROCHLORIDE 25 MG: 25 TABLET ORAL at 22:38

## 2021-08-27 RX ADMIN — TORSEMIDE 60 MG: 20 TABLET ORAL at 12:45

## 2021-08-27 RX ADMIN — Medication 10 ML: at 12:48

## 2021-08-27 RX ADMIN — METOPROLOL TARTRATE 12.5 MG: 25 TABLET, FILM COATED ORAL at 19:59

## 2021-08-27 RX ADMIN — ROSUVASTATIN 20 MG: 20 TABLET, FILM COATED ORAL at 12:45

## 2021-08-27 RX ADMIN — HEPARIN SODIUM 5000 UNITS: 5000 INJECTION INTRAVENOUS; SUBCUTANEOUS at 06:33

## 2021-08-27 ASSESSMENT — ENCOUNTER SYMPTOMS
CHOKING: 0
SHORTNESS OF BREATH: 0
EYE DISCHARGE: 0
NAUSEA: 0
PHOTOPHOBIA: 0
RHINORRHEA: 0
BLOOD IN STOOL: 0
APNEA: 0
DIARRHEA: 0
COUGH: 0
TROUBLE SWALLOWING: 0
EYE REDNESS: 0
FACIAL SWELLING: 0
CHEST TIGHTNESS: 0
ABDOMINAL PAIN: 0
COLOR CHANGE: 0
STRIDOR: 0

## 2021-08-27 ASSESSMENT — PAIN SCALES - GENERAL
PAINLEVEL_OUTOF10: 0

## 2021-08-27 NOTE — PROGRESS NOTES
PROCEDURE:  Patient seen on HEMODIALYSIS at 11:48 AM    PHYSICIAN:  Molly Bingham MD    INDICATION:  ESRD    Wt Readings from Last 3 Encounters:   08/27/21 181 lb 14.1 oz (82.5 kg)   08/23/21 178 lb (80.7 kg)   08/19/21 176 lb (79.8 kg)     Temp Readings from Last 3 Encounters:   08/27/21 97 °F (36.1 °C)   08/23/21 99 °F (37.2 °C) (Oral)   08/19/21 97.6 °F (36.4 °C) (Temporal)     BP Readings from Last 3 Encounters:   08/27/21 (!) 163/80   08/24/21 133/89   08/19/21 (!) 155/81     Pulse Readings from Last 3 Encounters:   08/27/21 89   08/24/21 95   08/19/21 86      No intake/output data recorded. CBC:   Recent Labs     08/25/21  1344 08/27/21  0537   WBC 18.0* 13.4*   HGB 10.2* 10.1*    246     BMP:    Recent Labs     08/25/21  1215 08/25/21  1215 08/25/21  1344 08/25/21 2057 08/27/21  0537   *  --   --   --  132*   K 5.9*   < > 6.0* 4.0 4.5   CL 91*  --   --   --  91*   CO2 19*  --   --   --  21   BUN 71*  --   --   --  58*   CREATININE 8.6*  --   --   --  7.6*   GLUCOSE 146*  --   --   --  84    < > = values in this interval not displayed. BMD:   Lab Results   Component Value Date    .3 (H) 02/10/2020    CALCIUM 10.1 08/27/2021    PHOS 7.8 (H) 08/27/2021       Iron:     Lab Results   Component Value Date    IRON 64 03/29/2018    TIBC 351 03/29/2018            RX:  See dialysis flowsheet for specifics on access, blood flow rate, dialysate baths, duration of dialysis, anticoagulation and other technical information. COMMENTS:      1. ESRD   Gets HD mon/wed/friday   Has hyperkalemia   HAD HD yesterday   k is better now   No UF      2. HTN. Controlled      3. AMS likely 2/2 infection vs from pain medications   Empirical abx   vascular surgery following   AV access site has redness and bruising      4. Acid- base disorder. Metabolic acidosis  Correct with HD      5.  Hyperkalemia   Corrected with HD     Need PT/OT evaluation         Molly Bingham MD  Nephrology     [facility] - [place] : [address] , [address] [facility] Office: [facility]  [address] [facility] Office: [place] [id number] [facility] [address].   [facility] Office: [address] [place] [address], [address] [facility] [address]  Office : [phone]  Fax :[phone]

## 2021-08-27 NOTE — PROGRESS NOTES
Infectious Diseases   Progress Note      Admission Date: 8/25/2021  Hospital Day: Hospital Day: 3   Attending: Tiffany Rain MD  Date of service: 8/27/2021     Chief complaint/ Reason for consult:     · Severe sepsis on admission with bandemia,  fever, hypotension  · Hyponatremia  · Negative COVID-19 PCR on 8/25/2021  · Hepatitis B immune  · Ex-smoker    Microbiology:        I have reviewed allavailable micro lab data and cultures    · Blood culture (2/2) - collected on 8/25/2021: In process      Antibiotics and immunizations:       Current antibiotics: All antibiotics and their doses were reviewed by me    Recent Abx Admin                   meropenem (MERREM) 500 mg IVPB (mini-bag) (mg) 500 mg New Bag 08/26/21 2107                  Immunization History: All immunization history was reviewed by me today. Immunization History   Administered Date(s) Administered    Influenza, High Dose (Fluzone 65 yrs and older) 01/23/2017, 11/01/2017    Influenza, Triv, inactivated, subunit, adjuvanted, IM (Fluad 65 yrs and older) 10/28/2019    Pneumococcal Conjugate 13-valent ([id number]) 01/23/2017    Pneumococcal Polysaccharide ([id number]) 05/10/2019       Known drug allergies: All allergies were reviewed and updated    Allergies   Allergen Reactions    Amoxicillin Anaphylaxis    Demeclocycline Anaphylaxis    Penicillins Anaphylaxis    Tetracyclines & Related Anaphylaxis    Ozempic (0.25 Or 0.5 Mg-Dose) [Semaglutide(0.25 Or 0.5mg-Dos)] Other (See Comments)     Lost large amount weight and loss  Of appetite    Codeine Itching and Rash       Social history:     Social History:  All social andepidemiologic history was reviewed and updated by me today as needed. · Tobacco use:   reports that she quit smoking about 43 years ago. Her smoking use included cigarettes. She has a 5.00 pack-year smoking history. She has never used smokeless tobacco.  · Alcohol use:   reports current alcohol use.   · Currently lives in: [address] [place] [address]  ·  reports no history of drug use. COVID VACCINATION AND LAB RESULT RECORDS:     Internal Administration   First Dose      Second Dose           Last COVID Lab SARS-CoV-2 (no units)   Date Value   08/25/2021 Not Detected     SARS-CoV-2, NAAT (no units)   Date Value   11/24/2020 Not Detected            Assessment:     The patient is a 67 y.o. old female who  has a past medical history of Arthritis, Diabetes ([facility] Hemodialysis patient ([facility] Hyperlipidemia, Hypertension, Kidney disease, Neuropathy, Pneumonia, and Thyroid disease. with following problems:    · Severe sepsis on admission with bandemia,  fever, hypotension-slowly improving  · Hyponatremia-being corrected  · Negative COVID-19 PCR on 8/25/2021  · Hepatitis B immune  · Ex-smoker  · Essential hypertension-blood pressure okay  · Hyperlipidemia  · ESRD on dialysis  · Obesity Class 1 due to excess calorie intake : Body mass index is 33.16 kg/m².-Counseling      Discussion:      The patient is afebrile. She is on empiric IV vancomycin and IV meropenem. White cell count seems to be coming down slowly. It is 13,400 today. Blood cultures from 8/25/2021 are negative so far. She is a dialysis patient. Exact source of her sepsis is unclear yet        Plan:     Diagnostic Workup:      · Continue to follow  fever curve, WBC count and blood cultures. · Continue to monitor blood counts, liver and renal function. Antimicrobials:    · Will continue empiric IV vancomycin  · Target vancomycin random level of 15-20  · If no evidence of MRSA by Monday, will plan to stop IV empiric IV vancomycin  · Will continue meropenem 500 mg every 24 hours  · We will follow up on the culture results and clinical progress and will make further recommendations accordingly. · Continue close vitals monitoring. · Maintain good glycemic control. · Fall precautions. Aspiration precautions.   · Continue to watch for new fever or diarrhea. · DVT prophylaxis. · Discussed all above with patient and RN. Drug Monitoring:    · Continue monitoring for antibiotic toxicity as follows: CBC, CMP, vancomycin level  · Continue to watch for following: new or worsening fever, new hypotension, hives, lip swelling and redness or purulence at vascular access sites. I/v access Management:    · Continue to monitor i.v access sites for erythema, induration, discharge or tenderness. · As always, continue efforts to minimize tubes/lines/drains as clinically appropriate to reduce chances of line associated infections. Patient education and counseling:        · The patient was educated in detail about the side-effects of various antibiotics and things to watch for like new rashes, lip swelling, severe reaction, worsening diarrhea, break through fever etc.  · Discussed patient's condition and what to expect. All of the patient's questions were addressed in a satisfactory manner and patient verbalized understanding all instructions. Level of complexity of visit: High       · Illness(es)/ Infection present that pose threat to life/bodily function. · There is potential for severe exacerbation of infection/side effects of treatment. · Therapy requires intensive monitoring for antimicrobial agent toxicity. Weight loss counseling:    Extensive weight loss counseling was done. It is important to set a realistic weight loss goal. First goal should be to avoid gaining more weight and staying at current weight (or within 5 percent). People at high risk of developing diabetes who are able to lose 5 percent of their body weight and maintain this weight will reduce their risk of developing diabetes by about 50 percent and reduce their blood pressure. Losing more than 15 percent of  body weight and staying at this weight is an extremely good result, even if you never reach your \"dream\" or \"ideal\" weight.     Lifestyle changes including changing eating habits, substituting excess carbohydrates with proteins, stress reduction, using self-help programs like Weight Watchers®, Overeaters Anonymous®, and Take Off Pounds Sensibly (TOPS)© , following DASH diet and increasing exercise or walking briskly daily for half hour to and hour 5-7 days a week was suggested among other measures. Information was given about various weight loss education programs and their websites like www.cdc.gov/healthyweight, www.choosemyplate.gov and www.health.gov/dietaryguidelines/      TIME SPENT TODAY:     - Spent over  36 minutes on visit (including interval history, physical exam, review of data including labs, cultures, imaging, development and implementation of treatment plan and coordination of complex care). More than 50 percent of this includes face-to-face time spent with the patient for counseling and coordination of care. Thank you for involving me in the care of your patient. I will continue to follow. If you have anyadditional questions, please do not hesitate to contact me. Subjective: Interval history: Interval history was obtained from chart review and patient/ RN. The patient feels tired. She is tolerating antibiotics okay. No diarrhea     REVIEW OF SYSTEMS:      Review of Systems   Constitutional: Positive for fatigue. Negative for chills, diaphoresis and unexpected weight change. HENT: Negative for congestion, ear discharge, ear pain, facial swelling, hearing loss, rhinorrhea and trouble swallowing. Eyes: Negative for photophobia, discharge, redness and visual disturbance. Respiratory: Negative for apnea, cough, choking, chest tightness, shortness of breath and stridor. Cardiovascular: Negative for chest pain and palpitations. Gastrointestinal: Negative for abdominal pain, blood in stool, diarrhea and nausea. Endocrine: Negative for polydipsia, polyphagia and polyuria.    Genitourinary: Negative for difficulty urinating, dysuria, frequency, hematuria, menstrual problem and vaginal discharge. Musculoskeletal: Negative for arthralgias, joint swelling, myalgias and neck stiffness. Skin: Negative for color change and rash. Allergic/Immunologic: Negative for immunocompromised state. Neurological: Negative for dizziness, seizures, speech difficulty, light-headedness and headaches. Hematological: Negative for adenopathy. Psychiatric/Behavioral: Negative for agitation, hallucinations and suicidal ideas. Past Medical History: All past medical history reviewed today. Past Medical History:   Diagnosis Date    Arthritis     Diabetes ([facility]     Hemodialysis patient ([facility]     Hyperlipidemia     Hypertension     Kidney disease     Neuropathy     Pneumonia     IN February, 2021, with COVID    Thyroid disease        Past Surgical History: All past surgical history was reviewed today. Past Surgical History:   Procedure Laterality Date    APPENDECTOMY      CHOLECYSTECTOMY      COLONOSCOPY  11/24/2020    COLONOSCOPY POLYPECTOMY SNARE/COLD BIOPSY performed by Vicky Koo MD at [address] [facility] 5/17/2021    PERITONEAL DIALYSIS CATHETER REMOVAL. EXCISSION OF ABDOMINAL CITRIX. performed by Fang Nunez DO at [address] N/A 4/3/2020    LAPAROSCOPIC LYSIS OF ADHESIONS, LAPAROSCOPIC PERITONEAL DIALYSIS CATHETER PLACEMENT, LAPAROSCOPIC OMENTOPEXY performed by Fang Nunez DO at [facility], BILATERAL      preventive    ROTATOR CUFF REPAIR Left     SHUNT REVISION Left 8/19/2021    LEFT FOREARM ARTERIO VENOUS GRAFT performed by Maia Bedolla MD at [facility] 11/24/2020    EGD BIOPSY performed by Vicky Koo MD at [address] [facility] ENDOSCOPY       Family History: All family history was reviewed today.         Problem Relation Age of Onset    Cancer Mother         lung, breast, liver    normal.            Lines and drains: All vascular access sites are healthy with no local erythema, discharge or tenderness. Intake and output:    I/O last 3 completed shifts: In: 240 [P.O.:240]  Out: -     Lab Data:   All available labs and old records have been reviewed by me. CBC:  Recent Labs     08/25/21  1344 08/27/21  0537   WBC 18.0* 13.4*   RBC 3.29* 3.29*   HGB 10.2* 10.1*   HCT 31.4* 31.5*    246   MCV 95.4 95.7   MCH 31.1 30.6   MCHC 32.6 32.0   RDW 17.8* 17.8*   BANDSPCT  --  3        BMP:  Recent Labs     08/25/21  1215 08/25/21  1215 08/25/21  1344 08/25/21 2057 08/27/21  0537   *  --   --   --  132*   K 5.9*   < > 6.0* 4.0 4.5   CL 91*  --   --   --  91*   CO2 19*  --   --   --  21   BUN 71*  --   --   --  58*   CREATININE 8.6*  --   --   --  7.6*   CALCIUM 10.0  --   --   --  10.1   GLUCOSE 146*  --   --   --  84    < > = values in this interval not displayed. Hepatic Function Panel:   Lab Results   Component Value Date    ALKPHOS 149 08/25/2021    ALT 30 08/25/2021    AST 37 08/25/2021    PROT 7.5 08/25/2021    BILITOT 0.5 08/25/2021    BILIDIR <0.2 08/12/2021    IBILI see below 08/12/2021    LABALBU 3.6 08/25/2021       CPK:   Lab Results   Component Value Date    CKTOTAL 243 (H) 08/25/2021     ESR: No results found for: SEDRATE  CRP: No results found for: CRP        Imaging: All pertinent images and reports for the current visit were reviewed by me during this visit. CT LUMBAR SPINE WO CONTRAST   Final Result   Negative for lumbar spine fracture         CT CERVICAL SPINE WO CONTRAST   Final Result   No acute abnormality of the cervical spine. CT HEAD WO CONTRAST   Final Result   No acute intracranial abnormality. Diffuse atrophic changes with findings suggesting chronic microvascular   ischemia         XR CHEST PORTABLE   Final Result   Improved aeration of the lungs bilaterally. A few scattered opacities remain. Medications:  All current and past medications were reviewed.      meropenem  500 mg IntraVENous Q24H    allopurinol  100 mg Oral Daily    aspirin EC  81 mg Oral Daily    clopidogrel  75 mg Oral Daily    levothyroxine  150 mcg Oral QAM AC    metoprolol tartrate  12.5 mg Oral BID    rosuvastatin  20 mg Oral Daily    spironolactone  25 mg Oral Daily    torsemide  60 mg Oral BID    sodium chloride flush  5-40 mL IntraVENous 2 times per day    heparin (porcine)  5,000 Units Subcutaneous 3 times per day    vancomycin (VANCOCIN) intermittent dosing (placeholder)   Other RX Placeholder        dextrose      sodium chloride Stopped (08/25/21 1241)       diphenhydrAMINE, glucose, dextrose, glucagon (rDNA), dextrose, sodium chloride flush, sodium chloride, heparin (porcine), acetaminophen, melatonin      Problem list:       Patient Active Problem List   Diagnosis Code    Essential hypertension I10    Hyperlipidemia E78.5    Acquired hypothyroidism E03.9    Diabetes mellitus type 2 in obese ([facility]) E11.69, E66.9    Chronic kidney 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 ([facility] E11.22, N18.6, Z99.2    Closed nondisplaced fracture of fifth metatarsal bone of right foot S92.354A    Arthritis of right knee M17.11    Acute pain of both knees M25.561, M25.562    Primary osteoarthritis of both knees M17.0    Bursitis M71.9    Memory loss R41.3    Bilateral leg edema R60.0    Primary osteoarthritis of left knee M17.12    Atypical chest pain R07.89    Tubular adenoma of colon D12.6    Primary insomnia F51.01    Diarrhea of presumed infectious origin R19.7    Diabetic polyneuropathy associated with type 2 diabetes mellitus ([facility]) E11.42    COVID-19 virus infection U07.1    Coronary artery disease of native artery with stable angina pectoris ([facility]) I25.118    Pain of left calf M79.662    Atherosclerosis of native arteries of extremities with intermittent claudication, bilateral legs ([facility] I70.213    ESRD on dialysis ([facility] N18.6, Z99.2    Arteriovenous fistula infection, initial encounter ([facility] T82. 7XXA    Septicemia ([facility] [address] A41.9    Pneumonia due to infectious organism J18.9    Frequent falls R29.6    Hyperkalemia E87.5    Hyponatremia E87.1    Ex-smoker Z87.891    Class 1 obesity due to excess calories with body mass index (BMI) of 33.0 to 33.9 in adult E66.09, Z68.33       Please note that this chart was generated using Dragon dictation software. Although every effort was made to ensure the accuracy of this automated transcription, some errors in transcription may have occurred inadvertently. If you may need any clarification, please do not hesitate to contact me through EPIC or at the phone number provided below with my electronic signature. Any pictures or media included in this note were obtained after taking informed verbal consent from the patient and with their approval to include those in the patient's medical record.     Beto Barrios MD, MPH  8/27/2021 , 10:57 AM   [facility] Infectious Disease   [address] [place] [address], [address] [place] [address]  Office: [phone]  Fax: [phone]  Clinic days:  Tuesday & Thursday

## 2021-08-27 NOTE — PROGRESS NOTES
Comprehensive Nutrition Assessment    Type and Reason for Visit:  Initial, Positive Nutrition Screen (MST 2 for poor appetite & wt loss)    Nutrition Recommendations/Plan:   Nepro @ bkf daily    Nutrition Assessment:  Pt triggered RD consult for wt loss & poor appetite. Receives low k+ diet with no po intake consumed @ this time. Per hx, wt has been stable. Will offer Nepro supplement with bkf daily. May increase if po intake consistently less than 50% of meals. Malnutrition Assessment:  Malnutrition Status:  No malnutrition    Context:  Acute Illness       Estimated Daily Nutrient Needs:  Energy (kcal):  1215- 1458 (15-18 kcal/81 kg); Weight Used for Energy Requirements:  Current     Protein (g):  60- 100g (1.2-2.0g/50kg); Weight Used for Protein Requirements:  Ideal        Fluid (ml/day):   ; Method Used for Fluid Requirements:  1 ml/kcal      Nutrition Related Findings:  k+ 4.5; elevated phos 7.8, low Na 132. -1.3L; on HD.  nonpitting LUE edema      Wounds:  Surgical Incision       Current Nutrition Therapies:    ADULT DIET; Regular; Low Potassium (Less than 3000 mg/day)    Anthropometric Measures:  · Height: 5' 2\" (157.5 cm)  · Current Body Weight: 177 lb (80.3 kg)   · Admission Body Weight:      · Usual Body Weight:       · Ideal Body Weight: 110 lbs; % Ideal Body Weight 160.9 %   · BMI: 32.4  · Adjusted Body Weight:  ; No Adjustment   · Adjusted BMI:      · BMI Categories: Obese Class 1 (BMI 30.0-34. 9)       Nutrition Diagnosis:   · Inadequate oral intake related to inadequate protein-energy intake as evidenced by poor intake prior to admission      Nutrition Interventions:   Food and/or Nutrient Delivery:  Continue Current Diet, Start Oral Nutrition Supplement  Nutrition Education/Counseling:  Education not indicated   Coordination of Nutrition Care:  Continue to monitor while inpatient    Goals:  po intake at least 50% of meals & supplements       Nutrition Monitoring and Evaluation: Behavioral-Environmental Outcomes:  None Identified   Food/Nutrient Intake Outcomes:  Food and Nutrient Intake, Supplement Intake  Physical Signs/Symptoms Outcomes:  Biochemical Data, Weight     Discharge Planning:     Too soon to determine     Electronically signed by Levon Corral RD, LD on 8/27/21 at 3:10 PM EDT    Contact: [phone]

## 2021-08-27 NOTE — PROGRESS NOTES
Pt returned from dialysis, 2 L removed. VSS, morning meds administered. Head to toe complete. Pt sitting in chair, alarm on, fall precautions in place. Will cont to monitor.

## 2021-08-27 NOTE — PROGRESS NOTES
Patient is out of the room for procedure  Chart reviewed  Orders reviewed  Discussed with nephrology

## 2021-08-27 NOTE — PROGRESS NOTES
8/27/21 2:21 PM   [phone] Hospital or Facility: [facility] From: Wagner Villafuerte RE: marisela tenorio RM: [phone] Pt vomited, states she has felt nauseous since returning from dialysis. Can you please order Zofran. Thanks.  Need Callback: NO CALLBACK REQ 5T ONCOLOGY

## 2021-08-28 LAB
ANION GAP SERPL CALCULATED.3IONS-SCNC: 17 MMOL/L (ref 3–16)
ANISOCYTOSIS: ABNORMAL
BANDED NEUTROPHILS RELATIVE PERCENT: 1 % (ref 0–7)
BASOPHILS ABSOLUTE: 0 K/UL (ref 0–0.2)
BASOPHILS RELATIVE PERCENT: 0 %
BLOOD CULTURE, ROUTINE: NORMAL
BUN BLDV-MCNC: 44 MG/DL (ref 7–20)
CALCIUM SERPL-MCNC: 9.9 MG/DL (ref 8.3–10.6)
CHLORIDE BLD-SCNC: 94 MMOL/L (ref 99–110)
CO2: 24 MMOL/L (ref 21–32)
CREAT SERPL-MCNC: 5.8 MG/DL (ref 0.6–1.2)
CULTURE, BLOOD 2: NORMAL
EOSINOPHILS ABSOLUTE: 0 K/UL (ref 0–0.6)
EOSINOPHILS RELATIVE PERCENT: 0 %
GFR AFRICAN AMERICAN: 9
GFR NON-AFRICAN AMERICAN: 7
GLUCOSE BLD-MCNC: 107 MG/DL (ref 70–99)
GLUCOSE BLD-MCNC: 108 MG/DL (ref 70–99)
HCT VFR BLD CALC: 32.3 % (ref 36–48)
HEMOGLOBIN: 10.6 G/DL (ref 12–16)
LYMPHOCYTES ABSOLUTE: 0.6 K/UL (ref 1–5.1)
LYMPHOCYTES RELATIVE PERCENT: 5 %
MAGNESIUM: 2.6 MG/DL (ref 1.8–2.4)
MCH RBC QN AUTO: 31.1 PG (ref 26–34)
MCHC RBC AUTO-ENTMCNC: 32.8 G/DL (ref 31–36)
MCV RBC AUTO: 94.9 FL (ref 80–100)
MONOCYTES ABSOLUTE: 0.9 K/UL (ref 0–1.3)
MONOCYTES RELATIVE PERCENT: 7 %
NEUTROPHILS ABSOLUTE: 11.2 K/UL (ref 1.7–7.7)
NEUTROPHILS RELATIVE PERCENT: 87 %
PDW BLD-RTO: 17.5 % (ref 12.4–15.4)
PERFORMED ON: ABNORMAL
PHOSPHORUS: 6.7 MG/DL (ref 2.5–4.9)
PLATELET # BLD: 280 K/UL (ref 135–450)
PLATELET SLIDE REVIEW: ADEQUATE
PMV BLD AUTO: 8.6 FL (ref 5–10.5)
POTASSIUM SERPL-SCNC: 4.5 MMOL/L (ref 3.5–5.1)
RBC # BLD: 3.41 M/UL (ref 4–5.2)
SLIDE REVIEW: ABNORMAL
SODIUM BLD-SCNC: 135 MMOL/L (ref 136–145)
VANCOMYCIN RANDOM: 20.4 UG/ML
WBC # BLD: 12.7 K/UL (ref 4–11)

## 2021-08-28 PROCEDURE — 36415 COLL VENOUS BLD VENIPUNCTURE: CPT

## 2021-08-28 PROCEDURE — 6360000002 HC RX W HCPCS: Performed by: INTERNAL MEDICINE

## 2021-08-28 PROCEDURE — 6370000000 HC RX 637 (ALT 250 FOR IP): Performed by: INTERNAL MEDICINE

## 2021-08-28 PROCEDURE — 84100 ASSAY OF PHOSPHORUS: CPT

## 2021-08-28 PROCEDURE — 80202 ASSAY OF VANCOMYCIN: CPT

## 2021-08-28 PROCEDURE — 2580000003 HC RX 258: Performed by: INTERNAL MEDICINE

## 2021-08-28 PROCEDURE — 1200000000 HC SEMI PRIVATE

## 2021-08-28 PROCEDURE — 83735 ASSAY OF MAGNESIUM: CPT

## 2021-08-28 PROCEDURE — 80048 BASIC METABOLIC PNL TOTAL CA: CPT

## 2021-08-28 PROCEDURE — 99232 SBSQ HOSP IP/OBS MODERATE 35: CPT | Performed by: HOSPITALIST

## 2021-08-28 PROCEDURE — 85025 COMPLETE CBC W/AUTO DIFF WBC: CPT

## 2021-08-28 RX ADMIN — HEPARIN SODIUM 5000 UNITS: 5000 INJECTION INTRAVENOUS; SUBCUTANEOUS at 20:52

## 2021-08-28 RX ADMIN — MEROPENEM 500 MG: 500 INJECTION, POWDER, FOR SOLUTION INTRAVENOUS at 21:00

## 2021-08-28 RX ADMIN — LEVOTHYROXINE SODIUM 150 MCG: 0.15 TABLET ORAL at 06:54

## 2021-08-28 RX ADMIN — CLOPIDOGREL BISULFATE 75 MG: 75 TABLET ORAL at 08:11

## 2021-08-28 RX ADMIN — ALLOPURINOL 100 MG: 100 TABLET ORAL at 20:52

## 2021-08-28 RX ADMIN — HEPARIN SODIUM 5000 UNITS: 5000 INJECTION INTRAVENOUS; SUBCUTANEOUS at 15:22

## 2021-08-28 RX ADMIN — ASPIRIN 81 MG: 81 TABLET, COATED ORAL at 08:11

## 2021-08-28 RX ADMIN — Medication 10 ML: at 20:56

## 2021-08-28 RX ADMIN — METOPROLOL TARTRATE 12.5 MG: 25 TABLET, FILM COATED ORAL at 08:10

## 2021-08-28 RX ADMIN — HEPARIN SODIUM 5000 UNITS: 5000 INJECTION INTRAVENOUS; SUBCUTANEOUS at 06:54

## 2021-08-28 RX ADMIN — SPIRONOLACTONE 25 MG: 25 TABLET ORAL at 08:11

## 2021-08-28 RX ADMIN — METOPROLOL TARTRATE 12.5 MG: 25 TABLET, FILM COATED ORAL at 20:50

## 2021-08-28 RX ADMIN — TORSEMIDE 60 MG: 20 TABLET ORAL at 20:52

## 2021-08-28 RX ADMIN — Medication 10 ML: at 08:12

## 2021-08-28 RX ADMIN — ROSUVASTATIN 20 MG: 20 TABLET, FILM COATED ORAL at 08:11

## 2021-08-28 RX ADMIN — TORSEMIDE 60 MG: 20 TABLET ORAL at 08:11

## 2021-08-28 ASSESSMENT — PAIN SCALES - GENERAL
PAINLEVEL_OUTOF10: 0
PAINLEVEL_OUTOF10: 0

## 2021-08-28 NOTE — PROGRESS NOTES
data in the 24 hours ending 08/28/21 2109    Physical Exam Performed:    /78   Pulse 106   Temp 98.4 °F (36.9 °C) (Oral)   Resp 18   Ht 5' 2\" (1.575 m)   Wt 171 lb 1.2 oz (77.6 kg)   LMP  (LMP Unknown)   SpO2 94%   BMI 31.29 kg/m²     General appearance: No apparent distress, appears stated age and cooperative. HEENT: Pupils equal, round, and reactive to light. Conjunctivae/corneas clear. Neck: Supple, with full range of motion. No jugular venous distention. Trachea midline. Respiratory:  Normal respiratory effort. Clear to auscultation, bilaterally without Rales/Wheezes/Rhonchi. Cardiovascular: Regular rate and rhythm with normal S1/S2 without murmurs, rubs or gallops. Abdomen: Soft, non-tender, non-distended with normal bowel sounds. Musculoskeletal: No clubbing, cyanosis or edema bilaterally. Full range of motion without deformity. Skin: Skin color, texture, turgor normal.  No rashes or lesions. Neurologic:  Neurovascularly intact without any focal sensory/motor deficits. Cranial nerves: II-XII intact, grossly non-focal.  Psychiatric: Alert and oriented, thought content appropriate, normal insight  Capillary Refill: Brisk,< 3 seconds   Peripheral Pulses: +2 palpable, equal bilaterally       Labs:   Recent Labs     08/27/21  0537 08/28/21  0804   WBC 13.4* 12.7*   HGB 10.1* 10.6*   HCT 31.5* 32.3*    280     Recent Labs     08/25/21 2057 08/27/21  0537 08/28/21  0804   NA  --  132* 135*   K 4.0 4.5 4.5   CL  --  91* 94*   CO2  --  21 24   BUN  --  58* 44*   CREATININE  --  7.6* 5.8*   CALCIUM  --  10.1 9.9   PHOS  --  7.8* 6.7*     No results for input(s): AST, ALT, BILIDIR, BILITOT, ALKPHOS in the last 72 hours. No results for input(s): INR in the last 72 hours.   Recent Labs     08/25/21 2057   TROPONINI 0.02*       Urinalysis:      Lab Results   Component Value Date    NITRU Negative 08/26/2021    WBCUA 1 08/26/2021    BACTERIA RARE 08/16/2021    RBCUA 1 08/26/2021    BLOODU TRACE 08/26/2021    SPECGRAV 1.010 08/26/2021    GLUCOSEU Negative 08/26/2021       Radiology:  CT LUMBAR SPINE WO CONTRAST   Final Result   Negative for lumbar spine fracture         CT CERVICAL SPINE WO CONTRAST   Final Result   No acute abnormality of the cervical spine. CT HEAD WO CONTRAST   Final Result   No acute intracranial abnormality. Diffuse atrophic changes with findings suggesting chronic microvascular   ischemia         XR CHEST PORTABLE   Final Result   Improved aeration of the lungs bilaterally. A few scattered opacities remain. Assessment/Plan:    Active Hospital Problems    Diagnosis     Pneumonia due to infectious organism [J18.9]     Frequent falls [R29.6]     Hyperkalemia [E87.5]     Hyponatremia [E87.1]     Ex-smoker [Z87.891]     Class 1 obesity due to excess calories with body mass index (BMI) of 33.0 to 33.9 in adult [E66.09, Z68.33]     Septicemia ([facility] [address] [A41.9]     ESRD on dialysis ([facility] [address] [N18.6, Z99.2]     Weight loss counseling, encounter for [Z71.3]     Hyperlipidemia [E78.5]      Sepsis  Unknown origin  Possibility of infected graft  Patient also has Vas-Cath  Follow-up blood cultures  Follow blood cultures obtained from Vas-Cath  Patient allergic to penicillin-anaphylaxis (many decades ago when she was pregnant with one of her children)  Vancomycin meropenem  Consult ID     Hyperkalemia  No EKG changes  Dialysis patient  Per ER signout dialysis planned for later today     ESRD  Dialysis  Monday Wednesday Friday  Last dialysis on Monday     Hyponatremia  Hypervolemic  In need of dialysis     Nonzero cardiac biomarker  Likely related to ESRD    DVT Prophylaxis: Heparin  Diet: ADULT DIET; Regular; Low Potassium (Less than 3000 mg/day)  Adult Oral Nutrition Supplement;  Renal Oral Supplement  Code Status: DNR-CCA    Electronically signed by Eveline Pruitt MD on 8/28/2021 at 3:39 PM

## 2021-08-28 NOTE — PROGRESS NOTES
PHYSICIAN:  Anthony Sher MD      Interval History :    Not in acute distress  No SOB  No chest pain       Wt Readings from Last 3 Encounters:   08/28/21 171 lb 1.2 oz (77.6 kg)   08/23/21 178 lb (80.7 kg)   08/19/21 176 lb (79.8 kg)     Temp Readings from Last 3 Encounters:   08/28/21 98 °F (36.7 °C) (Axillary)   08/23/21 99 °F (37.2 °C) (Oral)   08/19/21 97.6 °F (36.4 °C) (Temporal)     BP Readings from Last 3 Encounters:   08/28/21 (!) 161/75   08/24/21 133/89   08/19/21 (!) 155/81     Pulse Readings from Last 3 Encounters:   08/28/21 104   08/24/21 95   08/19/21 86      No intake/output data recorded. CBC:   Recent Labs     08/25/21  1344 08/27/21  0537 08/28/21  0804   WBC 18.0* 13.4* 12.7*   HGB 10.2* 10.1* 10.6*    246 280     BMP:    Recent Labs     08/25/21  2057 08/27/21  0537 08/28/21  0804   NA  --  132* 135*   K 4.0 4.5 4.5   CL  --  91* 94*   CO2  --  21 24   BUN  --  58* 44*   CREATININE  --  7.6* 5.8*   GLUCOSE  --  84 107*     BMD:   Lab Results   Component Value Date    .3 (H) 02/10/2020    CALCIUM 9.9 08/28/2021    PHOS 6.7 (H) 08/28/2021       Iron:     Lab Results   Component Value Date    IRON 64 03/29/2018    TIBC 351 03/29/2018            . Assessment and Plan    1. ESRD   Gets HD mon/wed/friday       K 4.5     No urgent indication for HD today    Stable renal standpoint       2. HTN. Controlled      3. AMS likely 2/2 infection vs from pain medications   Empirical abx     AMS improved        4. Acid- base disorder. Metabolic acidosis  Correct with HD      5.  Hyperkalemia   Corrected with HD     Need PT/OT evaluation

## 2021-08-28 NOTE — PROGRESS NOTES
[phone] Hospital or Facility: [facility] From: Luda Nieto RE: marisela tenorio RM: [phone] critical vanc trough 20.4 Need Callback: NO CALLBACK REQ 5T ONCOLOGY Seen by MELITON Pierce, [id number], prn meds per MAR, continue with PCA and betancur today, PT/OT eval, LSO prn, needs attended.

## 2021-08-28 NOTE — CONSULTS
Clinical Pharmacy Note: Pharmacy to Dose Vancomcyin    Vancomycin Day: 5  Current Dosing Regimen: intermittent  Dosing Method: Dosing by random levels    Random: 20.2    Recent Labs     08/27/21  0537 08/28/21  0804   BUN 58* 44*       Recent Labs     08/27/21  0537 08/28/21  0804   CREATININE 7.6* 5.8*       Recent Labs     08/27/21  0537 08/28/21  0804   WBC 13.4* 12.7*         Intake/Output Summary (Last 24 hours) at 8/28/2021 0956  Last data filed at 8/27/2021 1151  Gross per 24 hour   Intake 500 ml   Output 2500 ml   Net -2000 ml         Ht Readings from Last 1 Encounters:   08/27/21 5' 2\" (1.575 m)        Wt Readings from Last 1 Encounters:   08/28/21 171 lb 1.2 oz (77.6 kg)         Body mass index is 31.29 kg/m². Estimated Creatinine Clearance: 8 mL/min (A) (based on SCr of 5.8 mg/dL [facility])). Assessment/Plan:   Hold dosing for today.  A vancomycin random level has been ordered on 8/30 at 0600 for follow-up.  Changes in regimen will be determined based on culture results, renal function, and clinical response. [address] [place] [address] will continue to monitor and adjust regimen as necessary.     Thank you for the consult,    Regina DavisD, BCPS

## 2021-08-28 NOTE — PLAN OF CARE
Problem: Pain:  Goal: Pain level will decrease  Description: Pain level will decrease  Outcome: Ongoing  Goal: Control of acute pain  Description: Control of acute pain  Outcome: Ongoing  Goal: Control of chronic pain  Description: Control of chronic pain  Outcome: Ongoing     Problem: Falls - Risk of:  Goal: Will remain free from falls  Description: Will remain free from falls  Outcome: Ongoing  Note: Fall precautions in place: bed locked and in lowest position, bed alarm on, 2/4 side rails raised, non-slip socks on, call light and overhead table within reach, patient knows when to appropriately call out for help.      Goal: Absence of physical injury  Description: Absence of physical injury  Outcome: Ongoing     Problem: Nutrition  Goal: Optimal nutrition therapy  Outcome: Ongoing

## 2021-08-28 NOTE — FLOWSHEET NOTE
Patient's head to toe assessment complete at this time. Routine VSS. Patient resting comfortably in chair with respirations even and unlabored. Pt awake, alert, and oriented. Pt denies nausea at this time. Pt on room air. All nightly medications given per MAR. No other needs expressed, call light within reach. Will continue to monitor. 2157 PRN melatonin given per patient request.    2238 PRN benadryl given per patient request for itching.      Fall precautions in place: bed locked and in lowest position, bed alarm on, 2/4 side rails raised, non-slip socks on, overhead table within reach, patient knows when to appropriately call out for help.        08/27/21 1957   Vital Signs   Temp 99 °F (37.2 °C)   Temp Source Oral   Pulse 110   Heart Rate Source Monitor   Resp 18   /88   BP Location Right upper arm   MAP (mmHg) 101   Patient Position Semi fowlers   Level of Consciousness Alert (0)   MEWS Score 2   Patient Currently in Pain Denies   Pain Assessment   Pain Assessment 0-10   Pain Level 0   Oxygen Therapy   SpO2 96 %   Pulse Oximeter Device Mode Intermittent   Pulse Oximeter Device Location Finger   O2 Device None (Room air)   O2 Flow Rate (L/min) 0 L/min

## 2021-08-29 LAB
ANION GAP SERPL CALCULATED.3IONS-SCNC: 18 MMOL/L (ref 3–16)
BASOPHILS ABSOLUTE: 0.1 K/UL (ref 0–0.2)
BASOPHILS RELATIVE PERCENT: 0.8 %
BLOOD CULTURE, ROUTINE: NORMAL
BUN BLDV-MCNC: 67 MG/DL (ref 7–20)
CALCIUM SERPL-MCNC: 10 MG/DL (ref 8.3–10.6)
CHLORIDE BLD-SCNC: 93 MMOL/L (ref 99–110)
CO2: 24 MMOL/L (ref 21–32)
CREAT SERPL-MCNC: 7.9 MG/DL (ref 0.6–1.2)
CULTURE, BLOOD 2: NORMAL
EOSINOPHILS ABSOLUTE: 0.2 K/UL (ref 0–0.6)
EOSINOPHILS RELATIVE PERCENT: 1.2 %
GFR AFRICAN AMERICAN: 6
GFR NON-AFRICAN AMERICAN: 5
GLUCOSE BLD-MCNC: 126 MG/DL (ref 70–99)
GLUCOSE BLD-MCNC: 94 MG/DL (ref 70–99)
HCT VFR BLD CALC: 29.8 % (ref 36–48)
HEMOGLOBIN: 9.8 G/DL (ref 12–16)
LYMPHOCYTES ABSOLUTE: 0.7 K/UL (ref 1–5.1)
LYMPHOCYTES RELATIVE PERCENT: 4.4 %
MAGNESIUM: 2.6 MG/DL (ref 1.8–2.4)
MCH RBC QN AUTO: 31 PG (ref 26–34)
MCHC RBC AUTO-ENTMCNC: 33.1 G/DL (ref 31–36)
MCV RBC AUTO: 93.7 FL (ref 80–100)
MONOCYTES ABSOLUTE: 0.8 K/UL (ref 0–1.3)
MONOCYTES RELATIVE PERCENT: 5.1 %
NEUTROPHILS ABSOLUTE: 14.1 K/UL (ref 1.7–7.7)
NEUTROPHILS RELATIVE PERCENT: 88.5 %
PDW BLD-RTO: 17.4 % (ref 12.4–15.4)
PERFORMED ON: NORMAL
PHOSPHORUS: 6.8 MG/DL (ref 2.5–4.9)
PLATELET # BLD: 312 K/UL (ref 135–450)
PMV BLD AUTO: 8.4 FL (ref 5–10.5)
POTASSIUM SERPL-SCNC: 4.8 MMOL/L (ref 3.5–5.1)
RBC # BLD: 3.18 M/UL (ref 4–5.2)
SODIUM BLD-SCNC: 135 MMOL/L (ref 136–145)
WBC # BLD: 15.9 K/UL (ref 4–11)

## 2021-08-29 PROCEDURE — 97535 SELF CARE MNGMENT TRAINING: CPT

## 2021-08-29 PROCEDURE — 84100 ASSAY OF PHOSPHORUS: CPT

## 2021-08-29 PROCEDURE — 99232 SBSQ HOSP IP/OBS MODERATE 35: CPT | Performed by: HOSPITALIST

## 2021-08-29 PROCEDURE — 97161 PT EVAL LOW COMPLEX 20 MIN: CPT

## 2021-08-29 PROCEDURE — 85025 COMPLETE CBC W/AUTO DIFF WBC: CPT

## 2021-08-29 PROCEDURE — 83735 ASSAY OF MAGNESIUM: CPT

## 2021-08-29 PROCEDURE — 36415 COLL VENOUS BLD VENIPUNCTURE: CPT

## 2021-08-29 PROCEDURE — 6360000002 HC RX W HCPCS: Performed by: INTERNAL MEDICINE

## 2021-08-29 PROCEDURE — 6370000000 HC RX 637 (ALT 250 FOR IP): Performed by: INTERNAL MEDICINE

## 2021-08-29 PROCEDURE — 1200000000 HC SEMI PRIVATE

## 2021-08-29 PROCEDURE — 2580000003 HC RX 258: Performed by: INTERNAL MEDICINE

## 2021-08-29 PROCEDURE — 97165 OT EVAL LOW COMPLEX 30 MIN: CPT

## 2021-08-29 PROCEDURE — 80048 BASIC METABOLIC PNL TOTAL CA: CPT

## 2021-08-29 PROCEDURE — 97116 GAIT TRAINING THERAPY: CPT

## 2021-08-29 RX ADMIN — ASPIRIN 81 MG: 81 TABLET, COATED ORAL at 09:09

## 2021-08-29 RX ADMIN — MEROPENEM 500 MG: 500 INJECTION, POWDER, FOR SOLUTION INTRAVENOUS at 23:43

## 2021-08-29 RX ADMIN — METOPROLOL TARTRATE 12.5 MG: 25 TABLET, FILM COATED ORAL at 21:42

## 2021-08-29 RX ADMIN — ROSUVASTATIN 20 MG: 20 TABLET, FILM COATED ORAL at 09:08

## 2021-08-29 RX ADMIN — HEPARIN SODIUM 5000 UNITS: 5000 INJECTION INTRAVENOUS; SUBCUTANEOUS at 14:27

## 2021-08-29 RX ADMIN — CLOPIDOGREL BISULFATE 75 MG: 75 TABLET ORAL at 09:08

## 2021-08-29 RX ADMIN — TORSEMIDE 60 MG: 20 TABLET ORAL at 21:41

## 2021-08-29 RX ADMIN — SPIRONOLACTONE 25 MG: 25 TABLET ORAL at 09:09

## 2021-08-29 RX ADMIN — ALLOPURINOL 100 MG: 100 TABLET ORAL at 21:42

## 2021-08-29 RX ADMIN — HEPARIN SODIUM 5000 UNITS: 5000 INJECTION INTRAVENOUS; SUBCUTANEOUS at 05:10

## 2021-08-29 RX ADMIN — Medication 10 ML: at 09:11

## 2021-08-29 RX ADMIN — TORSEMIDE 60 MG: 20 TABLET ORAL at 09:08

## 2021-08-29 RX ADMIN — HEPARIN SODIUM 5000 UNITS: 5000 INJECTION INTRAVENOUS; SUBCUTANEOUS at 21:42

## 2021-08-29 RX ADMIN — LEVOTHYROXINE SODIUM 150 MCG: 0.15 TABLET ORAL at 05:10

## 2021-08-29 RX ADMIN — METOPROLOL TARTRATE 12.5 MG: 25 TABLET, FILM COATED ORAL at 09:09

## 2021-08-29 RX ADMIN — Medication 10 ML: at 21:42

## 2021-08-29 ASSESSMENT — PAIN SCALES - GENERAL
PAINLEVEL_OUTOF10: 0

## 2021-08-29 NOTE — PROGRESS NOTES
VSS. Alert and oriented. Respirations easy and unlabored. Head to toe assessment completed. Scheduled medications given per MAR. Patient being in pain. Bed locked and in low position. Call light within reach. Patient resting in bed.

## 2021-08-29 NOTE — PROGRESS NOTES
Occupational Therapy   Occupational Therapy Initial Assessment  Date: 2021   Patient Name: Anu Bellamy  MRN: [id number]     : 1945    Date of Service: 2021    Discharge Recommendations: Anu Bellamy scored a 24/24 on the -PAC ADL Inpatient form. At this time, no further OT is recommended upon discharge due to being at baseline level. Recommend patient returns to prior setting with prior services. OT Equipment Recommendations  Equipment Needed: No    Assessment   Performance deficits / Impairments: Decreased functional mobility ; Decreased ADL status  Assessment: 67 y.o. female referred to OT services for the above deficits. At this time pt is independent w/ functional mobility, transfers and ADL completion. No further OT needs identified at this time  Treatment Diagnosis: Performance deficits associated w/ hyperkalemia  Prognosis: Good  Decision Making: Low Complexity  OT Education: OT Role;Plan of Care  Patient Education: Pt verbalized understanding of education provided  REQUIRES OT FOLLOW UP: No  Activity Tolerance  Activity Tolerance: Patient Tolerated treatment well  Safety Devices  Safety Devices in place: Yes  Type of devices: Left in chair;Nurse notified;Call light within reach; Chair alarm in place           Patient Diagnosis(es): The primary encounter diagnosis was Septicemia ([facility] [address] Diagnoses of Pneumonia due to infectious organism, unspecified laterality, unspecified part of lung, Frequent falls, Hyperkalemia, and ESRD on dialysis [facility]) were also pertinent to this visit. has a past medical history of Arthritis, Diabetes ([facility] [address] Hemodialysis patient ([facility] [address] Hyperlipidemia, Hypertension, Kidney disease, Neuropathy, Pneumonia, and Thyroid disease. has a past surgical history that includes Appendectomy; Mastectomy, bilateral; Rotator cuff repair (Left); Cholecystectomy; LAPAROSCOPY INSERTION PERITONEAL CATHETER (N/A, 4/3/2020);  Upper gastrointestinal endoscopy (N/A, 11/24/2020); Colonoscopy (11/24/2020); Hysterectomy; Dialysis Catheter Removal (N/A, 5/17/2021); and shunt revision (Left, 8/19/2021). Treatment Diagnosis: Performance deficits associated w/ hyperkalemia      Restrictions  Restrictions/Precautions  Restrictions/Precautions: Fall Risk  Required Braces or Orthoses?: No  Position Activity Restriction  Other position/activity restrictions: 67 y.o. female with past medical history ESRD, diabetes, hypertension, hyperlipidemia, thyroid disease recently admitted for suspected infection of the dialysis fistula, new fistula/graft formed earlier this month. She comes in from home in view of several falls. ED note states that patient altered, however, patient is alert and oriented to the time of my interaction. Patient states that she was trying to  the phone and slid out of bed. Was not able to get up after that due to weakness. She denies any confusion loss of consciousness headache blurry vision palpitations chest pain shortness of breath. In the ED patient found to have significant leukocytosis. Broad-spectrum antibiotics initiated.   Patient also with hyperkalemia, nephrology contacted and dialysis    Subjective   General  Chart Reviewed: Yes  Patient assessed for rehabilitation services?: Yes  Family / Caregiver Present: No  Diagnosis: Hyperkalemia  Subjective  Subjective: Pt supine in bed upon entry, agreeable to OT/PT eval  Patient Currently in Pain: No  Vital Signs  Patient Currently in Pain: No  Social/Functional History  Social/Functional History  Lives With: Alone (pt states daugther and son live nearby and assist PRN)  Type of Home: Apartment  Home Layout: One level  Home Access: Stairs to enter with rails  Entrance Stairs - Number of Steps: 2+8 BARRIE  Entrance Stairs - Rails: Both  Bathroom Shower/Tub: Tub/Shower unit  Bathroom Toilet: Standard  Bathroom Equipment: Grab bars in shower  ADL Assistance: Independent (pt states she has been spongebathing at sink or taking a bath since her fistula placement; pt states she has done bathing in tub only when daughter has been present)  Homemaking Assistance: Independent  Ambulation Assistance: Independent  Transfer Assistance: Independent  Active : No  Patient's  Info: family provides assist with transport  Occupation: Retired  Leisure & Hobbies: sewing, cross stitch, TV, cooking  Additional Comments: Pt reports one recent fall just prior to admission. Objective           Observation/Palpation  Posture: Good  Balance  Sitting Balance: Independent  Standing Balance: Independent  Standing Balance  Time: ~15 min  Activity: Functional mobility, transfers, ADL completion  Functional Mobility  Functional - Mobility Device: No device  Activity: Other; To/from bathroom  Assist Level: Independent  Toilet Transfers  Toilet - Technique: Ambulating  Equipment Used: Standard toilet  Toilet Transfer: Independent  ADL  Grooming: Independent  LE Dressing: Independent (To don socks)  Toileting: Independent  Additional Comments: Pt donned socks while seated EOB, ambulated to bathroom and transferred to commode. Pt completed grooming tasks in stance at sink  Tone RUE  RUE Tone: Normotonic  Tone LUE  LUE Tone: Normotonic  Coordination  Movements Are Fluid And Coordinated: Yes        Transfers  Sit to stand: Independent  Stand to sit:  Independent     Cognition  Overall Cognitive Status: WFL  Perception  Overall Perceptual Status: WFL     Sensation  Overall Sensation Status: WFL        LUE AROM (degrees)  LUE AROM : WFL  RUE AROM (degrees)  RUE AROM : WFL  LUE Strength  Gross LUE Strength: WFL  RUE Strength  Gross RUE Strength: WFL      AM-PAC Score        AM-PAC Inpatient Daily Activity Raw Score: 24 (08/29/21 0904)  AM-PAC Inpatient ADL T-Scale Score : 57.54 (08/29/21 0904)  ADL Inpatient CMS 0-100% Score: 0 (08/29/21 0904)  ADL Inpatient CMS G-Code Modifier : 509 [address] (08/29/21 3568)      Therapy Time   Individual Concurrent Group Co-treatment   Time In       8984   Time Out       0812   Minutes       23          Timed Code Treatment Minutes:   8    Total Treatment Minutes:  Dipesh Lee [address], [address] [place], [place] #[id number]

## 2021-08-29 NOTE — PROGRESS NOTES
PHYSICIAN:  Dionne Bower MD      Interval History :    Blood cx NGTD  On empiric antibiotics, Vancomycin and Meropenem , as per ID  Not in acute distress  No SOB  No chest pain       Wt Readings from Last 3 Encounters:   08/29/21 171 lb 4.8 oz (77.7 kg)   08/23/21 178 lb (80.7 kg)   08/19/21 176 lb (79.8 kg)     Temp Readings from Last 3 Encounters:   08/29/21 98.3 °F (36.8 °C) (Oral)   08/23/21 99 °F (37.2 °C) (Oral)   08/19/21 97.6 °F (36.4 °C) (Temporal)     BP Readings from Last 3 Encounters:   08/29/21 (!) 144/67   08/24/21 133/89   08/19/21 (!) 155/81     Pulse Readings from Last 3 Encounters:   08/29/21 104   08/24/21 95   08/19/21 86      No intake/output data recorded. CBC:   Recent Labs     08/27/21  0537 08/28/21  0804 08/29/21  1212   WBC 13.4* 12.7* 15.9*   HGB 10.1* 10.6* 9.8*    280 312     BMP:    Recent Labs     08/27/21  0537 08/28/21  0804 08/29/21  1212   * 135* 135*   K 4.5 4.5 4.8   CL 91* 94* 93*   CO2 21 24 24   BUN 58* 44* 67*   CREATININE 7.6* 5.8* 7.9*   GLUCOSE 84 107* 126*     BMD:   Lab Results   Component Value Date    .3 (H) 02/10/2020    CALCIUM 10.0 08/29/2021    PHOS 6.8 (H) 08/29/2021       Iron:     Lab Results   Component Value Date    IRON 64 03/29/2018    TIBC 351 03/29/2018            . Assessment and Plan    1. ESRD   Gets HD mon/wed/friday     Will cont MWF HD while she remains inpatient      No urgent indication for HD today        Sepsis :    ID on board  Blood cx NGTD  On empiric antibiotics, Vancomycin and Meropenem , as per ID     2. HTN. Controlled      3. AMS likely 2/2 infection vs from pain medications   Empirical abx     AMS improved        4. Acid- base disorder. Metabolic acidosis  Correct with HD      5.  Hyperkalemia   Corrected with HD     Need PT/OT evaluation

## 2021-08-29 NOTE — PROGRESS NOTES
Physical Therapy    Facility/Department: [address] ONCOLOGY  Initial Assessment/Discharge Summary    NAME: Joie Rhodes  : 1945  MRN: [id number]    Date of Service: 2021    Discharge Recommendations: Joie Rhodes scored a 24/24 on the AM-PAC short mobility form. At this time, no further PT is recommended upon discharge due to pt being at baseline independent functional mobility. Recommend patient returns to prior setting with prior services. PT Equipment Recommendations  Equipment Needed: No    Assessment   Assessment: Pt is presenting at baseline independent functional mobility and does not require skilled PT services at this time. As such, pt is being discharged from acute PT caseload. Prognosis: Good  Decision Making: Low Complexity  Clinical Presentation: stable  PT Education: PT Role;General Safety  Patient Education: d/c recommendations--pt verbalizing understanding  Barriers to Learning: none  REQUIRES PT FOLLOW UP: No  Activity Tolerance  Activity Tolerance: Patient Tolerated treatment well       Patient Diagnosis(es): The primary encounter diagnosis was Septicemia ([facility] [address] Diagnoses of Pneumonia due to infectious organism, unspecified laterality, unspecified part of lung, Frequent falls, Hyperkalemia, and ESRD on dialysis [facility]) were also pertinent to this visit. has a past medical history of Arthritis, Diabetes ([facility] [address] Hemodialysis patient ([facility] [address] Hyperlipidemia, Hypertension, Kidney disease, Neuropathy, Pneumonia, and Thyroid disease. has a past surgical history that includes Appendectomy; Mastectomy, bilateral; Rotator cuff repair (Left); Cholecystectomy; LAPAROSCOPY INSERTION PERITONEAL CATHETER (N/A, 4/3/2020); Upper gastrointestinal endoscopy (N/A, 2020); Colonoscopy (2020); Hysterectomy; Dialysis Catheter Removal (N/A, 2021); and shunt revision (Left, 2021).     Restrictions  Restrictions/Precautions  Restrictions/Precautions: Fall Risk  Required Braces or Orthoses?: No  Position Activity Restriction  Other position/activity restrictions: 67 y.o. female with past medical history ESRD, diabetes, hypertension, hyperlipidemia, thyroid disease recently admitted for suspected infection of the dialysis fistula, new fistula/graft formed earlier this month. She comes in from home in view of several falls. ED note states that patient altered, however, patient is alert and oriented to the time of my interaction. Patient states that she was trying to  the phone and slid out of bed. Was not able to get up after that due to weakness. She denies any confusion loss of consciousness headache blurry vision palpitations chest pain shortness of breath. In the ED patient found to have significant leukocytosis. Broad-spectrum antibiotics initiated. Patient also with hyperkalemia, nephrology contacted and dialysis     Vision/Hearing  Vision: Impaired  Vision Exceptions: Wears glasses at all times  Hearing: Within functional limits       Subjective  General  Chart Reviewed: Yes  Patient assessed for rehabilitation services?: Yes  Response To Previous Treatment: Not applicable  Family / Caregiver Present: No  Diagnosis: hyperkalemia  Follows Commands: Within Functional Limits  General Comment  Comments: Pt supine in bed upon arrival.  Subjective  Subjective: Pt agreeable to PT/OT eval, reporting no pain at this time. Requesting to use bathroom.   Pain Screening  Patient Currently in Pain: No  Vital Signs  Patient Currently in Pain: Denies       Orientation  Orientation  Overall Orientation Status: Within Functional Limits     Social/Functional History  Social/Functional History  Lives With: Alone (pt states daugther and son live nearby and assist PRN)  Type of Home: Apartment  Home Layout: One level  Home Access: Stairs to enter with rails  Entrance Stairs - Number of Steps: 2+8 BARRIE  Entrance Stairs - Rails: Both  Bathroom Shower/Tub: Tub/Shower unit  Bathroom Toilet: Devices  Type of devices:  All fall risk precautions in place, Call light within reach, Gait belt, Nurse notified, Left in chair, Chair alarm in place  Restraints  Initially in place: No    G-Code       OutComes Score       AM-PAC Score  AM-PAC Inpatient Mobility Raw Score : 24 (08/29/21 0905)  AM-PAC Inpatient T-Scale Score : 61.14 (08/29/21 0905)  Mobility Inpatient CMS 0-100% Score: 0 (08/29/21 0905)  Mobility Inpatient CMS G-Code Modifier : [facility] (08/29/21 0028)          Goals  Short term goals  Time Frame for Short term goals: none, discharge from PT caseload       Therapy Time   Individual Concurrent Group Co-treatment   Time In 0749         Time Out 0812         Minutes 23              Timed Code Treatment Minutes:   8    Total Treatment Minutes:  [address], [address] [place] [id number]

## 2021-08-29 NOTE — PROGRESS NOTES
Performed:    BP (!) 144/67   Pulse 104   Temp 98.3 °F (36.8 °C) (Oral)   Resp 18   Ht 5' 2\" (1.575 m)   Wt 171 lb 4.8 oz (77.7 kg)   LMP  (LMP Unknown)   SpO2 95%   BMI 31.33 kg/m²     General appearance: No apparent distress, appears stated age and cooperative. HEENT: Pupils equal, round, and reactive to light. Conjunctivae/corneas clear. Neck: Supple, with full range of motion. No jugular venous distention. Trachea midline. Respiratory:  Normal respiratory effort. Clear to auscultation, bilaterally without Rales/Wheezes/Rhonchi. Cardiovascular: Regular rate and rhythm with normal S1/S2 without murmurs, rubs or gallops. Abdomen: Soft, non-tender, non-distended with normal bowel sounds. Musculoskeletal: No clubbing, cyanosis or edema bilaterally. Full range of motion without deformity. Skin: Skin color, texture, turgor normal.  No rashes or lesions. Neurologic:  Neurovascularly intact without any focal sensory/motor deficits. Cranial nerves: II-XII intact, grossly non-focal.  Psychiatric: Alert and oriented, thought content appropriate, normal insight  Capillary Refill: Brisk,< 3 seconds   Peripheral Pulses: +2 palpable, equal bilaterally       Labs:   Recent Labs     08/27/21  0537 08/28/21  0804 08/29/21  1212   WBC 13.4* 12.7* 15.9*   HGB 10.1* 10.6* 9.8*   HCT 31.5* 32.3* 29.8*    280 312     Recent Labs     08/27/21  0537 08/28/21  0804   * 135*   K 4.5 4.5   CL 91* 94*   CO2 21 24   BUN 58* 44*   CREATININE 7.6* 5.8*   CALCIUM 10.1 9.9   PHOS 7.8* 6.7*     No results for input(s): AST, ALT, BILIDIR, BILITOT, ALKPHOS in the last 72 hours. No results for input(s): INR in the last 72 hours. No results for input(s): Reyne Lao in the last 72 hours.     Urinalysis:      Lab Results   Component Value Date    NITRU Negative 08/26/2021    WBCUA 1 08/26/2021    BACTERIA RARE 08/16/2021    RBCUA 1 08/26/2021    BLOODU TRACE 08/26/2021    SPECGRAV 1.010 08/26/2021    GLUCOSEU Negative 08/26/2021       Radiology:  CT LUMBAR SPINE WO CONTRAST   Final Result   Negative for lumbar spine fracture         CT CERVICAL SPINE WO CONTRAST   Final Result   No acute abnormality of the cervical spine. CT HEAD WO CONTRAST   Final Result   No acute intracranial abnormality. Diffuse atrophic changes with findings suggesting chronic microvascular   ischemia         XR CHEST PORTABLE   Final Result   Improved aeration of the lungs bilaterally. A few scattered opacities remain. Assessment/Plan:    Active Hospital Problems    Diagnosis     Pneumonia due to infectious organism [J18.9]     Frequent falls [R29.6]     Hyperkalemia [E87.5]     Hyponatremia [E87.1]     Ex-smoker [Z87.891]     Class 1 obesity due to excess calories with body mass index (BMI) of 33.0 to 33.9 in adult [E66.09, Z68.33]     Septicemia ([facility] [address] [A41.9]     ESRD on dialysis ([facility] [address] [N18.6, Z99.2]     Weight loss counseling, encounter for [Z71.3]     Hyperlipidemia [E78.5]      Sepsis  Unknown origin  Possibility of infected graft  Patient also has Vas-Cath  Follow-up blood cultures  Follow blood cultures obtained from Vas-Cath  Patient allergic to penicillin-anaphylaxis (many decades ago when she was pregnant with one of her children)  Vancomycin meropenem  Consult ID     Hyperkalemia  No EKG changes  Dialysis patient  Per ER signout dialysis planned for later today     ESRD  Dialysis  Monday Wednesday Friday  Last dialysis on Monday     Hyponatremia  Hypervolemic  In need of dialysis     Nonzero cardiac biomarker  Likely related to ESRD    DVT Prophylaxis: Heparin  Diet: ADULT DIET; Regular; Low Potassium (Less than 3000 mg/day)  Adult Oral Nutrition Supplement;  Renal Oral Supplement  Code Status: DNR-CCA    Electronically signed by Eveline Pruitt MD on 8/29/2021 at 12:36 PM Statement Selected

## 2021-08-30 LAB
ANION GAP SERPL CALCULATED.3IONS-SCNC: 20 MMOL/L (ref 3–16)
ANISOCYTOSIS: ABNORMAL
BANDED NEUTROPHILS RELATIVE PERCENT: 7 % (ref 0–7)
BASOPHILS ABSOLUTE: 0 K/UL (ref 0–0.2)
BASOPHILS RELATIVE PERCENT: 0 %
BUN BLDV-MCNC: 73 MG/DL (ref 7–20)
C DIFF TOXIN/ANTIGEN: NORMAL
CALCIUM SERPL-MCNC: 9.9 MG/DL (ref 8.3–10.6)
CHLORIDE BLD-SCNC: 94 MMOL/L (ref 99–110)
CO2: 19 MMOL/L (ref 21–32)
CREAT SERPL-MCNC: 8.4 MG/DL (ref 0.6–1.2)
EOSINOPHILS ABSOLUTE: 0.1 K/UL (ref 0–0.6)
EOSINOPHILS RELATIVE PERCENT: 1 %
GFR AFRICAN AMERICAN: 6
GFR NON-AFRICAN AMERICAN: 5
GLUCOSE BLD-MCNC: 105 MG/DL (ref 70–99)
GLUCOSE BLD-MCNC: 105 MG/DL (ref 70–99)
HCT VFR BLD CALC: 29.9 % (ref 36–48)
HEMOGLOBIN: 9.6 G/DL (ref 12–16)
LYMPHOCYTES ABSOLUTE: 1.1 K/UL (ref 1–5.1)
LYMPHOCYTES RELATIVE PERCENT: 8 %
MAGNESIUM: 2.7 MG/DL (ref 1.8–2.4)
MCH RBC QN AUTO: 31.2 PG (ref 26–34)
MCHC RBC AUTO-ENTMCNC: 32.2 G/DL (ref 31–36)
MCV RBC AUTO: 96.9 FL (ref 80–100)
MONOCYTES ABSOLUTE: 0.8 K/UL (ref 0–1.3)
MONOCYTES RELATIVE PERCENT: 6 %
NEUTROPHILS ABSOLUTE: 11.8 K/UL (ref 1.7–7.7)
NEUTROPHILS RELATIVE PERCENT: 78 %
PDW BLD-RTO: 17 % (ref 12.4–15.4)
PERFORMED ON: ABNORMAL
PHOSPHORUS: 8.1 MG/DL (ref 2.5–4.9)
PLATELET # BLD: 295 K/UL (ref 135–450)
PMV BLD AUTO: 8.4 FL (ref 5–10.5)
POTASSIUM SERPL-SCNC: 4.8 MMOL/L (ref 3.5–5.1)
RBC # BLD: 3.08 M/UL (ref 4–5.2)
RBC # BLD: NORMAL 10*6/UL
SODIUM BLD-SCNC: 133 MMOL/L (ref 136–145)
VANCOMYCIN RANDOM: 15.2 UG/ML
WBC # BLD: 13.9 K/UL (ref 4–11)

## 2021-08-30 PROCEDURE — 87324 CLOSTRIDIUM AG IA: CPT

## 2021-08-30 PROCEDURE — 6370000000 HC RX 637 (ALT 250 FOR IP): Performed by: INTERNAL MEDICINE

## 2021-08-30 PROCEDURE — 36415 COLL VENOUS BLD VENIPUNCTURE: CPT

## 2021-08-30 PROCEDURE — 80202 ASSAY OF VANCOMYCIN: CPT

## 2021-08-30 PROCEDURE — 99233 SBSQ HOSP IP/OBS HIGH 50: CPT | Performed by: INTERNAL MEDICINE

## 2021-08-30 PROCEDURE — 6360000002 HC RX W HCPCS: Performed by: INTERNAL MEDICINE

## 2021-08-30 PROCEDURE — 90935 HEMODIALYSIS ONE EVALUATION: CPT | Performed by: INTERNAL MEDICINE

## 2021-08-30 PROCEDURE — 83735 ASSAY OF MAGNESIUM: CPT

## 2021-08-30 PROCEDURE — 84100 ASSAY OF PHOSPHORUS: CPT

## 2021-08-30 PROCEDURE — 80048 BASIC METABOLIC PNL TOTAL CA: CPT

## 2021-08-30 PROCEDURE — 87040 BLOOD CULTURE FOR BACTERIA: CPT

## 2021-08-30 PROCEDURE — 87449 NOS EACH ORGANISM AG IA: CPT

## 2021-08-30 PROCEDURE — 85025 COMPLETE CBC W/AUTO DIFF WBC: CPT

## 2021-08-30 PROCEDURE — 2580000003 HC RX 258: Performed by: INTERNAL MEDICINE

## 2021-08-30 PROCEDURE — 90935 HEMODIALYSIS ONE EVALUATION: CPT

## 2021-08-30 PROCEDURE — 1200000000 HC SEMI PRIVATE

## 2021-08-30 RX ADMIN — ASPIRIN 81 MG: 81 TABLET, COATED ORAL at 12:51

## 2021-08-30 RX ADMIN — ROSUVASTATIN 20 MG: 20 TABLET, FILM COATED ORAL at 12:51

## 2021-08-30 RX ADMIN — SPIRONOLACTONE 25 MG: 25 TABLET ORAL at 12:51

## 2021-08-30 RX ADMIN — CLOPIDOGREL BISULFATE 75 MG: 75 TABLET ORAL at 12:51

## 2021-08-30 RX ADMIN — LEVOTHYROXINE SODIUM 150 MCG: 0.15 TABLET ORAL at 06:08

## 2021-08-30 RX ADMIN — METOPROLOL TARTRATE 12.5 MG: 25 TABLET, FILM COATED ORAL at 12:51

## 2021-08-30 RX ADMIN — TORSEMIDE 60 MG: 20 TABLET ORAL at 12:51

## 2021-08-30 RX ADMIN — HEPARIN SODIUM 5000 UNITS: 5000 INJECTION INTRAVENOUS; SUBCUTANEOUS at 06:08

## 2021-08-30 RX ADMIN — HEPARIN SODIUM 5000 UNITS: 5000 INJECTION INTRAVENOUS; SUBCUTANEOUS at 14:28

## 2021-08-30 RX ADMIN — HEPARIN SODIUM 5000 UNITS: 5000 INJECTION INTRAVENOUS; SUBCUTANEOUS at 21:06

## 2021-08-30 RX ADMIN — VANCOMYCIN HYDROCHLORIDE 1000 MG: 1 INJECTION, POWDER, LYOPHILIZED, FOR SOLUTION INTRAVENOUS at 13:00

## 2021-08-30 RX ADMIN — ALLOPURINOL 100 MG: 100 TABLET ORAL at 21:06

## 2021-08-30 RX ADMIN — Medication 10 ML: at 21:07

## 2021-08-30 RX ADMIN — Medication 10 ML: at 12:58

## 2021-08-30 RX ADMIN — TORSEMIDE 60 MG: 20 TABLET ORAL at 21:06

## 2021-08-30 RX ADMIN — METOPROLOL TARTRATE 12.5 MG: 25 TABLET, FILM COATED ORAL at 21:06

## 2021-08-30 RX ADMIN — ONDANSETRON 4 MG: 2 INJECTION INTRAMUSCULAR; INTRAVENOUS at 14:28

## 2021-08-30 ASSESSMENT — ENCOUNTER SYMPTOMS
CHOKING: 0
RHINORRHEA: 0
STRIDOR: 0
NAUSEA: 0
CHEST TIGHTNESS: 0
APNEA: 0
DIARRHEA: 1
SHORTNESS OF BREATH: 0
COLOR CHANGE: 0
BLOOD IN STOOL: 0
EYE DISCHARGE: 0
COUGH: 0
TROUBLE SWALLOWING: 0
FACIAL SWELLING: 0
PHOTOPHOBIA: 0
ABDOMINAL PAIN: 0
EYE REDNESS: 0

## 2021-08-30 ASSESSMENT — PAIN SCALES - GENERAL
PAINLEVEL_OUTOF10: 0

## 2021-08-30 NOTE — PROGRESS NOTES
Hospitalist Progress Note      PCP: Sumanth Meeks MD    Date of Admission: 8/25/2021    Chief Complaint: [facility] Course: 67 y.o. female with past medical history ESRD, diabetes, hypertension, hyperlipidemia, thyroid disease recently admitted for suspected infection of the dialysis fistula, new fistula/graft formed earlier this month. She comes in from home in view of several falls. ED note states that patient altered, however, patient is alert and oriented to the time of my interaction. Patient states that she was trying to  the phone and slid out of bed. Was not able to get up after that due to weakness. She denies any confusion loss of consciousness headache blurry vision palpitations chest pain shortness of breath. In the ED patient found to have significant leukocytosis. Broad-spectrum antibiotics initiated. Patient also with hyperkalemia, nephrology contacted and dialysis. Subjective: Patient seen and examined at bedside. States she is feeling better.         Medications:  Reviewed    Infusion Medications    dextrose      sodium chloride 100 mL/hr at 08/29/21 3584     Scheduled Medications    vancomycin  1,000 mg IntraVENous Once    meropenem  500 mg IntraVENous Q24H    allopurinol  100 mg Oral Daily    aspirin EC  81 mg Oral Daily    clopidogrel  75 mg Oral Daily    levothyroxine  150 mcg Oral QAM AC    metoprolol tartrate  12.5 mg Oral BID    rosuvastatin  20 mg Oral Daily    spironolactone  25 mg Oral Daily    torsemide  60 mg Oral BID    sodium chloride flush  5-40 mL IntraVENous 2 times per day    heparin (porcine)  5,000 Units SubCUTAneous 3 times per day    vancomycin (VANCOCIN) intermittent dosing (placeholder)   Other RX Placeholder     PRN Meds: ondansetron, diphenhydrAMINE, glucose, dextrose, glucagon (rDNA), dextrose, sodium chloride flush, sodium chloride, heparin (porcine), acetaminophen, melatonin      Intake/Output Summary (Last 24 hours) at 8/30/2021 1239  Last data filed at 8/30/2021 1126  Gross per 24 hour   Intake 300 ml   Output 891 ml   Net -591 ml       Physical Exam Performed:    /81   Pulse 101   Temp 96.8 °F (36 °C) (Temporal)   Resp 18   Ht 5' 2\" (1.575 m)   Wt 174 lb 13.2 oz (79.3 kg)   LMP  (LMP Unknown)   SpO2 95%   BMI 31.98 kg/m²     General appearance: No apparent distress, appears stated age and cooperative. HEENT: Pupils equal, round, and reactive to light. Conjunctivae/corneas clear. Neck: Supple, with full range of motion. No jugular venous distention. Trachea midline. Respiratory:  Normal respiratory effort. Clear to auscultation, bilaterally without Rales/Wheezes/Rhonchi. Cardiovascular: Regular rate and rhythm with normal S1/S2 without murmurs, rubs or gallops. Abdomen: Soft, non-tender, non-distended with normal bowel sounds. Musculoskeletal: No clubbing, cyanosis or edema bilaterally. Full range of motion without deformity. Skin: Skin color, texture, turgor normal.  No rashes or lesions. Neurologic:  Neurovascularly intact without any focal sensory/motor deficits. Cranial nerves: II-XII intact, grossly non-focal.  Psychiatric: Alert and oriented, thought content appropriate, normal insight  Capillary Refill: Brisk,< 3 seconds   Peripheral Pulses: +2 palpable, equal bilaterally       Labs:   Recent Labs     08/28/21  0804 08/29/21  1212 08/30/21  0605   WBC 12.7* 15.9* 13.9*   HGB 10.6* 9.8* 9.6*   HCT 32.3* 29.8* 29.9*    312 295     Recent Labs     08/28/21  0804 08/29/21  1212 08/30/21  0605   * 135* 133*   K 4.5 4.8 4.8   CL 94* 93* 94*   CO2 24 24 19*   BUN 44* 67* 73*   CREATININE 5.8* 7.9* 8.4*   CALCIUM 9.9 10.0 9.9   PHOS 6.7* 6.8* 8.1*     No results for input(s): AST, ALT, BILIDIR, BILITOT, ALKPHOS in the last 72 hours. No results for input(s): INR in the last 72 hours. No results for input(s): Gerber Lager in the last 72 hours.     Urinalysis:      Lab Results   Component Value Date    NITRU Negative 08/26/2021    WBCUA 1 08/26/2021    BACTERIA RARE 08/16/2021    RBCUA 1 08/26/2021    BLOODU TRACE 08/26/2021    SPECGRAV 1.010 08/26/2021    GLUCOSEU Negative 08/26/2021       Radiology:  CT LUMBAR SPINE WO CONTRAST   Final Result   Negative for lumbar spine fracture         CT CERVICAL SPINE WO CONTRAST   Final Result   No acute abnormality of the cervical spine. CT HEAD WO CONTRAST   Final Result   No acute intracranial abnormality. Diffuse atrophic changes with findings suggesting chronic microvascular   ischemia         XR CHEST PORTABLE   Final Result   Improved aeration of the lungs bilaterally. A few scattered opacities remain. Assessment/Plan:    Active Hospital Problems    Diagnosis     Pneumonia due to infectious organism [J18.9]     Frequent falls [R29.6]     Hyperkalemia [E87.5]     Hyponatremia [E87.1]     Ex-smoker [Z87.891]     Class 1 obesity due to excess calories with body mass index (BMI) of 33.0 to 33.9 in adult [E66.09, Z68.33]     Septicemia ([facility] [A41.9]     ESRD on dialysis ([facility] [N18.6, Z99.2]     Weight loss counseling, encounter for [Z71.3]     Hyperlipidemia [E78.5]      Sepsis  Unknown origin  Possibility of infected graft  Patient also has Vas-Cath  Follow-up blood cultures  Follow blood cultures obtained from Vas-Cath  Patient allergic to penicillin-anaphylaxis (many decades ago when she was pregnant with one of her children)  Vancomycin meropenem  Pending further recommendations from infectious diseases     Hyperkalemia  No EKG changes  Dialysis patient  Per ER signout dialysis planned for later today     ESRD  Dialysis  Monday Wednesday Friday  Last dialysis on Monday     Hyponatremia  Hypervolemic  In need of dialysis     Nonzero cardiac biomarker  Likely related to ESRD    DVT Prophylaxis: Heparin  Diet: ADULT DIET; Regular; Low Potassium (Less than 3000 mg/day)  Adult Oral Nutrition Supplement;  Renal Oral Supplement  Code Status: DNR-CCA    Electronically signed by Chad Pino MD on 8/30/2021 at 12:39 PM

## 2021-08-30 NOTE — FLOWSHEET NOTE
08/30/21 0836 08/30/21 1126   Treatment   Time On 0844  --    Time Off  --  1126   Treatment Goal 500  --    Vital Signs   BP (!) 148/73 132/81   Temp 96.9 °F (36.1 °C) 96.8 °F (36 °C)   Pulse 93 101   Resp 18 18   Weight 176 lb 12.9 oz (80.2 kg)  --    Weight Method Bed scale  --    Percent Weight Change 2.86  --    Technical Checks   Dialysis Machine No. [id number]  --    RO Machine No. [id number]  --    Dialyzer Lot No. [id number]  --    RO Machine Log Sheet Completed Yes  --    Machine Alarm Self Test Completed; Passed  --    Machine Autotest Completed; Passed  --    Air Foam Detector Tested;Proper Function  --    Extracorporeal Circuit Tested for Integrity Yes  --    Machine Conductivity 13.9  --    Manual Conductivity 13.9  --    Manual Ph 7.4  --    Bleach Test (Neg) Yes  --    Bath Temperature 96.8 °F (36 °C)  --    Treatment Initiation   Dialyze Hours 3  --    Treatment  Initiation Universal Precautions maintained;Lines secured to patient; Connections secured;Prime given;Venous Parameters set; Arterial Parameters set; Air foam detector engaged; Hemosafe Device; Dialysate;Saline line double clamped; Hemo-Safe Applied;Dialyzer;F180  --    Dialysis Bath   K+ (Potassium) 3  --    Ca+ (Calcium) 2.5  --    Na+ (Sodium) 138  --    HCO3 (Bicarb) 35  --    Post-Hemodialysis Assessment   Post-Treatment Procedures  --  Blood returned;Catheter capped, clamped and heparinized x 2 ports   Machine Disinfection Process  --  Exterior Machine Disinfection   Rinseback Volume (ml)  --  300 ml   Total Liters Processed (l/min)  --  46.9 l/min   Dialyzer Clearance  --  Lightly streaked   Duration of Treatment (minutes)  --  180 minutes   Hemodialysis Intake (ml)  --  300 ml   Hemodialysis Output (ml)  --  891 ml   NET Removed (ml)  --  500 ml   Tolerated Treatment  --  Good   Treatment time: 2 hours 40 minutes    Net UF: 490    Pre weight: 80.2 kg  Post weight: 79.3 kg  EDW:     Access used: [facility]  Access function: Functioned well with  ml/min    Medications or blood products given: none    Regular outpatient schedule: MWF    Summary of response to treatment: Pt stopped 20 minutes early per pt request.    Copy of dialysis treatment record placed in chart, to be scanned into EMR.

## 2021-08-30 NOTE — PROGRESS NOTES
Physician Progress Note      PATIENT:               Rob Kerr  [facility] #:                  [id number]  :                       1945  ADMIT DATE:       2021 10:40 AM  [address] [place] DATE:  RESPONDING  PROVIDER #:        Topher Pineda MD          QUERY TEXT:    Patient admitted with Sepsis. Documentation reflects Pneumonia in Infectious   Disease consult note(s) dated . If possible, please document in the   progress notes and discharge summary if Pneumonia was: The medical record reflects the following:  Risk Factors: Sepsis,  Clinical Indicators:  ID consult note documented \"Pneumonia due to   infectious organism, unspecified laterality, unspecified part of lung   [J18.9]. \"  Treatment: Chest xr  Options provided:  -- Pneumonia confirmed after study  -- Pneumonia treated and resolved  -- Pneumonia ruled out after study  -- Other - I will add my own diagnosis  -- Disagree - Not applicable / Not valid  -- Disagree - Clinically unable to determine / Unknown  -- Refer to Clinical Documentation Reviewer    PROVIDER RESPONSE TEXT:    Pneumonia ruled out after study. Query created by: Adry Maria on 2021 3:00 PM      QUERY TEXT:    Pt admitted with Sepsis and has encephalopathy documented. If possible, please   document in progress notes and discharge summary further specificity   regarding the type of encephalopathy:      The medical record reflects the following:  Risk Factors: Sepsis, Altered mental status in ED  Clinical Indicators:  ID consult note documented \"In the ER, he was noted   to have elevated white cell count of 18,100 and a fever of 99.5. She was also   encephalopathic. The patient was hospitalized. Blood cultures were sent. \"    \"Her encephalopathy is improving and she is quite awake at this time. \"    H&P note documented \"She comes in from home in view of several falls.   ED note   states that patient altered, however, patient is alert and oriented to the   time of my interaction. \"  Treatment: Blood Cultures, Labs  Options provided:  -- Metabolic encephalopathy  -- Septic encephalopathy  -- Other - I will add my own diagnosis  -- Disagree - Not applicable / Not valid  -- Disagree - Clinically unable to determine / Unknown  -- Refer to Clinical Documentation Reviewer    PROVIDER RESPONSE TEXT:    This patient has metabolic encephalopathy.     Query created by: Michelle Grant on 8/27/2021 3:04 PM      Electronically signed by:  Altagracia Feng MD 8/30/2021 1:27 PM

## 2021-08-30 NOTE — CONSULTS
Clinical Pharmacy Note: Pharmacy to Dose Vancomcyin    Vancomycin Day: 7  Current Dosing Regimen: intermittent based on levels  Dosing Method: Dosing by random levels    Random: 15.2    Recent Labs     08/29/21  1212 08/30/21  0605   BUN 67* 73*       Recent Labs     08/29/21  1212 08/30/21  0605   CREATININE 7.9* 8.4*       Recent Labs     08/29/21  1212 08/30/21  0605   WBC 15.9* 13.9*       No intake or output data in the 24 hours ending 08/30/21 1129      Ht Readings from Last 1 Encounters:   08/27/21 5' 2\" (1.575 m)        Wt Readings from Last 1 Encounters:   08/30/21 176 lb 12.9 oz (80.2 kg)         Body mass index is 32.34 kg/m². Estimated Creatinine Clearance: 6 mL/min (A) (based on SCr of 8.4 mg/dL [facility])). Assessment/Plan:  Vancomycin level is therapeutic. Level was drawn appropriately in respect to last dose given. Continue vancomycin regimen to 1g today x1. A vancomycin random level has been ordered on 9/1  at 0600 for follow-up. Changes in regimen will be determined based on culture results, renal function, and clinical response. Pharmacy will continue to monitor and adjust regimen as necessary.     Thank you for the consult,    Dwight Duane PharmD, BCPS

## 2021-08-30 NOTE — PROGRESS NOTES
PROCEDURE:  Patient seen on HEMODIALYSISat 8:54 AM    PHYSICIAN:  Rosette Hsu MD    INDICATION:  ESRD    Wt Readings from Last 3 Encounters:   08/30/21 171 lb 14.4 oz (78 kg)   08/23/21 178 lb (80.7 kg)   08/19/21 176 lb (79.8 kg)     Temp Readings from Last 3 Encounters:   08/30/21 96.9 °F (36.1 °C) (Temporal)   08/23/21 99 °F (37.2 °C) (Oral)   08/19/21 97.6 °F (36.4 °C) (Temporal)     BP Readings from Last 3 Encounters:   08/30/21 (!) 148/73   08/24/21 133/89   08/19/21 (!) 155/81     Pulse Readings from Last 3 Encounters:   08/30/21 93   08/24/21 95   08/19/21 86      No intake/output data recorded. CBC:   Recent Labs     08/28/21  0804 08/29/21  1212 08/30/21  0605   WBC 12.7* 15.9* 13.9*   HGB 10.6* 9.8* 9.6*    312 295     BMP:    Recent Labs     08/28/21  0804 08/29/21  1212 08/30/21  0605   * 135* 133*   K 4.5 4.8 4.8   CL 94* 93* 94*   CO2 24 24 19*   BUN 44* 67* 73*   CREATININE 5.8* 7.9* 8.4*   GLUCOSE 107* 126* 105*     BMD:   Lab Results   Component Value Date    .3 (H) 02/10/2020    CALCIUM 9.9 08/30/2021    PHOS 8.1 (H) 08/30/2021       Iron:     Lab Results   Component Value Date    IRON 64 03/29/2018    TIBC 351 03/29/2018            RX:  See dialysis flowsheet for specifics on access, blood flow rate, dialysate baths, duration of dialysis, anticoagulation and other technical information. COMMENTS:      1. ESRD   Gets HD mon/wed/friday   On HD now      ID on board  Blood cx NGTD  On empiric antibiotics, Vancomycin and Meropenem , as per ID     2. HTN. Controlled      3. AMS likely 2/2 infection vs from pain medications   Empirical abx      AMS improved         4. Acid- base disorder. Metabolic acidosis  Correct with HD      5.  Hyperkalemia   Corrected with HD            Rosette Hsu MD  Nephrology     [place] Office : [address] , [address]  [place] Office: [address], [address] [place] [address] Office: [address] [phone].   [facility] Office: [address] [place] [address]  Office : [phone]  Fax :[phone]

## 2021-08-30 NOTE — PROGRESS NOTES
Infectious Diseases   Progress Note      Admission Date: 8/25/2021  Hospital Day: Hospital Day: 6   Attending: Mauricio Baeza MD  Date of service: 8/30/2021     Chief complaint/ Reason for consult:     · Severe sepsis on admission with bandemia,  fever, hypotension  · Hyponatremia  · Negative COVID-19 PCR on 8/25/2021  · Hepatitis B immune  · Ex-smoker    Microbiology:        I have reviewed allavailable micro lab data and cultures    · Blood culture (2/2) - collected on 8/25/2021: In process      Antibiotics and immunizations:       Current antibiotics: All antibiotics and their doses were reviewed by me    Recent Abx Admin                   vancomycin 1000 mg IVPB in 250 mL D5W addavial (mg) 1,000 mg New Bag 08/30/21 1300    meropenem (MERREM) 500 mg IVPB (mini-bag) (mg) 500 mg New Bag 08/29/21 2343                  Immunization History: All immunization history was reviewed by me today. Immunization History   Administered Date(s) Administered    Influenza, High Dose (Fluzone 65 yrs and older) 01/23/2017, 11/01/2017    Influenza, Triv, inactivated, subunit, adjuvanted, IM (Fluad 65 yrs and older) 10/28/2019    Pneumococcal Conjugate 13-valent ([id number]) 01/23/2017    Pneumococcal Polysaccharide ([id number]) 05/10/2019       Known drug allergies: All allergies were reviewed and updated    Allergies   Allergen Reactions    Amoxicillin Anaphylaxis    Demeclocycline Anaphylaxis    Penicillins Anaphylaxis    Tetracyclines & Related Anaphylaxis    Ozempic (0.25 Or 0.5 Mg-Dose) [Semaglutide(0.25 Or 0.5mg-Dos)] Other (See Comments)     Lost large amount weight and loss  Of appetite    Codeine Itching and Rash       Social history:     Social History:  All social andepidemiologic history was reviewed and updated by me today as needed. · Tobacco use:   reports that she quit smoking about 43 years ago. Her smoking use included cigarettes. She has a 5.00 pack-year smoking history.  She has never used smokeless tobacco.  · Alcohol use:   reports current alcohol use. · Currently lives in: [address] [place] [address]  ·  reports no history of drug use. COVID VACCINATION AND LAB RESULT RECORDS:     Internal Administration   First Dose      Second Dose           Last COVID Lab SARS-CoV-2 (no units)   Date Value   08/25/2021 Not Detected     SARS-CoV-2, NAAT (no units)   Date Value   11/24/2020 Not Detected            Assessment:     The patient is a 67 y.o. old female who  has a past medical history of Arthritis, Diabetes ([address] Hemodialysis patient ([address] Hyperlipidemia, Hypertension, Kidney disease, Neuropathy, Pneumonia, and Thyroid disease. with following problems:    · Severe sepsis on admission with bandemia,  fever, hypotension-sepsis resolved, has ongoing leukocytosis, unclear source  · Diarrhea-looks like antibiotic induced, will check for C. difficile  · Hyponatremia-corrected with dialysis  · Negative COVID-19 PCR on 8/25/2021  · Hepatitis B immune  · Ex-smoker  · Essential hypertension-BP controlled  · Hyperlipidemia  · ESRD on dialysis  · Obesity Class 1 due to excess calorie intake : Body mass index is 33.16 kg/m².-Counseling      Discussion:      The patient is afebrile. She is on empiric IV vancomycin and IV meropenem. White cell count seems to be coming down slowly. It is 13,900 today. Blood cultures from 8/25/2021 are negative so far. She is a dialysis patient. Exact source of her sepsis is unclear yet    Serum sodium is 133. The patient is having diarrhea. Source of initial leukocytosis is still unclear    Plan:     Diagnostic Workup:    · Will order stool C. difficile test  · Continue to follow  fever curve, WBC count and blood cultures. · Continue to monitor blood counts, liver and renal function. · We will repeat 2 sets of blood cultures today    Antimicrobials:    · Since the patient is having diarrhea.   Will stop empiric meropenem today  · Blood cultures from admission are negative so far. We will stop empiric IV vancomycin today as well  · Will watch her off broad-spectrum antibiotics today. Stool C. difficile test comes back positive, will start oral vancomycin  · We will follow up on the culture results and clinical progress and will make further recommendations accordingly. · Continue close vitals monitoring. · Maintain good glycemic control. · Fall precautions. Aspiration precautions. · Continue to watch for new fever or diarrhea. · DVT prophylaxis. · Discussed all above with patient and RN. Drug Monitoring:    · Continue monitoring for antibiotic toxicity as follows: CBC, CMP   · Continue to watch for following: new or worsening fever, new hypotension, hives, lip swelling and redness or purulence at vascular access sites. I/v access Management:    · Continue to monitor i.v access sites for erythema, induration, discharge or tenderness. · As always, continue efforts to minimize tubes/lines/drains as clinically appropriate to reduce chances of line associated infections. Patient education and counseling:        · The patient was educated in detail about the side-effects of various antibiotics and things to watch for like new rashes, lip swelling, severe reaction, worsening diarrhea, break through fever etc.  · Discussed patient's condition and what to expect. All of the patient's questions were addressed in a satisfactory manner and patient verbalized understanding all instructions. Level of complexity of visit: High     Risk of Complications/Morbidity: High     · Illness(es)/ Infection present that pose threat to life/bodily function. · There is potential for severe exacerbation of infection/side effects of treatment. · Therapy requires intensive monitoring for antimicrobial agent toxicity. Thank you for involving me in the care of your patient. I will continue to follow.  If you have anyadditional questions, please do not hesitate to contact me.      Subjective: Interval history: Interval history was obtained from chart review and patient/ RN. She is afebrile. She is having diarrhea. REVIEW OF SYSTEMS:      Review of Systems   Constitutional: Positive for fatigue. Negative for chills, diaphoresis and unexpected weight change. HENT: Negative for congestion, ear discharge, ear pain, facial swelling, hearing loss, rhinorrhea and trouble swallowing. Eyes: Negative for photophobia, discharge, redness and visual disturbance. Respiratory: Negative for apnea, cough, choking, chest tightness, shortness of breath and stridor. Cardiovascular: Negative for chest pain and palpitations. Gastrointestinal: Positive for diarrhea. Negative for abdominal pain, blood in stool and nausea. Endocrine: Negative for polydipsia, polyphagia and polyuria. Genitourinary: Negative for difficulty urinating, dysuria, frequency, hematuria, menstrual problem and vaginal discharge. Musculoskeletal: Negative for arthralgias, joint swelling, myalgias and neck stiffness. Skin: Negative for color change and rash. Allergic/Immunologic: Negative for immunocompromised state. Neurological: Negative for dizziness, seizures, speech difficulty, light-headedness and headaches. Hematological: Negative for adenopathy. Psychiatric/Behavioral: Negative for agitation, hallucinations and suicidal ideas. Past Medical History: All past medical history reviewed today. Past Medical History:   Diagnosis Date    Arthritis     Diabetes ([facility]     Hemodialysis patient ([facility]     Hyperlipidemia     Hypertension     Kidney disease     Neuropathy     Pneumonia     IN February, 2021, with COVID    Thyroid disease        Past Surgical History: All past surgical history was reviewed today.     Past Surgical History:   Procedure Laterality Date    APPENDECTOMY      CHOLECYSTECTOMY      COLONOSCOPY  11/24/2020    COLONOSCOPY POLYPECTOMY SNARE/COLD BIOPSY performed by Ruddy Ramos MD at [address] 5/17/2021    PERITONEAL DIALYSIS CATHETER REMOVAL. EXCISSION OF ABDOMINAL CITRIX. performed by Sofi Vu DO at [address] N/A 4/3/2020    LAPAROSCOPIC LYSIS OF ADHESIONS, LAPAROSCOPIC PERITONEAL DIALYSIS CATHETER PLACEMENT, LAPAROSCOPIC OMENTOPEXY [facility], BILATERAL      preventive    ROTATOR CUFF REPAIR Left     SHUNT REVISION Left 8/19/2021    LEFT FOREARM ARTERIO VENOUS GRAFT performed by Rossy Santiago MD at [facility] 11/24/2020    EGD BIOPSY performed by Ruddy Ramos MD at [address] [facility] ENDOSCOPY       Family History: All family history was reviewed today. Problem Relation Age of Onset    Cancer Mother         lung, breast, liver    Stroke Father     Stomach Cancer Brother     Cancer Maternal Grandmother     No Known Problems Paternal Grandmother     No Known Problems Paternal Grandfather        Objective:       PHYSICAL EXAM:      Vitals:   Vitals:    08/30/21 0615 08/30/21 0836 08/30/21 1126 08/30/21 1245   BP: (!) 147/80 (!) 148/73 132/81 (!) 147/85   Pulse: 88 93 101 109   Resp: 17 18 18 22   Temp: 97.9 °F (36.6 °C) 96.9 °F (36.1 °C) 96.8 °F (36 °C) 97.7 °F (36.5 °C)   TempSrc: Axillary Temporal Temporal Oral   SpO2: 95%   96%   Weight:  176 lb 12.9 oz (80.2 kg) 174 lb 13.2 oz (79.3 kg)    Height:           Physical Exam  Vitals and nursing note reviewed. Constitutional:       General: She is not in acute distress. Appearance: She is well-developed. She is not diaphoretic. HENT:      Head: Normocephalic. Right Ear: External ear normal.      Left Ear: External ear normal.      Nose: Nose normal.   Eyes:      General: No scleral icterus. Right eye: No discharge. Left eye: No discharge.       Conjunctiva/sclera: Conjunctivae normal. Pupils: Pupils are equal, round, and reactive to light. Cardiovascular:      Rate and Rhythm: Normal rate and regular rhythm. Heart sounds: No murmur heard. No friction rub. Pulmonary:      Effort: Pulmonary effort is normal.      Breath sounds: No stridor. No wheezing or rales. Chest:      Chest wall: No tenderness. Abdominal:      Palpations: Abdomen is soft. There is no mass. Tenderness: There is no abdominal tenderness. There is no guarding or rebound. Musculoskeletal:         General: No tenderness. Cervical back: Normal range of motion and neck supple. Lymphadenopathy:      Cervical: No cervical adenopathy. Skin:     General: Skin is warm and dry. Findings: No erythema or rash. Neurological:      Mental Status: She is alert and oriented to person, place, and time. Motor: No abnormal muscle tone. Psychiatric:         Judgment: Judgment normal.                 Lines and drains: All vascular access sites are healthy with no local erythema, discharge or tenderness. Intake and output:    No intake/output data recorded. Lab Data:   All available labs and old records have been reviewed by me.     CBC:  Recent Labs     08/28/21  0804 08/29/21  1212 08/30/21  0605   WBC 12.7* 15.9* 13.9*   RBC 3.41* 3.18* 3.08*   HGB 10.6* 9.8* 9.6*   HCT 32.3* 29.8* 29.9*    312 295   MCV 94.9 93.7 96.9   MCH 31.1 31.0 31.2   MCHC 32.8 33.1 32.2   RDW 17.5* 17.4* 17.0*   BANDSPCT 1  --  7        BMP:  Recent Labs     08/28/21  0804 08/29/21  1212 08/30/21  0605   * 135* 133*   K 4.5 4.8 4.8   CL 94* 93* 94*   CO2 24 24 19*   BUN 44* 67* 73*   CREATININE 5.8* 7.9* 8.4*   CALCIUM 9.9 10.0 9.9   GLUCOSE 107* 126* 105*        Hepatic Function Panel:   Lab Results   Component Value Date    ALKPHOS 149 08/25/2021    ALT 30 08/25/2021    AST 37 08/25/2021    PROT 7.5 08/25/2021    BILITOT 0.5 08/25/2021    BILIDIR <0.2 08/12/2021    IBILI see below 08/12/2021    LABALBU 3.6 08/25/2021       CPK:   Lab Results   Component Value Date    CKTOTAL 243 (H) 08/25/2021     ESR: No results found for: SEDRATE  CRP: No results found for: CRP        Imaging: All pertinent images and reports for the current visit were reviewed by me during this visit. CT LUMBAR SPINE WO CONTRAST   Final Result   Negative for lumbar spine fracture         CT CERVICAL SPINE WO CONTRAST   Final Result   No acute abnormality of the cervical spine. CT HEAD WO CONTRAST   Final Result   No acute intracranial abnormality. Diffuse atrophic changes with findings suggesting chronic microvascular   ischemia         XR CHEST PORTABLE   Final Result   Improved aeration of the lungs bilaterally. A few scattered opacities remain. Medications: All current and past medications were reviewed.      vancomycin  1,000 mg IntraVENous Once    allopurinol  100 mg Oral Daily    aspirin EC  81 mg Oral Daily    clopidogrel  75 mg Oral Daily    levothyroxine  150 mcg Oral QAM AC    metoprolol tartrate  12.5 mg Oral BID    rosuvastatin  20 mg Oral Daily    spironolactone  25 mg Oral Daily    torsemide  60 mg Oral BID    sodium chloride flush  5-40 mL IntraVENous 2 times per day    heparin (porcine)  5,000 Units SubCUTAneous 3 times per day    vancomycin (VANCOCIN) intermittent dosing (placeholder)   Other RX Placeholder        dextrose      sodium chloride 100 mL/hr at 08/29/21 2335       ondansetron, diphenhydrAMINE, glucose, dextrose, glucagon (rDNA), dextrose, sodium chloride flush, sodium chloride, heparin (porcine), acetaminophen, melatonin      Problem list:       Patient Active Problem List   Diagnosis Code    Essential hypertension I10    Hyperlipidemia E78.5    Acquired hypothyroidism E03.9    Diabetes mellitus type 2 in obese ([facility] E11.69, E66.9    Chronic kidney 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 ([facility] E11.22, N18.6, Z99.2    Closed nondisplaced fracture of fifth metatarsal bone of right foot S92.354A    Arthritis of right knee M17.11    Acute pain of both knees M25.561, M25.562    Primary osteoarthritis of both knees M17.0    Bursitis M71.9    Memory loss R41.3    Bilateral leg edema R60.0    Primary osteoarthritis of left knee M17.12    Atypical chest pain R07.89    Weight loss counseling, encounter for Z71.3    Tubular adenoma of colon D12.6    Primary insomnia F51.01    Diarrhea of presumed infectious origin R19.7    Diabetic polyneuropathy associated with type 2 diabetes mellitus ([facility]) E11.42    COVID-19 virus infection U07.1    Coronary artery disease of native artery with stable angina pectoris ([facility]) I25.118    Pain of left calf M79.662    Atherosclerosis of native arteries of extremities with intermittent claudication, bilateral legs ([facility]) I70.213    ESRD on dialysis ([facility]) N18.6, Z99.2    Arteriovenous fistula infection, initial encounter ([facility] T82. 7XXA    Septicemia ([facility] A41.9    Pneumonia due to infectious organism J18.9    Frequent falls R29.6    Hyperkalemia E87.5    Hyponatremia E87.1    Ex-smoker Z87.891    Class 1 obesity due to excess calories with body mass index (BMI) of 33.0 to 33.9 in adult E66.09, Z68.33    Antibiotic-associated diarrhea K52.1, T36.95XA       Please note that this chart was generated using Dragon dictation software. Although every effort was made to ensure the accuracy of this automated transcription, some errors in transcription may have occurred inadvertently. If you may need any clarification, please do not hesitate to contact me through EPIC or at the phone number provided below with my electronic signature. Any pictures or media included in this note were obtained after taking informed verbal consent from the patient and with their approval to include those in the patient's medical record.     Cadence Garcia MD, MPH  8/30/2021 , 1:36 PM   [facility] [address] [place] [address], [facility] [address] [facility], [address] [place] [address]  Office: [phone]  Fax: [phone]  Clinic days:  Tuesday & Thursday

## 2021-08-30 NOTE — PROGRESS NOTES
Pt transferred to room 3305. Oriented to room and call light system. pts LUE does not appear red. Does feel warm. pts daughter stated redness and swelling has improved. Pt reports her stomach being upset but denies need for medication. Gave pt ginger ale and peanut butter crackers. Refusing dinner. Pt ambulating independently in room.

## 2021-08-31 ENCOUNTER — APPOINTMENT (OUTPATIENT)
Dept: CT IMAGING | Age: 76
DRG: 871 | End: 2021-08-31
Payer: MEDICARE

## 2021-08-31 LAB
ANION GAP SERPL CALCULATED.3IONS-SCNC: 16 MMOL/L (ref 3–16)
BASOPHILS ABSOLUTE: 0 K/UL (ref 0–0.2)
BASOPHILS RELATIVE PERCENT: 0.2 %
BUN BLDV-MCNC: 47 MG/DL (ref 7–20)
CALCIUM SERPL-MCNC: 9.7 MG/DL (ref 8.3–10.6)
CHLORIDE BLD-SCNC: 91 MMOL/L (ref 99–110)
CO2: 26 MMOL/L (ref 21–32)
CREAT SERPL-MCNC: 6.6 MG/DL (ref 0.6–1.2)
EOSINOPHILS ABSOLUTE: 0.2 K/UL (ref 0–0.6)
EOSINOPHILS RELATIVE PERCENT: 1.5 %
GFR AFRICAN AMERICAN: 7
GFR NON-AFRICAN AMERICAN: 6
GLUCOSE BLD-MCNC: 104 MG/DL (ref 70–99)
GLUCOSE BLD-MCNC: 105 MG/DL (ref 70–99)
GLUCOSE BLD-MCNC: 108 MG/DL (ref 70–99)
GLUCOSE BLD-MCNC: 95 MG/DL (ref 70–99)
HCT VFR BLD CALC: 27.4 % (ref 36–48)
HEMOGLOBIN: 9 G/DL (ref 12–16)
LYMPHOCYTES ABSOLUTE: 1 K/UL (ref 1–5.1)
LYMPHOCYTES RELATIVE PERCENT: 7.3 %
MAGNESIUM: 2.4 MG/DL (ref 1.8–2.4)
MCH RBC QN AUTO: 30.9 PG (ref 26–34)
MCHC RBC AUTO-ENTMCNC: 33 G/DL (ref 31–36)
MCV RBC AUTO: 93.7 FL (ref 80–100)
MONOCYTES ABSOLUTE: 1.3 K/UL (ref 0–1.3)
MONOCYTES RELATIVE PERCENT: 9.1 %
NEUTROPHILS ABSOLUTE: 11.4 K/UL (ref 1.7–7.7)
NEUTROPHILS RELATIVE PERCENT: 81.9 %
PDW BLD-RTO: 17.1 % (ref 12.4–15.4)
PERFORMED ON: ABNORMAL
PHOSPHORUS: 7.3 MG/DL (ref 2.5–4.9)
PLATELET # BLD: 362 K/UL (ref 135–450)
PMV BLD AUTO: 7.9 FL (ref 5–10.5)
POTASSIUM SERPL-SCNC: 4.7 MMOL/L (ref 3.5–5.1)
RBC # BLD: 2.92 M/UL (ref 4–5.2)
SODIUM BLD-SCNC: 133 MMOL/L (ref 136–145)
WBC # BLD: 13.9 K/UL (ref 4–11)

## 2021-08-31 PROCEDURE — 2580000003 HC RX 258: Performed by: INTERNAL MEDICINE

## 2021-08-31 PROCEDURE — 6360000004 HC RX CONTRAST MEDICATION: Performed by: INTERNAL MEDICINE

## 2021-08-31 PROCEDURE — 80048 BASIC METABOLIC PNL TOTAL CA: CPT

## 2021-08-31 PROCEDURE — 84100 ASSAY OF PHOSPHORUS: CPT

## 2021-08-31 PROCEDURE — APPSS30 APP SPLIT SHARED TIME 16-30 MINUTES: Performed by: NURSE PRACTITIONER

## 2021-08-31 PROCEDURE — 99233 SBSQ HOSP IP/OBS HIGH 50: CPT | Performed by: INTERNAL MEDICINE

## 2021-08-31 PROCEDURE — 1200000000 HC SEMI PRIVATE

## 2021-08-31 PROCEDURE — 83735 ASSAY OF MAGNESIUM: CPT

## 2021-08-31 PROCEDURE — 71250 CT THORAX DX C-: CPT

## 2021-08-31 PROCEDURE — 99232 SBSQ HOSP IP/OBS MODERATE 35: CPT | Performed by: INTERNAL MEDICINE

## 2021-08-31 PROCEDURE — 85025 COMPLETE CBC W/AUTO DIFF WBC: CPT

## 2021-08-31 PROCEDURE — 6360000002 HC RX W HCPCS: Performed by: INTERNAL MEDICINE

## 2021-08-31 PROCEDURE — 71260 CT THORAX DX C+: CPT

## 2021-08-31 PROCEDURE — 6360000004 HC RX CONTRAST MEDICATION

## 2021-08-31 PROCEDURE — 6370000000 HC RX 637 (ALT 250 FOR IP): Performed by: INTERNAL MEDICINE

## 2021-08-31 PROCEDURE — APPNB30 APP NON BILLABLE TIME 0-30 MINS: Performed by: NURSE PRACTITIONER

## 2021-08-31 PROCEDURE — 6370000000 HC RX 637 (ALT 250 FOR IP): Performed by: PHYSICIAN ASSISTANT

## 2021-08-31 PROCEDURE — 36415 COLL VENOUS BLD VENIPUNCTURE: CPT

## 2021-08-31 RX ORDER — TORSEMIDE 20 MG/1
60 TABLET ORAL 2 TIMES DAILY
Qty: 30 TABLET | Refills: 3 | Status: SHIPPED | OUTPATIENT
Start: 2021-08-31

## 2021-08-31 RX ORDER — SPIRONOLACTONE 25 MG/1
25 TABLET ORAL DAILY
Qty: 30 TABLET | Refills: 3 | Status: SHIPPED | OUTPATIENT
Start: 2021-09-01 | End: 2022-03-29 | Stop reason: SDUPTHER

## 2021-08-31 RX ADMIN — ACETAMINOPHEN 1000 MG: 500 TABLET, FILM COATED ORAL at 20:43

## 2021-08-31 RX ADMIN — HEPARIN SODIUM 5000 UNITS: 5000 INJECTION INTRAVENOUS; SUBCUTANEOUS at 21:27

## 2021-08-31 RX ADMIN — TORSEMIDE 60 MG: 20 TABLET ORAL at 07:55

## 2021-08-31 RX ADMIN — METOPROLOL TARTRATE 12.5 MG: 25 TABLET, FILM COATED ORAL at 21:28

## 2021-08-31 RX ADMIN — HEPARIN SODIUM 5000 UNITS: 5000 INJECTION INTRAVENOUS; SUBCUTANEOUS at 16:56

## 2021-08-31 RX ADMIN — ROSUVASTATIN 20 MG: 20 TABLET, FILM COATED ORAL at 07:55

## 2021-08-31 RX ADMIN — ALLOPURINOL 100 MG: 100 TABLET ORAL at 21:28

## 2021-08-31 RX ADMIN — HEPARIN SODIUM 5000 UNITS: 5000 INJECTION INTRAVENOUS; SUBCUTANEOUS at 06:09

## 2021-08-31 RX ADMIN — SPIRONOLACTONE 25 MG: 25 TABLET ORAL at 07:55

## 2021-08-31 RX ADMIN — METOPROLOL TARTRATE 12.5 MG: 25 TABLET, FILM COATED ORAL at 07:55

## 2021-08-31 RX ADMIN — IOHEXOL 50 ML: 240 INJECTION, SOLUTION INTRATHECAL; INTRAVASCULAR; INTRAVENOUS; ORAL at 16:53

## 2021-08-31 RX ADMIN — ASPIRIN 81 MG: 81 TABLET, COATED ORAL at 07:55

## 2021-08-31 RX ADMIN — CLOPIDOGREL BISULFATE 75 MG: 75 TABLET ORAL at 07:55

## 2021-08-31 RX ADMIN — LEVOTHYROXINE SODIUM 150 MCG: 0.15 TABLET ORAL at 06:09

## 2021-08-31 RX ADMIN — TORSEMIDE 60 MG: 20 TABLET ORAL at 21:27

## 2021-08-31 RX ADMIN — Medication 10 ML: at 08:02

## 2021-08-31 RX ADMIN — IOPAMIDOL 75 ML: 755 INJECTION, SOLUTION INTRAVENOUS at 19:50

## 2021-08-31 ASSESSMENT — PAIN SCALES - GENERAL
PAINLEVEL_OUTOF10: 0
PAINLEVEL_OUTOF10: 0
PAINLEVEL_OUTOF10: 9
PAINLEVEL_OUTOF10: 0
PAINLEVEL_OUTOF10: 6
PAINLEVEL_OUTOF10: 0
PAINLEVEL_OUTOF10: 7
PAINLEVEL_OUTOF10: 0
PAINLEVEL_OUTOF10: 0

## 2021-08-31 ASSESSMENT — PAIN DESCRIPTION - ONSET
ONSET: SUDDEN
ONSET_2: ON-GOING
ONSET_2: ON-GOING
ONSET: ON-GOING

## 2021-08-31 ASSESSMENT — PAIN DESCRIPTION - PAIN TYPE
TYPE: ACUTE PAIN
TYPE: ACUTE PAIN
TYPE_2: ACUTE PAIN
TYPE_2: ACUTE PAIN

## 2021-08-31 ASSESSMENT — ENCOUNTER SYMPTOMS
CHEST TIGHTNESS: 0
RHINORRHEA: 0
COUGH: 0
CHOKING: 0
STRIDOR: 0
EYE REDNESS: 0
COLOR CHANGE: 0
SHORTNESS OF BREATH: 0
DIARRHEA: 1
APNEA: 0
ABDOMINAL PAIN: 0
PHOTOPHOBIA: 0
BLOOD IN STOOL: 0
EYE DISCHARGE: 0
FACIAL SWELLING: 0
NAUSEA: 0
TROUBLE SWALLOWING: 0

## 2021-08-31 ASSESSMENT — PAIN DESCRIPTION - PROGRESSION
CLINICAL_PROGRESSION_2: GRADUALLY IMPROVING
CLINICAL_PROGRESSION_2: GRADUALLY IMPROVING
CLINICAL_PROGRESSION: GRADUALLY IMPROVING
CLINICAL_PROGRESSION: GRADUALLY IMPROVING

## 2021-08-31 ASSESSMENT — PAIN DESCRIPTION - LOCATION
LOCATION_2: ARM
LOCATION_2: ARM
LOCATION: ARM
LOCATION: ARM

## 2021-08-31 ASSESSMENT — PAIN DESCRIPTION - INTENSITY
RATING_2: 6
RATING_2: 6

## 2021-08-31 ASSESSMENT — PAIN DESCRIPTION - DESCRIPTORS
DESCRIPTORS_2: ACHING;CONSTANT
DESCRIPTORS_2: ACHING;CONSTANT
DESCRIPTORS: ACHING;CONSTANT
DESCRIPTORS: ACHING;CONSTANT

## 2021-08-31 ASSESSMENT — PAIN DESCRIPTION - DURATION
DURATION_2: CONTINUOUS
DURATION_2: CONTINUOUS

## 2021-08-31 ASSESSMENT — PAIN DESCRIPTION - ORIENTATION
ORIENTATION_2: LEFT
ORIENTATION_2: LEFT
ORIENTATION: RIGHT
ORIENTATION: RIGHT

## 2021-08-31 ASSESSMENT — PAIN DESCRIPTION - FREQUENCY
FREQUENCY: CONTINUOUS
FREQUENCY: CONTINUOUS

## 2021-08-31 ASSESSMENT — PAIN - FUNCTIONAL ASSESSMENT
PAIN_FUNCTIONAL_ASSESSMENT: ACTIVITIES ARE NOT PREVENTED
PAIN_FUNCTIONAL_ASSESSMENT: ACTIVITIES ARE NOT PREVENTED

## 2021-08-31 NOTE — PROGRESS NOTES
Vascular Progress Note    8/31/2021 10:45 AM    Chief complaint / Reason for visit : S/p left forearm AV graft 8/19/21    Subjective:  Patient up in chair. She reports her left arm swelling has improved. She is hoping to discharge home later today. She was re-admitted on 8/25 after falling at home. Appears hemodynamically stable, afebrile.      Vital Signs: /71   Pulse 89   Temp 98 °F (36.7 °C) (Oral)   Resp 18   Ht 5' 2\" (1.575 m)   Wt 169 lb 11.2 oz (77 kg)   LMP  (LMP Unknown)   SpO2 91%   BMI 31.04 kg/m²  O2 Flow Rate (L/min): 0 L/min   I/O:    Intake/Output Summary (Last 24 hours) at 8/31/2021 1045  Last data filed at 8/31/2021 0122  Gross per 24 hour   Intake 670 ml   Output 891 ml   Net -221 ml       Physical Exam:   General: no apparent distress, appears stated age  Chest/Lungs: no accessory muscle use  Cardiac:  regular rate and rhythm  Vascular: left forearm AV graft + bruit/thrill - incisions intact with no drainage or erythema, swelling and ecchymosis improved to left forearm    Labs:   Lab Results   Component Value Date     08/31/2021    K 4.7 08/31/2021    K 4.4 08/24/2021    CL 91 08/31/2021    CO2 26 08/31/2021    BUN 47 08/31/2021    CREATININE 6.6 08/31/2021    GFRAA 7 08/31/2021    LABGLOM 6 08/31/2021    GLUCOSE 95 08/31/2021    PHOS 7.3 08/31/2021    MG 2.40 08/31/2021    CALCIUM 9.7 08/31/2021     Lab Results   Component Value Date    WBC 13.9 08/31/2021    RBC 2.92 08/31/2021    HGB 9.0 08/31/2021    HCT 27.4 08/31/2021    MCV 93.7 08/31/2021    RDW 17.1 08/31/2021     08/31/2021     Lab Results   Component Value Date    INR 0.97 08/25/2021    PROTIME 11.0 08/25/2021        Scheduled Meds:    allopurinol  100 mg Oral Daily    aspirin EC  81 mg Oral Daily    clopidogrel  75 mg Oral Daily    levothyroxine  150 mcg Oral QAM AC    metoprolol tartrate  12.5 mg Oral BID    rosuvastatin  20 mg Oral Daily    spironolactone  25 mg Oral Daily    torsemide  60 mg

## 2021-08-31 NOTE — DISCHARGE SUMMARY
Hospital Medicine Discharge Summary    Patient ID: Naa Amaro      Patient's PCP: Myles Hernandez MD    Admit Date: 8/25/2021     Discharge Date:   8/31/2021    Admitting Physician: Luis Madsen MD     Discharge Physician: Laura Aranda MD     Discharge Diagnoses: Active Hospital Problems    Diagnosis     Antibiotic-associated diarrhea [K52.1, M85.82RD]     Pneumonia due to infectious organism [J18.9]     Frequent falls [R29.6]     Hyperkalemia [E87.5]     Hyponatremia [E87.1]     Ex-smoker [Z87.891]     Class 1 obesity due to excess calories with body mass index (BMI) of 33.0 to 33.9 in adult [E66.09, Z68.33]     Septicemia ([facility] [address] [A41.9]     ESRD on dialysis ([facility] [address] [N18.6, Z99.2]     Weight loss counseling, encounter for [Z71.3]     Hyperlipidemia [E78.5]        The patient was seen and examined on day of discharge and this discharge summary is in conjunction with any daily progress note from day of discharge. Hospital Course:     67 y. o. female with past medical history ESRD, diabetes, hypertension, hyperlipidemia, thyroid disease recently admitted for suspected infection of the dialysis fistula, new fistula/graft formed earlier this month.  She comes in from home in view of several falls.  ED note states that patient altered, however, patient is alert and oriented to the time of my interaction. David Gilliam states that she was trying to  the phone and slid out of bed.  Was not able to get up after that due to weakness.  She denies any confusion loss of consciousness headache blurry vision palpitations chest pain shortness of breath.  In the ED patient found to have significant leukocytosis.  Broad-spectrum antibiotics initiated.  Patient also with hyperkalemia, nephrology contacted and dialysis.        Sepsis-resolved  Unknown origin  Possibility of infected graft  Patient also has Vas-Cath  Blood cultures no growth  Follow blood cultures obtained from Vas-Cath no growth  Patient allergic to penicillin-anaphylaxis (many decades ago when she was pregnant with one of her children)  Vancomycin meropenem discontinued by ID     Hyperkalemia  No EKG changes  Dialysis patient  Resolved     ESRD  Dialysis  Monday Wednesday Friday  Cleared by nephrology for discharge     Hyponatremia  Hypervolemic  Resolved     Nonzero cardiac biomarker  Likely related to ESRD    Physical Exam Performed:     BP (!) 149/87   Pulse 90   Temp 98.3 °F (36.8 °C) (Oral)   Resp 18   Ht 5' 2\" (1.575 m)   Wt 169 lb 11.2 oz (77 kg)   LMP  (LMP Unknown)   SpO2 97%   BMI 31.04 kg/m²       General appearance:  No apparent distress, appears stated age and cooperative. HEENT:  Normal cephalic, atraumatic without obvious deformity. Pupils equal, round, and reactive to light. Extra ocular muscles intact. Conjunctivae/corneas clear. Neck: Supple, with full range of motion. No jugular venous distention. Trachea midline. Respiratory:  Normal respiratory effort. Clear to auscultation, bilaterally without Rales/Wheezes/Rhonchi. Cardiovascular:  Regular rate and rhythm with normal S1/S2 without murmurs, rubs or gallops. Abdomen: Soft, non-tender, non-distended with normal bowel sounds. Musculoskeletal:  No clubbing, cyanosis or edema bilaterally. Full range of motion without deformity. Skin: Skin color, texture, turgor normal.  No rashes or lesions. Neurologic:  Neurovascularly intact without any focal sensory/motor deficits. Cranial nerves: II-XII intact, grossly non-focal.  Psychiatric:  Alert and oriented, thought content appropriate, normal insight  Capillary Refill: Brisk,< 3 seconds   Peripheral Pulses: +2 palpable, equal bilaterally       Labs:  For convenience and continuity at follow-up the following most recent labs are provided:      CBC:    Lab Results   Component Value Date    WBC 13.9 08/31/2021    HGB 9.0 08/31/2021    HCT 27.4 08/31/2021     08/31/2021       Renal:    Lab Results Component Value Date     08/31/2021    K 4.7 08/31/2021    K 4.4 08/24/2021    CL 91 08/31/2021    CO2 26 08/31/2021    BUN 47 08/31/2021    CREATININE 6.6 08/31/2021    CALCIUM 9.7 08/31/2021    PHOS 7.3 08/31/2021         Significant Diagnostic Studies    Radiology:   CT LUMBAR SPINE WO CONTRAST   Final Result   Negative for lumbar spine fracture         CT CERVICAL SPINE WO CONTRAST   Final Result   No acute abnormality of the cervical spine. CT HEAD WO CONTRAST   Final Result   No acute intracranial abnormality. Diffuse atrophic changes with findings suggesting chronic microvascular   ischemia         XR CHEST PORTABLE   Final Result   Improved aeration of the lungs bilaterally. A few scattered opacities remain. Consults:     IP CONSULT TO NEPHROLOGY  IP CONSULT TO INFECTIOUS DISEASES  PHARMACY CONSULT FOR RENAL DOSING    Disposition: Home    Condition at Discharge: Stable    Discharge Instructions/Follow-up: PCP  Nephrology  Vascular surgery  ID    Code Status:  DNR-CCA     Activity: activity as tolerated    Diet: renal diet      Discharge Medications:     Current Discharge Medication List           Details   torsemide (DEMADEX) 20 MG tablet Take 3 tablets by mouth 2 times daily  Qty: 30 tablet, Refills: 3      spironolactone (ALDACTONE) 25 MG tablet Take 1 tablet by mouth daily  Qty: 30 tablet, Refills: 3              Details   nortriptyline (PAMELOR) 10 MG capsule TAKE 1 CAPSULE BY MOUTH EVERY EVENING  Qty: 30 capsule, Refills: 3      rosuvastatin (CRESTOR) 20 MG tablet Take 1 tablet by mouth daily  Qty: 90 tablet, Refills: 1      clopidogrel (PLAVIX) 75 MG tablet Take 1 tablet by mouth daily  Qty: 90 tablet, Refills: 3      aspirin EC 81 MG EC tablet Take 1 tablet by mouth daily  Qty: 90 tablet, Refills: 1      gabapentin (NEURONTIN) 100 MG capsule Take 2 capsules by mouth nightly for 30 days.   Qty: 60 capsule, Refills: 3      UNABLE TO FIND Dialysis mon wed fri loperamide (IMODIUM) 2 MG capsule Take 2 mg by mouth 4 times daily as needed      metoprolol tartrate (LOPRESSOR) 25 MG tablet Take 12.5 mg by mouth 2 times daily      levothyroxine (SYNTHROID) 150 MCG tablet Take 150 mcg by mouth every morning (before breakfast)      allopurinol (ZYLOPRIM) 100 MG tablet TAKE 1 TABLET EVERY DAY  Qty: 90 tablet, Refills: 3             Time Spent on discharge is more than 30 minutes in the examination, evaluation, counseling and review of medications and discharge plan.       Signed:    Electronically signed by Dmitry Monzon MD on 8/31/2021 at 2:13 PM

## 2021-08-31 NOTE — PROGRESS NOTES
Comprehensive Nutrition Assessment    Type and Reason for Visit:  Reassess    Nutrition Recommendations/Plan:   1. Continue low potassium diet and encourage PO intake  2. RD is discontinue ONS, per pt request.  3. Continue to monitor weights  4. Recommend addition of probiotic and/ or psyllium fiber to help bulk stool if medically appropriate  5. Monitor nutrition adequacy, pertinent labs, bowel habits, wt changes, and clinical progress    Nutrition Assessment:  Follow up: HD yesterday. On low K+ diet with nepro at breakfast. PO intakes 0%. Pt reports increase in appetite today, ordered cottage cheese and fruit for lunch. Reports that intake will increase upon discharge and will drinks protein drinks at home. Does not like any of the protein drinks here and declined ONS, RD to discontinue. Reports nausea today. Encouraged PO intake. WIll continue to monitor. Malnutrition Assessment:  Malnutrition Status: At risk for malnutrition (Comment)    Context:  Acute Illness     Findings of the 6 clinical characteristics of malnutrition:  Energy Intake:  Mild decrease in energy intake (Comment)  Weight Loss:  7 - Greater than 2% over 1 week     Fluid Accumulation:  1 - Mild      Estimated Daily Nutrient Needs:  Energy (kcal):  1215- 1458 (15-18 kcal/81 kg); Weight Used for Energy Requirements:  Current     Protein (g):  60- 100g (1.2-2.0g/50kg); Weight Used for Protein Requirements:  Ideal        Method Used for Fluid Requirements:  1 ml/kcal      Nutrition Related Findings:  Na low 133, Phos high 7.3. On HD. Wt flucuating, 10 lb weight loss since admission (-5.6% wt change) -1.5 L since admission. On lasix. Non-pitting edema RUE. Diarrhea today, per pt. Wounds:  Surgical Incision       Current Nutrition Therapies:    ADULT DIET;  Regular; Low Potassium (Less than 3000 mg/day)    Anthropometric Measures:  · Height: 5' 2\" (157.5 cm)  · Current Body Weight: 177 lb (80.3 kg)     · Ideal Body Weight: 110 lbs; % Ideal Body Weight 160.9 %   · BMI: 32.4  · BMI Categories: Obese Class 1 (BMI 30.0-34. 9)       Nutrition Diagnosis:   · Inadequate oral intake related to inadequate protein-energy intake as evidenced by poor intake prior to admission    Nutrition Interventions:   Food and/or Nutrient Delivery:  Continue Current Diet, Discontinue Oral Nutrition Supplement  Nutrition Education/Counseling:  Education not indicated   Coordination of Nutrition Care:  Continue to monitor while inpatient    Goals:  po intake at least 50% of meals & supplements       Nutrition Monitoring and Evaluation:   Behavioral-Environmental Outcomes:  None Identified   Food/Nutrient Intake Outcomes:  Food and Nutrient Intake, Supplement Intake  Physical Signs/Symptoms Outcomes:  Biochemical Data, Weight     Discharge Planning:    Continue current diet     Electronically signed by Donell Simmonds, MS, RD, LD on 8/31/21 at 11:37 AM EDT    Contact: [address] [place] [address]

## 2021-08-31 NOTE — PLAN OF CARE
Problem: Pain:  Goal: Pain level will decrease  Description: Pain level will decrease  Outcome: Met This Shift  Note: Pt denies pain currently. Problem: Falls - Risk of:  Goal: Will remain free from falls  Description: Will remain free from falls  Outcome: Met This Shift  Note: Pt is wearing the fall bracelet and yellow nonskid socks. Bed is in lowest position, locked, side rails up 2/4, and call light is within reach. Pt informed of fall risks, verbalizes understanding. Will monitor.     Goal: Absence of physical injury  Description: Absence of physical injury  Outcome: Met This Shift     Problem: Nutrition  Goal: Optimal nutrition therapy  Outcome: Ongoing

## 2021-08-31 NOTE — PROGRESS NOTES
Orders for Peripheral IV. Pre procedure and timout done with bedside RN. Successfull insertion of 20G RFARM. Catheter flushes without resistance. Blood return noted.   Dressing C/D/I

## 2021-08-31 NOTE — PROGRESS NOTES
Shift assessment completed. Pt is a/o X 4. VSS. Medications administered, see MAR. POC discussed and all questions answered. Pt provided with fresh ice water. Pt has belongings and call light in reach. Bed is locked and in lowest position, bed/chair alarm is refused by pt and pt is a medium fall risk and has a steady gait. Pt denies further needs, will continue to monitor.

## 2021-08-31 NOTE — PLAN OF CARE
Nutrition Problem #1: Inadequate oral intake  Intervention: Food and/or Nutrient Delivery: Continue Current Diet, Discontinue Oral Nutrition Supplement  Nutritional Goals: po intake at least 50% of meals & supplements

## 2021-08-31 NOTE — PLAN OF CARE
Problem: Pain:  Goal: Control of acute pain  Description: Control of acute pain  Outcome: Met This Shift     Problem: Falls - Risk of:  Goal: Will remain free from falls  Description: Will remain free from falls  8/31/2021 1051 by Austin Molina RN  Outcome: Ongoing     Problem: Nutrition  Goal: Optimal nutrition therapy  8/31/2021 1051 by Austin Molina RN  Outcome: Ongoing

## 2021-08-31 NOTE — PROGRESS NOTES
Infectious Diseases   Progress Note      Admission Date: 8/25/2021  Hospital Day: Hospital Day: 7   Attending: Hugo Schreiber MD  Date of service: 8/31/2021     Chief complaint/ Reason for consult:     · Severe sepsis on admission with bandemia,  fever, hypotension  · Hyponatremia  · Negative COVID-19 PCR on 8/25/2021  · Hepatitis B immune  · Ex-smoker    Microbiology:        I have reviewed allavailable micro lab data and cultures    · Blood culture (2/2) - collected on 8/25/2021: Negative  · Blood culture 2 out of 2: Collected on 8/30/2021: In process      Antibiotics and immunizations:       Current antibiotics: All antibiotics and their doses were reviewed by me    Recent Abx Admin                   vancomycin 1000 mg IVPB in 250 mL D5W addavial (mg) 1,000 mg New Bag 08/30/21 1300                  Immunization History: All immunization history was reviewed by me today. Immunization History   Administered Date(s) Administered    Influenza, High Dose (Fluzone 65 yrs and older) 01/23/2017, 11/01/2017    Influenza, Triv, inactivated, subunit, adjuvanted, IM (Fluad 65 yrs and older) 10/28/2019    Pneumococcal Conjugate 13-valent ([id number]) 01/23/2017    Pneumococcal Polysaccharide ([id number]) 05/10/2019       Known drug allergies: All allergies were reviewed and updated    Allergies   Allergen Reactions    Amoxicillin Anaphylaxis    Demeclocycline Anaphylaxis    Penicillins Anaphylaxis    Tetracyclines & Related Anaphylaxis    Ozempic (0.25 Or 0.5 Mg-Dose) [Semaglutide(0.25 Or 0.5mg-Dos)] Other (See Comments)     Lost large amount weight and loss  Of appetite    Codeine Itching and Rash       Social history:     Social History:  All social andepidemiologic history was reviewed and updated by me today as needed. · Tobacco use:   reports that she quit smoking about 43 years ago. Her smoking use included cigarettes. She has a 5.00 pack-year smoking history.  She has never used smokeless tobacco.  · Alcohol use:   reports current alcohol use. · Currently lives in: [address] [place] [address]  ·  reports no history of drug use. COVID VACCINATION AND LAB RESULT RECORDS:     Internal Administration   First Dose      Second Dose           Last COVID Lab SARS-CoV-2 (no units)   Date Value   08/25/2021 Not Detected     SARS-CoV-2, NAAT (no units)   Date Value   11/24/2020 Not Detected            Assessment:     The patient is a 67 y.o. old female who  has a past medical history of Arthritis, Diabetes ([facility] Hemodialysis patient ([facility] Hyperlipidemia, Hypertension, Kidney disease, Neuropathy, Pneumonia, and Thyroid disease. with following problems:    · Severe sepsis on admission with bandemia,  fever, hypotension-sepsis resolved, has ongoing leukocytosis of unclear source  · Diarrhea-looks like antibiotic induced-stool C. difficile test negative  · Hyponatremia-corrected with dialysis  · Negative COVID-19 PCR on 8/25/2021  · Hepatitis B immune  · Ex-smoker  · Essential hypertension-blood pressure is okay  · Hyperlipidemia  · ESRD on dialysis  · Obesity Class 1 due to excess calorie intake : Body mass index is 33.16 kg/m².-Counseling      Discussion:      I had stopped her broad-spectrum antibiotics yesterday as she was having diarrhea. Stool C. difficile test came back positive. Parminder blood cultures have been sent yesterday. White cell count remained stable at 13,900. Cause of leukocytosis remains unclear. She is otherwise afebrile. She tells me her diarrhea is improving    Plan:     Diagnostic Workup:      · Continue to follow  fever curve, WBC count and blood cultures. · Continue to monitor blood counts, liver and renal function. Antimicrobials:    · The patient is afebrile, has ongoing leukocytosis of unclear source  · I recommended CT scan of chest abdomen and pelvis with IV and p.o. contrast and await for blood cultures to look for the source for leukocytosis.   However, patient does not want to wait and wants to go home today  · Unfortunately, I do not have a source of her leukocytosis yet and the blood cultures from yesterday are pending. Explained to the patient that I cannot rule out possibility of any serious or potentially life-threatening underlying infection without above work-up. She understands and tells me that she feels fine. · I called and discussed my concerns with her nephrologist, Dr. Jason Larose, who will try to convince the patient to get the CT scan done  · If the CT scan is unremarkable, I think it would be more reassuring and the blood cultures can be followed as an outpatient. However if the CT scan comes back abnormal, the further plan will be based on that  · We will continue to watch her off antibiotics for now  · Continue close vitals monitoring. · Maintain good glycemic control. · Fall precautions. Aspiration precautions. · Continue to watch for new fever or diarrhea. · DVT prophylaxis. · Discussed all above with patient and RN. Drug Monitoring:    · Continue monitoring for antibiotic toxicity as follows: CBC, CMP   · Continue to watch for following: new or worsening fever, new hypotension, hives, lip swelling and redness or purulence at vascular access sites. I/v access Management:    · Continue to monitor i.v access sites for erythema, induration, discharge or tenderness. · As always, continue efforts to minimize tubes/lines/drains as clinically appropriate to reduce chances of line associated infections. Patient education and counseling:        · The patient was educated in detail about the side-effects of various antibiotics and things to watch for like new rashes, lip swelling, severe reaction, worsening diarrhea, break through fever etc.  · Discussed patient's condition and what to expect. All of the patient's questions were addressed in a satisfactory manner and patient verbalized understanding all instructions.       TIME SPENT TODAY:     - Spent over  36 minutes on visit (including interval history, physical exam, review of data including labs, cultures, imaging, development and implementation of treatment plan and coordination of complex care). More than 50 percent of this includes face-to-face time spent with the patient for counseling and coordination of care. Thank you for involving me in the care of your patient. I will continue to follow. If you have anyadditional questions, please do not hesitate to contact me. Subjective: Interval history: Interval history was obtained from chart review and patient/ RN. The patient is afebrile. She is tolerating antibiotic okay. She tells me that her diarrhea is improving     REVIEW OF SYSTEMS:      Review of Systems   Constitutional: Positive for fatigue. Negative for chills, diaphoresis and unexpected weight change. HENT: Negative for congestion, ear discharge, ear pain, facial swelling, hearing loss, rhinorrhea and trouble swallowing. Eyes: Negative for photophobia, discharge, redness and visual disturbance. Respiratory: Negative for apnea, cough, choking, chest tightness, shortness of breath and stridor. Cardiovascular: Negative for chest pain and palpitations. Gastrointestinal: Positive for diarrhea (Improving.). Negative for abdominal pain, blood in stool and nausea. Endocrine: Negative for polydipsia, polyphagia and polyuria. Genitourinary: Negative for difficulty urinating, dysuria, frequency, hematuria, menstrual problem and vaginal discharge. Musculoskeletal: Negative for arthralgias, joint swelling, myalgias and neck stiffness. Skin: Negative for color change and rash. Allergic/Immunologic: Negative for immunocompromised state. Neurological: Negative for dizziness, seizures, speech difficulty, light-headedness and headaches. Hematological: Negative for adenopathy. Psychiatric/Behavioral: Negative for agitation, hallucinations and suicidal ideas.          Past Medical History: All past medical history reviewed today. Past Medical History:   Diagnosis Date    Arthritis     Diabetes ([facility]     Hemodialysis patient ([facility]     Hyperlipidemia     Hypertension     Kidney disease     Neuropathy     Pneumonia     IN February, 2021, with COVID    Thyroid disease        Past Surgical History: All past surgical history was reviewed today. Past Surgical History:   Procedure Laterality Date    APPENDECTOMY      CHOLECYSTECTOMY      COLONOSCOPY  11/24/2020    COLONOSCOPY POLYPECTOMY SNARE/COLD BIOPSY performed by Gonzalo Javier MD at [address] 5/17/2021    PERITONEAL DIALYSIS CATHETER REMOVAL. EXCISSION OF ABDOMINAL CITRIX. performed by Navya Mckeon DO at [address] N/A 4/3/2020    LAPAROSCOPIC LYSIS OF ADHESIONS, LAPAROSCOPIC PERITONEAL DIALYSIS CATHETER PLACEMENT, LAPAROSCOPIC OMENTOPEXY performed by Navya Mckeon DO at [facility], BILATERAL      preventive    ROTATOR CUFF REPAIR Left     SHUNT REVISION Left 8/19/2021    LEFT FOREARM ARTERIO VENOUS GRAFT performed by Maria Teresa Crowe MD at [address] 11/24/2020    EGD BIOPSY performed by Gonzalo Javier MD at [address] ENDOSCOPY       Family History: All family history was reviewed today.         Problem Relation Age of Onset    Cancer Mother         lung, breast, liver    Stroke Father     Stomach Cancer Brother     Cancer Maternal Grandmother     No Known Problems Paternal Grandmother     No Known Problems Paternal Grandfather        Objective:       PHYSICAL EXAM:      Vitals:   Vitals:    08/30/21 2306 08/31/21 0344 08/31/21 0755 08/31/21 1113   BP: (!) 143/82 123/65 124/71 (!) 149/87   Pulse: 92 87 89 90   Resp: 18 16 18 18   Temp: 97.6 °F (36.4 °C) 98.4 °F (36.9 °C) 98 °F (36.7 °C) 98.3 °F (36.8 °C)   TempSrc: Tympanic Temporal Oral Oral   SpO2: 94% 91% 91% 97%   Weight:  169 lb 11.2 oz (77 kg)     Height:           Physical Exam  Vitals and nursing note reviewed. Constitutional:       General: She is not in acute distress. Appearance: She is well-developed. She is not diaphoretic. HENT:      Head: Normocephalic. Right Ear: External ear normal.      Left Ear: External ear normal.      Nose: Nose normal.   Eyes:      General: No scleral icterus. Right eye: No discharge. Left eye: No discharge. Conjunctiva/sclera: Conjunctivae normal.      Pupils: Pupils are equal, round, and reactive to light. Cardiovascular:      Rate and Rhythm: Normal rate and regular rhythm. Heart sounds: No murmur heard. No friction rub. Pulmonary:      Effort: Pulmonary effort is normal.      Breath sounds: No stridor. No wheezing or rales. Chest:      Chest wall: No tenderness. Abdominal:      Palpations: Abdomen is soft. There is no mass. Tenderness: There is no abdominal tenderness. There is no guarding or rebound. Musculoskeletal:         General: No tenderness. Cervical back: Normal range of motion and neck supple. Lymphadenopathy:      Cervical: No cervical adenopathy. Skin:     General: Skin is warm and dry. Findings: No erythema or rash. Neurological:      Mental Status: She is alert and oriented to person, place, and time. Motor: No abnormal muscle tone. Psychiatric:         Judgment: Judgment normal.                 Lines and drains: All vascular access sites are healthy with no local erythema, discharge or tenderness. Intake and output:    I/O last 3 completed shifts: In: 430 [P.O.:120; I.V.:10]  Out: 891     Lab Data:   All available labs and old records have been reviewed by me.     CBC:  Recent Labs     08/29/21  1212 08/30/21  0605 08/31/21  0516   WBC 15.9* 13.9* 13.9*   RBC 3.18* 3.08* 2.92*   HGB 9.8* 9.6* 9.0*   HCT 29.8* 29.9* 27.4*    295 362   MCV 93.7 96.9 93.7   MCH 31.0 31.2 30.9   MCHC 33.1 32.2 33.0   RDW 17.4* 17.0* 17.1*   BANDSPCT  --  7  --         BMP:  Recent Labs     08/29/21  1212 08/30/21  0605 08/31/21  0516   * 133* 133*   K 4.8 4.8 4.7   CL 93* 94* 91*   CO2 24 19* 26   BUN 67* 73* 47*   CREATININE 7.9* 8.4* 6.6*   CALCIUM 10.0 9.9 9.7   GLUCOSE 126* 105* 95        Hepatic Function Panel:   Lab Results   Component Value Date    ALKPHOS 149 08/25/2021    ALT 30 08/25/2021    AST 37 08/25/2021    PROT 7.5 08/25/2021    BILITOT 0.5 08/25/2021    BILIDIR <0.2 08/12/2021    IBILI see below 08/12/2021    LABALBU 3.6 08/25/2021       CPK:   Lab Results   Component Value Date    CKTOTAL 243 (H) 08/25/2021     ESR: No results found for: SEDRATE  CRP: No results found for: CRP        Imaging: All pertinent images and reports for the current visit were reviewed by me during this visit. CT LUMBAR SPINE WO CONTRAST   Final Result   Negative for lumbar spine fracture         CT CERVICAL SPINE WO CONTRAST   Final Result   No acute abnormality of the cervical spine. CT HEAD WO CONTRAST   Final Result   No acute intracranial abnormality. Diffuse atrophic changes with findings suggesting chronic microvascular   ischemia         XR CHEST PORTABLE   Final Result   Improved aeration of the lungs bilaterally. A few scattered opacities remain. Medications: All current and past medications were reviewed.      allopurinol  100 mg Oral Daily    aspirin EC  81 mg Oral Daily    clopidogrel  75 mg Oral Daily    levothyroxine  150 mcg Oral QAM AC    metoprolol tartrate  12.5 mg Oral BID    rosuvastatin  20 mg Oral Daily    spironolactone  25 mg Oral Daily    torsemide  60 mg Oral BID    sodium chloride flush  5-40 mL IntraVENous 2 times per day    heparin (porcine)  5,000 Units SubCUTAneous 3 times per day        dextrose      sodium chloride 100 mL/hr at 08/29/21 9065       ondansetron, diphenhydrAMINE, glucose, dextrose, glucagon (rDNA), dextrose, sodium chloride flush, sodium chloride, heparin (porcine), acetaminophen, melatonin      Problem list:       Patient Active Problem List   Diagnosis Code    Essential hypertension I10    Hyperlipidemia E78.5    Acquired hypothyroidism E03.9    Diabetes mellitus type 2 in obese ([facility]) E11.69, E66.9    Chronic kidney 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 ([facility] E11.22, N18.6, Z99.2    Closed nondisplaced fracture of fifth metatarsal bone of right foot S92.354A    Arthritis of right knee M17.11    Acute pain of both knees M25.561, M25.562    Primary osteoarthritis of both knees M17.0    Bursitis M71.9    Memory loss R41.3    Bilateral leg edema R60.0    Primary osteoarthritis of left knee M17.12    Atypical chest pain R07.89    Weight loss counseling, encounter for Z71.3    Tubular adenoma of colon D12.6    Primary insomnia F51.01    Diarrhea of presumed infectious origin R19.7    Diabetic polyneuropathy associated with type 2 diabetes mellitus ([facility]) E11.42    COVID-19 virus infection U07.1    Coronary artery disease of native artery with stable angina pectoris ([facility]) I25.118    Pain of left calf M79.662    Atherosclerosis of native arteries of extremities with intermittent claudication, bilateral legs ([facility]) I70.213    ESRD on dialysis ([facility]) N18.6, Z99.2    Arteriovenous fistula infection, initial encounter ([facility] T82. 7XXA    Septicemia ([facility] A41.9    Pneumonia due to infectious organism J18.9    Frequent falls R29.6    Hyperkalemia E87.5    Hyponatremia E87.1    Ex-smoker Z87.891    Class 1 obesity due to excess calories with body mass index (BMI) of 33.0 to 33.9 in adult E66.09, Z68.33    Antibiotic-associated diarrhea K52.1, T36.95XA       Please note that this chart was generated using Dragon dictation software.  Although every effort was made to ensure the accuracy of this automated transcription, some errors in transcription may have occurred inadvertently. If you may need any clarification, please do not hesitate to contact me through EPIC or at the phone number provided below with my electronic signature. Any pictures or media included in this note were obtained after taking informed verbal consent from the patient and with their approval to include those in the patient's medical record.     Merlyn Watts MD, MPH  8/31/2021 , 12:51 PM   [facility] Infectious Disease   [address] [place] [address], [address] [place] [address]  Office: [phone]  Fax: [phone]  Clinic days:  Tuesday & Thursday

## 2021-08-31 NOTE — PROGRESS NOTES
Office : [phone]     Fax :[phone]       Nephrology progress  Note      Patient's Name: Adry Oconnor  9:08 AM  8/31/2021    Reason for Consult:  ESRD management , hyperkalemia       History of Present iIlness:    Adry Oconnor is a 67 y.o. female with past h/o ESRD, diabetes, hypertension, hyperlipidemia, thyroid disease recently admitted for new fistula/graft formed earlier this month. She comes in from home in view of several falls. ED note states that patient altered, however, patient is alert and oriented to the time of my interaction. Patient states that she was trying to  the phone and slid out of bed. Was not able to get up after that due to weakness. She denies any confusion loss of consciousness headache blurry vision palpitations chest pain shortness of breath. In the ED patient found to have significant leukocytosis. Broad-spectrum antibiotics initiated. Has leukocytosis   K is 6.0     Interval hx     Feeling better   Had diarrhea last night   Had HD yesterday     No SOB   No edema       I/O last 3 completed shifts: In: 430 [P.O.:120; I.V.:10]  Out: 891     Past Medical History:   Diagnosis Date    Arthritis     Diabetes ([facility]     Hemodialysis patient ([facility]     Hyperlipidemia     Hypertension     Kidney disease     Neuropathy     Pneumonia     IN February, 2021, with COVID    Thyroid disease        Past Surgical History:   Procedure Laterality Date    APPENDECTOMY      CHOLECYSTECTOMY      COLONOSCOPY  11/24/2020    COLONOSCOPY POLYPECTOMY SNARE/COLD BIOPSY performed by Amanda Zamora MD at [address] 5/17/2021    PERITONEAL DIALYSIS CATHETER REMOVAL.  EXCISSION OF ABDOMINAL CITRIX. performed by (77 kg)   LMP  (LMP Unknown)   SpO2 91%   BMI 31.04 kg/m²   Constitutional:  OAA X3 NAD  Skin: no rash, turgor wnl  Heent:  eomi, mmm  Neck: no bruits or jvd noted  Cardiovascular:  S1, S2 without m/r/g  Respiratory: CTA B without w/r/r  Abdomen:  +bs, soft, nt, nd  Ext:no  lower extremity edema    Labs:  CBC:   Recent Labs     08/29/21  1212 08/30/21  0605 08/31/21  0516   WBC 15.9* 13.9* 13.9*   HGB 9.8* 9.6* 9.0*    295 362     BMP:    Recent Labs     08/29/21  1212 08/30/21  0605 08/31/21  0516   * 133* 133*   K 4.8 4.8 4.7   CL 93* 94* 91*   CO2 24 19* 26   BUN 67* 73* 47*   CREATININE 7.9* 8.4* 6.6*   GLUCOSE 126* 105* 95     Ca/Mg/Phos:   Recent Labs     08/29/21  1212 08/30/21  0605 08/31/21  0516   CALCIUM 10.0 9.9 9.7   MG 2.60* 2.70* 2.40   PHOS 6.8* 8.1* 7.3*                IMAGING:  CT LUMBAR SPINE WO CONTRAST   Final Result   Negative for lumbar spine fracture         CT CERVICAL SPINE WO CONTRAST   Final Result   No acute abnormality of the cervical spine. CT HEAD WO CONTRAST   Final Result   No acute intracranial abnormality. Diffuse atrophic changes with findings suggesting chronic microvascular   ischemia         XR CHEST PORTABLE   Final Result   Improved aeration of the lungs bilaterally. A few scattered opacities remain. Assessment/Plan :      1. ESRD   Gets HD mon/wed/friday   Got hd yesterday     2. HTN. Controlled     3. AMS likely 2/2 infection vs from pain medications   Resolved   AV access site has redness and bruising has improved   Awaiting results of BC from yesterday   Negative for c. Diff     4. Acid- base disorder. Metabolic acidosis  Correct with HD     5.  Hyperkalemia   Corrected with HD       Stable from nephrology standpoint once cleared by ID       D/w primary team      Thank you for allowing us to participate in care of [facility]         Electronically signed by: Delfina Lares MD, 8/31/2021, 9:08 AM      [facility] of [address]  Office : [phone]  Fax :[phone]

## 2021-09-01 VITALS
TEMPERATURE: 98.5 F | RESPIRATION RATE: 18 BRPM | WEIGHT: 171.96 LBS | BODY MASS INDEX: 31.64 KG/M2 | OXYGEN SATURATION: 93 % | HEIGHT: 62 IN | HEART RATE: 106 BPM | SYSTOLIC BLOOD PRESSURE: 134 MMHG | DIASTOLIC BLOOD PRESSURE: 78 MMHG

## 2021-09-01 LAB
ALBUMIN SERPL-MCNC: 3.8 G/DL (ref 3.4–5)
ANION GAP SERPL CALCULATED.3IONS-SCNC: 21 MMOL/L (ref 3–16)
ANISOCYTOSIS: ABNORMAL
BASOPHILS ABSOLUTE: 0 K/UL (ref 0–0.2)
BASOPHILS RELATIVE PERCENT: 0 %
BUN BLDV-MCNC: 71 MG/DL (ref 7–20)
CALCIUM SERPL-MCNC: 10 MG/DL (ref 8.3–10.6)
CHLORIDE BLD-SCNC: 89 MMOL/L (ref 99–110)
CO2: 22 MMOL/L (ref 21–32)
CREAT SERPL-MCNC: 8.1 MG/DL (ref 0.6–1.2)
EKG ATRIAL RATE: 101 BPM
EKG DIAGNOSIS: NORMAL
EKG P AXIS: 15 DEGREES
EKG P-R INTERVAL: 148 MS
EKG Q-T INTERVAL: 374 MS
EKG QRS DURATION: 90 MS
EKG QTC CALCULATION (BAZETT): 484 MS
EKG R AXIS: 74 DEGREES
EKG T AXIS: 17 DEGREES
EKG VENTRICULAR RATE: 101 BPM
EOSINOPHILS ABSOLUTE: 0.4 K/UL (ref 0–0.6)
EOSINOPHILS RELATIVE PERCENT: 3 %
GFR AFRICAN AMERICAN: 6
GFR NON-AFRICAN AMERICAN: 5
GLUCOSE BLD-MCNC: 102 MG/DL (ref 70–99)
HCT VFR BLD CALC: 28.3 % (ref 36–48)
HEMOGLOBIN: 9.5 G/DL (ref 12–16)
INR BLD: 1.03 (ref 0.88–1.12)
LV EF: 65 %
LVEF MODALITY: NORMAL
LYMPHOCYTES ABSOLUTE: 1.3 K/UL (ref 1–5.1)
LYMPHOCYTES RELATIVE PERCENT: 9 %
MAGNESIUM: 2.5 MG/DL (ref 1.8–2.4)
MCH RBC QN AUTO: 31.5 PG (ref 26–34)
MCHC RBC AUTO-ENTMCNC: 33.5 G/DL (ref 31–36)
MCV RBC AUTO: 93.9 FL (ref 80–100)
MONOCYTES ABSOLUTE: 1.1 K/UL (ref 0–1.3)
MONOCYTES RELATIVE PERCENT: 8 %
NEUTROPHILS ABSOLUTE: 11.3 K/UL (ref 1.7–7.7)
NEUTROPHILS RELATIVE PERCENT: 80 %
PDW BLD-RTO: 17 % (ref 12.4–15.4)
PHOSPHORUS: 8.8 MG/DL (ref 2.5–4.9)
PHOSPHORUS: 8.9 MG/DL (ref 2.5–4.9)
PLATELET # BLD: 392 K/UL (ref 135–450)
PMV BLD AUTO: 8 FL (ref 5–10.5)
POTASSIUM SERPL-SCNC: 4.6 MMOL/L (ref 3.5–5.1)
PROTHROMBIN TIME: 11.7 SEC (ref 9.9–12.7)
RBC # BLD: 3.01 M/UL (ref 4–5.2)
RBC # BLD: NORMAL 10*6/UL
SODIUM BLD-SCNC: 132 MMOL/L (ref 136–145)
TROPONIN: 0.03 NG/ML
WBC # BLD: 14.1 K/UL (ref 4–11)

## 2021-09-01 PROCEDURE — 99232 SBSQ HOSP IP/OBS MODERATE 35: CPT | Performed by: INTERNAL MEDICINE

## 2021-09-01 PROCEDURE — 36415 COLL VENOUS BLD VENIPUNCTURE: CPT

## 2021-09-01 PROCEDURE — 93306 TTE W/DOPPLER COMPLETE: CPT

## 2021-09-01 PROCEDURE — 93010 ELECTROCARDIOGRAM REPORT: CPT | Performed by: INTERNAL MEDICINE

## 2021-09-01 PROCEDURE — 99222 1ST HOSP IP/OBS MODERATE 55: CPT | Performed by: INTERNAL MEDICINE

## 2021-09-01 PROCEDURE — 6370000000 HC RX 637 (ALT 250 FOR IP): Performed by: INTERNAL MEDICINE

## 2021-09-01 PROCEDURE — 99233 SBSQ HOSP IP/OBS HIGH 50: CPT | Performed by: INTERNAL MEDICINE

## 2021-09-01 PROCEDURE — 94760 N-INVAS EAR/PLS OXIMETRY 1: CPT

## 2021-09-01 PROCEDURE — 6370000000 HC RX 637 (ALT 250 FOR IP)

## 2021-09-01 PROCEDURE — 83735 ASSAY OF MAGNESIUM: CPT

## 2021-09-01 PROCEDURE — 84484 ASSAY OF TROPONIN QUANT: CPT

## 2021-09-01 PROCEDURE — 85610 PROTHROMBIN TIME: CPT

## 2021-09-01 PROCEDURE — 84100 ASSAY OF PHOSPHORUS: CPT

## 2021-09-01 PROCEDURE — 93005 ELECTROCARDIOGRAM TRACING: CPT | Performed by: INTERNAL MEDICINE

## 2021-09-01 PROCEDURE — 80069 RENAL FUNCTION PANEL: CPT

## 2021-09-01 PROCEDURE — 90935 HEMODIALYSIS ONE EVALUATION: CPT

## 2021-09-01 PROCEDURE — 85025 COMPLETE CBC W/AUTO DIFF WBC: CPT

## 2021-09-01 RX ORDER — COLCHICINE 0.6 MG/1
0.6 TABLET ORAL 2 TIMES DAILY
Status: DISCONTINUED | OUTPATIENT
Start: 2021-09-01 | End: 2021-09-01 | Stop reason: HOSPADM

## 2021-09-01 RX ORDER — NITROGLYCERIN 0.4 MG/1
0.4 TABLET SUBLINGUAL EVERY 5 MIN PRN
Status: DISCONTINUED | OUTPATIENT
Start: 2021-09-01 | End: 2021-09-01 | Stop reason: HOSPADM

## 2021-09-01 RX ORDER — NITROGLYCERIN 0.4 MG/1
TABLET SUBLINGUAL
Status: COMPLETED
Start: 2021-09-01 | End: 2021-09-01

## 2021-09-01 RX ORDER — COLCHICINE 0.6 MG/1
0.6 TABLET ORAL DAILY
Qty: 5 TABLET | Refills: 0 | Status: SHIPPED | OUTPATIENT
Start: 2021-09-01 | End: 2021-10-07 | Stop reason: ALTCHOICE

## 2021-09-01 RX ORDER — KETOROLAC TROMETHAMINE 30 MG/ML
15 INJECTION, SOLUTION INTRAMUSCULAR; INTRAVENOUS ONCE
Status: DISCONTINUED | OUTPATIENT
Start: 2021-09-01 | End: 2021-09-01 | Stop reason: HOSPADM

## 2021-09-01 RX ADMIN — NITROGLYCERIN 0.4 MG: 0.4 TABLET, ORALLY DISINTEGRATING SUBLINGUAL at 07:40

## 2021-09-01 RX ADMIN — NITROGLYCERIN 0.4 MG: 0.4 TABLET, ORALLY DISINTEGRATING SUBLINGUAL at 07:35

## 2021-09-01 RX ADMIN — ROSUVASTATIN 20 MG: 20 TABLET, FILM COATED ORAL at 09:13

## 2021-09-01 RX ADMIN — METOPROLOL TARTRATE 12.5 MG: 25 TABLET, FILM COATED ORAL at 09:13

## 2021-09-01 RX ADMIN — ASPIRIN 81 MG: 81 TABLET, COATED ORAL at 07:39

## 2021-09-01 RX ADMIN — COLCHICINE 0.6 MG: 0.6 TABLET, FILM COATED ORAL at 17:19

## 2021-09-01 RX ADMIN — SPIRONOLACTONE 25 MG: 25 TABLET ORAL at 09:13

## 2021-09-01 RX ADMIN — NITROGLYCERIN 0.4 MG: 0.4 TABLET, ORALLY DISINTEGRATING SUBLINGUAL at 07:49

## 2021-09-01 RX ADMIN — TORSEMIDE 60 MG: 20 TABLET ORAL at 09:13

## 2021-09-01 RX ADMIN — CLOPIDOGREL BISULFATE 75 MG: 75 TABLET ORAL at 07:39

## 2021-09-01 RX ADMIN — LEVOTHYROXINE SODIUM 150 MCG: 0.15 TABLET ORAL at 06:22

## 2021-09-01 ASSESSMENT — PAIN DESCRIPTION - LOCATION
LOCATION: CHEST
LOCATION: CHEST
LOCATION: ARM
LOCATION: CHEST
LOCATION: ARM

## 2021-09-01 ASSESSMENT — PAIN SCALES - GENERAL
PAINLEVEL_OUTOF10: 2
PAINLEVEL_OUTOF10: 0
PAINLEVEL_OUTOF10: 0
PAINLEVEL_OUTOF10: 6
PAINLEVEL_OUTOF10: 0
PAINLEVEL_OUTOF10: 5
PAINLEVEL_OUTOF10: 2
PAINLEVEL_OUTOF10: 9

## 2021-09-01 ASSESSMENT — ENCOUNTER SYMPTOMS
EYE DISCHARGE: 0
EYE REDNESS: 0
NAUSEA: 0
FACIAL SWELLING: 0
TROUBLE SWALLOWING: 0
STRIDOR: 0
BLOOD IN STOOL: 0
CHOKING: 0
SHORTNESS OF BREATH: 0
COUGH: 0
RHINORRHEA: 0
APNEA: 0
COLOR CHANGE: 0
PHOTOPHOBIA: 0
ABDOMINAL PAIN: 0
DIARRHEA: 0
CHEST TIGHTNESS: 0

## 2021-09-01 ASSESSMENT — PAIN DESCRIPTION - PROGRESSION
CLINICAL_PROGRESSION: GRADUALLY IMPROVING

## 2021-09-01 ASSESSMENT — PAIN DESCRIPTION - PAIN TYPE
TYPE: ACUTE PAIN

## 2021-09-01 ASSESSMENT — PAIN DESCRIPTION - ORIENTATION
ORIENTATION: LEFT

## 2021-09-01 ASSESSMENT — PAIN DESCRIPTION - DESCRIPTORS
DESCRIPTORS: ACHING
DESCRIPTORS: PRESSURE
DESCRIPTORS: PRESSURE
DESCRIPTORS: ACHING

## 2021-09-01 ASSESSMENT — PAIN DESCRIPTION - ONSET
ONSET: ON-GOING
ONSET: ON-GOING

## 2021-09-01 ASSESSMENT — PAIN DESCRIPTION - FREQUENCY
FREQUENCY: CONTINUOUS
FREQUENCY: CONTINUOUS

## 2021-09-01 NOTE — PROGRESS NOTES
Hospitalist Progress Note      PCP: Linda Garcia MD    Date of Admission: 8/25/2021    Chief Complaint: [facility] Course: 67 y.o. female with past medical history ESRD, diabetes, hypertension, hyperlipidemia, thyroid disease recently admitted for suspected infection of the dialysis fistula, new fistula/graft formed earlier this month. She comes in from home in view of several falls. ED note states that patient altered, however, patient is alert and oriented to the time of my interaction. Patient states that she was trying to  the phone and slid out of bed. Was not able to get up after that due to weakness. She denies any confusion loss of consciousness headache blurry vision palpitations chest pain shortness of breath. In the ED patient found to have significant leukocytosis. Broad-spectrum antibiotics initiated. Patient also with hyperkalemia, nephrology contacted and dialysis. Subjective: Patient seen and examined at bedside. States she is feeling better.         Medications:  Reviewed    Infusion Medications    dextrose      sodium chloride 100 mL/hr at 08/29/21 5165     Scheduled Medications    ketorolac  15 mg IntraVENous Once    colchicine  0.6 mg Oral BID    allopurinol  100 mg Oral Daily    aspirin EC  81 mg Oral Daily    clopidogrel  75 mg Oral Daily    levothyroxine  150 mcg Oral QAM AC    metoprolol tartrate  12.5 mg Oral BID    rosuvastatin  20 mg Oral Daily    spironolactone  25 mg Oral Daily    torsemide  60 mg Oral BID    sodium chloride flush  5-40 mL IntraVENous 2 times per day    heparin (porcine)  5,000 Units SubCUTAneous 3 times per day     PRN Meds: nitroGLYCERIN, aspirin, perflutren lipid microspheres, ondansetron, diphenhydrAMINE, glucose, dextrose, glucagon (rDNA), dextrose, sodium chloride flush, sodium chloride, heparin (porcine), acetaminophen, melatonin      Intake/Output Summary (Last 24 hours) at 9/1/2021 1502  Last data filed at 8/31/2021 2050  Gross per 24 hour   Intake 240 ml   Output --   Net 240 ml       Physical Exam Performed:    /77   Pulse 86   Temp 96.8 °F (36 °C) (Temporal)   Resp 18   Ht 5' 2\" (1.575 m)   Wt 171 lb 15.3 oz (78 kg)   LMP  (LMP Unknown)   SpO2 97%   BMI 31.45 kg/m²     General appearance: No apparent distress, appears stated age and cooperative. HEENT: Pupils equal, round, and reactive to light. Conjunctivae/corneas clear. Neck: Supple, with full range of motion. No jugular venous distention. Trachea midline. Respiratory:  Normal respiratory effort. Clear to auscultation, bilaterally without Rales/Wheezes/Rhonchi. Cardiovascular: Regular rate and rhythm with normal S1/S2 without murmurs, rubs or gallops. Abdomen: Soft, non-tender, non-distended with normal bowel sounds. Musculoskeletal: No clubbing, cyanosis or edema bilaterally. Full range of motion without deformity. Skin: Skin color, texture, turgor normal.  No rashes or lesions. Neurologic:  Neurovascularly intact without any focal sensory/motor deficits. Cranial nerves: II-XII intact, grossly non-focal.  Psychiatric: Alert and oriented, thought content appropriate, normal insight  Capillary Refill: Brisk,< 3 seconds   Peripheral Pulses: +2 palpable, equal bilaterally       Labs:   Recent Labs     08/30/21  0605 08/31/21  0516 09/01/21  0812   WBC 13.9* 13.9* 14.1*   HGB 9.6* 9.0* 9.5*   HCT 29.9* 27.4* 28.3*    362 392     Recent Labs     08/30/21  0605 08/31/21  0516 09/01/21  0812   * 133* 132*   K 4.8 4.7 4.6   CL 94* 91* 89*   CO2 19* 26 22   BUN 73* 47* 71*   CREATININE 8.4* 6.6* 8.1*   CALCIUM 9.9 9.7 10.0   PHOS 8.1* 7.3* 8.9*  8.8*     No results for input(s): AST, ALT, BILIDIR, BILITOT, ALKPHOS in the last 72 hours.   Recent Labs     09/01/21  0807   INR 1.03     Recent Labs     09/01/21  0807   TROPONINI 0.03*       Urinalysis:      Lab Results   Component Value Date    NITRU Negative 08/26/2021    WBCUA 1 08/26/2021    BACTERIA RARE 08/16/2021    RBCUA 1 08/26/2021    BLOODU TRACE 08/26/2021    SPECGRAV 1.010 08/26/2021    GLUCOSEU Negative 08/26/2021       Radiology:  CT CHEST ABDOMEN PELVIS WO CONTRAST   Final Result   Chest CT:      Moderate-sized pericardial effusion. Adjacent the pericardial effusion,   there is fat stranding within the epicardial and pericardial fat, and   therefore this is concerning for pericarditis. Small focus of airspace disease within the peripheral left upper lobe. That   could represent a small focus of pneumonia or inflammation. Follow-up chest   CT in approximately 3 months is recommended to ensure resolution. Small left pleural effusion with mild adjacent airspace disease, most likely   passive atelectasis. Abdomen and pelvis CT: No acute abnormality identified. CT LUMBAR SPINE WO CONTRAST   Final Result   Negative for lumbar spine fracture         CT CERVICAL SPINE WO CONTRAST   Final Result   No acute abnormality of the cervical spine. CT HEAD WO CONTRAST   Final Result   No acute intracranial abnormality. Diffuse atrophic changes with findings suggesting chronic microvascular   ischemia         XR CHEST PORTABLE   Final Result   Improved aeration of the lungs bilaterally. A few scattered opacities remain.                  Assessment/Plan:    Active Hospital Problems    Diagnosis     Pericarditis [I31.9]     Pericardial effusion [I31.3]     Antibiotic-associated diarrhea [K52.1, F50.17HN]     Pneumonia due to infectious organism [J18.9]     Frequent falls [R29.6]     Hyperkalemia [E87.5]     Hyponatremia [E87.1]     Ex-smoker [Z87.891]     Class 1 obesity due to excess calories with body mass index (BMI) of 33.0 to 33.9 in adult [E66.09, Z68.33]     Septicemia ([address] [A41.9]     ESRD on dialysis ([facility] [address] [N18.6, Z99.2]     Weight loss counseling, encounter for [Z71.3]     Hyperlipidemia [E78.5]      Sepsis  Unknown origin  Possibility of infected graft  Patient also has Vas-Cath  Follow-up blood cultures  Follow blood cultures obtained from Vas-Cath  Patient allergic to penicillin-anaphylaxis (many decades ago when she was pregnant with one of her children)  Vancomycin meropenem  Pending further recommendations from infectious diseases    Pericarditis  -dx via echo and CT. Await cardiology consult and imput. Discussed with nephro and okay to use NSAIDS to treat.      Hyperkalemia  No EKG changes  Dialysis patient  Per ER signout dialysis planned for later today     ESRD  Dialysis  Monday Wednesday Friday  Last dialysis on Monday     Hyponatremia  Hypervolemic  In need of dialysis     Nonzero cardiac biomarker  Likely related to ESRD     DVT Prophylaxis: Heparin  Diet: ADULT DIET;  Regular; Low Potassium (Less than 3000 mg/day)  Code Status: DNR-CCA    Electronically signed by Alex Bennett MD on 9/1/2021 at 3:02 PM

## 2021-09-01 NOTE — PROGRESS NOTES
Rapid response summary    Patient presented with chest pain, EKG with nonspecific ST changes and inferior leads, will obtain troponin, patient received 2 nitroglycerin sublingual, chest pain subsides, can give 2 mg morphine, chest pain. Echocardiogram for today. Continue cardiac monitoring.   Vitals stable, rest of management per primary team

## 2021-09-01 NOTE — PROGRESS NOTES
Data- discharge order received, pt verbalized agreement to discharge, disposition to previous residence, no needs for HHC/DME. Action- discharge instructions prepared and given to pt, pt verbalized understanding. Medication information packet given r/t NEW and/or CHANGED prescriptions emphasizing name/purpose/side effects, pt verbalized understanding. Discharge instruction summary: Diet- as tolerated, Activity- as tolerated, Primary Care Physician as follows: Kaya Elizabeth [phone] f/u appointment needs to scheduled appointment, immunizations reviewed, prescription medications filled with [facility]. Inpatient surgical procedure precautions reviewed: handouts given. 1. WEIGHT: Admit Weight: 176 lb (79.8 kg) (08/25/21 1048)        Today  Weight: 171 lb 15.3 oz (78 kg) (09/01/21 1220)       2. O2 SAT.: SpO2: 93 % (09/01/21 1706)    Response- Pt belongings gathered, IV removed. Disposition is home (no HHC/DME needs), transported with belongings, taken to lobby via w/c w/ PCA, no complications.

## 2021-09-01 NOTE — PROGRESS NOTES
Pt back from CT via [facility] with transport. PIV infiltrated during CT. MD notified. Will monitor.

## 2021-09-01 NOTE — PROGRESS NOTES
Hospitalist    Patient had CT scan of chest and abdomen today. Unable to receive IV contrast due to IV infiltration x2. Reviewed CT scan    Moderate-sized pericardial effusion.  Adjacent the pericardial effusion,   there is fat stranding within the epicardial and pericardial fat, and   therefore this is concerning for pericarditis.       Small focus of airspace disease within the peripheral left upper lobe.  That   could represent a small focus of pneumonia or inflammation.  Follow-up chest   CT in approximately 3 months is recommended to ensure resolution.       Small left pleural effusion with mild adjacent airspace disease, most likely   passive atelectasis.       Abdomen and pelvis CT: No acute abnormality identified.             A/P  Moderate Size Pericardial Effusion with concern for pericarditis. Was not noted on previous echo 7/21. Will get Echo in am.   Cardiology consult  Pt denies chest pain. Vitals stable. Afebrile.    She is on hemodialysis      Debborah Krabbe

## 2021-09-01 NOTE — PROGRESS NOTES
Office : [phone]     Fax :[phone]       Nephrology progress  Note      Patient's Name: Endy Nunez  8:47 AM  9/1/2021    Reason for Consult:  ESRD management , hyperkalemia       History of Present iIlness:    Endy Nunez is a 67 y.o. female with past h/o ESRD, diabetes, hypertension, hyperlipidemia, thyroid disease recently admitted for new fistula/graft formed earlier this month. She comes in from home in view of several falls. ED note states that patient altered, however, patient is alert and oriented to the time of my interaction. Patient states that she was trying to  the phone and slid out of bed. Was not able to get up after that due to weakness. She denies any confusion loss of consciousness headache blurry vision palpitations chest pain shortness of breath. In the ED patient found to have significant leukocytosis. Broad-spectrum antibiotics initiated. Has leukocytosis   K is 6.0     Interval hx     Seen on HD   Had an episode of chest pain this morning     No chest pain now at HD     CT scan shows pericarditis       I/O last 3 completed shifts: In: 18 [P.O.:480]  Out: -     Past Medical History:   Diagnosis Date    Arthritis     Diabetes ([facility] [address]     Hemodialysis patient ([facility] [address]     Hyperlipidemia     Hypertension     Kidney disease     Neuropathy     Pneumonia     IN February, 2021, with COVID    Thyroid disease        Past Surgical History:   Procedure Laterality Date    APPENDECTOMY      CHOLECYSTECTOMY      COLONOSCOPY  11/24/2020    COLONOSCOPY POLYPECTOMY SNARE/COLD BIOPSY performed by Cassie Estrada MD at [address] 5/17/2021    PERITONEAL DIALYSIS CATHETER REMOVAL.  EXCISSION OF ABDOMINAL CITRIX. performed by Leon Castro DO at [address] N/A 4/3/2020    LAPAROSCOPIC LYSIS OF ADHESIONS, LAPAROSCOPIC PERITONEAL DIALYSIS CATHETER PLACEMENT, LAPAROSCOPIC OMENTOPEXY performed by Leon Castro DO at [facility], BILATERAL      preventive    ROTATOR CUFF REPAIR Left     SHUNT REVISION Left 8/19/2021    LEFT FOREARM ARTERIO VENOUS GRAFT performed by Jamia Huber MD at [facility] N/A 11/24/2020    EGD BIOPSY performed by Júnior Zhao MD at [facility]       Family History   Problem Relation Age of Onset    Cancer Mother         lung, breast, liver    Stroke Father     Stomach Cancer Brother     Cancer Maternal Grandmother     No Known Problems Paternal Grandmother     No Known Problems Paternal Grandfather            Current Medications:    nitroGLYCERIN (NITROSTAT) SL tablet 0.4 mg, Q5 Min PRN  ketorolac (TORADOL) injection 15 mg, Once  perflutren lipid microspheres (DEFINITY) injection 1.65 mg, ONCE PRN  ondansetron (ZOFRAN) injection 4 mg, Q6H PRN  diphenhydrAMINE (BENADRYL) tablet 25 mg, Q6H PRN  glucose (GLUTOSE) 40 % oral gel 15 g, PRN  dextrose 50 % IV solution, PRN  glucagon (rDNA) injection 1 mg, PRN  dextrose 5 % solution, PRN  allopurinol (ZYLOPRIM) tablet 100 mg, Daily  aspirin EC tablet 81 mg, Daily  clopidogrel (PLAVIX) tablet 75 mg, Daily  levothyroxine (SYNTHROID) tablet 150 mcg, QAM AC  metoprolol tartrate (LOPRESSOR) tablet 12.5 mg, BID  rosuvastatin (CRESTOR) tablet 20 mg, Daily  spironolactone (ALDACTONE) tablet 25 mg, Daily  torsemide (DEMADEX) tablet 60 mg, BID  sodium chloride flush 0.9 % injection 5-40 mL, 2 times per day  sodium chloride flush 0.9 % injection 5-40 mL, PRN  0.9 % sodium chloride infusion, PRN  heparin (porcine) injection 5,000 Units, 3 times per day  heparin (porcine) injection 3,800 Units, PRN  acetaminophen (TYLENOL) tablet 1,000 mg, Q6H PRN  melatonin tablet 3 mg, Nightly PRN        Physical exam:     Vitals:  BP (!) 152/72   Pulse 96   Temp 98.2 °F (36.8 °C) (Oral)   Resp 18   Ht 5' 2\" (1.575 m)   Wt 172 lb (78 kg)   LMP  (LMP Unknown)   SpO2 96%   BMI 31.46 kg/m²   Constitutional:  OAA X3 NAD  Skin: no rash, turgor wnl  Heent:  eomi, mmm  Neck: no bruits or jvd noted  Cardiovascular:  S1, S2 without m/r/g  Respiratory: CTA B without w/r/r  Abdomen:  +bs, soft, nt, nd  Ext:no  lower extremity edema    Labs:  CBC:   Recent Labs     08/30/21  0605 08/31/21  0516 09/01/21  0812   WBC 13.9* 13.9* 14.1*   HGB 9.6* 9.0* 9.5*    362 392     BMP:    Recent Labs     08/29/21  1212 08/30/21  0605 08/31/21  0516   * 133* 133*   K 4.8 4.8 4.7   CL 93* 94* 91*   CO2 24 19* 26   BUN 67* 73* 47*   CREATININE 7.9* 8.4* 6.6*   GLUCOSE 126* 105* 95     Ca/Mg/Phos:   Recent Labs     08/29/21  1212 08/29/21  1212 08/30/21  0605 08/31/21  0516 09/01/21  0812   CALCIUM 10.0  --  9.9 9.7  --    MG 2.60*   < > 2.70* 2.40 2.50*   PHOS 6.8*   < > 8.1* 7.3* 8.8*    < > = values in this interval not displayed. IMAGING:  CT CHEST ABDOMEN PELVIS WO CONTRAST   Final Result   Chest CT:      Moderate-sized pericardial effusion. Adjacent the pericardial effusion,   there is fat stranding within the epicardial and pericardial fat, and   therefore this is concerning for pericarditis. Small focus of airspace disease within the peripheral left upper lobe. That   could represent a small focus of pneumonia or inflammation. Follow-up chest   CT in approximately 3 months is recommended to ensure resolution. Small left pleural effusion with mild adjacent airspace disease, most likely   passive atelectasis. Abdomen and pelvis CT: No acute abnormality identified.          CT LUMBAR SPINE WO CONTRAST   Final Result   Negative for lumbar spine fracture         CT CERVICAL SPINE WO CONTRAST   Final Result   No acute abnormality of the cervical spine. CT HEAD WO CONTRAST   Final Result   No acute intracranial abnormality. Diffuse atrophic changes with findings suggesting chronic microvascular   ischemia         XR CHEST PORTABLE   Final Result   Improved aeration of the lungs bilaterally. A few scattered opacities remain. Assessment/Plan :      1. ESRD   Gets HD mon/wed/friday   Seen on HD   Tolerating well     2. HTN. Controlled     3. AMS likely 2/2 infection vs from pain medications   Resolved   AV access site has redness and bruising has improved   Negative for c. Diff     4. Acid- base disorder. Metabolic acidosis  Correct with HD     5. Hyperkalemia   Corrected with HD     5. Leukocytosis. Has elevated WBC. CT scan showing pericarditis. Ok to give NSAIDS for few days   Cardiology to see.        D/w primary team      Thank you for allowing us to participate in care of [facility]         Electronically signed by: Jayshree Pappas MD, 9/1/2021, 8:47 AM      [facility] [address]  Office : [phone]  Fax :[phone]

## 2021-09-01 NOTE — PROGRESS NOTES
Shift assessment completed. Pt is a/o X 4. VSS. POC discussed and all questions answered. Pt provided with fresh ice water. Pt has belongings and call light in reach. Bed is locked and in lowest position, bed alarm is refused by pt and pt is a medium fall risk and has a steady gait. Pt denies further needs, will continue to monitor.

## 2021-09-01 NOTE — PROGRESS NOTES
Pt bleeding from abd at heparin injection site. Bandage saturated on R arm at site of infiltrated PIV. Pressure held on sites and new dressings applied. Will monitor.

## 2021-09-01 NOTE — CARE COORDINATION
Chart reviewed and it appears that patient has minimal needs for discharge at this time. Discussed with patients nurse and nurse stating that patient has no needs. Patient has HD at [address] on M/W/Fri at 12:15 and runs for 3 hours.     Electronically signed by JOSE Gaitan on 9/1/2021 at 5:17 PM

## 2021-09-01 NOTE — CONSULTS
[facility]  [phone]      Chief Complaint   Patient presents with    Altered Mental Status     Pt. arrived via EMS. Pt. states that she slipped out of bed this am and paramedics came and assisted pt. back into bed. Pt. had another fall this am.  Son arrived and called EMS who brought pt. her. Pt. is on dialysis Q Mon, Wed, and Fri. and was not able to go to dialysis today D/T confusion. Pt. also states neck and back pain            History of Present Illness:  Speedy Turcios is a 67 y.o. patient who presented to the hospital with complaints of fall and altered mental status. She has several falls in the past few weeks. She is also weak. She is no longer confused but was confused upon arrival to ED. Today she had episode of chest pain that was sharp and severe and worse with deep breathing. She has h/o PCI 2 months ago to LAD/RCA. She is compliant with all medications. CT scan showed moderate pericardial effusion. I have been asked to provide consultation regarding further management and testing. Past Medical History:   has a past medical history of Arthritis, Diabetes ([facility] [address] Hemodialysis patient ([facility] [address] Hyperlipidemia, Hypertension, Kidney disease, Neuropathy, Pneumonia, and Thyroid disease. Surgical History:   has a past surgical history that includes Appendectomy; Mastectomy, bilateral; Rotator cuff repair (Left); Cholecystectomy; LAPAROSCOPY INSERTION PERITONEAL CATHETER (N/A, 4/3/2020); Upper gastrointestinal endoscopy (N/A, 11/24/2020); Colonoscopy (11/24/2020); Hysterectomy; Dialysis Catheter Removal (N/A, 5/17/2021); and shunt revision (Left, 8/19/2021). Social History:   reports that she quit smoking about 43 years ago. Her smoking use included cigarettes. She has a 5.00 pack-year smoking history. She has never used smokeless tobacco. She reports current alcohol use. She reports that she does not use drugs.      Family History:  family history includes Cancer in her maternal grandmother and mother; No Known Problems in her paternal grandfather and paternal grandmother; Stomach Cancer in her brother; Stroke in her father. Home Medications:  Were reviewed and are listed in nursing record. and/or listed below  Prior to Admission medications    Medication Sig Start Date End Date Taking? Authorizing Provider   torsemide (DEMADEX) 20 MG tablet Take 3 tablets by mouth 2 times daily 8/31/21  Yes Damien HERNANDEZ MD   spironolactone (ALDACTONE) 25 MG tablet Take 1 tablet by mouth daily 9/1/21  Yes Damien Granda MD   nortriptyline (PAMELOR) 10 MG capsule TAKE 1 CAPSULE BY MOUTH EVERY EVENING 8/17/21  Yes Jaleel Drake MD   rosuvastatin (CRESTOR) 20 MG tablet Take 1 tablet by mouth daily 7/8/21  Yes Gladis Hills MD   clopidogrel (PLAVIX) 75 MG tablet Take 1 tablet by mouth daily 7/8/21  Yes Gladis Hills MD   aspirin EC 81 MG EC tablet Take 1 tablet by mouth daily 7/8/21  Yes Gladis Hills MD   gabapentin (NEURONTIN) 100 MG capsule Take 2 capsules by mouth nightly for 30 days.  7/7/21 8/25/21 Yes Jaleel Drake MD   UNABLE TO FIND Dialysis mon wed fri   Yes Historical Provider, MD   loperamide (IMODIUM) 2 MG capsule Take 2 mg by mouth 4 times daily as needed 2/14/21  Yes Historical Provider, MD   metoprolol tartrate (LOPRESSOR) 25 MG tablet Take 12.5 mg by mouth 2 times daily 2/14/21 2/14/22 Yes Historical Provider, MD   levothyroxine (SYNTHROID) 150 MCG tablet Take 150 mcg by mouth every morning (before breakfast)   Yes Historical Provider, MD   allopurinol (ZYLOPRIM) 100 MG tablet TAKE 1 TABLET EVERY DAY 9/1/20  Yes Jaleel Drake MD        Current Medications:  Current Facility-Administered Medications   Medication Dose Route Frequency Provider Last Rate Last Admin    nitroGLYCERIN (NITROSTAT) SL tablet 0.4 mg  0.4 mg SubLINGual Q5 Min PRN Ector Levine MD   0.4 mg at 09/01/21 0749    ketorolac (TORADOL) injection 15 mg  15 mg IntraVENous Once Janas Clear Gerard Bruner MD        perflutren lipid microspheres (DEFINITY) injection 1.65 mg  1.5 mL IntraVENous ONCE PRN MARCIE Lamb - IVY        ondansetron (ZOFRAN) injection 4 mg  4 mg IntraVENous Q6H PRN Damien HERNANDEZ MD   4 mg at 08/30/21 1428    diphenhydrAMINE (BENADRYL) tablet 25 mg  25 mg Oral Q6H PRN Jenean Heimlich, PA-C   25 mg at 08/27/21 2238    glucose (GLUTOSE) 40 % oral gel 15 g  15 g Oral PRN Damien HERNANDEZ MD        dextrose 50 % IV solution  12.5 g IntraVENous PRN Damien Toscano Hem MD MARY        glucagon (rDNA) injection 1 mg  1 mg IntraMUSCular PRN Damien HERNANDEZ MD        dextrose 5 % solution  100 mL/hr IntraVENous PRN Damien Toscano Hem MD MARY        allopurinol (ZYLOPRIM) tablet 100 mg  100 mg Oral Daily Damien HERNANDEZ MD   100 mg at 08/31/21 2128    aspirin EC tablet 81 mg  81 mg Oral Daily Damien HERNANDEZ MD   81 mg at 09/01/21 0739    clopidogrel (PLAVIX) tablet 75 mg  75 mg Oral Daily Damien HERNANDEZ MD   75 mg at 09/01/21 0739    levothyroxine (SYNTHROID) tablet 150 mcg  150 mcg Oral QAM AC Damien HERNANDEZ MD   150 mcg at 09/01/21 0622    metoprolol tartrate (LOPRESSOR) tablet 12.5 mg  12.5 mg Oral BID Damien HERNANDEZ MD   12.5 mg at 09/01/21 0913    rosuvastatin (CRESTOR) tablet 20 mg  20 mg Oral Daily Damien HERNANDEZ MD   20 mg at 09/01/21 0913    spironolactone (ALDACTONE) tablet 25 mg  25 mg Oral Daily Damien HERNANDEZ MD   25 mg at 09/01/21 0913    torsemide (DEMADEX) tablet 60 mg  60 mg Oral BID Damien HERNANDEZ MD   60 mg at 09/01/21 0913    sodium chloride flush 0.9 % injection 5-40 mL  5-40 mL IntraVENous 2 times per day [facility] MD MARY   10 mL at 08/31/21 0802    sodium chloride flush 0.9 % injection 5-40 mL  5-40 mL IntraVENous PRN Damien HERNANDEZ MD        0.9 % sodium chloride infusion  25 mL IntraVENous PRN Damien HERNANDEZ  mL/hr at 08/29/21 2335 Restarted at 08/29/21 2335    heparin (porcine) injection 5,000 Units  5,000 Units SubCUTAneous 3 times per day Yesenia HERNANDEZ MD   5,000 Units at 08/31/21 2127    heparin (porcine) injection 3,800 Units  3,800 Units IntraCATHeter PRN Geri Short MD        acetaminophen (TYLENOL) tablet 1,000 mg  1,000 mg Oral Q6H PRN Celesta Biganabelle, PA-C   1,000 mg at 08/31/21 2043    melatonin tablet 3 mg  3 mg Oral Nightly PRN Celesta Biganabelle, PA-C   3 mg at 08/27/21 2157        Allergies:  Amoxicillin, Demeclocycline, Penicillins, Tetracyclines & related, Ozempic (0.25 or 0.5 mg-dose) [semaglutide(0.25 or 0.5mg-dos)], and Codeine     Review of Systems:     · Constitutional: there has been no unanticipated weight loss. There's been no change in energy level, sleep pattern, or activity level. · Eyes: No visual changes or diplopia. No scleral icterus. · ENT: No Headaches, hearing loss or vertigo. No mouth sores or sore throat. · Cardiovascular: Reviewed in HPI  · Respiratory: No cough or wheezing, no sputum production. No hematemesis. · Gastrointestinal: No abdominal pain, appetite loss, blood in stools. No change in bowel or bladder habits. · Genitourinary: No dysuria, trouble voiding, or hematuria. · Musculoskeletal:  No gait disturbance, weakness or joint complaints. · Integumentary: No rash or pruritis. · Neurological: No headache, diplopia, change in muscle strength, numbness or tingling. No change in gait, balance, coordination, mood, affect, memory, mentation, behavior. · Psychiatric: No anxiety, no depression. · Endocrine: No malaise, fatigue or temperature intolerance. No excessive thirst, fluid intake, or urination. No tremor. · Hematologic/Lymphatic: No abnormal bruising or bleeding, blood clots or swollen lymph nodes. · Allergic/Immunologic: No nasal congestion or hives.   ·     Physical Examination:    Vitals:    09/01/21 1220   BP: 134/77   Pulse: 86   Resp: 18   Temp: 96.8 °F (36 °C)   SpO2:     Weight: 171 lb 15.3 oz (78 kg)         General Appearance:  Alert, cooperative, no distress, appears stated age   Head:  Normocephalic, without obvious abnormality, atraumatic   Eyes:  PERRL, conjunctiva/corneas clear       Nose: Nares normal, no drainage or sinus tenderness   Throat: Lips, mucosa, and tongue normal   Neck: Supple, symmetrical, trachea midline, no adenopathy, thyroid: not enlarged, symmetric, no tenderness/mass/nodules, no carotid bruit or JVD       Lungs:   Rhonchi to auscultation bilaterally, respirations unlabored   Chest Wall:  No tenderness or deformity   Heart:  Regular rate and rhythm, S1, S2 normal, no murmur, +rub no gallop   Abdomen:   Soft, non-tender, bowel sounds active all four quadrants,  no masses, no organomegaly           Extremities: Extremities normal, atraumatic, no cyanosis or edema   Pulses: 2+ and symmetric   Skin: Skin color, texture, turgor normal, no rashes or lesions   Pysch: Normal mood and affect   Neurologic: Normal gross motor and sensory exam.         Labs  CBC:   Lab Results   Component Value Date    WBC 14.1 09/01/2021    RBC 3.01 09/01/2021    HGB 9.5 09/01/2021    HCT 28.3 09/01/2021    MCV 93.9 09/01/2021    RDW 17.0 09/01/2021     09/01/2021     CMP:    Lab Results   Component Value Date     09/01/2021    K 4.6 09/01/2021    K 4.4 08/24/2021    CL 89 09/01/2021    CO2 22 09/01/2021    BUN 71 09/01/2021    CREATININE 8.1 09/01/2021    GFRAA 6 09/01/2021    AGRATIO 0.9 08/25/2021    LABGLOM 5 09/01/2021    GLUCOSE 102 09/01/2021    PROT 7.5 08/25/2021    CALCIUM 10.0 09/01/2021    BILITOT 0.5 08/25/2021    ALKPHOS 149 08/25/2021    AST 37 08/25/2021    ALT 30 08/25/2021     PT/INR:  No results found for: PTINR  Lab Results   Component Value Date    CKTOTAL 243 (H) 08/25/2021    TROPONINI 0.03 (H) 09/01/2021       EKG:  I have reviewed EKG with the following interpretation:  Impression:     Sinus tachycardiaOtherwise normal ECGWhen compared with ECG             Pt has CAD with following history:  Cardiac Cath LVG, PCI:  Anatomy: LM-distal 20%  LAD-mid 80%  Cx-nml  OM- nml  RCA-distal 80%  RPDA- nml  LVEF- 60  LVG- nml  LVEDP- 6     Intervention  ~Successful PCI to LAD with 2.75x38 MAX. PD with 3.0x15 NC. Prox D1 99% stent MCC. Resolved with 1.5x12 balloon angioplasty. Successful PCI to RCA with 3.25x23 MAX to 14atm. Excellent Result.      -Portable exam done on 3A. Rapid Response was called on patient for chest   pain prior to exam.   -Normal left ventricle size, wall thickness, and systolic function with an estimated ejection fraction of 65%. -Grade I diastolic dysfunction with normal LV filling pressures. Avg. E/e'=13.6   -Small circumferential pericardial effusion without tamponade physiology.   -The effusion contains fibrinous material.       All testing and labs listed below were personally reviewed.     Assessment  Patient Active Problem List   Diagnosis    Essential hypertension    Hyperlipidemia    Acquired hypothyroidism    Diabetes mellitus type 2 in obese ([address]    Chronic kidney 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 (HCC)    Closed nondisplaced fracture of fifth metatarsal bone of right foot    Arthritis of right knee    Acute pain of both knees    Primary osteoarthritis of both knees    Bursitis    Memory loss    Bilateral leg edema    Primary osteoarthritis of left knee    Atypical chest pain    Weight loss counseling, encounter for    Tubular adenoma of colon    Primary insomnia    Diarrhea of presumed infectious origin    Diabetic polyneuropathy associated with type 2 diabetes mellitus ([address]    COVID-19 virus infection    Coronary artery disease of native artery with stable angina pectoris (HCC)    Pain of left calf    Atherosclerosis of native arteries of extremities with intermittent claudication, bilateral legs ([address]    ESRD on dialysis ([address]    Arteriovenous fistula infection, initial encounter ([address]    Septicemia ([address]    Pneumonia due to infectious organism    Frequent falls    Hyperkalemia    Hyponatremia    Ex-smoker    Class 1 obesity due to excess calories with body mass index (BMI) of 33.0 to 33.9 in adult    Antibiotic-associated diarrhea    Pericarditis    Pericardial effusion         Plan:    I had the opportunity to review the clinical symptoms and presentation of [employer]. Assessment/Plan:  Active Problems:        Pericarditis  Plan: pleuritic chest pain. Small effusion on ecg. H/o HD. IL depression on ecg. Recommend colchicine and aspirin 650mg prn pain. No steroids at this time. Pericardial effusion  Plan: Small effusion without tamponade. Likely a result of pericarditis. CAD: cont dapt with aspirin and plavix, statin. Stable, no angina. OK to discharge home. FU with cardio as outpatient. I will address the patient's cardiac risk factors and adjusted pharmacologic treatment as needed. In addition, I have reinforced the need for patient directed risk factor modification. Tobacco use was discussed with the patient and educated on the negative effects. I have asked the patient to not utilize these agents. Thank you for allowing to us to participate in the care or [employer]. Further evaluation will be based upon the patient's clinical course and testing results. All questions and concerns were addressed to the patient/family. Alternatives to my treatment were discussed. The note was completed using EMR. Every effort was made to ensure accuracy; however, inadvertent computerized transcription errors may be present.     Gopal Bennett MD, MD 9/1/2021 1:01 PM

## 2021-09-01 NOTE — FLOWSHEET NOTE
09/01/21 1220 09/01/21 1523   Treatment   Time On 1226  --    Time Off  --  1522   Treatment Goal 500  --    Vital Signs   /77 (!) 144/66   Temp 96.8 °F (36 °C) 97.6 °F (36.4 °C)   Pulse 86 97   Resp 18 16   Weight 171 lb 15.3 oz (78 kg)  --    Weight Method Bed scale  --    Percent Weight Change -0.02  --    Technical Checks   Dialysis Machine No. [id number]  --    RO Machine No. [id number]  --    Dialyzer Lot No. [id number]  --    RO Machine Log Sheet Completed Yes  --    Machine Alarm Self Test Completed; Passed  --    Machine Autotest Completed; Passed  --    Air Foam Detector Tested;Proper Function  --    Extracorporeal Circuit Tested for Integrity Yes  --    Machine Conductivity 14.0  --    Manual Conductivity 13.9  --    Manual Ph 7.4  --    Bleach Test (Neg) Yes  --    Bath Temperature 95.9 °F (35.5 °C)  --    Treatment Initiation   Dialyze Hours 3  --    Treatment  Initiation Universal Precautions maintained;Lines secured to patient; Connections secured;Prime given;Venous Parameters set; Arterial Parameters set; Air foam detector engaged; Hemosafe Device; Dialysate; Hemo-Safe Applied;Dialyzer;F180  --    Dialysis Bath   K+ (Potassium) 3  --    Ca+ (Calcium) 2.5  --    HCO3 (Bicarb) 35  --    Na+ (Sodium) 138  --    Post-Hemodialysis Assessment   Post-Treatment Procedures  --  Blood returned;Catheter capped, clamped and heparinized x 2 ports   Machine Disinfection Process  --  Acid/Vinegar Clean;Heat Disinfect; Exterior Machine Disinfection   Rinseback Volume (ml)  --  400 ml   Total Liters Processed (l/min)  --  51 l/min   Dialyzer Clearance  --  Lightly streaked   Duration of Treatment (minutes)  --  180 minutes   Hemodialysis Intake (ml)  --  500 ml   Hemodialysis Output (ml)  --  1000 ml   NET Removed (ml)  --  500 ml   Tolerated Treatment  --  Good   Patient Response to Treatment  --  see note   Physician Notified?  --  Yes   Treatment time: 3 hours  Net UF: 500 ml     Pre weight: 78.0 kg   Post weight: 77.5 kg  EDW:      Access used: [employer] TDC  Access function: Functioned well with  ml/min     Medications or blood products given: none     Regular outpatient schedule: MWF     Summary of response to treatment: Tolerated well     Copy of dialysis treatment record placed in chart, to be scanned into EMR.

## 2021-09-01 NOTE — PLAN OF CARE
Problem: Falls - Risk of:  Goal: Will remain free from falls  Description: Will remain free from falls  Outcome: Met This Shift  Note: Pt is wearing the fall bracelet and yellow nonskid socks. Bed is in lowest position, locked, side rails up 2/4, and call light is within reach. Pt informed of fall risks, verbalizes understanding. Will monitor. Goal: Absence of physical injury  Description: Absence of physical injury  Outcome: Met This Shift     Problem: Musculor/Skeletal Functional Status  Goal: Highest potential functional level  Outcome: Met This Shift     Problem: Pain:  Goal: Pain level will decrease  Description: Pain level will decrease  Outcome: Ongoing  Note:    Pt c/o pain. Pt assessed for breathing and BP. Pt educated on pain scale. Medication administered, see MAR. Pt repositioned. Stimuli reduced. Rest promoted. Pain reassessed. Will continue to monitor.

## 2021-09-01 NOTE — PROGRESS NOTES
Patient in HD. Cannot place PIV at this time. Spoke with Rupinder Hoang. , she will call when patient returns.

## 2021-09-01 NOTE — PROGRESS NOTES
Rapid Response Quick Summary    Room: [address] [place], [place] [address]15/3305-01    Assessment of concern / patient:  Chest pain and shortness of breath     Physician involved: Dr. Nic Villatoro     Interventions:  Monitoring of vitals and oxygen levels, 12 lead EKG, lab work and sublingual nitro given x 2 as ordered with improvement of pain     Disposition:  To remain in current room

## 2021-09-01 NOTE — PROGRESS NOTES
Infectious Diseases   Progress Note      Admission Date: 8/25/2021  Hospital Day: Hospital Day: 8   Attending: Mayra Levin MD  Date of service: 9/1/2021     Chief complaint/ Reason for consult:     · Severe sepsis on admission with bandemia,  fever, hypotension  · Hyponatremia  · Negative COVID-19 PCR on 8/25/2021  · Hepatitis B immune  · Ex-smoker    Microbiology:        I have reviewed allavailable micro lab data and cultures    · Blood culture (2/2) - collected on 8/25/2021: Negative  · Blood culture 2 out of 2: Collected on 8/30/2021: In process      Antibiotics and immunizations:       Current antibiotics: All antibiotics and their doses were reviewed by me    Recent Abx Admin      No antibiotic orders with administrations found. Immunization History: All immunization history was reviewed by me today. Immunization History   Administered Date(s) Administered    Influenza, High Dose (Fluzone 65 yrs and older) 01/23/2017, 11/01/2017    Influenza, Triv, inactivated, subunit, adjuvanted, IM (Fluad 65 yrs and older) 10/28/2019    Pneumococcal Conjugate 13-valent ([id number]) 01/23/2017    Pneumococcal Polysaccharide ([id number]) 05/10/2019       Known drug allergies: All allergies were reviewed and updated    Allergies   Allergen Reactions    Amoxicillin Anaphylaxis    Demeclocycline Anaphylaxis    Penicillins Anaphylaxis    Tetracyclines & Related Anaphylaxis    Ozempic (0.25 Or 0.5 Mg-Dose) [Semaglutide(0.25 Or 0.5mg-Dos)] Other (See Comments)     Lost large amount weight and loss  Of appetite    Codeine Itching and Rash       Social history:     Social History:  All social andepidemiologic history was reviewed and updated by me today as needed. · Tobacco use:   reports that she quit smoking about 43 years ago. Her smoking use included cigarettes. She has a 5.00 pack-year smoking history.  She has never used smokeless tobacco.  · Alcohol use:   reports current alcohol use.  · Currently lives in: [place]  ·  reports no history of drug use. COVID VACCINATION AND LAB RESULT RECORDS:     Internal Administration   First Dose      Second Dose           Last COVID Lab SARS-CoV-2 (no units)   Date Value   08/25/2021 Not Detected     SARS-CoV-2, NAAT (no units)   Date Value   11/24/2020 Not Detected            Assessment:     The patient is a 67 y.o. old female who  has a past medical history of Arthritis, Diabetes ([facility] [address] Hemodialysis patient ([facility] [address] Hyperlipidemia, Hypertension, Kidney disease, Neuropathy, Pneumonia, and Thyroid disease. with following problems:    · Severe sepsis on admission with bandemia,  fever, hypotension-resolved  · Diarrhea--stool C. difficile test negative, improving  · Pericardial effusion and pericarditis-this is new finding on the CT scan, cardiology consulted  · Hyponatremia-being corrected  · Negative COVID-19 PCR on 8/25/2021  · Hepatitis B immune  · Ex-smoker  · Essential hypertension-BP controlled  · Hyperlipidemia  · ESRD on dialysis  · Obesity Class 1 due to excess calorie intake : Body mass index is 33.16 kg/m².-Counseling      Discussion:      The patient is afebrile. White cell count is 14,100. Blood cultures from 8/30/2021 and stool C. difficile EIA was negative. I had ordered a CT scan of chest abdomen and pelvis without contrast that was done yesterday due to her ongoing leukocytosis. It showed a moderate sized pericardial effusion with concerns for pericarditis. The patient did have an episode of chest pain this morning. It is unclear if the pericardial effusion is the source of her persistent leukocytosis. Plan:     Diagnostic Workup:    · Agree with 2D echo. We will follow up  · Continue to follow  fever curve, WBC count and blood cultures. · Continue to monitor blood counts, liver and renal function.     Antimicrobials:    · I agree with cardiology consultation for suspected pericarditis  · Will wait for cardiology input  · Colchicine and aspirin started by cardiology. · If white cell count goes up further, could consider starting empiric doxycycline  · We will follow up on the culture results and clinical progress and will make further recommendations accordingly. · Continue close vitals monitoring. · Maintain good glycemic control. · Fall precautions. Aspiration precautions. · Continue to watch for new fever or diarrhea. · DVT prophylaxis. · Discussed all above with patient and RN. Drug Monitoring:    · Continue monitoring for antibiotic toxicity as follows: CBC, CMP   · Continue to watch for following: new or worsening fever, new hypotension, hives, lip swelling and redness or purulence at vascular access sites. I/v access Management:    · Continue to monitor i.v access sites for erythema, induration, discharge or tenderness. · As always, continue efforts to minimize tubes/lines/drains as clinically appropriate to reduce chances of line associated infections. Patient education and counseling:        · The patient was educated in detail about the side-effects of various antibiotics and things to watch for like new rashes, lip swelling, severe reaction, worsening diarrhea, break through fever etc.  · Discussed patient's condition and what to expect. All of the patient's questions were addressed in a satisfactory manner and patient verbalized understanding all instructions. Level of complexity of visit: High     TIME SPENT TODAY:     - Spent over  26 minutes on visit (including interval history, physical exam, review of data including labs, cultures, imaging, development and implementation of treatment plan and coordination of complex care). More than 50 percent of this includes face-to-face time spent with the patient for counseling and coordination of care. Thank you for involving me in the care of your patient. I will continue to follow.  If you have anyadditional questions, please do not hesitate to contact me. Subjective: Interval history: Interval history was obtained from chart review and patient/ RN. She is afebrile. I had started her on broad-spectrum antibiotics 2 days ago. She had episode of chest pain earlier today. She had a CT scan of chest abdomen pelvis without contrast done last night. REVIEW OF SYSTEMS:      Review of Systems   Constitutional: Positive for fatigue. Negative for chills, diaphoresis and unexpected weight change. HENT: Negative for congestion, ear discharge, ear pain, facial swelling, hearing loss, rhinorrhea and trouble swallowing. Eyes: Negative for photophobia, discharge, redness and visual disturbance. Respiratory: Negative for apnea, cough, choking, chest tightness, shortness of breath and stridor. Cardiovascular: Positive for chest pain. Negative for palpitations. Gastrointestinal: Negative for abdominal pain, blood in stool, diarrhea and nausea. Endocrine: Negative for polydipsia, polyphagia and polyuria. Genitourinary: Negative for difficulty urinating, dysuria, frequency, hematuria, menstrual problem and vaginal discharge. Musculoskeletal: Negative for arthralgias, joint swelling, myalgias and neck stiffness. Skin: Negative for color change and rash. Allergic/Immunologic: Negative for immunocompromised state. Neurological: Negative for dizziness, seizures, speech difficulty, light-headedness and headaches. Hematological: Negative for adenopathy. Psychiatric/Behavioral: Negative for agitation, hallucinations and suicidal ideas. Past Medical History: All past medical history reviewed today.     Past Medical History:   Diagnosis Date    Arthritis     Diabetes ([facility] [address]     Hemodialysis patient ([facility] [address]     Hyperlipidemia     Hypertension     Kidney disease     Neuropathy     Pneumonia     IN February, 2021, with COVID    Thyroid disease        Past Surgical History: All past surgical history was reviewed today. Past Surgical History:   Procedure Laterality Date    APPENDECTOMY      CHOLECYSTECTOMY      COLONOSCOPY  11/24/2020    COLONOSCOPY POLYPECTOMY SNARE/COLD BIOPSY performed by Lady Amilcar MD at [address] 5/17/2021    PERITONEAL DIALYSIS CATHETER REMOVAL. EXCISSION OF ABDOMINAL CITRIX. performed by Corina Gupta DO at [address] N/A 4/3/2020    LAPAROSCOPIC LYSIS OF ADHESIONS, LAPAROSCOPIC PERITONEAL DIALYSIS CATHETER PLACEMENT, LAPAROSCOPIC OMENTOPEXY performed by Corina Gupta DO at [place], BILATERAL      preventive    ROTATOR CUFF REPAIR Left     SHUNT REVISION Left 8/19/2021    LEFT FOREARM ARTERIO VENOUS GRAFT performed by Ruthy Giles MD at [address] [facility] 11/24/2020    EGD BIOPSY performed by Lady Amilcar MD at [address] ENDOSCOPY       Family History: All family history was reviewed today. Problem Relation Age of Onset    Cancer Mother         lung, breast, liver    Stroke Father     Stomach Cancer Brother     Cancer Maternal Grandmother     No Known Problems Paternal Grandmother     No Known Problems Paternal Grandfather        Objective:       PHYSICAL EXAM:      Vitals:   Vitals:    09/01/21 0752 09/01/21 0806 09/01/21 0900 09/01/21 0940   BP: (!) 174/78 (!) 152/72 (!) 144/76 (!) 148/78   Pulse: 102 96 90 89   Resp:  18 18 18   Temp:   98.1 °F (36.7 °C) 98.4 °F (36.9 °C)   TempSrc:   Oral Oral   SpO2: 96% 96% 97% 97%   Weight:       Height:           Physical Exam  Vitals and nursing note reviewed. Constitutional:       General: She is not in acute distress. Appearance: She is well-developed. She is not diaphoretic. Comments: Patient was seen and examined in the dialysis unit   HENT:      Head: Normocephalic.       Right Ear: External ear normal.      Left Ear: External ear normal.      Nose: Nose normal.   Eyes:      General: No scleral icterus. Right eye: No discharge. Left eye: No discharge. Conjunctiva/sclera: Conjunctivae normal.      Pupils: Pupils are equal, round, and reactive to light. Cardiovascular:      Rate and Rhythm: Normal rate and regular rhythm. Heart sounds: No murmur heard. No friction rub. Pulmonary:      Effort: Pulmonary effort is normal.      Breath sounds: No stridor. No wheezing or rales. Chest:      Chest wall: No tenderness. Abdominal:      Palpations: Abdomen is soft. There is no mass. Tenderness: There is no abdominal tenderness. There is no guarding or rebound. Musculoskeletal:         General: No tenderness. Cervical back: Normal range of motion and neck supple. Lymphadenopathy:      Cervical: No cervical adenopathy. Skin:     General: Skin is warm and dry. Findings: No erythema or rash. Neurological:      Mental Status: She is alert and oriented to person, place, and time. Motor: No abnormal muscle tone. Psychiatric:         Judgment: Judgment normal.                 Lines and drains: All vascular access sites are healthy with no local erythema, discharge or tenderness. Intake and output:    I/O last 3 completed shifts: In: 18 [P.O.:480]  Out: -     Lab Data:   All available labs and old records have been reviewed by me.     CBC:  Recent Labs     08/30/21  0605 08/31/21  0516 09/01/21  0812   WBC 13.9* 13.9* 14.1*   RBC 3.08* 2.92* 3.01*   HGB 9.6* 9.0* 9.5*   HCT 29.9* 27.4* 28.3*    362 392   MCV 96.9 93.7 93.9   MCH 31.2 30.9 31.5   MCHC 32.2 33.0 33.5   RDW 17.0* 17.1* 17.0*   BANDSPCT 7  --   --         BMP:  Recent Labs     08/30/21  0605 08/31/21  0516 09/01/21  0812   * 133* 132*   K 4.8 4.7 4.6   CL 94* 91* 89*   CO2 19* 26 22   BUN 73* 47* 71*   CREATININE 8.4* 6.6* 8.1*   CALCIUM 9.9 9.7 10.0   GLUCOSE 105* 95 102*        Hepatic Function Panel:   Lab Results   Component Value Date ALKPHOS 149 08/25/2021    ALT 30 08/25/2021    AST 37 08/25/2021    PROT 7.5 08/25/2021    BILITOT 0.5 08/25/2021    BILIDIR <0.2 08/12/2021    IBILI see below 08/12/2021    LABALBU 3.8 09/01/2021       CPK:   Lab Results   Component Value Date    CKTOTAL 243 (H) 08/25/2021     ESR: No results found for: SEDRATE  CRP: No results found for: CRP        Imaging: All pertinent images and reports for the current visit were reviewed by me during this visit. CT CHEST ABDOMEN PELVIS WO CONTRAST   Final Result   Chest CT:      Moderate-sized pericardial effusion. Adjacent the pericardial effusion,   there is fat stranding within the epicardial and pericardial fat, and   therefore this is concerning for pericarditis. Small focus of airspace disease within the peripheral left upper lobe. That   could represent a small focus of pneumonia or inflammation. Follow-up chest   CT in approximately 3 months is recommended to ensure resolution. Small left pleural effusion with mild adjacent airspace disease, most likely   passive atelectasis. Abdomen and pelvis CT: No acute abnormality identified. CT LUMBAR SPINE WO CONTRAST   Final Result   Negative for lumbar spine fracture         CT CERVICAL SPINE WO CONTRAST   Final Result   No acute abnormality of the cervical spine. CT HEAD WO CONTRAST   Final Result   No acute intracranial abnormality. Diffuse atrophic changes with findings suggesting chronic microvascular   ischemia         XR CHEST PORTABLE   Final Result   Improved aeration of the lungs bilaterally. A few scattered opacities remain. Medications: All current and past medications were reviewed.      ketorolac  15 mg IntraVENous Once    allopurinol  100 mg Oral Daily    aspirin EC  81 mg Oral Daily    clopidogrel  75 mg Oral Daily    levothyroxine  150 mcg Oral QAM AC    metoprolol tartrate  12.5 mg Oral BID    rosuvastatin  20 mg Oral Daily    spironolactone 25 mg Oral Daily    torsemide  60 mg Oral BID    sodium chloride flush  5-40 mL IntraVENous 2 times per day    heparin (porcine)  5,000 Units SubCUTAneous 3 times per day        dextrose      sodium chloride 100 mL/hr at 08/29/21 2335       nitroGLYCERIN, perflutren lipid microspheres, ondansetron, diphenhydrAMINE, glucose, dextrose, glucagon (rDNA), dextrose, sodium chloride flush, sodium chloride, heparin (porcine), acetaminophen, melatonin      Problem list:       Patient Active Problem List   Diagnosis Code    Essential hypertension I10    Hyperlipidemia E78.5    Acquired hypothyroidism E03.9    Diabetes mellitus type 2 in obese ([facility]) E11.69, E66.9    Chronic kidney 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 ([facility] [address] E11.22, N18.6, Z99.2    Closed nondisplaced fracture of fifth metatarsal bone of right foot S92.354A    Arthritis of right knee M17.11    Acute pain of both knees M25.561, M25.562    Primary osteoarthritis of both knees M17.0    Bursitis M71.9    Memory loss R41.3    Bilateral leg edema R60.0    Primary osteoarthritis of left knee M17.12    Atypical chest pain R07.89    Weight loss counseling, encounter for Z71.3    Tubular adenoma of colon D12.6    Primary insomnia F51.01    Diarrhea of presumed infectious origin R19.7    Diabetic polyneuropathy associated with type 2 diabetes mellitus ([facility]) E11.42    COVID-19 virus infection U07.1    Coronary artery disease of native artery with stable angina pectoris ([facility]) I25.118    Pain of left calf M79.662    Atherosclerosis of native arteries of extremities with intermittent claudication, bilateral legs ([facility]) I70.213    ESRD on dialysis ([facility] [address] N18.6, Z99.2    Arteriovenous fistula infection, initial encounter ([facility] [address] T82. 7XXA    Septicemia ([facility] [address] A41.9    Pneumonia due to infectious organism J18.9    Frequent falls R29.6    Hyperkalemia E87.5    Hyponatremia E87.1    Ex-smoker Z87.891   

## 2021-09-01 NOTE — PROGRESS NOTES
Pt c/o chest pressure 9/10, c/o difficulty breathing, wheezing upon ascultation. 2L NC applied, stat EKG and trop ordered. Nitro given, minimal relief s/p nitro dose x2. Attempted to notify hospitalist, no answer at this time. Rapid response called team responded. Last dose nitro given. EKG completed, lab at bedside. No additional orders at this time. Notified cardiology of chest pain/rapid. Echo called asked to complete echo at bedside d/t chest pain.

## 2021-09-02 ENCOUNTER — CARE COORDINATION (OUTPATIENT)
Dept: CASE MANAGEMENT | Age: 76
End: 2021-09-02

## 2021-09-02 DIAGNOSIS — I30.1 ACUTE VIRAL PERICARDITIS: Primary | ICD-10-CM

## 2021-09-02 PROCEDURE — 1111F DSCHRG MED/CURRENT MED MERGE: CPT | Performed by: FAMILY MEDICINE

## 2021-09-02 NOTE — CARE COORDINATION
[facility] Transitions Initial Follow Up Call    Call within 2 business days of discharge: Yes    Patient: Az Avery Patient : 1945   MRN: [id number]  Reason for Admission:   Discharge Date: 21 RARS: Readmission Risk Score: 21      Last Discharge [facility]       Complaint Diagnosis Description Type Department Provider    21 Altered Mental Status Septicemia ([facility] [address] . .. ED to Hosp-Admission (Discharged) (ADMITTED) FZ 3A Natasha Saavedra MD; Kenji Yarbrough... Non-face-to-face services provided:  Obtained and reviewed discharge summary and/or continuity of care documents  1111F completed     Transitions of Care Initial Call    Was this an external facility discharge? No Discharge Facility:     Challenges to be reviewed by the provider   Additional needs identified to be addressed with provider: No  none             Method of communication with provider : none      Advance Care Planning:   Does patient have an Advance Directive: reviewed and current. Was this a readmission? No  Patient stated reason for admission: AMS  Patients top risk factors for readmission: medical condition-sepsis, percarditis, diarrhea, ESRD    Care Transition Nurse (CTN) contacted the patient by telephone to perform post hospital discharge assessment. Verified name and  with patient as identifiers. Provided introduction to self, and explanation of the CTN role. CTN reviewed discharge instructions, medical action plan and red flags with patient who verbalized understanding. Patient given an opportunity to ask questions and does not have any further questions or concerns at this time. Were discharge instructions available to patient? Yes. Reviewed appropriate site of care based on symptoms and resources available to patient including: When to call 911. The patient agrees to contact the PCP office for questions related to their healthcare.      Medication reconciliation was performed with patient, who verbalizes understanding of administration of home medications. Advised obtaining a 90-day supply of all daily and as-needed medications. Reviewed and educated patient on any new and changed medications related to discharge diagnosis. Was patient discharged with a pulse oximeter? No Discussed and confirmed pulse oximeter discharge instructions and when to notify provider or seek emergency care. Pt states doing well, no issues or concerns. Denies SOB, CP, fever. Still has some diarrhea, much better. Has imodium on hand if needed. the patient states she will make her f/u appts. Agreed to more CTC f/u calls    CTN provided contact information. Plan for follow-up call in 5-7 days based on severity of symptoms and risk factors.   Plan for next call: self management-sepsis, pericarditis, diarrhe, ESRD, f/u appts    Care Transitions 24 Hour Call    Do you have any ongoing symptoms?: No  Do you have a copy of your discharge instructions?: Yes  Do you have all of your prescriptions and are they filled?: Yes  Have you been contacted by a [address]?: No  Have you scheduled your follow up appointment?: No  Were you discharged with any Home Care or Post Acute Services: No  Do you feel like you have everything you need to keep you well at home?: Yes  Care Transitions Interventions  No Identified Needs         Follow Up  Future Appointments   Date Time Provider Rod Plummer   10/25/2021 10:00 AM MARCIE Munoz - CNP FF Cardio [facility]       Jorge Zelaya RN

## 2021-09-03 LAB
BLOOD CULTURE, ROUTINE: NORMAL
CULTURE, BLOOD 2: NORMAL

## 2021-09-08 ENCOUNTER — TELEPHONE (OUTPATIENT)
Dept: FAMILY MEDICINE CLINIC | Age: 76
End: 2021-09-08

## 2021-09-08 RX ORDER — LEVOTHYROXINE SODIUM 0.15 MG/1
150 TABLET ORAL
Qty: 90 TABLET | Refills: 3 | Status: SHIPPED | OUTPATIENT
Start: 2021-09-08

## 2021-09-08 RX ORDER — ALLOPURINOL 100 MG/1
TABLET ORAL
Qty: 90 TABLET | Refills: 3 | Status: SHIPPED | OUTPATIENT
Start: 2021-09-08

## 2021-09-08 RX ORDER — METOPROLOL SUCCINATE 25 MG/1
TABLET, EXTENDED RELEASE ORAL
Qty: 90 TABLET | Refills: 3 | Status: SHIPPED | OUTPATIENT
Start: 2021-09-08

## 2021-09-08 NOTE — TELEPHONE ENCOUNTER
Detailed message left for Evelio gore/Kadie that she should be on the levothyroxine and to call if Ms Vanessa Austin needs a refill.

## 2021-09-09 ENCOUNTER — CARE COORDINATION (OUTPATIENT)
Dept: CASE MANAGEMENT | Age: 76
End: 2021-09-09

## 2021-09-09 ENCOUNTER — HOSPITAL ENCOUNTER (OUTPATIENT)
Age: 76
Discharge: HOME | End: 2021-09-09
Payer: MEDICARE

## 2021-09-09 ENCOUNTER — HOSPITAL ENCOUNTER (OUTPATIENT)
Dept: GENERAL RADIOLOGY | Age: 76
Discharge: HOME | End: 2021-09-09
Payer: MEDICARE

## 2021-09-09 DIAGNOSIS — I30.0 PERICARDITIS, ACUTE, IDIOPATHIC: ICD-10-CM

## 2021-09-09 DIAGNOSIS — R06.02 SHORTNESS OF BREATH: ICD-10-CM

## 2021-09-09 PROCEDURE — 71048 X-RAY EXAM CHEST 4+ VIEWS: CPT

## 2021-09-09 PROCEDURE — 71046 X-RAY EXAM CHEST 2 VIEWS: CPT

## 2021-09-09 NOTE — CARE COORDINATION
[facility] Transitions Follow Up Call    2021    Patient: Efren Sherman  Patient : 1945   MRN: [id number]  Reason for Admission:   Discharge Date: 21 RARS: Readmission Risk Score: 21         Spoke with: [address] Transitions Subsequent and Final Call    Subsequent and Final Calls  Do you have any ongoing symptoms?: Yes  Onset of Patient-reported symptoms: In the past 7 days  Patient-reported symptoms: Shortness of Breath  Interventions for patient-reported symptoms: Other  Have your medications changed?: No  Do you have any questions related to your medications?: No  Do you currently have any active services?: No  Do you have any needs or concerns that I can assist you with?: No  Identified Barriers: None  Care Transitions Interventions  No Identified Needs  Other Interventions:         Pt states doing better today, she has been SOB earlier this week, Dr. Gonzalo Joseph sent her for xrays, awaiting results. F/U appts listed below.   Agreed to more CTC f/u calls      Follow Up  Future Appointments   Date Time Provider Rod Plummer   2021  1:45 PM Shana Luis MD FF VASC/ENDO [facility]   10/25/2021 10:00 AM Molly Patricia APRN - CNP FF Cardio [facility]       Marbella Zarate RN

## 2021-09-14 ENCOUNTER — OFFICE VISIT (OUTPATIENT)
Dept: VASCULAR SURGERY | Age: 76
End: 2021-09-14

## 2021-09-14 ENCOUNTER — CARE COORDINATION (OUTPATIENT)
Dept: CASE MANAGEMENT | Age: 76
End: 2021-09-14

## 2021-09-14 VITALS
WEIGHT: 175 LBS | DIASTOLIC BLOOD PRESSURE: 70 MMHG | SYSTOLIC BLOOD PRESSURE: 136 MMHG | BODY MASS INDEX: 32.2 KG/M2 | HEIGHT: 62 IN

## 2021-09-14 DIAGNOSIS — E11.22 CHRONIC KIDNEY DISEASE WITH END STAGE RENAL DISEASE ON DIALYSIS DUE TO TYPE 2 DIABETES MELLITUS (HCC): Primary | ICD-10-CM

## 2021-09-14 DIAGNOSIS — Z09 POSTOPERATIVE EXAMINATION: ICD-10-CM

## 2021-09-14 DIAGNOSIS — N18.6 CHRONIC KIDNEY DISEASE WITH END STAGE RENAL DISEASE ON DIALYSIS DUE TO TYPE 2 DIABETES MELLITUS (HCC): Primary | ICD-10-CM

## 2021-09-14 DIAGNOSIS — Z99.2 CHRONIC KIDNEY DISEASE WITH END STAGE RENAL DISEASE ON DIALYSIS DUE TO TYPE 2 DIABETES MELLITUS (HCC): Primary | ICD-10-CM

## 2021-09-14 PROCEDURE — 99024 POSTOP FOLLOW-UP VISIT: CPT | Performed by: SURGERY

## 2021-09-14 NOTE — PROGRESS NOTES
Postop Progress Note    Subjective    Kendal Lobato presents to the office for postop follow up. Patient underwent left forearm AV graft August 19, 2021. This is her first postoperative visit. She has no new complaints today    Objective    Vitals:    09/14/21 1334   BP: 136/70     General: alert, cooperative and no distress  Incision: healing well  Excellent thrill and bruit in her left forearm AV graft. Incisions well-healed. Hand warm. Palpable distal pulses. Assessment  Doing well postoperatively. Plan  58-year-old female with well-functioning left forearm AV graft. Okay to begin cannulation. Patient to contact my office when she needs her tunneled dialysis catheter removed. We can do that in the office.   Otherwise she can follow-up as needed    Electronically signed by Keri Eastman MD on 9/14/2021 at 1:39 PM

## 2021-09-14 NOTE — CARE COORDINATION
[facility] Transitions Follow Up Call    2021    Patient: Lucy Sinha  Patient : 1945   MRN: [id number]  Reason for Admission:   Discharge Date: 21 RARS: Readmission Risk Score: 21       Per Epic, pt had an MD appt this afternoon, this nurse will follow up at a later date.       Follow Up  Future Appointments   Date Time Provider Rod Plummer   10/25/2021 10:00 AM MARCIE Paul - CNP [facility] Card[facility]       Matt Newell RN

## 2021-09-14 NOTE — Clinical Note
I saw Unruly Rg in follow-up today. Her left forearm AV graft is functioning well. Its okay to begin cannulation.   She will follow-up with me as needed  Thanks again  rich

## 2021-09-16 ENCOUNTER — CARE COORDINATION (OUTPATIENT)
Dept: CASE MANAGEMENT | Age: 76
End: 2021-09-16

## 2021-09-16 NOTE — CARE COORDINATION
[facility] Transitions Follow Up Call    2021    Patient: Madelyn Tapia  Patient : 1945   MRN: [id number]  Reason for Admission:   Discharge Date: 21 RARS: Readmission Risk Score: 21         Spoke with: [address] Transitions Subsequent and Final Call    Subsequent and Final Calls  Do you have any ongoing symptoms?: No  Have your medications changed?: No  Do you have any questions related to your medications?: No  Do you currently have any active services?: No  Do you have any needs or concerns that I can assist you with?: No  Identified Barriers: None  Care Transitions Interventions  No Identified Needs  Other Interventions:         Pt states doing ok, no new issues or concerns. She is still struggling with the PNA, states Dr. Charlene Harvey put her on an antibiotic that she takes every other day for 14 days. She saw Dr. Marisol Cohn for a f/u on her AV graft, all went well. F/U appts listed below.  Agreed to more CTC f/u calls      Follow Up  Future Appointments   Date Time Provider Rod Plummer   10/25/2021 10:00 AM MARCIE Ivey - CNP [facility] Card[facility]       Mohit Cates RN

## 2021-09-22 ENCOUNTER — APPOINTMENT (OUTPATIENT)
Dept: GENERAL RADIOLOGY | Age: 76
DRG: 186 | End: 2021-09-22
Payer: MEDICARE

## 2021-09-22 ENCOUNTER — TELEPHONE (OUTPATIENT)
Dept: OTHER | Facility: CLINIC | Age: 76
End: 2021-09-22

## 2021-09-22 ENCOUNTER — APPOINTMENT (OUTPATIENT)
Dept: CT IMAGING | Age: 76
DRG: 186 | End: 2021-09-22
Payer: MEDICARE

## 2021-09-22 ENCOUNTER — HOSPITAL ENCOUNTER (INPATIENT)
Age: 76
LOS: 2 days | Discharge: HOME | DRG: 186 | End: 2021-09-24
Attending: EMERGENCY MEDICINE | Admitting: INTERNAL MEDICINE
Payer: MEDICARE

## 2021-09-22 DIAGNOSIS — J90 PLEURAL EFFUSION: ICD-10-CM

## 2021-09-22 DIAGNOSIS — I31.39 PERICARDIAL EFFUSION: ICD-10-CM

## 2021-09-22 DIAGNOSIS — R07.9 CHEST PAIN, UNSPECIFIED TYPE: Primary | ICD-10-CM

## 2021-09-22 DIAGNOSIS — Z99.2 END STAGE RENAL DISEASE ON DIALYSIS (HCC): ICD-10-CM

## 2021-09-22 DIAGNOSIS — N18.6 END STAGE RENAL DISEASE ON DIALYSIS (HCC): ICD-10-CM

## 2021-09-22 LAB
A/G RATIO: 0.9 (ref 1.1–2.2)
ALBUMIN SERPL-MCNC: 3.9 G/DL (ref 3.4–5)
ALP BLD-CCNC: 151 U/L (ref 40–129)
ALT SERPL-CCNC: 20 U/L (ref 10–40)
ANION GAP SERPL CALCULATED.3IONS-SCNC: 16 MMOL/L (ref 3–16)
ANISOCYTOSIS: ABNORMAL
AST SERPL-CCNC: 23 U/L (ref 15–37)
BASOPHILS ABSOLUTE: 0 K/UL (ref 0–0.2)
BASOPHILS RELATIVE PERCENT: 0 %
BILIRUB SERPL-MCNC: 0.4 MG/DL (ref 0–1)
BUN BLDV-MCNC: 47 MG/DL (ref 7–20)
CALCIUM SERPL-MCNC: 10.1 MG/DL (ref 8.3–10.6)
CHLORIDE BLD-SCNC: 92 MMOL/L (ref 99–110)
CO2: 26 MMOL/L (ref 21–32)
CREAT SERPL-MCNC: 7.1 MG/DL (ref 0.6–1.2)
EOSINOPHILS ABSOLUTE: 0.3 K/UL (ref 0–0.6)
EOSINOPHILS RELATIVE PERCENT: 2 %
GFR AFRICAN AMERICAN: 7
GFR NON-AFRICAN AMERICAN: 6
GLOBULIN: 4.2 G/DL
GLUCOSE BLD-MCNC: 115 MG/DL (ref 70–99)
HCT VFR BLD CALC: 25.4 % (ref 36–48)
HEMOGLOBIN: 8.2 G/DL (ref 12–16)
LACTIC ACID, SEPSIS: 0.8 MMOL/L (ref 0.4–1.9)
LYMPHOCYTES ABSOLUTE: 0.5 K/UL (ref 1–5.1)
LYMPHOCYTES RELATIVE PERCENT: 4 %
MCH RBC QN AUTO: 30.2 PG (ref 26–34)
MCHC RBC AUTO-ENTMCNC: 32.3 G/DL (ref 31–36)
MCV RBC AUTO: 93.5 FL (ref 80–100)
MONOCYTES ABSOLUTE: 1.4 K/UL (ref 0–1.3)
MONOCYTES RELATIVE PERCENT: 10 %
NEUTROPHILS ABSOLUTE: 11.4 K/UL (ref 1.7–7.7)
NEUTROPHILS RELATIVE PERCENT: 84 %
PDW BLD-RTO: 16.6 % (ref 12.4–15.4)
PLATELET # BLD: 427 K/UL (ref 135–450)
PMV BLD AUTO: 7.2 FL (ref 5–10.5)
POTASSIUM REFLEX MAGNESIUM: 4.6 MMOL/L (ref 3.5–5.1)
RBC # BLD: 2.71 M/UL (ref 4–5.2)
SODIUM BLD-SCNC: 134 MMOL/L (ref 136–145)
STOMATOCYTES: ABNORMAL
TOTAL PROTEIN: 8.1 G/DL (ref 6.4–8.2)
TROPONIN: 0.03 NG/ML
TROPONIN: 0.04 NG/ML
WBC # BLD: 13.6 K/UL (ref 4–11)

## 2021-09-22 PROCEDURE — 6370000000 HC RX 637 (ALT 250 FOR IP): Performed by: INTERNAL MEDICINE

## 2021-09-22 PROCEDURE — 80053 COMPREHEN METABOLIC PANEL: CPT

## 2021-09-22 PROCEDURE — 85025 COMPLETE CBC W/AUTO DIFF WBC: CPT

## 2021-09-22 PROCEDURE — 90935 HEMODIALYSIS ONE EVALUATION: CPT | Performed by: INTERNAL MEDICINE

## 2021-09-22 PROCEDURE — 84484 ASSAY OF TROPONIN QUANT: CPT

## 2021-09-22 PROCEDURE — 87040 BLOOD CULTURE FOR BACTERIA: CPT

## 2021-09-22 PROCEDURE — 71260 CT THORAX DX C+: CPT

## 2021-09-22 PROCEDURE — 83605 ASSAY OF LACTIC ACID: CPT

## 2021-09-22 PROCEDURE — 90935 HEMODIALYSIS ONE EVALUATION: CPT

## 2021-09-22 PROCEDURE — 99222 1ST HOSP IP/OBS MODERATE 55: CPT | Performed by: INTERNAL MEDICINE

## 2021-09-22 PROCEDURE — 5A1D70Z PERFORMANCE OF URINARY FILTRATION, INTERMITTENT, LESS THAN 6 HOURS PER DAY: ICD-10-PCS | Performed by: INTERNAL MEDICINE

## 2021-09-22 PROCEDURE — 2580000003 HC RX 258: Performed by: PHYSICIAN ASSISTANT

## 2021-09-22 PROCEDURE — 1200000000 HC SEMI PRIVATE

## 2021-09-22 PROCEDURE — 36415 COLL VENOUS BLD VENIPUNCTURE: CPT

## 2021-09-22 PROCEDURE — 6360000002 HC RX W HCPCS: Performed by: PHYSICIAN ASSISTANT

## 2021-09-22 PROCEDURE — 99283 EMERGENCY DEPT VISIT LOW MDM: CPT

## 2021-09-22 PROCEDURE — 6360000004 HC RX CONTRAST MEDICATION: Performed by: PHYSICIAN ASSISTANT

## 2021-09-22 PROCEDURE — 93005 ELECTROCARDIOGRAM TRACING: CPT | Performed by: EMERGENCY MEDICINE

## 2021-09-22 PROCEDURE — 71045 X-RAY EXAM CHEST 1 VIEW: CPT

## 2021-09-22 RX ORDER — ALLOPURINOL 100 MG/1
100 TABLET ORAL DAILY
Status: DISCONTINUED | OUTPATIENT
Start: 2021-09-23 | End: 2021-09-24 | Stop reason: HOSPADM

## 2021-09-22 RX ORDER — SPIRONOLACTONE 25 MG/1
25 TABLET ORAL DAILY
Status: DISCONTINUED | OUTPATIENT
Start: 2021-09-23 | End: 2021-09-24 | Stop reason: HOSPADM

## 2021-09-22 RX ORDER — CLOPIDOGREL BISULFATE 75 MG/1
75 TABLET ORAL DAILY
Status: DISCONTINUED | OUTPATIENT
Start: 2021-09-22 | End: 2021-09-22 | Stop reason: ALTCHOICE

## 2021-09-22 RX ORDER — ASPIRIN 81 MG/1
81 TABLET ORAL DAILY
Status: DISCONTINUED | OUTPATIENT
Start: 2021-09-23 | End: 2021-09-24 | Stop reason: HOSPADM

## 2021-09-22 RX ORDER — METOPROLOL SUCCINATE 25 MG/1
25 TABLET, EXTENDED RELEASE ORAL DAILY
Status: DISCONTINUED | OUTPATIENT
Start: 2021-09-23 | End: 2021-09-24 | Stop reason: HOSPADM

## 2021-09-22 RX ORDER — LEVOTHYROXINE SODIUM 0.15 MG/1
150 TABLET ORAL
Status: DISCONTINUED | OUTPATIENT
Start: 2021-09-23 | End: 2021-09-24 | Stop reason: HOSPADM

## 2021-09-22 RX ORDER — TORSEMIDE 20 MG/1
60 TABLET ORAL 2 TIMES DAILY
Status: DISCONTINUED | OUTPATIENT
Start: 2021-09-22 | End: 2021-09-23

## 2021-09-22 RX ORDER — HEPARIN SODIUM 1000 [USP'U]/ML
3800 INJECTION, SOLUTION INTRAVENOUS; SUBCUTANEOUS PRN
Status: DISCONTINUED | OUTPATIENT
Start: 2021-09-22 | End: 2021-09-24 | Stop reason: HOSPADM

## 2021-09-22 RX ADMIN — TORSEMIDE 60 MG: 20 TABLET ORAL at 20:40

## 2021-09-22 RX ADMIN — CEFEPIME HYDROCHLORIDE 2000 MG: 2 INJECTION, POWDER, FOR SOLUTION INTRAVENOUS at 20:38

## 2021-09-22 RX ADMIN — IOPAMIDOL 75 ML: 755 INJECTION, SOLUTION INTRAVENOUS at 12:42

## 2021-09-22 RX ADMIN — VANCOMYCIN HYDROCHLORIDE 1250 MG: 10 INJECTION, POWDER, LYOPHILIZED, FOR SOLUTION INTRAVENOUS at 17:26

## 2021-09-22 ASSESSMENT — PAIN DESCRIPTION - PROGRESSION
CLINICAL_PROGRESSION: GRADUALLY IMPROVING
CLINICAL_PROGRESSION: GRADUALLY IMPROVING

## 2021-09-22 ASSESSMENT — PAIN SCALES - GENERAL
PAINLEVEL_OUTOF10: 7
PAINLEVEL_OUTOF10: 0

## 2021-09-22 NOTE — ACP (ADVANCE CARE PLANNING)
Advanced Care Planning Note.     Purpose of Encounter: Advanced care planning in light of hospitalization  Parties In Attendance: Patient,    Decisional Capacity: Yes  Subjective: Patient  understand that this conversation is to address long term care goal  Objective: Patient with history of ESRD coronary artery disease admitted to the hospital with bilateral pleural effusions persisting cough leukocytosis and new onset chest pain  Goals of Care Determination: Patient does not want CPR or intubation if required  Code Status: DNR CCA DNI  Time spent on Advanced care Plannin minutes  Advanced Care Planning Documents: documented patient's wishes, would like Daughter Emelyn President to make medical decisions if unable to make decisions    mD Castro MD  2021 4:05 PM

## 2021-09-22 NOTE — ED NOTES
Both blood cultures drawn, patient then taken to dialysis in stable condition.       Masood Carson RN  09/22/21 1049

## 2021-09-22 NOTE — TELEPHONE ENCOUNTER
Writer contacted ED provider to inform of 30 day readmission risk. ED provider informed writer of possible readmission.

## 2021-09-22 NOTE — ED PROVIDER NOTES
as noted above in the ROS, all other systems were reviewed and negative. PAST MEDICAL HISTORY     Past Medical History:   Diagnosis Date    Arthritis     Diabetes ([facility] [address]     Hemodialysis patient ([facility] [address]     Hyperlipidemia     Hypertension     Kidney disease     Neuropathy     Pneumonia     IN February, 2021, with COVID    Thyroid disease          SURGICAL HISTORY     Past Surgical History:   Procedure Laterality Date    APPENDECTOMY      CHOLECYSTECTOMY      COLONOSCOPY  11/24/2020    COLONOSCOPY POLYPECTOMY SNARE/COLD BIOPSY performed by Danielle Weems MD at [address] 5/17/2021    PERITONEAL DIALYSIS CATHETER REMOVAL. [address]. performed by Marta Arcso DO at [address] N/A 4/3/2020    LAPAROSCOPIC LYSIS OF ADHESIONS, LAPAROSCOPIC PERITONEAL DIALYSIS CATHETER PLACEMENT, LAPAROSCOPIC OMENTOPEXY performed by Marta Arcos DO at [facility], BILATERAL      preventive    ROTATOR CUFF REPAIR Left     SHUNT REVISION Left 8/19/2021    LEFT FOREARM ARTERIO VENOUS GRAFT performed by Hollis Diop MD at [address] [place] [address] 11/24/2020    EGD BIOPSY performed by Danielle Weems MD at [address]       Previous Medications    ALLOPURINOL (ZYLOPRIM) 100 MG TABLET    TAKE 1 TABLET EVERY DAY    ASPIRIN EC 81 MG EC TABLET    Take 1 tablet by mouth daily    CLOPIDOGREL (PLAVIX) 75 MG TABLET    Take 1 tablet by mouth daily    COLCHICINE (COLCRYS) 0.6 MG TABLET    Take 1 tablet by mouth daily for 5 days    GABAPENTIN (NEURONTIN) 100 MG CAPSULE    Take 2 capsules by mouth nightly for 30 days.     LEVOTHYROXINE (SYNTHROID) 150 MCG TABLET    Take 1 tablet by mouth every morning (before breakfast)    LOPERAMIDE (IMODIUM) 2 MG CAPSULE    Take 2 mg by mouth 4 times daily as needed    METOPROLOL SUCCINATE (TOPROL XL) 25 MG EXTENDED RELEASE TABLET    TAKE 1 TABLET EVERY DAY    METOPROLOL TARTRATE (LOPRESSOR) 25 MG TABLET    Take 12.5 mg by mouth 2 times daily    NORTRIPTYLINE (PAMELOR) 10 MG CAPSULE    TAKE 1 CAPSULE BY MOUTH EVERY EVENING    ROSUVASTATIN (CRESTOR) 20 MG TABLET    Take 1 tablet by mouth daily    SPIRONOLACTONE (ALDACTONE) 25 MG TABLET    Take 1 tablet by mouth daily    TORSEMIDE (DEMADEX) 20 MG TABLET    Take 3 tablets by mouth 2 times daily    UNABLE TO FIND    Dialysis          ALLERGIES     Amoxicillin, Demeclocycline, Penicillins, Tetracyclines & related, Ozempic (0.25 or 0.5 mg-dose) [semaglutide(0.25 or 0.5mg-dos)], and Codeine    FAMILYHISTORY       Family History   Problem Relation Age of Onset    Cancer Mother         lung, breast, liver    Stroke Father     Stomach Cancer Brother     Cancer Maternal Grandmother     No Known Problems Paternal Grandmother     No Known Problems Paternal Grandfather           SOCIAL HISTORY       Social History     Tobacco Use    Smoking status: Former Smoker     Packs/day: 0.50     Years: 10.00     Pack years: 5.00     Types: Cigarettes     Quit date: 1978     Years since quittin.2    Smokeless tobacco: Never Used   Vaping Use    Vaping Use: Never used   Substance Use Topics    Alcohol use: Yes     Comment: every other week one glass of wine    Drug use: Never       SCREENINGS             PHYSICAL EXAM    (up to 7 for level 4, 8 or more for level 5)     ED Triage Vitals [21 1035]   BP Temp Temp Source Pulse Resp SpO2 Height Weight   (!) 153/67 98.7 °F (37.1 °C) Oral 110 18 94 % 5' 2\" (1.575 m) 171 lb 3.2 oz (77.7 kg)       Physical Exam  Vitals and nursing note reviewed. Constitutional:       Appearance: She is well-developed. She is not diaphoretic. HENT:      Head: Atraumatic. Nose: Nose normal.   Eyes:      General:         Right eye: No discharge. Left eye: No discharge.    Cardiovascular:      Rate and Rhythm: Regular rhythm. Tachycardia present. Heart sounds: No murmur heard. No friction rub. No gallop. Pulmonary:      Effort: Pulmonary effort is normal. No respiratory distress. Breath sounds: No stridor. Rales present. No wheezing or rhonchi. Comments: Decreased breath sounds bilaterally at the bases with crackles at the left mid lung field. Abdominal:      General: Bowel sounds are normal. There is no distension. Palpations: Abdomen is soft. There is no mass. Tenderness: There is no abdominal tenderness. There is no guarding or rebound. Hernia: No hernia is present. Musculoskeletal:         General: No swelling. Normal range of motion. Cervical back: Normal range of motion. Skin:     General: Skin is warm and dry. Findings: No erythema or rash. Neurological:      Mental Status: She is alert and oriented to person, place, and time. Cranial Nerves: No cranial nerve deficit.    Psychiatric:         Behavior: Behavior normal.         DIAGNOSTIC RESULTS   LABS:    Labs Reviewed   CBC WITH AUTO DIFFERENTIAL - Abnormal; Notable for the following components:       Result Value    WBC 13.6 (*)     RBC 2.71 (*)     Hemoglobin 8.2 (*)     Hematocrit 25.4 (*)     RDW 16.6 (*)     Neutrophils Absolute 11.4 (*)     Lymphocytes Absolute 0.5 (*)     Monocytes Absolute 1.4 (*)     Anisocytosis 1+ (*)     Stomatocytes Occasional (*)     All other components within normal limits    Narrative:     Performed at:  [facility] [address], [place], [address]   Phone [phone]   COMPREHENSIVE METABOLIC PANEL W/ REFLEX TO MG FOR LOW K - Abnormal; Notable for the following components:    Sodium 134 (*)     Chloride 92 (*)     Glucose 115 (*)     BUN 47 (*)     CREATININE 7.1 (*)     GFR Non- 6 (*)     GFR African American 7 (*)     Albumin/Globulin Ratio 0.9 (*)     Alkaline Phosphatase 151 (*)     All other components within normal limits    Narrative:     [place] Leiva  [facility] tel. [phone],  Chemistry results called to and read back by Jaclyn Garcia, 09/22/2021 12:22,  by [facility]   Performed at:  [facility] [address], [place], [facility]   Phone [phone]   TROPONIN - Abnormal; Notable for the following components:    Troponin 0.04 (*)     All other components within normal limits    Narrative:     [place] Leiva  [facility] tel. [phone],  Chemistry results called to and read back by Jaclyn Garcia, 09/22/2021 12:22,  by [facility]   Performed at:  [facility] [address], [place], [facility]   Phone [phone]   CULTURE, BLOOD 1   CULTURE, BLOOD 2   LACTATE, SEPSIS    Narrative:     Performed at:  [facility] [address], [place], [facility]   Phone [phone]   LACTATE, SEPSIS       When ordered only abnormal lab results are displayed. All other labs were within normal range or not returned as of this dictation. EKG: When ordered, EKG's are interpreted by the Emergency Department Physician in the absence of a cardiologist.  Please see their note for interpretation of EKG. RADIOLOGY:   Non-plain film images such as CT, Ultrasound and MRI are read by the radiologist. Plain radiographic images are visualized and preliminarily interpreted by the ED Provider with the below findings:        Interpretation per the Radiologist below, if available at the time of this note:    CT CHEST PULMONARY EMBOLISM W CONTRAST   Final Result   1. No pulmonary emboli. 2. Moderate bilateral pleural effusions with adjacent atelectasis. 3. Small pericardial effusion. XR CHEST PORTABLE   Final Result   Bibasilar opacities suggest bilateral pleural effusions with associated   atelectatic changes or infection           No results found.         PROCEDURES   Unless otherwise noted below, none     Procedures    CRITICAL CARE TIME N/A    CONSULTS:  1. Dr. Emery Greene -nephrology  2. Dr. Abhishek Sorto -hospitalist  Kip Hash and DIFFERENTIAL DIAGNOSIS/MDM:   Vitals:    Vitals:    09/22/21 1343 09/22/21 1344 09/22/21 1345 09/22/21 1400   BP:   136/86 (!) 145/72   Pulse: 97 97 98 95   Resp: (!) 37 29 22 30   Temp:       TempSrc:       SpO2: 93% 92% 92% 94%   Weight:       Height:           Patient was given the following medications:  Medications   cefepime (MAXIPIME) 2000 mg IVPB minibag (has no administration in time range)   vancomycin (VANCOCIN) 1,250 mg in dextrose 5 % 250 mL IVPB (has no administration in time range)   iopamidol (ISOVUE-370) 76 % injection 75 mL (75 mLs IntraVENous Given 9/22/21 1242)           Patient presented with chest pain shortness of breath and continual cough. X-ray imaging reveals pleural effusion with possible atelectatic changes or infectious change. Was recently discharged a month ago for moderate pericardial effusion and pericarditis and actually discharged on colchicine after discussing this with her nephrologist.  X-ray image about 2 weeks ago and was placed on Levaquin by nephrology for pneumonia that she is already finished per nephrology. He has mild leukocytosis. After discussion given her tachycardia and chest pain CTPA was ordered and patient can get dialysis after that per nephrology. There is no evidence of PE. Will start on vancomycin and cefepime given her recent hospitalization with pleural effusion and possible infectious changes. Discussed this with hospitalist.  Patient was stable time of admission. She still has pericardial effusion but this is now small. FINAL IMPRESSION      1. Chest pain, unspecified type    2. Pleural effusion    3. End stage renal disease on dialysis ([facility] [address]    4.  Pericardial effusion          DISPOSITION/PLAN   DISPOSITION Decision To Admit 09/22/2021 01:47:14 PM      PATIENT REFERRED TO:  No follow-up provider specified.     DISCHARGE MEDICATIONS:  New Prescriptions    No medications on file       DISCONTINUED MEDICATIONS:  Discontinued Medications    No medications on file              (Please note that portions of this note were completed with a voice recognition program.  Efforts were made to edit the dictations but occasionally words are mis-transcribed.)    Yulisa Carlin PA-C (electronically signed)            Yulisa Carlin PA-C  09/22/21 5511

## 2021-09-22 NOTE — ED PROVIDER NOTES
[facility] Emergency Department      Pt Name: Beverley Michaels  MRN: [id number]  [place] 1945  Date of evaluation: 9/22/2021  Provider: Clearnce Riedel, MD  I independently performed a history and physical on Beverley Michaels. All diagnostic, treatment, and disposition decisions were made by myself in conjunction with the advanced practice provider. HPI: Beverley Michaels presented with   Chief Complaint   Patient presents with    Shortness of Breath     pt dc from hospital two weeks ago. still feels short of breath. diagnosed with pneumonia. nephrologist told pt to come in      Beverley Michaels has a past medical history of Arthritis, Diabetes ([facility] [address] Hemodialysis patient ([facility] [address] Hyperlipidemia, Hypertension, Kidney disease, Neuropathy, Pneumonia, and Thyroid disease. She has a past surgical history that includes Appendectomy; Mastectomy, bilateral; Rotator cuff repair (Left); Cholecystectomy; LAPAROSCOPY INSERTION PERITONEAL CATHETER (N/A, 4/3/2020); Upper gastrointestinal endoscopy (N/A, 11/24/2020); Colonoscopy (11/24/2020); Hysterectomy; Dialysis Catheter Removal (N/A, 5/17/2021); and shunt revision (Left, 8/19/2021). No current facility-administered medications on file prior to encounter.      Current Outpatient Medications on File Prior to Encounter   Medication Sig Dispense Refill    levothyroxine (SYNTHROID) 150 MCG tablet Take 1 tablet by mouth every morning (before breakfast) 90 tablet 3    allopurinol (ZYLOPRIM) 100 MG tablet TAKE 1 TABLET EVERY DAY 90 tablet 3    metoprolol succinate (TOPROL XL) 25 MG extended release tablet TAKE 1 TABLET EVERY DAY 90 tablet 3    torsemide (DEMADEX) 20 MG tablet Take 3 tablets by mouth 2 times daily 30 tablet 3    spironolactone (ALDACTONE) 25 MG tablet Take 1 tablet by mouth daily 30 tablet 3    nortriptyline (PAMELOR) 10 MG capsule TAKE 1 CAPSULE BY MOUTH EVERY EVENING 30 capsule 3    rosuvastatin (CRESTOR) 20 MG tablet Take 1 tablet by mouth daily 90 tablet 1    aspirin EC 81 MG EC tablet Take 1 tablet by mouth daily 90 tablet 1    colchicine (COLCRYS) 0.6 MG tablet Take 1 tablet by mouth daily for 5 days 5 tablet 0    clopidogrel (PLAVIX) 75 MG tablet Take 1 tablet by mouth daily 90 tablet 3    gabapentin (NEURONTIN) 100 MG capsule Take 2 capsules by mouth nightly for 30 days. 60 capsule 3    UNABLE TO FIND Dialysis mon wed fri      loperamide (IMODIUM) 2 MG capsule Take 2 mg by mouth 4 times daily as needed      metoprolol tartrate (LOPRESSOR) 25 MG tablet Take 12.5 mg by mouth 2 times daily       PHYSICAL EXAM  Vitals: BP (!) 140/102   Pulse 98   Temp 98.7 °F (37.1 °C) (Oral)   Resp 22   Ht 5' 2\" (1.575 m)   Wt 171 lb 3.2 oz (77.7 kg)   LMP  (LMP Unknown)   SpO2 92%   BMI 31.31 kg/m²   Constitutional:  67 y.o. female alert, cooperative  HENT:  Atraumatic scalp, mucous membranes moist  Eyes:   Conjunctiva clear, no icterus  Neck:  Supple, no visible JVD, no signs of injury  Cardiovascular:  Irregular, no rubs  Thorax & Lungs:  No accessory muscle usage, decreased basilar BS  Abdomen:  Soft, non distended, NT  Back:  No deformity  Genitalia:  Deferred  Rectal:  Deferred  Extremities:  No cyanosis, no edema, adequate perfusion  Skin:  Warm, dry  Neurologic:  Alert, no slurred speech  Psychiatric:  Affect appropriate    Medical Decision Making and Plan:  Briefly, this is an 67 y. o.female who presented with sob, hx of CKD on dialysis. Moderate bilateral pleural effusions. Dyspnea at rest.  We have some concerns for an infectious cause will cover with antibiotics. Plan is to admit for further care. For further details of [facility] Emergency Department encounter, please see documentation by advanced practice provider TRUNG Menjivar.      Labs Reviewed   CBC WITH AUTO DIFFERENTIAL - Abnormal; Notable for the following components:       Result Value    WBC 13.6 (*)     RBC 2.71 (*)     Hemoglobin 8.2 (*)     Hematocrit 25.4 (*) RDW 16.6 (*)     Neutrophils Absolute 11.4 (*)     Lymphocytes Absolute 0.5 (*)     Monocytes Absolute 1.4 (*)     Anisocytosis 1+ (*)     Stomatocytes Occasional (*)     All other components within normal limits    Narrative:     Performed at:                  [facility] [address] [address] [place]   Phone [phone]   COMPREHENSIVE METABOLIC PANEL W/ REFLEX TO MG FOR LOW K - Abnormal; Notable for the following components:    Sodium 134 (*)     Chloride 92 (*)     Glucose 115 (*)     BUN 47 (*)     CREATININE 7.1 (*)     GFR Non- 6 (*)     GFR African American 7 (*)     Albumin/Globulin Ratio 0.9 (*)     Alkaline Phosphatase 151 (*)     All other components within normal limits    Narrative:     Migdalia Padilla  [facility] tel. [phone],                  Chemistry results called to and read back by Natalie Sagastume, 09/22/2021 12:22,                  by [facility]                   Performed at:                  [facility] [address], [place], [facility]   Phone [phone]   TROPONIN - Abnormal; Notable for the following components:    Troponin 0.04 (*)     All other components within normal limits    Narrative:     Migdalia Padilla  [facility] tel. [phone],                  Chemistry results called to and read back by Natalie Sagastume, 09/22/2021 12:22,                  by [facility]                   Performed at:                  [facility] [address], [place], [facility] [place]   Phone [phone]   CULTURE, BLOOD 1   CULTURE, BLOOD 2   LACTATE, SEPSIS    Narrative:     Performed at:                  [facility] [address], [place], [facility] [place]   Phone [phone]   LACTATE, SEPSIS     RADIOLOGY:     Plain x-rays were viewed by me:   CT CHEST PULMONARY EMBOLISM W CONTRAST   Final Result   1.  No pulmonary emboli. 2. Moderate bilateral pleural effusions with adjacent atelectasis. 3. Small pericardial effusion. XR CHEST PORTABLE   Final Result   Bibasilar opacities suggest bilateral pleural effusions with associated   atelectatic changes or infection           EKG:  Read by me in the absence of a cardiologist shows: Sinus tachycardia, rate 110, QRS duration normal, axis normal, no acute injury pattern, minimal change from prior EKG from 1 September 2021    Vitals:    09/22/21 1342 09/22/21 1343 09/22/21 1344 09/22/21 1345   BP:       Pulse: 96 97 97 98   Resp: 25 (!) 37 29 22   Temp:       TempSrc:       SpO2: 94% 93% 92% 92%   Weight:       Height:         Medications administered:  Medications   cefepime (MAXIPIME) 2000 mg IVPB minibag (has no administration in time range)   vancomycin (VANCOCIN) 1,250 mg in dextrose 5 % 250 mL IVPB (has no administration in time range)   iopamidol (ISOVUE-370) 76 % injection 75 mL (75 mLs IntraVENous Given 9/22/21 1242)     FINAL IMPRESSION:    1. Chest pain, unspecified type    2. Pleural effusion    3. End stage renal disease on dialysis ([facility] [address]    4.  Pericardial effusion       DISPOSITION Decision To Admit 09/22/2021 01:47:14 PM       Tiffanie Acuña MD  09/22/21 3080

## 2021-09-22 NOTE — CONSULTS
Office : [phone]     Fax :[phone]       Nephrology Consult Note      Patient's Name: Brandi Smith  12:23 PM  9/22/2021    Reason for Consult:  ESRD      Requesting Physician:  Yennifer Harrison MD      Chief Complaint:    Chief Complaint   Patient presents with    Shortness of Breath     pt dc from hospital two weeks ago. still feels short of breath. diagnosed with pneumonia. nephrologist told pt to come in          History of Present iIlness:    Brandi Smith is a 67 y.o. female with h/o ESRD , HTN, Anemia of dhronic kidney ds, DM 2, CAD h/o stent placement who was admitted with c/o chest pain and SOB. Denies any fever . Recently treated for pericarditis and pneumonia . Is tachycardic. Had HD on Monday. No intake/output data recorded. Past Medical History:   Diagnosis Date    Arthritis     Diabetes ([facility]     Hemodialysis patient ([facility] [address] [facility]     Hyperlipidemia     Hypertension     Kidney disease     Neuropathy     Pneumonia     IN February, 2021, with COVID    Thyroid disease        Past Surgical History:   Procedure Laterality Date    APPENDECTOMY      CHOLECYSTECTOMY      COLONOSCOPY  11/24/2020    COLONOSCOPY POLYPECTOMY SNARE/COLD BIOPSY performed by Ebony Kaur MD at [address] 5/17/2021    PERITONEAL DIALYSIS CATHETER REMOVAL.  [address] performed by Quin Doe DO at [address] [place] [address] N/A 4/3/2020    LAPAROSCOPIC LYSIS OF ADHESIONS, LAPAROSCOPIC PERITONEAL DIALYSIS CATHETER PLACEMENT, LAPAROSCOPIC OMENTOPEXY performed by Quin Doe DO at [facility], BILATERAL      preventive    ROTATOR CUFF REPAIR Left     SHUNT REVISION Left 8/19/2021    LEFT FOREARM ARTERIO VENOUS GRAFT performed by Cortez Oakley MD at [facility] N/A 11/24/2020    EGD BIOPSY performed by Maria Teresa Farmer MD at [address]       Family History   Problem Relation Age of Onset    Cancer Mother         lung, breast, liver    Stroke Father     Stomach Cancer Brother     Cancer Maternal Grandmother     No Known Problems Paternal Grandmother     No Known Problems Paternal Grandfather         reports that she quit smoking about 43 years ago. Her smoking use included cigarettes. She has a 5.00 pack-year smoking history. She has never used smokeless tobacco. She reports current alcohol use. She reports that she does not use drugs.         Allergies:  Amoxicillin, Demeclocycline, Penicillins, Tetracyclines & related, Ozempic (0.25 or 0.5 mg-dose) [semaglutide(0.25 or 0.5mg-dos)], and Codeine    Current Medications:    Current Outpatient Medications   Medication Instructions    allopurinol (ZYLOPRIM) 100 MG tablet TAKE 1 TABLET EVERY DAY    aspirin EC 81 mg, Oral, DAILY    clopidogrel (PLAVIX) 75 mg, Oral, DAILY    colchicine (COLCRYS) 0.6 mg, Oral, DAILY    gabapentin (NEURONTIN) 200 mg, Oral, NIGHTLY    levothyroxine (SYNTHROID) 150 mcg, Oral, DAILY BEFORE BREAKFAST    loperamide (IMODIUM) 2 mg, Oral, 4 TIMES DAILY PRN    metoprolol succinate (TOPROL XL) 25 MG extended release tablet TAKE 1 TABLET EVERY DAY    metoprolol tartrate (LOPRESSOR) 12.5 mg, Oral, 2 TIMES DAILY    nortriptyline (PAMELOR) 10 MG capsule TAKE 1 CAPSULE BY MOUTH EVERY EVENING    rosuvastatin (CRESTOR) 20 mg, Oral, DAILY    spironolactone (ALDACTONE) 25 mg, Oral, DAILY    torsemide (DEMADEX) 60 mg, Oral, 2 TIMES DAILY    UNABLE TO FIND Dialysis mon wed fri      Review of Systems:   14 point ROS obtained but were negative except mentioned in HPI      Physical exam:     Vitals:  BP (!) 153/67   Pulse 110   Temp 98.7 °F (37.1 °C) (Oral)   Resp 18   Ht 5' 2\" (1.575 m)   Wt 171 lb 3.2 oz (77.7 kg)   LMP  (LMP Unknown)   SpO2 94%   BMI 31.31 kg/m²   Constitutional:  OAA X3 NAD  Skin: no rash, turgor wnl  Heent:  eomi, mmm  Neck: no bruits or jvd noted  Cardiovascular:  S1, S2 without m/r/g  Respiratory: decreased air entry at bases   Abdomen:  +bs, soft, nt, nd  Ext: no  lower extremity edema  Psychiatric: mood and affect appropriate  Musculoskeletal:  Rom, muscular strength intact    Labs:  CBC:   Recent Labs     09/22/21  1141   WBC 13.6*   HGB 8.2*        BMP:    Recent Labs     09/22/21  1141   *   K 4.6   CL 92*   CO2 26   BUN 47*   CREATININE 7.1*   GLUCOSE 115*     Ca/Mg/Phos:   Recent Labs     09/22/21  1141   CALCIUM 10.1     Hepatic:   Recent Labs     09/22/21  1141   AST 23   ALT 20   BILITOT 0.4   ALKPHOS 151*     Troponin:   Recent Labs     09/22/21  1141   TROPONINI 0.04*     BNP: No results for input(s): BNP in the last 72 hours. Lipids: No results for input(s): CHOL, TRIG, HDL, LDLCALC, LABVLDL in the last 72 hours. ABGs: No results for input(s): PHART, PO2ART, ZON8CSN in the last 72 hours. INR: No results for input(s): INR in the last 72 hours. UA:No results for input(s): Theone Ground, GLUCOSEU, BILIRUBINUR, Jinx Going, BLOODU, PHUR, PROTEINU, UROBILINOGEN, NITRU, LEUKOCYTESUR, LABMICR, URINETYPE in the last 72 hours. Urine Microscopic: No results for input(s): LABCAST, BACTERIA, COMU, HYALCAST, WBCUA, RBCUA, EPIU in the last 72 hours. Urine Culture: No results for input(s): LABURIN in the last 72 hours. Urine Chemistry: No results for input(s): Suann Pummel, PROTEINUR, NAUR in the last 72 hours. IMAGING:  XR CHEST PORTABLE   Final Result   Bibasilar opacities suggest bilateral pleural effusions with associated   atelectatic changes or infection         CT CHEST PULMONARY EMBOLISM W CONTRAST    (Results Pending)                       Assessment/Plan :      1.  ESRD   last HD monday   Will do 2 hr HD today and see if tolerates    2. HTN. Controlled     3. SOB. R/o PE   CTA negative   Has pleural effusion   R/o pneumonia   Treated with levofloxacin outpt for 10 days       4. Acid- base disorder. Correct with HD     5. Electrolytes.  monitor and correct with HD             D/w primary team      Thank you for allowing us to participate in care of [facility]         Electronically signed by: Nick Florence MD, 9/22/2021, 12:23 PM      [facility] [address]  Office : [phone]  Fax :[phone]

## 2021-09-22 NOTE — H&P
HOSPITALISTS HISTORY AND PHYSICAL    9/22/2021 3:58 PM    Patient Information:  Adrian Herron is a 67 y.o. female [id number]  PCP:  Milton Vela MD (Tel: [phone] )    Chief complaint:    Chief Complaint   Patient presents with    Shortness of Breath     pt dc from hospital two weeks ago. still feels short of breath. diagnosed with pneumonia. nephrologist told pt to come in         History of Present Illness:  Teresa Onofre is a 67 y.o. female who has had multiple hospital admissions recently with multiple rounds of antibiotics. Currently on oral antibiotics unable to tell me which one. The patient has continued to have a  Non productive cough with shortness of breath  For the past 1 week and a half some nausea with this no diarrhea no dysuria no fevers or chills. This morning patient was walking around and developed midsternal 6 or 10 chest pain that radiated down her left arm. The patient pain got slightly better with rest.  Patient recently had cardiac stents in August is on aspirin and Plavix. Also developed pericarditis today with colchicine. The patient had a CT chest performed in the ED which was negative for PE but did show bilateral pleural effusions and persistently elevated white blood cell count on laboratory work-up patient denies any sick contacts          REVIEW OF SYSTEMS:   Constitutional: Negative for fever,chills or night sweats   ENT: Negative for rhinorrhea, epistaxis, hoarseness, sore throat. Respiratory: see above  Cardiovascular: see above  Gastrointestinal: Negative for nausea, vomiting, diarrhea  Genitourinary: Negative for polyuria, dysuria   Hematologic/Lymphatic: Negative for bleeding tendency, easy bruising  Musculoskeletal: Negative for myalgias and arthralgias  Neurologic: Negative for confusion,dysarthria.   Skin: Negative for itching,rash  Psychiatric: Negative for depression,anxiety, agitation. Endocrine: Negative for polydipsia,polyuria,heat /cold intolerance. Past Medical History:   has a past medical history of Arthritis, Diabetes ([facility] [address] Hemodialysis patient ([facility] [address] Hyperlipidemia, Hypertension, Kidney disease, Neuropathy, Pneumonia, and Thyroid disease. Past Surgical History:   has a past surgical history that includes Appendectomy; Mastectomy, bilateral; Rotator cuff repair (Left); Cholecystectomy; LAPAROSCOPY INSERTION PERITONEAL CATHETER (N/A, 4/3/2020); Upper gastrointestinal endoscopy (N/A, 11/24/2020); Colonoscopy (11/24/2020); Hysterectomy; Dialysis Catheter Removal (N/A, 5/17/2021); and shunt revision (Left, 8/19/2021). Medications:  No current facility-administered medications on file prior to encounter. Current Outpatient Medications on File Prior to Encounter   Medication Sig Dispense Refill    levothyroxine (SYNTHROID) 150 MCG tablet Take 1 tablet by mouth every morning (before breakfast) 90 tablet 3    allopurinol (ZYLOPRIM) 100 MG tablet TAKE 1 TABLET EVERY DAY 90 tablet 3    metoprolol succinate (TOPROL XL) 25 MG extended release tablet TAKE 1 TABLET EVERY DAY 90 tablet 3    torsemide (DEMADEX) 20 MG tablet Take 3 tablets by mouth 2 times daily 30 tablet 3    spironolactone (ALDACTONE) 25 MG tablet Take 1 tablet by mouth daily 30 tablet 3    nortriptyline (PAMELOR) 10 MG capsule TAKE 1 CAPSULE BY MOUTH EVERY EVENING 30 capsule 3    rosuvastatin (CRESTOR) 20 MG tablet Take 1 tablet by mouth daily 90 tablet 1    aspirin EC 81 MG EC tablet Take 1 tablet by mouth daily 90 tablet 1    colchicine (COLCRYS) 0.6 MG tablet Take 1 tablet by mouth daily for 5 days 5 tablet 0    clopidogrel (PLAVIX) 75 MG tablet Take 1 tablet by mouth daily 90 tablet 3    gabapentin (NEURONTIN) 100 MG capsule Take 2 capsules by mouth nightly for 30 days.  60 capsule 3    UNABLE TO FIND Dialysis mon wed fri      loperamide (IMODIUM) 2 MG capsule Take 2 mg by mouth 4 times daily as needed      metoprolol tartrate (LOPRESSOR) 25 MG tablet Take 12.5 mg by mouth 2 times daily         Allergies: Allergies   Allergen Reactions    Amoxicillin Anaphylaxis    Demeclocycline Anaphylaxis    Penicillins Anaphylaxis    Tetracyclines & Related Anaphylaxis    Ozempic (0.25 Or 0.5 Mg-Dose) [Semaglutide(0.25 Or 0.5mg-Dos)] Other (See Comments)     Lost large amount weight and loss  Of appetite    Codeine Itching and Rash        Social History:  Patient Lives at home   reports that she quit smoking about 43 years ago. Her smoking use included cigarettes. She has a 5.00 pack-year smoking history. She has never used smokeless tobacco. She reports current alcohol use. She reports that she does not use drugs. Family History:  family history includes Cancer in her maternal grandmother and mother; No Known Problems in her paternal grandfather and paternal grandmother; Stomach Cancer in her brother; Stroke in her father. ,     Physical Exam:  BP (!) 145/72   Pulse 95   Temp 98.7 °F (37.1 °C) (Oral)   Resp 30   Ht 5' 2\" (1.575 m)   Wt 171 lb 3.2 oz (77.7 kg)   LMP  (LMP Unknown)   SpO2 94%   BMI 31.31 kg/m²     General appearance:  Appears comfortable. AAOx3  HEENT: atraumatic, Pupils equal, muscous membranes moist, no masses appreciated  Cardiovascular: Regular rate and rhythm no murmurs appreciated  Respiratory: CTAB no wheezing  Gastrointestinal: Abdomen soft, non-tender, BS+  EXT: no edema  Neurology: no gross focal deficts  Psychiatry: Appropriate affect.  Not agitated  Skin: Warm, dry, no rashes appreciated    Labs:  CBC:   Lab Results   Component Value Date    WBC 13.6 09/22/2021    RBC 2.71 09/22/2021    HGB 8.2 09/22/2021    HCT 25.4 09/22/2021    MCV 93.5 09/22/2021    MCH 30.2 09/22/2021    MCHC 32.3 09/22/2021    RDW 16.6 09/22/2021     09/22/2021    MPV 7.2 09/22/2021     BMP:    Lab Results   Component Value Date     09/22/2021    K 4.6 09/22/2021    CL 92 09/22/2021    CO2 26 09/22/2021    BUN 47 09/22/2021    CREATININE 7.1 09/22/2021    CALCIUM 10.1 09/22/2021    GFRAA 7 09/22/2021    LABGLOM 6 09/22/2021    GLUCOSE 115 09/22/2021     CT CHEST PULMONARY EMBOLISM W CONTRAST   Final Result   1. No pulmonary emboli. 2. Moderate bilateral pleural effusions with adjacent atelectasis. 3. Small pericardial effusion. XR CHEST PORTABLE   Final Result   Bibasilar opacities suggest bilateral pleural effusions with associated   atelectatic changes or infection             Recent imaging reviewed    Problem List  Active Problems:    Chest pain  Resolved Problems:    * No resolved hospital problems. *        Assessment/Plan:   Chest pain: elevated troponin may be do to esrd  - repeat troponin  - echo  - cards consult    Persistent cough and sob: with leukocytosis, pleural effusions  -pulm and id consult  - vanc and cefepime    CAD s/p stent 8/2021  - asa and plavix    ESRD on HD: nephro consulted      DVT prophylaxis heparin   Code statusDNR CCA        Admit as inpatient I anticipate hospitalization spanning more than two midnights for investigation and treatment of the above medically necessary diagnoses. Please note that some part of this chart was generated using Dragon dictation software. Although every effort was made to ensure the accuracy of this automated transcription, some errors in transcription may have occurred inadvertently. If you may need any clarification, please do not hesitate to contact me through [facility].        Alexey Hernandez MD    9/22/2021 3:58 PM

## 2021-09-22 NOTE — FLOWSHEET NOTE
09/22/21 1432 09/22/21 1649   Treatment   Time On 1444  --    Time Off  --  1649   Observations & Evaluations   Level of Consciousness Alert (0) Alert (0)   Respiratory Quality/Effort Unlabored Unlabored   O2 Device None (Room air) None (Room air)   Vital Signs   BP (!) 187/90 (!) 161/76   Temp 98.2 °F (36.8 °C) 98 °F (36.7 °C)   Pulse 96 90   Resp 20 22   Weight 167 lb 15.9 oz (76.2 kg)  --    Weight Method Standing scale  --    Percent Weight Change -1.87  --    Technical Checks   Dialysis Machine No. [id number]  --    Dialyzer Lot No. [id number]  --    RO Machine Log Sheet Completed Yes  --    Machine Alarm Self Test Completed; Passed  --    Machine Autotest Completed; Passed  --    Air Foam Detector Proper Function  --    Extracorporeal Circuit Tested for Integrity Yes  --    Bleach Test (Neg) Yes  --    Bath Temperature 96.8 °F (36 °C)  --    Treatment Initiation   Dialyze Hours 2  --    Treatment  Initiation Universal Precautions maintained;Lines secured to patient; Connections secured;Prime given;Venous Parameters set; Arterial Parameters set; Air foam detector engaged; Hemosafe Device; Dialysate;Saline line double clamped; Hemo-Safe Applied;Dialyzer;F160  --    Dialysis Bath   K+ (Potassium) 3  --    Ca+ (Calcium) 2.5  --    Na+ (Sodium) 140  --    HCO3 (Bicarb) 35  --    Hemodialysis Fistula/Graft Arteriovenous fistula Left Forearm   Placement Date/Time: 08/19/21 1135   Pre-existing: No  Timeout: Patient  Inserted by: Dr Zoie Jerez  Access Type: Arteriovenous fistula  Orientation: Left  Access Location: Forearm   Thrill Present  --    Bruit Present  --    Post-Hemodialysis Assessment   Post-Treatment Procedures  --  Blood returned;Catheter capped, clamped and heparinized x 2 ports   Machine Disinfection Process  --  Acid/Vinegar Clean;Exterior Machine Disinfection;Bleach   Rinseback Volume (ml)  --  400 ml   Total Liters Processed (l/min)  --  34 l/min   Dialyzer Clearance  --  Lightly streaked   Duration of Treatment (minutes)  --  120 minutes   Hemodialysis Intake (ml)  --  400 ml   Hemodialysis Output (ml)  --  900 ml   NET Removed (ml)  --  500 ml   Tolerated Treatment  --  Good   Patient Response to Treatment  --  see note   Physician Notified?  --  Yes   Treatment time: 2 hours    Net UF: 500 ml    Pre weight: 76.2 kg  Post weight: 75.7 kg  EDW:     Access used: [facility] [place] [facility]  Access function: Functioned well with  ml/min    Medications or blood products given: none    Regular outpatient schedule: Cecilia SRIVASTAVA    Summary of response to treatment: Tolerated well. Copy of dialysis treatment record placed in chart, to be scanned into EMR.

## 2021-09-22 NOTE — ED NOTES
Report given to 3A nurse, patient placed on portable tele-monitor, patient left ER in stable condition.       Soni Singh RN  09/22/21 0761

## 2021-09-23 LAB
A/G RATIO: 0.9 (ref 1.1–2.2)
ALBUMIN SERPL-MCNC: 3.7 G/DL (ref 3.4–5)
ALP BLD-CCNC: 148 U/L (ref 40–129)
ALT SERPL-CCNC: 32 U/L (ref 10–40)
ANION GAP SERPL CALCULATED.3IONS-SCNC: 16 MMOL/L (ref 3–16)
AST SERPL-CCNC: 39 U/L (ref 15–37)
BASOPHILS ABSOLUTE: 0 K/UL (ref 0–0.2)
BASOPHILS RELATIVE PERCENT: 0.4 %
BILIRUB SERPL-MCNC: 0.4 MG/DL (ref 0–1)
BUN BLDV-MCNC: 34 MG/DL (ref 7–20)
CALCIUM SERPL-MCNC: 9.9 MG/DL (ref 8.3–10.6)
CHLORIDE BLD-SCNC: 95 MMOL/L (ref 99–110)
CO2: 24 MMOL/L (ref 21–32)
CREAT SERPL-MCNC: 5.9 MG/DL (ref 0.6–1.2)
EKG ATRIAL RATE: 110 BPM
EKG DIAGNOSIS: NORMAL
EKG P AXIS: 38 DEGREES
EKG P-R INTERVAL: 164 MS
EKG Q-T INTERVAL: 342 MS
EKG QRS DURATION: 80 MS
EKG QTC CALCULATION (BAZETT): 462 MS
EKG R AXIS: -1 DEGREES
EKG T AXIS: 27 DEGREES
EKG VENTRICULAR RATE: 110 BPM
EOSINOPHILS ABSOLUTE: 0.4 K/UL (ref 0–0.6)
EOSINOPHILS RELATIVE PERCENT: 3.4 %
GFR AFRICAN AMERICAN: 8
GFR NON-AFRICAN AMERICAN: 7
GLOBULIN: 3.9 G/DL
GLUCOSE BLD-MCNC: 101 MG/DL (ref 70–99)
GLUCOSE BLD-MCNC: 90 MG/DL (ref 70–99)
HCT VFR BLD CALC: 25 % (ref 36–48)
HEMOGLOBIN: 8.4 G/DL (ref 12–16)
LYMPHOCYTES ABSOLUTE: 0.7 K/UL (ref 1–5.1)
LYMPHOCYTES RELATIVE PERCENT: 6.7 %
MCH RBC QN AUTO: 30.8 PG (ref 26–34)
MCHC RBC AUTO-ENTMCNC: 33.7 G/DL (ref 31–36)
MCV RBC AUTO: 91.5 FL (ref 80–100)
MONOCYTES ABSOLUTE: 1 K/UL (ref 0–1.3)
MONOCYTES RELATIVE PERCENT: 9.3 %
NEUTROPHILS ABSOLUTE: 8.4 K/UL (ref 1.7–7.7)
NEUTROPHILS RELATIVE PERCENT: 80.2 %
PDW BLD-RTO: 16.9 % (ref 12.4–15.4)
PERFORMED ON: ABNORMAL
PLATELET # BLD: 409 K/UL (ref 135–450)
PMV BLD AUTO: 7.3 FL (ref 5–10.5)
POTASSIUM REFLEX MAGNESIUM: 4.2 MMOL/L (ref 3.5–5.1)
PROCALCITONIN: 0.64 NG/ML (ref 0–0.15)
RBC # BLD: 2.73 M/UL (ref 4–5.2)
SODIUM BLD-SCNC: 135 MMOL/L (ref 136–145)
TOTAL PROTEIN: 7.6 G/DL (ref 6.4–8.2)
WBC # BLD: 10.5 K/UL (ref 4–11)

## 2021-09-23 PROCEDURE — 93010 ELECTROCARDIOGRAM REPORT: CPT | Performed by: INTERNAL MEDICINE

## 2021-09-23 PROCEDURE — 1200000000 HC SEMI PRIVATE

## 2021-09-23 PROCEDURE — 85025 COMPLETE CBC W/AUTO DIFF WBC: CPT

## 2021-09-23 PROCEDURE — 93308 TTE F-UP OR LMTD: CPT

## 2021-09-23 PROCEDURE — 99233 SBSQ HOSP IP/OBS HIGH 50: CPT | Performed by: INTERNAL MEDICINE

## 2021-09-23 PROCEDURE — 6370000000 HC RX 637 (ALT 250 FOR IP): Performed by: INTERNAL MEDICINE

## 2021-09-23 PROCEDURE — 99222 1ST HOSP IP/OBS MODERATE 55: CPT | Performed by: INTERNAL MEDICINE

## 2021-09-23 PROCEDURE — 87641 MR-STAPH DNA AMP PROBE: CPT

## 2021-09-23 PROCEDURE — 80053 COMPREHEN METABOLIC PANEL: CPT

## 2021-09-23 PROCEDURE — 90935 HEMODIALYSIS ONE EVALUATION: CPT

## 2021-09-23 PROCEDURE — 99223 1ST HOSP IP/OBS HIGH 75: CPT | Performed by: INTERNAL MEDICINE

## 2021-09-23 PROCEDURE — 36415 COLL VENOUS BLD VENIPUNCTURE: CPT

## 2021-09-23 PROCEDURE — 84145 PROCALCITONIN (PCT): CPT

## 2021-09-23 RX ORDER — ONDANSETRON 2 MG/ML
4 INJECTION INTRAMUSCULAR; INTRAVENOUS ONCE
Status: DISCONTINUED | OUTPATIENT
Start: 2021-09-23 | End: 2021-09-24 | Stop reason: HOSPADM

## 2021-09-23 RX ORDER — LEVOFLOXACIN 250 MG/1
250 TABLET ORAL DAILY
Status: DISCONTINUED | OUTPATIENT
Start: 2021-09-23 | End: 2021-09-24 | Stop reason: HOSPADM

## 2021-09-23 RX ADMIN — ASPIRIN 81 MG: 81 TABLET, COATED ORAL at 08:04

## 2021-09-23 RX ADMIN — ALLOPURINOL 100 MG: 100 TABLET ORAL at 08:04

## 2021-09-23 RX ADMIN — TORSEMIDE 60 MG: 20 TABLET ORAL at 08:04

## 2021-09-23 RX ADMIN — SPIRONOLACTONE 25 MG: 25 TABLET ORAL at 08:04

## 2021-09-23 RX ADMIN — METOPROLOL SUCCINATE 25 MG: 25 TABLET, EXTENDED RELEASE ORAL at 08:04

## 2021-09-23 RX ADMIN — LEVOFLOXACIN 250 MG: 250 TABLET, FILM COATED ORAL at 13:11

## 2021-09-23 RX ADMIN — LEVOTHYROXINE SODIUM 150 MCG: 0.15 TABLET ORAL at 05:12

## 2021-09-23 ASSESSMENT — ENCOUNTER SYMPTOMS
STRIDOR: 0
SHORTNESS OF BREATH: 1
CHOKING: 0
DIARRHEA: 0
APNEA: 0
ABDOMINAL PAIN: 0
COUGH: 1
PHOTOPHOBIA: 0
RHINORRHEA: 0
CHEST TIGHTNESS: 0
COLOR CHANGE: 0
NAUSEA: 0
FACIAL SWELLING: 0
EYE DISCHARGE: 0
EYE REDNESS: 0
TROUBLE SWALLOWING: 0
BLOOD IN STOOL: 0

## 2021-09-23 ASSESSMENT — PAIN SCALES - GENERAL
PAINLEVEL_OUTOF10: 0

## 2021-09-23 NOTE — CONSULTS
PULMONARY AND CRITICAL CARE MEDICINE CONSULTATION NOTE    CONSULTING PHYSICIAN:  Sharalyn Spurling, MD    ADMISSION DATE: 9/22/2021  ADMISSION DIAGNOSIS: Pericardial effusion [I31.3]  Chest pain [R07.9]  Pleural effusion [J90]  End stage renal disease on dialysis ([facility] [address] [N18.6, Z99.2]  Chest pain, unspecified type [R07.9]    REASON FOR CONSULT:   Chief Complaint   Patient presents with    Shortness of Breath     pt dc from hospital two weeks ago. still feels short of breath. diagnosed with pneumonia. nephrologist told pt to come in        [facility]: 9/22/2021    HISTORY OF PRESENT ILLNESS: 67y.o. year old female with a past medical history significant for end-stage renal disease on hemodialysis, diabetes, recent history of pericarditis who presented to the hospital with 1 week of nausea and mild shortness of breath. On the day of admission patient was at midsternal chest pain which radiated to her left arm. Patient has recently been treated for pericarditis with colchicine. Also had PCI done in August and has been on aspirin Plavix. CT chest done in the ER showed bilateral pleural effusions with mildly elevated leukocytosis. .     At the time of interview patient sitting in bed in no apparent respiratory distress. Reports that her breathing has significantly improved. Oxygen requirements down to only 1 L/min. Patient saturating in low to mid 90s. Has been getting hemodialysis on Monday, Wednesday, Friday. Also produces urine and takes Demadex at home. REVIEW OF SYSTEMS:     CONSTITUTIONAL SYMPTOMS: The patient denies fever, fatigue, night sweats, weight loss or weight gain. HEENT: No vision changes. No tinnitus, Denies sinus pain. No hoarseness, or dysphagia. NECK: Patient denies swelling in the neck. CARDIOVASCULAR: Denies chest pain, palpitation, syncope. RESPIRATORY: As per HPI. GASTROINTESTINAL: Denies nausea, abdominal pain or change in bowel function.   GENITOURINARY: Denies obstructive symptoms. No history of incontinence. BREASTS: No masses or lumps in the breasts. SKIN: No rashes or itching. MUSCULOSKELETAL: Denies weakness or bone pain. NEUROLOGICAL: No headaches or seizures. PSYCHIATRIC: Denies mood swings or depression. ENDOCRINE: Denies heat or cold intolerance or excessive thirst.  HEMATOLOGIC/LYMPHATIC: Denies easy bruising or lymph node swelling. ALLERGIC/IMMUNOLOGIC: No environmental allergies. PAST MEDICAL HISTORY:   Past Medical History:   Diagnosis Date    Arthritis     Diabetes ([facility] [address]     Hemodialysis patient ([facility] [address]     Hyperlipidemia     Hypertension     Kidney disease     Neuropathy     Pneumonia     IN , with COVID    Thyroid disease        PAST SURGICAL HISTORY:   Past Surgical History:   Procedure Laterality Date    APPENDECTOMY      CHOLECYSTECTOMY      COLONOSCOPY  2020    COLONOSCOPY POLYPECTOMY SNARE/COLD BIOPSY performed by Juan Wood MD at [address] [place] [address] 2021    PERITONEAL DIALYSIS CATHETER REMOVAL.  EXCISSION OF ABDOMINAL CITRIX. performed by Samuel oWoten DO at [address] [place] [address] N/A 4/3/2020    LAPAROSCOPIC LYSIS OF ADHESIONS, LAPAROSCOPIC PERITONEAL DIALYSIS CATHETER PLACEMENT, LAPAROSCOPIC OMENTOPEXY performed by Samuel Wooten DO at [facility], BILATERAL      preventive    ROTATOR CUFF REPAIR Left     SHUNT REVISION Left 2021    LEFT FOREARM ARTERIO VENOUS GRAFT performed by You Donaldson MD at [facility]. N/A 2020    EGD BIOPSY performed by Juan Wood MD at [place] [address]. HISTORY:   Social History     Tobacco Use    Smoking status: Former Smoker     Packs/day: 0.50     Years: 10.00     Pack years: 5.00     Types: Cigarettes     Quit date: 1978     Years since quittin.2    Smokeless tobacco: Never Used   Vaping Use    Vaping Use: Never used   Substance Use Topics    Alcohol use: Yes     Comment: every other week one glass of wine    Drug use: Never       FAMILY HISTORY:   Family History   Problem Relation Age of Onset    Cancer Mother         lung, breast, liver    Stroke Father     Stomach Cancer Brother     Cancer Maternal Grandmother     No Known Problems Paternal Grandmother     No Known Problems Paternal Grandfather        MEDICATIONS:     No current facility-administered medications on file prior to encounter. Current Outpatient Medications on File Prior to Encounter   Medication Sig Dispense Refill    levothyroxine (SYNTHROID) 150 MCG tablet Take 1 tablet by mouth every morning (before breakfast) 90 tablet 3    allopurinol (ZYLOPRIM) 100 MG tablet TAKE 1 TABLET EVERY DAY 90 tablet 3    metoprolol succinate (TOPROL XL) 25 MG extended release tablet TAKE 1 TABLET EVERY DAY 90 tablet 3    torsemide (DEMADEX) 20 MG tablet Take 3 tablets by mouth 2 times daily 30 tablet 3    spironolactone (ALDACTONE) 25 MG tablet Take 1 tablet by mouth daily 30 tablet 3    nortriptyline (PAMELOR) 10 MG capsule TAKE 1 CAPSULE BY MOUTH EVERY EVENING 30 capsule 3    rosuvastatin (CRESTOR) 20 MG tablet Take 1 tablet by mouth daily 90 tablet 1    aspirin EC 81 MG EC tablet Take 1 tablet by mouth daily 90 tablet 1    metoprolol tartrate (LOPRESSOR) 25 MG tablet Take 12.5 mg by mouth 2 times daily      colchicine (COLCRYS) 0.6 MG tablet Take 1 tablet by mouth daily for 5 days 5 tablet 0    clopidogrel (PLAVIX) 75 MG tablet Take 1 tablet by mouth daily 90 tablet 3    gabapentin (NEURONTIN) 100 MG capsule Take 2 capsules by mouth nightly for 30 days.  60 capsule 3    UNABLE TO FIND Dialysis mon wed fri      loperamide (IMODIUM) 2 MG capsule Take 2 mg by mouth 4 times daily as needed          levoFLOXacin  250 mg Oral Daily    aspirin EC  81 mg Oral Daily    allopurinol  100 mg Oral Daily    levothyroxine  150 mcg Oral QAM AC    metoprolol succinate  25 mg Oral Daily    spironolactone  25 mg Oral Daily       perflutren lipid microspheres, heparin (porcine)      ALLERGIES:   Allergies as of 09/22/2021 - Fully Reviewed 09/22/2021   Allergen Reaction Noted    Amoxicillin Anaphylaxis 01/23/2017    Demeclocycline Anaphylaxis 05/01/2017    Penicillins Anaphylaxis 01/23/2017    Tetracyclines & related Anaphylaxis 01/23/2017    Ozempic (0.25 or 0.5 mg-dose) [semaglutide(0.25 or 0.5mg-dos)] Other (See Comments) 04/03/2020    Codeine Itching and Rash 01/23/2017      OBJECTIVE:   height is 5' 2\" (1.575 m) and weight is 164 lb 7.4 oz (74.6 kg). Her temperature is 96.5 °F (35.8 °C). Her blood pressure is 149/71 (abnormal) and her pulse is 92. Her respiration is 18 and oxygen saturation is 96%. I/O this shift:  In: 240 [P.O.:240]  Out: -      PHYSICAL EXAM:    CONSTITUTIONAL: She is a 67y.o.-year-old who appears her stated age. She is alert and oriented x 3 and in no acute distress. HEENT: PERRLA, EOMI. No scleral icterus. No thrush, atraumatic, normocephalic. NECK: Supple, without cervical or supraclavicular lymphadenopathy:  CARDIOVASCULAR: S1 S2 RRR. Without murmer  RESPIRATORY & CHEST: Decreased breath sounds in the bibasilar areas. No wheezing or crackles heard. GASTROINTESTINAL & ABDOMEN: Soft, nontender, positive bowel sounds in all quadrants, non-distended, without hepatosplenomegaly. GENITOURINARY: Deferred. MUSCULOSKELETAL: No tenderness to palpation of the axial skeleton. There is no clubbing. No cyanosis. No edema of the lower extremities. SKIN OF BODY: No rash or jaundice. PSYCHIATRIC EVALUATION: Normal affect. Patient answers questions appropriately. HEMATOLOGIC/LYMPHATIC/ IMMUNOLOGIC: No palpable lymphadenopathy. NEUROLOGIC: Alert and oriented x 3. Groslly non-focal. Motor strength is 5+/5 in all muscle groups. The patient has a normal sensorium globally.       LABS:  Recent Labs     09/22/21  1141 09/23/21  0637   WBC 13.6* 10.5   HGB 8.2* 8.4*   HCT 25.4* 25.0*    409   ALT 20 32   AST 23 39*   * 135*   K 4.6 4.2   CL 92* 95*   CREATININE 7.1* 5.9*   BUN 47* 34*   CO2 26 24       Recent Labs     09/22/21  1141 09/23/21  0637   GLUCOSE 115* 90   CALCIUM 10.1 9.9   * 135*   K 4.6 4.2   CO2 26 24   CL 92* 95*   BUN 47* 34*   CREATININE 7.1* 5.9*       No results for input(s): PHART, DJK7YZY, PO2ART, WLM5HEQ, L9XESIHW, BEART, D5MICWTB in the last 72 hours. Lab Results   Component Value Date    INR 1.03 09/01/2021    INR 0.97 08/25/2021    INR 0.98 08/24/2021    PROTIME 11.7 09/01/2021    PROTIME 11.0 08/25/2021    PROTIME 11.1 08/24/2021     Lab Results   Component Value Date    AMYLASE 146 10/30/2019      Lab Results   Component Value Date    LABA1C 5.2 07/28/2021     Lab Results   Component Value Date    .5 07/28/2021     Lab Results   Component Value Date    TSH 2.64 08/15/2019    Q2BXBPL 1.00 08/24/2017    T4FREE 1.3 08/15/2019     Lab Results   Component Value Date    CKTOTAL 243 (H) 08/25/2021    TROPONINI 0.03 (H) 09/22/2021      No results found for: CRP   No results found for: BNP   No results found for: DDIMER   No results found for: FERRITIN   Lab Results   Component Value Date    LACTA 0.7 04/09/2019           IMAGING:    Narrative   EXAMINATION:   CTA OF THE CHEST 9/22/2021 12:37 pm           FINDINGS:   Pulmonary Arteries: Pulmonary arteries are adequately opacified for   evaluation.  No evidence of intraluminal filling defect to suggest pulmonary   embolism.  Main pulmonary artery is normal in caliber.       Mediastinum: The central airways are clear.  No mediastinal lymphadenopathy. Small pericardial effusion.  No CT evidence of cardiac tamponade. Atherosclerotic disease of the thoracic aorta and the coronary arteries.       Lungs/pleura: Moderate bilateral pleural effusions with adjacent atelectasis. No pneumothorax.       Upper Abdomen: Small hiatal hernia.  Rest of visualized upper abdomen is   unremarkable.       Soft Tissues/Bones: Right breast implant.  Right chest wall dialysis catheter   with tip in the right atrium.  No axillary lymphadenopathy.           Impression   1. No pulmonary emboli. 2. Moderate bilateral pleural effusions with adjacent atelectasis. 3. Small pericardial effusion.                 IMPRESSION:     1. Acute respiratory distress  2. Bilateral pleural effusions  3. Recent history of pericarditis  4. End-stage renal disease on hemodialysis  5. Coronary artery disease s/p PCI in August 2021    RECOMMENDATION:     1. Patient presented to the hospital with chest pain and acute respiratory distress. 2. CT chest showed bilateral pleural effusions. 3. I strongly suspect fluid overload due to end-stage renal disease and recent pericarditis. 4. Currently her respiratory status is stable. Oxygen requirements are minimal and may be able to come off of oxygen supplementation. 5. Leukocytosis is trended down. 6. Patient is on dual antiplatelet therapy due to recent PCI. Will be a high risk for undergoing thoracentesis with minimal benefit as there is bilateral pleural effusion and fluid overload. I will strongly recommend against doing thoracentesis at this time. 7. I discussed the case with the nephrologist.  He is recommending performing extra sessions of ultrafiltration in order to get the fluid balance under control. 8. Titrate oxygen supplementation to maintain SPO2 above 90%. 9. Antibiotics have been deescalated to Levaquin. 10. Nothing further to add from pulmonary standpoint. Thank you for your consultation. We will sign off. Daron Swain MD  Pulmonary Critical Care and Sleep Medicine  9/22/2021, 11:40 AM    This note was completed using dragon medical speech recognition software.  Grammatical errors, random word insertions, pronoun errors and incomplete sentences are occasional consequences of this technology due to software limitations. If there are questions or concerns about the content of this note of information contained within the body of this dictation they should be addressed with the provider for clarification.

## 2021-09-23 NOTE — FLOWSHEET NOTE
Treatment time: 2 hours (UF only)  Net UF: 2000 ml     Pre weight: 74.6 kg   Post weight: 72.3 kg  EDW:  TBD     Access used: RCW CVC  Access function: Good with  ml/min     Medications or blood products given: None     Regular outpatient schedule: [facility], MWF     Summary of response to treatment: Tolerated tx well. No HD related complaints or complications.  Patient did have some leg cramping in right leg once patient came off machine.      Copy of dialysis treatment record placed in chart, to be scanned into EMR.       09/23/21 1020 09/23/21 1245   Treatment   Time On  --  1039   Time Off  --  1239   Vital Signs   BP (!) 149/71 (!) 151/68   Temp 96.5 °F (35.8 °C) 96.9 °F (36.1 °C)   Pulse 92 93   Resp 18 18

## 2021-09-23 NOTE — PROGRESS NOTES
[facility] PROGRESS NOTE    9/23/2021 1:05 PM        Name: Giovanny Garza . Admitted: 9/22/2021  Primary Care Provider: Ta Barrett MD (Tel: [phone])        Subjective:    Lying in bed feeling better no chest pain or sob still dry cough    Reviewed interval ancillary notes    Current Medications  levoFLOXacin (LEVAQUIN) tablet 250 mg, Daily  ondansetron (ZOFRAN) injection 4 mg, Once  perflutren lipid microspheres (DEFINITY) injection 1.65 mg, ONCE PRN  aspirin EC tablet 81 mg, Daily  allopurinol (ZYLOPRIM) tablet 100 mg, Daily  levothyroxine (SYNTHROID) tablet 150 mcg, QAM AC  metoprolol succinate (TOPROL XL) extended release tablet 25 mg, Daily  spironolactone (ALDACTONE) tablet 25 mg, Daily  heparin (porcine) injection 3,800 Units, PRN        Objective:  BP (!) 149/71   Pulse 92   Temp 96.5 °F (35.8 °C)   Resp 18   Ht 5' 2\" (1.575 m)   Wt 164 lb 7.4 oz (74.6 kg)   LMP  (LMP Unknown)   SpO2 96%   BMI 30.08 kg/m²     Intake/Output Summary (Last 24 hours) at 9/23/2021 1305  Last data filed at 9/23/2021 1004  Gross per 24 hour   Intake 954.32 ml   Output 900 ml   Net 54.32 ml      Wt Readings from Last 3 Encounters:   09/23/21 164 lb 7.4 oz (74.6 kg)   09/14/21 175 lb (79.4 kg)   09/01/21 171 lb 15.3 oz (78 kg)       General appearance:  Appears comfortable. AAOx3  HEENT: atraumatic, Pupils equal, muscous membranes moist, no masses appreciated  Cardiovascular: Regular rate and rhythm no murmurs appreciated  Respiratory: CTAB no wheezing  Gastrointestinal: Abdomen soft, non-tender, BS+  EXT: no edema  Neurology: no gross focal deficts  Psychiatry: Appropriate affect.  Not agitated  Skin: Warm, dry, no rashes appreciated    Labs and Tests:  CBC:   Recent Labs     09/22/21  1141 09/23/21  0637   WBC 13.6* 10.5   HGB 8.2* 8.4*    409     BMP:    Recent Labs     09/22/21  1141 09/23/21  0637   NA 134* 135*   K 4.6 4.2   CL 92* 95*   CO2 26 24   BUN 47* 34*   CREATININE 7.1* 5.9*   GLUCOSE 115* 90     Hepatic:   Recent Labs     09/22/21  1141 09/23/21  0637   AST 23 39*   ALT 20 32   BILITOT 0.4 0.4   ALKPHOS 151* 148*     CT CHEST PULMONARY EMBOLISM W CONTRAST   Final Result   1. No pulmonary emboli. 2. Moderate bilateral pleural effusions with adjacent atelectasis. 3. Small pericardial effusion. XR CHEST PORTABLE   Final Result   Bibasilar opacities suggest bilateral pleural effusions with associated   atelectatic changes or infection             Recent imaging reviewed    Problem List  Active Problems:    End stage renal disease on dialysis (HCC)    Class 1 obesity due to excess calories with body mass index (BMI) of 30.0 to 30.9 in adult    Chest pain    Pleural effusion    Hepatitis B immune    Bilateral pleural effusion  Resolved Problems:    * No resolved hospital problems.  *       Assessment/Plan:   Chest pain: elevated troponin may be do to esrd  - echo pending  - cards consult pending     Persistent cough and sob: with leukocytosis, pleural effusions  =- appreciate pulm recs, id consult pending     CAD s/p stent 8/2021  - asa and plavix     ESRD on HD: UF today discussed with nephro        DVT prophylaxis heparin       Code statusDNR CCA      Vonda Saeed MD   9/23/2021 1:05 PM

## 2021-09-23 NOTE — CONSULTS
43 years ago. Her smoking use included cigarettes. She has a 5.00 pack-year smoking history. She has never used smokeless tobacco.  · Alcohol use:   reports current alcohol use. · Currently lives in: [address] [place] [address]  ·  reports no history of drug use. COVID VACCINATION AND LAB RESULT RECORDS:     Internal Administration   First Dose      Second Dose           Last COVID Lab SARS-CoV-2 (no units)   Date Value   08/25/2021 Not Detected     SARS-CoV-2, NAAT (no units)   Date Value   11/24/2020 Not Detected            Assessment:     The patient is a 67 y.o. old female who  has a past medical history of Arthritis, Diabetes ([facility] [address] Hemodialysis patient ([facility] [address] Hyperlipidemia, Hypertension, Kidney disease, Neuropathy, Pneumonia, and Thyroid disease. with following problems:    · Bilateral pleural effusions  · History of hospitalization with severe sepsis 3 weeks ago  · History of pericardial effusion  · Ex-smoker  · Hepatitis B immune  · Essential hypertension  · ESRD on dialysis  · Hyperlipidemia  · Obesity Class 1 due to excess calorie intake : Body mass index is 30.54 kg/m². Discussion:      The patient had a white cell count of 13,600 on admission. She has been started on empiric IV vancomycin and IV cefepime. Blood culture from this admission are in process. The patient is known to me from previous hospitalization few weeks ago. Her infectious disease work-up at that time was also negative. I reviewed the images CT angiogram of her chest done yesterday. It showed bilateral pleural effusions. She is an ESRD patient. Last 2D echo from 9/1/2021 showed ejection fraction of 65% with small pericardial effusion without tamponade physiology.           Plan:     Diagnostic Workup:    · Agree with blood cultures  · Will order nasal MRSA probe  · Check a procalcitonin level  · Continue to follow fever curve, WBC count and blood cultures  · Follow up on liverand renal functions closely    Antimicrobials:      · Will order empiric p.o. levofloxacin 250 mg every 24 hours  · If above work-up is negative for MRSA, will likely not need further vancomycin  · We will follow up on the culture results and clinical progress and will make further recommendations accordingly. · Continue close vitals monitoring. · Maintain good glycemic control. · Fall precautions. Aspiration precautions. · Continue to watch for new fever or diarrhea. · DVT prophylaxis. · Discussed all above with patient and RN. Drug Monitoring:    · Continue serial monitoring for antibiotic toxicity as follows: CBC, CMP, QTc interval  · Continue to watch for following: new or worsening fever, hypotension, hives, lip swelling and redness or purulence at vascular access sites. I/v access Management:    · Continue to monitor i.v access sites for erythema, induration, discharge or tenderness. · As always, continue efforts to minimizetubes/lines/drains as clinically appropriate to reduce chances of line associated infections. Current isolation precautions: There are no current isolations documented for this patient. Level of complexity of consult: High     Risk of Complications/Morbidity: High     · Illness(es)/ Infection present that pose threat to life/bodily function. · There is potential for severe exacerbation of infection/side effects of treatment. · Therapy requires intensive monitoring for antimicrobial agent toxicity. Thank you for involving me in the care of your patient. I will continue to follow. If you have any additional questions, please do not hesitate to contact me. Subjective:     Presenting complaint in ER:     Chief Complaint   Patient presents with    Shortness of Breath     pt dc from hospital two weeks ago. still feels short of breath. diagnosed with pneumonia.  nephrologist told pt to come in         HPI: Avni Estes is a 67 y.o. female patient, who was seen at the request of Dr. Paul Burgos MD.    History was obtained from chart review and the patient. The patient was admitted on 9/22/2021. I have been consulted to see the patient for above mentioned reason(s). The patient has multiple medical comorbidities, and presented to the ER for concern for chest pain that was reportedly going on for about 1 month for the patient. She was also having coughing issues. In the ER, the patient was afebrile. White cell count was 13,600. A CT angiogram of the chest was done which showed moderate bilateral pleural effusions. She was hospitalized. Blood cultures were sent. He was started empiric IV vancomycin and IV cefepime. I have been asked for my opinion for management for this patient. Past Medical History: All past medical history reviewed today. Past Medical History:   Diagnosis Date    Arthritis     Diabetes ([facility] [address]     Hemodialysis patient ([facility] [address]     Hyperlipidemia     Hypertension     Kidney disease     Neuropathy     Pneumonia     IN February, 2021, with COVID    Thyroid disease          Past Surgical History: All pastsurgical history was reviewed today. Past Surgical History:   Procedure Laterality Date    APPENDECTOMY      CHOLECYSTECTOMY      COLONOSCOPY  11/24/2020    COLONOSCOPY POLYPECTOMY SNARE/COLD BIOPSY performed by Kris Orellana MD at [address] 5/17/2021    PERITONEAL DIALYSIS CATHETER REMOVAL.  EXCISSION OF ABDOMINAL CITRIX. performed by Rasta Kwong DO at [address] N/A 4/3/2020    LAPAROSCOPIC LYSIS OF ADHESIONS, LAPAROSCOPIC PERITONEAL DIALYSIS CATHETER PLACEMENT, LAPAROSCOPIC OMENTOPEXY performed by Rasta Kwong DO at [place], BILATERAL      preventive    ROTATOR CUFF REPAIR Left     SHUNT REVISION Left 8/19/2021    LEFT FOREARM ARTERIO [facility]EN[facility]RA[facility] N/A 11/24/2020    EGD BIOPSY performed by Sharon Arauz MD at [address] [facility]         Family History: All family history was reviewed today. Problem Relation Age of Onset   Debra Luu Cancer Mother         lung, breast, liver    Stroke Father     Stomach Cancer Brother     Cancer Maternal Grandmother     No Known Problems Paternal Grandmother     No Known Problems Paternal Grandfather          Medications: All current and past medications were reviewed. Medications Prior to Admission: levothyroxine (SYNTHROID) 150 MCG tablet, Take 1 tablet by mouth every morning (before breakfast)  allopurinol (ZYLOPRIM) 100 MG tablet, TAKE 1 TABLET EVERY DAY  metoprolol succinate (TOPROL XL) 25 MG extended release tablet, TAKE 1 TABLET EVERY DAY  torsemide (DEMADEX) 20 MG tablet, Take 3 tablets by mouth 2 times daily  spironolactone (ALDACTONE) 25 MG tablet, Take 1 tablet by mouth daily  nortriptyline (PAMELOR) 10 MG capsule, TAKE 1 CAPSULE BY MOUTH EVERY EVENING  rosuvastatin (CRESTOR) 20 MG tablet, Take 1 tablet by mouth daily  aspirin EC 81 MG EC tablet, Take 1 tablet by mouth daily  metoprolol tartrate (LOPRESSOR) 25 MG tablet, Take 12.5 mg by mouth 2 times daily  colchicine (COLCRYS) 0.6 MG tablet, Take 1 tablet by mouth daily for 5 days  clopidogrel (PLAVIX) 75 MG tablet, Take 1 tablet by mouth daily  gabapentin (NEURONTIN) 100 MG capsule, Take 2 capsules by mouth nightly for 30 days. UNABLE TO FIND, Dialysis mon wed fri  loperamide (IMODIUM) 2 MG capsule, Take 2 mg by mouth 4 times daily as needed     aspirin EC  81 mg Oral Daily    allopurinol  100 mg Oral Daily    levothyroxine  150 mcg Oral QAM AC    metoprolol succinate  25 mg Oral Daily    spironolactone  25 mg Oral Daily          REVIEW OF SYSTEMS:       Review of Systems   Constitutional: Positive for fatigue. Negative for chills, diaphoresis and unexpected weight change.    HENT: Negative for congestion, ear discharge, ear pain, facial swelling, hearing loss, rhinorrhea and trouble swallowing. Eyes: Negative for photophobia, discharge, redness and visual disturbance. Respiratory: Positive for cough (Occasional) and shortness of breath. Negative for apnea, choking, chest tightness and stridor. Cardiovascular: Negative for chest pain and palpitations. Gastrointestinal: Negative for abdominal pain, blood in stool, diarrhea and nausea. Endocrine: Negative for polydipsia, polyphagia and polyuria. Genitourinary: Negative for difficulty urinating, dysuria, frequency, hematuria, menstrual problem and vaginal discharge. Musculoskeletal: Negative for arthralgias, joint swelling, myalgias and neck stiffness. Skin: Negative for color change and rash. Allergic/Immunologic: Negative for immunocompromised state. Neurological: Negative for dizziness, seizures, speech difficulty, light-headedness and headaches. Hematological: Negative for adenopathy. Psychiatric/Behavioral: Negative for agitation, hallucinations and suicidal ideas. Objective:       PHYSICAL EXAM:      Vitals:   Vitals:    09/23/21 0007 09/23/21 0400 09/23/21 0402 09/23/21 0802   BP: (!) 152/73 (!) 155/80  (!) 143/79   Pulse: 103 102  97   Resp: 16 18  16   Temp: 98.3 °F (36.8 °C) 98.5 °F (36.9 °C)  97.4 °F (36.3 °C)   TempSrc: Oral Oral  Oral   SpO2: 99% (!) 88% 94% 96%   Weight:       Height:           Physical Exam  Vitals and nursing note reviewed. Constitutional:       General: She is not in acute distress. Appearance: She is well-developed. She is not diaphoretic. HENT:      Head: Normocephalic. Right Ear: External ear normal.      Left Ear: External ear normal.      Nose: Nose normal.   Eyes:      General: No scleral icterus. Right eye: No discharge. Left eye: No discharge. Conjunctiva/sclera: Conjunctivae normal.      Pupils: Pupils are equal, round, and reactive to light.    Cardiovascular: Rate and Rhythm: Normal rate and regular rhythm. Heart sounds: No murmur heard. No friction rub. Pulmonary:      Effort: Pulmonary effort is normal.      Breath sounds: No stridor. Comments: Decreased breath sounds at bilateral bases  Abdominal:      Palpations: Abdomen is soft. There is no mass. Tenderness: There is no abdominal tenderness. There is no guarding or rebound. Musculoskeletal:         General: No tenderness. Cervical back: Normal range of motion and neck supple. Lymphadenopathy:      Cervical: No cervical adenopathy. Skin:     General: Skin is warm and dry. Findings: No erythema or rash. Neurological:      Mental Status: She is alert and oriented to person, place, and time. Motor: No abnormal muscle tone. Psychiatric:         Judgment: Judgment normal.           Lines and drains: All vascular access sites are healthy with no local erythema, discharge or tenderness. Intake and output:     I/O last 3 completed shifts: In: 714.3 [IV Piggyback:314.3]  Out: 900     Lab Data:   All available labs were reviewed by me today. CBC:   Recent Labs     09/22/21  1141 09/23/21  0637   WBC 13.6* 10.5   RBC 2.71* 2.73*   HGB 8.2* 8.4*   HCT 25.4* 25.0*    409   MCV 93.5 91.5   MCH 30.2 30.8   MCHC 32.3 33.7   RDW 16.6* 16.9*        BMP:  Recent Labs     09/22/21  1141 09/23/21  0637   * 135*   K 4.6 4.2   CL 92* 95*   CO2 26 24   BUN 47* 34*   CREATININE 7.1* 5.9*   CALCIUM 10.1 9.9   GLUCOSE 115* 90        Hepatic FunctionPanel:   Lab Results   Component Value Date    ALKPHOS 148 09/23/2021    ALT 32 09/23/2021    AST 39 09/23/2021    PROT 7.6 09/23/2021    BILITOT 0.4 09/23/2021    BILIDIR <0.2 08/12/2021    IBILI see below 08/12/2021    LABALBU 3.7 09/23/2021       CPK:   Lab Results   Component Value Date    CKTOTAL 243 (H) 08/25/2021     ESR: No results found for: SEDRATE  CRP: No results found for: CRP      Imaging:     All pertinent images and reports for the current visit were reviewed by meduring this visit. CT CHEST PULMONARY EMBOLISM W CONTRAST   Final Result   1. No pulmonary emboli. 2. Moderate bilateral pleural effusions with adjacent atelectasis. 3. Small pericardial effusion. XR CHEST PORTABLE   Final Result   Bibasilar opacities suggest bilateral pleural effusions with associated   atelectatic changes or infection             Outside records:    Labs, Microbiology, Radiology and pertinent results from Care everywhere, if available, were reviewed as a part ofthe consultation. Problem list:       Patient Active Problem List   Diagnosis Code    Essential hypertension I10    Hyperlipidemia E78.5    Acquired hypothyroidism E03.9    Diabetes mellitus type 2 in obese ([facility]) E11.69, E66.9    Chronic kidney 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 ([facility] [address] E11.22, N18.6, Z99.2    Closed nondisplaced fracture of fifth metatarsal bone of right foot S92.354A    Arthritis of right knee M17.11    Acute pain of both knees M25.561, M25.562    Primary osteoarthritis of both knees M17.0    Bursitis M71.9    Memory loss R41.3    Bilateral leg edema R60.0    Primary osteoarthritis of left knee M17.12    Atypical chest pain R07.89    Weight loss counseling, encounter for Z71.3    Tubular adenoma of colon D12.6    Primary insomnia F51.01    Diarrhea of presumed infectious origin R19.7    Diabetic polyneuropathy associated with type 2 diabetes mellitus ([facility]) E11.42    COVID-19 virus infection U07.1    Coronary artery disease of native artery with stable angina pectoris ([facility]) I25.118    Pain of left calf M79.662    Atherosclerosis of native arteries of extremities with intermittent claudication, bilateral legs ([facility]) I70.213    End stage renal disease on dialysis ([facility]) N18.6, Z99.2    Arteriovenous fistula infection, initial encounter ([facility] [address] T82. 7XXA    Septicemia ([facility] [address] A41.9    Pneumonia due to infectious organism J18.9    Frequent falls R29.6    Hyperkalemia E87.5    Hyponatremia E87.1    Ex-smoker Z87.891    Class 1 obesity due to excess calories with body mass index (BMI) of 30.0 to 30.9 in adult E66.09, Z68.30    Antibiotic-associated diarrhea K52.1, T36.95XA    Pericarditis I31.9    Pericardial effusion I31.3    Chest pain R07.9    History of anemia due to chronic kidney disease N18.9, Z86.2    Pleural effusion J90    Hepatitis B immune Z78.9    Bilateral pleural effusion J90         Please note that this chart was generated using Dragon dictation software. Although every effort was made to ensure the accuracy of this automated transcription, some errors in transcription may have occurred inadvertently. If you may need any clarification, please do not hesitate to contact me through EPIC or at the phone number provided below with my electronic signature. Any pictures or media included in this note were obtained after taking informed verbal consent from the patient and with their approval to include those in the patient's medical record.       Yehuda Sagastume MD, MPH  9/23/21, 10:50 AM EDT   [facility]   [address] [place] [address], [address] [place] [address]  Office: [phone]  Fax: [phone]  Clinic days:  Tuesday & Thursday

## 2021-09-23 NOTE — PLAN OF CARE
Problem: Safety:  Goal: Free from accidental physical injury  Description: Free from accidental physical injury  Outcome: Ongoing  Goal: Free from intentional harm  Description: Free from intentional harm  Outcome: Ongoing     Problem: Pain:  Goal: Patient's pain/discomfort is manageable  Description: Patient's pain/discomfort is manageable  Outcome: Ongoing     Problem: Skin Integrity:  Goal: Skin integrity will stabilize  Description: Skin integrity will stabilize  Outcome: Ongoing     Problem: Falls - Risk of:  Goal: Will remain free from falls  Description: Will remain free from falls  Outcome: Ongoing  Goal: Absence of physical injury  Description: Absence of physical injury  Outcome: Ongoing     Problem: Infection:  Goal: Will remain free from infection  Description: Will remain free from infection  Outcome: Ongoing     Problem: Daily Care:  Goal: Daily care needs are met  Description: Daily care needs are met  Outcome: Ongoing     Problem: Discharge Planning:  Goal: Patients continuum of care needs are met  Description: Patients continuum of care needs are met  Outcome: Ongoing     Problem:  Activity:  Goal: Fatigue will decrease  Description: Fatigue will decrease  Outcome: Ongoing  Goal: Risk for activity intolerance will decrease  Description: Risk for activity intolerance will decrease  Outcome: Ongoing     Problem: Coping:  Goal: Ability to cope will improve  Description: Ability to cope will improve  Outcome: Ongoing     Problem: Fluid Volume:  Goal: Will show no signs or symptoms of fluid imbalance  Description: Will show no signs or symptoms of fluid imbalance  Outcome: Ongoing     Problem: Health Behavior:  Goal: Ability to manage health-related needs will improve  Description: Ability to manage health-related needs will improve  Outcome: Ongoing  Goal: Identification of resources available to assist in meeting health care needs will improve  Description: Identification of resources available to assist in meeting health care needs will improve  Outcome: Ongoing     Problem: Nutritional:  Goal: Ability to identify appropriate dietary choices will improve  Description: Ability to identify appropriate dietary choices will improve  Outcome: Ongoing     Problem: Physical Regulation:  Goal: Ability to maintain clinical measurements within normal limits will improve  Description: Ability to maintain clinical measurements within normal limits will improve  Outcome: Ongoing  Goal: Complications related to the disease process, condition or treatment will be avoided or minimized  Description: Complications related to the disease process, condition or treatment will be avoided or minimized  Outcome: Ongoing     Problem: Sensory:  Goal: General experience of comfort will improve  Description: General experience of comfort will improve  Outcome: Ongoing     Problem: Skin Integrity:  Goal: Status of oral mucous membranes will improve  Description: Status of oral mucous membranes will improve  Outcome: Ongoing  Goal: Skin integrity will be maintained  Description: Skin integrity will be maintained  Outcome: Ongoing     Problem: Discharge Planning:  Goal: Discharged to appropriate level of care  Description: Discharged to appropriate level of care  Outcome: Ongoing  Goal: Participates in care planning  Description: Participates in care planning  Outcome: Ongoing     Problem: Airway Clearance - Ineffective:  Goal: Clear lung sounds  Description: Clear lung sounds  Outcome: Ongoing  Goal: Ability to maintain a clear airway will improve  Description: Ability to maintain a clear airway will improve  Outcome: Ongoing     Problem: Fluid Volume - Deficit:  Goal: Achieves intake and output within specified parameters  Description: Achieves intake and output within specified parameters  Outcome: Ongoing     Problem: Gas Exchange - Impaired:  Goal: Levels of oxygenation will improve  Description: Levels of oxygenation will improve  Outcome: Ongoing     Problem: Hyperthermia:  Goal: Ability to maintain a body temperature in the normal range will improve  Description: Ability to maintain a body temperature in the normal range will improve  Outcome: Ongoing     Problem: Tobacco Use:  Goal: Will participate in inpatient tobacco-use cessation counseling  Description: Will participate in inpatient tobacco-use cessation counseling  Outcome: Ongoing

## 2021-09-23 NOTE — PROGRESS NOTES
Office : [phone]     Fax :[phone]       Nephrology progress  Note      Patient's Name: Karena Moore  9:05 AM  9/23/2021      Reason for Consult:  ESRD      Requesting Physician:  Janette Marin MD      Chief Complaint:    Chief Complaint   Patient presents with    Shortness of Breath     pt dc from hospital two weeks ago. still feels short of breath. diagnosed with pneumonia. nephrologist told pt to come in          History of Present iIlness:    Karena Moore is a 67 y.o. female with h/o ESRD , HTN, Anemia of dhronic kidney ds, DM 2, CAD h/o stent placement who was admitted with c/o chest pain and SOB. Denies any fever . Recently treated for pericarditis and pneumonia . Is tachycardic. Had HD on Monday. Today visit :    Feels better   No chest pain   Cough +   No fever           I/O last 3 completed shifts: In: 714.3 [IV Piggyback:314.3]  Out: 900     Past Medical History:   Diagnosis Date    Arthritis     [facility] [address] [facility]     Hemodialysis patient ([facility] [address] [facility]     Hyperlipidemia     Hypertension     Kidney disease     Neuropathy     Pneumonia     IN February, 2021, with COVID    Thyroid disease        Past Surgical History:   Procedure Laterality Date    APPENDECTOMY      CHOLECYSTECTOMY      COLONOSCOPY  11/24/2020    COLONOSCOPY POLYPECTOMY SNARE/COLD BIOPSY performed by Petey Lewis MD at [address] 5/17/2021    PERITONEAL DIALYSIS CATHETER REMOVAL.  [address]. performed by Jefferson Castañeda DO at [address] N/A 4/3/2020    LAPAROSCOPIC LYSIS OF ADHESIONS, LAPAROSCOPIC PERITONEAL DIALYSIS CATHETER PLACEMENT, LAPAROSCOPIC OMENTOPEXY performed by Rasta Kwong DO at [facility], BILATERAL      preventive    ROTATOR CUFF REPAIR Left     SHUNT REVISION Left 8/19/2021    LEFT FOREARM ARTERIO VENOUS GRAFT performed by Neema Gomez MD at [address] [place] [address], [facility] N/A 11/24/2020    EGD BIOPSY performed by Kris Orellana MD at [address] [place] [address]       Family History   Problem Relation Age of Onset    Cancer Mother         lung, breast, liver    Stroke Father     Stomach Cancer Brother     Cancer Maternal Grandmother     No Known Problems Paternal Grandmother     No Known Problems Paternal Grandfather         reports that she quit smoking about 43 years ago. Her smoking use included cigarettes. She has a 5.00 pack-year smoking history. She has never used smokeless tobacco. She reports current alcohol use. She reports that she does not use drugs.         Allergies:  Amoxicillin, Demeclocycline, Penicillins, Tetracyclines & related, Ozempic (0.25 or 0.5 mg-dose) [semaglutide(0.25 or 0.5mg-dos)], and Codeine    Current Medications:    Current Facility-Administered Medications: perflutren lipid microspheres (DEFINITY) injection 1.65 mg, 1.5 mL, IntraVENous, ONCE PRN  aspirin EC tablet 81 mg, 81 mg, Oral, Daily  allopurinol (ZYLOPRIM) tablet 100 mg, 100 mg, Oral, Daily  levothyroxine (SYNTHROID) tablet 150 mcg, 150 mcg, Oral, QAM AC  metoprolol succinate (TOPROL XL) extended release tablet 25 mg, 25 mg, Oral, Daily  spironolactone (ALDACTONE) tablet 25 mg, 25 mg, Oral, Daily  heparin (porcine) injection 3,800 Units, 3,800 Units, IntraCATHeter, PRN               Physical exam:     Vitals:  BP (!) 143/79   Pulse 97   Temp 97.4 °F (36.3 °C) (Oral)   Resp 16   Ht 5' 2\" (1.575 m)   Wt 167 lb (75.8 kg)   LMP  (LMP Unknown)   SpO2 96%   BMI 30.54 kg/m²   Constitutional:  OAA X3 NAD  Skin: no rash, turgor wnl  Heent:  eomi, mmm  Neck: no bruits or jvd noted  Cardiovascular:  S1, S2 without m/r/g  Respiratory: decreased air entry at bases   Abdomen:  +bs, soft, nt, nd  Ext: no  lower extremity edema      Labs:  CBC:   Recent Labs     09/22/21  1141 09/23/21  0637   WBC 13.6* 10.5   HGB 8.2* 8.4*    409     BMP:    Recent Labs     09/22/21  1141 09/23/21  0637   * 135*   K 4.6 4.2   CL 92* 95*   CO2 26 24   BUN 47* 34*   CREATININE 7.1* 5.9*   GLUCOSE 115* 90     Ca/Mg/Phos:   Recent Labs     09/22/21  1141 09/23/21  0637   CALCIUM 10.1 9.9     Hepatic:   Recent Labs     09/22/21  1141 09/23/21  0637   AST 23 39*   ALT 20 32   BILITOT 0.4 0.4   ALKPHOS 151* 148*     Troponin:   Recent Labs     09/22/21  1141 09/22/21  1411   TROPONINI 0.04* 0.03*     BNP: No results for input(s): BNP in the last 72 hours. Lipids: No results for input(s): CHOL, TRIG, HDL, LDLCALC, LABVLDL in the last 72 hours. ABGs: No results for input(s): PHART, PO2ART, WAA0VCZ in the last 72 hours. INR: No results for input(s): INR in the last 72 hours. UA:No results for input(s): Janace Parks, GLUCOSEU, BILIRUBINUR, [place], BLOODU, PHUR, PROTEINU, UROBILINOGEN, NITRU, LEUKOCYTESUR, LABMICR, URINETYPE in the last 72 hours. Urine Microscopic: No results for input(s): LABCAST, BACTERIA, COMU, HYALCAST, WBCUA, RBCUA, EPIU in the last 72 hours. Urine Culture: No results for input(s): LABURIN in the last 72 hours. Urine Chemistry: No results for input(s): Trey Clos, PROTEINUR, NAUR in the last 72 hours. IMAGING:  CT CHEST PULMONARY EMBOLISM W CONTRAST   Final Result   1. No pulmonary emboli. 2. Moderate bilateral pleural effusions with adjacent atelectasis. 3. Small pericardial effusion. XR CHEST PORTABLE   Final Result   Bibasilar opacities suggest bilateral pleural effusions with associated   atelectatic changes or infection                             Assessment/Plan :      1.  ESRD   Had HD yesterday evening   B/L pleural effusion   ? empyema vs volume overload   Will do 2 hr isolated UF today 2. HTN. Controlled     3. SOB. CTA negative for PE   Has pleural effusion   Pneumonia   abx       4. Acid- base disorder. Correct with HD     5. Electrolytes. monitor and correct with HD     6.  H/o CAD and stents few weeks ago     If pleural effusion does not improve then may need thoracentesis   D/w Pulmonology         D/w primary team      Thank you for allowing us to participate in care of [facility]         Electronically signed by: Sayra Hardy MD, 9/23/2021, 9:05 AM      [facility] [address]  Office : [phone]  Fax :[phone]

## 2021-09-24 ENCOUNTER — APPOINTMENT (OUTPATIENT)
Dept: GENERAL RADIOLOGY | Age: 76
DRG: 186 | End: 2021-09-24
Payer: MEDICARE

## 2021-09-24 VITALS
BODY MASS INDEX: 29.13 KG/M2 | DIASTOLIC BLOOD PRESSURE: 75 MMHG | WEIGHT: 158.29 LBS | OXYGEN SATURATION: 94 % | TEMPERATURE: 97.7 F | SYSTOLIC BLOOD PRESSURE: 129 MMHG | HEIGHT: 62 IN | HEART RATE: 95 BPM | RESPIRATION RATE: 16 BRPM

## 2021-09-24 PROBLEM — Z71.89 ENCOUNTER FOR MEDICATION COUNSELING: Status: ACTIVE | Noted: 2020-04-01

## 2021-09-24 LAB
ALBUMIN SERPL-MCNC: 3.9 G/DL (ref 3.4–5)
ANION GAP SERPL CALCULATED.3IONS-SCNC: 20 MMOL/L (ref 3–16)
ANISOCYTOSIS: ABNORMAL
BANDED NEUTROPHILS RELATIVE PERCENT: 3 % (ref 0–7)
BASOPHILS ABSOLUTE: 0 K/UL (ref 0–0.2)
BASOPHILS RELATIVE PERCENT: 0 %
BUN BLDV-MCNC: 51 MG/DL (ref 7–20)
CALCIUM SERPL-MCNC: 10.1 MG/DL (ref 8.3–10.6)
CHLORIDE BLD-SCNC: 95 MMOL/L (ref 99–110)
CO2: 22 MMOL/L (ref 21–32)
CREAT SERPL-MCNC: 7.7 MG/DL (ref 0.6–1.2)
EOSINOPHILS ABSOLUTE: 0.5 K/UL (ref 0–0.6)
EOSINOPHILS RELATIVE PERCENT: 4 %
GFR AFRICAN AMERICAN: 6
GFR NON-AFRICAN AMERICAN: 5
GLUCOSE BLD-MCNC: 100 MG/DL (ref 70–99)
HCT VFR BLD CALC: 27.1 % (ref 36–48)
HEMOGLOBIN: 8.8 G/DL (ref 12–16)
LYMPHOCYTES ABSOLUTE: 1.3 K/UL (ref 1–5.1)
LYMPHOCYTES RELATIVE PERCENT: 11 %
MCH RBC QN AUTO: 29.6 PG (ref 26–34)
MCHC RBC AUTO-ENTMCNC: 32.5 G/DL (ref 31–36)
MCV RBC AUTO: 91.2 FL (ref 80–100)
MONOCYTES ABSOLUTE: 0.7 K/UL (ref 0–1.3)
MONOCYTES RELATIVE PERCENT: 6 %
MRSA SCREEN RT-PCR: NORMAL
NEUTROPHILS ABSOLUTE: 9.2 K/UL (ref 1.7–7.7)
NEUTROPHILS RELATIVE PERCENT: 76 %
PDW BLD-RTO: 17.1 % (ref 12.4–15.4)
PHOSPHORUS: 6.9 MG/DL (ref 2.5–4.9)
PLATELET # BLD: 438 K/UL (ref 135–450)
PLATELET SLIDE REVIEW: ADEQUATE
PMV BLD AUTO: 7.5 FL (ref 5–10.5)
POTASSIUM SERPL-SCNC: 4.4 MMOL/L (ref 3.5–5.1)
RBC # BLD: 2.97 M/UL (ref 4–5.2)
SLIDE REVIEW: ABNORMAL
SODIUM BLD-SCNC: 137 MMOL/L (ref 136–145)
WBC # BLD: 11.6 K/UL (ref 4–11)

## 2021-09-24 PROCEDURE — 99233 SBSQ HOSP IP/OBS HIGH 50: CPT | Performed by: INTERNAL MEDICINE

## 2021-09-24 PROCEDURE — 90935 HEMODIALYSIS ONE EVALUATION: CPT

## 2021-09-24 PROCEDURE — 90935 HEMODIALYSIS ONE EVALUATION: CPT | Performed by: INTERNAL MEDICINE

## 2021-09-24 PROCEDURE — 80069 RENAL FUNCTION PANEL: CPT

## 2021-09-24 PROCEDURE — 71045 X-RAY EXAM CHEST 1 VIEW: CPT

## 2021-09-24 PROCEDURE — 6370000000 HC RX 637 (ALT 250 FOR IP): Performed by: INTERNAL MEDICINE

## 2021-09-24 PROCEDURE — 85025 COMPLETE CBC W/AUTO DIFF WBC: CPT

## 2021-09-24 RX ORDER — LEVOFLOXACIN 250 MG/1
250 TABLET ORAL DAILY
Qty: 5 TABLET | Refills: 0 | Status: SHIPPED | OUTPATIENT
Start: 2021-09-25 | End: 2021-09-30

## 2021-09-24 RX ADMIN — ALLOPURINOL 100 MG: 100 TABLET ORAL at 11:12

## 2021-09-24 RX ADMIN — METOPROLOL SUCCINATE 25 MG: 25 TABLET, EXTENDED RELEASE ORAL at 11:12

## 2021-09-24 RX ADMIN — LEVOTHYROXINE SODIUM 150 MCG: 0.15 TABLET ORAL at 06:13

## 2021-09-24 RX ADMIN — LEVOFLOXACIN 250 MG: 250 TABLET, FILM COATED ORAL at 11:12

## 2021-09-24 RX ADMIN — ASPIRIN 81 MG: 81 TABLET, COATED ORAL at 11:12

## 2021-09-24 RX ADMIN — SPIRONOLACTONE 25 MG: 25 TABLET ORAL at 11:12

## 2021-09-24 ASSESSMENT — ENCOUNTER SYMPTOMS
CHEST TIGHTNESS: 0
RHINORRHEA: 0
PHOTOPHOBIA: 0
NAUSEA: 0
SHORTNESS OF BREATH: 0
DIARRHEA: 0
ABDOMINAL PAIN: 0
COLOR CHANGE: 0
CHOKING: 0
EYE REDNESS: 0
COUGH: 1
FACIAL SWELLING: 0
STRIDOR: 0
TROUBLE SWALLOWING: 0
APNEA: 0
EYE DISCHARGE: 0
BLOOD IN STOOL: 0

## 2021-09-24 ASSESSMENT — PAIN SCALES - GENERAL
PAINLEVEL_OUTOF10: 0

## 2021-09-24 NOTE — PROGRESS NOTES
Treatment time: 3    Net UF: 900    Pre weight: 72.7  Post weight: 71.8   EDW: tbd    Access used: cvc  Access function: well    Medications or blood products given: none    Regular outpatient schedule: MWF    Summary of response to treatment: tolerated well. Pt c/o cramping at times, goal decreased to assist with cramping. No other c/o voiced. Report called to primary RN, Pt stable upon leaving unit    Copy of dialysis treatment record placed in chart, to be scanned into EMR.

## 2021-09-24 NOTE — PROGRESS NOTES
Data- discharge order received, pt verbalized agreement to discharge, disposition to previous residence, no needs for HHC/DME. Action- discharge instructions prepared and given to Pt, pt verbalized understanding. Medication information packet given r/t NEW and/or CHANGED prescriptions emphasizing name/purpose/side effects, pt verbalized understanding. Discharge instruction summary: Diet- low potassium low phosphorus, Activity- up as tolerated, Primary Care Physician as follows: Theodore Marin -[phone] f/u appointment in one week,  prescription medications filled at pharmacy of choice. 1. WEIGHT: Admit Weight: 171 lb 3.2 oz (77.7 kg) (09/22/21 1035)        Today  Weight: 158 lb 4.6 oz (71.8 kg) (09/24/21 1045)       2. O2 SAT.: SpO2: 94 % (09/24/21 1106)    Response- Pt belongings gathered, IV removed. Disposition is home (no HHC/DME needs), transported with dc instructions and belongings, taken to lobby via w/c w/ RN, no complications.

## 2021-09-24 NOTE — PROGRESS NOTES
Infectious Diseases   Progress Note      Admission Date: 9/22/2021  Hospital Day: Hospital Day: 3   Attending: Malcolm Pittman MD  Date of service: 9/24/2021     Chief complaint/ Reason for consult:     · Bilateral pleural effusions  · History of hospitalization with severe sepsis 3 weeks ago  · History of pericardial effusion  · Ex-smoker  · Hepatitis B immune    Microbiology:        I have reviewed allavailable micro lab data and cultures    Blood culture (2/2) - collected on 9/22/2021: Negative so far    Antibiotics and immunizations:       Current antibiotics: All antibiotics and their doses were reviewed by me    Recent Abx Admin                   levoFLOXacin (LEVAQUIN) tablet 250 mg (mg) 250 mg Given 09/23/21 1311                  Immunization History: All immunization history was reviewed by me today. Immunization History   Administered Date(s) Administered    Influenza, High Dose (Fluzone 65 yrs and older) 01/23/2017, 11/01/2017    Influenza, Triv, inactivated, subunit, adjuvanted, IM (Fluad 65 yrs and older) 10/28/2019    Pneumococcal Conjugate 13-valent ([id number]) 01/23/2017    Pneumococcal Polysaccharide ([id number]) 05/10/2019       Known drug allergies: All allergies were reviewed and updated    Allergies   Allergen Reactions    Amoxicillin Anaphylaxis    Demeclocycline Anaphylaxis    Penicillins Anaphylaxis    Tetracyclines & Related Anaphylaxis    Ozempic (0.25 Or 0.5 Mg-Dose) [Semaglutide(0.25 Or 0.5mg-Dos)] Other (See Comments)     Lost large amount weight and loss  Of appetite    Codeine Itching and Rash       Social history:     Social History:  All social andepidemiologic history was reviewed and updated by me today as needed. · Tobacco use:   reports that she quit smoking about 43 years ago. Her smoking use included cigarettes. She has a 5.00 pack-year smoking history. She has never used smokeless tobacco.  · Alcohol use:   reports current alcohol use.   · Currently lives in: [place] [id number]  ·  reports no history of drug use. COVID VACCINATION AND LAB RESULT RECORDS:     Internal Administration   First Dose      Second Dose           Last COVID Lab SARS-CoV-2 (no units)   Date Value   08/25/2021 Not Detected     SARS-CoV-2, NAAT (no units)   Date Value   11/24/2020 Not Detected            Assessment:     The patient is a 67 y.o. old female who  has a past medical history of Arthritis, Diabetes ([facility] Hemodialysis patient ([facility] Hyperlipidemia, Hypertension, Kidney disease, Neuropathy, Pneumonia, and Thyroid disease. with following problems:    · Bilateral pleural effusions  -repeat chest x-ray tomorrow  · History of hospitalization with severe sepsis 3 weeks ago  · History of pericardial effusion  · Ex-smoker  · Hepatitis B immune  · Essential hypertension-blood pressure okay  · ESRD on dialysis  · Hyperlipidemia  · Obesity Class 1 due to excess calorie intake : Body mass index is 30.54 kg/m². Discussion:      The patient is afebrile. I had started her on empiric levofloxacin at dialysis dose. White cell count 11,600 today. Blood cultures were additionally negative. Nasal MRSA screen was negative. Procalcitonin was slightly elevated at 0.64 yesterday. White cell count 11,600 today. She is tolerating dialysis okay. 2D echo has been done yesterday. It showed EF of 60% with small circumferential pericardial effusion    Plan:     Diagnostic Workup:      · Continue to follow  fever curve, WBC count and blood cultures. · Continue to monitor blood counts, liver and renal function. Antimicrobials:    · I think the pericardial effusion may be secondary to fluid overload state.   However, given mild leukocytosis and elevated procalcitonin, I will leave her on empiric levofloxacin 250 mg every 24 hours  · Will plan for a 1 week course of levofloxacin  · We will follow up on the culture results and clinical progress and will make further recommendations accordingly. · Continue close vitals monitoring. · Maintain good glycemic control. · Fall precautions. Aspiration precautions. · Continue to watch for new fever or diarrhea. · DVT prophylaxis. · Discussed all above with patient and RN. Drug Monitoring:    · Continue monitoring for antibiotic toxicity as follows: CBC, CMP , QTC interval  · Continue to watch for following: new or worsening fever, new hypotension, hives, lip swelling and redness or purulence at vascular access sites. I/v access Management:    · Continue to monitor i.v access sites for erythema, induration, discharge or tenderness. · As always, continue efforts to minimize tubes/lines/drains as clinically appropriate to reduce chances of line associated infections. Patient education and counseling:        · The patient was educated in detail about the side-effects of various antibiotics and things to watch for like new rashes, lip swelling, severe reaction, worsening diarrhea, break through fever etc.  · Discussed patient's condition and what to expect. All of the patient's questions were addressed in a satisfactory manner and patient verbalized understanding all instructions. Level of complexity of visit: High     Fluoroquinolone related instructions:     Patient instructed to watch for low or high blood sugars, muscle pains and ankle tendon pain while on Ciprofloxacin or Levofloxacin. Patient was advised to keep a sugar candy at all times as all fluoroquinolones have the potential of causing hypoglycemia. If these symptoms develops, patient was instructed to stop the antibiotic and call my office at [phone]. Use sunscreen when going in bright sun while on Ciprofloxacin or Levofloxacin as these antibiotics can cause photosensitivity. Take 1 hour before or 2 hour after dairy, calcium, iron, magnesium, aluminum or zinc.      Weight loss counseling:    Extensive weight loss counseling was done.  It is important to set a realistic weight loss goal. First goal should be to avoid gaining more weight and staying at current weight (or within 5 percent). People at high risk of developing diabetes who are able to lose 5 percent of their body weight and maintain this weight will reduce their risk of developing diabetes by about 50 percent and reduce their blood pressure. Losing more than 15 percent of  body weight and staying at this weight is an extremely good result, even if you never reach your \"dream\" or \"ideal\" weight. Lifestyle changes including changing eating habits, substituting excess carbohydrates with proteins, stress reduction, using self-help programs like Weight Watchers®, Overeaters Anonymous®, and Take Off Pounds Sensibly (TOPS)© , following DASH diet and increasing exercise or walking briskly daily for half hour to and hour 5-7 days a week was suggested among other measures. Information was given about various weight loss education programs and their websites like www.cdc.gov/healthyweight, www.choosemyplate.gov and www.health.gov/dietaryguidelines/    TIME SPENT TODAY:     - Spent over  36 minutes on visit (including interval history, physical exam, review of data including labs, cultures, imaging, development and implementation of treatment plan and coordination of complex care). More than 50 percent of this includes face-to-face time spent with the patient for counseling and coordination of care. Thank you for involving me in the care of your patient. I will continue to follow. If you have anyadditional questions, please do not hesitate to contact me. Subjective: Interval history: Interval history was obtained from chart review and patient/ RN. The patient afebrile. She is feeling better patient. She is tolerating Levaquin okay     REVIEW OF SYSTEMS:      Review of Systems   Constitutional: Negative for chills, diaphoresis and unexpected weight change.    HENT: Negative for congestion, ear discharge, ear pain, facial swelling, hearing loss, rhinorrhea and trouble swallowing. Eyes: Negative for photophobia, discharge, redness and visual disturbance. Respiratory: Positive for cough. Negative for apnea, choking, chest tightness, shortness of breath and stridor. Cardiovascular: Negative for chest pain and palpitations. Gastrointestinal: Negative for abdominal pain, blood in stool, diarrhea and nausea. Endocrine: Negative for polydipsia, polyphagia and polyuria. Genitourinary: Negative for difficulty urinating, dysuria, frequency, hematuria, menstrual problem and vaginal discharge. Musculoskeletal: Negative for arthralgias, joint swelling, myalgias and neck stiffness. Skin: Negative for color change and rash. Allergic/Immunologic: Negative for immunocompromised state. Neurological: Negative for dizziness, seizures, speech difficulty, light-headedness and headaches. Hematological: Negative for adenopathy. Psychiatric/Behavioral: Negative for agitation, hallucinations and suicidal ideas. Past Medical History: All past medical history reviewed today. Past Medical History:   Diagnosis Date    Arthritis     Diabetes ([facility]     Hemodialysis patient ([facility]     Hyperlipidemia     Hypertension     Kidney disease     Neuropathy     Pneumonia     IN February, 2021, with COVID    Thyroid disease        Past Surgical History: All past surgical history was reviewed today. Past Surgical History:   Procedure Laterality Date    APPENDECTOMY      CHOLECYSTECTOMY      COLONOSCOPY  11/24/2020    COLONOSCOPY POLYPECTOMY SNARE/COLD BIOPSY performed by Sujatha Knapp MD at [address] 5/17/2021    PERITONEAL DIALYSIS CATHETER REMOVAL.  [address] performed by Sean Avendano DO at [address] N/A 4/3/2020    LAPAROSCOPIC LYSIS OF ADHESIONS, LAPAROSCOPIC PERITONEAL DIALYSIS CATHETER PLACEMENT, LAPAROSCOPIC OMENTOPEXY performed by Elsa Daniel DO at [place], BILATERAL      preventive    ROTATOR CUFF REPAIR Left     SHUNT REVISION Left 8/19/2021    LEFT FOREARM ARTERIO VENOUS GRAFT performed by Heather Hanson MD at [address] 11/24/2020    EGD BIOPSY performed by Zeus Jansen MD at [address] [facility]       Family History: All family history was reviewed today. Problem Relation Age of Onset    Cancer Mother         lung, breast, liver    Stroke Father     Stomach Cancer Brother     Cancer Maternal Grandmother     No Known Problems Paternal Grandmother     No Known Problems Paternal Grandfather        Objective:       PHYSICAL EXAM:      Vitals:   Vitals:    09/23/21 1934 09/23/21 2339 09/24/21 0428 09/24/21 0723   BP: 121/72 134/80 (!) 130/97 (!) 151/71   Pulse: 108 101 94 99   Resp: 16 16 16 18   Temp: 98.2 °F (36.8 °C) 97.2 °F (36.2 °C) 97.2 °F (36.2 °C) 97.2 °F (36.2 °C)   TempSrc: Tympanic Tympanic Tympanic    SpO2: 95% 91% 95%    Weight:    160 lb 4.4 oz (72.7 kg)   Height:           Physical Exam  Vitals and nursing note reviewed. Constitutional:       General: She is not in acute distress. Appearance: She is well-developed. She is not diaphoretic. HENT:      Head: Normocephalic. Right Ear: External ear normal.      Left Ear: External ear normal.      Nose: Nose normal.   Eyes:      General: No scleral icterus. Right eye: No discharge. Left eye: No discharge. Conjunctiva/sclera: Conjunctivae normal.      Pupils: Pupils are equal, round, and reactive to light. Cardiovascular:      Rate and Rhythm: Normal rate and regular rhythm. Heart sounds: No murmur heard. No friction rub. Pulmonary:      Effort: Pulmonary effort is normal.      Breath sounds: No stridor. No wheezing or rales.       Comments: Decreased breath sounds at bilateral bases  Chest:      Chest wall: No tenderness. Abdominal:      Palpations: Abdomen is soft. There is no mass. Tenderness: There is no abdominal tenderness. There is no guarding or rebound. Musculoskeletal:         General: No tenderness. Cervical back: Normal range of motion and neck supple. Lymphadenopathy:      Cervical: No cervical adenopathy. Skin:     General: Skin is warm and dry. Findings: No erythema or rash. Neurological:      Mental Status: She is alert and oriented to person, place, and time. Motor: No abnormal muscle tone. Psychiatric:         Judgment: Judgment normal.            Lines and drains: All vascular access sites are healthy with no local erythema, discharge or tenderness. Intake and output:    I/O last 3 completed shifts: In: 26 [P.O.:960]  Out: 2500     Lab Data:   All available labs and old records have been reviewed by me. CBC:  Recent Labs     09/22/21  1141 09/23/21  0637 09/24/21  0750   WBC 13.6* 10.5 11.6*   RBC 2.71* 2.73* 2.97*   HGB 8.2* 8.4* 8.8*   HCT 25.4* 25.0* 27.1*    409 438   MCV 93.5 91.5 91.2   MCH 30.2 30.8 29.6   MCHC 32.3 33.7 32.5   RDW 16.6* 16.9* 17.1*   BANDSPCT  --   --  3        BMP:  Recent Labs     09/22/21  1141 09/23/21  0637 09/24/21  0750   * 135* 137   K 4.6 4.2 4.4   CL 92* 95* 95*   CO2 26 24 22   BUN 47* 34* 51*   CREATININE 7.1* 5.9* 7.7*   CALCIUM 10.1 9.9 10.1   GLUCOSE 115* 90 100*        Hepatic Function Panel:   Lab Results   Component Value Date    ALKPHOS 148 09/23/2021    ALT 32 09/23/2021    AST 39 09/23/2021    PROT 7.6 09/23/2021    BILITOT 0.4 09/23/2021    BILIDIR <0.2 08/12/2021    IBILI see below 08/12/2021    LABALBU 3.9 09/24/2021       CPK:   Lab Results   Component Value Date    CKTOTAL 243 (H) 08/25/2021     ESR: No results found for: SEDRATE  CRP: No results found for: CRP        Imaging: All pertinent images and reports for the current visit were reviewed by me during this visit.     CT CHEST PULMONARY EMBOLISM W CONTRAST   Final Result   1. No pulmonary emboli. 2. Moderate bilateral pleural effusions with adjacent atelectasis. 3. Small pericardial effusion. XR CHEST PORTABLE   Final Result   Bibasilar opacities suggest bilateral pleural effusions with associated   atelectatic changes or infection             Medications: All current and past medications were reviewed.      levoFLOXacin  250 mg Oral Daily    ondansetron  4 mg IntraVENous Once    aspirin EC  81 mg Oral Daily    allopurinol  100 mg Oral Daily    levothyroxine  150 mcg Oral QAM AC    metoprolol succinate  25 mg Oral Daily    spironolactone  25 mg Oral Daily           perflutren lipid microspheres, heparin (porcine)      Problem list:       Patient Active Problem List   Diagnosis Code    Essential hypertension I10    Hyperlipidemia E78.5    Acquired hypothyroidism E03.9    Diabetes mellitus type 2 in obese ([facility]) E11.69, E66.9    Chronic kidney 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 ([facility] E11.22, N18.6, Z99.2    Closed nondisplaced fracture of fifth metatarsal bone of right foot S92.354A    Arthritis of right knee M17.11    Acute pain of both knees M25.561, M25.562    Primary osteoarthritis of both knees M17.0    Bursitis M71.9    Memory loss R41.3    Bilateral leg edema R60.0    Primary osteoarthritis of left knee M17.12    Atypical chest pain R07.89    Weight loss counseling, encounter for Z71.3    Tubular adenoma of colon D12.6    Primary insomnia F51.01    Diarrhea of presumed infectious origin R19.7    Diabetic polyneuropathy associated with type 2 diabetes mellitus ([facility] E11.42    COVID-19 virus infection U07.1    Coronary artery disease of native artery with stable angina pectoris ([facility]) I25.118    Pain of left calf M79.662    Atherosclerosis of native arteries of extremities with intermittent claudication, bilateral legs ([facility]) I70.213    End stage renal disease on dialysis ([facility] N18.6, Z99.2    Arteriovenous fistula infection, initial encounter ([facility] T82. 7XXA    Septicemia ([facility] A41.9    Pneumonia due to infectious organism J18.9    Frequent falls R29.6    Hyperkalemia E87.5    Hyponatremia E87.1    Ex-smoker Z87.891    Class 1 obesity due to excess calories with body mass index (BMI) of 30.0 to 30.9 in adult E66.09, Z68.30    Antibiotic-associated diarrhea K52.1, T36.95XA    Pericarditis I31.9    Pericardial effusion I31.3    Chest pain R07.9    History of anemia due to chronic kidney disease N18.9, Z86.2    Pleural effusion J90    Hepatitis B immune Z78.9    Bilateral pleural effusion J90       Please note that this chart was generated using Dragon dictation software. Although every effort was made to ensure the accuracy of this automated transcription, some errors in transcription may have occurred inadvertently. If you may need any clarification, please do not hesitate to contact me through EP[employer] or at the phone number provided below with my electronic signature. Any pictures or media included in this note were obtained after taking informed verbal consent from the patient and with their approval to include those in the patient's medical record.     Dragan Díaz MD, MPH  9/24/2021 , 10:01 AM   [facility] Infectious Disease   [address] [place] [address], [address] [place] [address]  Office: [phone]  Fax: [phone]  Clinic days:  Tuesday & Thursday

## 2021-09-25 NOTE — DISCHARGE SUMMARY
Hospital Medicine Discharge Summary    Patient: Noelle Lucia     Gender: female  : 1945   Age: 67 y.o. MRN: [id number]    Admitting Physician: Paul Burgos MD  Discharge Physician: Paul Burgos MD     Code Status: Prior     Admit Date: 2021   Discharge Date: 2021      Disposition: home  Time spent arranging discharge: 35 minutes    Discharge Diagnoses: Active Hospital Problems    Diagnosis Date Noted    Pleural effusion [J90]     Hepatitis B immune [Z78.9]     Bilateral pleural effusion [J90]     Chest pain [R07.9] 2021    Class 1 obesity due to excess calories with body mass index (BMI) of 30.0 to 30.9 in adult [E66.09, Z68.30]     [facility] [N18.6]     Encounter for medication counseling [Z71.89] 2020         Condition at Discharge:  Stable    Hospital Course:   Admitted to hospital with chest pain echo was performed which showed normal LVEF and will pericardial effusion. The shortness of breath underwent ultrafiltration x2 and this improved. Patient did have pleural effusion discussed with pulmonology would recommend holding off of thoracentesis at this time did have persistent white count and cough ID was consulted. Patient was discharged on 7 days of Levaquin. Discharge Exam:    /75   Pulse 95   Temp 97.7 °F (36.5 °C) (Oral)   Resp 16   Ht 5' 2\" (1.575 m)   Wt 158 lb 4.6 oz (71.8 kg)   LMP  (LMP Unknown)   SpO2 94%   BMI 28.95 kg/m²   General appearance:  Appears comfortable. AAOx3  HEENT: atraumatic, Pupils equal, muscous membranes moist, no masses appreciated  Cardiovascular: Regular rate and rhythm no murmurs appreciated  Respiratory: CTAB no wheezing  Gastrointestinal: Abdomen soft, non-tender, BS+  EXT: no edema  Neurology: no gross focal deficts  Psychiatry: Appropriate affect.  Not agitated  Skin: Warm, dry, no rashes appreciated    Discharge Medications:   Discharge Medication List as of 9/24/2021  2:35 PM      START taking these medications    Details   levoFLOXacin (LEVAQUIN) 250 MG tablet Take 1 tablet by mouth daily for 5 doses, Disp-5 tablet, R-0Normal           Discharge Medication List as of 9/24/2021  2:35 PM        Discharge Medication List as of 9/24/2021  2:35 PM      CONTINUE these medications which have NOT CHANGED    Details   levothyroxine (SYNTHROID) 150 MCG tablet Take 1 tablet by mouth every morning (before breakfast), Disp-90 tablet, R-3Normal      allopurinol (ZYLOPRIM) 100 MG tablet TAKE 1 TABLET EVERY DAY, Disp-90 tablet, R-3Normal      metoprolol succinate (TOPROL XL) 25 MG extended release tablet TAKE 1 TABLET EVERY DAY, Disp-90 tablet, R-3Normal      colchicine (COLCRYS) 0.6 MG tablet Take 1 tablet by mouth daily for 5 days, Disp-5 tablet, R-0Normal      torsemide (DEMADEX) 20 MG tablet Take 3 tablets by mouth 2 times daily, Disp-30 tablet, R-3Normal      spironolactone (ALDACTONE) 25 MG tablet Take 1 tablet by mouth daily, Disp-30 tablet, R-3Normal      nortriptyline (PAMELOR) 10 MG capsule TAKE 1 CAPSULE BY MOUTH EVERY EVENING, Disp-30 capsule, R-3Normal      rosuvastatin (CRESTOR) 20 MG tablet Take 1 tablet by mouth daily, Disp-90 tablet, R-1Normal      clopidogrel (PLAVIX) 75 MG tablet Take 1 tablet by mouth daily, Disp-90 tablet, R-3Normal      aspirin EC 81 MG EC tablet Take 1 tablet by mouth daily, Disp-90 tablet, R-1Normal      gabapentin (NEURONTIN) 100 MG capsule Take 2 capsules by mouth nightly for 30 days. , Disp-60 capsule, R-3Normal      loperamide (IMODIUM) 2 MG capsule Take 2 mg by mouth 4 times daily as neededHistorical Med           Discharge Medication List as of 9/24/2021  2:35 PM      STOP taking these medications       UNABLE TO FIND Comments:   Reason for Stopping:         metoprolol tartrate (LOPRESSOR) 25 MG tablet Comments:   Reason for Stopping:               Labs:  For convenience and continuity at follow-up the following most recent labs are provided:    Lab Results   Component Value Date    WBC 11.6 09/24/2021    HGB 8.8 09/24/2021    HCT 27.1 09/24/2021    MCV 91.2 09/24/2021     09/24/2021     09/24/2021    K 4.4 09/24/2021    K 4.2 09/23/2021    CL 95 09/24/2021    CO2 22 09/24/2021    BUN 51 09/24/2021    CREATININE 7.7 09/24/2021    CALCIUM 10.1 09/24/2021    PHOS 6.9 09/24/2021    ALKPHOS 148 09/23/2021    ALT 32 09/23/2021    AST 39 09/23/2021    BILITOT 0.4 09/23/2021    BILIDIR <0.2 08/12/2021    LABALBU 3.9 09/24/2021    LDLCALC 38 08/12/2021    TRIG 132 08/12/2021     Lab Results   Component Value Date    INR 1.03 09/01/2021    INR 0.97 08/25/2021    INR 0.98 08/24/2021       Radiology:  Echo Complete    Result Date: 9/1/2021  Transthoracic Echocardiography Report (TTE)  Demographics   Patient Name       Carly Dey   Date of Study      09/01/2021         Gender              Female   Patient Number     [id number]         Date of Birth       1945   Visit Number       [id number]          Age                 67 year(s)   Accession Number   [id number]         Room Number         1234   Corporate ID       [id number]            Sonographer         [facility] [place]   Ordering Physician Hermilo Ken,   Interpreting        Keegan Delgado MD,                     CNP                Physician           [facility], [address]  Procedure Type of Study   TTE procedure:ECHOCARDIOGRAM COMPLETE 2D W DOPPLER W COLOR. Procedure Date Date: 09/01/2021 Start: 08:18 AM Study Location: Portable Technical Quality: Adequate visualization Additional Indications:Pericardial effusion. Patient Status: Routine Height: 62 inches Weight: 172 pounds BSA: 1.79 m2 BMI: 31.46 kg/m2 BP: 150/75 mmHg  Conclusions   Summary  -Portable exam done on 3A.  Rapid Response was called on patient for chest  pain prior to exam.  -Normal left ventricle size, wall thickness, and systolic function with an  estimated ejection fraction of 65%. -Grade I diastolic dysfunction with normal LV filling pressures. Avg.  E/e'=13.6  -Small circumferential pericardial effusion without tamponade physiology.  -The effusion contains fibrinous material.   Signature   ------------------------------------------------------------------  Electronically signed by Paco Velasco MD  (Interpreting physician) on 09/01/2021 at 02:47 PM  ------------------------------------------------------------------   Findings   Left Ventricle  Normal left ventricle size, wall thickness, and systolic function with an  estimated ejection fraction of 65%. No regional wall motion abnormalities  are seen. Grade I diastolic dysfunction with normal LV filling pressures. Avg.  E/e'=13.6   Mitral Valve  Mitral valve is structurally normal.  Mild mitral annular calcification is present. No significant regurgitation. Left Atrium  The left atrium is normal in size. Aortic Valve  Aortic valve appears sclerotic but opens adequately. No evidence of aortic  valve regurgitation. Aorta  The aortic root is normal in size. Right Ventricle  The right ventricle is normal in size and function. TAPSE is estimated at  2.14 cm. Tricuspid Valve  Tricuspid valve is structurally normal.  There is trivial tricuspid regurgitation with a RVSP estimation of 34 mmHg. Right Atrium  The right atrial size is normal.   Pulmonic Valve  The pulmonic valve is structurally normal.  No evidence of pulmonic valve stenosis. No significant regurgitation. Pericardial Effusion  Small circumferential pericardial effusion without tamponade physiology. The effusion contains fibrinous material.   Pleural Effusion  No pleural effusion. Miscellaneous  The inferior vena cava appears normal in size with normal respiratory  variation.   M-Mode/2D Measurements (cm)   LV Diastolic Dimension: 9.26 cm LV Systolic Dimension: 0.40 cm  LV Septum Diastolic: 0.9 cm  LV PW Diastolic: 3.17 cm AO Root Dimension: 2.9 cm                                  LA Dimension: 3.5 cm                                  LA Area: 16.6 cm2  LVOT: 2 cm                      LA volume/Index: 36.2 ml /20 ml/m2  Doppler Measurements   AV Peak Velocity: 182 cm/s     MV Peak E-Wave: 82.3 cm/s  AV Peak Gradient: 13.25 mmHg   MV Peak A-Wave: 120 cm/s  AV Mean Gradient: 7 mmHg       MV E/A Ratio: 0.69  LVOT Peak Velocity: 134 cm/s  AV Area (Continuity):2.1 cm2   TR Velocity:278 cm/s  TR Gradient:30.91 mmHg  Estimated RAP:3 mmHg  Estimated RVSP: 34 mmHg  E' Septal Velocity: 5.77 cm/s  E' Lateral Velocity: 6.42 cm/s   Aortic Valve   Peak Velocity: 182 cm/s    Mean Velocity: 128 cm/s  Peak Gradient: 13.25 mmHg  Mean Gradient: 7 mmHg  Area (continuity): 2.1 cm2  AV VTI: 37.6 cm  Aorta   Aortic Root: 2.9 cm  LVOT Diameter: 2 cm      Echo Limited    Result Date: 9/23/2021  Transthoracic Echocardiography Report (TTE)  Demographics   Patient Name       Skyla Carlisle   Date of Study      09/23/2021         Gender              Female   Patient Number     [id number]         Date of Birth       1945   Visit Number       [id number]          Age                 67 year(s)   Accession Number   [id number]         Room Number         2286   Corporate ID       [id number]            Azul Gomez T   Ordering Physician Venessa Walker, Interpreting        Anna Bustillo MD                 Physician           DO JOSIE, [facility]  Procedure Type of Study   TTE procedure:ECHOCARDIOGRAM LIMITED. Procedure Date Date: 09/23/2021 Start: 03:12 PM Study Location: [facility] - Echo Lab Technical Quality: Adequate visualization Indications:Pericardial effusion. Patient Status: Routine Height: 62 inches Weight: 167 pounds BSA: 1.77 m2 BMI: 30.54 kg/m2  Conclusions   Summary  -Limited exam for pericardial effusion.  Patient had a complete previous exam  9/01/2021.  -Normal global systolic function with an ejection fraction estimated at 60%. -Abnormal septal motion due to left bundle branch block.  -There is trivial to small circumferential pericardial effusion noted. Signature   ------------------------------------------------------------------  Electronically signed by Wiley Whittington, [facility]  (Interpreting physician) on 09/23/2021 at 05:14 PM  ------------------------------------------------------------------   Findings   Left Ventricle  Normal left ventricular cavity size and wall thickness. Normal global systolic function with an ejection fraction estimated at 60%. Abnormal septal motion due to left bundle branch block. Aorta  The aortic root is normal in size. Right Ventricle  The right ventricle is normal in size and function. Pericardial Effusion  There is trivial to small circumferential pericardial effusion noted. There is no evidence of right atrial or right ventricular collapse. Pleural Effusion  Significant pleural effusion noted measuring 10.5 cm. Miscellaneous  The inferior vena cava appears normal in size with normal respiratory  variation. M-Mode/2D Measurements (cm)   LV Diastolic Dimension: 7.50 cm LV Systolic Dimension: 1.70 cm  LV Septum Diastolic: 1 cm  LV PW Diastolic: 0.68 cm        AO Root Dimension: 2.9 cm                                  LA Dimension: 3.1 cm  Aorta   Aortic Root: 2.9 cm      XR CHEST PA LATERAL W BOTH OBLIQUE PROJECTIONS    Result Date: 9/9/2021  EXAMINATION: CHEST XRAY SPECIAL VIEWS 9/9/2021 10:47 am COMPARISON: August 25, 2021 HISTORY: ORDERING SYSTEM PROVIDED HISTORY: Shortness of breath TECHNOLOGIST PROVIDED HISTORY: Acuity: Acute Type of Exam: Initial FINDINGS: Mild cardiomegaly. Moderate left pleural effusion and small right pleural effusion identified with bibasilar consolidations/atelectasis. Mild interstitial congestion.   Right-sided dialysis catheter is in the right ventricle. Significant osteopenic changes and degenerative changes noted in the bony structures. .     Mild cardiomegaly, moderate left and small right pleural effusion with bibasilar consolidations/atelectasis. Bibasilar infiltrates to be considered. Mild interstitial congestion. XR CHEST PORTABLE    Result Date: 9/24/2021  EXAMINATION: ONE XRAY VIEW OF THE CHEST 9/24/2021 11:01 am COMPARISON: CT chest, 09/22/2021 HISTORY: ORDERING SYSTEM PROVIDED HISTORY: Pleural effusions TECHNOLOGIST PROVIDED HISTORY: Reason for exam:->Pleural effusions Acuity: Unknown FINDINGS: Tunneled right jugular venous catheter terminates over the mid right atrium. Cardiac silhouette remain mildly prominent. There are hazy opacities in both lung bases, consistent with small to moderate-sized effusions, with likely components of atelectasis. No pneumothorax is identified. Stable bibasilar pleural effusions and atelectasis. Superimposed pneumonia remains a differential possibility. XR CHEST PORTABLE    Result Date: 9/22/2021  EXAMINATION: ONE XRAY VIEW OF THE CHEST 9/22/2021 11:16 am COMPARISON: 09/09/2021 HISTORY: ORDERING SYSTEM PROVIDED HISTORY: SOB, cough TECHNOLOGIST PROVIDED HISTORY: Reason for exam:->SOB, cough Reason for Exam:  SOB, COUGH Acuity: Unknown Type of Exam: Unknown FINDINGS: Right-sided central venous catheter remains in place. The heart size is stable. Increased opacification right lung base. Persistent opacification left lung base. No pneumothorax. Bibasilar opacities suggest bilateral pleural effusions with associated atelectatic changes or infection     CT CHEST PULMONARY EMBOLISM W CONTRAST    Result Date: 9/22/2021  EXAMINATION: CTA OF THE CHEST 9/22/2021 12:37 pm TECHNIQUE: CTA of the chest was performed after the administration of intravenous contrast.  Multiplanar reformatted images are provided for review. MIP images are provided for review.  Dose modulation, iterative reconstruction, and/or weight based adjustment of the mA/kV was utilized to reduce the radiation dose to as low as reasonably achievable. COMPARISON: CT chest abdomen and pelvis dated 08/31/2021 HISTORY: ORDERING SYSTEM PROVIDED HISTORY: Chest pain, tachycardic, recent hospitalization for pericarditis, concern for pneumonia versus PE versus pericarditis TECHNOLOGIST PROVIDED HISTORY: Reason for exam:->Chest pain, tachycardic, recent hospitalization for pericarditis, concern for pneumonia versus PE versus pericarditis Decision Support Exception - unselect if not a suspected or confirmed emergency medical condition->Emergency Medical Condition (MA) Reason for Exam: Chest pain, tachycardic, recent hospitalization for pericarditis, concern for pneumonia versus PE versus pericarditis Acuity: Unknown Type of Exam: Unknown FINDINGS: Pulmonary Arteries: Pulmonary arteries are adequately opacified for evaluation. No evidence of intraluminal filling defect to suggest pulmonary embolism. Main pulmonary artery is normal in caliber. Mediastinum: The central airways are clear. No mediastinal lymphadenopathy. Small pericardial effusion. No CT evidence of cardiac tamponade. Atherosclerotic disease of the thoracic aorta and the coronary arteries. Lungs/pleura: Moderate bilateral pleural effusions with adjacent atelectasis. No pneumothorax. Upper Abdomen: Small hiatal hernia. Rest of visualized upper abdomen is unremarkable. Soft Tissues/Bones: Right breast implant. Right chest wall dialysis catheter with tip in the right atrium. No axillary lymphadenopathy. 1. No pulmonary emboli. 2. Moderate bilateral pleural effusions with adjacent atelectasis. 3. Small pericardial effusion.      CT CHEST ABDOMEN PELVIS WO CONTRAST    Result Date: 8/31/2021  EXAMINATION: CT OF THE CHEST, ABDOMEN, AND PELVIS WITH CONTRAST 8/31/2021 4:51 pm TECHNIQUE: CT of the chest, abdomen and pelvis was performed with the administration of intravenous contrast. Multiplanar reformatted images are provided for review. Dose modulation, iterative reconstruction, and/or weight based adjustment of the mA/kV was utilized to reduce the radiation dose to as low as reasonably achievable. COMPARISON: None HISTORY: ORDERING SYSTEM PROVIDED HISTORY: Unexplained persistent leucocytosis TECHNOLOGIST PROVIDED HISTORY: Reason for exam:->Unexplained persistent leucocytosis Additional Contrast?->Oral Reason for Exam: Unexplained persistent leucocytosis Acuity: Acute Type of Exam: Initial FINDINGS: Chest: Mediastinum: Visualized thyroid is unremarkable. No mediastinal or hilar lymphadenopathy is found. Esophagus is unremarkable. There is a moderate-sized pericardial effusion. In addition, there is fat stranding seen within the epicardial and pericardial fat adjacent to that effusion. Limited noncontrast imaging of the thoracic aorta and pulmonary arteries is unremarkable. Lungs/pleura: There is a small left pleural effusion. Mild adjacent airspace disease is noted, presumably passive atelectasis. Parenchymal bands of atelectasis are noted within the lower lungs bilaterally. Small focus of airspace disease is seen within the periphery of the left upper lobe (as seen on series 4, image 29). No pneumothorax is seen. Airways are patent. The Soft Tissues/Bones: Right breast prosthesis is noted. Visualized extra thoracic soft tissues otherwise unremarkable. No acute or suspicious bony abnormalities are detected within the thorax. Abdomen/Pelvis: There is limited contrast enhancement within the abdomen and pelvis. Organs: Having said that, no acute or suspicious hepatic abnormality identified. Gallbladder is surgically absent. Spleen, adrenals, and pancreas are unremarkable in appearance. No acute or suspicious renal abnormality identified. GI/Bowel: No large bowel abnormalities are identified. The appendix is not well visualized.   No asymmetric pericecal inflammation is seen to suggest acute appendicitis. Stomach, duodenal sweep, and the remainder of the small bowel are unremarkable in appearance. Pelvis: Uterus is surgically absent. Urinary bladder unremarkable. No free pelvic fluid. Peritoneum/Retroperitoneum: Abdominal aorta normal in caliber. No upper abdominal, retroperitoneal, iliac chain, or inguinal lymphadenopathy. Bones/Soft Tissues: Edema is noted within the subcutaneous fat of the mid to lower ventral wall, presumably secondary to medication administration. The extra-abdominal and extra pelvic soft tissues are otherwise unremarkable. No acute or suspicious bony abnormalities are detected within the abdomen pelvis. Chest CT: Moderate-sized pericardial effusion. Adjacent the pericardial effusion, there is fat stranding within the epicardial and pericardial fat, and therefore this is concerning for pericarditis. Small focus of airspace disease within the peripheral left upper lobe. That could represent a small focus of pneumonia or inflammation. Follow-up chest CT in approximately 3 months is recommended to ensure resolution. Small left pleural effusion with mild adjacent airspace disease, most likely passive atelectasis. Abdomen and pelvis CT: No acute abnormality identified.          Signed:    Vonda Saeed MD   9/25/2021

## 2021-09-26 LAB
BLOOD CULTURE, ROUTINE: NORMAL
CULTURE, BLOOD 2: NORMAL

## 2021-09-27 ENCOUNTER — CARE COORDINATION (OUTPATIENT)
Dept: CASE MANAGEMENT | Age: 76
End: 2021-09-27

## 2021-09-27 DIAGNOSIS — J90 BILATERAL PLEURAL EFFUSION: Primary | ICD-10-CM

## 2021-09-27 PROCEDURE — 1111F DSCHRG MED/CURRENT MED MERGE: CPT | Performed by: FAMILY MEDICINE

## 2021-09-27 NOTE — CARE COORDINATION
[facility] Transitions Initial Follow Up Call    Call within 2 business days of discharge: Yes    Patient: Speedy Turcios Patient : 1945   MRN: [id number]  Reason for Admission:   Discharge Date: 21 RARS: Readmission Risk Score: 23      Last Discharge [facility]       Complaint Diagnosis Description Type Department Provider    21 Shortness of Breath Chest pain, unspecified type . .. ED to Hosp-Admission (Discharged) (Regino Guy) True Connell MD; Abhishek Taveras. .. Spoke with: Speedy Turcios      Non-face-to-face services provided:  Obtained and reviewed discharge summary and/or continuity of care documents  [id number] completed     Care Transitions 24 Hour Call    Do you have any ongoing symptoms?: No  Do you have a copy of your discharge instructions?: Yes  Do you have all of your prescriptions and are they filled?: Yes  Have you been contacted by a [address]?: No  Have you scheduled your follow up appointment?: Yes  How are you going to get to your appointment?: Car - drive self  Were you discharged with any Home Care or Post Acute Services: No  Do you feel like you have everything you need to keep you well at home?: Yes  Care Transitions Interventions  No Identified Needs       Pt states doing well, no issues or concerns. Denies SOB, CP, fever. States I feel great. F/U appts listed below.  Agreed to more CTC f/u calls      Follow Up  Future Appointments   Date Time Provider Rod Plummer   10/7/2021 11:30 AM MARCIE Lew - CNP FF Cardio MMA       Sigrid Dover RN

## 2021-10-01 NOTE — PROGRESS NOTES
Physician Progress Note      PATIENT:               John Barry  CSN #:                  [id number]  :                       1945  ADMIT DATE:       2021 10:21 AM  DISCH DATE:        2021 2:42 PM  RESPONDING  PROVIDER #:        Danica Mena MD          QUERY TEXT:    Pt admitted with Chest pain. Pt noted to have elevated troponins, ESRD,   Pericardial Effusion, Pleural Effusions, CAD s/p stent 2021. If possible,   please document in progress notes and discharge summary if you are evaluating   and/or treating any of the following: The medical record reflects the following:  Risk Factors: Age, ESRD, Pericardial Effusion, Pleural Effusions, CAD s/p   stent 2021, HTN, DM2  Clinical Indicators: Troponins on admission 21  0.04  0.03   Per H&P   \"Chest pain: elevated troponin may be do to esrd. \"  Per Cardiology consult   note 21 \"Pericardial effusion - new problem    - recommend thoracentesis   to evaluate etiology of pleural effusion  -Elevated troponin - atypical chest   pain last week, none since - secondary to esrd and demand. \"  Treatment: Nephrology/Cardiology/ID/Pulmonary consults, 2D Limited Echo, CxR,   monitor labs  Options provided:  -- Chest pain due to CAD with unstable angina  -- Chest pain due to pleural effusion  -- Chest pain due to pericardial effusion  -- Chest pain due to, Please specify cause.   -- Other - I will add my own diagnosis  -- Disagree - Not applicable / Not valid  -- Disagree - Clinically unable to determine / Unknown  -- Refer to Clinical Documentation Reviewer    PROVIDER RESPONSE TEXT:    This patient has chest pain due to likely due to pleural effusion    Query created by: Manuelito Wolf on 2021 4:21 PM      Electronically signed by:  Danica Mena MD 10/1/2021 9:25 AM

## 2021-10-04 ENCOUNTER — CARE COORDINATION (OUTPATIENT)
Dept: CASE MANAGEMENT | Age: 76
End: 2021-10-04

## 2021-10-04 NOTE — CARE COORDINATION
[facility] Transitions Follow Up Call    10/4/2021    Patient: Courtney Moore  Patient : 1945   MRN: [id number]  Reason for Admission:   Discharge Date: 21 RARS: Readmission Risk Score: 23         Spoke with: [address] Transitions Subsequent and Final Call    Subsequent and Final Calls  Do you have any ongoing symptoms?: No  Have your medications changed?: No  Do you have any questions related to your medications?: No  Do you currently have any active services?: No  Do you have any needs or concerns that I can assist you with?: No  Identified Barriers: None  Care Transitions Interventions  No Identified Needs  Other Interventions:         Pt states doing well, no issues or concerns. Just leaving dialysis. F/U appts listed below. Agreed to more CTC f/u calls.       Follow Up  Future Appointments   Date Time Provider Rod Plummer   10/7/2021 11:30 AM MARCIE Ferrell - CNP FF Cardio MMA       Sadi Officer, RN

## 2021-10-07 ENCOUNTER — OFFICE VISIT (OUTPATIENT)
Dept: CARDIOLOGY CLINIC | Age: 76
End: 2021-10-07
Payer: MEDICARE

## 2021-10-07 VITALS
DIASTOLIC BLOOD PRESSURE: 64 MMHG | SYSTOLIC BLOOD PRESSURE: 148 MMHG | BODY MASS INDEX: 31.25 KG/M2 | HEART RATE: 103 BPM | OXYGEN SATURATION: 100 % | HEIGHT: 62 IN | WEIGHT: 169.8 LBS

## 2021-10-07 DIAGNOSIS — I31.39 PERICARDIAL EFFUSION: ICD-10-CM

## 2021-10-07 DIAGNOSIS — E78.2 MIXED HYPERLIPIDEMIA: ICD-10-CM

## 2021-10-07 DIAGNOSIS — I10 ESSENTIAL HYPERTENSION: ICD-10-CM

## 2021-10-07 DIAGNOSIS — I25.10 CORONARY ARTERY DISEASE INVOLVING NATIVE CORONARY ARTERY OF NATIVE HEART WITHOUT ANGINA PECTORIS: Primary | ICD-10-CM

## 2021-10-07 PROCEDURE — G8484 FLU IMMUNIZE NO ADMIN: HCPCS | Performed by: NURSE PRACTITIONER

## 2021-10-07 PROCEDURE — 99214 OFFICE O/P EST MOD 30 MIN: CPT | Performed by: NURSE PRACTITIONER

## 2021-10-07 PROCEDURE — G8417 CALC BMI ABV UP PARAM F/U: HCPCS | Performed by: NURSE PRACTITIONER

## 2021-10-07 PROCEDURE — 1123F ACP DISCUSS/DSCN MKR DOCD: CPT | Performed by: NURSE PRACTITIONER

## 2021-10-07 PROCEDURE — 1036F TOBACCO NON-USER: CPT | Performed by: NURSE PRACTITIONER

## 2021-10-07 PROCEDURE — G8399 PT W/DXA RESULTS DOCUMENT: HCPCS | Performed by: NURSE PRACTITIONER

## 2021-10-07 PROCEDURE — 1090F PRES/ABSN URINE INCON ASSESS: CPT | Performed by: NURSE PRACTITIONER

## 2021-10-07 PROCEDURE — G8427 DOCREV CUR MEDS BY ELIG CLIN: HCPCS | Performed by: NURSE PRACTITIONER

## 2021-10-07 PROCEDURE — 4040F PNEUMOC VAC/ADMIN/RCVD: CPT | Performed by: NURSE PRACTITIONER

## 2021-10-07 PROCEDURE — 1111F DSCHRG MED/CURRENT MED MERGE: CPT | Performed by: NURSE PRACTITIONER

## 2021-10-07 NOTE — PROGRESS NOTES
[address]     Outpatient Follow Up Note    CHIEF COMPLAINT / HPI: Hospital Follow Up secondary to CP / pericardial effusion d/t pericarditis    Hospital record has been reviewed  Hospital Course progressed as follows per discharge summary:   Admitted to hospital with chest pain echo was performed which showed normal LVEF and will pericardial effusion. The shortness of breath underwent ultrafiltration x2 and this improved. Patient did have pleural effusion discussed with pulmonology would recommend holding off of thoracentesis at this time did have persistent white count and cough ID was consulted. Patient was discharged on 7 days of Levaquin. Francheska Bueno is 67 y.o. female who presents today for a routine follow up after a recent hospitalization related to the above mentioned issues. She recalls being found down by her son. She cannot recall anything prior to her episode  Subjective:   Since the time of discharge, the patient admits their symptoms have improved. She denies significant chest pain. There is a little SOB, ie going up steps. She's been slowly working her way back into her old routine. She walked in from the parking lot today without feeling winded. Her dry wt is 75 kg. She continues to void 3-4 x / day in good amts. The patient is not experiencing palpitations. Her BP runs normal     These symptoms are improving over the last many days. With regard to medication therapy the patient has been compliant with prescribed regimen. They have tolerated therapy to date.      Past Medical History:   Diagnosis Date    Arthritis     [facility] ([facility]     Hemodialysis patient ([facility]     Hyperlipidemia     Hypertension     Kidney disease     Neuropathy     Pneumonia     IN February, 2021, with COVID    Thyroid disease      Social History:    Social History     Tobacco Use   Smoking Status Former Smoker    Packs/day: 0.50    Years: 10.00    Pack years: 5.00    Types: depression    Objective:   PHYSICAL EXAM:    Vitals:    10/07/21 1134 10/07/21 1201   BP: (!) 140/60 (!) 148/64   Site: Right Upper Arm Right Upper Arm   Position: Sitting    Cuff Size: Large Adult    Pulse: 103    SpO2: 100%    Weight: 169 lb 12.8 oz (77 kg)    Height: 5' 2\" (1.575 m)          VITALS:  BP (!) 148/64 (Site: Right Upper Arm)   Pulse 103   Ht 5' 2\" (1.575 m)   Wt 169 lb 12.8 oz (77 kg)   LMP  (LMP Unknown)   SpO2 100%   BMI 31.06 kg/m²     CONSTITUTIONAL: Cooperative, no apparent distress, and appears well nourished / developed  NEUROLOGIC:  Awake and orientated to person, place and time. PSYCH: Calm affect. SKIN: Warm and dry; Rt chest wall cath secured; LUE graft + bruit with faint thrill. HEENT: Sclera non-icteric, normocephalic, neck supple, no elevation of JVP, normal carotid pulses with no bruits and thyroid normal size. LUNGS:  No increased work of breathing and clear to auscultation, no crackles or wheezing. CARDIOVASCULAR:  Regular rate 100 and rhythm with no murmurs, gallops, rubs, or abnormal heart sounds, normal PMI. The apical impulses not displaced. Heart tones are crisp and normal                                                                                            Cervical veins are not engorged                 JVP less than 8 cm H2O                                                                              The carotid upstroke is normal in amplitude and contour without delay or bruit    ABDOMEN:  Normal bowel sounds, non-distended and non-tender to palpation   EXT: No edema, no calf tenderness. Pulses are present bilaterally.     DATA:    Lab Results   Component Value Date    ALT 32 09/23/2021    AST 39 (H) 09/23/2021    ALKPHOS 148 (H) 09/23/2021    BILITOT 0.4 09/23/2021     Lab Results   Component Value Date    CREATININE 7.7 (HH) 09/24/2021    BUN 51 (H) 09/24/2021     09/24/2021    K 4.4 09/24/2021    CL 95 (L) 09/24/2021 CO2 22 09/24/2021     Lab Results   Component Value Date    TSH 2.64 08/15/2019    E0URNVO 1.00 08/24/2017     Lab Results   Component Value Date    WBC 11.6 (H) 09/24/2021    HGB 8.8 (L) 09/24/2021    HCT 27.1 (L) 09/24/2021    MCV 91.2 09/24/2021     09/24/2021     No components found for: CHLPL  Lab Results   Component Value Date    TRIG 132 08/12/2021    TRIG 203 (H) 07/28/2021    TRIG 432 (H) 08/15/2019     Lab Results   Component Value Date    HDL 32 (L) 08/12/2021    HDL 32 (L) 07/28/2021    HDL 34 (L) 08/15/2019     Lab Results   Component Value Date    LDLCALC 38 08/12/2021    LDLCALC 31 07/28/2021    [address] see below 08/15/2019     Lab Results   Component Value Date    LABVLDL 26 08/12/2021    LABVLDL 41 07/28/2021    LABVLDL see below 08/15/2019     Radiology Review:  Pertinent images / reports were reviewed as a part of this visit and reveals the following:  Last Angiogram: 7/8/21:  LM-distal 20%  LAD-mid 80%  Cx-nml  OM- nml  RCA-distal 80%  RPDA- nml  LVEF- 60  LVG- nml  LVEDP- 6  Intervention  ~Successful PCI to LAD with 2.75x38 MAX. PD with 3.0x15 NC. Prox D1 99% stent group home. Resolved with 1.5x12 balloon angioplasty. Successful PCI to RCA with 3.25x23 MAX to 14atm. Excellent Result       Echo: 9/1/21  Summary   -Portable exam done on 3A. Rapid Response was called on patient for chest   pain prior to exam.   -Normal left ventricle size, wall thickness, and systolic function with an   estimated ejection fraction of 65%. -Grade I diastolic dysfunction with normal LV filling pressures. Avg.   E/e'=13.6   -Small circumferential pericardial effusion without tamponade physiology.   -The effusion contains fibrinous material.    CT chest pul: 9/22/21:     Impression   1. No pulmonary emboli. 2. Moderate bilateral pleural effusions with adjacent atelectasis. 3. Small pericardial effusion. Limited echo:9/23/21  Summary   -Limited exam for pericardial effusion.  Patient had a complete previous exam   9/01/2021.   -Normal global systolic function with an ejection fraction estimated at 60%. -Abnormal septal motion due to left bundle branch block.   -There is trivial to small circumferential pericardial effusion noted. CXR: 9/24/21     Impression   Stable bibasilar pleural effusions and atelectasis.  Superimposed pneumonia   remains a differential possibility. Assessment:      Diagnosis Orders   1. Coronary artery disease involving native coronary artery of native heart without angina pectoris   ~stable : denies angina  ~s/p PTCA LAD & RCA with POBA jailed diag-2 in July   ~DAPT / statin / BB    2. Pericardial effusion  ~trivial to small on last echo   ~attributed to episode of pericarditis   ~colchinine near completion      3. Mixed hyperlipidemia  ~HDL near goal  ~crestor 20 mg daily  Lipid Panel   4. Essential hypertension   ~controlled / reasonable on current regimen        Patient  is stable since hospital discharge. Plan:  Lipid profile with next set of labs   F/U in three months       I have addressed the patient's cardiac risk factors and adjusted pharmacologic treatment as needed. In addition, I have reinforced the need for patient directed risk factor modification. Further evaluation will be based upon the patient's clinical course and testing results. All questions and concerns were addressed to the patient/sister. Alternatives to  treatment were discussed. The patient  currently  is not smoking. The risks related to smoking were reviewed with the patient. Recommend maintaining a smoke-free lifestyle. Dual Antiplatelet therapy has been recommended / prescribed for this patient. Education conducted on adverse reactions including bleeding was discussed. The patient verbalizes understanding.     Pt is on a BB  Pt is not on an ace-i/ARB : ESRD  Pt is on a statin      Saturated fat diet discussed  Exercise program discussed    Thank you for allowing to us to participate in the care of [place].       [facility]  Documentation of today's visit sent to PCP

## 2021-10-12 ENCOUNTER — CARE COORDINATION (OUTPATIENT)
Dept: CASE MANAGEMENT | Age: 76
End: 2021-10-12

## 2021-10-12 NOTE — CARE COORDINATION
[facility] Transitions Follow Up Call    10/12/2021    Patient: Luz Salinas  Patient : 1945   MRN: [id number]  Reason for Admission:   Discharge Date: 21 RARS: Readmission Risk Score: 23      Follow up call attempted, left contact info on       Follow Up  No future appointments.     Roseline Patel RN

## 2021-10-18 ENCOUNTER — CARE COORDINATION (OUTPATIENT)
Dept: CASE MANAGEMENT | Age: 76
End: 2021-10-18

## 2021-10-18 NOTE — CARE COORDINATION
Care Transitions Outreach Attempt    Call within 2 business days of discharge: Yes   Attempted to reach patient for transitions of care follow up. Unable to reach patient. Unable to leave vm as phone just rang. Cristy Mays LPN, [facility]: [phone]      Patient: Carmelo Neal Patient : 1945 MRN: [id number]    Last Discharge [facility]       Complaint Diagnosis Description Type Department Provider    21 Shortness of Breath Chest pain, unspecified type . .. ED to Hosp-Admission (Discharged) (Kaylyn Hunt) Komal Alexander MD; Shar Reaves. .. Was this an external facility discharge?  No Discharge Facility:     Noted following upcoming appointments from discharge chart review:   [facility] follow up appointment(s):   Future Appointments   Date Time Provider Rod Plummer   2021  8:30 AM Moni Alegre MD [facility]     Non-[facility] follow up appointment(s):

## 2021-11-02 ENCOUNTER — PROCEDURE VISIT (OUTPATIENT)
Dept: VASCULAR SURGERY | Age: 76
End: 2021-11-02
Payer: COMMERCIAL

## 2021-11-02 VITALS
WEIGHT: 162 LBS | SYSTOLIC BLOOD PRESSURE: 144 MMHG | DIASTOLIC BLOOD PRESSURE: 84 MMHG | HEIGHT: 62 IN | BODY MASS INDEX: 29.81 KG/M2

## 2021-11-02 DIAGNOSIS — E11.22 CHRONIC KIDNEY DISEASE WITH END STAGE RENAL DISEASE ON DIALYSIS DUE TO TYPE 2 DIABETES MELLITUS (HCC): Primary | ICD-10-CM

## 2021-11-02 DIAGNOSIS — Z99.2 CHRONIC KIDNEY DISEASE WITH END STAGE RENAL DISEASE ON DIALYSIS DUE TO TYPE 2 DIABETES MELLITUS (HCC): Primary | ICD-10-CM

## 2021-11-02 DIAGNOSIS — N18.6 CHRONIC KIDNEY DISEASE WITH END STAGE RENAL DISEASE ON DIALYSIS DUE TO TYPE 2 DIABETES MELLITUS (HCC): Primary | ICD-10-CM

## 2021-11-02 PROCEDURE — G8399 PT W/DXA RESULTS DOCUMENT: HCPCS | Performed by: SURGERY

## 2021-11-02 PROCEDURE — 1123F ACP DISCUSS/DSCN MKR DOCD: CPT | Performed by: SURGERY

## 2021-11-02 PROCEDURE — G8484 FLU IMMUNIZE NO ADMIN: HCPCS | Performed by: SURGERY

## 2021-11-02 PROCEDURE — 1090F PRES/ABSN URINE INCON ASSESS: CPT | Performed by: SURGERY

## 2021-11-02 PROCEDURE — 4040F PNEUMOC VAC/ADMIN/RCVD: CPT | Performed by: SURGERY

## 2021-11-02 PROCEDURE — G8417 CALC BMI ABV UP PARAM F/U: HCPCS | Performed by: SURGERY

## 2021-11-02 PROCEDURE — G8427 DOCREV CUR MEDS BY ELIG CLIN: HCPCS | Performed by: SURGERY

## 2021-11-02 PROCEDURE — 99212 OFFICE O/P EST SF 10 MIN: CPT | Performed by: SURGERY

## 2021-11-02 PROCEDURE — 1036F TOBACCO NON-USER: CPT | Performed by: SURGERY

## 2021-11-17 RX ORDER — NORTRIPTYLINE HYDROCHLORIDE 10 MG/1
CAPSULE ORAL
Qty: 90 CAPSULE | Refills: 1 | Status: SHIPPED | OUTPATIENT
Start: 2021-11-17 | End: 2022-03-29 | Stop reason: SDUPTHER

## 2021-11-29 ENCOUNTER — HOSPITAL ENCOUNTER (EMERGENCY)
Age: 76
Discharge: HOME | End: 2021-11-29
Payer: COMMERCIAL

## 2021-11-29 ENCOUNTER — APPOINTMENT (OUTPATIENT)
Dept: GENERAL RADIOLOGY | Age: 76
End: 2021-11-29
Payer: COMMERCIAL

## 2021-11-29 VITALS
BODY MASS INDEX: 32.01 KG/M2 | HEART RATE: 92 BPM | DIASTOLIC BLOOD PRESSURE: 62 MMHG | SYSTOLIC BLOOD PRESSURE: 155 MMHG | WEIGHT: 175 LBS | RESPIRATION RATE: 15 BRPM | OXYGEN SATURATION: 96 % | TEMPERATURE: 98.5 F

## 2021-11-29 DIAGNOSIS — J18.9 PNEUMONIA OF RIGHT MIDDLE LOBE DUE TO INFECTIOUS ORGANISM: Primary | ICD-10-CM

## 2021-11-29 DIAGNOSIS — N18.6 ESRD ON DIALYSIS (HCC): ICD-10-CM

## 2021-11-29 DIAGNOSIS — Z99.2 ESRD ON DIALYSIS (HCC): ICD-10-CM

## 2021-11-29 DIAGNOSIS — H10.9 CONJUNCTIVITIS OF BOTH EYES, UNSPECIFIED CONJUNCTIVITIS TYPE: ICD-10-CM

## 2021-11-29 LAB
A/G RATIO: 0.9 (ref 1.1–2.2)
ALBUMIN SERPL-MCNC: 3.7 G/DL (ref 3.4–5)
ALP BLD-CCNC: 107 U/L (ref 40–129)
ALT SERPL-CCNC: 12 U/L (ref 10–40)
ANION GAP SERPL CALCULATED.3IONS-SCNC: 16 MMOL/L (ref 3–16)
AST SERPL-CCNC: 10 U/L (ref 15–37)
BASOPHILS ABSOLUTE: 0 K/UL (ref 0–0.2)
BASOPHILS RELATIVE PERCENT: 0.2 %
BILIRUB SERPL-MCNC: 0.4 MG/DL (ref 0–1)
BUN BLDV-MCNC: 71 MG/DL (ref 7–20)
CALCIUM SERPL-MCNC: 9.9 MG/DL (ref 8.3–10.6)
CHLORIDE BLD-SCNC: 99 MMOL/L (ref 99–110)
CO2: 22 MMOL/L (ref 21–32)
CREAT SERPL-MCNC: 8.7 MG/DL (ref 0.6–1.2)
EOSINOPHILS ABSOLUTE: 0.2 K/UL (ref 0–0.6)
EOSINOPHILS RELATIVE PERCENT: 1.9 %
GFR AFRICAN AMERICAN: 5
GFR NON-AFRICAN AMERICAN: 4
GLUCOSE BLD-MCNC: 106 MG/DL (ref 70–99)
HCT VFR BLD CALC: 34.3 % (ref 36–48)
HEMOGLOBIN: 10.8 G/DL (ref 12–16)
LYMPHOCYTES ABSOLUTE: 0.9 K/UL (ref 1–5.1)
LYMPHOCYTES RELATIVE PERCENT: 7.9 %
MCH RBC QN AUTO: 28.8 PG (ref 26–34)
MCHC RBC AUTO-ENTMCNC: 31.4 G/DL (ref 31–36)
MCV RBC AUTO: 91.7 FL (ref 80–100)
MONOCYTES ABSOLUTE: 1 K/UL (ref 0–1.3)
MONOCYTES RELATIVE PERCENT: 8.3 %
NEUTROPHILS ABSOLUTE: 9.6 K/UL (ref 1.7–7.7)
NEUTROPHILS RELATIVE PERCENT: 81.7 %
PDW BLD-RTO: 19 % (ref 12.4–15.4)
PLATELET # BLD: 297 K/UL (ref 135–450)
PMV BLD AUTO: 7.9 FL (ref 5–10.5)
POTASSIUM REFLEX MAGNESIUM: 5.4 MMOL/L (ref 3.5–5.1)
RAPID INFLUENZA  B AGN: NEGATIVE
RAPID INFLUENZA A AGN: NEGATIVE
RBC # BLD: 3.74 M/UL (ref 4–5.2)
SODIUM BLD-SCNC: 137 MMOL/L (ref 136–145)
TOTAL PROTEIN: 7.6 G/DL (ref 6.4–8.2)
WBC # BLD: 11.7 K/UL (ref 4–11)

## 2021-11-29 PROCEDURE — U0003 INFECTIOUS AGENT DETECTION BY NUCLEIC ACID (DNA OR RNA); SEVERE ACUTE RESPIRATORY SYNDROME CORONAVIRUS 2 (SARS-COV-2) (CORONAVIRUS DISEASE [COVID-19]), AMPLIFIED PROBE TECHNIQUE, MAKING USE OF HIGH THROUGHPUT TECHNOLOGIES AS DESCRIBED BY CMS-2020-01-R: HCPCS

## 2021-11-29 PROCEDURE — U0005 INFEC AGEN DETEC AMPLI PROBE: HCPCS

## 2021-11-29 PROCEDURE — 87804 INFLUENZA ASSAY W/OPTIC: CPT

## 2021-11-29 PROCEDURE — 71046 X-RAY EXAM CHEST 2 VIEWS: CPT

## 2021-11-29 PROCEDURE — 99283 EMERGENCY DEPT VISIT LOW MDM: CPT

## 2021-11-29 PROCEDURE — 85025 COMPLETE CBC W/AUTO DIFF WBC: CPT

## 2021-11-29 PROCEDURE — 80053 COMPREHEN METABOLIC PANEL: CPT

## 2021-11-29 RX ORDER — ROSUVASTATIN CALCIUM 20 MG/1
TABLET, COATED ORAL
Qty: 90 TABLET | Refills: 1 | Status: SHIPPED | OUTPATIENT
Start: 2021-11-29 | End: 2022-03-29 | Stop reason: SDUPTHER

## 2021-11-29 RX ORDER — ERYTHROMYCIN 5 MG/G
OINTMENT OPHTHALMIC EVERY 6 HOURS
Qty: 3.5 G | Refills: 0 | Status: SHIPPED | OUTPATIENT
Start: 2021-11-29 | End: 2021-12-06

## 2021-11-29 RX ORDER — LEVOFLOXACIN 250 MG/1
250 TABLET ORAL DAILY
Qty: 5 TABLET | Refills: 0 | Status: SHIPPED | OUTPATIENT
Start: 2021-11-29 | End: 2021-12-04

## 2021-11-29 RX ORDER — ASPIRIN 81 MG/1
TABLET, COATED ORAL
Qty: 90 TABLET | Refills: 1 | Status: SHIPPED | OUTPATIENT
Start: 2021-11-29 | End: 2022-06-02

## 2021-11-29 ASSESSMENT — PAIN SCALES - GENERAL: PAINLEVEL_OUTOF10: 2

## 2021-11-29 ASSESSMENT — PAIN DESCRIPTION - LOCATION: LOCATION: GENERALIZED

## 2021-11-29 ASSESSMENT — PAIN DESCRIPTION - PAIN TYPE: TYPE: ACUTE PAIN

## 2021-11-29 NOTE — ED NOTES
Bed: Tri-A  Expected date:   Expected time:   Means of arrival:   Comments:  Needs [facility], RN  11/29/21 9504

## 2021-11-29 NOTE — TELEPHONE ENCOUNTER
Celeste Browne stented this patient but it looks like this patient will follow LES in office (he is oot). Do you mind refilling until he can see Shaniqua Dickey since you have seen him in office already?  Thank you

## 2021-11-30 LAB — SARS-COV-2: NOT DETECTED

## 2022-01-11 ENCOUNTER — OFFICE VISIT (OUTPATIENT)
Dept: CARDIOLOGY CLINIC | Age: 77
End: 2022-01-11
Payer: MEDICARE

## 2022-01-11 VITALS
WEIGHT: 178.7 LBS | HEIGHT: 62 IN | BODY MASS INDEX: 32.89 KG/M2 | SYSTOLIC BLOOD PRESSURE: 154 MMHG | DIASTOLIC BLOOD PRESSURE: 72 MMHG | OXYGEN SATURATION: 96 % | HEART RATE: 95 BPM

## 2022-01-11 DIAGNOSIS — E78.2 MIXED HYPERLIPIDEMIA: ICD-10-CM

## 2022-01-11 DIAGNOSIS — I10 ESSENTIAL HYPERTENSION: ICD-10-CM

## 2022-01-11 DIAGNOSIS — I25.10 CORONARY ARTERY DISEASE INVOLVING NATIVE CORONARY ARTERY OF NATIVE HEART WITHOUT ANGINA PECTORIS: Primary | ICD-10-CM

## 2022-01-11 PROCEDURE — 99214 OFFICE O/P EST MOD 30 MIN: CPT | Performed by: NURSE PRACTITIONER

## 2022-01-11 NOTE — PROGRESS NOTES
[facility], [place]     Outpatient Follow Up Note    Aide Perea is 67 y.o. female who presents today with a history of pericardial effusion d/t pericarditis, CAD s/p PTCA LAD with jailed diag-1 s/p POBA and PTCA RCA ; HTN and hyperlipidemia    CHIEF COMPLAINT / HPI:  Follow Up secondary to coronary artery disease    Subjective:   She denies significant chest pain. Every once in awhile she'll get a sharp pain that last seconds. There is SOB going up steps which she attributes to the void in her routine walking. The patient denies orthopnea/PND. The patient has swelling in her legs. Her dry wt was adjusted yesterday after a wt gain & swelling. Her wt stays b/w 174-77# . She continues to void in good amts. The patient is not experiencing palpitations or dizziness. These symptoms show no change since the last OV. With regard to medication therapy the patient has been compliant with prescribed regimen. They have tolerated therapy to date.      Past Medical History:   Diagnosis Date    Arthritis     Diabetes ([facility] [address]     Hemodialysis patient ([facility] [address] [facility]     Hyperlipidemia     Hypertension     Kidney disease     Neuropathy     Pneumonia     [place] , with COVID    Thyroid disease      Social History:    Social History     Tobacco Use   Smoking Status Former Smoker    Packs/day: 0.50    Years: 10.00    Pack years: 5.00    Types: Cigarettes    Quit date: 1978    Years since quittin.5   Smokeless Tobacco Never Used     Current Medications:  Current Outpatient Medications   Medication Sig Dispense Refill    rosuvastatin (CRESTOR) 20 MG tablet TAKE 1 TABLET BY MOUTH EVERY DAY 90 tablet 1    ASPIRIN LOW DOSE 81 MG EC tablet TAKE 1 TABLET BY MOUTH EVERY DAY 90 tablet 1    nortriptyline (PAMELOR) 10 MG capsule TAKE 1 CAPSULE BY MOUTH EVERY EVENING 90 capsule 1    levothyroxine (SYNTHROID) 150 MCG tablet Take 1 tablet by mouth every morning (before breakfast) 90 tablet 3    allopurinol (ZYLOPRIM) 100 MG tablet TAKE 1 TABLET EVERY DAY 90 tablet 3    metoprolol succinate (TOPROL XL) 25 MG extended release tablet TAKE 1 TABLET EVERY DAY 90 tablet 3    torsemide (DEMADEX) 20 MG tablet Take 3 tablets by mouth 2 times daily 30 tablet 3    spironolactone (ALDACTONE) 25 MG tablet Take 1 tablet by mouth daily 30 tablet 3    clopidogrel (PLAVIX) 75 MG tablet Take 1 tablet by mouth daily 90 tablet 3    gabapentin (NEURONTIN) 100 MG capsule Take 2 capsules by mouth nightly for 30 days. 60 capsule 3    loperamide (IMODIUM) 2 MG capsule Take 2 mg by mouth 4 times daily as needed        No current facility-administered medications for this visit. REVIEW OF SYSTEMS:    CONSTITUTIONAL: No major weight gain or loss, fatigue, weakness, night sweats or fever. HEENT: No new vision difficulties or ringing in the ears. RESPIRATORY: No new SOB, PND, orthopnea or cough. CARDIOVASCULAR: See HPI  GI: No nausea, vomiting, diarrhea, constipation, abdominal pain or changes in bowel habits. : No urinary frequency, urgency, incontinence hematuria or dysuria. SKIN: No cyanosis or skin lesions. MUSCULOSKELETAL: No new muscle or joint pain. NEUROLOGICAL: No syncope or TIA-like symptoms.   PSYCHIATRIC: No anxiety, pain, insomnia or depression  ~daughter has aggressive breast    Objective:   PHYSICAL EXAM:        Vitals:    01/11/22 1005 01/11/22 1022   BP: (!) 140/60 (!) 154/72   Site: Right Upper Arm Right Upper Arm   Position: Sitting    Cuff Size: Large Adult Large Adult   Pulse: 95    SpO2: 96%    Weight: 178 lb 11.2 oz (81.1 kg)    Height: 5' 2\" (1.575 m)        VITALS:  BP (!) 140/60 (Site: Right Upper Arm, Position: Sitting, Cuff Size: Large Adult)   Pulse 95   Ht 5' 2\" (1.575 m)   Wt 178 lb 11.2 oz (81.1 kg)   LMP  (LMP Unknown)   SpO2 96%   BMI 32.68 kg/m²   CONSTITUTIONAL: Cooperative, no apparent distress, and appears well nourished / developed  NEUROLOGIC:  Awake and orientated to person, place and time. PSYCH: Calm affect. SKIN: Warm and dry; Lt forearm fistula + thrill/bruit. HEENT: Sclera non-icteric, normocephalic, neck supple, no elevation of JVP, normal carotid pulses with no bruits and thyroid normal size. LUNGS:  No increased work of breathing and clear to auscultation, no crackles or wheezing  CARDIOVASCULAR:  Regular rate 94 and rhythm with no murmurs, gallops, rubs, or abnormal heart sounds, normal PMI. The apical impulses not displaced  JVP less than 8 cm H2O  Heart tones are crisp and normal  Cervical veins are not engorged  The carotid upstroke is normal in amplitude and contour without delay or bruit  JVP is not elevated  ABDOMEN:  Normal bowel sounds, non-distended and non-tender to palpation  EXT: No edema, no calf tenderness. Pulses are present bilaterally.     DATA:    Lab Results   Component Value Date    ALT 12 11/29/2021    AST 10 (L) 11/29/2021    ALKPHOS 107 11/29/2021    BILITOT 0.4 11/29/2021     Lab Results   Component Value Date    CREATININE 8.7 (HH) 11/29/2021    BUN 71 (H) 11/29/2021     11/29/2021    K 5.4 (H) 11/29/2021    CL 99 11/29/2021    CO2 22 11/29/2021     Lab Results   Component Value Date    TSH 2.64 08/15/2019    Q7GSNTL 1.00 08/24/2017     Lab Results   Component Value Date    WBC 11.7 (H) 11/29/2021    HGB 10.8 (L) 11/29/2021    HCT 34.3 (L) 11/29/2021    MCV 91.7 11/29/2021     11/29/2021     No components found for: CHLPL  Lab Results   Component Value Date    TRIG 132 08/12/2021    TRIG 203 (H) 07/28/2021    TRIG 432 (H) 08/15/2019     Lab Results   Component Value Date    HDL 32 (L) 08/12/2021    HDL 32 (L) 07/28/2021    HDL 34 (L) 08/15/2019     Lab Results   Component Value Date    LDLCALC 38 08/12/2021    LDLCALC 31 07/28/2021    LDLCALC see below 08/15/2019     Lab Results   Component Value Date    LABVLDL 26 08/12/2021    LABVLDL 41 07/28/2021    LABVLDL see below 08/15/2019       Radiology Review:  Pertinent images / reports were reviewed as a part of this visit and reveals the following:    Last Angiogram: 7/8/21:  LM-distal 20%  LAD-mid 80%  Cx-nml  OM- nml  RCA-distal 80%  RPDA- nml  LVEF- 60  LVG- nml  LVEDP- 6  Intervention  ~Successful PCI to LAD with 2.75x38 MAX. PD with 3.0x15 NC. Prox D1 99% stent care home. Resolved with 1.5x12 balloon angioplasty. Successful PCI to RCA with 3.25x23 MAX to 14atm. Excellent Result         Echo: 9/1/21  Summary   -Portable exam done on 3A. Rapid Response was called on patient for chest   pain prior to exam.   -Normal left ventricle size, wall thickness, and systolic function with an   estimated ejection fraction of 65%.   -Grade I diastolic dysfunction with normal LV filling pressures. Avg.   E/e'=13.6   -Small circumferential pericardial effusion without tamponade physiology.   -The effusion contains fibrinous material.     CT chest pul: 9/22/21:      Impression   1. No pulmonary emboli. 2. Moderate bilateral pleural effusions with adjacent atelectasis. 3. Small pericardial effusion.      Limited echo: 9/23/21  Summary   -Limited exam for pericardial effusion. Patient had a complete previous exam 9/01/2021.   -Normal global systolic function with an ejection fraction estimated at 60%.  -Abnormal septal motion due to left bundle branch block.   -There is trivial to small circumferential pericardial effusion noted.     Assessment:      Diagnosis Orders   1. Coronary artery disease involving native coronary artery of native heart without angina pectoris   ~stable : denies angina  ~s/p PTCA LAD with jailed diag-1 s/p POBA and PTCA RCA July '21  ~exercises / walks routinely weather dependent   ~DAPT / statin / BB  ~trivial to small circumferential pericardial effusion noted on echo Sept '21     2. Essential hypertension   ~reasonable on arrival ; suboptimal with recheck  ~metoprolol / aldactone     3.  Mixed hyperlipidemia   ~HDL not at goal otherwise controlled  ~crestor 20 mg daily I had the opportunity to review the clinical symptoms and presentation of [employer]. Plan:     1. Continue present management   2. F/U in 3-4 months with Dr. Nestor Kirkland    Overall the patient is stable from CV standpoint    I have addresed the patient's cardiac risk factors and adjusted pharmacologic treatment as needed. In addition, I have reinforced the need for patient directed risk factor modification. Further evaluation will be based upon the patient's clinical course and testing results. All questions and concerns were addressed to the patient. Alternatives to my treatment were discussed. The patient is not currently smoking: remote. The risks related to smoking were reviewed with the patient. Recommend maintaining a smoke-free lifestyle. Patient is on a beta-blocker  Patient is on an ace-i/ARB : ESRD followed by Dr. Endy Velasquez  Patient is on a statin    Dual Antiplatelet therapy has been recommended / prescribed for this patient. Education conducted on adverse reactions including bleeding was discussed. The patient verbalizes understanding not to stop medications without discussing with us. Discussed exercise: 30-60 minutes 7 days/week : had walked 5 miles / day until the cold weather. Plans to do a 30 mile walk over 2 days for [employer] in [facility] Oct '22  Discussed Low saturated fat/ARIADNE diet. Thank you for allowing to us to participate in the care of [employer].     MARCIE Nava    Documentation of today's visit sent to PCP

## 2022-01-19 ENCOUNTER — APPOINTMENT (OUTPATIENT)
Dept: GENERAL RADIOLOGY | Age: 77
End: 2022-01-19
Payer: MEDICARE

## 2022-01-19 ENCOUNTER — HOSPITAL ENCOUNTER (OUTPATIENT)
Age: 77
Setting detail: OBSERVATION
Discharge: HOME | End: 2022-01-20
Attending: EMERGENCY MEDICINE | Admitting: INTERNAL MEDICINE
Payer: MEDICARE

## 2022-01-19 ENCOUNTER — TELEPHONE (OUTPATIENT)
Dept: CARDIOLOGY CLINIC | Age: 77
End: 2022-01-19

## 2022-01-19 DIAGNOSIS — T82.9XXA: ICD-10-CM

## 2022-01-19 DIAGNOSIS — N18.6 CHRONIC KIDNEY DISEASE WITH END STAGE RENAL FAILURE ON DIALYSIS (HCC): ICD-10-CM

## 2022-01-19 DIAGNOSIS — R07.9 CHEST PAIN, UNSPECIFIED TYPE: Primary | ICD-10-CM

## 2022-01-19 DIAGNOSIS — Z99.2 CHRONIC KIDNEY DISEASE WITH END STAGE RENAL FAILURE ON DIALYSIS (HCC): ICD-10-CM

## 2022-01-19 DIAGNOSIS — M79.89 LEFT ARM SWELLING: ICD-10-CM

## 2022-01-19 LAB
A/G RATIO: 1.6 (ref 1.1–2.2)
ALBUMIN SERPL-MCNC: 5.1 G/DL (ref 3.4–5)
ALP BLD-CCNC: 134 U/L (ref 40–129)
ALT SERPL-CCNC: 25 U/L (ref 10–40)
ANION GAP SERPL CALCULATED.3IONS-SCNC: 17 MMOL/L (ref 3–16)
AST SERPL-CCNC: 25 U/L (ref 15–37)
BASOPHILS ABSOLUTE: 0 K/UL (ref 0–0.2)
BASOPHILS RELATIVE PERCENT: 0.3 %
BILIRUB SERPL-MCNC: 0.6 MG/DL (ref 0–1)
BUN BLDV-MCNC: 34 MG/DL (ref 7–20)
CALCIUM SERPL-MCNC: 10.4 MG/DL (ref 8.3–10.6)
CHLORIDE BLD-SCNC: 91 MMOL/L (ref 99–110)
CO2: 28 MMOL/L (ref 21–32)
CREAT SERPL-MCNC: 4.7 MG/DL (ref 0.6–1.2)
EOSINOPHILS ABSOLUTE: 0.3 K/UL (ref 0–0.6)
EOSINOPHILS RELATIVE PERCENT: 4.2 %
GFR AFRICAN AMERICAN: 11
GFR NON-AFRICAN AMERICAN: 9
GLUCOSE BLD-MCNC: 93 MG/DL (ref 70–99)
HCT VFR BLD CALC: 40.1 % (ref 36–48)
HEMOGLOBIN: 12.7 G/DL (ref 12–16)
LYMPHOCYTES ABSOLUTE: 1 K/UL (ref 1–5.1)
LYMPHOCYTES RELATIVE PERCENT: 14.8 %
MCH RBC QN AUTO: 29.6 PG (ref 26–34)
MCHC RBC AUTO-ENTMCNC: 31.7 G/DL (ref 31–36)
MCV RBC AUTO: 93.3 FL (ref 80–100)
MONOCYTES ABSOLUTE: 0.5 K/UL (ref 0–1.3)
MONOCYTES RELATIVE PERCENT: 7.9 %
NEUTROPHILS ABSOLUTE: 4.9 K/UL (ref 1.7–7.7)
NEUTROPHILS RELATIVE PERCENT: 72.8 %
PDW BLD-RTO: 20.8 % (ref 12.4–15.4)
PLATELET # BLD: 267 K/UL (ref 135–450)
PMV BLD AUTO: 7.6 FL (ref 5–10.5)
POTASSIUM REFLEX MAGNESIUM: 4.7 MMOL/L (ref 3.5–5.1)
PRO-BNP: 4225 PG/ML (ref 0–449)
RBC # BLD: 4.3 M/UL (ref 4–5.2)
SODIUM BLD-SCNC: 136 MMOL/L (ref 136–145)
TOTAL PROTEIN: 8.2 G/DL (ref 6.4–8.2)
TROPONIN: 0.03 NG/ML
TROPONIN: 0.03 NG/ML
WBC # BLD: 6.8 K/UL (ref 4–11)

## 2022-01-19 PROCEDURE — 71045 X-RAY EXAM CHEST 1 VIEW: CPT

## 2022-01-19 PROCEDURE — 84484 ASSAY OF TROPONIN QUANT: CPT

## 2022-01-19 PROCEDURE — 85025 COMPLETE CBC W/AUTO DIFF WBC: CPT

## 2022-01-19 PROCEDURE — U0003 INFECTIOUS AGENT DETECTION BY NUCLEIC ACID (DNA OR RNA); SEVERE ACUTE RESPIRATORY SYNDROME CORONAVIRUS 2 (SARS-COV-2) (CORONAVIRUS DISEASE [COVID-19]), AMPLIFIED PROBE TECHNIQUE, MAKING USE OF HIGH THROUGHPUT TECHNOLOGIES AS DESCRIBED BY CMS-2020-01-R: HCPCS

## 2022-01-19 PROCEDURE — 83880 ASSAY OF NATRIURETIC PEPTIDE: CPT

## 2022-01-19 PROCEDURE — 93005 ELECTROCARDIOGRAM TRACING: CPT | Performed by: EMERGENCY MEDICINE

## 2022-01-19 PROCEDURE — U0005 INFEC AGEN DETEC AMPLI PROBE: HCPCS

## 2022-01-19 PROCEDURE — 80053 COMPREHEN METABOLIC PANEL: CPT

## 2022-01-19 PROCEDURE — 6370000000 HC RX 637 (ALT 250 FOR IP): Performed by: EMERGENCY MEDICINE

## 2022-01-19 PROCEDURE — G0378 HOSPITAL OBSERVATION PER HR: HCPCS

## 2022-01-19 PROCEDURE — 99284 EMERGENCY DEPT VISIT MOD MDM: CPT

## 2022-01-19 PROCEDURE — 94761 N-INVAS EAR/PLS OXIMETRY MLT: CPT

## 2022-01-19 RX ORDER — ASPIRIN 81 MG/1
324 TABLET, CHEWABLE ORAL ONCE
Status: COMPLETED | OUTPATIENT
Start: 2022-01-19 | End: 2022-01-19

## 2022-01-19 RX ADMIN — ASPIRIN 81 MG 324 MG: 81 TABLET ORAL at 15:51

## 2022-01-19 ASSESSMENT — ENCOUNTER SYMPTOMS
RESPIRATORY NEGATIVE: 1
EYES NEGATIVE: 1
GASTROINTESTINAL NEGATIVE: 1

## 2022-01-19 NOTE — ED NOTES
Bed: 05  Expected date:   Expected time:   Means of arrival:   Comments:   [facility], [address] [place] [address]  01/19/22 0045

## 2022-01-19 NOTE — ED PROVIDER NOTES
[id number] [facility] PROVIDER NOTE    Patient Identification  Pt Name: Lc Ribeiro  MRN: [id number]  [place] 1945  Date of evaluation: 1/19/2022  Provider: Javier Gramajo MD  PCP: Hailee Man MD    Chief Complaint  Chest Pain (Pt to ER for complaint of CP, fever, SOB last evening, stopping this morning. Was instructed per Nephrology to come to ER.)      HPI  (History provided by patient)  This is a 67 y.o. female who was brought in by self for severe chest pain through to back and shoulders. This started last night last night. She denies shortness of breath. She feels very swollen through whole body. No chest pain now. Stopped around 6am. When present, it was pressure-like in character. No modifiers. Pain was 10/10. She has had similar pain before. She was diagnosed with pericarditis and pleurisy. No cough. She has had temp of 100.2 last night. Resolved with tylenol. No vomiting, diarrhea, or abdominal pain. Felt nauseous. No diaphoresis. +chills. Vaccinated for covid, not boosted. She was able to complete dialysis today as scheduled    ROS  10 systems reviewed, pertinent positives/negatives per HPI otherwise noted to be negative.     I have reviewed the following nursing documentation:  Allergies: Amoxicillin, Demeclocycline, Penicillins, Tetracyclines & related, Ozempic (0.25 or 0.5 mg-dose) [semaglutide(0.25 or 0.5mg-dos)], and Codeine    Past medical history:   Past Medical History:   Diagnosis Date    Arthritis     Diabetes ([facility]     Hemodialysis patient ([facility]     Hyperlipidemia     Hypertension     Kidney disease     Neuropathy     Pneumonia     IN February, 2021, with COVID    Thyroid disease      Past surgical history:   Past Surgical History:   Procedure Laterality Date    APPENDECTOMY      CHOLECYSTECTOMY      COLONOSCOPY  11/24/2020    COLONOSCOPY POLYPECTOMY SNARE/COLD BIOPSY performed by Aliza Melgar MD at [facility] [employer] DIALYSIS CATHETER REMOVAL N/A 5/17/2021    PERITONEAL DIALYSIS CATHETER REMOVAL. [address] performed by Hiram Gibson DO at [address] N/A 4/3/2020    LAPAROSCOPIC LYSIS OF ADHESIONS, LAPAROSCOPIC PERITONEAL DIALYSIS CATHETER PLACEMENT, LAPAROSCOPIC OMENTOPEXY performed by Hiram Gibson DO at [place], BILATERAL      preventive    ROTATOR CUFF REPAIR Left     SHUNT REVISION Left 8/19/2021    LEFT FOREARM ARTERIO VENOUS GRAFT performed by Felicia Contreras MD at [address] [place] [address] Street 11/24/2020    EGD BIOPSY performed by Augusto Goins MD at [facility] medications:   Previous Medications    ALLOPURINOL (ZYLOPRIM) 100 MG TABLET    TAKE 1 TABLET EVERY DAY    ASPIRIN LOW DOSE 81 MG EC TABLET    TAKE 1 TABLET BY MOUTH EVERY DAY    CLOPIDOGREL (PLAVIX) 75 MG TABLET    Take 1 tablet by mouth daily    GABAPENTIN (NEURONTIN) 100 MG CAPSULE    Take 2 capsules by mouth nightly for 30 days. LEVOTHYROXINE (SYNTHROID) 150 MCG TABLET    Take 1 tablet by mouth every morning (before breakfast)    LOPERAMIDE (IMODIUM) 2 MG CAPSULE    Take 2 mg by mouth 4 times daily as needed     METOPROLOL SUCCINATE (TOPROL XL) 25 MG EXTENDED RELEASE TABLET    TAKE 1 TABLET EVERY DAY    NORTRIPTYLINE (PAMELOR) 10 MG CAPSULE    TAKE 1 CAPSULE BY MOUTH EVERY EVENING    ROSUVASTATIN (CRESTOR) 20 MG TABLET    TAKE 1 TABLET BY MOUTH EVERY DAY    SPIRONOLACTONE (ALDACTONE) 25 MG TABLET    Take 1 tablet by mouth daily    TORSEMIDE (DEMADEX) 20 MG TABLET    Take 3 tablets by mouth 2 times daily       Social history:  reports that she quit smoking about 43 years ago. Her smoking use included cigarettes. She has a 5.00 pack-year smoking history. She has never used smokeless tobacco. She reports current alcohol use. She reports that she does not use drugs.     Family history:    Family History   Problem Relation Age of Onset    Cancer Mother         lung, breast, liver    Stroke Father     Stomach Cancer Brother     Cancer Maternal Grandmother     No Known Problems Paternal Grandmother     No Known Problems Paternal Grandfather      Exam  ED Triage Vitals [01/19/22 1549]   BP Temp Temp Source Pulse Resp SpO2 Height Weight   (!) 150/81 97.8 °F (36.6 °C) Oral 92 15 98 % -- 178 lb (80.7 kg)   Nursing note and vitals reviewed. Constitutional: Well developed, well nourished. Non-toxic in appearance. HENT:      Head: Normocephalic and atraumatic. Ears: External ears normal.      Nose: Nose normal.     Mouth: Membrane mucosa moist and pink. Eyes: Anicteric sclera. No discharge. Neck: Supple. Trachea midline. Cardiovascular: RRR; no murmurs, rubs, or gallops. Diminished bruit and thrill in left forearm arteriovenous fistula. Pulmonary/Chest: Effort normal. No respiratory distress. CTAB. No stridor. No wheezes. No rales. Abdominal: Soft. No distension. Non-tender to palpation  Musculoskeletal: Moves all extremities. No gross deformity. Significant edema in the left upper extremity. +1 pitting edema in the bilateral lower extremities. Neurological: Alert and oriented. Face symmetric. Speech is clear. Skin: Warm and dry. No rash. Psychiatric: Normal mood and affect. Behavior is normal.    EKG  The Ekg interpreted by me in the absence of a cardiologist shows. normal sinus rhythm with a rate of 88  Axis is   Left axis deviation  QTc is  normal  LVH. No specific ST-T wave changes appreciated. No evidence of acute ischemia. No significant change from prior EKG dated 9/22/2021      Radiology  XR CHEST PORTABLE   Final Result   Nonspecific left mid lung opacity. Correlate with presentation.              Labs  Results for orders placed or performed during the hospital encounter of 01/19/22   CBC Auto Differential   Result Value Ref Range    WBC 6.8 4.0 - 11.0 K/uL    RBC 4.30 4.00 - 5.20 M/uL Hemoglobin 12.7 12.0 - 16.0 g/dL    Hematocrit 40.1 36.0 - 48.0 %    MCV 93.3 80.0 - 100.0 fL    MCH 29.6 26.0 - 34.0 pg    MCHC 31.7 31.0 - 36.0 g/dL    RDW 20.8 (H) 12.4 - 15.4 %    Platelets 322 041 - 738 K/uL    MPV 7.6 5.0 - 10.5 fL    Neutrophils % 72.8 %    Lymphocytes % 14.8 %    Monocytes % 7.9 %    Eosinophils % 4.2 %    Basophils % 0.3 %    Neutrophils Absolute 4.9 1.7 - 7.7 K/uL    Lymphocytes Absolute 1.0 1.0 - 5.1 K/uL    Monocytes Absolute 0.5 0.0 - 1.3 K/uL    Eosinophils Absolute 0.3 0.0 - 0.6 K/uL    Basophils Absolute 0.0 0.0 - 0.2 K/uL   Comprehensive Metabolic Panel w/ Reflex to MG   Result Value Ref Range    Sodium 136 136 - 145 mmol/L    Potassium reflex Magnesium 4.7 3.5 - 5.1 mmol/L    Chloride 91 (L) 99 - 110 mmol/L    CO2 28 21 - 32 mmol/L    Anion Gap 17 (H) 3 - 16    Glucose 93 70 - 99 mg/dL    BUN 34 (H) 7 - 20 mg/dL    CREATININE 4.7 (H) 0.6 - 1.2 mg/dL    GFR Non-African American 9 (A) >60    GFR  11 (A) >60    Calcium 10.4 8.3 - 10.6 mg/dL    Total Protein 8.2 6.4 - 8.2 g/dL    Albumin 5.1 (H) 3.4 - 5.0 g/dL    Albumin/Globulin Ratio 1.6 1.1 - 2.2    Total Bilirubin 0.6 0.0 - 1.0 mg/dL    Alkaline Phosphatase 134 (H) 40 - 129 U/L    ALT 25 10 - 40 U/L    AST 25 15 - 37 U/L   Brain Natriuretic Peptide   Result Value Ref Range    Pro-BNP 4,225 (H) 0 - 449 pg/mL   Troponin   Result Value Ref Range    Troponin 0.03 (H) <0.01 ng/mL       MDM and ED Course  Patient was sent in by her nephrologist Dr. James Casillas. She had an episode of severe pressure-like chest pain last night. She also has significant swelling of her left upper extremity with poor function of her left arteriovenous fistula. I contacted Dr. James Casillas, who is worried about her chest pain and her arm swelling. He wanted her to be admitted for chest pain and have Buster Agustin evaluate the fistula. I contacted Dr. Buster Agustin.  He declined to have me perform imaging of the fistula as he will evaluate it himself and determine what needs to be done. She did not have a leukocytosis or fever, but temp was elevated to 100.2. BNP is elevated an there is an unclear opacity in the lung, but no obvious edema. Troponin is at the level of her chronic troponin leak. I consulted TRUNG Lynn through [facility] for admission. She reviewed the patient's history, physical exam, labs, imaging studies, and emergency department course and has decided to admit Cosmo Millan for further evaluation and treatment. As I have deemed necessary from their history, physical, and studies, I have considered and evaluated Cosmo Millan for the following diagnoses:  PULMONARY EMBOLISM, ACUTE CORONARY SYNDROME, CARDIAC TAMPONADE, PNEUMOTHORAX, PNEUMONIA, PERICARDITIS, AORTIC DISSECTION/ANEURYSM, HEMOTHORAX      The total Critical Care time is 30 minutes which excludes separately billable procedures. Final Impression  1. Chest pain, unspecified type    2. Chronic kidney disease with end stage renal failure on dialysis ([facility] [address]    3. Disorder of surgical arteriovenous fistula, initial encounter    4. Left arm swelling        Blood pressure (!) 150/81, pulse 92, temperature 97.8 °F (36.6 °C), temperature source Oral, resp. rate 15, weight 178 lb (80.7 kg), SpO2 98 %, not currently breastfeeding. Disposition:  Admit      This chart was generated using the [address] [place] [address] [address]  dictation system. I created this record but it may contain dictation errors given the limitations of this technology.        Ade Colby MD  01/26/22 4093

## 2022-01-19 NOTE — TELEPHONE ENCOUNTER
Pt sates she has swelling in legs, ankles and arms. She states she had chest pain last night, but not today. States she does not feel well. Please call to advise.

## 2022-01-20 VITALS
DIASTOLIC BLOOD PRESSURE: 69 MMHG | SYSTOLIC BLOOD PRESSURE: 136 MMHG | BODY MASS INDEX: 32.62 KG/M2 | TEMPERATURE: 98 F | OXYGEN SATURATION: 92 % | WEIGHT: 177.25 LBS | HEART RATE: 88 BPM | RESPIRATION RATE: 19 BRPM | HEIGHT: 62 IN

## 2022-01-20 LAB
A/G RATIO: 1.7 (ref 1.1–2.2)
ALBUMIN SERPL-MCNC: 4.5 G/DL (ref 3.4–5)
ALP BLD-CCNC: 118 U/L (ref 40–129)
ALT SERPL-CCNC: 19 U/L (ref 10–40)
ANION GAP SERPL CALCULATED.3IONS-SCNC: 16 MMOL/L (ref 3–16)
AST SERPL-CCNC: 18 U/L (ref 15–37)
BASOPHILS ABSOLUTE: 0 K/UL (ref 0–0.2)
BASOPHILS RELATIVE PERCENT: 0.4 %
BILIRUB SERPL-MCNC: 0.6 MG/DL (ref 0–1)
BUN BLDV-MCNC: 47 MG/DL (ref 7–20)
CALCIUM SERPL-MCNC: 9.9 MG/DL (ref 8.3–10.6)
CHLORIDE BLD-SCNC: 96 MMOL/L (ref 99–110)
CO2: 26 MMOL/L (ref 21–32)
CREAT SERPL-MCNC: 6.2 MG/DL (ref 0.6–1.2)
EKG ATRIAL RATE: 88 BPM
EKG DIAGNOSIS: NORMAL
EKG P AXIS: 42 DEGREES
EKG P-R INTERVAL: 164 MS
EKG Q-T INTERVAL: 400 MS
EKG QRS DURATION: 82 MS
EKG QTC CALCULATION (BAZETT): 484 MS
EKG R AXIS: -20 DEGREES
EKG T AXIS: 94 DEGREES
EKG VENTRICULAR RATE: 88 BPM
EOSINOPHILS ABSOLUTE: 0.3 K/UL (ref 0–0.6)
EOSINOPHILS RELATIVE PERCENT: 4.5 %
GFR AFRICAN AMERICAN: 8
GFR NON-AFRICAN AMERICAN: 7
GLUCOSE BLD-MCNC: 98 MG/DL (ref 70–99)
HCT VFR BLD CALC: 36.6 % (ref 36–48)
HEMOGLOBIN: 11.7 G/DL (ref 12–16)
LYMPHOCYTES ABSOLUTE: 1 K/UL (ref 1–5.1)
LYMPHOCYTES RELATIVE PERCENT: 14.8 %
MCH RBC QN AUTO: 29.9 PG (ref 26–34)
MCHC RBC AUTO-ENTMCNC: 32 G/DL (ref 31–36)
MCV RBC AUTO: 93.4 FL (ref 80–100)
MONOCYTES ABSOLUTE: 0.7 K/UL (ref 0–1.3)
MONOCYTES RELATIVE PERCENT: 10.3 %
NEUTROPHILS ABSOLUTE: 4.8 K/UL (ref 1.7–7.7)
NEUTROPHILS RELATIVE PERCENT: 70 %
PDW BLD-RTO: 20.4 % (ref 12.4–15.4)
PLATELET # BLD: 240 K/UL (ref 135–450)
PMV BLD AUTO: 7.9 FL (ref 5–10.5)
POTASSIUM REFLEX MAGNESIUM: 5.7 MMOL/L (ref 3.5–5.1)
RBC # BLD: 3.91 M/UL (ref 4–5.2)
SARS-COV-2: NOT DETECTED
SODIUM BLD-SCNC: 138 MMOL/L (ref 136–145)
TOTAL PROTEIN: 7.1 G/DL (ref 6.4–8.2)
TROPONIN: 0.03 NG/ML
TROPONIN: 0.03 NG/ML
WBC # BLD: 6.9 K/UL (ref 4–11)

## 2022-01-20 PROCEDURE — 99221 1ST HOSP IP/OBS SF/LOW 40: CPT | Performed by: SURGERY

## 2022-01-20 PROCEDURE — 96372 THER/PROPH/DIAG INJ SC/IM: CPT

## 2022-01-20 PROCEDURE — APPNB30 APP NON BILLABLE TIME 0-30 MINS: Performed by: NURSE PRACTITIONER

## 2022-01-20 PROCEDURE — 80053 COMPREHEN METABOLIC PANEL: CPT

## 2022-01-20 PROCEDURE — 93990 DOPPLER FLOW TESTING: CPT

## 2022-01-20 PROCEDURE — 85025 COMPLETE CBC W/AUTO DIFF WBC: CPT

## 2022-01-20 PROCEDURE — 6360000002 HC RX W HCPCS: Performed by: NURSE PRACTITIONER

## 2022-01-20 PROCEDURE — 93010 ELECTROCARDIOGRAM REPORT: CPT | Performed by: INTERNAL MEDICINE

## 2022-01-20 PROCEDURE — 84484 ASSAY OF TROPONIN QUANT: CPT

## 2022-01-20 PROCEDURE — G0378 HOSPITAL OBSERVATION PER HR: HCPCS

## 2022-01-20 PROCEDURE — APPSS60 APP SPLIT SHARED TIME 46-60 MINUTES: Performed by: NURSE PRACTITIONER

## 2022-01-20 PROCEDURE — 99223 1ST HOSP IP/OBS HIGH 75: CPT | Performed by: INTERNAL MEDICINE

## 2022-01-20 PROCEDURE — 36415 COLL VENOUS BLD VENIPUNCTURE: CPT

## 2022-01-20 PROCEDURE — 99214 OFFICE O/P EST MOD 30 MIN: CPT | Performed by: INTERNAL MEDICINE

## 2022-01-20 PROCEDURE — 6370000000 HC RX 637 (ALT 250 FOR IP): Performed by: NURSE PRACTITIONER

## 2022-01-20 PROCEDURE — 90935 HEMODIALYSIS ONE EVALUATION: CPT

## 2022-01-20 PROCEDURE — 93986 DUP-SCAN HEMO COMPL UNI STD: CPT

## 2022-01-20 PROCEDURE — 2580000003 HC RX 258: Performed by: NURSE PRACTITIONER

## 2022-01-20 RX ORDER — POLYETHYLENE GLYCOL 3350 17 G/17G
17 POWDER, FOR SOLUTION ORAL DAILY PRN
Status: DISCONTINUED | OUTPATIENT
Start: 2022-01-20 | End: 2022-01-20 | Stop reason: HOSPADM

## 2022-01-20 RX ORDER — GABAPENTIN 100 MG/1
200 CAPSULE ORAL NIGHTLY
Status: DISCONTINUED | OUTPATIENT
Start: 2022-01-20 | End: 2022-01-20 | Stop reason: HOSPADM

## 2022-01-20 RX ORDER — SODIUM CHLORIDE 9 MG/ML
25 INJECTION, SOLUTION INTRAVENOUS PRN
Status: DISCONTINUED | OUTPATIENT
Start: 2022-01-20 | End: 2022-01-20 | Stop reason: HOSPADM

## 2022-01-20 RX ORDER — CLOPIDOGREL BISULFATE 75 MG/1
75 TABLET ORAL DAILY
Status: DISCONTINUED | OUTPATIENT
Start: 2022-01-20 | End: 2022-01-20 | Stop reason: HOSPADM

## 2022-01-20 RX ORDER — HEPARIN SODIUM 5000 [USP'U]/ML
5000 INJECTION, SOLUTION INTRAVENOUS; SUBCUTANEOUS EVERY 8 HOURS SCHEDULED
Status: DISCONTINUED | OUTPATIENT
Start: 2022-01-20 | End: 2022-01-20 | Stop reason: HOSPADM

## 2022-01-20 RX ORDER — ACETAMINOPHEN 650 MG/1
650 SUPPOSITORY RECTAL EVERY 6 HOURS PRN
Status: DISCONTINUED | OUTPATIENT
Start: 2022-01-20 | End: 2022-01-20 | Stop reason: HOSPADM

## 2022-01-20 RX ORDER — ACETAMINOPHEN 325 MG/1
650 TABLET ORAL EVERY 6 HOURS PRN
Status: DISCONTINUED | OUTPATIENT
Start: 2022-01-20 | End: 2022-01-20 | Stop reason: HOSPADM

## 2022-01-20 RX ORDER — ALLOPURINOL 100 MG/1
100 TABLET ORAL NIGHTLY
Status: DISCONTINUED | OUTPATIENT
Start: 2022-01-20 | End: 2022-01-20 | Stop reason: HOSPADM

## 2022-01-20 RX ORDER — METOPROLOL SUCCINATE 25 MG/1
25 TABLET, EXTENDED RELEASE ORAL DAILY
Status: DISCONTINUED | OUTPATIENT
Start: 2022-01-20 | End: 2022-01-20 | Stop reason: HOSPADM

## 2022-01-20 RX ORDER — ASPIRIN 81 MG/1
81 TABLET ORAL DAILY
Status: DISCONTINUED | OUTPATIENT
Start: 2022-01-20 | End: 2022-01-20 | Stop reason: HOSPADM

## 2022-01-20 RX ORDER — ONDANSETRON 2 MG/ML
4 INJECTION INTRAMUSCULAR; INTRAVENOUS EVERY 6 HOURS PRN
Status: DISCONTINUED | OUTPATIENT
Start: 2022-01-20 | End: 2022-01-20 | Stop reason: HOSPADM

## 2022-01-20 RX ORDER — SODIUM CHLORIDE 0.9 % (FLUSH) 0.9 %
5-40 SYRINGE (ML) INJECTION EVERY 12 HOURS SCHEDULED
Status: DISCONTINUED | OUTPATIENT
Start: 2022-01-20 | End: 2022-01-20 | Stop reason: HOSPADM

## 2022-01-20 RX ORDER — LEVOTHYROXINE SODIUM 0.15 MG/1
150 TABLET ORAL
Status: DISCONTINUED | OUTPATIENT
Start: 2022-01-20 | End: 2022-01-20 | Stop reason: HOSPADM

## 2022-01-20 RX ORDER — ONDANSETRON 4 MG/1
4 TABLET, ORALLY DISINTEGRATING ORAL EVERY 8 HOURS PRN
Status: DISCONTINUED | OUTPATIENT
Start: 2022-01-20 | End: 2022-01-20 | Stop reason: HOSPADM

## 2022-01-20 RX ORDER — ROSUVASTATIN CALCIUM 20 MG/1
20 TABLET, COATED ORAL DAILY
Status: DISCONTINUED | OUTPATIENT
Start: 2022-01-20 | End: 2022-01-20 | Stop reason: HOSPADM

## 2022-01-20 RX ORDER — SODIUM CHLORIDE 0.9 % (FLUSH) 0.9 %
5-40 SYRINGE (ML) INJECTION PRN
Status: DISCONTINUED | OUTPATIENT
Start: 2022-01-20 | End: 2022-01-20 | Stop reason: HOSPADM

## 2022-01-20 RX ORDER — TORSEMIDE 20 MG/1
60 TABLET ORAL 2 TIMES DAILY
Status: DISCONTINUED | OUTPATIENT
Start: 2022-01-20 | End: 2022-01-20 | Stop reason: HOSPADM

## 2022-01-20 RX ORDER — NORTRIPTYLINE HYDROCHLORIDE 10 MG/1
10 CAPSULE ORAL NIGHTLY
Status: DISCONTINUED | OUTPATIENT
Start: 2022-01-20 | End: 2022-01-20 | Stop reason: HOSPADM

## 2022-01-20 RX ADMIN — GABAPENTIN 200 MG: 100 CAPSULE ORAL at 01:31

## 2022-01-20 RX ADMIN — ASPIRIN 81 MG: 81 TABLET, COATED ORAL at 09:50

## 2022-01-20 RX ADMIN — ALLOPURINOL 100 MG: 100 TABLET ORAL at 01:31

## 2022-01-20 RX ADMIN — TORSEMIDE 60 MG: 20 TABLET ORAL at 09:50

## 2022-01-20 RX ADMIN — NORTRIPTYLINE HYDROCHLORIDE 10 MG: 10 CAPSULE ORAL at 01:31

## 2022-01-20 RX ADMIN — METOPROLOL SUCCINATE 25 MG: 25 TABLET, EXTENDED RELEASE ORAL at 09:50

## 2022-01-20 RX ADMIN — ROSUVASTATIN 20 MG: 20 TABLET, FILM COATED ORAL at 09:51

## 2022-01-20 RX ADMIN — CLOPIDOGREL BISULFATE 75 MG: 75 TABLET ORAL at 09:51

## 2022-01-20 RX ADMIN — HEPARIN SODIUM 5000 UNITS: 5000 INJECTION INTRAVENOUS; SUBCUTANEOUS at 06:21

## 2022-01-20 RX ADMIN — LEVOTHYROXINE SODIUM 150 MCG: 0.15 TABLET ORAL at 06:21

## 2022-01-20 RX ADMIN — SODIUM CHLORIDE, PRESERVATIVE FREE 10 ML: 5 INJECTION INTRAVENOUS at 09:51

## 2022-01-20 ASSESSMENT — PAIN SCALES - GENERAL
PAINLEVEL_OUTOF10: 0
PAINLEVEL_OUTOF10: 8
PAINLEVEL_OUTOF10: 0
PAINLEVEL_OUTOF10: 8
PAINLEVEL_OUTOF10: 0

## 2022-01-20 ASSESSMENT — PAIN DESCRIPTION - LOCATION
LOCATION: LEG
LOCATION: LEG

## 2022-01-20 ASSESSMENT — PAIN DESCRIPTION - PAIN TYPE
TYPE: NEUROPATHIC PAIN
TYPE: NEUROPATHIC PAIN

## 2022-01-20 ASSESSMENT — PAIN DESCRIPTION - ORIENTATION: ORIENTATION: RIGHT;LEFT

## 2022-01-20 NOTE — PROGRESS NOTES
Admission assessment complete. VSS. Pt denies any pain or SOB. Pt has +1 swelling to left arm and left lower leg. Redness to BLE, pt states is new for her. Pt states left calf is tender and has localized area of swelling to left calf. Pt states she has chronic diarrhea from medications, taking Immodium relieves symptoms. Pt aware of POC, NPO after MN.

## 2022-01-20 NOTE — PLAN OF CARE
Problem: Falls - Risk of:  Goal: Will remain free from falls  Description: Will remain free from falls  1/20/2022 1844 by Angie Cleveland RN  Outcome: Completed  1/20/2022 1715 by Angie Cleveland RN  Outcome: Ongoing  Goal: Absence of physical injury  Description: Absence of physical injury  1/20/2022 1844 by Angie Cleveland RN  Outcome: Completed  1/20/2022 1715 by Angie Cleveland RN  Outcome: Ongoing

## 2022-01-20 NOTE — CONSULTS
[address] [facility]  [phone]      Chief Complaint   Patient presents with    Chest Pain     Pt to ER for complaint of CP, fever, SOB last evening, stopping this morning. Was instructed per Nephrology to come to ER. History of Present Illness:  Jluis Gonzáles is a 67 y.o. patient who presented to the hospital with complaints of CP yesterday during HD. Substernal Chest pressure, constant and severe. Went away on its own after several minutes. She was sent to the ED. She has these pains very often, usually it is sharp. She has exertional SOB that is chronic. She has h/o pericarditis and pericardial effusion 3 months ago and PCI 6 months ago. No recurrence of CP. I have been asked to provide consultation regarding further management and testing. Past Medical History:   has a past medical history of Arthritis, CAD (coronary artery disease), Diabetes ([facility] [address] Hemodialysis patient ([facility] Hyperlipidemia, Hypertension, Kidney disease, Neuropathy, Pneumonia, and Thyroid disease. Surgical History:   has a past surgical history that includes Appendectomy; Mastectomy, bilateral; Rotator cuff repair (Left); Cholecystectomy; LAPAROSCOPY INSERTION PERITONEAL CATHETER (N/A, 4/3/2020); Upper gastrointestinal endoscopy (N/A, 11/24/2020); Colonoscopy (11/24/2020); Hysterectomy; Dialysis Catheter Removal (N/A, 5/17/2021); and shunt revision (Left, 8/19/2021). Social History:   reports that she quit smoking about 43 years ago. Her smoking use included cigarettes. She has a 5.00 pack-year smoking history. She has never used smokeless tobacco. She reports current alcohol use. She reports that she does not use drugs. Family History:  family history includes Cancer in her maternal grandmother and mother; No Known Problems in her paternal grandfather and paternal grandmother; Stomach Cancer in her brother; Stroke in her father.      Home Medications:  Were reviewed and are listed in nursing record. and/or listed below  Prior to Admission medications    Medication Sig Start Date End Date Taking? Authorizing Provider   rosuvastatin (CRESTOR) 20 MG tablet TAKE 1 TABLET BY MOUTH EVERY DAY 11/29/21  Yes MARCIE [place] CNP   ASPIRIN LOW DOSE 81 MG EC tablet TAKE 1 TABLET BY MOUTH EVERY DAY 11/29/21  Yes MARCIE [place] CNP   nortriptyline (PAMELOR) 10 MG capsule TAKE 1 CAPSULE BY MOUTH EVERY EVENING 11/17/21  Yes Adonis Argueta MD   levothyroxine (SYNTHROID) 150 MCG tablet Take 1 tablet by mouth every morning (before breakfast) 9/8/21  Yes Adonis Argueta MD   allopurinol (ZYLOPRIM) 100 MG tablet TAKE 1 TABLET EVERY DAY 9/8/21  Yes Adonis Argueta MD   metoprolol succinate (TOPROL XL) 25 MG extended release tablet TAKE 1 TABLET EVERY DAY 9/8/21  Yes Adonis Argueta MD   torsemide (DEMADEX) 20 MG tablet Take 3 tablets by mouth 2 times daily 8/31/21  Yes Damien HERNANDEZ MD   spironolactone (ALDACTONE) 25 MG tablet Take 1 tablet by mouth daily 9/1/21  Yes Damien May MD   clopidogrel (PLAVIX) 75 MG tablet Take 1 tablet by mouth daily 7/8/21  Yes Eddi Munoz MD   gabapentin (NEURONTIN) 100 MG capsule Take 2 capsules by mouth nightly for 30 days.  7/7/21 1/20/22 Yes Adonis Argueta MD   loperamide (IMODIUM) 2 MG capsule Take 2 mg by mouth 4 times daily as needed  2/14/21  Yes Historical Provider, MD        Current Medications:  Current Facility-Administered Medications   Medication Dose Route Frequency Provider Last Rate Last Admin    allopurinol (ZYLOPRIM) tablet 100 mg  100 mg Oral Nightly Joanne Minick, APRN - CNP   100 mg at 01/20/22 0131    aspirin EC tablet 81 mg  81 mg Oral Daily Joanne Minick, APRN - CNP   81 mg at 01/20/22 0950    clopidogrel (PLAVIX) tablet 75 mg  75 mg Oral Daily Joanne Minick, APRN - CNP   75 mg at 01/20/22 0951    gabapentin (NEURONTIN) capsule 200 mg  200 mg Oral Nightly Joanne Minick, APRN - CNP   200 mg at 01/20/22 0136  levothyroxine (SYNTHROID) tablet 150 mcg  150 mcg Oral QAM AC Joanne Minick, APRN - CNP   150 mcg at 01/20/22 2324    metoprolol succinate (TOPROL XL) extended release tablet 25 mg  25 mg Oral Daily Joanne Minick, APRN - CNP   25 mg at 01/20/22 0950    nortriptyline (PAMELOR) capsule 10 mg  10 mg Oral Nightly Joanne Minick, APRN - CNP   10 mg at 01/20/22 0131    rosuvastatin (CRESTOR) tablet 20 mg  20 mg Oral Daily Joanne Minick, APRN - CNP   20 mg at 01/20/22 0951    torsemide (DEMADEX) tablet 60 mg  60 mg Oral BID Joanne Minick, APRN - CNP   60 mg at 01/20/22 0950    sodium chloride flush 0.9 % injection 5-40 mL  5-40 mL IntraVENous 2 times per day Joanne Minick, APRN - CNP   10 mL at 01/20/22 0951    sodium chloride flush 0.9 % injection 5-40 mL  5-40 mL IntraVENous PRN Joanne Minick, APRN - CNP        0.9 % sodium chloride infusion  25 mL IntraVENous PRN Joanne Minick, APRN - CNP        heparin (porcine) injection 5,000 Units  5,000 Units SubCUTAneous 3 times per day Joanne Minick, APRN - CNP   5,000 Units at 01/20/22 0621    ondansetron (ZOFRAN-ODT) disintegrating tablet 4 mg  4 mg Oral Q8H PRN Joanne Minick, APRN - CNP        Or    ondansetron (ZOFRAN) injection 4 mg  4 mg IntraVENous Q6H PRN Joanne Minick, APRN - CNP        polyethylene glycol (GLYCOLAX) packet 17 g  17 g Oral Daily PRN Joanne Minick, APRN - CNP        acetaminophen (TYLENOL) tablet 650 mg  650 mg Oral Q6H PRN Joanne Minick, APRN - CNP        Or    acetaminophen (TYLENOL) suppository 650 mg  650 mg Rectal Q6H PRN Joanne Minick, APRN - CNP        perflutren lipid microspheres (DEFINITY) injection 1.65 mg  1.5 mL IntraVENous ONCE PRN Joanne Minick, APRN - CNP            Allergies:  Amoxicillin, Demeclocycline, Penicillins, Tetracyclines & related, Ozempic (0.25 or 0.5 mg-dose) [semaglutide(0.25 or 0.5mg-dos)], and Codeine     Review of Systems:     · Constitutional: there has been no unanticipated weight loss. There's been no change in energy level, sleep pattern, or activity level. · Eyes: No visual changes or diplopia. No scleral icterus. · ENT: No Headaches, hearing loss or vertigo. No mouth sores or sore throat. · Cardiovascular: Reviewed in HPI  · Respiratory: No cough or wheezing, no sputum production. No hematemesis. · Gastrointestinal: No abdominal pain, appetite loss, blood in stools. No change in bowel or bladder habits. · Genitourinary: No dysuria, trouble voiding, or hematuria. · Musculoskeletal:  No gait disturbance, weakness or joint complaints. · Integumentary: No rash or pruritis. · Neurological: No headache, diplopia, change in muscle strength, numbness or tingling. No change in gait, balance, coordination, mood, affect, memory, mentation, behavior. · Psychiatric: No anxiety, no depression. · Endocrine: No malaise, fatigue or temperature intolerance. No excessive thirst, fluid intake, or urination. No tremor. · Hematologic/Lymphatic: No abnormal bruising or bleeding, blood clots or swollen lymph nodes. · Allergic/Immunologic: No nasal congestion or hives.   ·     Physical Examination:    Vitals:    01/20/22 0950   BP: (!) 154/74   Pulse: 88   Resp:    Temp:    SpO2:     Weight: 179 lb 4.8 oz (81.3 kg)         General Appearance:  Alert, cooperative, no distress, appears stated age   Head:  Normocephalic, without obvious abnormality, atraumatic   Eyes:  PERRL, conjunctiva/corneas clear       Nose: Nares normal, no drainage or sinus tenderness   Throat: Lips, mucosa, and tongue normal   Neck: Supple, symmetrical, trachea midline, no adenopathy, thyroid: not enlarged, symmetric, no tenderness/mass/nodules, no carotid bruit or JVD       Lungs:   Clear to auscultation bilaterally, respirations unlabored   Chest Wall:  No tenderness or deformity   Heart:  Regular rate and rhythm, S1, S2 normal, no murmur, rub or gallop   Abdomen:   Soft, non-tender, bowel sounds active all four quadrants,  no masses, no organomegaly           Extremities: Extremities normal, atraumatic, no cyanosis or edema   Pulses: 2+ and symmetric   Skin: Skin color, texture, turgor normal, no rashes or lesions   Pysch: Normal mood and affect   Neurologic: Normal gross motor and sensory exam.         Labs  CBC:   Lab Results   Component Value Date    WBC 6.9 01/20/2022    RBC 3.91 01/20/2022    HGB 11.7 01/20/2022    HCT 36.6 01/20/2022    MCV 93.4 01/20/2022    RDW 20.4 01/20/2022     01/20/2022     CMP:    Lab Results   Component Value Date     01/20/2022    K 5.7 01/20/2022    CL 96 01/20/2022    CO2 26 01/20/2022    BUN 47 01/20/2022    CREATININE 6.2 01/20/2022    GFRAA 8 01/20/2022    AGRATIO 1.7 01/20/2022    LABGLOM 7 01/20/2022    GLUCOSE 98 01/20/2022    PROT 7.1 01/20/2022    CALCIUM 9.9 01/20/2022    BILITOT 0.6 01/20/2022    ALKPHOS 118 01/20/2022    AST 18 01/20/2022    ALT 19 01/20/2022     PT/INR:  No results found for: PTINR  Lab Results   Component Value Date    CKTOTAL 243 (H) 08/25/2021    TROPONINI 0.03 (H) 01/20/2022       EKG:  I have reviewed EKG with the following interpretation:  Impression:  Normal sinus rhythmPossible Left atrial enlargementLeft ventricular hypertrophy with repolarization abnormalityPoor R wave progressionAbnormal ECGConfirmed by     Last Angiogram: 7/8/21:  LM-distal 20%  LAD-mid 80%  Cx-nml  OM- nml  RCA-distal 80%  RPDA- nml  LVEF- 60  LVG- nml  LVEDP- 6  Intervention  ~Successful PCI to LAD with 2.75x38 MAX. PD with 3.0x15 NC. Prox D1 99% stent detention. Resolved with 1.5x12 balloon angioplasty. Successful PCI to RCA with 3.25x23 MAX to 14atm. Excellent Result         Echo: 9/1/21  Summary   -Portable exam done on 3A. Rapid Response was called on patient for chest   pain prior to exam.   -Normal left ventricle size, wall thickness, and systolic function with an   estimated ejection fraction of 65%.   -Grade I diastolic dysfunction with normal LV filling pressures. Avg.   E/e'=13.6   -Small circumferential pericardial effusion without tamponade physiology.   -The effusion contains fibrinous material.     CT chest pul: 9/22/21:      Impression   1. No pulmonary emboli. 2. Moderate bilateral pleural effusions with adjacent atelectasis. 3. Small pericardial effusion.      Limited echo: 9/23/21  Summary   -Limited exam for pericardial effusion. Patient had a complete previous exam 9/01/2021.   -Normal global systolic function with an ejection fraction estimated at 60%.  -Abnormal septal motion due to left bundle branch block.   -There is trivial to small circumferential pericardial effusion noted. All testing and labs listed below were personally reviewed.     Assessment  Patient Active Problem List   Diagnosis    Essential hypertension    Hyperlipidemia    Acquired hypothyroidism    Diabetes mellitus type 2 in obese ([address]    Chronic kidney 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 (HCC)    Closed nondisplaced fracture of fifth metatarsal bone of right foot    Arthritis of right knee    Acute pain of both knees    Primary osteoarthritis of both knees    Bursitis    Memory loss    Bilateral leg edema    Primary osteoarthritis of left knee    Atypical chest pain    Encounter for medication counseling    Tubular adenoma of colon    Primary insomnia    Diarrhea of presumed infectious origin    Diabetic polyneuropathy associated with type 2 diabetes mellitus ([address]    COVID-19 virus infection    Pain of left calf    Atherosclerosis of native arteries of extremities with intermittent claudication, bilateral legs ([address]    ESRD (end stage renal disease) ([address]    Arteriovenous fistula infection, initial encounter ([address]    Septicemia ([address]    Pneumonia due to infectious organism    Frequent falls    Hyperkalemia    Hyponatremia    Ex-smoker    Class 1 obesity due to excess calories with body mass index (BMI) of 30.0 to 30.9 in adult    Antibiotic-associated diarrhea    Pericarditis    Pericardial effusion    Chest pain    History of anemia due to chronic kidney disease    Pleural effusion    Hepatitis B immune    Bilateral pleural effusion         Plan:    I had the opportunity to review the clinical symptoms and presentation of [employer]. Assessment/Plan:  Active Problems:    Chest pain  Plan: stable angina. Possibly due to branch CAD (diagonal stent detention). Cont DAPT, statin, bb. Nitro prn chest pain. Elevated troponin is chronic due to ESRD. No further cardiac testing or treatment required. HLD: statin  HTN: cont home meds. OK to discharge home. FU with cardiology as outpatient. I will address the patient's cardiac risk factors and adjusted pharmacologic treatment as needed. In addition, I have reinforced the need for patient directed risk factor modification. Tobacco use was discussed with the patient and educated on the negative effects. I have asked the patient to not utilize these agents. Thank you for allowing to us to participate in the care or [employer]. Further evaluation will be based upon the patient's clinical course and testing results. All questions and concerns were addressed to the patient/family. Alternatives to my treatment were discussed. The note was completed using EMR. Every effort was made to ensure accuracy; however, inadvertent computerized transcription errors may be present.     Robbie Patino MD, MD 1/20/2022 11:07 AM

## 2022-01-20 NOTE — PROGRESS NOTES
Pt with call light and personal belongings within reach. No s/s of distress or SOB observed. AVS reviewed with patient and her daughter. Questions and concerns voiced and were addressed. Patient belongings packed up by patient and family, taken home with patient.

## 2022-01-20 NOTE — CONSULTS
Office : [phone]     Fax :[phone]       Nephrology Consult Note      Patient's Name: Gus Ma  9:14 AM  1/20/2022    Reason for Consult:  ESRD      Requesting Physician:  Celsa Garcia MD      Chief Complaint:    Chief Complaint   Patient presents with    Chest Pain     Pt to ER for complaint of CP, fever, SOB last evening, stopping this morning. Was instructed per Nephrology to come to ER. History of Present iIlness:    Gus Ma is a 67 y.o. female with h/o ESRD 2/2 diabetic nephropathy, HTN , CAD who was seen by me yesterday at dialysis. She mentioned that she has had chest pain last night mainly described as chest heaviness which lasted for few hours. At time of dialysis there was no chest pain but she complained of not feeling well. Also noticed left arm swelling. She received 3 hours of dialysis yesterday. Was sent to the emergency room for further evaluation. She was admitted for evaluation of chest pain. Does have history of recent PCI and stent placement. No intake/output data recorded. Past Medical History:   Diagnosis Date    Arthritis     CAD (coronary artery disease)     Diabetes ([facility]     Hemodialysis patient ([facility]     Hyperlipidemia     Hypertension     Kidney disease     Neuropathy     Pneumonia     IN February, 2021, with COVID    Thyroid disease        Past Surgical History:   Procedure Laterality Date    APPENDECTOMY      CHOLECYSTECTOMY      COLONOSCOPY  11/24/2020    COLONOSCOPY POLYPECTOMY SNARE/COLD BIOPSY performed by Melinda Dueñas MD at [address] 5/17/2021    PERITONEAL DIALYSIS CATHETER REMOVAL.  [address]. performed by Adelaida Clark DO at [address] N/A 4/3/2020    LAPAROSCOPIC LYSIS OF ADHESIONS, LAPAROSCOPIC PERITONEAL DIALYSIS CATHETER PLACEMENT, LAPAROSCOPIC OMENTOPEXY performed by Kevan Crowe DO at [facility], BILATERAL      preventive    ROTATOR CUFF REPAIR Left     SHUNT REVISION Left 8/19/2021    LEFT FOREARM ARTERIO VENOUS GRAFT performed by Volodymyr Escudero MD at [address] [facility] N/A 11/24/2020    EGD BIOPSY performed by Amador Andrade MD at [facility]       Family History   Problem Relation Age of Onset    Cancer Mother         lung, breast, liver    Stroke Father     Stomach Cancer Brother     Cancer Maternal Grandmother     No Known Problems Paternal Grandmother     No Known Problems Paternal Grandfather         reports that she quit smoking about 43 years ago. Her smoking use included cigarettes. She has a 5.00 pack-year smoking history. She has never used smokeless tobacco. She reports current alcohol use. She reports that she does not use drugs.         Allergies:  Amoxicillin, Demeclocycline, Penicillins, Tetracyclines & related, Ozempic (0.25 or 0.5 mg-dose) [semaglutide(0.25 or 0.5mg-dos)], and Codeine    Current Medications:    allopurinol (ZYLOPRIM) tablet 100 mg, Nightly  aspirin EC tablet 81 mg, Daily  clopidogrel (PLAVIX) tablet 75 mg, Daily  gabapentin (NEURONTIN) capsule 200 mg, Nightly  levothyroxine (SYNTHROID) tablet 150 mcg, QAM AC  metoprolol succinate (TOPROL XL) extended release tablet 25 mg, Daily  nortriptyline (PAMELOR) capsule 10 mg, Nightly  rosuvastatin (CRESTOR) tablet 20 mg, Daily  torsemide (DEMADEX) tablet 60 mg, BID  sodium chloride flush 0.9 % injection 5-40 mL, 2 times per day  sodium chloride flush 0.9 % injection 5-40 mL, PRN  0.9 % sodium chloride infusion, PRN  heparin (porcine) injection 5,000 Units, 3 times per day  ondansetron (ZOFRAN-ODT) disintegrating tablet 4 mg, Q8H PRN   Or  ondansetron (ZOFRAN) injection 4 mg, Q6H PRN  polyethylene glycol (GLYCOLAX) packet 17 g, Daily PRN  acetaminophen (TYLENOL) tablet 650 mg, Q6H PRN   Or  acetaminophen (TYLENOL) suppository 650 mg, Q6H PRN  perflutren lipid microspheres (DEFINITY) injection 1.65 mg, ONCE PRN        Review of Systems:   14 point ROS obtained but were negative except mentioned in HPI      Physical exam:     Vitals:  BP (!) 160/78   Pulse 95   Temp 97.5 °F (36.4 °C) (Oral)   Resp 18   Ht 5' 1.5\" (1.562 m)   Wt 179 lb 4.8 oz (81.3 kg)   LMP  (LMP Unknown)   SpO2 95%   BMI 33.33 kg/m²   Constitutional:  OAA X3 NAD  Skin: no rash, turgor wnl  Heent:  eomi, mmm  Neck: no bruits or jvd noted  Cardiovascular:  S1, S2 without m/r/g  Respiratory: CTA B without w/r/r  Abdomen:  +bs, soft, nt, nd  Ext: 1 +  lower extremity edema  Psychiatric: mood and affect appropriate  Musculoskeletal:  Rom, muscular strength intact    Labs:  CBC:   Recent Labs     01/19/22  1600 01/20/22  0616   WBC 6.8 6.9   HGB 12.7 11.7*    240     BMP:    Recent Labs     01/19/22  1600 01/20/22  0616    138   K 4.7 5.7*   CL 91* 96*   CO2 28 26   BUN 34* 47*   CREATININE 4.7* 6.2*   GLUCOSE 93 98     Ca/Mg/Phos:   Recent Labs     01/19/22  1600 01/20/22  0616   CALCIUM 10.4 9.9     Hepatic:   Recent Labs     01/19/22  1600 01/20/22  0616   AST 25 18   ALT 25 19   BILITOT 0.6 0.6   ALKPHOS 134* 118     Troponin:   Recent Labs     01/19/22 1952 01/20/22  0055 01/20/22  0616   TROPONINI 0.03* 0.03* 0.03*     BNP: No results for input(s): BNP in the last 72 hours. Lipids: No results for input(s): CHOL, TRIG, HDL, LDLCALC, LABVLDL in the last 72 hours. ABGs: No results for input(s): PHART, PO2ART, VAE7CYB in the last 72 hours. INR: No results for input(s): INR in the last 72 hours.   UA:No results for input(s): Randall Wang, GLUCOSEU, [facility], BLOODU, [facility], PROTEINU, UROBILINOGEN, NITRU, LEUKOCYTESUR, LABMICR, URINETYPE in the last 72 hours. Urine Microscopic: No results for input(s): LABCAST, BACTERIA, COMU, HYALCAST, WBCUA, RBCUA, EPIU in the last 72 hours. Urine Culture: No results for input(s): LABURIN in the last 72 hours. Urine Chemistry: No results for input(s): David Grandchild, PROTEINUR, NAUR in the last 72 hours. IMAGING:  XR CHEST PORTABLE   Final Result   Nonspecific left mid lung opacity. Correlate with presentation. VL HEMODIALYSIS ACCESS    (Results Pending)                       Assessment/Plan :      1. ESRD management. Had partial dialysis yesterday. Potassium is 5.7. We will do 2-hour dialysis today with 2K bath. Discussed with dialysis nurse already. 2. HTN. 3. Anemia of chronic disease. On erythropoietin at dialysis unit    4. Acid- base disorder. Monitor and correct with dialysis    5. History of CAD. Has angina. Cardiology to see. 6   left arm swelling . Has left arm AV graft.   Vascular surgery consulted to evaluate the AV graft        D/w primary team      Thank you for allowing us to participate in care of [facility]         Electronically signed by: Louie Reynaga MD, 1/20/2022, 9:14 AM      [facility] of [address]  Office : [phone]  Fax :[phone]

## 2022-01-20 NOTE — H&P
HOSPITALISTS HISTORY AND PHYSICAL    1/19/2022 8:28 PM    Patient Information:  Joie Helms is a 67 y.o. female [id number]  PCP:  Alonso Rosen MD (Tel: [phone] )    Chief complaint:    Chief Complaint   Patient presents with    Chest Pain     Pt to ER for complaint of CP, fever, SOB last evening, stopping this morning. Was instructed per Nephrology to come to ER. History of Present Illness:  Matheus Campa is a 67 y.o. female with history of end-stage renal disease on hemodialysis, coronary artery disease on aspirin and Plavix, hypertension, and hyperlipidemia. Patient presents the emergency room for chest pain and left arm swelling. Patient states last evening she developed chest pain in the middle of her chest that felt like somebody was sitting on it. She states the pain lasted for most the night. Initially she thought it was indigestion she did try taking Tums with no relief. Eventually chest pain subsided. Currently she is not have any chest pain. She did receive dialysis earlier today. She has been having left forearm swelling. She does have a AV fistula for dialysis in the left forearm. She denies any fever cough, cough, or recent illness. Patient was admitted in 9/21 for chest pain, on echo found to have pericardial effusion, pericarditis. History obtained from patient       REVIEW OF SYSTEMS:   Review of Systems   Constitutional: Positive for appetite change. Negative for activity change, fatigue and fever. HENT: Negative. Eyes: Negative. Respiratory: Negative. Cardiovascular: Positive for chest pain. Negative for leg swelling. Gastrointestinal: Negative. Endocrine: Negative. Genitourinary: Negative. Musculoskeletal:        Left arm swelling   Skin: Negative. Neurological: Negative. Hematological: Negative. Psychiatric/Behavioral: Negative. Past Medical History:   has a past medical history of Arthritis, Diabetes ([facility] [address] Hemodialysis patient ([facility] [address] Hyperlipidemia, Hypertension, Kidney disease, Neuropathy, Pneumonia, and Thyroid disease. Past Surgical History:   has a past surgical history that includes Appendectomy; Mastectomy, bilateral; Rotator cuff repair (Left); Cholecystectomy; LAPAROSCOPY INSERTION PERITONEAL CATHETER (N/A, 4/3/2020); Upper gastrointestinal endoscopy (N/A, 11/24/2020); Colonoscopy (11/24/2020); Hysterectomy; Dialysis Catheter Removal (N/A, 5/17/2021); and shunt revision (Left, 8/19/2021). Medications:  No current facility-administered medications on file prior to encounter. Current Outpatient Medications on File Prior to Encounter   Medication Sig Dispense Refill    ASPIRIN LOW DOSE 81 MG EC tablet TAKE 1 TABLET BY MOUTH EVERY DAY 90 tablet 1    clopidogrel (PLAVIX) 75 MG tablet Take 1 tablet by mouth daily 90 tablet 3    rosuvastatin (CRESTOR) 20 MG tablet TAKE 1 TABLET BY MOUTH EVERY DAY 90 tablet 1    nortriptyline (PAMELOR) 10 MG capsule TAKE 1 CAPSULE BY MOUTH EVERY EVENING 90 capsule 1    levothyroxine (SYNTHROID) 150 MCG tablet Take 1 tablet by mouth every morning (before breakfast) 90 tablet 3    allopurinol (ZYLOPRIM) 100 MG tablet TAKE 1 TABLET EVERY DAY 90 tablet 3    metoprolol succinate (TOPROL XL) 25 MG extended release tablet TAKE 1 TABLET EVERY DAY 90 tablet 3    torsemide (DEMADEX) 20 MG tablet Take 3 tablets by mouth 2 times daily 30 tablet 3    spironolactone (ALDACTONE) 25 MG tablet Take 1 tablet by mouth daily 30 tablet 3    gabapentin (NEURONTIN) 100 MG capsule Take 2 capsules by mouth nightly for 30 days. 60 capsule 3    loperamide (IMODIUM) 2 MG capsule Take 2 mg by mouth 4 times daily as needed          Allergies:   Allergies   Allergen Reactions    Amoxicillin Anaphylaxis    Demeclocycline Anaphylaxis    Penicillins Anaphylaxis    Tetracyclines & Related Anaphylaxis    Ozempic (0.25 Or 0.5 Mg-Dose) [Semaglutide(0.25 Or 0.5mg-Dos)] Other (See Comments)     Lost large amount weight and loss  Of appetite    Codeine Itching and Rash        Social History:  Patient Lives alone   reports that she quit smoking about 43 years ago. Her smoking use included cigarettes. She has a 5.00 pack-year smoking history. She has never used smokeless tobacco. She reports current alcohol use. She reports that she does not use drugs. Family History:  family history includes Cancer in her maternal grandmother and mother; No Known Problems in her paternal grandfather and paternal grandmother; Stomach Cancer in her brother; Stroke in her father. ,     Physical Exam:  BP (!) 150/81   Pulse 92   Temp 97.8 °F (36.6 °C) (Oral)   Resp 15   Wt 178 lb (80.7 kg)   LMP  (LMP Unknown)   SpO2 98%   BMI 32.56 kg/m²   Physical Exam  Vitals and nursing note reviewed. Constitutional:       General: She is not in acute distress. Appearance: Normal appearance. She is not ill-appearing. HENT:      Head: Normocephalic. Nose: Nose normal.      Mouth/Throat:      Mouth: Mucous membranes are moist.   Eyes:      Pupils: Pupils are equal, round, and reactive to light. Cardiovascular:      Rate and Rhythm: Normal rate and regular rhythm. Pulses: Normal pulses. Heart sounds: No murmur heard. Pulmonary:      Effort: Pulmonary effort is normal. No respiratory distress. Breath sounds: Normal breath sounds. Abdominal:      General: Abdomen is flat. Bowel sounds are normal. There is no distension. Palpations: Abdomen is soft. Musculoskeletal:         General: Normal range of motion. Cervical back: Normal range of motion. Right lower leg: Edema present. Left lower leg: Edema present.       Comments: 1+ bilateral edema     Left lower forearm is swollen and firm, thrill and bruit present for left arm fistula    Skin:     General: Skin is warm and dry. Capillary Refill: Capillary refill takes less than 2 seconds. Coloration: Skin is not jaundiced. Neurological:      General: No focal deficit present. Mental Status: She is alert and oriented to person, place, and time. Cranial Nerves: No cranial nerve deficit. Psychiatric:         Mood and Affect: Mood normal.         Behavior: Behavior normal.         Thought Content: Thought content normal.         Labs:  CBC:   Lab Results   Component Value Date    WBC 6.8 01/19/2022    RBC 4.30 01/19/2022    HGB 12.7 01/19/2022    HCT 40.1 01/19/2022    MCV 93.3 01/19/2022    MCH 29.6 01/19/2022    MCHC 31.7 01/19/2022    RDW 20.8 01/19/2022     01/19/2022    MPV 7.6 01/19/2022     BMP:    Lab Results   Component Value Date     01/19/2022    K 4.7 01/19/2022    CL 91 01/19/2022    CO2 28 01/19/2022    BUN 34 01/19/2022    CREATININE 4.7 01/19/2022    CALCIUM 10.4 01/19/2022    GFRAA 11 01/19/2022    LABGLOM 9 01/19/2022    GLUCOSE 93 01/19/2022     XR CHEST PORTABLE   Final Result   Nonspecific left mid lung opacity. Correlate with presentation. Chest Xray:   EKG:    I visualized CXR images and EKG strips    Problem List  Active Problems:    * No active hospital problems. *  Resolved Problems:    * No resolved hospital problems. *        Assessment/Plan:   1. Chest Pain  Patient will be admitted observation with telemetry monitoring. She had [facility] July 2021 PTCA to LAD and RCA,  She continues to take ASA and Plavix,   Troponin  0.03 chronically elevated secondary to ESRD,  Will trend. currenlty she is denying chest pain. Continue ASA, Plavix, and BB  Continue statin  2D echo in am.   Cardiology consult given her cardiac hx and cardiac stents placed 6 months ago. 2. Hypertension  BP stable continue home meds    3. Left Arm Swelling  Vascular surgery consult. Swelling in left forearm near AV fistula, arm is firm    4.  Hyperlipidemia  Continue statin    5. ESRD on hemodialysis   Nephrology consult in am.       DVT prophylaxis heparin   Code status DNR CCA  Diet Cardiac, NPO after midnight   IV access peripheral   Amaya Catheter none    Admit as Observation. I anticipate hospitalization spanning less than two midnights for investigation and treatment of the above medically necessary diagnoses. Please note that some part of this chart was generated using Dragon dictation software. Although every effort was made to ensure the accuracy of this automated transcription, some errors in transcription may have occurred inadvertently. If you may need any clarification, please do not hesitate to contact me through [facility].        MARCIE Moraes CNP    1/19/2022 8:28 PM

## 2022-01-20 NOTE — DISCHARGE SUMMARY
[address] [place] [address] DISCHARGE SUMMARY    Patient Demographics    PatientRashad Felton  Date of Birth. 1945  MRN. [id number]     Primary care provider. Michael Mckinnon MD  (Tel: [phone])    Admit date: 1/19/2022    Discharge date (blank if same as Note Date): Note Date: 1/20/2022     Reason for Hospitalization. Chief Complaint   Patient presents with    Chest Pain     Pt to ER for complaint of CP, fever, SOB last evening, stopping this morning. Was instructed per Nephrology to come to ER. [facility] Course. Chest pain  -Likely probably musculoskeletal  -ACS was ruled out eval by cardiology      End-stage renal disease on hemodialysis hyperkalemia underwent dialysis    Consults. IP CONSULT TO VASCULAR SURGERY  IP CONSULT TO NEPHROLOGY  IP CONSULT TO CARDIOLOGY    Physical examination on discharge day. BP (!) 149/75   Pulse 84   Temp 97.8 °F (36.6 °C)   Resp 20   Ht 5' 1.5\" (1.562 m)   Wt 177 lb 4 oz (80.4 kg)   LMP  (LMP Unknown)   SpO2 92%   BMI 32.95 kg/m²   General appearance. Alert. Looks comfortable. HEENT. Sclera clear. Moist mucus membranes. Cardiovascular. Regular rate and rhythm, normal S1, S2. No murmur. Respiratory. Not using accessory muscles. Clear to auscultation bilaterally, no wheeze. Gastrointestinal. Abdomen soft, non-tender, not distended, normal bowel sounds  Neurology. Facial symmetry. No speech deficits. Moving all extremities equally. Extremities. No edema in lower extremities. Skin. Warm, dry, normal turgor    Condition at time of discharge stable     Medication instructions provided to patient at discharge. Medication List      CONTINUE taking these medications    allopurinol 100 MG tablet  Commonly known as: ZYLOPRIM  TAKE 1 TABLET EVERY DAY  Notes to patient: Use: for prevention of gout flare up  Side effects: skin rash. diarrhea. nausea. changes in your liver function test results. Aspirin Low Dose 81 MG EC tablet  Generic drug: aspirin  TAKE 1 TABLET BY MOUTH EVERY DAY  Notes to patient: Aspirin  Use: Prevents heart attacks & strokes. Side effects: bleeding or bruising more easily, upset stomach. clopidogrel 75 MG tablet  Commonly known as: Plavix  Take 1 tablet by mouth daily  Notes to patient: Use: prevention of blood clots  Side effects: bruise easily, nosebleed, generalized discomfort     gabapentin 100 MG capsule  Commonly known as: NEURONTIN  Take 2 capsules by mouth nightly for 30 days. Notes to patient: Gabapentin (Neurontin)  Use: to treat nerve pain, seizures  Side effects: nausea, unsteady gait     levothyroxine 150 MCG tablet  Commonly known as: SYNTHROID  Take 1 tablet by mouth every morning (before breakfast)  Notes to patient: Use: to treat underactive thyroid, thyroid hormone replacement  Side effects: rapid or irregular heartbeat, fatigue, difficulty swallowing, irritability      loperamide 2 MG capsule  Commonly known as: IMODIUM  Notes to patient: Loperamide  Use: To treat diarrhea. Side Effects: dizziness; drowsiness, tired feeling; constipation; mild stomach pain; or mild skin rash or itching. metoprolol succinate 25 MG extended release tablet  Commonly known as: TOPROL XL  TAKE 1 TABLET EVERY DAY  Notes to patient: Lopressor/Metoprolol is used to treat high blood pressure, chest pain or pressure.    Side effects: dizziness, feeling tired, loose stools, throwing up     nortriptyline 10 MG capsule  Commonly known as: PAMELOR  TAKE 1 CAPSULE BY MOUTH EVERY EVENING  Notes to patient: Use: anti-depressant  Side effects: drowsiness, (notify physician of the following:) abdominal pain, chest pain, agitation, confusion, numbness, tingling     rosuvastatin 20 MG tablet  Commonly known as: CRESTOR  TAKE 1 TABLET BY MOUTH EVERY DAY  Notes to patient: Rosuvastatin (Crestor*)  Use: Lower bad cholesterol  Side effects: headache, muscle pain, constipation, dizziness     spironolactone 25 MG tablet  Commonly known as: ALDACTONE  Take 1 tablet by mouth daily  Notes to patient: USE: \"water pill\" reduces swelling, fluid accumulation, blood pressure but holds potassium     Side Effects: lightheadedness, dehydration, lethargy     torsemide 20 MG tablet  Commonly known as: DEMADEX  Take 3 tablets by mouth 2 times daily  Notes to patient: Use: treat heart failure, fluid retention, lower blood pressure. Side effects: frequent urination, weakness, muscle cramps, increased sensitivity to light, nausea and dizziness. Discharge recommendations given to patient. Follow Up. pcp in 1 week   Disposition. home  Activity. activity as tolerated  Diet: Diet NPO      Spent 45  minutes in discharge process.     Signed:  Caitlin Boyce MD     1/20/2022 6:04 PM

## 2022-01-20 NOTE — CONSULTS
[facility]  Consultation Note    Chief Complaint / Reason for Consultation  left arm swollen, same arm as graft    History of Present Illness  Patient is a 67 y.o. female with past medical history of CAD, DM, HTN, HLD, ESRD on HD and arthritis who presented to the ED with complaints of chest pain and left arm swelling. Patient reports she developed chest pain Tuesday evening where it felt like someone was sitting on her chest.  She denies any fever or chills. She reports she has also had some swelling of her left forearm along with redness. She has a history of left forearm AV graft placed August 19, 2021. She reports it has been working well with dialysis with her last treatment yesterday. She reports dialysis nurses noticed the swelling and redness and were concerned. She denies any left hand pain or numbness. We have been consulted for further evaluation and recommendations. Review of Systems  + chest pain, + left arm swelling. Denies fevers, chills, shortness of breath, nausea, vomiting, hematemesis, diarrhea, constipation, melena, hematochezia, wt changes, vision problems, blindness, hearing problems, facial droop, slurred speech, extremity weakness, extremity numbness, dysuria. Past Medical History:   Diagnosis Date    Arthritis     CAD (coronary artery disease)     [facility]     Hemodialysis patient [facility]     Hyperlipidemia     Hypertension     Kidney disease     Neuropathy     Pneumonia     IN February, 2021, with COVID    Thyroid disease        Past Surgical History:   Procedure Laterality Date    APPENDECTOMY      CHOLECYSTECTOMY      COLONOSCOPY  11/24/2020    COLONOSCOPY POLYPECTOMY SNARE/COLD BIOPSY performed by Rocío Walker MD at [address] 5/17/2021    PERITONEAL DIALYSIS CATHETER REMOVAL.  EXCISSION OF ABDOMINAL CITRIX. performed by Cora Combs DO at [address] [facility] LAPAROSCOPY INSERTION PERITONEAL CATHETER N/A 4/3/2020    LAPAROSCOPIC LYSIS OF ADHESIONS, LAPAROSCOPIC PERITONEAL DIALYSIS CATHETER PLACEMENT, [facility]EX[facility]      preventive    ROTATOR CUFF REPAIR Left     SHUNT REVISION Left 2021    LEFT FOREARM ARTERIO VENOUS GRAFT performed by Leigh Roberts MD at [address] N/A 2020    EGD BIOPSY performed by Rocío Walker MD at [facility] ENDOSCOPY       Allergies   Allergen Reactions    Amoxicillin Anaphylaxis    Demeclocycline Anaphylaxis    Penicillins Anaphylaxis    Tetracyclines & Related Anaphylaxis    Ozempic (0.25 Or 0.5 Mg-Dose) [Semaglutide(0.25 Or 0.5mg-Dos)] Other (See Comments)     Lost large amount weight and loss  Of appetite    Codeine Itching and Rash       Social History     Socioeconomic History    Marital status:       Spouse name: Not on file    Number of children: 3    Years of education: Not on file    Highest education level: Not on file   Occupational History    Occupation: retired clerical   Tobacco Use    Smoking status: Former Smoker     Packs/day: 0.50     Years: 10.00     Pack years: 5.00     Types: Cigarettes     Quit date: 1978     Years since quittin.5    Smokeless tobacco: Never Used   Vaping Use    Vaping Use: Never used   Substance and Sexual Activity    Alcohol use: Yes     Comment: every other week one glass of wine    Drug use: Never    Sexual activity: Not Currently   Other Topics Concern    Not on file   Social History Narrative    Lives with son--recently moved from [facility] and now here    [facility]     Social Determinants of Health     Financial Resource Strain: Low Risk     Difficulty of Paying Living Expenses: Not hard at all   Food Insecurity: No Food Insecurity    Worried About [address] [employer] in the Last Year: Never true    [address] [facility] [address] in the Last Year: Never true Transportation Needs: No Transportation Needs    Lack of Transportation (Medical): No    Lack of Transportation (Non-Medical):  No   Physical Activity:     Days of Exercise per Week: Not on file    Minutes of Exercise per Session: Not on file   Stress:     Feeling of Stress : Not on file   Social Connections:     Frequency of Communication with Friends and Family: Not on file    Frequency of Social Gatherings with Friends and Family: Not on file    Attends Jain Services: Not on file    Active Member of [address] [place] [address] or Organizations: Not on file    Attends Club or Organization Meetings: Not on file    Marital Status: Not on file   Intimate Partner Violence:     Fear of Current or Ex-Partner: Not on file    Emotionally Abused: Not on file    Physically Abused: Not on file    Sexually Abused: Not on file   Housing Stability:     Unable to Pay for Housing in the Last Year: Not on file    Number of Places Lived in the Last Year: Not on file    Unstable Housing in the Last Year: Not on file       Family History   Problem Relation Age of Onset    Cancer Mother         lung, breast, liver    Stroke Father     Stomach Cancer Brother     Cancer Maternal Grandmother     No Known Problems Paternal Grandmother     No Known Problems Paternal Grandfather      - No history of bleeding or clotting disorders    Vital Signs  Vitals:    01/19/22 2349 01/20/22 0008 01/20/22 0130 01/20/22 0445   BP: (!) 174/100  (!) 163/89 (!) 160/78   Pulse: 97  93 95   Resp: 18   18   Temp: 97.7 °F (36.5 °C)   97.5 °F (36.4 °C)   TempSrc: Oral   Oral   SpO2: 94%   95%   Weight:  179 lb 4.8 oz (81.3 kg)     Height: 5' 1.5\" (1.562 m)          Physical Examination  General: no apparent distress, appears stated age  Psychiatric: affect appropriate  Head/Eyes/Ears/Nose/Throat:  Normocephalic, atraumatic, PERRLA, face symmetric  Neck:  no adenopathy, no carotid bruit, no JVD, supple, symmetrical, trachea midline, thyroid not enlarged, no tenderness/mass/nodules  Chest/Lungs: clear to auscultation bilaterally, no accessory muscle use  Cardiac:  regular rate and rhythm, S1, S2 normal, no murmur, click, rub or gallop  Abdomen: soft, nontender, active bowel sounds  Extremities: warm and well perfused, no signs of cyanosis or ischemia, bilateral upper and lower extremity motorsensory intact  Vascular exam:  - R radial: 2+  - Left forearm AV graft + bruit/thrill with some edema and redness noted. Hand warm, motor and sensory intact. Labs  Lab Results   Component Value Date    WBC 6.9 01/20/2022    HGB 11.7 01/20/2022    HCT 36.6 01/20/2022    MCV 93.4 01/20/2022     01/20/2022     Lab Results   Component Value Date     01/20/2022    K 5.7 01/20/2022    CL 96 01/20/2022    CO2 26 01/20/2022    PHOS 6.9 09/24/2021    BUN 47 01/20/2022    CREATININE 6.2 01/20/2022      No results found for: GLU    Scheduled Meds:    allopurinol  100 mg Oral Nightly    aspirin  81 mg Oral Daily    clopidogrel  75 mg Oral Daily    gabapentin  200 mg Oral Nightly    levothyroxine  150 mcg Oral QAM AC    metoprolol succinate  25 mg Oral Daily    nortriptyline  10 mg Oral Nightly    rosuvastatin  20 mg Oral Daily    torsemide  60 mg Oral BID    sodium chloride flush  5-40 mL IntraVENous 2 times per day    heparin (porcine)  5,000 Units SubCUTAneous 3 times per day     Continuous Infusions:    sodium chloride         Imaging:     CXR 1/19/22:  FINDINGS:   Cardiomediastinal silhouette is comparable to prior exam.  Left mid lung   opacity.  No pleural effusion on this projection.  No pneumothorax   appreciated on this projection.           Impression   Nonspecific left mid lung opacity.  Correlate with presentation. Assessment:   Left arm swelling  H/o left forearm AV graft 8/19/21  ESRD on HD - MWF  Chest pain  CAD  HTN  HLD  DM    Plan: Will get left arm HD graft scan to assess for flow volume, velocities and perigraft fluid.   Cardiology consulted. Okay to eat today from a vascular standpoint. No need for abx at this time from a vascular standpoint. Will follow up after scan reviewed with further recommendations and plan. Plan discussed with Dr. Amado Taveras. Thank you for the consultation. Patient educated on plan of care and disease process. All questions answered. Electronically signed by MARCIE Yanez CNP on 1/20/2022 at 8:51 AM     VASCULAR STAFF  Seen for Dr. Lauren Babin - pt from 8/2021 surgery. Agree with above history, exam, assessment and plan. Independent evaluation performed. Pertinent findings as follows:  LUE HD graft patent with thrill palpable and bruit heard. Mild nonerythematous, fluctulence or tender \"edema\" along one limb of graft. Mild edema L proximal calf    IMP: Patent HD graft L arm   Mild L arm edema   Mild L leg edema    REC: Graft scan for velocities and flow volumes - r/o perigraft fluid (not likely)   No antibx from vasc standpoint. Outpt KH 20/30 mmHg compression stockings. Will follow up.      Eduardo Dickinson

## 2022-01-21 NOTE — FLOWSHEET NOTE
01/20/22 1322 01/20/22 1523   Vital Signs   BP (!) 149/75 (!) 143/62   Temp 97.8 °F (36.6 °C) 98 °F (36.7 °C)   Pulse 84 83   Resp 20 20     Treatment time: 2 hours    Net UF: No UF    Pre weight: 80.4 kg (standing scale)  Post weight: 80.4 kg (standing scale)  EDW: 81.5 kg    Access used: LLA AVG  Access function: No problems    Medications or blood products given: None    Regular outpatient schedule: [facility] MWF    Summary of response to treatment: Tolerated tx without difficulty. VSS    Copy of dialysis treatment record placed in chart, to be scanned into EMR.     Report called to Valentin Bess RN

## 2022-02-01 RX ORDER — GABAPENTIN 100 MG/1
200 CAPSULE ORAL NIGHTLY
Qty: 60 CAPSULE | Refills: 3 | Status: SHIPPED | OUTPATIENT
Start: 2022-02-01 | End: 2022-03-29 | Stop reason: SDUPTHER

## 2022-02-11 RX ORDER — IRBESARTAN AND HYDROCHLOROTHIAZIDE 150; 12.5 MG/1; MG/1
TABLET, FILM COATED ORAL
Qty: 90 TABLET | Refills: 1 | Status: SHIPPED | OUTPATIENT
Start: 2022-02-11 | End: 2022-07-27

## 2022-02-15 ENCOUNTER — OFFICE VISIT (OUTPATIENT)
Dept: VASCULAR SURGERY | Age: 77
End: 2022-02-15
Payer: MEDICARE

## 2022-02-15 VITALS
HEIGHT: 62 IN | DIASTOLIC BLOOD PRESSURE: 78 MMHG | BODY MASS INDEX: 32.96 KG/M2 | SYSTOLIC BLOOD PRESSURE: 138 MMHG | WEIGHT: 179.1 LBS

## 2022-02-15 DIAGNOSIS — Z99.2 CHRONIC KIDNEY DISEASE WITH END STAGE RENAL DISEASE ON DIALYSIS DUE TO TYPE 2 DIABETES MELLITUS (HCC): Primary | ICD-10-CM

## 2022-02-15 DIAGNOSIS — N18.6 CHRONIC KIDNEY DISEASE WITH END STAGE RENAL DISEASE ON DIALYSIS DUE TO TYPE 2 DIABETES MELLITUS (HCC): Primary | ICD-10-CM

## 2022-02-15 DIAGNOSIS — E11.22 CHRONIC KIDNEY DISEASE WITH END STAGE RENAL DISEASE ON DIALYSIS DUE TO TYPE 2 DIABETES MELLITUS (HCC): Primary | ICD-10-CM

## 2022-02-15 PROCEDURE — G8427 DOCREV CUR MEDS BY ELIG CLIN: HCPCS | Performed by: SURGERY

## 2022-02-15 PROCEDURE — G8417 CALC BMI ABV UP PARAM F/U: HCPCS | Performed by: SURGERY

## 2022-02-15 PROCEDURE — G8399 PT W/DXA RESULTS DOCUMENT: HCPCS | Performed by: SURGERY

## 2022-02-15 PROCEDURE — 1090F PRES/ABSN URINE INCON ASSESS: CPT | Performed by: SURGERY

## 2022-02-15 PROCEDURE — 4040F PNEUMOC VAC/ADMIN/RCVD: CPT | Performed by: SURGERY

## 2022-02-15 PROCEDURE — 99212 OFFICE O/P EST SF 10 MIN: CPT | Performed by: SURGERY

## 2022-02-15 PROCEDURE — G8484 FLU IMMUNIZE NO ADMIN: HCPCS | Performed by: SURGERY

## 2022-02-15 PROCEDURE — 1036F TOBACCO NON-USER: CPT | Performed by: SURGERY

## 2022-02-15 PROCEDURE — 1123F ACP DISCUSS/DSCN MKR DOCD: CPT | Performed by: SURGERY

## 2022-02-15 NOTE — PROGRESS NOTES
[facility])   Vascular Surgery Followup    Referring Provider:  Rafael Smith MD     No chief complaint on file. History of Present Illness:  79-year-old female presents today for follow-up with history of left forearm AV graft placement in August 2021. She states her access has been working well however the dialysis clinic says that it does not sound right when they listen to her. She had a infiltration several weeks ago with bruising. This has resolved. She has no complaints    Past Medical History:   has a past medical history of Arthritis, CAD (coronary artery disease), Diabetes ([facility] [address] Hemodialysis patient ([facility] [address] Hyperlipidemia, Hypertension, Kidney disease, Neuropathy, Pneumonia, and Thyroid disease. Surgical History:   has a past surgical history that includes Appendectomy; Mastectomy, bilateral; Rotator cuff repair (Left); Cholecystectomy; LAPAROSCOPY INSERTION PERITONEAL CATHETER (N/A, 4/3/2020); Upper gastrointestinal endoscopy (N/A, 11/24/2020); Colonoscopy (11/24/2020); Hysterectomy; Dialysis Catheter Removal (N/A, 5/17/2021); and shunt revision (Left, 8/19/2021). Social History:   reports that she quit smoking about 43 years ago. Her smoking use included cigarettes. She has a 5.00 pack-year smoking history. She has never used smokeless tobacco. She reports current alcohol use. She reports that she does not use drugs. Family History:  family history includes Cancer in her maternal grandmother and mother; No Known Problems in her paternal grandfather and paternal grandmother; Stomach Cancer in her brother; Stroke in her father. Home Medications:  Current Outpatient Medications   Medication Sig Dispense Refill    irbesartan-hydroCHLOROthiazide (AVALIDE) 150-12.5 MG per tablet TAKE 1 TABLET EVERY DAY 90 tablet 1    gabapentin (NEURONTIN) 100 MG capsule Take 2 capsules by mouth nightly for 30 days.  60 capsule 3    rosuvastatin (CRESTOR) 20 MG tablet TAKE 1 TABLET BY MOUTH EVERY DAY 90 tablet 1    ASPIRIN LOW DOSE 81 MG EC tablet TAKE 1 TABLET BY MOUTH EVERY DAY 90 tablet 1    nortriptyline (PAMELOR) 10 MG capsule TAKE 1 CAPSULE BY MOUTH EVERY EVENING 90 capsule 1    levothyroxine (SYNTHROID) 150 MCG tablet Take 1 tablet by mouth every morning (before breakfast) 90 tablet 3    allopurinol (ZYLOPRIM) 100 MG tablet TAKE 1 TABLET EVERY DAY 90 tablet 3    metoprolol succinate (TOPROL XL) 25 MG extended release tablet TAKE 1 TABLET EVERY DAY 90 tablet 3    torsemide (DEMADEX) 20 MG tablet Take 3 tablets by mouth 2 times daily 30 tablet 3    spironolactone (ALDACTONE) 25 MG tablet Take 1 tablet by mouth daily 30 tablet 3    clopidogrel (PLAVIX) 75 MG tablet Take 1 tablet by mouth daily 90 tablet 3    loperamide (IMODIUM) 2 MG capsule Take 2 mg by mouth 4 times daily as needed        No current facility-administered medications for this visit. Allergies:  Amoxicillin, Demeclocycline, Penicillins, Tetracyclines & related, Ozempic (0.25 or 0.5 mg-dose) [semaglutide(0.25 or 0.5mg-dos)], and Codeine     Review of Systems:   · Constitutional: there has been no unanticipated weight loss. There's been no change in energy level, sleep pattern, or activity level. · Eyes: No visual changes or diplopia. No scleral icterus. · ENT: No Headaches, hearing loss or vertigo. No mouth sores or sore throat. · Cardiovascular: Reviewed in HPI  · Respiratory: No cough or wheezing, no sputum production. No hematemesis. · Gastrointestinal: No abdominal pain, appetite loss, blood in stools. No change in bowel or bladder habits. · Genitourinary: No dysuria, trouble voiding, or hematuria. · Musculoskeletal:  No gait disturbance, weakness or joint complaints. · Integumentary: No rash or pruritis. · Neurological: No headache, diplopia, change in muscle strength, numbness or tingling. No change in gait, balance, coordination, mood, affect, memory, mentation, behavior.   · Psychiatric: No anxiety, no depression. · Endocrine: No malaise, fatigue or temperature intolerance. No excessive thirst, fluid intake, or urination. No tremor. · Hematologic/Lymphatic: No abnormal bruising or bleeding, blood clots or swollen lymph nodes. · Allergic/Immunologic: No nasal congestion or hives. Physical Examination:    There were no vitals filed for this visit. General appearance: alert, appears stated age, cooperative and no distress  Left forearm AV graft with thrill and bruit. There is slight increased pulsatility. Palpable distal pulses. Hand warm.     Assessment:     Patient Active Problem List   Diagnosis    Essential hypertension    Hyperlipidemia    Acquired hypothyroidism    Diabetes mellitus type 2 in obese ([address]    Chronic kidney 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 (HCC)    Closed nondisplaced fracture of fifth metatarsal bone of right foot    Arthritis of right knee    Acute pain of both knees    Primary osteoarthritis of both knees    Bursitis    Memory loss    Bilateral leg edema    Primary osteoarthritis of left knee    Atypical chest pain    Encounter for medication counseling    Tubular adenoma of colon    Primary insomnia    Diarrhea of presumed infectious origin    Diabetic polyneuropathy associated with type 2 diabetes mellitus ([address]    COVID-19 virus infection    Pain of left calf    Atherosclerosis of native arteries of extremities with intermittent claudication, bilateral legs ([address]    ESRD (end stage renal disease) ([address]    Arteriovenous fistula infection, initial encounter [facility] [address]    Septicemia ([address]    Pneumonia due to infectious organism    Frequent falls    Hyperkalemia    Hyponatremia    Ex-smoker    Class 1 obesity due to excess calories with body mass index (BMI) of 30.0 to 30.9 in adult    Antibiotic-associated diarrhea    Pericarditis    Pericardial effusion    Chest pain    History of anemia due to chronic kidney disease    Pleural effusion    Hepatitis B immune    Bilateral pleural effusion       Plan:  67-year-old female with left forearm AV graft with increased pulsatility on clinical examination. Recommend fistulogram at [address] [place] [address] to help maintain patency of this well-functioning graft. Thank you for allowing me to participate in the care of this individual.  Please do not hesitate to contact me with any questions. Adrianne Borrego M.D., FACS.   2/15/2022  7:52 AM

## 2022-03-01 ENCOUNTER — PROCEDURE VISIT (OUTPATIENT)
Dept: AUDIOLOGY | Age: 77
End: 2022-03-01
Payer: MEDICARE

## 2022-03-01 ENCOUNTER — OFFICE VISIT (OUTPATIENT)
Dept: ENT CLINIC | Age: 77
End: 2022-03-01
Payer: MEDICARE

## 2022-03-01 VITALS — SYSTOLIC BLOOD PRESSURE: 143 MMHG | HEART RATE: 92 BPM | DIASTOLIC BLOOD PRESSURE: 73 MMHG | TEMPERATURE: 97.7 F

## 2022-03-01 DIAGNOSIS — H74.8X3 TYPE A-S TYMPANOGRAM, BILATERAL: ICD-10-CM

## 2022-03-01 DIAGNOSIS — H91.8X9 ASYMMETRICAL HEARING LOSS: Primary | ICD-10-CM

## 2022-03-01 DIAGNOSIS — H90.A31 MIXED CONDUCTIVE AND SENSORINEURAL HEARING LOSS OF RIGHT EAR WITH RESTRICTED HEARING OF LEFT EAR: ICD-10-CM

## 2022-03-01 DIAGNOSIS — J34.2 DNS (DEVIATED NASAL SEPTUM): ICD-10-CM

## 2022-03-01 DIAGNOSIS — H61.21 IMPACTED CERUMEN OF RIGHT EAR: ICD-10-CM

## 2022-03-01 DIAGNOSIS — H93.13 TINNITUS OF BOTH EARS: ICD-10-CM

## 2022-03-01 DIAGNOSIS — H90.A32 MIXED CONDUCTIVE AND SENSORINEURAL HEARING LOSS OF LEFT EAR WITH RESTRICTED HEARING OF RIGHT EAR: Primary | ICD-10-CM

## 2022-03-01 DIAGNOSIS — S02.2XXA CLOSED FRACTURE OF NASAL BONE, INITIAL ENCOUNTER: ICD-10-CM

## 2022-03-01 PROCEDURE — 99204 OFFICE O/P NEW MOD 45 MIN: CPT | Performed by: STUDENT IN AN ORGANIZED HEALTH CARE EDUCATION/TRAINING PROGRAM

## 2022-03-01 PROCEDURE — 1036F TOBACCO NON-USER: CPT | Performed by: STUDENT IN AN ORGANIZED HEALTH CARE EDUCATION/TRAINING PROGRAM

## 2022-03-01 PROCEDURE — 4040F PNEUMOC VAC/ADMIN/RCVD: CPT | Performed by: STUDENT IN AN ORGANIZED HEALTH CARE EDUCATION/TRAINING PROGRAM

## 2022-03-01 PROCEDURE — G8484 FLU IMMUNIZE NO ADMIN: HCPCS | Performed by: STUDENT IN AN ORGANIZED HEALTH CARE EDUCATION/TRAINING PROGRAM

## 2022-03-01 PROCEDURE — 92557 COMPREHENSIVE HEARING TEST: CPT | Performed by: AUDIOLOGIST

## 2022-03-01 PROCEDURE — G8399 PT W/DXA RESULTS DOCUMENT: HCPCS | Performed by: STUDENT IN AN ORGANIZED HEALTH CARE EDUCATION/TRAINING PROGRAM

## 2022-03-01 PROCEDURE — 69210 REMOVE IMPACTED EAR WAX UNI: CPT | Performed by: STUDENT IN AN ORGANIZED HEALTH CARE EDUCATION/TRAINING PROGRAM

## 2022-03-01 PROCEDURE — 1123F ACP DISCUSS/DSCN MKR DOCD: CPT | Performed by: STUDENT IN AN ORGANIZED HEALTH CARE EDUCATION/TRAINING PROGRAM

## 2022-03-01 PROCEDURE — 92567 TYMPANOMETRY: CPT | Performed by: AUDIOLOGIST

## 2022-03-01 PROCEDURE — 1090F PRES/ABSN URINE INCON ASSESS: CPT | Performed by: STUDENT IN AN ORGANIZED HEALTH CARE EDUCATION/TRAINING PROGRAM

## 2022-03-01 PROCEDURE — G8417 CALC BMI ABV UP PARAM F/U: HCPCS | Performed by: STUDENT IN AN ORGANIZED HEALTH CARE EDUCATION/TRAINING PROGRAM

## 2022-03-01 PROCEDURE — G8427 DOCREV CUR MEDS BY ELIG CLIN: HCPCS | Performed by: STUDENT IN AN ORGANIZED HEALTH CARE EDUCATION/TRAINING PROGRAM

## 2022-03-01 NOTE — PROGRESS NOTES
[address] [place] [address] PATIENT HISTORY AND PHYSICAL NOTE      Patient Name: [facility] Record Number:  [id number]  Primary Care Physician:  Celsa Garcia MD    ChiefComplaint     Chief Complaint   Patient presents with    Other     Review hearing test, patient states she feels she hears white noise in both ears, but left is worse than right. History of Present Illness     Gus Ma is an 67 y.o. female presenting with tinnitus. Bilateral white noise in the ears for the past 1 year. Worse in the left. Constant on the left, intermittent on the right. Denies recent changes in hearing. No otalgia. No otorrhea. No history of chronic ear infections. No history of otologic surgery. No family history of early onset hearing loss. No loud noise exposures. Denies exposure to chemotherapy. Denies vertigo or dizziness. Uses a white noise machine to help sleep at night, feels like she does not need it because it emits the same kind of noise she hears. Favioelaine Anais yesterday when taking trash out, foot came out of shoe, fell on face and nose. + nosebleed after, stopped spontaneously after a few minutes. Denies cosmetic deformity to the nose other than some swelling. No LOC. No reported dental malocclusion. No nasal congestion. Broke nose at 2years old.  No x of sinonasal surgery in the past.       Past Medical History     Past Medical History:   Diagnosis Date    Arthritis     CAD (coronary artery disease)     Diabetes ([facility]     Hemodialysis patient ([facility]     Hyperlipidemia     Hypertension     Kidney disease     Neuropathy     Pneumonia     IN February, 2021, with COVID    Thyroid disease        Past Surgical History     Past Surgical History:   Procedure Laterality Date    APPENDECTOMY      CHOLECYSTECTOMY      COLONOSCOPY  11/24/2020    COLONOSCOPY POLYPECTOMY SNARE/COLD BIOPSY performed by Melinda Dueñas MD at [address] [place] [address]  DIALYSIS CATHETER REMOVAL N/A 2021    PERITONEAL DIALYSIS CATHETER REMOVAL.  EXCISSION OF ABDOMINAL CITRIX. performed by Abigail Goltz, DO at [address] N/A 4/3/2020    LAPAROSCOPIC LYSIS OF ADHESIONS, LAPAROSCOPIC PERITONEAL DIALYSIS CATHETER PLACEMENT, LAPAROSCOPIC OMENTOPEXY performed by Abigail Goltz, DO at [facility], BILATERAL      preventive    ROTATOR CUFF REPAIR Left     SHUNT REVISION Left 2021    LEFT FOREARM ARTERIO VENOUS GRAFT performed [facility]verde [facility] at [facility] N/A 2020    EGD BIOPSY performed by Arnulfo Sandifer, MD at [address] [place] [address]       Family History     Family History   Problem Relation Age of Onset    Cancer Mother         lung, breast, liver    Stroke Father     Stomach Cancer Brother     Cancer Maternal Grandmother     No Known Problems Paternal Grandmother     No Known Problems Paternal Grandfather        Social History     Social History     Tobacco Use    Smoking status: Former Smoker     Packs/day: 0.50     Years: 10.00     Pack years: 5.00     Types: Cigarettes     Quit date: 1978     Years since quittin.6    Smokeless tobacco: Never Used   Vaping Use    Vaping Use: Never used   Substance Use Topics    Alcohol use: Yes     Comment: every other week one glass of wine    Drug use: Never        Allergies     Allergies   Allergen Reactions    Amoxicillin Anaphylaxis    Demeclocycline Anaphylaxis    Penicillins Anaphylaxis    Tetracyclines & Related Anaphylaxis    Ozempic (0.25 Or 0.5 Mg-Dose) [Semaglutide(0.25 Or 0.5mg-Dos)] Other (See Comments)     Lost large amount weight and loss  Of appetite    Codeine Itching and Rash       Medications     Current Outpatient Medications   Medication Sig Dispense Refill    irbesartan-hydroCHLOROthiazide (AVALIDE) 150-12.5 MG per tablet TAKE 1 TABLET EVERY DAY 90 tablet 1    gabapentin (NEURONTIN) 100 MG capsule Take 2 capsules by mouth nightly for 30 days. 60 capsule 3    rosuvastatin (CRESTOR) 20 MG tablet TAKE 1 TABLET BY MOUTH EVERY DAY 90 tablet 1    ASPIRIN LOW DOSE 81 MG EC tablet TAKE 1 TABLET BY MOUTH EVERY DAY 90 tablet 1    nortriptyline (PAMELOR) 10 MG capsule TAKE 1 CAPSULE BY MOUTH EVERY EVENING 90 capsule 1    levothyroxine (SYNTHROID) 150 MCG tablet Take 1 tablet by mouth every morning (before breakfast) 90 tablet 3    allopurinol (ZYLOPRIM) 100 MG tablet TAKE 1 TABLET EVERY DAY 90 tablet 3    metoprolol succinate (TOPROL XL) 25 MG extended release tablet TAKE 1 TABLET EVERY DAY 90 tablet 3    torsemide (DEMADEX) 20 MG tablet Take 3 tablets by mouth 2 times daily 30 tablet 3    spironolactone (ALDACTONE) 25 MG tablet Take 1 tablet by mouth daily 30 tablet 3    clopidogrel (PLAVIX) 75 MG tablet Take 1 tablet by mouth daily 90 tablet 3    loperamide (IMODIUM) 2 MG capsule Take 2 mg by mouth 4 times daily as needed        No current facility-administered medications for this visit.        Review of Systems     REVIEW OF SYSTEMS  The following systems were reviewed and revealed the following in addition to any already discussed in the HPI:    CONSTITUTIONAL: no weight loss, no fever, no night sweats, no chills  EYES: no vision changes, no blurry vision  EARS: no hearing loss, no otalgia  NOSE: no epistaxis, no rhinorrhea  THROAT: No voice changes, no sore throat, no dysphagia      PhysicalExam     Vitals:    03/01/22 1337   BP: (!) 143/73   Site: Right Upper Arm   Position: Sitting   Cuff Size: Medium Adult   Pulse: 92   Temp: 97.7 °F (36.5 °C)   TempSrc: Temporal       PHYSICAL EXAM  BP (!) 143/73 (Site: Right Upper Arm, Position: Sitting, Cuff Size: Medium Adult)   Pulse 92   Temp 97.7 °F (36.5 °C) (Temporal)   LMP  (LMP Unknown)     GENERAL: No acute distress, alert and oriented, no hoarseness  EYES: EOMI, Anti-icteric  NOSE: There is ecchymosis around the bridge of the nose with tenderness. Moderate edema surrounding the nasal bridge. On anterior rhinoscopy there is no epistaxis, nasal mucosa moist and normal appearing, no purulent drainage. Nasal septum deviated to the left. No evidence of septal hematoma. EARS: Normal external appearance; on portable otomicroscopy:     -Ad: External auditory canal with cerumen impaction, see procedure note below.     -As: External auditory canal without stenosis, tympanic membrane clear, no middle ear effusions or retractions. Pneumatic otoscopy: Bilateral tympanic membranes mobile pneumatic otoscopy  FACE: HB 1/6 bilaterally, symmetric appearing, sensation equal bilaterally  ORAL CAVITY: No masses or lesions visualized or palpated, uvula is midline, moist mucous membranes  NECK: Normal range of motion, no thyromegaly, trachea is midline, no palpable lymphadenopathy or neck masses, no crepitus  NEURO: Cranial Nerves 2, 3, 4, 5, 6, 7, 11, 12 grossly intact bilaterally     I have performed a head and neck physical exam personally or was physically present during the key or critical portions of the service. Procedure     Procedure: Binocular otomicroscopy with debridement of cerumen impaction    Pre-op: Cerumen impaction of the right external auditory canal   Post op: Same  Procedure : Binocular otomicroscopy with debridement of cerumen of right external auditory canal  Surgeon: Dr. Tyrese Dent DO  Estimated Blood Loss: None    Description of Procedure:    After obtaining verbal consent, the patient was placed in the examination chair in the reclined position.      -Right ear: External auditory canal with occluding cerumen limiting visualization of the tympanic membrane which was removed with use of #7 and #5 Arabic suction, alligator forceps, and cerumen loop curet.   After successful removal of cerumen, the right tympanic membrane was visualized and without significant retractions or cholesteatoma, no middle ear effusions. * Patient tolerated the procedure well with no complications    Data/Imaging Review     Comprehensive audiological evaluation performed in the office today by Sourav VEGA was independently reviewed by myself which demonstrates:    As: Mild sloping to severe mixed hearing loss    Ad: Large conductive loss at 250 Hz. There is a moderate mixed hearing loss at 500 Hz sloping to a mild sensorineural hearing loss from 750 - 2000 Hz. Moderate to severe sensorineural hearing loss from 3000 - 8000 Hz    WRS 88% right, WRS 88% left. Type As tympanogram right. Type As tympanogram left. No ipsilateral acoustic reflexes noted bilaterally. Assessment and Plan     1. Asymmetrical hearing loss  Offered MRI IAC to rule out retrocochlear pathology. Patient would like to hold off on this for now. Encouraged her to call the office if her ringing gets worse, especially in her left ear, or she notes any other cochleovestibular symptoms including vertigo or worsening hearing loss. At the very least would repeat audiogram in 1 year which the patient is agreeable to.    2. Mixed conductive and sensorineural hearing loss of right ear with restricted hearing of left ear  - Would likely benefit from amplification with hearing aids but patient would like to hold off on this for now as well. - Encouraged loud noise avoidance and wearing of hearing protection when exposed. - Recommend surveillance of hearing with comprehensive audiological evaluation in 1-2 years. 3.  Tinnitus of both ears   Tinnitus worse on the left, likely secondary to worsening hearing loss on that side. Continue masking therapy. Could consider hearing aids to help with habituation to tinnitus as well. 4. Closed fracture of nasal bone, initial encounter  Status post mechanical fall yesterday. No nasal congestion. She will copiously ice her nose and take NSAIDs as needed for pain and swelling.   If she notes any cosmetic deformity of her nose and swelling has subsided she will call the office we can consider closed reduction. 5. DNS (deviated nasal septum)  Chronic, stable. Nonobstructive    6. Impacted cerumen of right ear  - Cerumen debrided in the office today  - Avoid Q-tip and self instrumentation of ears  - Hydrogen peroxide or Debrox (OTC) drops as needed or once every other week to help break up and soften wax  - Follow-up as needed for repeat debridement      Follow Up     Return if symptoms worsen or fail to improve. Chris Forbes   Department of Otolaryngology/Head & Neck Surgery  3/1/22    Medical Decision Making: The following items were considered in medical decision making:  Independent review of images  Review / order clinical lab tests  Review / order radiology tests  Decision to obtain old records    This note was generated completely or in part utilizing Dragon dictation speech recognition software. Occasionally, words are mistranscribed and despite editing, the text may contain inaccuracies due to incorrect word recognition. If further clarification is needed please contact the office at [phone].

## 2022-03-01 NOTE — PROGRESS NOTES
[facility]  Division of Audiology/Otolaryngology    3/1/2022     Patient name: Dora Marley  Primary Care Physician: Rc Malagon MD   Medical Record Number: [id number]     Dora Marley   1945, 67 y.o. female   [id number]       Referring Provider: Nanette Klein DO  Referral Type: In an order in [address] [place] [address]    Reason for Visit: Evaluation of the cause of disorders of hearing, tinnitus, or balance. AUDIOLOGIC AND OTHER PERTINENT MEDICAL HISTORY:      Dora Marley was seen for comprehensive audiologic evaluation. Patient presents with left sided tinnitus. Onset was 1 year ago. Quality is described as wind noise. She has been awakened from a full nights rest one occasions. She does recognize some hearing loss, while communicating in complex listening environments. Medical history is reportedly significant for coronary artery and chronic kidney disease. ASSESSMENT AND FINDINGS:     Otoscopy revealed: Visible tympanic membrane bilaterally    RIGHT EAR:  Hearing Sensitivity: Mild severe sensory loss. Word Recognition: Good (80-89%), based on NU-6   Tympanometry: Normal peak pressure with low compliance, Type As tympanogram, consistent with reduced tympanic membrane mobility. Acoustic Reflexes: Ipsilateral: Absent. LEFT EAR:  Hearing Sensitivity: Mild severe mixed hearing loss. isolated air-bone gap at 50 dB at . 250 KHz. Word Recognition: Good (80-89%), based on NU-6   Tympanometry: Normal peak pressure with low compliance, Type As tympanogram, consistent with reduced tympanic membrane mobility. Acoustic Reflexes: Ipsilateral: Absent. Quick SIN (hearing performance in noise) assessment was administered. Dora Marley scored  22/25.5, consistent with 3.5 dB signal-noise ratio loss. Suggestive of mild degree difficulty hearing during the presence of noise or complex listening enviornments.            COUNSELING:        Reviewed purpose of testing completed today, general auditory system function, and results obtained today. Patient was provided with a copy of audiogram.  Hearing in Noise Assessment reviewed. Patient counseled on impacts of hearing status with respect to background noise. Degree of   Hearing Sensitivity dB Range   Within Normal Limits (WNL) 0 - 20   Mild 20 - 40   Moderate 40 - 55   Moderately-Severe 55 - 70   Severe 70 - 90   Profound 90 +        RECOMMENDATIONS:        Duyen Salinas,  to medically advise.       Electronically signed by Ericka Carl on 3/1/22 at 1:22 PM.

## 2022-03-01 NOTE — PATIENT INSTRUCTIONS
Patient Education        Hearing Loss: Care Instructions  Overview     Hearing loss is a sudden or slow decrease in how well you hear. It can range from mild to severe. Permanent hearing loss can occur with aging. It also can happen when you are exposed long-term to loud noise. Examples include listening to loud music, riding motorcycles, or being around other loud machines. Hearing loss can affect your work and home life. It can make you feel lonely or depressed. You may feel that you have lost your independence. But hearing aids and other devices can help you hear better and feel connected to others. Follow-up care is a key part of your treatment and safety. Be sure to make and go to all appointments, and call your doctor if you are having problems. It's also a good idea to know your test results and keep a list of the medicines you take. How can you care for yourself at home? · Avoid loud noises whenever possible. This helps keep your hearing from getting worse. · Always wear hearing protection around loud noises. · Wear a hearing aid as directed. See a professional who can help you pick a hearing aid that fits you. · Have hearing tests as your doctor suggests. They can show whether your hearing has changed. Your hearing aid may need to be adjusted. · Use other devices as needed. These may include:  ? Telephone amplifiers and hearing aids that can connect to a television, stereo, radio, or microphone. ? Devices that use lights or vibrations. These alert you to the doorbell, a ringing telephone, or a baby monitor. ? Television closed-captioning. This shows the words at the bottom of the screen. Most new TVs can do this. ? TTY (text telephone). This lets you type messages back and forth on the telephone instead of talking or listening. These devices are also called TDD. When messages are typed on the keyboard, they are sent over the phone line to a receiving TTY. The message is shown on a monitor.   · Use

## 2022-03-22 ENCOUNTER — OFFICE VISIT (OUTPATIENT)
Dept: CARDIOLOGY CLINIC | Age: 77
End: 2022-03-22
Payer: MEDICARE

## 2022-03-22 VITALS
SYSTOLIC BLOOD PRESSURE: 124 MMHG | WEIGHT: 182 LBS | BODY MASS INDEX: 30.32 KG/M2 | DIASTOLIC BLOOD PRESSURE: 58 MMHG | HEART RATE: 83 BPM | HEIGHT: 65 IN | OXYGEN SATURATION: 95 %

## 2022-03-22 DIAGNOSIS — I25.10 CORONARY ARTERY DISEASE INVOLVING NATIVE CORONARY ARTERY OF NATIVE HEART WITHOUT ANGINA PECTORIS: Primary | ICD-10-CM

## 2022-03-22 DIAGNOSIS — E78.2 MIXED HYPERLIPIDEMIA: ICD-10-CM

## 2022-03-22 DIAGNOSIS — I10 PRIMARY HYPERTENSION: ICD-10-CM

## 2022-03-22 PROCEDURE — G8417 CALC BMI ABV UP PARAM F/U: HCPCS | Performed by: NURSE PRACTITIONER

## 2022-03-22 PROCEDURE — 1090F PRES/ABSN URINE INCON ASSESS: CPT | Performed by: NURSE PRACTITIONER

## 2022-03-22 PROCEDURE — 99214 OFFICE O/P EST MOD 30 MIN: CPT | Performed by: NURSE PRACTITIONER

## 2022-03-22 PROCEDURE — 4040F PNEUMOC VAC/ADMIN/RCVD: CPT | Performed by: NURSE PRACTITIONER

## 2022-03-22 PROCEDURE — 1036F TOBACCO NON-USER: CPT | Performed by: NURSE PRACTITIONER

## 2022-03-22 PROCEDURE — G8484 FLU IMMUNIZE NO ADMIN: HCPCS | Performed by: NURSE PRACTITIONER

## 2022-03-22 PROCEDURE — G8399 PT W/DXA RESULTS DOCUMENT: HCPCS | Performed by: NURSE PRACTITIONER

## 2022-03-22 PROCEDURE — 1123F ACP DISCUSS/DSCN MKR DOCD: CPT | Performed by: NURSE PRACTITIONER

## 2022-03-22 PROCEDURE — G8427 DOCREV CUR MEDS BY ELIG CLIN: HCPCS | Performed by: NURSE PRACTITIONER

## 2022-03-22 RX ORDER — NITROGLYCERIN 0.4 MG/1
0.4 TABLET SUBLINGUAL EVERY 5 MIN PRN
Qty: 25 TABLET | Refills: 3 | Status: SHIPPED | OUTPATIENT
Start: 2022-03-22

## 2022-03-22 NOTE — PROGRESS NOTES
[facility]     Outpatient Follow Up Note    Aide Perea is 67 y.o. female who presents today with a history of pericardial effusion d/t pericarditis, CAD s/p PTCA LAD with jailed diag-1 s/p POBA and PTCA RCA ; HTN and hyperlipidemia    CHIEF COMPLAINT / HPI:  Follow Up secondary to coronary artery disease    Subjective:     A couple of times this past week she woke with pain in the center of her chest. It leaves it hard to breath. It lasts maybe a minute. She usually only has 1 epidose but this time it was 3. She walks routinely and hasn't had any chest discomfort. She denies (d) significant chest pain. There is a little SOB going up steps at times. The patient denies orthopnea/PND. She has a hard time sleeping The patient has occasional swelling in her legs. Her wt today was 182# which had been staying b/w 174-77# . She tends to hold a lot of water : hands, face and ankles get puffy. She dialyzes 3 x / week and follows with Dr. Jocelyne Olsen. The patient is not experiencing palpitations or dizziness. Her home BP runs ~ 140/80    These symptoms show no change since the last OV. With regard to medication therapy the patient has been compliant with prescribed regimen. They have tolerated therapy to date.      Past Medical History:   Diagnosis Date    Arthritis     CAD (coronary artery disease)     [facility]     Hemodialysis patient ([facility]     Hyperlipidemia     Hypertension     Kidney disease     Neuropathy     Pneumonia     IN , with COVID    Thyroid disease      Social History:    Social History     Tobacco Use   Smoking Status Former Smoker    Packs/day: 0.50    Years: 10.00    Pack years: 5.00    Types: Cigarettes    Quit date: 1978    Years since quittin.7   Smokeless Tobacco Never Used     Current Medications:  Current Outpatient Medications   Medication Sig Dispense Refill    irbesartan-hydroCHLOROthiazide (AVALIDE) 150-12.5 MG per tablet TAKE 1 TABLET EVERY DAY 90 tablet 1    rosuvastatin (CRESTOR) 20 MG tablet TAKE 1 TABLET BY MOUTH EVERY DAY 90 tablet 1    ASPIRIN LOW DOSE 81 MG EC tablet TAKE 1 TABLET BY MOUTH EVERY DAY 90 tablet 1    nortriptyline (PAMELOR) 10 MG capsule TAKE 1 CAPSULE BY MOUTH EVERY EVENING 90 capsule 1    levothyroxine (SYNTHROID) 150 MCG tablet Take 1 tablet by mouth every morning (before breakfast) 90 tablet 3    allopurinol (ZYLOPRIM) 100 MG tablet TAKE 1 TABLET EVERY DAY 90 tablet 3    metoprolol succinate (TOPROL XL) 25 MG extended release tablet TAKE 1 TABLET EVERY DAY 90 tablet 3    torsemide (DEMADEX) 20 MG tablet Take 3 tablets by mouth 2 times daily (Patient taking differently: Take 60 mg by mouth 2 times daily 6 tabs bid) 30 tablet 3    spironolactone (ALDACTONE) 25 MG tablet Take 1 tablet by mouth daily 30 tablet 3    clopidogrel (PLAVIX) 75 MG tablet Take 1 tablet by mouth daily 90 tablet 3    loperamide (IMODIUM) 2 MG capsule Take 2 mg by mouth 4 times daily as needed       gabapentin (NEURONTIN) 100 MG capsule Take 2 capsules by mouth nightly for 30 days. 60 capsule 3     No current facility-administered medications for this visit. REVIEW OF SYSTEMS:    CONSTITUTIONAL: No major weight gain or loss, fatigue, weakness, night sweats or fever. HEENT: No new vision difficulties or ringing in the ears. RESPIRATORY: No new SOB, PND, orthopnea or cough. CARDIOVASCULAR: See HPI  GI: No nausea, vomiting, diarrhea, constipation, abdominal pain or changes in bowel habits. : No urinary frequency, urgency, incontinence hematuria or dysuria. SKIN: No cyanosis or skin lesions. MUSCULOSKELETAL: No new muscle or joint pain. NEUROLOGICAL: No syncope or TIA-like symptoms.   PSYCHIATRIC: No anxiety, pain, insomnia or depression  ~daughter has aggressive breast    Objective:   PHYSICAL EXAM:        Vitals:    03/22/22 1055 03/22/22 1112   BP: 120/60 (!) 124/58   Site: Right Upper Arm Right Upper Arm Position: Sitting    Cuff Size: Medium Adult Large Adult   Pulse: 83    SpO2: 95%    Weight: 182 lb (82.6 kg)    Height: 5' 5\" (1.651 m)        VITALS:  /60 (Site: Right Upper Arm, Position: Sitting, Cuff Size: Medium Adult)   Pulse 83   Ht 5' 5\" (1.651 m)   Wt 182 lb (82.6 kg)   LMP  (LMP Unknown)   SpO2 95%   BMI 30.29 kg/m²   CONSTITUTIONAL: Cooperative, no apparent distress, and appears well nourished / developed  NEUROLOGIC:  Awake and orientated to person, place and time. PSYCH: Calm affect. SKIN: Warm and dry  HEENT: Sclera non-icteric, normocephalic, neck supple, no elevation of JVP, normal carotid pulses with no bruits and thyroid normal size. LUNGS:  No increased work of breathing and clear to auscultation, no crackles or wheezing  CARDIOVASCULAR:  Regular rate 88 and rhythm with no murmurs, gallops, rubs, or abnormal heart sounds, normal PMI. The apical impulses not displaced  JVP less than 8 cm H2O  Heart tones are crisp and normal  Cervical veins are not engorged  The carotid upstroke is normal in amplitude and contour without delay or bruit  JVP is not elevated  ABDOMEN:  Normal bowel sounds, non-distended and non-tender to palpation  EXT: trace eliza LE edema, no calf tenderness. Pulses are present bilaterally.     DATA:    Lab Results   Component Value Date    ALT 19 01/20/2022    AST 18 01/20/2022    ALKPHOS 118 01/20/2022    BILITOT 0.6 01/20/2022     Lab Results   Component Value Date    CREATININE 6.2 (HH) 01/20/2022    BUN 47 (H) 01/20/2022     01/20/2022    K 5.7 (H) 01/20/2022    CL 96 (L) 01/20/2022    CO2 26 01/20/2022     Lab Results   Component Value Date    TSH 2.64 08/15/2019    L0IPGMO 1.00 08/24/2017     Lab Results   Component Value Date    WBC 6.9 01/20/2022    HGB 11.7 (L) 01/20/2022    HCT 36.6 01/20/2022    MCV 93.4 01/20/2022     01/20/2022     No components found for: CHLPL  Lab Results   Component Value Date    TRIG 132 08/12/2021    TRIG 203 (H) 07/28/2021    TRIG 432 (H) 08/15/2019     Lab Results   Component Value Date    HDL 32 (L) 08/12/2021    HDL 32 (L) 07/28/2021    HDL 34 (L) 08/15/2019     Lab Results   Component Value Date    LDLCALC 38 08/12/2021    LDLCALC 31 07/28/2021    [address] see below 08/15/2019     Lab Results   Component Value Date    LABVLDL 26 08/12/2021    LABVLDL 41 07/28/2021    LABVLDL see below 08/15/2019       Radiology Review:  Pertinent images / reports were reviewed as a part of this visit and reveals the following:    Last Angiogram: 7/8/21:  LM-distal 20%  LAD-mid 80%  Cx-nml  OM- nml  RCA-distal 80%  RPDA- nml  LVEF- 60  LVG- nml  LVEDP- 6  Intervention  ~Successful PCI to LAD with 2.75x38 MAX. PD with 3.0x15 NC. Prox D1 99% stent residential. Resolved with 1.5x12 balloon angioplasty. Successful PCI to RCA with 3.25x23 MAX to 14atm. Excellent Result         Echo: 9/1/21  Summary   -Portable exam done on 3A. Rapid Response was called on patient for chest   pain prior to exam.   -Normal left ventricle size, wall thickness, and systolic function with an   estimated ejection fraction of 65%.   -Grade I diastolic dysfunction with normal LV filling pressures. Avg.   E/e'=13.6   -Small circumferential pericardial effusion without tamponade physiology.   -The effusion contains fibrinous material.    Limited echo: 9/23/21  Summary   -Limited exam for pericardial effusion. Patient had a complete previous exam 9/01/2021.   -Normal global systolic function with an ejection fraction estimated at 60%.  -Abnormal septal motion due to left bundle branch block.   -There is trivial to small circumferential pericardial effusion noted.     Assessment:      Diagnosis Orders   1.  Coronary artery disease involving native coronary artery of native heart without angina pectoris   ~atypical discomfort of brief duration ; asymptomatic when out walking and during dialysis runs  ~s/p PTCA LAD with jailed diag-1 s/p POBA and PTCA RCA July '21  ~DAPT / statin / BB  ~trivial to small circumferential pericardial effusion noted on echo Sept '21     2. Essential hypertension   ~controlled   ~metoprolol / aldactone / ibersartan-HCTZ    3. Mixed hyperlipidemia   ~unchanged with HDL low ; LDL at goal   ~crestor 20 mg daily       I had the opportunity to review the clinical symptoms and presentation of [employer]. Plan:     1. NTG SL 0.4 mg prn ; she will call with any changes including discomfort with exercising or while on dialysis  2. [facility]    Overall the patient is stable from CV standpoint    I have addresed the patient's cardiac risk factors and adjusted pharmacologic treatment as needed. In addition, I have reinforced the need for patient directed risk factor modification. Further evaluation will be based upon the patient's clinical course and testing results. All questions and concerns were addressed to the patient. Alternatives to my treatment were discussed. The patient is not currently smoking: remote. The risks related to smoking were reviewed with the patient. Recommend maintaining a smoke-free lifestyle. Patient is on a beta-blocker  Patient is on an ace-i/ARB : ESRD followed by Dr. James Casillas  Patient is on a statin    Dual Antiplatelet therapy has been recommended / prescribed for this patient. Education conducted on adverse reactions including bleeding was discussed. The patient verbalizes understanding not to stop medications without discussing with us. Discussed exercise: 30-60 minutes 7 days/week :   Discussed Low saturated fat/ARIADNE diet. Thank you for allowing to us to participate in the care of Alis Khoury.     MARCIE Jay    Documentation of today's visit sent to PCP

## 2022-03-29 ENCOUNTER — OFFICE VISIT (OUTPATIENT)
Dept: FAMILY MEDICINE CLINIC | Age: 77
End: 2022-03-29
Payer: MEDICARE

## 2022-03-29 VITALS
WEIGHT: 188.8 LBS | HEART RATE: 93 BPM | SYSTOLIC BLOOD PRESSURE: 120 MMHG | OXYGEN SATURATION: 96 % | BODY MASS INDEX: 31.46 KG/M2 | HEIGHT: 65 IN | DIASTOLIC BLOOD PRESSURE: 70 MMHG

## 2022-03-29 DIAGNOSIS — G25.81 RLS (RESTLESS LEGS SYNDROME): ICD-10-CM

## 2022-03-29 DIAGNOSIS — I70.213 ATHEROSCLEROSIS OF NATIVE ARTERIES OF EXTREMITIES WITH INTERMITTENT CLAUDICATION, BILATERAL LEGS (HCC): ICD-10-CM

## 2022-03-29 DIAGNOSIS — E66.9 DIABETES MELLITUS TYPE 2 IN OBESE (HCC): ICD-10-CM

## 2022-03-29 DIAGNOSIS — E11.69 DIABETES MELLITUS TYPE 2 IN OBESE (HCC): ICD-10-CM

## 2022-03-29 DIAGNOSIS — G20 PARKINSON'S DISEASE (HCC): ICD-10-CM

## 2022-03-29 PROBLEM — G20.A1 PARKINSON'S DISEASE: Status: ACTIVE | Noted: 2022-03-29

## 2022-03-29 PROCEDURE — 1123F ACP DISCUSS/DSCN MKR DOCD: CPT | Performed by: FAMILY MEDICINE

## 2022-03-29 PROCEDURE — 4040F PNEUMOC VAC/ADMIN/RCVD: CPT | Performed by: FAMILY MEDICINE

## 2022-03-29 PROCEDURE — 99213 OFFICE O/P EST LOW 20 MIN: CPT | Performed by: FAMILY MEDICINE

## 2022-03-29 PROCEDURE — G8427 DOCREV CUR MEDS BY ELIG CLIN: HCPCS | Performed by: FAMILY MEDICINE

## 2022-03-29 PROCEDURE — G8399 PT W/DXA RESULTS DOCUMENT: HCPCS | Performed by: FAMILY MEDICINE

## 2022-03-29 PROCEDURE — G8417 CALC BMI ABV UP PARAM F/U: HCPCS | Performed by: FAMILY MEDICINE

## 2022-03-29 PROCEDURE — G8484 FLU IMMUNIZE NO ADMIN: HCPCS | Performed by: FAMILY MEDICINE

## 2022-03-29 PROCEDURE — 1036F TOBACCO NON-USER: CPT | Performed by: FAMILY MEDICINE

## 2022-03-29 PROCEDURE — 1090F PRES/ABSN URINE INCON ASSESS: CPT | Performed by: FAMILY MEDICINE

## 2022-03-29 RX ORDER — ISOSORBIDE MONONITRATE 30 MG/1
30 TABLET, EXTENDED RELEASE ORAL DAILY
Qty: 90 TABLET | Refills: 3 | Status: SHIPPED | OUTPATIENT
Start: 2022-03-29 | End: 2022-06-14

## 2022-03-29 RX ORDER — ISOSORBIDE MONONITRATE 30 MG/1
30 TABLET, EXTENDED RELEASE ORAL DAILY
COMMUNITY
Start: 2022-03-16 | End: 2022-03-29 | Stop reason: SDUPTHER

## 2022-03-29 RX ORDER — ROSUVASTATIN CALCIUM 20 MG/1
TABLET, COATED ORAL
Qty: 90 TABLET | Refills: 1 | Status: SHIPPED | OUTPATIENT
Start: 2022-03-29

## 2022-03-29 RX ORDER — CLOPIDOGREL BISULFATE 75 MG/1
75 TABLET ORAL DAILY
Qty: 90 TABLET | Refills: 3 | Status: SHIPPED | OUTPATIENT
Start: 2022-03-29

## 2022-03-29 RX ORDER — NORTRIPTYLINE HYDROCHLORIDE 10 MG/1
CAPSULE ORAL
Qty: 90 CAPSULE | Refills: 3 | Status: SHIPPED | OUTPATIENT
Start: 2022-03-29 | End: 2022-05-19 | Stop reason: SDUPTHER

## 2022-03-29 RX ORDER — GABAPENTIN 100 MG/1
200 CAPSULE ORAL NIGHTLY
Qty: 60 CAPSULE | Refills: 3 | Status: SHIPPED | OUTPATIENT
Start: 2022-03-29 | End: 2022-06-29

## 2022-03-29 RX ORDER — ROPINIROLE 0.25 MG/1
0.25 TABLET, FILM COATED ORAL 2 TIMES DAILY
Qty: 180 TABLET | Refills: 3 | Status: SHIPPED | OUTPATIENT
Start: 2022-03-29 | End: 2022-06-14

## 2022-03-29 RX ORDER — SPIRONOLACTONE 25 MG/1
25 TABLET ORAL DAILY
Qty: 90 TABLET | Refills: 3 | Status: SHIPPED | OUTPATIENT
Start: 2022-03-29

## 2022-03-29 ASSESSMENT — PATIENT HEALTH QUESTIONNAIRE - PHQ9
SUM OF ALL RESPONSES TO PHQ QUESTIONS 1-9: 0
SUM OF ALL RESPONSES TO PHQ QUESTIONS 1-9: 0
SUM OF ALL RESPONSES TO PHQ9 QUESTIONS 1 & 2: 0
SUM OF ALL RESPONSES TO PHQ QUESTIONS 1-9: 0
2. FEELING DOWN, DEPRESSED OR HOPELESS: 0
SUM OF ALL RESPONSES TO PHQ QUESTIONS 1-9: 0
1. LITTLE INTEREST OR PLEASURE IN DOING THINGS: 0

## 2022-03-29 NOTE — PROGRESS NOTES
tablet TAKE 1 TABLET BY MOUTH EVERY DAY 90 tablet 1    levothyroxine (SYNTHROID) 150 MCG tablet Take 1 tablet by mouth every morning (before breakfast) 90 tablet 3    allopurinol (ZYLOPRIM) 100 MG tablet TAKE 1 TABLET EVERY DAY 90 tablet 3    metoprolol succinate (TOPROL XL) 25 MG extended release tablet TAKE 1 TABLET EVERY DAY 90 tablet 3    torsemide (DEMADEX) 20 MG tablet Take 3 tablets by mouth 2 times daily (Patient taking differently: Take 120 mg by mouth 2 times daily 6 tabs bid) 30 tablet 3    loperamide (IMODIUM) 2 MG capsule Take 2 mg by mouth 4 times daily as needed        No current facility-administered medications for this visit. Past Medical History:   Diagnosis Date    Arthritis     CAD (coronary artery disease)     [facility]     Hemodialysis patient ([facility]     Hyperlipidemia     Hypertension     Kidney disease     Neuropathy     Pneumonia     IN February, 2021, with COVID    Thyroid disease        Past Surgical History:   Procedure Laterality Date    APPENDECTOMY      CHOLECYSTECTOMY      COLONOSCOPY  11/24/2020    COLONOSCOPY POLYPECTOMY SNARE/COLD BIOPSY performed by Anay Flood MD at [address] [facility] 5/17/2021    PERITONEAL DIALYSIS CATHETER REMOVAL. EXCISSION OF ABDOMINAL CITRIX. performed by Marcell Torres DO at [address] N/A 4/3/2020    LAPAROSCOPIC LYSIS OF ADHESIONS, LAPAROSCOPIC PERITONEAL DIALYSIS CATHETER PLACEMENT, LAPAROSCOPIC OMENTOPEXY performed by Marcell Torres DO at [facility], BILATERAL      preventive    ROTATOR CUFF REPAIR Left     SHUNT REVISION Left 8/19/2021    LEFT FOREARM ARTERIO VENOUS GRAFT performed by Yvette Jean MD at [facility] [facility] N/A 11/24/2020    EGD BIOPSY performed by Anay Flood MD at [address], [facility] History     Socioeconomic History    Marital status:   Spouse name: Not on file    Number of children: 3    Years of education: Not on file    Highest education level: Not on file   Occupational History    Occupation: retired clerical   Tobacco Use    Smoking status: Former Smoker     Packs/day: 0.50     Years: 10.00     Pack years: 5.00     Types: Cigarettes     Quit date: 1978     Years since quittin.7    Smokeless tobacco: Never Used   Vaping Use    Vaping Use: Never used   Substance and Sexual Activity    Alcohol use: Yes     Comment: every other week one glass of wine    Drug use: Never    Sexual activity: Not Currently   Other Topics Concern    Not on file   Social History Narrative    Lives with son--recently moved from [place] the [address] [place] [address] and now here    [facility]     Social Determinants of Health     Financial Resource Strain: Low Risk     Difficulty of Paying Living Expenses: Not hard at all   Food Insecurity: No Food Insecurity    Worried About [address] [place] [address] in the Last Year: Never true    Devante of Food in the Last Year: Never true   Transportation Needs: No Transportation Needs    Lack of Transportation (Medical): No    Lack of Transportation (Non-Medical):  No   Physical Activity:     Days of Exercise per Week: Not on file    Minutes of Exercise per Session: Not on file   Stress:     Feeling of Stress : Not on file   Social Connections:     Frequency of Communication with Friends and Family: Not on file    Frequency of Social Gatherings with Friends and Family: Not on file    Attends Caodaism Services: Not on file    Active Member of Clubs or Organizations: Not on file    Attends Club or Organization Meetings: Not on file    Marital Status: Not on file   Intimate Partner Violence:     Fear of Current or Ex-Partner: Not on file    Emotionally Abused: Not on file    Physically Abused: Not on file    Sexually Abused: Not on file   Housing Stability:     Unable to Pay for Housing in the Last Year: Not on file    Number of Places Lived in the Last Year: Not on file    Unstable Housing in the Last Year: Not on file       Family History   Problem Relation Age of Onset    Cancer Mother         lung, breast, liver    Stroke Father     Stomach Cancer Brother     Cancer Maternal Grandmother     No Known Problems Paternal Grandmother     No Known Problems Paternal Grandfather        Immunization History   Administered Date(s) Administered    COVID-19, J&J, PF, 0.5 mL 10/26/2021, 01/27/2022    Influenza, High Dose (Fluzone 65 yrs and older) 01/23/2017, 11/01/2017    Influenza, Triv, inactivated, subunit, adjuvanted, IM (Fluad 65 yrs and older) 10/28/2019    Pneumococcal Conjugate 13-valent ([id number]) 01/23/2017    Pneumococcal Polysaccharide ([id number]) 05/10/2019       Review of Systems  Review of Systems   Neurological: Negative for tingling and numbness. Objective:   Physical Exam  Physical Exam  Vitals reviewed. Constitutional:       General: She is not in acute distress. Appearance: She is well-developed. Eyes:      Conjunctiva/sclera: Conjunctivae normal.      Pupils: Pupils are equal, round, and reactive to light. Neck:      Thyroid: No thyromegaly. Vascular: No JVD. Trachea: No tracheal deviation. Cardiovascular:      Rate and Rhythm: Normal rate and regular rhythm. Heart sounds: Normal heart sounds. No murmur heard. No gallop. Pulmonary:      Effort: Pulmonary effort is normal. No respiratory distress. Breath sounds: Normal breath sounds. No stridor. No wheezing or rales. Chest:      Chest wall: No tenderness. Abdominal:      General: Bowel sounds are normal. There is no distension. Palpations: Abdomen is soft. There is no mass. Tenderness: There is no abdominal tenderness. Musculoskeletal:         General: No tenderness. Lymphadenopathy:      Cervical: No cervical adenopathy. Skin:     General: Skin is warm and dry. Coloration: Skin is not pale. Findings: No erythema or rash. Neurological:      Mental Status: She is alert and oriented to person, place, and time. Cranial Nerves: No cranial nerve deficit. Motor: No abnormal muscle tone. Coordination: Coordination normal.      Deep Tendon Reflexes: Reflexes normal.   Psychiatric:         Behavior: Behavior normal.         Thought Content:  Thought content normal.         Judgment: Judgment normal.         Assessment and Plan:     RLS (restless legs syndrome)   Uncontrolled, changes made today: trial of requip BID    Atherosclerosis of native arteries of extremities with intermittent claudication, bilateral legs (HCC)   On statin    Diabetes mellitus type 2 in [facility])   Resolved with renal failure

## 2022-04-25 ENCOUNTER — TELEPHONE (OUTPATIENT)
Dept: CARDIOLOGY CLINIC | Age: 77
End: 2022-04-25

## 2022-04-25 NOTE — TELEPHONE ENCOUNTER
She has never taken plavix / clopidogrel despite having her stent in July '21    She is at dialysis right now so will call back in am with a list of all the medicines she's taking

## 2022-05-02 ENCOUNTER — CARE COORDINATION (OUTPATIENT)
Dept: CARE COORDINATION | Age: 77
End: 2022-05-02

## 2022-05-02 ASSESSMENT — PATIENT HEALTH QUESTIONNAIRE - PHQ9
SUM OF ALL RESPONSES TO PHQ QUESTIONS 1-9: 0

## 2022-05-02 NOTE — CARE COORDINATION
Ambulatory Care Coordination Note  5/2/2022  CM Risk Score: 2  Charlson 10 Year Mortality Risk Score: 100%     ACC: Eric Ho RN    Summary Note: referral per [facility] for added support via [facility]. Reviewed [facility], of which pt acknowledges she is familiar & already has engaged for support. Notes strong family support - son helps as needed. Presently @ HD (scheduled M, W, F). Pt reports no noted improvement of RLS since recent introduction of Rx Requip on trial basis which PCP had introduced on trial basis. She wanted PCP to know that RLS have been especially pronounced during dialysis today. Reviewed availability of ACM. Pt verbalized understanding of above and agreement with the following  Plan: Initiate ACC support for health coaching, care collaboration, support w/community resources, and help with any newly presenting concerns as needed. Pt agrees to outreach direct to ACM, as needed, between routine follow up outreach initiated by [facility]    Ambulatory Care Coordination Assessment    Care Coordination Protocol  Referral from Primary Care Provider: No  Week 1 - Initial Assessment     Do you have all of your prescriptions and are they filled?: Yes (Comment: reviewed 5.2.22)  Barriers to medication adherence: None  Are you able to afford your medications?: Yes  How often do you have trouble taking your medications the way you have been told to take them?: I always take them as prescribed. Do you have Home O2 Therapy?: No      Ability to seek help/take action for Emergent Urgent situations i.e. fire, crime, inclement weather or health crisis. : Independent  Ability to ambulate to restroom: Independent  Ability handle personal hygeine needs (bathing/dressing/grooming): Independent  Ability to manage Medications: Independent  Ability to prepare Food Preparation: Independent  Ability to maintain home (clean home, laundry):  Independent  Ability to drive and/or has transportation: Dependent  Ability to do shopping: Needs Assistance  Ability to manage finances: Independent  Is patient able to live independently?: Yes     Current Housing: Apartment        Per the Fall Risk Screening, did the patient have 2 or more falls or 1 fall with injury in the past year?: No     Frequent urination at night?: No  Do you use rails/bars?: Yes  Do you have a non-slip tub mat?: Yes     Are you experiencing loss of meaning?: No  Are you experiencing loss of hope and peace?: No     Thinking about your patient's physical health needs, are there any symptoms or problems (risk indicators) you are unsure about that require further investigation?: No identified areas of uncertainly or problems already being investigated   Are the patients physical health problems impacting on their mental well-being?: No identified areas of concern   Are there any problems with your patients lifestyle behaviors (alcohol, drugs, diet, exercise) that are impacting on physical or mental well-being?: No identified areas of concern   Do you have any other concerns about your patients mental well-being?  How would you rate their severity and impact on the patient?: No identified areas of concern   How would you rate their home environment in terms of safety and stability (including domestic violence, insecure housing, neighbor harassment)?: Consistently safe, supportive, stable, no identified problems   How do daily activities impact on the patient's well-being? (include current or anticipated unemployment, work, caregiving, access to transportation or other): No identified problems or perceived positive benefits   How would you rate their social network (family, work, friends)?: Good participation with social networks   How would you rate their financial resources (including ability to afford all required medical care)?: Financially secure, resources adequate, no identified problems   How wells does the patient now understand their health and well-being (symptoms, signs or risk factors) and what they need to do to manage their health?: Reasonable to good understanding and already engages in managing health or is willing to undertake better management   How well do you think your patient can engage in healthcare discussions? (Barriers include language, deafness, aphasia, alcohol or drug problems, learning difficulties, concentration): Clear and open communication, no identified barriers   Do other services need to be involved to help this patient?: Other care/services not required at this time   Are current services involved with this patient well-coordinated? (Include coordination with other services you are now recommendation): All required care/services in place and well-coordinated   Suggested Interventions and Community Resources  Diabetes Education: Completed   Fall Risk Prevention: Completed Other Services or Interventions: review of self mgmt concepts; review of appts; review of medications; community-based resources which may prove helpful for added support   Specialty Service Referral: Completed   Other Services: Completed   Transportation Services: Completed   Zone Management Tools: Completed         Schedule an appointment with the patient's PCP, Set up/Review an Education Plan, Set up/Review Goals              Prior to Admission medications    Medication Sig Start Date End Date Taking?  Authorizing Provider   spironolactone (ALDACTONE) 25 MG tablet Take 1 tablet by mouth daily 3/29/22  Yes Devon Ross MD   nortriptyline (PAMELOR) 10 MG capsule TAKE 1 CAPSULE BY MOUTH EVERY EVENING 3/29/22  Yes Devon Ross MD   isosorbide mononitrate (IMDUR) 30 MG extended release tablet Take 1 tablet by mouth daily 3/29/22  Yes Devon Ross MD   clopidogrel (PLAVIX) 75 MG tablet Take 1 tablet by mouth daily 3/29/22  Yes Devon Ross MD   rosuvastatin (CRESTOR) 20 MG tablet TAKE 1 TABLET BY MOUTH EVERY DAY 3/29/22  Yes Ngoc Vidal Elsa Walker MD   rOPINIRole (REQUIP) 0.25 MG tablet Take 1 tablet by mouth in the morning and at bedtime 3/29/22  Yes Rogerio Rivas MD   nitroGLYCERIN (NITROSTAT) 0.4 MG SL tablet Place 1 tablet under the tongue every 5 minutes as needed for Chest pain 3/22/22  Yes MARCIE Martino CNP   irbesartan-hydroCHLOROthiazide (AVALIDE) 150-12.5 MG per tablet TAKE 1 TABLET EVERY DAY 2/11/22  Yes Rogerio Rivas MD   ASPIRIN LOW DOSE 81 MG EC tablet TAKE 1 TABLET BY MOUTH EVERY DAY 11/29/21  Yes MARCIE Martino CNP   levothyroxine (SYNTHROID) 150 MCG tablet Take 1 tablet by mouth every morning (before breakfast) 9/8/21  Yes Rogerio Rivas MD   allopurinol (ZYLOPRIM) 100 MG tablet TAKE 1 TABLET EVERY DAY 9/8/21  Yes Rogerio Rivas MD   metoprolol succinate (TOPROL XL) 25 MG extended release tablet TAKE 1 TABLET EVERY DAY 9/8/21  Yes Rogerio Rivas MD   torsemide (DEMADEX) 20 MG tablet Take 3 tablets by mouth 2 times daily  Patient taking differently: Take 120 mg by mouth 2 times daily 6 tabs bid 8/31/21  Yes Damien Webb MD   loperamide (IMODIUM) 2 MG capsule Take 2 mg by mouth 4 times daily as needed  2/14/21  Yes Historical Provider, MD   gabapentin (NEURONTIN) 100 MG capsule Take 2 capsules by mouth nightly for 30 days.  3/29/22 4/28/22  Rogerio Rivas MD       Future Appointments   Date Time Provider Rod Plummer   7/8/2022  8:00 AM Sivakumar Manning MD [facility]

## 2022-05-02 NOTE — TELEPHONE ENCOUNTER
I agree with the Care Coordinator's Plan of Care--please tell her to con't the requio and if NB by next week, to call

## 2022-05-03 ENCOUNTER — CARE COORDINATION (OUTPATIENT)
Dept: CARE COORDINATION | Age: 77
End: 2022-05-03

## 2022-05-03 NOTE — CARE COORDINATION
Follow up outreach to review w/pt PCP advises continue taking Requip and call in 1 week if symptoms no better. Pt reviews she will be out of town for the next 5 weeks, visiting w/her daughter. Reviewed use of MyChart and/or phone to review w/PCP. Pt verbalized understanding and will continue taking Requip as advised and will outreach to PCP accordingly. Due to pt visiting out of town w/her daughter, mutually agreed for a one time [facility] outreach over the next month and that pt understands ACM remains available for pt to outreach as needed, as well.

## 2022-05-19 RX ORDER — NORTRIPTYLINE HYDROCHLORIDE 10 MG/1
CAPSULE ORAL
Qty: 90 CAPSULE | Refills: 3 | Status: CANCELLED | OUTPATIENT
Start: 2022-05-19

## 2022-05-19 RX ORDER — NORTRIPTYLINE HYDROCHLORIDE 10 MG/1
CAPSULE ORAL
Qty: 90 CAPSULE | Refills: 3 | Status: SHIPPED | OUTPATIENT
Start: 2022-05-19

## 2022-05-19 NOTE — TELEPHONE ENCOUNTER
Pharmacy Ph [phone]    Pt would like to have something to help her sleep and combat restless legs. Can she take one additional gabapentin? Please call pt.     LOV

## 2022-05-19 NOTE — TELEPHONE ENCOUNTER
Msg given to patient - ok to take one additional gabapentin and nortriptyline ws sent to her pharmacy.

## 2022-06-01 ENCOUNTER — CARE COORDINATION (OUTPATIENT)
Dept: CARE COORDINATION | Age: 77
End: 2022-06-01

## 2022-06-01 NOTE — CARE COORDINATION
Ambulatory Care Coordination Note  6/1/2022  CM Risk Score: 6  Charlson 10 Year Mortality Risk Score: 100%     ACC: Kenia Stafford, RN  General Assessment    Do you have any symptoms that are causing concern?: Yes  Progression since Onset: Unchanged  Reported Symptoms: Other (Comment: restless legs)       Summary Note: c/o 'restless legs' haven't improved. Continuing to take medications as prescribed. Shares that she knows she needs to also follow w/ortho for potential knee replacement and questions if PCP has recommendation for an ortho surgeon. Advised pt to schedule w/PCP for review of above. Pt states she is still out of town visiting w/her daughter, but agrees to call PCP today for visit on her return this week. Pt verbalized understanding of above and agreement with the following  Plan: pt will schedule follow up w/PCP within the week. Continue [facility] support for health coaching, care collaboration, support w/community resources, and help with any newly presenting concerns as needed.   Pt understands in availability to PCP June 9-14 if requiring any ACM assistance (ACM will be on PTO & name/contact information for alternate ACM will be provided to PCP care team)  Since pt plans to follow w/PCP, [facility] outreach will be planned for 3-4 weeks  Pt agrees to outreach direct to ACM, as needed, between routine follow up outreach initiated by [facility]  Congestive Heart Failure Assessment    Are you currently restricting fluids?: No Restriction  Do you understand a low sodium diet?: Yes  Do you understand how to read food labels?: Yes  How many restaurant meals do you eat per week?: 0  Do you salt your food before tasting it?: No     No patient-reported symptoms      Symptoms:  None: Yes      Symptom course: stable  Weight trend: stable  Salt intake watch compared to last visit: stable       Diabetes Assessment    Medic Alert ID: No  Meal Planning: Plate Method, Avoidance of concentrated sweets, Carb counting   Do you have barriers with adherence to non-pharmacologic self-management interventions? (Nutrition/Exercise/Self-Monitoring): No   Have you ever had to go to the ED for symptoms of low blood sugar?: No       No patient-reported symptoms        Care Coordination Interventions    Referral from Primary Care Provider: No  Suggested Interventions and Community Resources  Diabetes Education: Completed  Fall Risk Prevention: Completed  Specialty Services Referral: Completed (Comment: [facility] [phone]; Dr Blas Setting [facility] [phone])  Other Services: Completed (Comment: HD on M, W., F)  Transportation Support: Completed  Zone Management Tools: Completed (Comment: DM & CHF)  Other Services or Interventions: review of self mgmt concepts; review of appts; review of medications; community-based resources which may prove helpful for added support         Goals Addressed                    This Visit's Progress      Conditions and Symptoms (pt-stated)   On track      I will schedule office visits, as directed by my provider. I will keep my appointment or reschedule if I have to cancel. I will notify my provider of any barriers to my plan of care. I will follow my Zone Management tool to seek urgent or emergent care. I will notify my provider of any symptoms that indicate a worsening of my condition. Barriers: fear of failure  Plan for overcoming my barriers: engage in Care Coordination for added support  Confidence: 10/10  Anticipated Goal Completion Date: 11.2.22          resolution or improvement of lower extremity symptoms (pt-stated)   No change      Wants relief/resolve of RLS     Barriers: fear of failure  Plan for overcoming my barriers: continue following w/providers involved in my care; engage in Care Coordination for added care team support  Confidence: 10/10  Anticipated Goal Completion Date: 12.1.22            Prior to Admission medications    Medication Sig Start Date End Date Taking?  Authorizing Provider   nortriptyline (PAMELOR) 10 MG capsule TAKE 1 CAPSULE BY MOUTH EVERY EVENING 5/19/22   Douglas Nicolas MD   spironolactone (ALDACTONE) 25 MG tablet Take 1 tablet by mouth daily 3/29/22   Douglas Nicolas MD   gabapentin (NEURONTIN) 100 MG capsule Take 2 capsules by mouth nightly for 30 days.  3/29/22 4/28/22  Douglas Nicolas MD   isosorbide mononitrate (IMDUR) 30 MG extended release tablet Take 1 tablet by mouth daily 3/29/22   Douglas Nicolas MD   clopidogrel (PLAVIX) 75 MG tablet Take 1 tablet by mouth daily 3/29/22   Douglas Nicolas MD   rosuvastatin (CRESTOR) 20 MG tablet TAKE 1 TABLET BY MOUTH EVERY DAY 3/29/22   Douglas Nicolas MD   rOPINIRole (REQUIP) 0.25 MG tablet Take 1 tablet by mouth in the morning and at bedtime 3/29/22   Douglas Nicolas MD   nitroGLYCERIN (NITROSTAT) 0.4 MG SL tablet Place 1 tablet under the tongue every 5 minutes as needed for Chest pain 3/22/22   MARCIE Little - IVY   irbesartan-hydroCHLOROthiazide (AVALIDE) 150-12.5 MG per tablet TAKE 1 TABLET EVERY DAY 2/11/22   Douglas Nicolas MD   ASPIRIN LOW DOSE 81 MG EC tablet TAKE 1 TABLET BY MOUTH EVERY DAY 11/29/21   MARCIE Little - CNP   levothyroxine (SYNTHROID) 150 MCG tablet Take 1 tablet by mouth every morning (before breakfast) 9/8/21   Douglas Nicolas MD   allopurinol (ZYLOPRIM) 100 MG tablet TAKE 1 TABLET EVERY DAY 9/8/21   Douglas Nicolas MD   metoprolol succinate (TOPROL XL) 25 MG extended release tablet TAKE 1 TABLET EVERY DAY 9/8/21   Douglas Nicolas MD   torsemide (DEMADEX) 20 MG tablet Take 3 tablets by mouth 2 times daily  Patient taking differently: Take 120 mg by mouth 2 times daily 6 tabs bid 8/31/21   Damien Prieto MD   loperamide (IMODIUM) 2 MG capsule Take 2 mg by mouth 4 times daily as needed  2/14/21   Historical Provider, MD       Future Appointments   Date Time Provider Rod Plummer   7/8/2022  8:00 AM Ariel Null MD [facility]

## 2022-06-02 RX ORDER — ASPIRIN 81 MG/1
TABLET, COATED ORAL
Qty: 90 TABLET | Refills: 1 | Status: SHIPPED | OUTPATIENT
Start: 2022-06-02

## 2022-06-14 ENCOUNTER — OFFICE VISIT (OUTPATIENT)
Dept: FAMILY MEDICINE CLINIC | Age: 77
End: 2022-06-14
Payer: MEDICARE

## 2022-06-14 VITALS
DIASTOLIC BLOOD PRESSURE: 70 MMHG | HEIGHT: 65 IN | BODY MASS INDEX: 31.79 KG/M2 | OXYGEN SATURATION: 96 % | SYSTOLIC BLOOD PRESSURE: 138 MMHG | WEIGHT: 190.8 LBS | HEART RATE: 76 BPM

## 2022-06-14 DIAGNOSIS — R41.3 MEMORY LOSS: ICD-10-CM

## 2022-06-14 DIAGNOSIS — G25.81 RLS (RESTLESS LEGS SYNDROME): ICD-10-CM

## 2022-06-14 DIAGNOSIS — R07.89 OTHER CHEST PAIN: ICD-10-CM

## 2022-06-14 DIAGNOSIS — I50.22 CHRONIC SYSTOLIC (CONGESTIVE) HEART FAILURE (HCC): ICD-10-CM

## 2022-06-14 PROBLEM — A41.9 SEPTICEMIA (HCC): Status: RESOLVED | Noted: 2021-08-25 | Resolved: 2022-06-14

## 2022-06-14 PROCEDURE — 99213 OFFICE O/P EST LOW 20 MIN: CPT | Performed by: FAMILY MEDICINE

## 2022-06-14 PROCEDURE — G8399 PT W/DXA RESULTS DOCUMENT: HCPCS | Performed by: FAMILY MEDICINE

## 2022-06-14 PROCEDURE — 1090F PRES/ABSN URINE INCON ASSESS: CPT | Performed by: FAMILY MEDICINE

## 2022-06-14 PROCEDURE — 1036F TOBACCO NON-USER: CPT | Performed by: FAMILY MEDICINE

## 2022-06-14 PROCEDURE — G8427 DOCREV CUR MEDS BY ELIG CLIN: HCPCS | Performed by: FAMILY MEDICINE

## 2022-06-14 PROCEDURE — 1123F ACP DISCUSS/DSCN MKR DOCD: CPT | Performed by: FAMILY MEDICINE

## 2022-06-14 PROCEDURE — G8417 CALC BMI ABV UP PARAM F/U: HCPCS | Performed by: FAMILY MEDICINE

## 2022-06-14 RX ORDER — ROPINIROLE 0.25 MG/1
0.25 TABLET, FILM COATED ORAL NIGHTLY
Qty: 30 TABLET | Refills: 3 | Status: SHIPPED | OUTPATIENT
Start: 2022-06-14 | End: 2022-07-21 | Stop reason: SDUPTHER

## 2022-06-14 RX ORDER — SODIUM POLYSTYRENE SULFONATE 15 G/60ML
SUSPENSION ORAL; RECTAL
COMMUNITY
Start: 2022-06-09

## 2022-06-14 RX ORDER — PANTOPRAZOLE SODIUM 40 MG/1
40 TABLET, DELAYED RELEASE ORAL
Qty: 30 TABLET | Refills: 3 | Status: SHIPPED | OUTPATIENT
Start: 2022-06-14

## 2022-06-14 SDOH — ECONOMIC STABILITY: FOOD INSECURITY: WITHIN THE PAST 12 MONTHS, THE FOOD YOU BOUGHT JUST DIDN'T LAST AND YOU DIDN'T HAVE MONEY TO GET MORE.: NEVER TRUE

## 2022-06-14 SDOH — ECONOMIC STABILITY: FOOD INSECURITY: WITHIN THE PAST 12 MONTHS, YOU WORRIED THAT YOUR FOOD WOULD RUN OUT BEFORE YOU GOT MONEY TO BUY MORE.: NEVER TRUE

## 2022-06-14 ASSESSMENT — PATIENT HEALTH QUESTIONNAIRE - PHQ9
1. LITTLE INTEREST OR PLEASURE IN DOING THINGS: 0
2. FEELING DOWN, DEPRESSED OR HOPELESS: 0
SUM OF ALL RESPONSES TO PHQ QUESTIONS 1-9: 0
SUM OF ALL RESPONSES TO PHQ9 QUESTIONS 1 & 2: 0
SUM OF ALL RESPONSES TO PHQ QUESTIONS 1-9: 0

## 2022-06-14 ASSESSMENT — ENCOUNTER SYMPTOMS
ORTHOPNEA: 0
SHORTNESS OF BREATH: 0
BACK PAIN: 0
NAUSEA: 0
ABDOMINAL PAIN: 0
COUGH: 0
VOMITING: 0
HEMOPTYSIS: 0
SPUTUM PRODUCTION: 0

## 2022-06-14 ASSESSMENT — SOCIAL DETERMINANTS OF HEALTH (SDOH): HOW HARD IS IT FOR YOU TO PAY FOR THE VERY BASICS LIKE FOOD, HOUSING, MEDICAL CARE, AND HEATING?: NOT HARD AT ALL

## 2022-06-14 NOTE — PROGRESS NOTES
Subjective:     Patient ID:Judy Hayward is a 67 y.o. female. Chest Pain   This is a recurrent problem. The current episode started more than 1 month ago. The onset quality is sudden. The problem has been waxing and waning. The pain is present in the substernal region. The pain is mild. The quality of the pain is described as burning and pressure. Pertinent negatives include no abdominal pain, back pain, claudication, cough, diaphoresis, dizziness, exertional chest pressure, fever, headaches, hemoptysis, irregular heartbeat, leg pain, lower extremity edema, malaise/fatigue, nausea, near-syncope, numbness, orthopnea, palpitations, PND, shortness of breath, sputum production, syncope, vomiting or weakness. The pain is aggravated by nothing (only at rest--also better with antacid). She has tried antacids for the symptoms. The treatment provided significant relief. Her past medical history is significant for CAD. Prior diagnostic workup includes cardiac catheterization.        Allergies   Allergen Reactions    Amoxicillin Anaphylaxis    Demeclocycline Anaphylaxis    Penicillins Anaphylaxis    Tetracyclines & Related Anaphylaxis    Ozempic (0.25 Or 0.5 Mg-Dose) [Semaglutide(0.25 Or 0.5mg-Dos)] Other (See Comments)     Lost large amount weight and loss  Of appetite    Codeine Itching and Rash       Current Outpatient Medications   Medication Sig Dispense Refill    SPS 15 GM/60ML suspension       rOPINIRole (REQUIP) 0.25 MG tablet Take 1 tablet by mouth nightly 30 tablet 3    pantoprazole (PROTONIX) 40 MG tablet Take 1 tablet by mouth daily (with breakfast) 30 tablet 3    ASPIRIN LOW DOSE 81 MG EC tablet TAKE 1 TABLET BY MOUTH EVERY DAY 90 tablet 1    nortriptyline (PAMELOR) 10 MG capsule TAKE 1 CAPSULE BY MOUTH EVERY EVENING 90 capsule 3    spironolactone (ALDACTONE) 25 MG tablet Take 1 tablet by mouth daily 90 tablet 3    gabapentin (NEURONTIN) 100 MG capsule Take 2 capsules by mouth nightly for 30 days. 60 capsule 3    clopidogrel (PLAVIX) 75 MG tablet Take 1 tablet by mouth daily 90 tablet 3    rosuvastatin (CRESTOR) 20 MG tablet TAKE 1 TABLET BY MOUTH EVERY DAY 90 tablet 1    nitroGLYCERIN (NITROSTAT) 0.4 MG SL tablet Place 1 tablet under the tongue every 5 minutes as needed for Chest pain 25 tablet 3    irbesartan-hydroCHLOROthiazide (AVALIDE) 150-12.5 MG per tablet TAKE 1 TABLET EVERY DAY 90 tablet 1    levothyroxine (SYNTHROID) 150 MCG tablet Take 1 tablet by mouth every morning (before breakfast) 90 tablet 3    allopurinol (ZYLOPRIM) 100 MG tablet TAKE 1 TABLET EVERY DAY 90 tablet 3    metoprolol succinate (TOPROL XL) 25 MG extended release tablet TAKE 1 TABLET EVERY DAY 90 tablet 3    torsemide (DEMADEX) 20 MG tablet Take 3 tablets by mouth 2 times daily (Patient taking differently: Take 120 mg by mouth 2 times daily 6 tabs bid) 30 tablet 3     No current facility-administered medications for this visit. Past Medical History:   Diagnosis Date    Arthritis     CAD (coronary artery disease)     Diabetes ([facility]     Hemodialysis patient ([facility]     Hyperlipidemia     Hypertension     Kidney disease     Neuropathy     Pneumonia     IN February, 2021, with COVID    Thyroid disease        Past Surgical History:   Procedure Laterality Date    APPENDECTOMY      CHOLECYSTECTOMY      COLONOSCOPY  11/24/2020    COLONOSCOPY POLYPECTOMY SNARE/COLD BIOPSY performed by Yash Luis MD at [address] 5/17/2021    PERITONEAL DIALYSIS CATHETER REMOVAL.  [address] performed by Annette Hawley DO at [address])      FULL    LAPAROSCOPY INSERTION PERITONEAL CATHETER N/A 4/3/2020    LAPAROSCOPIC LYSIS OF ADHESIONS, LAPAROSCOPIC PERITONEAL DIALYSIS CATHETER PLACEMENT, LAPAROSCOPIC OMENTOPEXY performed by Annette Hawley DO at [facility], BILATERAL      preventive    ROTATOR CUFF REPAIR Left     SHUNT REVISION Left 2021    LEFT FOREARM ARTERIO VENOUS GRAFT performed by Tracey Otto MD at [facility] N/A 2020    EGD BIOPSY performed by Serenity Workman MD at [address] History     Socioeconomic History    Marital status:      Spouse name: Not on file    Number of children: 3    Years of education: Not on file    Highest education level: Not on file   Occupational History    Occupation: retired clerical   Tobacco Use    Smoking status: Former Smoker     Packs/day: 0.50     Years: 10.00     Pack years: 5.00     Types: Cigarettes     Quit date: 1978     Years since quittin.9    Smokeless tobacco: Never Used   Vaping Use    Vaping Use: Never used   Substance and Sexual Activity    Alcohol use: Yes     Comment: every other week one glass of wine    Drug use: Never    Sexual activity: Not Currently   Other Topics Concern    Not on file   Social History Narrative    Lives with son--recently moved from [facility] and now here    [facility]     Social Determinants of Health     Financial Resource Strain: Low Risk     Difficulty of Paying Living Expenses: Not hard at all   Food Insecurity: No Food Insecurity    Worried About [address] in the Last Year: Never true    [address] [facility] [address] in the Last Year: Never true   Transportation Needs:     Lack of Transportation (Medical): Not on file    Lack of Transportation (Non-Medical):  Not on file   Physical Activity:     Days of Exercise per Week: Not on file    Minutes of Exercise per Session: Not on file   Stress:     Feeling of Stress : Not on file   Social Connections:     Frequency of Communication with Friends and Family: Not on file    Frequency of Social Gatherings with Friends and Family: Not on file    Attends [employer] Services: Not on file    Active Member of Clubs or Organizations: Not on file    Attends Club or Organization Meetings: Not on file   Raad Marital Status: Not on file   Intimate Partner Violence:     Fear of Current or Ex-Partner: Not on file    Emotionally Abused: Not on file    Physically Abused: Not on file    Sexually Abused: Not on file   Housing Stability:     Unable to Pay for Housing in the Last Year: Not on file    Number of [place] in the Last Year: Not on file    Unstable Housing in the Last Year: Not on file       Family History   Problem Relation Age of Onset    Cancer Mother         lung, breast, liver    Stroke Father     Stomach Cancer Brother     Cancer Maternal Grandmother     No Known Problems Paternal Grandmother     No Known Problems Paternal Grandfather        Immunization History   Administered Date(s) Administered    COVID-19, J&J, PF, 0.5 mL 10/26/2021, 01/27/2022    Influenza, High Dose (Fluzone 65 yrs and older) 01/23/2017, 11/01/2017    Influenza, Triv, inactivated, subunit, adjuvanted, IM (Fluad 65 yrs and older) 10/28/2019    Pneumococcal Conjugate 13-valent ([id number]) 01/23/2017    Pneumococcal Polysaccharide ([id number]) 05/10/2019       Review of Systems  Review of Systems   Constitutional: Negative for diaphoresis, fever and malaise/fatigue. Respiratory: Negative for cough, hemoptysis, sputum production and shortness of breath. Cardiovascular: Positive for chest pain. Negative for palpitations, orthopnea, claudication, syncope, PND and near-syncope. Gastrointestinal: Negative for abdominal pain, nausea and vomiting. Musculoskeletal: Negative for back pain. Neurological: Negative for dizziness, weakness, numbness and headaches. Objective:   Physical Exam  Physical Exam  Vitals reviewed. Constitutional:       General: She is not in acute distress. Appearance: She is well-developed. Eyes:      Conjunctiva/sclera: Conjunctivae normal.      Pupils: Pupils are equal, round, and reactive to light. Neck:      Thyroid: No thyromegaly. Vascular: No JVD.       Trachea: No tracheal deviation. Cardiovascular:      Rate and Rhythm: Normal rate and regular rhythm. Heart sounds: Normal heart sounds. No murmur heard. No gallop. Pulmonary:      Effort: Pulmonary effort is normal. No respiratory distress. Breath sounds: Normal breath sounds. No stridor. No wheezing or rales. Chest:      Chest wall: No tenderness. Abdominal:      General: Bowel sounds are normal. There is no distension. Palpations: Abdomen is soft. There is no mass. Tenderness: There is no abdominal tenderness. Musculoskeletal:         General: No tenderness. Lymphadenopathy:      Cervical: No cervical adenopathy. Skin:     General: Skin is warm and dry. Coloration: Skin is not pale. Findings: No erythema or rash. Neurological:      Mental Status: She is alert and oriented to person, place, and time. Cranial Nerves: No cranial nerve deficit. Motor: No abnormal muscle tone. Coordination: Coordination normal.      Deep Tendon Reflexes: Reflexes normal.   Psychiatric:         Behavior: Behavior normal.         Thought Content: Thought content normal.         Judgment: Judgment normal.         Assessment and Plan:     Memory loss   Unchanged--wonder if this will be improved with tx for RLS    RLS (restless legs syndrome)   Uncontrolled, changes made today: trial of requip    Chest pain   Atypical-?  GERD--trial of protonix--will see card soon

## 2022-06-24 ENCOUNTER — CARE COORDINATION (OUTPATIENT)
Dept: CARE COORDINATION | Age: 77
End: 2022-06-24

## 2022-06-24 NOTE — CARE COORDINATION
Ambulatory Care Coordination Note  6/24/2022  CM Risk Score: 7  Charlson 10 Year Mortality Risk Score: 100%     ACC: Lennox Singh RN  General Assessment    Do you have any symptoms that are causing concern?: Yes  Progression since Onset: Gradually Improving  Reported Symptoms: Other (Comment: symptoms of Restless leg syndrome NOTICEABLY IMPROVED s/p recent Rx)       Summary Note: wishes for PCP to know symptoms r/t RLS are NOTICEABLY IMPROVED since taking more recent medication to trial - pt unable to recall name. She is @ dialysis right now, declines line item review of medications for this reason. But wants PCP to know 'SEEMS TO BE WORKING WELL'. Pt does further report intermittent 'chills' over the past week. Denies any associated symptoms, notices chiefly when going in/out of a/c. She rests & takes Tylenol prn. Denies belief that chills are r/t to anything more than a/c. ACM advised s/s to observe, report, when to notify physician; and since it is Friday - reviewed use of after hours' provider line, as appropriate if needed. Pt verbalized understanding of above and agreement with the following  Plan: pt will promptly report any new onset, worsening, or unresolving symptoms to PCP directly during office hours and/or via use of after hours' provider line as appropriate. Continue [facility] support for health coaching, care collaboration, support w/community resources, and help with any newly presenting concerns as needed. [facility] will request mailing of HF patient education for summer months, in addition to sending via [address], d/t pt does not routinely access [employer].   Pt agrees to outreach direct to [employer], as needed, between routine follow up outreach initiated by [facility]    Care Coordination Interventions    Referral from Primary Care Provider: No  Suggested Interventions and Community Resources  Diabetes Education: Completed  Fall Risk Prevention: Completed  Specialty Services Referral: Completed (Comment: 6.1.22 pt will review w/PCP re Ortho referral   ;[facility] [phone]; Dr Rodriguez Melton [facility]) [phone])  Other Services: Completed (Comment: HD on M, W., F)  Transportation Support: Completed  Zone Management Tools: Completed (Comment: DM & CHF)  Other Services or Interventions: review of self mgmt concepts; review of appts; review of medications; community-based resources which may prove helpful for added support         Goals Addressed                    This Visit's Progress      Conditions and Symptoms (pt-stated)   On track      I will schedule office visits, as directed by my provider. I will keep my appointment or reschedule if I have to cancel. I will notify my provider of any barriers to my plan of care. I will follow my Zone Management tool to seek urgent or emergent care. I will notify my provider of any symptoms that indicate a worsening of my condition. Barriers: fear of failure  Plan for overcoming my barriers: engage in Care Coordination for added support  Confidence: 10/10  Anticipated Goal Completion Date: 11.2.22          resolution or improvement of lower extremity symptoms (pt-stated)   On track      Wants relief/resolve of RLS     Barriers: fear of failure  Plan for overcoming my barriers: continue following w/providers involved in my care; engage in Care Coordination for added care team support  Confidence: 10/10  Anticipated Goal Completion Date: 12.1.22            Prior to Admission medications    Medication Sig Start Date End Date Taking?  Authorizing Provider   SPS 15 GM/60ML suspension  6/9/22   Historical Provider, MD   rOPINIRole (REQUIP) 0.25 MG tablet Take 1 tablet by mouth nightly 6/14/22   Martín Sanchez MD   pantoprazole (PROTONIX) 40 MG tablet Take 1 tablet by mouth daily (with breakfast) 6/14/22   Martín Sanchez MD   ASPIRIN LOW DOSE 81 MG EC tablet TAKE 1 TABLET BY MOUTH EVERY DAY 6/2/22   MARCIE Bansal - CNP   nortriptyline (PAMELOR) 10 MG capsule TAKE 1 CAPSULE BY MOUTH EVERY EVENING 5/19/22   Marck Koenig MD   spironolactone (ALDACTONE) 25 MG tablet Take 1 tablet by mouth daily 3/29/22   Marck Koenig MD   gabapentin (NEURONTIN) 100 MG capsule Take 2 capsules by mouth nightly for 30 days.  3/29/22 6/14/22  Marck Koenig MD   clopidogrel (PLAVIX) 75 MG tablet Take 1 tablet by mouth daily 3/29/22   Marck Koenig MD   rosuvastatin (CRESTOR) 20 MG tablet TAKE 1 TABLET BY MOUTH EVERY DAY 3/29/22   Marck Koenig MD   nitroGLYCERIN (NITROSTAT) 0.4 MG SL tablet Place 1 tablet under the tongue every 5 minutes as needed for Chest pain 3/22/22   MARCIE Souza - CNP   irbesartan-hydroCHLOROthiazide (AVALIDE) 150-12.5 MG per tablet TAKE 1 TABLET EVERY DAY 2/11/22   Marck Koenig MD   levothyroxine (SYNTHROID) 150 MCG tablet Take 1 tablet by mouth every morning (before breakfast) 9/8/21   Marck Koenig MD   allopurinol (ZYLOPRIM) 100 MG tablet TAKE 1 TABLET EVERY DAY 9/8/21   Marck Koenig MD   metoprolol succinate (TOPROL XL) 25 MG extended release tablet TAKE 1 TABLET EVERY DAY 9/8/21   Marck Koenig MD   torsemide (DEMADEX) 20 MG tablet Take 3 tablets by mouth 2 times daily  Patient taking differently: Take 120 mg by mouth 2 times daily 6 tabs bid 8/31/21   Damien Brenner MD       Future Appointments   Date Time Provider Rod Plummer   7/8/2022  8:00 AM Gilmar Farooq MD [facility]

## 2022-06-29 RX ORDER — GABAPENTIN 100 MG/1
200 CAPSULE ORAL NIGHTLY
Qty: 60 CAPSULE | Refills: 3 | Status: SHIPPED | OUTPATIENT
Start: 2022-06-29 | End: 2022-07-21 | Stop reason: SDUPTHER

## 2022-07-08 ENCOUNTER — CARE COORDINATION (OUTPATIENT)
Dept: CARE COORDINATION | Age: 77
End: 2022-07-08

## 2022-07-08 NOTE — CARE COORDINATION
Routine follow up outreach for review pt status/progress towards goals. Left vm requesting return callback.     Provided & repeated direct callback to reach ACM    Encouraged return callback at pt's earliest convenience

## 2022-07-21 ENCOUNTER — TELEPHONE (OUTPATIENT)
Dept: FAMILY MEDICINE CLINIC | Age: 77
End: 2022-07-21

## 2022-07-21 RX ORDER — ROPINIROLE 0.25 MG/1
0.25 TABLET, FILM COATED ORAL NIGHTLY
Qty: 90 TABLET | Refills: 3 | Status: SHIPPED | OUTPATIENT
Start: 2022-07-21

## 2022-07-21 RX ORDER — GABAPENTIN 100 MG/1
200 CAPSULE ORAL NIGHTLY
Qty: 180 CAPSULE | Refills: 3 | Status: SHIPPED | OUTPATIENT
Start: 2022-07-21 | End: 2022-10-19

## 2022-07-21 NOTE — TELEPHONE ENCOUNTER
Pt asking for refills of    rOPINIRole (REQUIP) 0.25 MG tablet  Take 1 tablet by mouth nightly     gabapentin (NEURONTIN) 100 MG capsule  TAKE 2 CAPSULES BY MOUTH NIGHTLY FOR 30 DAYS.       [facility] ([address] [facility]) - [facility] [address] [phone] - F [phone]    LOV: 6/14/22  No FOV

## 2022-07-27 RX ORDER — IRBESARTAN AND HYDROCHLOROTHIAZIDE 150; 12.5 MG/1; MG/1
TABLET, FILM COATED ORAL
Qty: 90 TABLET | Refills: 1 | Status: SHIPPED | OUTPATIENT
Start: 2022-07-27

## 2022-08-19 ENCOUNTER — CARE COORDINATION (OUTPATIENT)
Dept: CARE COORDINATION | Age: 77
End: 2022-08-19

## 2022-08-19 NOTE — CARE COORDINATION
Completed (Comment: DM & CHF)  Other Services or Interventions: review of self mgmt concepts; review of appts; review of medications; community-based resources which may prove helpful for added support          Goals Addressed                      This Visit's Progress      Conditions and Symptoms (pt-stated)   On track      I will schedule office visits, as directed by my provider. I will keep my appointment or reschedule if I have to cancel. I will notify my provider of any barriers to my plan of care. I will follow my Zone Management tool to seek urgent or emergent care. I will notify my provider of any symptoms that indicate a worsening of my condition. Barriers: fear of failure  Plan for overcoming my barriers: engage in Care Coordination for added support  Confidence: 10/10  Anticipated Goal Completion Date: 11.2.22          resolution or improvement of lower extremity symptoms (pt-stated)   On track      Wants relief/resolve of RLS     Barriers: fear of failure  Plan for overcoming my barriers: continue following w/providers involved in my care; engage in Care Coordination for added care team support  Confidence: 10/10  Anticipated Goal Completion Date: 12.1.22              Prior to Admission medications    Medication Sig Start Date End Date Taking? Authorizing Provider   irbesartan-hydroCHLOROthiazide (AVALIDE) 150-12.5 MG per tablet TAKE 1 TABLET EVERY DAY 7/27/22  Yes Bruno Simental MD   gabapentin (NEURONTIN) 100 MG capsule Take 2 capsules by mouth nightly for 90 days.  7/21/22 10/19/22 Yes Bruno Simental MD   rOPINIRole (REQUIP) 0.25 MG tablet Take 1 tablet by mouth nightly 7/21/22  Yes Bruno Simental MD   vitamin D3 (CHOLECALCIFEROL) 25 MCG (1000 UT) TABS tablet Take 1 tablet by mouth daily 7/8/22  Yes MARCIE Powers CNP   SPS 15 GM/60ML suspension  6/9/22  Yes Historical Provider, MD   pantoprazole (PROTONIX) 40 MG tablet Take 1 tablet by mouth daily (with breakfast) 6/14/22 Yes Ana Vazquez MD   ASPIRIN LOW DOSE 81 MG EC tablet TAKE 1 TABLET BY MOUTH EVERY DAY 6/2/22  Yes MARCIE Parkinson CNP   nortriptyline (PAMELOR) 10 MG capsule TAKE 1 CAPSULE BY MOUTH EVERY EVENING 5/19/22  Yes Ana Vazquez MD   spironolactone (ALDACTONE) 25 MG tablet Take 1 tablet by mouth daily 3/29/22  Yes Ana Vazquez MD   clopidogrel (PLAVIX) 75 MG tablet Take 1 tablet by mouth daily 3/29/22  Yes Ana Vazquez MD   rosuvastatin (CRESTOR) 20 MG tablet TAKE 1 TABLET BY MOUTH EVERY DAY 3/29/22  Yes Ana Vazquez MD   nitroGLYCERIN (NITROSTAT) 0.4 MG SL tablet Place 1 tablet under the tongue every 5 minutes as needed for Chest pain 3/22/22  Yes MARCIE Parkinson CNP   levothyroxine (SYNTHROID) 150 MCG tablet Take 1 tablet by mouth every morning (before breakfast) 9/8/21  Yes Ana Vazquez MD   allopurinol (ZYLOPRIM) 100 MG tablet TAKE 1 TABLET EVERY DAY 9/8/21  Yes Ana Vazquez MD   metoprolol succinate (TOPROL XL) 25 MG extended release tablet TAKE 1 TABLET EVERY DAY 9/8/21  Yes Ana Vazquez MD   torsemide (DEMADEX) 20 MG tablet Take 3 tablets by mouth 2 times daily  Patient taking differently: Take 120 mg by mouth 2 times daily 6 tabs bid 8/31/21  Yes Damien Cota MD       No future appointments.

## 2022-08-19 NOTE — CARE COORDINATION
Routine follow up outreach for review pt status/progress towards goals. Left vm requesting return callback, noting last successful outreach was end of June.     Provided & repeated direct callback to reach ACM    Encouraged return callback at pt's earliest convenience

## 2022-09-08 RX ORDER — ROSUVASTATIN CALCIUM 20 MG/1
TABLET, COATED ORAL
Qty: 90 TABLET | Refills: 1 | OUTPATIENT
Start: 2022-09-08

## 2022-09-13 RX ORDER — CLOPIDOGREL BISULFATE 75 MG/1
TABLET ORAL
Qty: 90 TABLET | Refills: 3 | OUTPATIENT
Start: 2022-09-13

## 2022-09-19 ENCOUNTER — CARE COORDINATION (OUTPATIENT)
Dept: CARE COORDINATION | Age: 77
End: 2022-09-19

## 2022-09-19 NOTE — CARE COORDINATION
Ambulatory Care Coordination Note  9/19/2022    ACC: Thi Hernandez RN  General Assessment    Do you have any symptoms that are causing concern?: No        Summary Note: reviewed progress towards goals. Pt explains recent cancellation to appt @  Cardiology was d/t notification from transportation co that her  was dx Covid. They did not have a replacement  to offer & pt could not secure a ride by other means in such short notice. The agency had only notified her the day of her appt. Pt does report, ordinarily, medical transportation is a non-issue for concern. ACM reviewed option for pt participation in [facility] program, for CM. Pt invites outreach by care team member from [facility], but advises she has a 5 week visit planned in [address] [place] [address] with her daughter and she will leave Sept 27. Pt invites [facility] to outreach to her before Sept 27 for this reason. Pt further advises she does not answer numbers she does not recognize, so she advises to leave message and that she will return the call. Pt verbalized understanding this concludes ACC episode and [facility] discuss engaging in [facility]. Pt does plan to self outreach to PCP on return from visit w/her dtr, for scheduling future/return visit w/PCP.   Care Coordination Interventions    Referral from Primary Care Provider: No  Suggested Interventions and Community Resources  Diabetes Education: Completed  Fall Risk Prevention: Completed  Pharmacist: In Process (Comment: 8.19.22 referral to Ambulatory Pharmacist)  Specialty Services Referral: Completed (Comment: 6.1.22 pt will review w/PCP re Ortho referral   ; Cardiology [phone]; Dr Isabelle Bazan [facility] [phone])  Other Services: Completed (Comment: HD on T, Th, Sat @ [facility] [phone] [facility])  Transportation Support: Completed  Zone Management Tools: Completed (Comment: DM & CHF)  Other Services or Interventions: review of self mgmt concepts; review of appts; review of medications; community-based resources which may prove helpful for added support          Goals Addressed                      This Visit's Progress      COMPLETED: Conditions and Symptoms (pt-stated)   On track      I will schedule office visits, as directed by my provider. I will keep my appointment or reschedule if I have to cancel. I will notify my provider of any barriers to my plan of care. I will follow my Zone Management tool to seek urgent or emergent care. I will notify my provider of any symptoms that indicate a worsening of my condition. Barriers: fear of failure  Plan for overcoming my barriers: engage in Care Coordination for added support  Confidence: 10/10  Anticipated Goal Completion Date: 11.2.22          COMPLETED: resolution or improvement of lower extremity symptoms (pt-stated)   On track      Wants relief/resolve of RLS     Barriers: fear of failure  Plan for overcoming my barriers: continue following w/providers involved in my care; engage in Care Coordination for added care team support  Confidence: 10/10  Anticipated Goal Completion Date: 12.1.22              Prior to Admission medications    Medication Sig Start Date End Date Taking? Authorizing Provider   irbesartan-hydroCHLOROthiazide (AVALIDE) 150-12.5 MG per tablet TAKE 1 TABLET EVERY DAY 7/27/22   Mary Frazier MD   gabapentin (NEURONTIN) 100 MG capsule Take 2 capsules by mouth nightly for 90 days.  7/21/22 10/19/22  Mary Frazier MD   rOPINIRole (REQUIP) 0.25 MG tablet Take 1 tablet by mouth nightly 7/21/22   Mary Frazier MD   vitamin D3 (CHOLECALCIFEROL) 25 MCG (1000 UT) TABS tablet Take 1 tablet by mouth daily 7/8/22   MARCIE Conley - CNP   SPS 15 GM/60ML suspension  6/9/22   Historical Provider, MD   pantoprazole (PROTONIX) 40 MG tablet Take 1 tablet by mouth daily (with breakfast) 6/14/22   Mary Frazier MD   ASPIRIN LOW DOSE 81 MG EC tablet TAKE 1 TABLET BY MOUTH EVERY DAY 6/2/22   Lindalee Dakins Tosha Luevano APRN - CNP   nortriptyline (PAMELOR) 10 MG capsule TAKE 1 CAPSULE BY MOUTH EVERY EVENING 5/19/22   Beau Bonilla, MD   spironolactone (ALDACTONE) 25 MG tablet Take 1 tablet by mouth daily 3/29/22   Beau Bonilla, MD   clopidogrel (PLAVIX) 75 MG tablet Take 1 tablet by mouth daily 3/29/22   Beau Bonilla MD   rosuvastatin (CRESTOR) 20 MG tablet TAKE 1 TABLET BY MOUTH EVERY DAY 3/29/22   Beau Bonilla, MD   nitroGLYCERIN (NITROSTAT) 0.4 MG SL tablet Place 1 tablet under the tongue every 5 minutes as needed for Chest pain 3/22/22   Shalini White APRN - CNP   levothyroxine (SYNTHROID) 150 MCG tablet Take 1 tablet by mouth every morning (before breakfast) 9/8/21   Beau Bonilla MD   allopurinol (ZYLOPRIM) 100 MG tablet TAKE 1 TABLET EVERY DAY 9/8/21   Beau Bonilla MD   metoprolol succinate (TOPROL XL) 25 MG extended release tablet TAKE 1 TABLET EVERY DAY 9/8/21   Beau Bonilla MD   torsemide (DEMADEX) 20 MG tablet Take 3 tablets by mouth 2 times daily  Patient taking differently: Take 120 mg by mouth 2 times daily 6 tabs bid 8/31/21   Damien Soto MD       No future appointments.

## 2022-09-26 RX ORDER — ROSUVASTATIN CALCIUM 20 MG/1
TABLET, COATED ORAL
Qty: 90 TABLET | Refills: 1 | OUTPATIENT
Start: 2022-09-26

## 2022-10-25 ENCOUNTER — TELEPHONE (OUTPATIENT)
Dept: FAMILY MEDICINE CLINIC | Age: 77
End: 2022-10-25

## 2022-10-25 NOTE — TELEPHONE ENCOUNTER
Called and spoke with pt. Pt stated that she has been having back pain for the last 48 hours, pain is keeping her awake. Asking for medication, like a muscle relaxer, to be sent to pharmacy. [address] [place] [address] East: [address] [place] [address], [place], [address] [place] [address] 30: [phone].

## 2022-10-25 NOTE — TELEPHONE ENCOUNTER
----- Message from Cheyenne Kathleen sent at 10/25/2022  9:18 AM EDT -----  Subject: Message to Provider    QUESTIONS  Information for Provider? PT is out of town and having an issues with her   back please call her to discuss as she would like something called in to   the pharmacy where she is.  ---------------------------------------------------------------------------  --------------  [address]  [phone]; OK to leave message on voicemail  ---------------------------------------------------------------------------  --------------  SCRIPT ANSWERS  Relationship to Patient?  Self

## 2022-11-15 DIAGNOSIS — N12 PYELONEPHRITIS: ICD-10-CM

## 2022-11-15 RX ORDER — GABAPENTIN 100 MG/1
200 CAPSULE ORAL NIGHTLY
Qty: 180 CAPSULE | Refills: 3 | OUTPATIENT
Start: 2022-11-15 | End: 2023-02-13

## 2022-11-15 RX ORDER — HYDROCODONE BITARTRATE AND ACETAMINOPHEN 5; 325 MG/1; MG/1
1 TABLET ORAL EVERY 6 HOURS PRN
Qty: 15 TABLET | Refills: 0 | OUTPATIENT
Start: 2022-11-15 | End: 2022-11-18

## 2022-11-15 NOTE — TELEPHONE ENCOUNTER
Last fill date for gabapentin (NEURONTIN) 100 MG capsule on 7-21-22;     Pt c/o right hip, leg and ankle pain for the last 2 days; asking for pain medication. Informed pt that she would need to be seen prior to medication being prescribed. Pt scheduled for 11-18-22.

## 2022-11-18 ENCOUNTER — OFFICE VISIT (OUTPATIENT)
Dept: FAMILY MEDICINE CLINIC | Age: 77
End: 2022-11-18
Payer: MEDICARE

## 2022-11-18 VITALS
HEIGHT: 65 IN | TEMPERATURE: 97.1 F | SYSTOLIC BLOOD PRESSURE: 130 MMHG | DIASTOLIC BLOOD PRESSURE: 90 MMHG | HEART RATE: 75 BPM | OXYGEN SATURATION: 97 % | BODY MASS INDEX: 32.22 KG/M2 | WEIGHT: 193.4 LBS

## 2022-11-18 DIAGNOSIS — M54.31 RIGHT SIDED SCIATICA: Primary | ICD-10-CM

## 2022-11-18 PROCEDURE — G8484 FLU IMMUNIZE NO ADMIN: HCPCS | Performed by: FAMILY MEDICINE

## 2022-11-18 PROCEDURE — 99213 OFFICE O/P EST LOW 20 MIN: CPT | Performed by: FAMILY MEDICINE

## 2022-11-18 PROCEDURE — G8399 PT W/DXA RESULTS DOCUMENT: HCPCS | Performed by: FAMILY MEDICINE

## 2022-11-18 PROCEDURE — G8417 CALC BMI ABV UP PARAM F/U: HCPCS | Performed by: FAMILY MEDICINE

## 2022-11-18 PROCEDURE — 1090F PRES/ABSN URINE INCON ASSESS: CPT | Performed by: FAMILY MEDICINE

## 2022-11-18 PROCEDURE — 1036F TOBACCO NON-USER: CPT | Performed by: FAMILY MEDICINE

## 2022-11-18 PROCEDURE — 3074F SYST BP LT 130 MM HG: CPT | Performed by: FAMILY MEDICINE

## 2022-11-18 PROCEDURE — 1123F ACP DISCUSS/DSCN MKR DOCD: CPT | Performed by: FAMILY MEDICINE

## 2022-11-18 PROCEDURE — 3078F DIAST BP <80 MM HG: CPT | Performed by: FAMILY MEDICINE

## 2022-11-18 PROCEDURE — G8427 DOCREV CUR MEDS BY ELIG CLIN: HCPCS | Performed by: FAMILY MEDICINE

## 2022-11-18 RX ORDER — BACLOFEN 10 MG/1
10 TABLET ORAL 3 TIMES DAILY PRN
Qty: 30 TABLET | Refills: 0 | Status: SHIPPED | OUTPATIENT
Start: 2022-11-18

## 2022-11-18 RX ORDER — ISOSORBIDE MONONITRATE 30 MG/1
30 TABLET, EXTENDED RELEASE ORAL DAILY
COMMUNITY
Start: 2022-09-19

## 2022-11-18 NOTE — PROGRESS NOTES
Latia Lott is a 67 y.o. Female who came into the clinic today with concerns of mild low back pain radiating   towards her right buttock and thigh from past couple of days. The patient denies any fall, history of trauma or   lifting anything heavy. The patient informs me that it has happened to her in the past as well but it used to get   better by itself. The patient has been taking Tylenol with minimal relief so she is here to get herself checked. The patient is also taking gabapentin and nortriptyline for the neuropathy as well. The patient denies any associated   weakness, tingling or numbness in the lower extremity. The patient informs me that she has many questions regarding her medications she is currently on and she would   like to make an appointment with her PCP to discuss further.   No other questions or concerns today and all the   question concerns were appropriately answered    Past Medical History:   Diagnosis Date    Arthritis     CAD (coronary artery disease)     Diabetes ([facility]     Hemodialysis patient ([facility]     Hyperlipidemia     Hypertension     Kidney disease     Neuropathy     Pneumonia     IN February, 2021, with COVID    Thyroid disease        Patient Active Problem List   Diagnosis    Essential hypertension    Hyperlipidemia    Acquired hypothyroidism    Diabetes mellitus type 2 in obese ([address]    Chronic kidney 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 ([facility]    Closed nondisplaced fracture of fifth metatarsal bone of right foot    Arthritis of right knee    Acute pain of both knees    Primary osteoarthritis of both knees    Bursitis    Memory loss    Bilateral leg edema    Primary osteoarthritis of left knee    Atypical chest pain    Encounter for medication counseling    Tubular adenoma of colon    Primary insomnia    Diarrhea of presumed infectious origin    Diabetic polyneuropathy associated with type 2 diabetes mellitus ([facility]    COVID-19 virus infection    Coronary artery disease due to lipid rich plaque    Pain of left calf    Atherosclerosis of native arteries of extremities with intermittent claudication, bilateral legs (HCC)    ESRD (end stage renal disease) (HCC)    Arteriovenous fistula infection, initial encounter ([facility])    Frequent falls    Hyperkalemia    Hyponatremia    Ex-smoker    Class 1 obesity due to excess calories with body mass index (BMI) of 30.0 to 30.9 in adult    Antibiotic-associated diarrhea    Pericarditis    Pericardial effusion    Chest pain    History of anemia due to chronic kidney disease    Pleural effusion    Hepatitis B immune    Bilateral pleural effusion    Parkinson's disease    RLS (restless legs syndrome)    Chronic systolic (congestive) heart failure       Past Surgical History:   Procedure Laterality Date    APPENDECTOMY      CHOLECYSTECTOMY      COLONOSCOPY  11/24/2020    COLONOSCOPY POLYPECTOMY SNARE/COLD BIOPSY performed by Ratna Mcmahan MD at [address] N/A 5/17/2021    PERITONEAL DIALYSIS CATHETER REMOVAL.  [address] performed by Karmen Vargas DO at [address] ([facility]      FULL    LAPAROSCOPY INSERTION PERITONEAL CATHETER N/A 4/3/2020    LAPAROSCOPIC LYSIS OF ADHESIONS, LAPAROSCOPIC PERITONEAL DIALYSIS CATHETER PLACEMENT, LAPAROSCOPIC OMENTOPEXY performed by Karmen Vargas DO at [address] [facility] [address] BILATERAL      preventive    ROTATOR CUFF REPAIR Left     SHUNT REVISION Left 8/19/2021    LEFT FOREARM ARTERIO VENOUS GRAFT performed by Brenna Chino MD at [address] [facility] N/A 11/24/2020    EGD BIOPSY performed by Ratna Mcmahan MD at [address] [facility]       Family History   Problem Relation Age of Onset    Cancer Mother         lung, breast, liver    Stroke Father     Stomach Cancer Brother     Cancer Maternal Grandmother     No Known Problems Paternal Grandmother     No Known Problems Paternal Grandfather        Social History     Tobacco Use    Smoking status: Former     Packs/day: 0.50     Years: 10.00     Pack years: 5.00     Types: Cigarettes     Quit date: 1978     Years since quittin.4    Smokeless tobacco: Never   Substance Use Topics    Alcohol use: Yes     Comment: every other week one glass of wine       Current Outpatient Medications on File Prior to Visit   Medication Sig Dispense Refill    isosorbide mononitrate (IMDUR) 30 MG extended release tablet Take 30 mg by mouth daily      irbesartan-hydroCHLOROthiazide (AVALIDE) 150-12.5 MG per tablet TAKE 1 TABLET EVERY DAY 90 tablet 1    gabapentin (NEURONTIN) 100 MG capsule Take 2 capsules by mouth nightly for 90 days.  180 capsule 3    rOPINIRole (REQUIP) 0.25 MG tablet Take 1 tablet by mouth nightly 90 tablet 3    vitamin D3 (CHOLECALCIFEROL) 25 MCG (1000 UT) TABS tablet Take 1 tablet by mouth daily 90 tablet 1    SPS 15 GM/60ML suspension       pantoprazole (PROTONIX) 40 MG tablet Take 1 tablet by mouth daily (with breakfast) 30 tablet 3    ASPIRIN LOW DOSE 81 MG EC tablet TAKE 1 TABLET BY MOUTH EVERY DAY 90 tablet 1    nortriptyline (PAMELOR) 10 MG capsule TAKE 1 CAPSULE BY MOUTH EVERY EVENING 90 capsule 3    spironolactone (ALDACTONE) 25 MG tablet Take 1 tablet by mouth daily 90 tablet 3    clopidogrel (PLAVIX) 75 MG tablet Take 1 tablet by mouth daily 90 tablet 3    rosuvastatin (CRESTOR) 20 MG tablet TAKE 1 TABLET BY MOUTH EVERY DAY 90 tablet 1    nitroGLYCERIN (NITROSTAT) 0.4 MG SL tablet Place 1 tablet under the tongue every 5 minutes as needed for Chest pain 25 tablet 3    levothyroxine (SYNTHROID) 150 MCG tablet Take 1 tablet by mouth every morning (before breakfast) 90 tablet 3    allopurinol (ZYLOPRIM) 100 MG tablet TAKE 1 TABLET EVERY DAY 90 tablet 3    metoprolol succinate (TOPROL XL) 25 MG extended release tablet TAKE 1 TABLET EVERY DAY 90 tablet 3    torsemide (DEMADEX) 20 MG tablet Take 3 tablets by mouth 2 times daily (Patient taking differently: Take 120 mg by mouth 2 times daily 6 tabs bid) 30 tablet 3     No current facility-administered medications on file prior to visit. Allergies   Allergen Reactions    Amoxicillin Anaphylaxis    Demeclocycline Anaphylaxis    Penicillins Anaphylaxis    Tetracyclines & Related Anaphylaxis    Ozempic (0.25 Or 0.5 Mg-Dose) [Semaglutide(0.25 Or 0.5mg-Dos)] Other (See Comments)     Lost large amount weight and loss  Of appetite    Codeine Itching and Rash       REVIEW OF SYSTEMS:   CONSTITUTIONAL: No weight loss, fever, chills, weakness or fatigue. CARDIOVASCULAR: No chest pain, chest pressure or chest discomfort. No palpitations or edema. RESPIRATORY: No shortness of breath, cough or sputum. GASTROINTESTINAL: No anorexia, nausea, vomiting, diarrhea or constipation. No abdominal pain, hematochezia or melena. NEUROLOGICAL: No headache, dizziness, syncope, paralysis, ataxia,   numbness or tingling in the extremities. No change in bowel or bladder control. MUSCULOSKELETAL: Complains of muscle pain, back pain, joint pain or stiffness. Objective     . BP (!) 130/90   Pulse 75   Temp 97.1 °F (36.2 °C)   Ht 5' 5\" (1.651 m)   Wt 193 lb 6.4 oz (87.7 kg)   LMP  (LMP Unknown)   SpO2 97%   BMI 32.18 kg/m²     GENERAL:  Pako Crump is a 67 y.o.  female who is not in any acute distress. She was well attired and well groomed and was speaking in full sentences. LUNGS:  Normal ventilatory breath sounds are heard bilaterally. No crackles   or wheezes heard. CARDIOVASCULAR:  Normal S1, S2 heard. No murmurs heard. No JVD. EXTREMITIES:  All 4 extremities were moving fine. Full range of motion is   present. No deformity noted. Peripheral pulses were felt. No lower   extremity edema. Neurovascular integrity maintained. BACK:  Normal alignment of the spine. Full range of motion is present. No   stepoff. No deformity.  No spinal or paraspinal tenderness noted.  Mild tenderness   on palpation noted in the right SI joint area    Assessment/Plan     Diagnoses and all orders for this visit:    Right sided sciatica  -     baclofen (LIORESAL) 10 MG tablet; Take 1 tablet by mouth 3 times daily as needed (muscle spasms)      Advise given:  - Take all prescription medication as prescribed. - Drink plenty of fluids.   - Discussed importance of regular exercise and recommended starting or continuing a regular exercise program for good health. - Appropriate handout were given to the patient. -  All the questions and concerns were appropriately answered. - Patient / family member / caregiver verbalized understanding of patient instructions from today's visit. - The patient was advised to follow up with PCP in 1 month for recheck or can call me before if has any other questions or concerns.

## 2022-11-19 ENCOUNTER — HOSPITAL ENCOUNTER (EMERGENCY)
Age: 77
Discharge: HOME | DRG: 177 | End: 2022-11-19
Payer: MEDICARE

## 2022-11-19 VITALS
RESPIRATION RATE: 20 BRPM | DIASTOLIC BLOOD PRESSURE: 75 MMHG | OXYGEN SATURATION: 96 % | TEMPERATURE: 97.9 F | BODY MASS INDEX: 30.49 KG/M2 | SYSTOLIC BLOOD PRESSURE: 156 MMHG | WEIGHT: 183 LBS | HEART RATE: 80 BPM | HEIGHT: 65 IN

## 2022-11-19 DIAGNOSIS — M54.50 ACUTE RIGHT-SIDED LOW BACK PAIN, UNSPECIFIED WHETHER SCIATICA PRESENT: Primary | ICD-10-CM

## 2022-11-19 DIAGNOSIS — N18.6 ESRD (END STAGE RENAL DISEASE) ON DIALYSIS (HCC): ICD-10-CM

## 2022-11-19 DIAGNOSIS — Z99.2 ESRD (END STAGE RENAL DISEASE) ON DIALYSIS (HCC): ICD-10-CM

## 2022-11-19 LAB
A/G RATIO: 1.6 (ref 1.1–2.2)
ALBUMIN SERPL-MCNC: 4.8 G/DL (ref 3.4–5)
ALP BLD-CCNC: 159 U/L (ref 40–129)
ALT SERPL-CCNC: 14 U/L (ref 10–40)
ANION GAP SERPL CALCULATED.3IONS-SCNC: 18 MMOL/L (ref 3–16)
AST SERPL-CCNC: 14 U/L (ref 15–37)
BASOPHILS ABSOLUTE: 0.1 K/UL (ref 0–0.2)
BASOPHILS RELATIVE PERCENT: 0.5 %
BILIRUB SERPL-MCNC: 0.4 MG/DL (ref 0–1)
BUN BLDV-MCNC: 66 MG/DL (ref 7–20)
CALCIUM SERPL-MCNC: 9.6 MG/DL (ref 8.3–10.6)
CHLORIDE BLD-SCNC: 96 MMOL/L (ref 99–110)
CO2: 25 MMOL/L (ref 21–32)
CREAT SERPL-MCNC: 8.1 MG/DL (ref 0.6–1.2)
EOSINOPHILS ABSOLUTE: 0.3 K/UL (ref 0–0.6)
EOSINOPHILS RELATIVE PERCENT: 2.7 %
GFR SERPL CREATININE-BSD FRML MDRD: 5 ML/MIN/{1.73_M2}
GLUCOSE BLD-MCNC: 101 MG/DL (ref 70–99)
HCT VFR BLD CALC: 28.4 % (ref 36–48)
HEMOGLOBIN: 9.6 G/DL (ref 12–16)
LYMPHOCYTES ABSOLUTE: 0.9 K/UL (ref 1–5.1)
LYMPHOCYTES RELATIVE PERCENT: 8.8 %
MCH RBC QN AUTO: 32.3 PG (ref 26–34)
MCHC RBC AUTO-ENTMCNC: 33.8 G/DL (ref 31–36)
MCV RBC AUTO: 95.6 FL (ref 80–100)
MONOCYTES ABSOLUTE: 0.8 K/UL (ref 0–1.3)
MONOCYTES RELATIVE PERCENT: 7.1 %
NEUTROPHILS ABSOLUTE: 8.7 K/UL (ref 1.7–7.7)
NEUTROPHILS RELATIVE PERCENT: 80.9 %
PDW BLD-RTO: 15.4 % (ref 12.4–15.4)
PLATELET # BLD: 327 K/UL (ref 135–450)
PMV BLD AUTO: 8.3 FL (ref 5–10.5)
POTASSIUM REFLEX MAGNESIUM: 4.3 MMOL/L (ref 3.5–5.1)
RBC # BLD: 2.97 M/UL (ref 4–5.2)
SODIUM BLD-SCNC: 139 MMOL/L (ref 136–145)
TOTAL PROTEIN: 7.8 G/DL (ref 6.4–8.2)
WBC # BLD: 10.8 K/UL (ref 4–11)

## 2022-11-19 PROCEDURE — 99284 EMERGENCY DEPT VISIT MOD MDM: CPT

## 2022-11-19 PROCEDURE — 80053 COMPREHEN METABOLIC PANEL: CPT

## 2022-11-19 PROCEDURE — 6360000002 HC RX W HCPCS: Performed by: PHYSICIAN ASSISTANT

## 2022-11-19 PROCEDURE — 6370000000 HC RX 637 (ALT 250 FOR IP): Performed by: PHYSICIAN ASSISTANT

## 2022-11-19 PROCEDURE — 85025 COMPLETE CBC W/AUTO DIFF WBC: CPT

## 2022-11-19 PROCEDURE — 96372 THER/PROPH/DIAG INJ SC/IM: CPT

## 2022-11-19 RX ORDER — ORPHENADRINE CITRATE 30 MG/ML
60 INJECTION INTRAMUSCULAR; INTRAVENOUS ONCE
Status: COMPLETED | OUTPATIENT
Start: 2022-11-19 | End: 2022-11-19

## 2022-11-19 RX ORDER — LIDOCAINE 4 G/G
1 PATCH TOPICAL ONCE
Status: DISCONTINUED | OUTPATIENT
Start: 2022-11-19 | End: 2022-11-19 | Stop reason: HOSPADM

## 2022-11-19 RX ORDER — HYDROCODONE BITARTRATE AND ACETAMINOPHEN 5; 325 MG/1; MG/1
1 TABLET ORAL ONCE
Status: COMPLETED | OUTPATIENT
Start: 2022-11-19 | End: 2022-11-19

## 2022-11-19 RX ORDER — LIDOCAINE 50 MG/G
1 PATCH TOPICAL DAILY
Qty: 30 PATCH | Refills: 0 | Status: SHIPPED | OUTPATIENT
Start: 2022-11-19

## 2022-11-19 RX ADMIN — ORPHENADRINE CITRATE 60 MG: 30 INJECTION INTRAMUSCULAR; INTRAVENOUS at 09:51

## 2022-11-19 RX ADMIN — HYDROCODONE BITARTRATE AND ACETAMINOPHEN 1 TABLET: 5; 325 TABLET ORAL at 09:50

## 2022-11-19 ASSESSMENT — PAIN DESCRIPTION - ORIENTATION
ORIENTATION: RIGHT;MID
ORIENTATION: RIGHT;MID

## 2022-11-19 ASSESSMENT — PAIN SCALES - GENERAL
PAINLEVEL_OUTOF10: 8
PAINLEVEL_OUTOF10: 8

## 2022-11-19 ASSESSMENT — PAIN DESCRIPTION - DESCRIPTORS
DESCRIPTORS: ACHING
DESCRIPTORS: ACHING

## 2022-11-19 ASSESSMENT — PAIN DESCRIPTION - LOCATION
LOCATION: BACK
LOCATION: BACK

## 2022-11-19 ASSESSMENT — PAIN - FUNCTIONAL ASSESSMENT: PAIN_FUNCTIONAL_ASSESSMENT: 0-10

## 2022-11-19 ASSESSMENT — PAIN DESCRIPTION - PAIN TYPE: TYPE: ACUTE PAIN

## 2022-11-19 NOTE — ED NOTES
Informed MD of Critical Cr of 8.1. Expected result d/t dialysis pt and not completing treatment today.       Layton Childress, RN  11/19/22 [id number]

## 2022-11-19 NOTE — ED PROVIDER NOTES
[address] [facility]        Pt Name: Zainab Sloan  MRN: [id number]  [place] 1945  Date of evaluation: 11/19/2022  Provider: Rigo Wilkerson PA-C  PCP: Heidi Rudolph MD  Note Started: 9:25 AM EST 11/19/2022        SHIRAZ. I have evaluated this patient. My supervising physician was available for consultation. CHIEF COMPLAINT       Chief Complaint   Patient presents with    Back Pain     Came by [facility] from dialysis- did not complete (Tues, TH, Sat)- with c/o back pain to the small of the back radiating to right leg, started last night. Still able to ambulate       HISTORY OF PRESENT ILLNESS   (Location, Timing/Onset, Context/Setting, Quality, Duration, Modifying Factors, Severity, Associated Signs and Symptoms)  Note limiting factors. Chief Complaint: Right low back pain    Zainab Sloan is a 67 y.o. female who presents to the emergency department with a chief complaint of right low back pain that radiates on her leg to about her knee. States she has some chronic dull pain but last night when she was sitting in her chair after she stood up she began getting some intermittent sharp pain in her right low back. She only got 1 hour out of her 3-hour dialysis treatments and came here to the emergency department due to constant pain at dialysis. She states the sharp pain is now intermittent and last time she had it was a few minutes before I see her. Currently rates pain an 8 out of 10. Still makes urine. Denies any difficulty urinating, dysuria, hematuria, diarrhea, bloody stool, IV drug use or recent injection or surgery to her back or history of malignancy. Does have history of diabetes. Denies abdominal pain, nausea, vomiting, fevers, numbness or any other symptoms. She is still been able to ambulate. Has some chronic issues moving her legs and denies any change in this.   Had a CT of her lumbar spine in August 2021 here in the emergency department that revealed no mass. Nursing Notes were all reviewed and agreed with or any disagreements were addressed in the HPI. REVIEW OF SYSTEMS    (2-9 systems for level 4, 10 or more for level 5)     Review of Systems    Positives and Pertinent negatives as per HPI. Except as noted above in the ROS, all other systems were reviewed and negative. PAST MEDICAL HISTORY     Past Medical History:   Diagnosis Date    Arthritis     CAD (coronary artery disease)     Diabetes ([facility] [address]     Hemodialysis patient ([facility] [address]     Hyperlipidemia     Hypertension     Kidney disease     Neuropathy     Pneumonia     IN February, 2021, with COVID    Thyroid disease          SURGICAL HISTORY     Past Surgical History:   Procedure Laterality Date    APPENDECTOMY      CHOLECYSTECTOMY      COLONOSCOPY  11/24/2020    COLONOSCOPY POLYPECTOMY SNARE/COLD BIOPSY performed by Farzaneh Hough MD at [address] [place] [address] 5/17/2021    PERITONEAL DIALYSIS CATHETER REMOVAL.  [address] performed by Mariah Chung DO at [address] [facility] ([facility])      FULL    LAPAROSCOPY INSERTION PERITONEAL CATHETER N/A 4/3/2020    LAPAROSCOPIC LYSIS OF ADHESIONS, LAPAROSCOPIC PERITONEAL DIALYSIS CATHETER PLACEMENT, LAPAROSCOPIC OMENTOPEXY performed by Mariah Chung DO at [address] BILATERAL      preventive    ROTATOR CUFF REPAIR Left     SHUNT REVISION Left 8/19/2021    LEFT FOREARM ARTERIO VENOUS GRAFT performed by Renetta León MD at [place] [address] 11/24/2020    EGD BIOPSY performed by Farzaneh Hough MD at [address]       Discharge Medication List as of 11/19/2022 11:52 AM        CONTINUE these medications which have NOT CHANGED    Details   isosorbide mononitrate (IMDUR) 30 MG extended release tablet Take 30 mg by mouth dailyHistorical Med      baclofen (LIORESAL) 10 MG tablet Take 1 tablet by mouth 3 times daily as needed (muscle spasms), Disp-30 tablet, R-0Normal      irbesartan-hydroCHLOROthiazide (AVALIDE) 150-12.5 MG per tablet TAKE 1 TABLET EVERY DAY, Disp-90 tablet, R-1Normal      gabapentin (NEURONTIN) 100 MG capsule Take 2 capsules by mouth nightly for 90 days. , Disp-180 capsule, R-3Normal      rOPINIRole (REQUIP) 0.25 MG tablet Take 1 tablet by mouth nightly, Disp-90 tablet, R-3Normal      vitamin D3 (CHOLECALCIFEROL) 25 MCG (1000 UT) TABS tablet Take 1 tablet by mouth daily, Disp-90 tablet, R-1Normal      SPS 15 GM/60ML suspension Starting Thu 6/9/2022, PAKO, Historical Med      pantoprazole (PROTONIX) 40 MG tablet Take 1 tablet by mouth daily (with breakfast), Disp-30 tablet, R-3Normal      ASPIRIN LOW DOSE 81 MG EC tablet TAKE 1 TABLET BY MOUTH EVERY DAY, Disp-90 tablet, R-1Normal      nortriptyline (PAMELOR) 10 MG capsule TAKE 1 CAPSULE BY MOUTH EVERY EVENING, Disp-90 capsule, R-3Normal      spironolactone (ALDACTONE) 25 MG tablet Take 1 tablet by mouth daily, Disp-90 tablet, R-3Normal      clopidogrel (PLAVIX) 75 MG tablet Take 1 tablet by mouth daily, Disp-90 tablet, R-3Normal      rosuvastatin (CRESTOR) 20 MG tablet TAKE 1 TABLET BY MOUTH EVERY DAY, Disp-90 tablet, R-1Normal      nitroGLYCERIN (NITROSTAT) 0.4 MG SL tablet Place 1 tablet under the tongue every 5 minutes as needed for Chest pain, Disp-25 tablet, R-3Normal      levothyroxine (SYNTHROID) 150 MCG tablet Take 1 tablet by mouth every morning (before breakfast), Disp-90 tablet, R-3Normal      allopurinol (ZYLOPRIM) 100 MG tablet TAKE 1 TABLET EVERY DAY, Disp-90 tablet, R-3Normal      metoprolol succinate (TOPROL XL) 25 MG extended release tablet TAKE 1 TABLET EVERY DAY, Disp-90 tablet, R-3Normal      torsemide (DEMADEX) 20 MG tablet Take 3 tablets by mouth 2 times daily, Disp-30 tablet, R-3Normal               ALLERGIES     Amoxicillin, Demeclocycline, Penicillins, Tetracyclines & related, Ozempic (0.25 or 0.5 mg-dose) [semaglutide(0.25 or 0.5mg-dos)], and Codeine    FAMILYHISTORY       Family History   Problem Relation Age of Onset    Cancer Mother         lung, breast, liver    Stroke Father     Stomach Cancer Brother     Cancer Maternal Grandmother     No Known Problems Paternal Grandmother     No Known Problems Paternal Grandfather           SOCIAL HISTORY       Social History     Tobacco Use    Smoking status: Former     Packs/day: 0.50     Years: 10.00     Pack years: 5.00     Types: Cigarettes     Quit date: 1978     Years since quittin.4    Smokeless tobacco: Never   Vaping Use    Vaping Use: Never used   Substance Use Topics    Alcohol use: Not Currently     Comment: every other week one glass of wine    Drug use: Never       SCREENINGS    B[place]ley Coma Scale  Eye Opening: Spontaneous  Best Verbal Response: Oriented  Best Motor Response: Obeys commands  Brawley Coma Scale Score: 15        PHYSICAL EXAM    (up to 7 for level 4, 8 or more for level 5)     ED Triage Vitals [22 0910]   BP Temp Temp Source Heart Rate Resp SpO2 Height Weight   (!) 169/67 97.9 °F (36.6 °C) Oral 80 20 100 % 5' 5\" (1.651 m) 183 lb (83 kg)       Physical Exam  Vitals and nursing note reviewed. Constitutional:       Appearance: She is well-developed. She is not diaphoretic. HENT:      Head: Atraumatic. Nose: Nose normal.   Eyes:      General:         Right eye: No discharge. Left eye: No discharge. Cardiovascular:      Rate and Rhythm: Normal rate and regular rhythm. Pulses: Normal pulses. Heart sounds: No murmur heard. No friction rub. No gallop. Comments: 2+ dorsal pedal pulse in bilateral feet  Pulmonary:      Effort: Pulmonary effort is normal. No respiratory distress. Breath sounds: No stridor. No wheezing, rhonchi or rales. Abdominal:      General: Bowel sounds are normal. There is no distension. Palpations: Abdomen is soft. There is no mass. Tenderness:  There is no abdominal tenderness. There is no guarding or rebound. Hernia: No hernia is present. Musculoskeletal:         General: Tenderness present. No swelling. Cervical back: Normal range of motion. Comments: Equal range of motion of bilateral hips, knees, ankles. Tenderness to palpate over the right paralumbar musculature. No midline tenderness or step-off in the neck or back. Skin:     General: Skin is warm and dry. Findings: No erythema or rash. Neurological:      Mental Status: She is alert and oriented to person, place, and time. Cranial Nerves: No cranial nerve deficit. Psychiatric:         Behavior: Behavior normal.       DIAGNOSTIC RESULTS   LABS:    Labs Reviewed   CBC WITH AUTO DIFFERENTIAL - Abnormal; Notable for the following components:       Result Value    RBC 2.97 (*)     Hemoglobin 9.6 (*)     Hematocrit 28.4 (*)     Neutrophils Absolute 8.7 (*)     Lymphocytes Absolute 0.9 (*)     All other components within normal limits   COMPREHENSIVE METABOLIC PANEL W/ REFLEX TO MG FOR LOW K - Abnormal; Notable for the following components:    Chloride 96 (*)     Anion Gap 18 (*)     Glucose 101 (*)     BUN 66 (*)     Creatinine 8.1 (*)     Est, Glom Filt Rate 5 (*)     Alkaline Phosphatase 159 (*)     AST 14 (*)     All other components within normal limits    Narrative:     CALL  [facility] tel. [phone],  Chemistry results called to and read back by tatiana farmer, 11/19/2022  10:50, by NAYAN       When ordered only abnormal lab results are displayed. All other labs were within normal range or not returned as of this dictation. EKG: When ordered, EKG's are interpreted by the Emergency Department Physician in the absence of a cardiologist.  Please see their note for interpretation of EKG.     RADIOLOGY:   Non-plain film images such as CT, Ultrasound and MRI are read by the radiologist. Plain radiographic images are visualized and preliminarily interpreted by the ED Provider with the below findings:        Interpretation per the Radiologist below, if available at the time of this note:    No orders to display     No results found. PROCEDURES   Unless otherwise noted below, none     Procedures    CRITICAL CARE TIME       CONSULTS:  IP CONSULT TO NEPHROLOGY      EMERGENCY DEPARTMENT COURSE and DIFFERENTIAL DIAGNOSIS/MDM:   Vitals:    Vitals:    11/19/22 1031 11/19/22 1046 11/19/22 1101 11/19/22 1116   BP: (!) 168/78 (!) 163/79 (!) 140/68 (!) 156/75   Pulse:       Resp:       Temp:       TempSrc:       SpO2: 92% 95% 96%    Weight:       Height:           Patient was given the following medications:  Medications   HYDROcodone-acetaminophen (NORCO) 5-325 MG per tablet 1 tablet (1 tablet Oral Given 11/19/22 0950)   orphenadrine (NORFLEX) injection 60 mg (60 mg IntraMUSCular Given 11/19/22 0951)         Is this patient to be included in the SEP-1 Core Measure due to severe sepsis or septic shock? No   Exclusion criteria - the patient is NOT to be included for SEP-1 Core Measure due to: Infection is not suspected    Patient presented with some right low back pain. She was actually seen by her PCP yesterday and was placed on baclofen. She has been able to ambulate here. Nursing staff was concerned that she was slightly unsteady on her feet but after discussion with patient she states this is her baseline and this is normal for her. Shared decision making discussed with patient and she wishes to go home. Her son states that he will be able to stay with her. She did not get her dialysis finished today but has normal potassium and we discussed this with her nephrologist and nephrology on-call and she will get dialysis on Monday. Has no abdominal pain. Low suspicion for AAA, pyelonephritis, cauda equina, epidural abscess or other emergent etiology. She is distally neurovascular intact and able to ambulate here.   Will be discharged home with lidocaine patches to add with her baclofen. Return here for any worsening of symptoms or problems at home. FINAL IMPRESSION      1. Acute right-sided low back pain, unspecified whether sciatica present    2.  ESRD (end stage renal disease) on dialysis [facility])          DISPOSITION/PLAN   DISPOSITION Decision To Discharge 11/19/2022 11:44:20 AM      PATIENT REFERRED TO:  MD Jing Mckenna [address] [place] [address]  [phone]    Schedule an appointment as soon as possible for a visit in 3 days  For re-check    [facility] Emergency Department  [address] [place] [address]  [phone]    As needed    DISCHARGE MEDICATIONS:  Discharge Medication List as of 11/19/2022 11:52 AM        START taking these medications    Details   lidocaine (LIDODERM) 5 % Place 1 patch onto the skin daily 12 hours on, 12 hours off., Disp-30 patch, R-0Normal             DISCONTINUED MEDICATIONS:  Discharge Medication List as of 11/19/2022 11:52 AM                 (Please note that portions of this note were completed with a voice recognition program.  Efforts were made to edit the dictations but occasionally words are mis-transcribed.)    Kofi Villela PA-C (electronically signed)            Kofi Villela PA-C  11/19/22 2336

## 2022-11-19 NOTE — ED NOTES
Spoke with Latricia Dailey, RN at [facility]- he stated Dr. Kenyetta Dang was ok for pt waiting till Mon 11/21 for dialysis treatment     Vipin King, RN  11/19/22 1129

## 2022-11-20 ENCOUNTER — APPOINTMENT (OUTPATIENT)
Dept: CT IMAGING | Age: 77
DRG: 177 | End: 2022-11-20
Payer: MEDICARE

## 2022-11-20 ENCOUNTER — APPOINTMENT (OUTPATIENT)
Dept: GENERAL RADIOLOGY | Age: 77
DRG: 177 | End: 2022-11-20
Payer: MEDICARE

## 2022-11-20 ENCOUNTER — HOSPITAL ENCOUNTER (INPATIENT)
Age: 77
LOS: 4 days | Discharge: HOME | DRG: 177 | End: 2022-11-24
Attending: STUDENT IN AN ORGANIZED HEALTH CARE EDUCATION/TRAINING PROGRAM | Admitting: INTERNAL MEDICINE
Payer: MEDICARE

## 2022-11-20 DIAGNOSIS — E87.20 METABOLIC ACIDOSIS WITH RESPIRATORY ALKALOSIS: Primary | ICD-10-CM

## 2022-11-20 DIAGNOSIS — N18.6 ESRD (END STAGE RENAL DISEASE) (HCC): ICD-10-CM

## 2022-11-20 DIAGNOSIS — Z79.899 POLYPHARMACY: ICD-10-CM

## 2022-11-20 DIAGNOSIS — Z77.29 EXPOSURE TO CARBON MONOXIDE: ICD-10-CM

## 2022-11-20 DIAGNOSIS — E87.3 METABOLIC ACIDOSIS WITH RESPIRATORY ALKALOSIS: Primary | ICD-10-CM

## 2022-11-20 LAB
ANION GAP SERPL CALCULATED.3IONS-SCNC: 18 MMOL/L (ref 3–16)
BASE EXCESS VENOUS: -3.6 MMOL/L (ref -3–3)
BASOPHILS ABSOLUTE: 0.1 K/UL (ref 0–0.2)
BASOPHILS RELATIVE PERCENT: 1.1 %
BUN BLDV-MCNC: 73 MG/DL (ref 7–20)
CALCIUM SERPL-MCNC: 9.8 MG/DL (ref 8.3–10.6)
CARBOXYHEMOGLOBIN: 10.1 % (ref 0–1.5)
CHLORIDE BLD-SCNC: 99 MMOL/L (ref 99–110)
CO2: 21 MMOL/L (ref 21–32)
CREAT SERPL-MCNC: 9.4 MG/DL (ref 0.6–1.2)
EOSINOPHILS ABSOLUTE: 0.1 K/UL (ref 0–0.6)
EOSINOPHILS RELATIVE PERCENT: 1.1 %
GFR SERPL CREATININE-BSD FRML MDRD: 4 ML/MIN/{1.73_M2}
GLUCOSE BLD-MCNC: 102 MG/DL (ref 70–99)
HCO3 VENOUS: 18.7 MMOL/L (ref 23–29)
HCT VFR BLD CALC: 30.3 % (ref 36–48)
HEMOGLOBIN: 10.1 G/DL (ref 12–16)
LACTIC ACID: 0.9 MMOL/L (ref 0.4–2)
LYMPHOCYTES ABSOLUTE: 1 K/UL (ref 1–5.1)
LYMPHOCYTES RELATIVE PERCENT: 8.4 %
MAGNESIUM: 2.7 MG/DL (ref 1.8–2.4)
MCH RBC QN AUTO: 31.9 PG (ref 26–34)
MCHC RBC AUTO-ENTMCNC: 33.3 G/DL (ref 31–36)
MCV RBC AUTO: 95.9 FL (ref 80–100)
METHEMOGLOBIN VENOUS: 0.1 %
MONOCYTES ABSOLUTE: 0.7 K/UL (ref 0–1.3)
MONOCYTES RELATIVE PERCENT: 5.7 %
NEUTROPHILS ABSOLUTE: 9.8 K/UL (ref 1.7–7.7)
NEUTROPHILS RELATIVE PERCENT: 83.7 %
O2 CONTENT, VEN: 13 VOL %
O2 SAT, VEN: 100 %
O2 THERAPY: ABNORMAL
PCO2, VEN: 25 MMHG (ref 40–50)
PDW BLD-RTO: 15.8 % (ref 12.4–15.4)
PH VENOUS: 7.48 (ref 7.35–7.45)
PLATELET # BLD: 313 K/UL (ref 135–450)
PMV BLD AUTO: 8.2 FL (ref 5–10.5)
PO2, VEN: 140 MMHG (ref 25–40)
POTASSIUM REFLEX MAGNESIUM: 4.8 MMOL/L (ref 3.5–5.1)
RBC # BLD: 3.16 M/UL (ref 4–5.2)
SARS-COV-2, NAAT: DETECTED
SODIUM BLD-SCNC: 138 MMOL/L (ref 136–145)
TCO2 CALC VENOUS: 44 MMOL/L
TROPONIN: 0.04 NG/ML
WBC # BLD: 11.7 K/UL (ref 4–11)

## 2022-11-20 PROCEDURE — 74018 RADEX ABDOMEN 1 VIEW: CPT

## 2022-11-20 PROCEDURE — 87635 SARS-COV-2 COVID-19 AMP PRB: CPT

## 2022-11-20 PROCEDURE — 84484 ASSAY OF TROPONIN QUANT: CPT

## 2022-11-20 PROCEDURE — 6360000002 HC RX W HCPCS: Performed by: INTERNAL MEDICINE

## 2022-11-20 PROCEDURE — 2000000000 HC ICU R&B

## 2022-11-20 PROCEDURE — 93005 ELECTROCARDIOGRAM TRACING: CPT | Performed by: STUDENT IN AN ORGANIZED HEALTH CARE EDUCATION/TRAINING PROGRAM

## 2022-11-20 PROCEDURE — 83605 ASSAY OF LACTIC ACID: CPT

## 2022-11-20 PROCEDURE — 82803 BLOOD GASES ANY COMBINATION: CPT

## 2022-11-20 PROCEDURE — 80048 BASIC METABOLIC PNL TOTAL CA: CPT

## 2022-11-20 PROCEDURE — 83735 ASSAY OF MAGNESIUM: CPT

## 2022-11-20 PROCEDURE — 85025 COMPLETE CBC W/AUTO DIFF WBC: CPT

## 2022-11-20 PROCEDURE — 2580000003 HC RX 258: Performed by: INTERNAL MEDICINE

## 2022-11-20 PROCEDURE — 99285 EMERGENCY DEPT VISIT HI MDM: CPT

## 2022-11-20 PROCEDURE — 36415 COLL VENOUS BLD VENIPUNCTURE: CPT

## 2022-11-20 PROCEDURE — 90935 HEMODIALYSIS ONE EVALUATION: CPT

## 2022-11-20 PROCEDURE — 2060000000 HC ICU INTERMEDIATE R&B

## 2022-11-20 PROCEDURE — 70450 CT HEAD/BRAIN W/O DYE: CPT

## 2022-11-20 RX ORDER — SODIUM CHLORIDE 0.9 % (FLUSH) 0.9 %
10 SYRINGE (ML) INJECTION PRN
Status: DISCONTINUED | OUTPATIENT
Start: 2022-11-20 | End: 2022-11-23 | Stop reason: SDUPTHER

## 2022-11-20 RX ORDER — TORSEMIDE 20 MG/1
120 TABLET ORAL 2 TIMES DAILY
Status: DISCONTINUED | OUTPATIENT
Start: 2022-11-20 | End: 2022-11-24 | Stop reason: HOSPADM

## 2022-11-20 RX ORDER — POTASSIUM CHLORIDE 20 MEQ/1
40 TABLET, EXTENDED RELEASE ORAL PRN
Status: DISCONTINUED | OUTPATIENT
Start: 2022-11-20 | End: 2022-11-20

## 2022-11-20 RX ORDER — SODIUM CHLORIDE 0.9 % (FLUSH) 0.9 %
10 SYRINGE (ML) INJECTION EVERY 12 HOURS SCHEDULED
Status: DISCONTINUED | OUTPATIENT
Start: 2022-11-20 | End: 2022-11-23 | Stop reason: SDUPTHER

## 2022-11-20 RX ORDER — ROSUVASTATIN CALCIUM 20 MG/1
20 TABLET, COATED ORAL NIGHTLY
Status: DISCONTINUED | OUTPATIENT
Start: 2022-11-21 | End: 2022-11-24 | Stop reason: HOSPADM

## 2022-11-20 RX ORDER — SODIUM CHLORIDE 9 MG/ML
INJECTION, SOLUTION INTRAVENOUS PRN
Status: DISCONTINUED | OUTPATIENT
Start: 2022-11-20 | End: 2022-11-24 | Stop reason: HOSPADM

## 2022-11-20 RX ORDER — PANTOPRAZOLE SODIUM 40 MG/1
40 TABLET, DELAYED RELEASE ORAL
Status: DISCONTINUED | OUTPATIENT
Start: 2022-11-21 | End: 2022-11-21

## 2022-11-20 RX ORDER — ONDANSETRON 4 MG/1
4 TABLET, ORALLY DISINTEGRATING ORAL EVERY 8 HOURS PRN
Status: DISCONTINUED | OUTPATIENT
Start: 2022-11-20 | End: 2022-11-24 | Stop reason: HOSPADM

## 2022-11-20 RX ORDER — SPIRONOLACTONE 25 MG/1
25 TABLET ORAL DAILY
Status: DISCONTINUED | OUTPATIENT
Start: 2022-11-21 | End: 2022-11-24 | Stop reason: HOSPADM

## 2022-11-20 RX ORDER — NORTRIPTYLINE HYDROCHLORIDE 10 MG/1
10 CAPSULE ORAL NIGHTLY
Status: DISCONTINUED | OUTPATIENT
Start: 2022-11-20 | End: 2022-11-24 | Stop reason: HOSPADM

## 2022-11-20 RX ORDER — VITAMIN B COMPLEX
1000 TABLET ORAL DAILY
Status: DISCONTINUED | OUTPATIENT
Start: 2022-11-21 | End: 2022-11-24 | Stop reason: HOSPADM

## 2022-11-20 RX ORDER — BACLOFEN 10 MG/1
10 TABLET ORAL 3 TIMES DAILY PRN
Status: DISCONTINUED | OUTPATIENT
Start: 2022-11-20 | End: 2022-11-24 | Stop reason: HOSPADM

## 2022-11-20 RX ORDER — ONDANSETRON 2 MG/ML
4 INJECTION INTRAMUSCULAR; INTRAVENOUS EVERY 6 HOURS PRN
Status: DISCONTINUED | OUTPATIENT
Start: 2022-11-20 | End: 2022-11-24 | Stop reason: HOSPADM

## 2022-11-20 RX ORDER — ISOSORBIDE MONONITRATE 30 MG/1
30 TABLET, EXTENDED RELEASE ORAL DAILY
Status: DISCONTINUED | OUTPATIENT
Start: 2022-11-21 | End: 2022-11-24 | Stop reason: HOSPADM

## 2022-11-20 RX ORDER — GABAPENTIN 100 MG/1
200 CAPSULE ORAL NIGHTLY
Status: DISCONTINUED | OUTPATIENT
Start: 2022-11-20 | End: 2022-11-24 | Stop reason: HOSPADM

## 2022-11-20 RX ORDER — LIDOCAINE 4 G/G
1 PATCH TOPICAL DAILY
Status: DISCONTINUED | OUTPATIENT
Start: 2022-11-21 | End: 2022-11-24 | Stop reason: HOSPADM

## 2022-11-20 RX ORDER — DEXAMETHASONE SODIUM PHOSPHATE 10 MG/ML
6 INJECTION, SOLUTION INTRAMUSCULAR; INTRAVENOUS EVERY 24 HOURS
Status: DISCONTINUED | OUTPATIENT
Start: 2022-11-20 | End: 2022-11-24 | Stop reason: HOSPADM

## 2022-11-20 RX ORDER — MORPHINE SULFATE 2 MG/ML
1 INJECTION, SOLUTION INTRAMUSCULAR; INTRAVENOUS EVERY 4 HOURS PRN
Status: DISCONTINUED | OUTPATIENT
Start: 2022-11-20 | End: 2022-11-24 | Stop reason: HOSPADM

## 2022-11-20 RX ORDER — ASPIRIN 81 MG/1
81 TABLET ORAL ONCE
Status: DISCONTINUED | OUTPATIENT
Start: 2022-11-20 | End: 2022-11-24 | Stop reason: HOSPADM

## 2022-11-20 RX ORDER — 0.9 % SODIUM CHLORIDE 0.9 %
500 INTRAVENOUS SOLUTION INTRAVENOUS PRN
Status: DISCONTINUED | OUTPATIENT
Start: 2022-11-20 | End: 2022-11-24 | Stop reason: HOSPADM

## 2022-11-20 RX ORDER — POTASSIUM CHLORIDE 7.45 MG/ML
10 INJECTION INTRAVENOUS PRN
Status: DISCONTINUED | OUTPATIENT
Start: 2022-11-20 | End: 2022-11-20

## 2022-11-20 RX ORDER — METOPROLOL SUCCINATE 25 MG/1
25 TABLET, EXTENDED RELEASE ORAL DAILY
Status: DISCONTINUED | OUTPATIENT
Start: 2022-11-21 | End: 2022-11-24 | Stop reason: HOSPADM

## 2022-11-20 RX ORDER — LEVOTHYROXINE SODIUM 0.15 MG/1
150 TABLET ORAL
Status: DISCONTINUED | OUTPATIENT
Start: 2022-11-21 | End: 2022-11-24 | Stop reason: HOSPADM

## 2022-11-20 RX ORDER — CLOPIDOGREL BISULFATE 75 MG/1
75 TABLET ORAL DAILY
Status: DISCONTINUED | OUTPATIENT
Start: 2022-11-21 | End: 2022-11-24 | Stop reason: HOSPADM

## 2022-11-20 RX ORDER — HYDRALAZINE HYDROCHLORIDE 20 MG/ML
10 INJECTION INTRAMUSCULAR; INTRAVENOUS EVERY 6 HOURS PRN
Status: DISCONTINUED | OUTPATIENT
Start: 2022-11-20 | End: 2022-11-24 | Stop reason: HOSPADM

## 2022-11-20 RX ORDER — ACETAMINOPHEN 325 MG/1
650 TABLET ORAL EVERY 6 HOURS PRN
Status: DISCONTINUED | OUTPATIENT
Start: 2022-11-20 | End: 2022-11-24 | Stop reason: HOSPADM

## 2022-11-20 RX ORDER — IRBESARTAN AND HYDROCHLOROTHIAZIDE 150; 12.5 MG/1; MG/1
1 TABLET, FILM COATED ORAL DAILY
Status: DISCONTINUED | OUTPATIENT
Start: 2022-11-20 | End: 2022-11-20 | Stop reason: CLARIF

## 2022-11-20 RX ORDER — HYDROCHLOROTHIAZIDE 25 MG/1
12.5 TABLET ORAL DAILY
Status: DISCONTINUED | OUTPATIENT
Start: 2022-11-21 | End: 2022-11-24 | Stop reason: HOSPADM

## 2022-11-20 RX ORDER — ROPINIROLE 0.25 MG/1
0.25 TABLET, FILM COATED ORAL NIGHTLY
Status: DISCONTINUED | OUTPATIENT
Start: 2022-11-20 | End: 2022-11-24 | Stop reason: HOSPADM

## 2022-11-20 RX ORDER — LOSARTAN POTASSIUM 25 MG/1
50 TABLET ORAL DAILY
Status: DISCONTINUED | OUTPATIENT
Start: 2022-11-21 | End: 2022-11-24 | Stop reason: HOSPADM

## 2022-11-20 RX ORDER — DEXTROSE MONOHYDRATE 100 MG/ML
INJECTION, SOLUTION INTRAVENOUS CONTINUOUS PRN
Status: DISCONTINUED | OUTPATIENT
Start: 2022-11-20 | End: 2022-11-24 | Stop reason: HOSPADM

## 2022-11-20 RX ORDER — ACETAMINOPHEN 650 MG/1
650 SUPPOSITORY RECTAL EVERY 6 HOURS PRN
Status: DISCONTINUED | OUTPATIENT
Start: 2022-11-20 | End: 2022-11-24 | Stop reason: HOSPADM

## 2022-11-20 RX ORDER — ALLOPURINOL 100 MG/1
100 TABLET ORAL DAILY
Status: DISCONTINUED | OUTPATIENT
Start: 2022-11-21 | End: 2022-11-24 | Stop reason: HOSPADM

## 2022-11-20 RX ADMIN — ONDANSETRON 4 MG: 2 INJECTION INTRAMUSCULAR; INTRAVENOUS at 22:44

## 2022-11-20 RX ADMIN — MORPHINE SULFATE 1 MG: 2 INJECTION, SOLUTION INTRAMUSCULAR; INTRAVENOUS at 23:04

## 2022-11-20 RX ADMIN — HYDRALAZINE HYDROCHLORIDE 10 MG: 20 INJECTION INTRAMUSCULAR; INTRAVENOUS at 20:26

## 2022-11-20 RX ADMIN — DEXAMETHASONE SODIUM PHOSPHATE 6 MG: 10 INJECTION INTRAMUSCULAR; INTRAVENOUS at 20:26

## 2022-11-20 RX ADMIN — MORPHINE SULFATE 1 MG: 2 INJECTION, SOLUTION INTRAMUSCULAR; INTRAVENOUS at 16:31

## 2022-11-20 RX ADMIN — Medication 10 ML: at 20:39

## 2022-11-20 ASSESSMENT — LIFESTYLE VARIABLES
HOW MANY STANDARD DRINKS CONTAINING ALCOHOL DO YOU HAVE ON A TYPICAL DAY: PATIENT DOES NOT DRINK
HOW OFTEN DO YOU HAVE A DRINK CONTAINING ALCOHOL: NEVER

## 2022-11-20 ASSESSMENT — PAIN SCALES - GENERAL: PAINLEVEL_OUTOF10: 10

## 2022-11-20 NOTE — ED PROVIDER NOTES
[facility]      Pt Name: Bigg Perez  MRN: [id number]  [place] 1945  Date of evaluation: 11/20/2022  Provider: Onelia Blair MD    CHIEF COMPLAINT       Chief Complaint   Patient presents with    Altered Mental Status     Pt from assisted living, was seen yesterday for back pain, now altered, did not finish dialysis yesterday and skipped Thursday. Pt alert to verbal but will not answer questions. HISTORY OF PRESENT ILLNESS   (Location/Symptom, Timing/Onset, Context/Setting, Quality, Duration, Modifying Factors, Severity)  Note limiting factors. Bigg Perez is a 67 y.o. female who presents to the emergency department from assisted living facility with altered mentation. Patient unable to provide any significant history. Per squad she received 1 hour total of dialysis yesterday and has not had full session of dialysis since last Tuesday. Yesterday patient was in the emergency room for low back pain which is chronic in nature. Given muscle relaxants but was not sent home on opiates. Nephrology was consulted, patient was deemed to be stable for dialysis on Monday. Family reports that she fell yesterday but did not hit head. Nursing Notes were reviewed. REVIEW OF SYSTEMS    (2-9 systems for level 4, 10 or more for level 5)     Review of Systems   Unable to perform ROS: Mental status change     Except as noted above the remainder of the review of systems was reviewed and negative.        PAST MEDICAL HISTORY     Past Medical History:   Diagnosis Date    Arthritis     CAD (coronary artery disease)     Diabetes ([address]     Hemodialysis patient ([address]     Hyperlipidemia     Hypertension     Kidney disease     Neuropathy     Pneumonia     IN February, 2021, with COVID    Thyroid disease          SURGICAL HISTORY       Past Surgical History:   Procedure Laterality Date    APPENDECTOMY      CHOLECYSTECTOMY      COLONOSCOPY 11/24/2020    COLONOSCOPY POLYPECTOMY SNARE/COLD BIOPSY performed by Wagner Garcia MD at [place]  5/17/2021    PERITONEAL DIALYSIS CATHETER REMOVAL. EXCISSION OF ABDOMINAL CITRIX. performed by Bernadine Castellanos DO at [facility] ([facility])      FULL    LAPAROSCOPY INSERTION PERITONEAL CATHETER N/A 4/3/2020    LAPAROSCOPIC LYSIS OF ADHESIONS, LAPAROSCOPIC PERITONEAL DIALYSIS CATHETER PLACEMENT, LAPAROSCOPIC OMENTOPEXY performed by Bernadine Castellanos DO at [facility], BILATERAL      preventive    ROTATOR CUFF REPAIR Left     SHUNT REVISION Left 8/19/2021    LEFT FOREARM ARTERIO VENOUS GRAFT performed by Greg Torres MD at [facility] 11/24/2020    EGD BIOPSY performed by Wagner Garcia MD at [address]       Previous Medications    ALLOPURINOL (ZYLOPRIM) 100 MG TABLET    TAKE 1 TABLET EVERY DAY    ASPIRIN LOW DOSE 81 MG EC TABLET    TAKE 1 TABLET BY MOUTH EVERY DAY    BACLOFEN (LIORESAL) 10 MG TABLET    Take 1 tablet by mouth 3 times daily as needed (muscle spasms)    CLOPIDOGREL (PLAVIX) 75 MG TABLET    Take 1 tablet by mouth daily    GABAPENTIN (NEURONTIN) 100 MG CAPSULE    Take 2 capsules by mouth nightly for 90 days. IRBESARTAN-HYDROCHLOROTHIAZIDE (AVALIDE) 150-12.5 MG PER TABLET    TAKE 1 TABLET EVERY DAY    ISOSORBIDE MONONITRATE (IMDUR) 30 MG EXTENDED RELEASE TABLET    Take 30 mg by mouth daily    LEVOTHYROXINE (SYNTHROID) 150 MCG TABLET    Take 1 tablet by mouth every morning (before breakfast)    LIDOCAINE (LIDODERM) 5 %    Place 1 patch onto the skin daily 12 hours on, 12 hours off.     METOPROLOL SUCCINATE (TOPROL XL) 25 MG EXTENDED RELEASE TABLET    TAKE 1 TABLET EVERY DAY    NITROGLYCERIN (NITROSTAT) 0.4 MG SL TABLET    Place 1 tablet under the tongue every 5 minutes as needed for Chest pain    NORTRIPTYLINE (PAMELOR) 10 MG CAPSULE    TAKE 1 CAPSULE BY MOUTH EVERY EVENING    PANTOPRAZOLE (PROTONIX) 40 MG TABLET    Take 1 tablet by mouth daily (with breakfast)    ROPINIROLE (REQUIP) 0.25 MG TABLET    Take 1 tablet by mouth nightly    ROSUVASTATIN (CRESTOR) 20 MG TABLET    TAKE 1 TABLET BY MOUTH EVERY DAY    SPIRONOLACTONE (ALDACTONE) 25 MG TABLET    Take 1 tablet by mouth daily    SPS 15 GM/60ML SUSPENSION        TORSEMIDE (DEMADEX) 20 MG TABLET    Take 3 tablets by mouth 2 times daily    VITAMIN D3 (CHOLECALCIFEROL) 25 MCG (1000 UT) TABS TABLET    Take 1 tablet by mouth daily       ALLERGIES     Amoxicillin, Demeclocycline, Penicillins, Tetracyclines & related, Ozempic (0.25 or 0.5 mg-dose) [semaglutide(0.25 or 0.5mg-dos)], and Codeine    FAMILY HISTORY       Family History   Problem Relation Age of Onset    Cancer Mother         lung, breast, liver    Stroke Father     Stomach Cancer Brother     Cancer Maternal Grandmother     No Known Problems Paternal Grandmother     No Known Problems Paternal Grandfather           SOCIAL HISTORY       Social History     Socioeconomic History    Marital status:       Spouse name: None    Number of children: 3    Years of education: None    Highest education level: None   Occupational History    Occupation: retired clerical   Tobacco Use    Smoking status: Former     Packs/day: 0.50     Years: 10.00     Pack years: 5.00     Types: Cigarettes     Quit date: 1978     Years since quittin.4    Smokeless tobacco: Never   Vaping Use    Vaping Use: Never used   Substance and Sexual Activity    Alcohol use: Not Currently     Comment: every other week one glass of wine    Drug use: Never    Sexual activity: Not Currently   Social History Narrative    Lives with son--recently moved from [facility] and now here    [facility]     Social Determinants of Health     Financial Resource Strain: Low Risk     Difficulty of Paying Living Expenses: Not hard at all   Food Insecurity: No Food Insecurity    Worried About [address] in the Last Year: Never true    [facility] in the Last Year: Never true       SCREENINGS        Nasra Coma Scale  Eye Opening: To speech  Best Verbal Response: Inappropriate words  Best Motor Response: Withdraws from pain  N[place] Coma Scale Score: 10               PHYSICAL EXAM    (up to 7 for level 4, 8 or more for level 5)     ED Triage Vitals   BP Temp Temp src Pulse Resp SpO2 Height Weight   -- -- -- -- -- -- -- --       Physical Exam  Constitutional:       Appearance: Normal appearance. HENT:      Head: Normocephalic and atraumatic. Mouth/Throat:      Comments: Dry mucous membranes  Eyes:      Conjunctiva/sclera: Conjunctivae normal.   Cardiovascular:      Rate and Rhythm: Normal rate and regular rhythm. Pulses: Normal pulses. Heart sounds: Normal heart sounds. Pulmonary:      Effort: Pulmonary effort is normal.      Breath sounds: Normal breath sounds. Abdominal:      General: Abdomen is flat. There is no distension. Palpations: Abdomen is soft. There is no mass. Tenderness: There is no abdominal tenderness. Musculoskeletal:      Cervical back: Normal range of motion. No rigidity. Skin:     General: Skin is warm and dry. Capillary Refill: Capillary refill takes less than 2 seconds. Neurological:      Comments: Sleepy, opens eyes to verbal stimuli. Does not cooperate to questioning moves all 4 extremities to command though globally weak. Psychiatric:         Mood and Affect: Mood normal.         Behavior: Behavior normal.       DIAGNOSTIC RESULTS     EKG: All EKG's are interpreted by the Emergency Department Physician who either signs or Co-signs this chart in the absence of a cardiologist.  The Ekg interpreted by me in the absence of a cardiologist shows. Normal sinus rhythm with a ventricular left. QTc appropriate. No specific ST or T wave abnormality. No significant change from prior 1-.     RADIOLOGY:   Non-plain film images such as CT, Ultrasound and MRI are read by the radiologist. Plain radiographic images are visualized and preliminarily interpreted by the emergency physician with the below findings:        Interpretation per the Radiologist below, if available at the time of this note:    CT HEAD WO CONTRAST   Final Result   No acute intracranial abnormality.                LABS:  Labs Reviewed   CBC WITH AUTO DIFFERENTIAL - Abnormal; Notable for the following components:       Result Value    WBC 11.7 (*)     RBC 3.16 (*)     Hemoglobin 10.1 (*)     Hematocrit 30.3 (*)     RDW 15.8 (*)     Neutrophils Absolute 9.8 (*)     All other components within normal limits   BASIC METABOLIC PANEL W/ REFLEX TO MG FOR LOW K - Abnormal; Notable for the following components:    Anion Gap 18 (*)     Glucose 102 (*)     BUN 73 (*)     Creatinine 9.4 (*)     Est, Glom Filt Rate 4 (*)     All other components within normal limits    Narrative:     CALL  [place]  [facility] tel. [phone],  Previous panic on this admission - call not needed per SOP, 11/20/2022 12:33,  by YEN   MAGNESIUM - Abnormal; Notable for the following components:    Magnesium 2.70 (*)     All other components within normal limits    Narrative:     CALL  Wilson  [facility] tel. [phone],  Previous panic on this admission - call not needed per SOP, 11/20/2022 12:33,  by YEN   TROPONIN - Abnormal; Notable for the following components:    Troponin 0.04 (*)     All other components within normal limits    Narrative:     CALL  Wilson  [facility] tel. [phone],  Previous panic on this admission - call not needed per SOP, 11/20/2022 12:33,  by YEN   BLOOD GAS, VENOUS - Abnormal; Notable for the following components:    pH, Vinicio 7.482 (*)     pCO2, Vinicio 25.0 (*)     pO2, Vinicio 140.0 (*)     HCO3, Venous 18.7 (*)     Base Excess, Vinicio -3.6 (*)     Carboxyhemoglobin 10.1 (*)     All other components within normal limits   COVID-19, RAPID   LACTIC ACID   URINALYSIS WITH REFLEX TO CULTURE       All other labs were within normal range or not returned as of this dictation. EMERGENCY DEPARTMENT COURSE and DIFFERENTIAL DIAGNOSIS/MDM:   Vitals:    Vitals:    11/20/22 1215 11/20/22 1230 11/20/22 1245 11/20/22 1300   BP: (!) 144/77 (!) 152/72 (!) 150/80 (!) 168/77   Pulse: 88 88 92 91   Resp: 13 15 (!) 31 23   Temp:       TempSrc:       SpO2: 93% 93% 93% 94%     Vitals:    11/20/22 1300   BP: (!) 168/77   Pulse: 91   Resp: 23   Temp:    SpO2: 94%     Patient with ESRD on compliant with dialysis presents altered from independent living nursing facility. Was apparently oriented and answering questions yesterday. Now she is alert but nonverbal.  No focal neurologic deficits. Has not had full dialysis in over 7 days. Work-up showing worsening metabolic acidosis with respiratory alkalosis. Discussed with Dr. Demario Segura with nephrology who will be arranging for dialysis today. Her potassium remained stable. Of note, CO level was elevated at 10. I discussed with family who is unsure if she has a CO monitor at her facility. [facility] before [facility] returns there. She is on nasal cannula oxygen currently, hyperbaric oxygen not indicated for this low level of CO. Of note she was recently started on baclofen on Friday which could be another contributing factor. Possible polypharmacy, she is on baclofen, gabapentin, Requip and nortriptyline   Will admit for further monitoring and dialysis. Dr. Jeronimo Fraire to admit. Is this patient to be included in the SEP-1 Core Measure due to severe sepsis or septic shock? No   Exclusion criteria - the patient is NOT to be included for SEP-1 Core Measure due to: Infection is not suspected    Total Critical Care time was 35 minutes, excluding separately reportable procedures. There was a high probability of clinically significant/life threatening deterioration in the patient's condition which required my urgent intervention.         PROCEDURES:  Unless otherwise noted below, none Procedures      FINAL IMPRESSION      1. Metabolic acidosis with respiratory alkalosis    2. ESRD (end stage renal disease) ([facility]    3. Exposure to carbon monoxide    4. Polypharmacy          DISPOSITION/PLAN   DISPOSITION Decision To Admit 11/20/2022 12:46:36 PM      PATIENT REFERRED TO:  No follow-up provider specified. DISCHARGE MEDICATIONS:      New Prescriptions    No medications on file       Controlled Substances Monitoring:    If the patient was prescribed a controlled substance today, the PDMP was reviewed as documented below. No flowsheet data found.     (Please note that portions of this note were completed with a voice recognition program.  Efforts were made to edit the dictations but occasionally words are mis-transcribed.)    Cecil Sosa MD (electronically signed)  Attending Emergency Physician           Michelle Phipps MD  11/20/22 707 Grant Noonan MD  11/20/22 707 Grant Noonan MD  11/20/22 1459

## 2022-11-20 NOTE — H&P
History and Physical  Dr. Payal Salinas  11/20/2022    PCP: Emily Lincoln MD    Cc:   Chief Complaint   Patient presents with    Altered Mental Status     Pt from assisted living, was seen yesterday for back pain, now altered, did not finish dialysis yesterday and skipped Thursday. Pt alert to verbal but will not answer questions. HPI:  Grace Joaquin is a 67 y.o. female who has a past medical history of Arthritis, CAD (coronary artery disease), Diabetes ([facility] [address] Hemodialysis patient ([facility] [address] Hyperlipidemia, Hypertension, Kidney disease, Neuropathy, Pneumonia, and Thyroid disease. Patient presents with End stage renal disease ([facility] [address] HPI  (1-3 for expanded problem focused, ?4 for detailed/comprehensive)      67 y.o. female who presents to the emergency department from assisted living facility with altered mentation. Patient unable to provide any significant history. Per squad she received 1 hour total of dialysis yesterday and has not had full session of dialysis since last Tuesday. Yesterday patient was in the emergency room for low back pain which is chronic in nature. Given muscle relaxants but was not sent home on opiates. Nephrology was consulted, patient was deemed to be stable for dialysis on Monday. She is confused in ER  I requested COVID swab to be sent, it turns out to be positive    To be admitted for steroids, isolation for covid    Problem list of hospitalization thus far:   Active Hospital Problems    Diagnosis     End stage renal disease ([facility] [N18.6]      Priority: Medium    COVID-19 [U07.1]     Essential hypertension [I10]     Hyperlipidemia [E78.5]     Diabetes mellitus type 2 in obese (HCC) [E11.69, E66.9]          Review of Systems: (1 system for EPF, 2-9 for detailed, 10+ for comprehensive)  Cannot obtain from pt due to alteration    Past Medical History:   Past Medical History:   Diagnosis Date    Arthritis     CAD (coronary artery disease)     Diabetes ([facility] [address]     Hemodialysis patient [facility])     Hyperlipidemia     Hypertension     Kidney disease     Neuropathy     Pneumonia     IN February, 2021, with COVID    Thyroid disease        Past Surgical History:   Past Surgical History:   Procedure Laterality Date    APPENDECTOMY      CHOLECYSTECTOMY      COLONOSCOPY  11/24/2020    COLONOSCOPY POLYPECTOMY SNARE/COLD BIOPSY performed by Fady Rock MD at [place] [address] 5/17/2021    PERITONEAL DIALYSIS CATHETER REMOVAL.  EXCISSION OF ABDOMINAL CITRIX. performed by Kee Gipson DO at [address] ([address] [facility])      FULL    LAPAROSCOPY INSERTION PERITONEAL CATHETER N/A 4/3/2020    LAPAROSCOPIC LYSIS OF ADHESIONS, LAPAROSCOPIC PERITONEAL DIALYSIS CATHETER PLACEMENT, LAPAROSCOPIC OMENTOPEXY performed by Kee Gipson DO at [address] BILATERAL      preventive    ROTATOR CUFF REPAIR Left     SHUNT REVISION Left 8/19/2021    LEFT FOREARM ARTERIO VENOUS GRAFT performed by Olga Rutherford MD at [address] N/A 11/24/2020    EGD BIOPSY performed by Fady Rock MD at [address] History:   Social History       Tobacco History       Smoking Status  Former Quit Date  7/2/1978 Smoking Frequency  0.50 packs/day for 10.00 years (5.00 pk-yrs) Smoking Tobacco Type  Cigarettes quit in 7/2/1978      Smokeless Tobacco Use  Never              Alcohol History       Alcohol Use Status  Not Currently Comment  every other week one glass of wine              Drug Use       Drug Use Status  Never              Sexual Activity       Sexually Active  Not Currently                    Fam History:   Family History   Problem Relation Age of Onset    Cancer Mother         lung, breast, liver    Stroke Father     Stomach Cancer Brother     Cancer Maternal Grandmother     No Known Problems Paternal Grandmother     No Known Problems Paternal Grandfather        PFSH: The above PMHx, PSHx, SocHx, FamHx has been reviewed by myself. (1 area for detailed, 2-3 for comprehensive)      Code Status: Full Code    Meds - following list of home medications fromelectronic chart has been reviewed by myself  Prior to Admission medications    Medication Sig Start Date End Date Taking? Authorizing Provider   lidocaine (LIDODERM) 5 % Place 1 patch onto the skin daily 12 hours on, 12 hours off. 11/19/22   India Rowell PA-C   isosorbide mononitrate (IMDUR) 30 MG extended release tablet Take 30 mg by mouth daily 9/19/22   Historical Provider, MD   baclofen (LIORESAL) 10 MG tablet Take 1 tablet by mouth 3 times daily as needed (muscle spasms) 11/18/22   Celsa Chavez MD   irbesartan-hydroCHLOROthiazide (AVALIDE) 150-12.5 MG per tablet TAKE 1 TABLET EVERY DAY 7/27/22   Emily Lincoln MD   gabapentin (NEURONTIN) 100 MG capsule Take 2 capsules by mouth nightly for 90 days.  7/21/22 11/18/22  Emily Lincoln MD   rOPINIRole (REQUIP) 0.25 MG tablet Take 1 tablet by mouth nightly 7/21/22   Emily Lincoln MD   vitamin D3 (CHOLECALCIFEROL) 25 MCG (1000 UT) TABS tablet Take 1 tablet by mouth daily 7/8/22   MARCIE Tobin CNP   SPS 15 GM/60ML suspension  6/9/22   Historical Provider, MD   pantoprazole (PROTONIX) 40 MG tablet Take 1 tablet by mouth daily (with breakfast) 6/14/22   Emily Lincoln MD   ASPIRIN LOW DOSE 81 MG EC tablet TAKE 1 TABLET BY MOUTH EVERY DAY 6/2/22   MARCIE Woodard CNP   nortriptyline (PAMELOR) 10 MG capsule TAKE 1 CAPSULE BY MOUTH EVERY EVENING 5/19/22   Emily Lincoln MD   spironolactone (ALDACTONE) 25 MG tablet Take 1 tablet by mouth daily 3/29/22   Emily Lincoln MD   clopidogrel (PLAVIX) 75 MG tablet Take 1 tablet by mouth daily 3/29/22   Emily Lincoln MD   rosuvastatin (CRESTOR) 20 MG tablet TAKE 1 TABLET BY MOUTH EVERY DAY 3/29/22   Emily Lincoln MD   nitroGLYCERIN (NITROSTAT) 0.4 MG SL tablet Place 1 tablet under the tongue every 5 minutes as needed for Chest pain 3/22/22   Lizzeth Ordaz, APRN - CNP   levothyroxine (SYNTHROID) 150 MCG tablet Take 1 tablet by mouth every morning (before breakfast) 9/8/21   Emily Lincoln MD   allopurinol (ZYLOPRIM) 100 MG tablet TAKE 1 TABLET EVERY DAY 9/8/21   Emily Lincoln MD   metoprolol succinate (TOPROL XL) 25 MG extended release tablet TAKE 1 TABLET EVERY DAY 9/8/21   Emily Lincoln MD   torsemide (DEMADEX) 20 MG tablet Take 3 tablets by mouth 2 times daily  Patient taking differently: Take 120 mg by mouth 2 times daily 6 tabs bid 8/31/21   Damienxiao Bynum MD         Allergies   Allergen Reactions    Amoxicillin Anaphylaxis    Demeclocycline Anaphylaxis    Penicillins Anaphylaxis    Tetracyclines & Related Anaphylaxis    Ozempic (0.25 Or 0.5 Mg-Dose) [Semaglutide(0.25 Or 0.5mg-Dos)] Other (See Comments)     Lost large amount weight and loss  Of appetite    Codeine Itching and Rash             EXAM: (2-7 system for EPF/Detailed, ?8 for Comprehensive)  BP (!) 151/100   Pulse 91   Temp 98.8 °F (37.1 °C) (Temporal)   Resp 22   LMP  (LMP Unknown)   SpO2 97%   Constitutional: vitals as above: alert, appears stated age and cooperative    Head: Normocephalic, without obvious abnormality, atraumatic    Eyes:lids and lashes normal, conjunctivae and sclerae normal and pupils equal, round, reactive to light and accomodation    EMNT: external ears normal, nares midline    Neck: no carotid bruit, supple, symmetrical, trachea midline and thyroid not enlarged, symmetric, no tenderness/mass/nodules     Respiratory: clear to auscultation and percussion bilaterally with normal respiratory effort    Cardiovascular: normal rate, regular rhythm, normal S1 and S2 and no murmurs    Gastrointestinal: soft, non-tender, non-distended, normal bowel sounds, no masses or organomegaly    Extremities: no clubbing, no edema    Skin:No rashes or nodules noted.     Neurologic:negative         LABS:  Labs Reviewed COVID-19, RAPID - Abnormal; Notable for the following components:       Result Value    SARS-CoV-2, NAAT DETECTED (*)     All other components within normal limits   CBC WITH AUTO DIFFERENTIAL - Abnormal; Notable for the following components:    WBC 11.7 (*)     RBC 3.16 (*)     Hemoglobin 10.1 (*)     Hematocrit 30.3 (*)     RDW 15.8 (*)     Neutrophils Absolute 9.8 (*)     All other components within normal limits   BASIC METABOLIC PANEL W/ REFLEX TO MG FOR LOW K - Abnormal; Notable for the following components:    Anion Gap 18 (*)     Glucose 102 (*)     BUN 73 (*)     Creatinine 9.4 (*)     Est, Glom Filt Rate 4 (*)     All other components within normal limits    Narrative:     CALL  Wilson  [facility] tel. [phone],  Previous panic on this admission - call not needed per SOP, 11/20/2022 12:33,  by YEN   MAGNESIUM - Abnormal; Notable for the following components:    Magnesium 2.70 (*)     All other components within normal limits    Narrative:     CALL  Wilson  [facility] tel. [phone],  Previous panic on this admission - call not needed per SOP, 11/20/2022 12:33,  by YEN   TROPONIN - Abnormal; Notable for the following components:    Troponin 0.04 (*)     All other components within normal limits    Narrative:     CALL  Wilson  [facility] tel. [phone],  Previous panic on this admission - call not needed per SOP, 11/20/2022 12:33,  by YEN   BLOOD GAS, VENOUS - Abnormal; Notable for the following components:    pH, Vinicio 7.482 (*)     pCO2, Vinicio 25.0 (*)     pO2, Vinicio 140.0 (*)     HCO3, Venous 18.7 (*)     Base Excess, Vinicio -3.6 (*)     Carboxyhemoglobin 10.1 (*)     All other components within normal limits   LACTIC ACID   URINALYSIS WITH REFLEX TO CULTURE   COMPREHENSIVE METABOLIC PANEL W/ REFLEX TO MG FOR LOW K   CBC WITH AUTO DIFFERENTIAL         IMAGING:  Imaging results from the ER have been reviewed in the computerized chart.   CT HEAD WO CONTRAST    Result Date: 11/20/2022  EXAMINATION: CT OF THE HEAD WITHOUT CONTRAST  11/20/2022 12:35 pm TECHNIQUE: CT of the head was performed without the administration of intravenous contrast. Automated exposure control, iterative reconstruction, and/or weight based adjustment of the mA/kV was utilized to reduce the radiation dose to as low as reasonably achievable. COMPARISON: 08/25/2021 HISTORY: ORDERING SYSTEM PROVIDED HISTORY: AMS TECHNOLOGIST PROVIDED HISTORY: Reason for exam:->AMS Has a \"code stroke\" or \"stroke alert\" been called? ->No Decision Support Exception - unselect if not a suspected or confirmed emergency medical condition->Emergency Medical Condition (MA) Reason for Exam: AMS Additional signs and symptoms: Altered Mental Status (Pt from assisted living, was seen yesterday for back pain, now altered, did not finish dialysis yesterday and skipped Thursday. Pt alert to verbal but will not answer questions. ) FINDINGS: BRAIN/VENTRICLES: There is no acute intracranial hemorrhage, mass effect or midline shift. No abnormal extra-axial fluid collection. The gray-white differentiation is maintained without evidence of an acute infarct. There is no evidence of hydrocephalus. ORBITS: The visualized portion of the orbits demonstrate no acute abnormality. SINUSES: The visualized paranasal sinuses and mastoid air cells demonstrate no acute abnormality. SOFT TISSUES/SKULL:  No acute abnormality of the visualized skull or soft tissues. No acute intracranial abnormality. EKG:   EKG from ER, reviewed by self - it shows normal sinus at 80  Old chart reviewed, EKG dated 1/19/22 is reviewed, there is not difference noted.     Old study shows sinus at 80    Lab Results   Component Value Date/Time    GLUCOSE 102 11/20/2022 12:09 PM     Lab Results   Component Value Date/Time    POCGLU 101 09/23/2021 07:15 AM     BP (!) 151/100   Pulse 91   Temp 98.8 °F (37.1 °C) (Temporal)   Resp 22   LMP  (LMP Unknown)   SpO2 97%     MEDICAL DECISION MAKING:    Principal Problem:    End stage renal disease ([facility] [address] -Established problem. Uncontrolled. Plan: pt has missed HD tx. Admit, emergent HD today  Active Problems:    Essential hypertension -Established problem. Elevated. 151/100  Plan: Pt home BP meds reviewed and will be continued. IV Hydralazine ordered for control of extremely high blood pressures. Will monitor labs to assess Creat/K for possible complications of medications. Hyperlipidemia -Established problem. Stable. Plan: on statin, to continue    Diabetes mellitus type 2 in [facility]) -Established problem. Stable. glu 102  Plan: Patient placed on controlled carbohydrate diet. Fingerstick sugars to be checked to monitor for both hypoglycemia as well as hyperglycemia. Sliding scale insulin ordered. Glucagon and dextrose ordered for hypoglycemia. Patient will be continued on home medications. Hemoglobin a1c to be ordered to assess efficacy of therapy. COVID-19  New Problem to me. Positive simone  Plan: iv steroids. Keep in isolation. Check CRP        Diagnoses as listed above, designated as new or established and plan outlined for each. Data Reviewed:   (1) Lab tests were reviewed or ordered. (1) Radiology tests were reviewed or ordered. (1) Medical test (Echo, EKG, PFT/leonarda) were ordered. (1)History was not obtained from someone other than patient  (1) Old records were reviewed - see HPI/MDM for pertinent details if review done. (2) Case was discussed with another health care provider: Dr Romina Hernandez  (2) Imaging was viewed by myself. (2) EKG  was viewed by myself. The patient is being placed in inpatient status with the expectation of requiring a hospital stay spanning at least two midnights for care and treatment of the problems noted in the problem list.  This determination is also based on thepatients comorbidities and past medical history, the severity and timing of the signs and symptoms upon presentation.     (Please note that portions of this note were completed with a voice recognition program.  Efforts were made to edit the dictations but occasionally words are mis-transcribed.)      Electronically signed by: Frank Ferguson MD 11/20/2022

## 2022-11-20 NOTE — ED NOTES
Pt transported to dialysis via bed by transport, pt on ICU tele 2.       Candelaria Carr RN  11/20/22 0521

## 2022-11-20 NOTE — PROGRESS NOTES
Patient screaming out in pain. Pulled out IV during HD. IV replaced and order received for 1 mg morphine. Administered to patient with relief. Reported off to ICU RN.

## 2022-11-20 NOTE — FLOWSHEET NOTE
Treatment time: 2 hrs completed     Net UF: 750 mls     Pre weight: MALIK   Post weight: MALIK  EDW: TBD     Access used: L FA AVG  Access function: good,     Medications or blood products given: None     Regular outpatient schedule: TTS     Summary of response to treatment: pt tolerated tx poorly.  After one hour pt verbal about cramping in legs, pt insisted treatment terminat early     Copy of dialysis treatment record placed in chart, to be scanned into EMR.      11/20/22 1410 11/20/22 1622   Vital Signs   BP (!) 162/67 (!) 184/77   Temp 97.9 °F (36.6 °C) 98 °F (36.7 °C)   Heart Rate 89 90   Post-Hemodialysis Assessment   Hemodialysis Intake (ml)  --  500 ml   Hemodialysis Output (ml)  --  1250 ml   NET Removed (ml)  --  750   Tolerated Treatment  --  Poor

## 2022-11-20 NOTE — PROGRESS NOTES
Pt assessment complete, pt lying in bed, Does not follow commands, or respond verbally.  Pt lying in bed, repositioned, care ongoing

## 2022-11-20 NOTE — PROGRESS NOTES
"Encounter Date: 9/12/2018    This is a SORT/MSE of a 2 y.o. male presenting to the ED with c/o cough and difficulty breathing. Cough noted. Diminished breath sounds, Care faint wheezing. Retractions noted. Care will be transferred to an alternate provider when patient is roomed for a full evaluation and final disposition. GUTIERREZ Cooper, FNP-C 9/12/2018 12:26 PM    SCRIBE #1 NOTE: IHemalatha am scribing for, and in the presence of,  Mao Carranza NP. I have scribed the following portions of the note - Other sections scribed: HPI, ROS.       History     Chief Complaint   Patient presents with    Cough     " He has been coughing since last night." Mother denies productive cough     CC: Cough    2 year old male  has no past medical history on file presents to the ED accompanied with mother for evaluation of a productive cough, wheezing, and shortness of breath. Pt's mother reports giving pt albuterol pump and cough medicine at home. She reports pt received a breathing treatment in this ED with relief. She reports pt has not been formally diagnosed with asthma. No other symptoms reported.       The history is provided by the patient and the mother. No  was used.     Review of patient's allergies indicates:  No Known Allergies  History reviewed. No pertinent past medical history.  History reviewed. No pertinent surgical history.  Family History   Problem Relation Age of Onset    Anemia Mother         Copied from mother's history at birth     Social History     Tobacco Use    Smoking status: Never Smoker    Smokeless tobacco: Never Used   Substance Use Topics    Alcohol use: No    Drug use: No     Review of Systems   Constitutional: Negative for fever.   HENT: Negative for sore throat.    Respiratory: Positive for cough and wheezing.    Cardiovascular: Negative for palpitations.   Gastrointestinal: Negative for nausea.   Genitourinary: Negative for difficulty urinating. " Nephrology consult received    Discussed with ED      HD orders placed    HD unit informed   Musculoskeletal: Negative for joint swelling.   Skin: Negative for rash.   Neurological: Negative for seizures.   Hematological: Does not bruise/bleed easily.       Physical Exam     Initial Vitals   BP Pulse Resp Temp SpO2   09/12/18 1419 09/12/18 1227 09/12/18 1227 09/12/18 1227 09/12/18 1227   (!) 96/53 (!) 140 (!) 26 98.9 °F (37.2 °C) 95 %      MAP       --                Physical Exam    Nursing note and vitals reviewed.  Constitutional: He appears well-developed and well-nourished. He is not diaphoretic. He is active. No distress.   HENT:   Head: Atraumatic. No signs of injury.   Right Ear: Tympanic membrane normal.   Left Ear: Tympanic membrane normal.   Nose: Nose normal. No nasal discharge.   Mouth/Throat: Mucous membranes are moist. Dentition is normal. No dental caries. No tonsillar exudate. Oropharynx is clear. Pharynx is normal.   Eyes: Conjunctivae and EOM are normal. Pupils are equal, round, and reactive to light. Right eye exhibits no discharge. Left eye exhibits no discharge.   Neck: Normal range of motion. Neck supple. No neck rigidity or neck adenopathy.   Cardiovascular: Normal rate and regular rhythm. Pulses are strong.    Pulmonary/Chest: Effort normal. No accessory muscle usage, nasal flaring or stridor. No respiratory distress. He has wheezes in the right upper field and the left upper field. He has no rhonchi. He has no rales. He exhibits no retraction.   Physical exam performed following administration of albuterol nebulizer and Orapred ordered at triage.  Very mild wheezing to auscultation bilaterally in upper lung fields.  There is no wheezing or decreased breath sounds. No tachypnea, accessory muscle usage.  No respiratory distress. No cough heard during history and physical.   Abdominal: Soft. Bowel sounds are normal. He exhibits no distension and no mass. There is no tenderness. There is no rebound and no guarding. No hernia.   Musculoskeletal: Normal range of motion. He exhibits no  edema, tenderness, deformity or signs of injury.   Neurological: He is alert. No cranial nerve deficit. He exhibits normal muscle tone. Coordination normal.   Skin: Skin is warm and dry. Capillary refill takes less than 2 seconds. No purpura and no rash noted. No jaundice.         ED Course   Procedures  Labs Reviewed - No data to display       Imaging Results    None          Medical Decision Making:   History:   Old Medical Records: I decided to obtain old medical records.  Differential Diagnosis:   Asthma exacerbation, URI, pneumonia, bronchitis, others  ED Management:  Patient's mother reports wheezing and coughing that began last night.  She denies fevers.  Immunizations are up-to-date.  Patient is eating and drinking normally.  Patient is nontoxic, awake, alert, well-appearing, active, playful, in no distress.  She treated him with and albuterol inhaler and an unknown OTC cough medication without relief of symptoms. She reports that patient has had these symptoms in the past but has never been diagnosed with asthma.  Patient was reportedly mildly wheezing and exhibiting a cough at triage where the SORT provider ordered albuterol nebulizer and Orapred.  These treatments were administered before I examined the patient who is no longer coughing.  Mild wheezing to auscultation bilaterally in upper fields.  Ordered additional DuoNeb treatments.  Following administration of additional treatments there is no longer any wheezing to auscultation. Symptoms clinically consistent with reactive airway disease.  Prescribed Orapred burst and Zyrtec at discharge. Advised patient's mother to follow up with his pediatrician tomorrow for re-evaluation.  ED return precautions given.  Patient's mother expressed understanding of diagnosis, discharge instructions, return precautions.    My attending physician was available for consultation during this case            Scribe Attestation:   Scribe #1: I performed the above scribed  service and the documentation accurately describes the services I performed. I attest to the accuracy of the note.    Attending Attestation:           Physician Attestation for Scribe:  Physician Attestation Statement for Scribe #1: I, Mao Carranza NP, reviewed documentation, as scribed by Hemalatha Lee in my presence, and it is both accurate and complete.                    Clinical Impression:   The encounter diagnosis was Reactive airway disease with acute exacerbation, unspecified asthma severity, unspecified whether persistent.      Disposition:   Disposition: Discharged  Condition: Stable                        Mao Carranza NP  09/12/18 0774

## 2022-11-20 NOTE — ED NOTES
Pt report given to ICU RN, states no questions or concerns.       [facility], RN  11/20/22 0302

## 2022-11-21 LAB
A/G RATIO: 1.3 (ref 1.1–2.2)
ALBUMIN SERPL-MCNC: 4.7 G/DL (ref 3.4–5)
ALP BLD-CCNC: 182 U/L (ref 40–129)
ALT SERPL-CCNC: 18 U/L (ref 10–40)
ANION GAP SERPL CALCULATED.3IONS-SCNC: 21 MMOL/L (ref 3–16)
AST SERPL-CCNC: 23 U/L (ref 15–37)
BASOPHILS ABSOLUTE: 0 K/UL (ref 0–0.2)
BASOPHILS RELATIVE PERCENT: 0.1 %
BILIRUB SERPL-MCNC: 0.6 MG/DL (ref 0–1)
BUN BLDV-MCNC: 50 MG/DL (ref 7–20)
C-REACTIVE PROTEIN: 82.4 MG/L (ref 0–5.1)
CALCIUM SERPL-MCNC: 9.8 MG/DL (ref 8.3–10.6)
CHLORIDE BLD-SCNC: 94 MMOL/L (ref 99–110)
CO2: 22 MMOL/L (ref 21–32)
CREAT SERPL-MCNC: 7.5 MG/DL (ref 0.6–1.2)
EKG ATRIAL RATE: 88 BPM
EKG DIAGNOSIS: NORMAL
EKG P AXIS: 31 DEGREES
EKG P-R INTERVAL: 178 MS
EKG Q-T INTERVAL: 410 MS
EKG QRS DURATION: 80 MS
EKG QTC CALCULATION (BAZETT): 496 MS
EKG R AXIS: -19 DEGREES
EKG T AXIS: 57 DEGREES
EKG VENTRICULAR RATE: 88 BPM
EOSINOPHILS ABSOLUTE: 0 K/UL (ref 0–0.6)
EOSINOPHILS RELATIVE PERCENT: 0 %
ESTIMATED AVERAGE GLUCOSE: 122.6 MG/DL
GFR SERPL CREATININE-BSD FRML MDRD: 5 ML/MIN/{1.73_M2}
GLUCOSE BLD-MCNC: 122 MG/DL (ref 70–99)
GLUCOSE BLD-MCNC: 130 MG/DL (ref 70–99)
GLUCOSE BLD-MCNC: 153 MG/DL (ref 70–99)
GLUCOSE BLD-MCNC: 169 MG/DL (ref 70–99)
HBA1C MFR BLD: 5.9 %
HCT VFR BLD CALC: 33 % (ref 36–48)
HEMOGLOBIN: 11.2 G/DL (ref 12–16)
LYMPHOCYTES ABSOLUTE: 0.9 K/UL (ref 1–5.1)
LYMPHOCYTES RELATIVE PERCENT: 6.1 %
MCH RBC QN AUTO: 32.3 PG (ref 26–34)
MCHC RBC AUTO-ENTMCNC: 33.9 G/DL (ref 31–36)
MCV RBC AUTO: 95.2 FL (ref 80–100)
MONOCYTES ABSOLUTE: 0.1 K/UL (ref 0–1.3)
MONOCYTES RELATIVE PERCENT: 1 %
NEUTROPHILS ABSOLUTE: 13.2 K/UL (ref 1.7–7.7)
NEUTROPHILS RELATIVE PERCENT: 92.8 %
OCCULT BLOOD, OTHER: ABNORMAL
PDW BLD-RTO: 15.8 % (ref 12.4–15.4)
PERFORMED ON: ABNORMAL
PH, GASTRIC: ABNORMAL
PLATELET # BLD: 393 K/UL (ref 135–450)
PMV BLD AUTO: 8.2 FL (ref 5–10.5)
POTASSIUM REFLEX MAGNESIUM: 4.7 MMOL/L (ref 3.5–5.1)
RBC # BLD: 3.47 M/UL (ref 4–5.2)
SODIUM BLD-SCNC: 137 MMOL/L (ref 136–145)
TOTAL PROTEIN: 8.3 G/DL (ref 6.4–8.2)
WBC # BLD: 14.2 K/UL (ref 4–11)

## 2022-11-21 PROCEDURE — 6360000002 HC RX W HCPCS: Performed by: INTERNAL MEDICINE

## 2022-11-21 PROCEDURE — 82271 OCCULT BLOOD OTHER SOURCES: CPT

## 2022-11-21 PROCEDURE — 36415 COLL VENOUS BLD VENIPUNCTURE: CPT

## 2022-11-21 PROCEDURE — 2000000000 HC ICU R&B

## 2022-11-21 PROCEDURE — 99223 1ST HOSP IP/OBS HIGH 75: CPT | Performed by: INTERNAL MEDICINE

## 2022-11-21 PROCEDURE — 85025 COMPLETE CBC W/AUTO DIFF WBC: CPT

## 2022-11-21 PROCEDURE — 93010 ELECTROCARDIOGRAM REPORT: CPT | Performed by: INTERNAL MEDICINE

## 2022-11-21 PROCEDURE — 90935 HEMODIALYSIS ONE EVALUATION: CPT

## 2022-11-21 PROCEDURE — 92523 SPEECH SOUND LANG COMPREHEN: CPT

## 2022-11-21 PROCEDURE — C9113 INJ PANTOPRAZOLE SODIUM, VIA: HCPCS | Performed by: INTERNAL MEDICINE

## 2022-11-21 PROCEDURE — 2580000003 HC RX 258: Performed by: INTERNAL MEDICINE

## 2022-11-21 PROCEDURE — 92610 EVALUATE SWALLOWING FUNCTION: CPT

## 2022-11-21 PROCEDURE — 86140 C-REACTIVE PROTEIN: CPT

## 2022-11-21 PROCEDURE — 83036 HEMOGLOBIN GLYCOSYLATED A1C: CPT

## 2022-11-21 PROCEDURE — 2060000000 HC ICU INTERMEDIATE R&B

## 2022-11-21 PROCEDURE — 80053 COMPREHEN METABOLIC PANEL: CPT

## 2022-11-21 RX ORDER — INSULIN LISPRO 100 [IU]/ML
0-4 INJECTION, SOLUTION INTRAVENOUS; SUBCUTANEOUS NIGHTLY
Status: DISCONTINUED | OUTPATIENT
Start: 2022-11-21 | End: 2022-11-24 | Stop reason: HOSPADM

## 2022-11-21 RX ORDER — PANTOPRAZOLE SODIUM 40 MG/10ML
40 INJECTION, POWDER, LYOPHILIZED, FOR SOLUTION INTRAVENOUS ONCE
Status: COMPLETED | OUTPATIENT
Start: 2022-11-21 | End: 2022-11-21

## 2022-11-21 RX ORDER — INSULIN LISPRO 100 [IU]/ML
0-4 INJECTION, SOLUTION INTRAVENOUS; SUBCUTANEOUS
Status: DISCONTINUED | OUTPATIENT
Start: 2022-11-21 | End: 2022-11-24 | Stop reason: HOSPADM

## 2022-11-21 RX ORDER — DEXTROSE MONOHYDRATE 100 MG/ML
INJECTION, SOLUTION INTRAVENOUS CONTINUOUS PRN
Status: DISCONTINUED | OUTPATIENT
Start: 2022-11-21 | End: 2022-11-21 | Stop reason: SDUPTHER

## 2022-11-21 RX ORDER — GABAPENTIN 100 MG/1
200 CAPSULE ORAL NIGHTLY
COMMUNITY

## 2022-11-21 RX ORDER — INSULIN GLARGINE 100 [IU]/ML
10 INJECTION, SOLUTION SUBCUTANEOUS NIGHTLY
Status: DISCONTINUED | OUTPATIENT
Start: 2022-11-21 | End: 2022-11-21

## 2022-11-21 RX ADMIN — PANTOPRAZOLE SODIUM 40 MG: 40 INJECTION, POWDER, FOR SOLUTION INTRAVENOUS at 18:55

## 2022-11-21 RX ADMIN — SODIUM CHLORIDE 8 MG/HR: 9 INJECTION, SOLUTION INTRAVENOUS at 18:57

## 2022-11-21 RX ADMIN — HYDRALAZINE HYDROCHLORIDE 10 MG: 20 INJECTION INTRAMUSCULAR; INTRAVENOUS at 03:13

## 2022-11-21 RX ADMIN — Medication 10 ML: at 11:22

## 2022-11-21 RX ADMIN — MORPHINE SULFATE 1 MG: 2 INJECTION, SOLUTION INTRAMUSCULAR; INTRAVENOUS at 22:20

## 2022-11-21 RX ADMIN — HYDRALAZINE HYDROCHLORIDE 10 MG: 20 INJECTION INTRAMUSCULAR; INTRAVENOUS at 23:47

## 2022-11-21 RX ADMIN — MORPHINE SULFATE 1 MG: 2 INJECTION, SOLUTION INTRAMUSCULAR; INTRAVENOUS at 04:58

## 2022-11-21 RX ADMIN — DEXAMETHASONE SODIUM PHOSPHATE 6 MG: 10 INJECTION INTRAMUSCULAR; INTRAVENOUS at 18:55

## 2022-11-21 ASSESSMENT — ENCOUNTER SYMPTOMS
ABDOMINAL PAIN: 1
TROUBLE SWALLOWING: 1
SHORTNESS OF BREATH: 1
ABDOMINAL DISTENTION: 1

## 2022-11-21 ASSESSMENT — PAIN SCALES - GENERAL
PAINLEVEL_OUTOF10: 0
PAINLEVEL_OUTOF10: 6
PAINLEVEL_OUTOF10: 6
PAINLEVEL_OUTOF10: 0
PAINLEVEL_OUTOF10: 8

## 2022-11-21 ASSESSMENT — PAIN SCALES - WONG BAKER
WONGBAKER_NUMERICALRESPONSE: 6
WONGBAKER_NUMERICALRESPONSE: 0
WONGBAKER_NUMERICALRESPONSE: 6
WONGBAKER_NUMERICALRESPONSE: 8
WONGBAKER_NUMERICALRESPONSE: 0
WONGBAKER_NUMERICALRESPONSE: 0
WONGBAKER_NUMERICALRESPONSE: 6
WONGBAKER_NUMERICALRESPONSE: 0
WONGBAKER_NUMERICALRESPONSE: 0
WONGBAKER_NUMERICALRESPONSE: 6
WONGBAKER_NUMERICALRESPONSE: 6
WONGBAKER_NUMERICALRESPONSE: 0
WONGBAKER_NUMERICALRESPONSE: 0

## 2022-11-21 NOTE — PROGRESS NOTES
Facility/Department: [facility] ICU  SLP Clinical Swallow Evaluation and Speech Language Cognitive Assessment     Patient: Zainab Sloan   : 1945   MRN: [id number]      Evaluation Date: 2022      Admitting Dx: End stage renal disease ([facility] [N18.6]  Polypharmacy [Z79.899]  ESRD (end stage renal disease) ([facility] [N18.6]  Exposure to carbon monoxide [V06.92]  Metabolic acidosis with respiratory alkalosis [E87.20, E87.3]  Pain: Denies                                  H&P: Zainab Sloan is a 67 y.o. female who has a past medical history of Arthritis, CAD (coronary artery disease), Diabetes ([facility] Hemodialysis patient ([facility] Hyperlipidemia, Hypertension, Kidney disease, Neuropathy, Pneumonia, and Thyroid disease. Patient presents with End stage renal disease ([facility] HPI  (1-3 for expanded problem focused, ?4 for detailed/comprehensive)   67 y.o. female who presents to the emergency department from assisted living facility with altered mentation. Patient unable to provide any significant history. Per squad she received 1 hour total of dialysis yesterday and has not had full session of dialysis since last Tuesday. Yesterday patient was in the emergency room for low back pain which is chronic in nature. Given muscle relaxants but was not sent home on opiates. Nephrology was consulted, patient was deemed to be stable for dialysis on Monday. She is confused in ER  I requested COVID swab to be sent, it turns out to be positive  To be admitted for steroids, isolation for covid    Imaging:  Head CT:   Impression   No acute intracranial abnormality       History/Prior Level of Function:   Living Status: Assisted living  Prior Dysphagia History: No prior hx of dysphagia. Prior Speech History: No prior speech hx. Reason for referral: SLP evaluation orders received due to altered mental status.     DYSPHAGIA BEDSIDE SWALLOW EVALUATION   Dysphagia Impressions/Dysphagia Diagnosis: Oropharyngeal Dysphagia Pt alert and willing to be repositioned upright in bed for evaluation. Pt currently on 2L O2 via nasal cannula with NG to suction. O2 sats 97% with RR 23-25min. RN cleared SLP for entry and PO trials. Reduced verbal output with intermittent response to yes/no questions and 1-step commands therefore oral mech exam limited. Suspect decreased lingual and labial ROM coordination. Minimal trials of ice chips and thin liquids via tsp provided to assess swallow function. A single ice chip revealed decreased oral acceptance and manipulation, suspected pharyngeal pooling and a delayed, effortful swallow. Poor bolus control with anterior loss and suspected premature bolus loss to pharynx noted with single trial of thin liquids via tsp. Pt demonstrated immediate coughing and delayed throat clearing with increased RR 31-34min. She declined further trials of ice chips, endorsed bolus being too cold and painful to swallow. At this time, pt does not demonstrate functional or safe tolerance of PO therefore recommend pt remain NPO with meds via alternative means and strict oral care with ongoing dysphagia intervention. Recommended Diet and Intervention:  Diet Solids Recommendation:  NPO  Liquid Consistency Recommendation:  NPO Recommended form of Meds: Meds via alternative means     **STRICT oral care     Dysphagia Therapeutic Intervention:  Oral Care, Patient/Family Education , Therapeutic Trials with SLP     Compensatory Swallowing Strategies:   To be determined     Oral Mechanism Exam:  []WFL []Mild   [x] Moderate  [x]Severe  []To be assessed  Labial ROM, Labial Strength, Labial Coordination , Lingual ROM, Lingual Strength , Lingual Coordination     SHORT TERM DYSPHAGIA GOALS  Pt will functionally tolerate ongoing assessment of swallow function with diet to be determined as indicated   Pt will demonstrate understanding of aspiration risk and precautions via education/demonstration with occasional prompting    Patient Positioning: Upright in bed       SPEECH LANGUAGE COGNITIVE ASSESSMENT:     Speech Diagnosis:   Cognitive-Linguistic Deficits , Speech Language Deficits     Impressions:   Pt awake and alert, with overall limited verbal initiation with evaluation. RN reported pt feeling tired after dialysis. Pt with limited and inconsistent verbal output throughout session, occasionally making no attempt to respond to yes/no or orientation questions despite repetition of prompts and given f/2 options. Given f/2 options, pt responded correctly to orientation of place, but did not attempt to state name, year or month. Pt with consistent moaning during PO trials and unable to verbalize reasoning. Responses limited across session with pt responding to yes/no personal and preferential questions in ~50% of opportunities and to general yes/no in <50% of opportunities. Pt with decreased responses when following commands during oral [facility] exam, following 1-step commands with 50% accuracy 2/4. Pt able to blink and stick out tongue but did not open mouth or smile when prompted. Overall, Pt would benefit from SLP intervention to target ongoing assessment and to maximize functional communication. COMPREHENSION  Auditory Comprehension: Moderate , Severe , and To be assessed   Functional Ability to Comprehend Basic Commands/Questions   Impaired Yes/no questions  Impaired Basic questions  Impaired One step commands  Impaired Conversation    EXPRESSION  Verbal Expression: Severe  and To be assessed   Impaired Initiation  Impaired Conversation     Pragmatics/Social Functioning: To be assessed     MOTOR SPEECH  Motor Speech: To be assessed     VOICE  Voice: To be assessed     COGNITION    Overall Orientation : To be assessed    Oriented to place with f/2    Attention: To be assessed      Memory: To be assessed      Problem Solving: To be assessed      Safety/Judgement:  To be assessed      GOALS:  Short Term Speech/Language/Cognitive Goals:   Pt will improve auditory processing/comprehension of commands and questions to 80%, via graded tasks   Pt will improve orientation and short term recall via graded tasks to 80%  Pt will improve verbal expression for functional expression via graded tasks to 80%  Pt will participate in ongoing cognitive assessment with goals to be established as indicated     Plan of care: 3-5 times per week during acute care stay. Discharge Recommendations:  Discharge recommendations to be determined pending ongoing follow-up during acute care stay    EDUCATION:   Provided education regarding role of SLP, results of assessment, recommendations and general speech pathology plan of care. [] Pt verbalized understanding and agreement   [x] Pt requires ongoing learning   [x] No evidence of comprehension     If patient discharges prior to next visit, this note will serve as discharge. Treatment time:  Timed Code Treatment Minutes: 0  Total Treatment time: 25 min    Electronically signed by: Lc Mullins,  Speech-Language Pathology     The speech-language pathologist was present, directed the patient's care, made skilled judgment and was responsible for assessment and treatment.     Moni Prieto M.A., [address] [place] [address]  Speech-Language Pathologist

## 2022-11-21 NOTE — CONSULTS
Gastroenterology Consult Note                          Patient: Pamela Deng  : 1945  CSN#:      Date:  2022    Subjective:       History of Present Illness  Patient is a 67 y.o.  female admitted with End stage renal disease ([facility] [N18.6]  Polypharmacy [Z79.899]  ESRD (end stage renal disease) ([facility] [N18.6]  Exposure to carbon monoxide [M52.44]  Metabolic acidosis with respiratory alkalosis [E87.20, E87.3] who is seen in consult for bloody NG aspirate. She was admitted after found down, by family. She has ESRD on HD. In ICU found to have abd distension, so NG tube placed last pm.  This AM ng asp became bloody so we are consulted. Patient says yes or now and resists assistance. Seems comfortable. Denies abd pain to me. No melena nor hematochezia nor abd pain/distension per family. Past Medical History:   Diagnosis Date    Arthritis     CAD (coronary artery disease)     Diabetes ([facility]     Hemodialysis patient ([facility]     Hyperlipidemia     Hypertension     Kidney disease     Neuropathy     Pneumonia     IN , with COVID    Thyroid disease       Past Surgical History:   Procedure Laterality Date    APPENDECTOMY      CHOLECYSTECTOMY      COLONOSCOPY  2020    COLONOSCOPY POLYPECTOMY SNARE/COLD BIOPSY performed by Winfield Curling, MD at [place] [address] 2021    PERITONEAL DIALYSIS CATHETER REMOVAL.  [address]. performed by Mrilande Shafer DO at [facility] ([facility])      FULL    LAPAROSCOPY INSERTION PERITONEAL CATHETER N/A 4/3/2020    LAPAROSCOPIC LYSIS OF ADHESIONS, LAPAROSCOPIC PERITONEAL DIALYSIS CATHETER PLACEMENT, LAPAROSCOPIC OMENTOPEXY performed by Mirlande Shafer DO at [address] BILATERAL      preventive    ROTATOR CUFF REPAIR Left     SHUNT REVISION Left 2021    LEFT FOREARM ARTERIO VENOUS GRAFT performed by Froilan Lei MD at [facility] UPPER GASTROINTESTINAL ENDOSCOPY N/A 11/24/2020    EGD BIOPSY performed by Fady Rock MD at [facility]      Past Endoscopic History: EGD 11/2020: Erosive gastritis with hematin in the antrum. Biopsied for  H pylori  3cm sliding hiatal hernia. LA Grade A reflux esophagitis. Colonoscopy 11/2020:  8 mm tubulovillous adenoma of sigmoid colon. Hyperplastic right colon polyps. Admission Meds  No current facility-administered medications on file prior to encounter. Current Outpatient Medications on File Prior to Encounter   Medication Sig Dispense Refill    gabapentin (NEURONTIN) 100 MG capsule Take 200 mg by mouth at bedtime. lidocaine (LIDODERM) 5 % Place 1 patch onto the skin daily 12 hours on, 12 hours off.  30 patch 0    isosorbide mononitrate (IMDUR) 30 MG extended release tablet Take 30 mg by mouth daily      baclofen (LIORESAL) 10 MG tablet Take 1 tablet by mouth 3 times daily as needed (muscle spasms) 30 tablet 0    irbesartan-hydroCHLOROthiazide (AVALIDE) 150-12.5 MG per tablet TAKE 1 TABLET EVERY DAY 90 tablet 1    rOPINIRole (REQUIP) 0.25 MG tablet Take 1 tablet by mouth nightly 90 tablet 3    vitamin D3 (CHOLECALCIFEROL) 25 MCG (1000 UT) TABS tablet Take 1 tablet by mouth daily 90 tablet 1    pantoprazole (PROTONIX) 40 MG tablet Take 1 tablet by mouth daily (with breakfast) 30 tablet 3    ASPIRIN LOW DOSE 81 MG EC tablet TAKE 1 TABLET BY MOUTH EVERY DAY 90 tablet 1    nortriptyline (PAMELOR) 10 MG capsule TAKE 1 CAPSULE BY MOUTH EVERY EVENING 90 capsule 3    spironolactone (ALDACTONE) 25 MG tablet Take 1 tablet by mouth daily 90 tablet 3    clopidogrel (PLAVIX) 75 MG tablet Take 1 tablet by mouth daily 90 tablet 3    rosuvastatin (CRESTOR) 20 MG tablet TAKE 1 TABLET BY MOUTH EVERY DAY 90 tablet 1    nitroGLYCERIN (NITROSTAT) 0.4 MG SL tablet Place 1 tablet under the tongue every 5 minutes as needed for Chest pain 25 tablet 3    levothyroxine (SYNTHROID) 150 MCG tablet Take 1 tablet by mouth every morning (before breakfast) 90 tablet 3    allopurinol (ZYLOPRIM) 100 MG tablet TAKE 1 TABLET EVERY DAY 90 tablet 3    metoprolol succinate (TOPROL XL) 25 MG extended release tablet TAKE 1 TABLET EVERY DAY 90 tablet 3    torsemide (DEMADEX) 20 MG tablet Take 3 tablets by mouth 2 times daily (Patient taking differently: Take 120 mg by mouth 2 times daily 6 tabs bid) 30 tablet 3       Patient admits ASA and Plavix, no NSAID use. Allergies  Allergies   Allergen Reactions    Amoxicillin Anaphylaxis    Demeclocycline Anaphylaxis    Penicillins Anaphylaxis    Tetracyclines & Related Anaphylaxis    Ozempic (0.25 Or 0.5 Mg-Dose) [Semaglutide(0.25 Or 0.5mg-Dos)] Other (See Comments)     Lost large amount weight and loss  Of appetite    Codeine Itching and Rash      Social   Social History     Tobacco Use    Smoking status: Former     Packs/day: 0.50     Years: 10.00     Pack years: 5.00     Types: Cigarettes     Quit date: 1978     Years since quittin.4    Smokeless tobacco: Never   Substance Use Topics    Alcohol use: Not Currently     Comment: every other week one glass of wine        Family History   Problem Relation Age of Onset    Cancer Mother         lung, breast, liver    Stroke Father     Stomach Cancer Brother     Cancer Maternal Grandmother     No Known Problems Paternal Grandmother     No Known Problems Paternal Grandfather         Review of Systems  Pertinent items are noted in HPI. Physical Exam  Blood pressure (!) 151/78, pulse (!) 107, temperature 98.3 °F (36.8 °C), temperature source Temporal, resp. rate 20, weight 188 lb 15 oz (85.7 kg), SpO2 96 %, not currently breastfeeding. General appearance: alert, cooperative, no distress, appears stated age  Eyes: Anicteric  Head: Normocephalic, without obvious abnormality  Lungs: clear to auscultation bilaterally, Normal Effort  Heart: regular rate and rhythm, normal S1 and S2, no murmurs or rubs  Abdomen: soft, non-tender. Bowel sounds normal. No masses,  no organomegaly. Extremities: atraumatic, no cyanosis or edema  Skin: warm and dry, no jaundice  Neuro: Grossly intact, A&OX3      Data Review:    Recent Labs     11/19/22  1016 11/20/22  1154 11/21/22  0415   WBC 10.8 11.7* 14.2*   HGB 9.6* 10.1* 11.2*   HCT 28.4* 30.3* 33.0*   MCV 95.6 95.9 95.2    313 393     Recent Labs     11/19/22  1016 11/20/22  1209 11/21/22  0415    138 137   K 4.3 4.8 4.7   CL 96* 99 94*   CO2 25 21 22   BUN 66* 73* 50*   CREATININE 8.1* 9.4* 7.5*     Recent Labs     11/19/22  1016 11/21/22  0415   AST 14* 23   ALT 14 18   BILITOT 0.4 0.6   ALKPHOS 159* 182*     No results for input(s): LIPASE, AMYLASE in the last 72 hours. No results for input(s): PROTIME, INR in the last 72 hours. No results for input(s): PTT in the last 72 hours. No results for input(s): OCCULTBLD in the last 72 hours. COVID (+)    Imaging Studies:                            Abdomen xray 11/21/2022 - NS bowel gas with NG tube in gastric fundus. Assessment:     Principal Problem:    End stage renal disease ([facility]  Active Problems:    Essential hypertension    Hyperlipidemia    Diabetes mellitus type 2 in obese ([facility]    COVID-19  Resolved Problems:    * No resolved hospital problems. *    Bloody NG tube aspirate/UGI hemorrhage - patient unable to give history, but per family found confused. No history of melena nor hematochezia. She is on Plavix and ASA and receives HD for ESRD. She is COVID (+). She may have ileus from COVID infection, but would not cause bleeding. She has normal VS.  Will hold off on volume resuscitation given ESRD. For now will hold ASA and Plavix and start Protonix IV infusion. We will plan EGD in AM.  Can discontinue NG from GI standpoint  Recommendations:   1) IV Protonix  2) NPO/NG tube LIWS  3) EGD in AM  4) Serial Hb/Hct q 8 hrs. 5) Consider CT Abd/Pelvis with contrast depending on EGD result.      Merritt Herr, MD Donna Santana  11/21/2022

## 2022-11-21 NOTE — PLAN OF CARE
Problem: Discharge Planning  Goal: Discharge to home or other facility with appropriate resources  11/21/2022 1143 by Rose Harris RN  Outcome: Progressing     Problem: Safety - Medical Restraint  Goal: Remains free of injury from restraints (Restraint for Interference with Medical Device)  Description: INTERVENTIONS:  1. Determine that other, less restrictive measures have been tried or would not be effective before applying the restraint  2. Evaluate the patient's condition at the time of restraint application  3. Inform patient/family regarding the reason for restraint  4. Q2H: Monitor safety, psychosocial status, comfort, nutrition and hydration  11/21/2022 1143 by Rose Harris RN  Outcome: Progressing     Problem: Pain  Goal: Verbalizes/displays adequate comfort level or baseline comfort level  11/21/2022 1143 by Rose Hraris RN  Outcome: Progressing     Problem: Skin/Tissue Integrity  Goal: Absence of new skin breakdown  Description: 1. Monitor for areas of redness and/or skin breakdown  2. Assess vascular access sites hourly  3. Every 4-6 hours minimum:  Change oxygen saturation probe site  4. Every 4-6 hours:  If on nasal continuous positive airway pressure, respiratory therapy assess nares and determine need for appliance change or resting period. 11/21/2022 1143 by Rose Harris RN  Outcome: Progressing     Problem: ABCDS Injury Assessment  Goal: Absence of physical injury  Outcome: Progressing     Problem: Safety - Adult  Goal: Free from fall injury  Outcome: Progressing     Problem: Confusion  Goal: Confusion, delirium, dementia, or psychosis is improved or at baseline  Description: INTERVENTIONS:  1. Assess for possible contributors to thought disturbance, including medications, impaired vision or hearing, underlying metabolic abnormalities, dehydration, psychiatric diagnoses, and notify attending LIP  2. [place] high risk fall precautions, as indicated  3.  Provide frequent short contacts to provide reality reorientation, refocusing and direction  4. Decrease environmental stimuli, including noise as appropriate  5. Monitor and intervene to maintain adequate nutrition, hydration, elimination, sleep and activity  6. If unable to ensure safety without constant attention obtain sitter and review sitter guidelines with assigned personnel  7.  Initiate Psychosocial CNS and Spiritual Care consult, as indicated  Outcome: Progressing     Problem: Chronic Conditions and Co-morbidities  Goal: Patient's chronic conditions and co-morbidity symptoms are monitored and maintained or improved  Outcome: Progressing

## 2022-11-21 NOTE — CONSULTS
PALLIATIVE MEDICINE CONSULTATION     Patient name:Judy Chapman   QCP:[id number]    :1945  Room/Bed:ICU-3915/3915-01   LOS: 1 day         Date of consult:2022    Consult Information  Palliative Medicine Consult performed by: MARCIE Goncalves CNP, CNP    Inpatient consult to Palliative Care  Consult performed by: MARCIE Goncalves CNP  Consult ordered by: Edie Collins MD  Reason for consult: [address]             ASSESSMENT/RECOMMENDATIONS     67 y.o. female with Dyspnea and AMS       Symptom Management:  Dyspnea- pt on oxygen support COVID +  AMS- Pt non-verbal at present she is alert and oriented at baseline per family   Goals of Care- talked to pts daughter Pardeep Meadows She is listed at [facility] first contact on most recent [facility] paperwork. She states pt has been refusing dialysis at baseline and that she has saif at times she is tired and wants to stop dialysis but that at other times she is OK with going and stays shes not ready to be done. Pardeep Meadows is hopeful that pt will improve and make decision for herself that she wants to be done with dialysis. We discussed that pt is not safe to swallow at present we are awaiting speech evaluation but that pts paperwork is very clear she does not want artificial feeding so they want no tube feeding of any kind. Will follow along and support family     Patient/Family Goals of Care :    talked to pts daughter Pardeep Meadows She is listed at [facility] first contact on most recent [facility] paperwork. She states pt has been refusing dialysis at baseline and that she has saif at times she is tired and wants to stop dialysis but that at other times she is OK with going and stays shes not ready to be done. Pardeep Meadows is hopeful that pt will improve and make decision for herself that she wants to be done with dialysis.  We discussed that pt is not safe to swallow at present we are awaiting speech evaluation but that pts paperwork is very clear she does not want artificial feeding so they want no tube feeding of any kind. Will follow along and support family     Disposition/Discharge Plan:   pending    Advance Directives:  Surrogate Decision Maker: John Brady- AISHAOA/daughter  Code status:  DNR-CCA    Case discussed with: patient, floor RN  Thank you for allowing us to participate in the care of this patient. HISTORY     CC: AMS  HPI: The patient is a 67 y.o. female with past medical history of Arthritis, CAD (coronary artery disease), Diabetes ([facility] Hemodialysis patient ([facility] Hyperlipidemia, Hypertension, Kidney disease, Neuropathy, Pneumonia, and Thyroid disease. Pt presented to ED with AMS from dialysis. Palliative Medicine SymptomScreening/ROS:    Review of Systems   Constitutional:  Positive for appetite change and fatigue. HENT:  Positive for trouble swallowing. Respiratory:  Positive for shortness of breath. Gastrointestinal:  Positive for abdominal distention and abdominal pain. Skin:  Positive for pallor. Neurological:  Positive for weakness. Mouthing words, non-verbal   All other systems reviewed and are negative. Patient unable to complete full ROS due to current cognitive status. Information that is obtained from nursing and chart. Palliative Performance Scale:     [x] 60%  Amb reduced; Sig dz. Can't do hobbies/housework; Intake normal or reduced, Occasional assist; LOC full/confusion   [] 50%  Mainly sit/lie; Extensive disease. Mainly assist, Intake normal or reduced; Occasional assist; LOC full/confusion   [] 40%  Mainly in bed; Extensive disease; Mainly assist; Intake normal or reduced; Occasional assist; LOC full/confusion   [] 30%  Bed bound, Extensive disease; Total care; Intake reduced; LOC full/confusion   [] 20%  Bed bound; Extensive disease; Total care; Intake minimal; Drowsy/coma   [] 10%  Bed bound; Extensive disease;  Total care; Mouth care only; Drowsy/coma   []  0%   Death       Home med list and hospital medications reviewed in chart as of 11/21/2022     EXAM     Vitals:    11/21/22 0925   BP:    Pulse: (!) 102   Resp: 15   Temp:    SpO2: 99%       Physical Exam  Constitutional:       Appearance: She is ill-appearing. HENT:      Head: Normocephalic and atraumatic. Nose: No congestion. Mouth/Throat:      Mouth: Mucous membranes are dry. Eyes:      Pupils: Pupils are equal, round, and reactive to light. Cardiovascular:      Rate and Rhythm: Normal rate. Heart sounds: No murmur heard. No friction rub. No gallop. Pulmonary:      Effort: No respiratory distress. Abdominal:      Palpations: Abdomen is soft. Musculoskeletal:      Cervical back: Normal range of motion. Right lower leg: Edema present. Left lower leg: Edema present. Skin:     General: Skin is warm and dry. Coloration: Skin is pale. Neurological:      Mental Status: She is alert.       Comments: Mouthing words, non-verbal              Current labs in the epic chart reviewed as of 11/21/2022   Review of previous notes, admits, labs, radiology and testing relevant to this consult done in this chart today 11/21/2022      Total time: 70 minutes  >50% of time spent counseling patient at bedside or POA/family member if applicable , reviewing information and discussing care, coordinating with care team  Signed By: Electronically signed by MARCIE Waterman CNP on 11/21/2022 at 10:08 AM  Palliative Medicine   [phone]    November 21, 2022

## 2022-11-21 NOTE — PROGRESS NOTES
Attempted to complete admission with pts daughter, due to pts AMS, able to complete a couple task prior to her leaving.

## 2022-11-21 NOTE — CONSULTS
Office : [phone]     Fax :[phone]       Nephrology Consult Note      Patient's Name: Elo Grant  10:07 AM  11/21/2022    Reason for Consult:  ESRD       Requesting Physician:  Shantell Avelar MD      Chief Complaint:    Chief Complaint   Patient presents with    Altered Mental Status     Pt from assisted living, was seen yesterday for back pain, now altered, did not finish dialysis yesterday and skipped Thursday. Pt alert to verbal but will not answer questions. History of Present iIlness:      Elo Grant is a 67 y.o. female with prior history of diabetic nephropathy on dialysis who was admitted with main c/o back pain. She was also looking drowsy when admitted. Had urgent HD yesterday for 2 hrs. She was positive for COVID 19 infection . Getting HD today         I/O last 3 completed shifts: In: 500   Out: [address]     Past Medical History:   Diagnosis Date    Arthritis     CAD (coronary artery disease)     Diabetes ([facility]     Hemodialysis patient ([facility]     Hyperlipidemia     Hypertension     Kidney disease     Neuropathy     Pneumonia     IN February, 2021, with COVID    Thyroid disease        Past Surgical History:   Procedure Laterality Date    APPENDECTOMY      CHOLECYSTECTOMY      COLONOSCOPY  11/24/2020    COLONOSCOPY POLYPECTOMY SNARE/COLD BIOPSY performed by Luis Vasquez MD at [place] [address] 5/17/2021    PERITONEAL DIALYSIS CATHETER REMOVAL.  [address]. performed by Murray Pinto DO at [address] [place] [address] (CERVIX STATUS UNKNOWN)      FULL    LAPAROSCOPY INSERTION PERITONEAL CATHETER N/A 4/3/2020    LAPAROSCOPIC LYSIS OF ADHESIONS, LAPAROSCOPIC PERITONEAL DIALYSIS CATHETER PLACEMENT, LAPAROSCOPIC OMENTOPEXY performed by Cherylene Glee, DO at [facility], BILATERAL      preventive    ROTATOR CUFF REPAIR Left     SHUNT REVISION Left 8/19/2021    LEFT FOREARM ARTERIO VENOUS GRAFT performed by Sandra Marino MD at [facility] N/A 11/24/2020    EGD BIOPSY performed by Skip Hutchison MD at [facility]       Family History   Problem Relation Age of Onset    Cancer Mother         lung, breast, liver    Stroke Father     Stomach Cancer Brother     Cancer Maternal Grandmother     No Known Problems Paternal Grandmother     No Known Problems Paternal Grandfather         reports that she quit smoking about 44 years ago. Her smoking use included cigarettes. She has a 5.00 pack-year smoking history. She has never used smokeless tobacco. She reports that she does not currently use alcohol. She reports that she does not use drugs.         Allergies:  Amoxicillin, Demeclocycline, Penicillins, Tetracyclines & related, Ozempic (0.25 or 0.5 mg-dose) [semaglutide(0.25 or 0.5mg-dos)], and Codeine    Current Medications:    sodium chloride flush 0.9 % injection 10 mL, 2 times per day  sodium chloride flush 0.9 % injection 10 mL, PRN  0.9 % sodium chloride infusion, PRN  ondansetron (ZOFRAN-ODT) disintegrating tablet 4 mg, Q8H PRN   Or  ondansetron (ZOFRAN) injection 4 mg, Q6H PRN  acetaminophen (TYLENOL) tablet 650 mg, Q6H PRN   Or  acetaminophen (TYLENOL) suppository 650 mg, Q6H PRN  clopidogrel (PLAVIX) tablet 75 mg, Daily  allopurinol (ZYLOPRIM) tablet 100 mg, Daily  metoprolol succinate (TOPROL XL) extended release tablet 25 mg, Daily  spironolactone (ALDACTONE) tablet 25 mg, Daily  rosuvastatin (CRESTOR) tablet 20 mg, Nightly  aspirin EC tablet 81 mg, Once  nortriptyline (PAMELOR) capsule 10 mg, Nightly  Vitamin D (CHOLECALCIFEROL) tablet 1,000 Units, Daily  lidocaine 4 % external patch 1 patch, Daily  gabapentin (NEURONTIN) capsule 200 mg, Nightly  pantoprazole (PROTONIX) tablet 40 mg, Daily with breakfast  rOPINIRole (REQUIP) tablet 0.25 mg, Nightly  levothyroxine (SYNTHROID) tablet 150 mcg, QAM AC  isosorbide mononitrate (IMDUR) extended release tablet 30 mg, Daily  baclofen (LIORESAL) tablet 10 mg, TID PRN  torsemide (DEMADEX) tablet 120 mg, BID  morphine (PF) injection 1 mg, Q4H PRN  hydroCHLOROthiazide (HYDRODIURIL) tablet 12.5 mg, Daily   And  losartan (COZAAR) tablet 50 mg, Daily  hydrALAZINE (APRESOLINE) injection 10 mg, Q6H PRN  0.9 % sodium chloride bolus, PRN  dexamethasone (PF) (DECADRON) injection 6 mg, Q24H  glucose-vitamin C chewable tablet 4 tablet, PRN  dextrose bolus 10% 125 mL, PRN   Or  dextrose bolus 10% 250 mL, PRN  glucagon (rDNA) injection 1 mg, PRN  dextrose 10 % infusion, Continuous PRN        Review of Systems:   14 point ROS obtained but were negative except mentioned in HPI      Physical exam:     Vitals:  BP (!) 152/70   Pulse (!) 102   Temp 98.1 °F (36.7 °C)   Resp 15   Wt 188 lb 15 oz (85.7 kg)   LMP  (LMP Unknown)   SpO2 99%   BMI 31.44 kg/m²   Constitutional:  awake but very weak   Skin: no rash, turgor wnl  Heent:  eomi, mmm  Neck: no bruits or jvd noted  Cardiovascular:  S1, S2 without m/r/g  Respiratory: CTA B without w/r/r  Abdomen:  +bs, soft, nt, nd  Ext: no  lower extremity edema  Psychiatric: mood and affect appropriate  Musculoskeletal:  Rom, muscular strength intact    Labs:  CBC:   Recent Labs     11/19/22  1016 11/20/22  1154 11/21/22  0415   WBC 10.8 11.7* 14.2*   HGB 9.6* 10.1* 11.2*    313 393     BMP:    Recent Labs     11/19/22  1016 11/20/22  1209 11/21/22  0415    138 137   K 4.3 4.8 4.7   CL 96* 99 94*   CO2 25 21 22   BUN 66* 73* 50*   CREATININE 8.1* 9.4* 7.5*   GLUCOSE 101* 102* 169*     Ca/Mg/Phos:   Recent Labs     11/19/22  1016 11/20/22  1209 11/21/22  0415   CALCIUM 9.6 9.8 9.8   MG  --  2.70*  --      Hepatic:   Recent Labs     11/19/22  1016 11/21/22  0415   AST 14* 23   ALT 14 18   BILITOT 0.4 0.6   ALKPHOS 159* 182*     Troponin:   Recent Labs     11/20/22  1209   TROPONINI 0.04*     BNP: No results for input(s): BNP in the last 72 hours. Lipids: No results for input(s): CHOL, TRIG, HDL, LDLCALC, LABVLDL in the last 72 hours. ABGs: No results for input(s): PHART, PO2ART, EJF7TAI in the last 72 hours. INR: No results for input(s): INR in the last 72 hours. UA:No results for input(s): Mirtha Clinton, GLUCOSEU, BILIRUBINUR, [place] D[place], BLOODU, PHUR, PROTEINU, UROBILINOGEN, NITRU, LEUKOCYTESUR, LABMICR, URINETYPE in the last 72 hours. Urine Microscopic: No results for input(s): LABCAST, BACTERIA, COMU, HYALCAST, WBCUA, RBCUA, EPIU in the last 72 hours. Urine Culture: No results for input(s): LABURIN in the last 72 hours. Urine Chemistry: No results for input(s): [place], PROTEINUR, NAUR in the last 72 hours. IMAGING:  XR ABDOMEN (KUB) (SINGLE AP VIEW)   Final Result   Status post gastric tube placement along the fundus of the stomach with a   non-specific gas pattern. CT HEAD WO CONTRAST   Final Result   No acute intracranial abnormality. Assessment/Plan :      1. ESRD   Will get HD today   Usually goes for HD tue/ thu/ sat   Will get HD here MWF       2. HTN  UF on hd   BP meds     3. COVID 19   Infection  Mgmt per primary attending     4. Acid- base disorder. Monitor   Correct with HD     5. Electrolytes.    Monitor             D/w primary team      Thank you for allowing us to participate in care of [facility]         Electronically signed by: Kimi Calderón MD, 11/21/2022, 10:07 AM      Nephrology associates of [address]  Office : [phone]  Fax :[phone]

## 2022-11-21 NOTE — PROGRESS NOTES
Speech Language Pathology  Attempt    [facility]  1945    SLP eval and treat orders received. Attempted to see pt for dysphagia evaluation this date. Per discussion with RN, pt currently receiving dialysis. Will attempt to follow-up as schedule allows.      Thank you,     Margie Clifton M.A., [address] [facility]  Speech-Language Pathologist

## 2022-11-21 NOTE — PROGRESS NOTES
Pharmacy Medication History Note      List of current medications patient is taking is complete. Source of information: fill history, chart review     Changes made to medication list:    Medications removed (include reason, ex. therapy complete or physician discontinued):  SPS - not taking     Medications added/doses adjusted:  N/A    Other notes (ex. Recent course of antibiotics, Coumadin dosing): Unable to assess if patient taking protonix at home or vitamin D3  Denies use of other OTC or herbal medications.     David Lemus, PharmD, [facility]  Clinical Pharmacist  [id number]

## 2022-11-21 NOTE — PROGRESS NOTES
Notified  pt has had increase output of Dark brown fluid, with coffee ground appearance,  asked me to occult it, came back positive consulted GI

## 2022-11-21 NOTE — PROGRESS NOTES
Patient alert. Assessment complete. Stomach distended and firm. One episode emesis, Zofran given. Per Dr. Guy Torres place NG and obtain KUB. Moaning most of the night,will only answer yes or no to questions. Responds yes when ask if in pain, PRN pain meds given.

## 2022-11-21 NOTE — PROGRESS NOTES
On chart review  this am after starting decadron 6 mg qpm, has Hx DM, no DM home medications needed for >1 yr. Secure message sent to Dr to please consider starting lantus 10 units nightly while on decadron and low correctional dose humalog. Joshua Hawley RN, BSN, [facility].

## 2022-11-21 NOTE — PLAN OF CARE
Problem: Discharge Planning  Goal: Discharge to home or other facility with appropriate resources  Outcome: Progressing     Problem: Safety - Medical Restraint  Goal: Remains free of injury from restraints (Restraint for Interference with Medical Device)  Description: INTERVENTIONS:  1. Determine that other, less restrictive measures have been tried or would not be effective before applying the restraint  2. Evaluate the patient's condition at the time of restraint application  3. Inform patient/family regarding the reason for restraint  4. Q2H: Monitor safety, psychosocial status, comfort, nutrition and hydration  Outcome: Progressing     Problem: Pain  Goal: Verbalizes/displays adequate comfort level or baseline comfort level  Outcome: Progressing     Problem: Skin/Tissue Integrity  Goal: Absence of new skin breakdown  Description: 1. Monitor for areas of redness and/or skin breakdown  2. Assess vascular access sites hourly  3. Every 4-6 hours minimum:  Change oxygen saturation probe site  4. Every 4-6 hours:  If on nasal continuous positive airway pressure, respiratory therapy assess nares and determine need for appliance change or resting period.   Outcome: Progressing

## 2022-11-21 NOTE — PROGRESS NOTES
Physical Therapy/Occupational Therapy  [employer]  Hold PT/OT this morning as pt is undergoing hemodialysis at this time. Per discussion with RN, pt is minimally responsive and not following commands at this time. Will follow up as schedule allows.   Thanks, Vickie Champion, [address]

## 2022-11-21 NOTE — PROGRESS NOTES
Progress Note - Dr. Penny Horne - Internal Medicine  PCP: Merline Justice,  [address] [phone]    Hospital Day: 1  Code Status: DNR-CCA  Current Diet: Diet NPO        CC: follow up on medical issues    Subjective:   Carly Tello is a 67 y.o. female. Pt seen and examined  Chart reviewed since last visit, labs and imaging below      Pt unable to take pills today  SLP eval requested    Pt remains on steroids and in isolation for covid    Creat still >7, may need repeat HD today      Review of Systems: (1 system for EPF, 2-9 for detailed, 10+ for comprehensive)  Cannot obtain 2/2 mental status alteration    I have reviewed the patient's medical and social history in detail and updated the computerized patient record. To recap: She  has a past medical history of Arthritis, CAD (coronary artery disease), Diabetes ([facility] [facility] Hemodialysis patient ([facility] [address] Hyperlipidemia, Hypertension, Kidney disease, Neuropathy, Pneumonia, and Thyroid disease. . She  has a past surgical history that includes Appendectomy; Mastectomy, bilateral; Rotator cuff repair (Left); Cholecystectomy; LAPAROSCOPY INSERTION PERITONEAL CATHETER (N/A, 4/3/2020); Upper gastrointestinal endoscopy (N/A, 11/24/2020); Colonoscopy (11/24/2020); Hysterectomy; Dialysis Catheter Removal (N/A, 5/17/2021); and shunt revision (Left, 8/19/2021). . She  reports that she quit smoking about 44 years ago. Her smoking use included cigarettes. She has a 5.00 pack-year smoking history. She has never used smokeless tobacco. She reports that she does not currently use alcohol. She reports that she does not use drugs. .        Active Hospital Problems    Diagnosis Date Noted    End stage renal disease ([facility] [facility] [N18.6] 11/20/2022     Priority: Medium    COVID-19 [U07.1] 02/08/2021    Essential hypertension [I10] 01/23/2017    Hyperlipidemia [E78.5] 01/23/2017    Diabetes mellitus type 2 in obese ([facility] [address] [E11.69, E66.9] 01/23/2017       Current Facility-Administered Medications: sodium chloride flush 0.9 % injection 10 mL, 10 mL, IntraVENous, 2 times per day  sodium chloride flush 0.9 % injection 10 mL, 10 mL, IntraVENous, PRN  0.9 % sodium chloride infusion, , IntraVENous, PRN  ondansetron (ZOFRAN-ODT) disintegrating tablet 4 mg, 4 mg, Oral, Q8H PRN **OR** ondansetron (ZOFRAN) injection 4 mg, 4 mg, IntraVENous, Q6H PRN  acetaminophen (TYLENOL) tablet 650 mg, 650 mg, Oral, Q6H PRN **OR** acetaminophen (TYLENOL) suppository 650 mg, 650 mg, Rectal, Q6H PRN  clopidogrel (PLAVIX) tablet 75 mg, 75 mg, Oral, Daily  allopurinol (ZYLOPRIM) tablet 100 mg, 100 mg, Oral, Daily  metoprolol succinate (TOPROL XL) extended release tablet 25 mg, 25 mg, Oral, Daily  spironolactone (ALDACTONE) tablet 25 mg, 25 mg, Oral, Daily  rosuvastatin (CRESTOR) tablet 20 mg, 20 mg, Oral, Nightly  aspirin EC tablet 81 mg, 81 mg, Oral, Once  nortriptyline (PAMELOR) capsule 10 mg, 10 mg, Oral, Nightly  Vitamin D (CHOLECALCIFEROL) tablet 1,000 Units, 1,000 Units, Oral, Daily  lidocaine 4 % external patch 1 patch, 1 patch, TransDERmal, Daily  gabapentin (NEURONTIN) capsule 200 mg, 200 mg, Oral, Nightly  pantoprazole (PROTONIX) tablet 40 mg, 40 mg, Oral, Daily with breakfast  rOPINIRole (REQUIP) tablet 0.25 mg, 0.25 mg, Oral, Nightly  levothyroxine (SYNTHROID) tablet 150 mcg, 150 mcg, Oral, QAM AC  isosorbide mononitrate (IMDUR) extended release tablet 30 mg, 30 mg, Oral, Daily  baclofen (LIORESAL) tablet 10 mg, 10 mg, Oral, TID PRN  torsemide (DEMADEX) tablet 120 mg, 120 mg, Oral, BID  morphine (PF) injection 1 mg, 1 mg, IntraVENous, Q4H PRN  losartan (COZAAR) tablet 50 mg, 50 mg, Oral, Daily **AND** hydroCHLOROthiazide (HYDRODIURIL) tablet 12.5 mg, 12.5 mg, Oral, Daily  hydrALAZINE (APRESOLINE) injection 10 mg, 10 mg, IntraVENous, Q6H PRN  0.9 % sodium chloride bolus, 500 mL, IntraVENous, PRN  dexamethasone (PF) (DECADRON) injection 6 mg, 6 mg, IntraVENous, Q24H  glucose-vitamin C chewable tablet 4 tablet, 4 tablet, Oral, PRN  dextrose bolus 10% 125 mL, 125 mL, IntraVENous, PRN **OR** dextrose bolus 10% 250 mL, 250 mL, IntraVENous, PRN  glucagon (rDNA) injection 1 mg, 1 mg, SubCUTAneous, PRN  dextrose 10 % infusion, , IntraVENous, Continuous PRN         Objective:  BP (!) 152/62   Pulse (!) 101   Temp 98.6 °F (37 °C) (Temporal)   Resp 13   Wt 187 lb 9.8 oz (85.1 kg)   LMP  (LMP Unknown)   SpO2 97%   BMI 31.22 kg/m²      Patient Vitals for the past 24 hrs:   BP Temp Temp src Pulse Resp SpO2 Weight   11/21/22 0600 (!) 152/62 -- -- (!) 101 13 97 % --   11/21/22 0500 (!) 163/76 -- -- (!) 111 16 96 % 187 lb 9.8 oz (85.1 kg)   11/21/22 0400 (!) 172/75 98.6 °F (37 °C) Temporal (!) 114 (!) 31 95 % --   11/21/22 0300 (!) 166/82 -- -- 99 30 94 % --   11/21/22 0200 (!) 166/71 -- -- 97 19 96 % --   11/21/22 0100 (!) 172/76 -- -- (!) 101 17 94 % --   11/21/22 0000 (!) 178/72 98.8 °F (37.1 °C) Temporal (!) 106 13 95 % --   11/20/22 2300 (!) 175/72 -- -- (!) 103 27 94 % --   11/20/22 2200 (!) 150/64 -- -- 98 (!) 33 91 % --   11/20/22 2100 (!) 147/64 -- -- (!) 104 (!) 37 94 % --   11/20/22 2016 (!) 177/70 98.6 °F (37 °C) Temporal 85 22 97 % --   11/20/22 1726 (!) 151/100 98.8 °F (37.1 °C) Temporal 91 22 97 % --   11/20/22 1622 (!) 184/77 98 °F (36.7 °C) -- 90 -- -- --   11/20/22 1410 (!) 162/67 97.9 °F (36.6 °C) -- 89 -- -- --   11/20/22 1345 (!) 159/88 -- -- 90 16 95 % --   11/20/22 1330 (!) 161/58 -- -- 87 17 96 % --   11/20/22 1315 (!) 168/66 -- -- 92 22 94 % --   11/20/22 1300 (!) 168/77 -- -- 91 23 94 % --   11/20/22 1245 (!) 150/80 -- -- 92 (!) 31 93 % --   11/20/22 1230 (!) 152/72 -- -- 88 15 93 % --   11/20/22 1215 (!) 144/77 -- -- 88 13 93 % --   11/20/22 1200 (!) 184/72 -- -- 99 19 94 % --   11/20/22 1149 -- -- -- 89 24 94 % --   11/20/22 1147 (!) 153/90 98.7 °F (37.1 °C) Rectal 89 17 (!) 89 % --     Patient Vitals for the past 96 hrs (Last 3 readings):   Weight   11/21/22 0500 187 lb 9.8 oz (85.1 kg)           Intake/Output Summary (Last 24 hours) at 11/21/2022 9063  Last data filed at 11/20/2022 1622  Gross per 24 hour   Intake 500 ml   Output 1250 ml   Net -750 ml         Physical Exam: (2-7 system for EPF/Detailed, ?8 for Comprehensive)  BP (!) 152/62   Pulse (!) 101   Temp 98.6 °F (37 °C) (Temporal)   Resp 13   Wt 187 lb 9.8 oz (85.1 kg)   LMP  (LMP Unknown)   SpO2 97%   BMI 31.22 kg/m²   Constitutional: vitals as above:  appears stated age and cooperative    Head: Normocephalic, without obvious abnormality, atraumatic    Eyes:lids and lashes normal, conjunctivae and sclerae normal and pupils equal, round, reactive to light and accomodation    EMNT: external ears normal, nares midline    Neck: no carotid bruit, supple, symmetrical, trachea midline and thyroid not enlarged, symmetric, no tenderness/mass/nodules     Respiratory: clear to auscultation and percussion bilaterally with normal respiratory effort    Cardiovascular: normal rate, regular rhythm, normal S1 and S2 and no murmurs    Gastrointestinal: soft, non-tender, non-distended, normal bowel sounds, no masses or organomegaly    Extremities: no clubbing, no edema    Skin:No rashes or nodules noted.     Neurologic:negative         Labs:  Lab Results   Component Value Date    WBC 14.2 (H) 11/21/2022    HGB 11.2 (L) 11/21/2022    HCT 33.0 (L) 11/21/2022     11/21/2022    CHOL 96 08/12/2021    TRIG 132 08/12/2021    HDL 32 (L) 08/12/2021    LDLDIRECT 124 (H) 08/15/2019    ALT 18 11/21/2022    AST 23 11/21/2022     11/21/2022    K 4.7 11/21/2022    CL 94 (L) 11/21/2022    CREATININE 7.5 (HH) 11/21/2022    BUN 50 (H) 11/21/2022    CO2 22 11/21/2022    TSH 2.64 08/15/2019    INR 1.03 09/01/2021    LABA1C 5.2 07/28/2021    LABMICR YES 08/26/2021     Lab Results   Component Value Date    CKTOTAL 243 (H) 08/25/2021    TROPONINI 0.04 (H) 11/20/2022       Recent Imaging Results are Reviewed:  XR ABDOMEN (KUB) (SINGLE AP VIEW)    Result Date: 11/21/2022  EXAMINATION: ONE SUPINE XRAY VIEW(S) OF THE ABDOMEN 11/20/2022 11:36 pm COMPARISON: None. HISTORY: ORDERING SYSTEM PROVIDED HISTORY: NG placement/distention TECHNOLOGIST PROVIDED HISTORY: Reason for exam:->NG placement/distention FINDINGS: The gas pattern is unremarkable. There is a nasogastric tube in place with the tip and side port along the fundus of the stomach. No urinary calculi are seen. Status post gastric tube placement along the fundus of the stomach with a non-specific gas pattern. CT HEAD WO CONTRAST    Result Date: 11/20/2022  EXAMINATION: CT OF THE HEAD WITHOUT CONTRAST  11/20/2022 12:35 pm TECHNIQUE: CT of the head was performed without the administration of intravenous contrast. Automated exposure control, iterative reconstruction, and/or weight based adjustment of the mA/kV was utilized to reduce the radiation dose to as low as reasonably achievable. COMPARISON: 08/25/2021 HISTORY: ORDERING SYSTEM PROVIDED HISTORY: AMS TECHNOLOGIST PROVIDED HISTORY: Reason for exam:->AMS Has a \"code stroke\" or \"stroke alert\" been called? ->No Decision Support Exception - unselect if not a suspected or confirmed emergency medical condition->Emergency Medical Condition (MA) Reason for Exam: AMS Additional signs and symptoms: Altered Mental Status (Pt from assisted living, was seen yesterday for back pain, now altered, did not finish dialysis yesterday and skipped Thursday. Pt alert to verbal but will not answer questions. ) FINDINGS: BRAIN/VENTRICLES: There is no acute intracranial hemorrhage, mass effect or midline shift. No abnormal extra-axial fluid collection. The gray-white differentiation is maintained without evidence of an acute infarct. There is no evidence of hydrocephalus. ORBITS: The visualized portion of the orbits demonstrate no acute abnormality. SINUSES: The visualized paranasal sinuses and mastoid air cells demonstrate no acute abnormality.  SOFT TISSUES/SKULL:  No acute abnormality of the visualized skull or soft tissues. No acute intracranial abnormality. Lab Results   Component Value Date/Time    GLUCOSE 169 11/21/2022 04:15 AM     Lab Results   Component Value Date/Time    POCGLU 101 09/23/2021 07:15 AM     BP (!) 152/62   Pulse (!) 101   Temp 98.6 °F (37 °C) (Temporal)   Resp 13   Wt 187 lb 9.8 oz (85.1 kg)   LMP  (LMP Unknown)   SpO2 97%   BMI 31.22 kg/m²     Assessment and Plan:  Principal Problem:    End stage renal disease ([address] -Established problem. Stable. Plan: cont on HD treatments per renal  Active Problems:    Essential hypertension -Established problem. Stable. 152/62  Plan: stay on home meds    Hyperlipidemia -Established problem. Stable. Plan: Continue present orders/plan. Diabetes mellitus type 2 in [facility]) -Established problem. Stable. Glu 169  Plan: for now, npo. Sliding scale ordered    COVID-19 -Established problem. Stable. Plan: keep in isolation.  Cont steroids      (Please note that portions of this note were completed with a voice recognition program.  Efforts were made to edit the dictations but occasionally words are mis-transcribed.)        Vidya Garcia MD  11/21/2022

## 2022-11-22 ENCOUNTER — APPOINTMENT (OUTPATIENT)
Dept: GENERAL RADIOLOGY | Age: 77
DRG: 177 | End: 2022-11-22
Payer: MEDICARE

## 2022-11-22 ENCOUNTER — APPOINTMENT (OUTPATIENT)
Dept: MRI IMAGING | Age: 77
DRG: 177 | End: 2022-11-22
Payer: MEDICARE

## 2022-11-22 PROBLEM — G93.41 ENCEPHALOPATHY, METABOLIC: Status: ACTIVE | Noted: 2022-11-22

## 2022-11-22 LAB
ANION GAP SERPL CALCULATED.3IONS-SCNC: 18 MMOL/L (ref 3–16)
BASOPHILS ABSOLUTE: 0 K/UL (ref 0–0.2)
BASOPHILS RELATIVE PERCENT: 0 %
BUN BLDV-MCNC: 56 MG/DL (ref 7–20)
CALCIUM SERPL-MCNC: 9.8 MG/DL (ref 8.3–10.6)
CHLORIDE BLD-SCNC: 95 MMOL/L (ref 99–110)
CO2: 26 MMOL/L (ref 21–32)
CREAT SERPL-MCNC: 6.8 MG/DL (ref 0.6–1.2)
EOSINOPHILS ABSOLUTE: 0 K/UL (ref 0–0.6)
EOSINOPHILS RELATIVE PERCENT: 0 %
GFR SERPL CREATININE-BSD FRML MDRD: 6 ML/MIN/{1.73_M2}
GLUCOSE BLD-MCNC: 103 MG/DL (ref 70–99)
GLUCOSE BLD-MCNC: 128 MG/DL (ref 70–99)
GLUCOSE BLD-MCNC: 129 MG/DL (ref 70–99)
GLUCOSE BLD-MCNC: 153 MG/DL (ref 70–99)
HCT VFR BLD CALC: 32.1 % (ref 36–48)
HEMOGLOBIN: 10.4 G/DL (ref 12–16)
LYMPHOCYTES ABSOLUTE: 0.7 K/UL (ref 1–5.1)
LYMPHOCYTES RELATIVE PERCENT: 6 %
MCH RBC QN AUTO: 31.5 PG (ref 26–34)
MCHC RBC AUTO-ENTMCNC: 32.5 G/DL (ref 31–36)
MCV RBC AUTO: 96.8 FL (ref 80–100)
MONOCYTES ABSOLUTE: 0.3 K/UL (ref 0–1.3)
MONOCYTES RELATIVE PERCENT: 2.6 %
NEUTROPHILS ABSOLUTE: 10.8 K/UL (ref 1.7–7.7)
NEUTROPHILS RELATIVE PERCENT: 91.4 %
PDW BLD-RTO: 15.8 % (ref 12.4–15.4)
PERFORMED ON: ABNORMAL
PLATELET # BLD: 375 K/UL (ref 135–450)
PMV BLD AUTO: 7.7 FL (ref 5–10.5)
POTASSIUM SERPL-SCNC: 4.7 MMOL/L (ref 3.5–5.1)
RBC # BLD: 3.32 M/UL (ref 4–5.2)
SODIUM BLD-SCNC: 139 MMOL/L (ref 136–145)
WBC # BLD: 11.8 K/UL (ref 4–11)

## 2022-11-22 PROCEDURE — 99223 1ST HOSP IP/OBS HIGH 75: CPT | Performed by: PSYCHIATRY & NEUROLOGY

## 2022-11-22 PROCEDURE — 6360000002 HC RX W HCPCS: Performed by: INTERNAL MEDICINE

## 2022-11-22 PROCEDURE — 5A1D70Z PERFORMANCE OF URINARY FILTRATION, INTERMITTENT, LESS THAN 6 HOURS PER DAY: ICD-10-PCS | Performed by: INTERNAL MEDICINE

## 2022-11-22 PROCEDURE — 2060000000 HC ICU INTERMEDIATE R&B

## 2022-11-22 PROCEDURE — 99233 SBSQ HOSP IP/OBS HIGH 50: CPT | Performed by: INTERNAL MEDICINE

## 2022-11-22 PROCEDURE — 97535 SELF CARE MNGMENT TRAINING: CPT

## 2022-11-22 PROCEDURE — 97116 GAIT TRAINING THERAPY: CPT

## 2022-11-22 PROCEDURE — 36415 COLL VENOUS BLD VENIPUNCTURE: CPT

## 2022-11-22 PROCEDURE — 70551 MRI BRAIN STEM W/O DYE: CPT

## 2022-11-22 PROCEDURE — 97166 OT EVAL MOD COMPLEX 45 MIN: CPT

## 2022-11-22 PROCEDURE — 92526 ORAL FUNCTION THERAPY: CPT

## 2022-11-22 PROCEDURE — 97530 THERAPEUTIC ACTIVITIES: CPT

## 2022-11-22 PROCEDURE — 97162 PT EVAL MOD COMPLEX 30 MIN: CPT

## 2022-11-22 PROCEDURE — 92507 TX SP LANG VOICE COMM INDIV: CPT

## 2022-11-22 PROCEDURE — 80048 BASIC METABOLIC PNL TOTAL CA: CPT

## 2022-11-22 PROCEDURE — 85025 COMPLETE CBC W/AUTO DIFF WBC: CPT

## 2022-11-22 PROCEDURE — 2580000003 HC RX 258: Performed by: INTERNAL MEDICINE

## 2022-11-22 PROCEDURE — C9113 INJ PANTOPRAZOLE SODIUM, VIA: HCPCS | Performed by: INTERNAL MEDICINE

## 2022-11-22 PROCEDURE — 74019 RADEX ABDOMEN 2 VIEWS: CPT

## 2022-11-22 RX ORDER — SODIUM CHLORIDE 0.9 % (FLUSH) 0.9 %
5-40 SYRINGE (ML) INJECTION EVERY 12 HOURS SCHEDULED
Status: DISCONTINUED | OUTPATIENT
Start: 2022-11-22 | End: 2022-11-24 | Stop reason: HOSPADM

## 2022-11-22 RX ORDER — SODIUM CHLORIDE 9 MG/ML
25 INJECTION, SOLUTION INTRAVENOUS PRN
Status: DISCONTINUED | OUTPATIENT
Start: 2022-11-22 | End: 2022-11-24 | Stop reason: HOSPADM

## 2022-11-22 RX ORDER — SODIUM CHLORIDE 0.9 % (FLUSH) 0.9 %
5-40 SYRINGE (ML) INJECTION PRN
Status: DISCONTINUED | OUTPATIENT
Start: 2022-11-22 | End: 2022-11-24 | Stop reason: HOSPADM

## 2022-11-22 RX ORDER — PANTOPRAZOLE SODIUM 40 MG/10ML
40 INJECTION, POWDER, LYOPHILIZED, FOR SOLUTION INTRAVENOUS 2 TIMES DAILY
Status: DISCONTINUED | OUTPATIENT
Start: 2022-11-22 | End: 2022-11-24 | Stop reason: HOSPADM

## 2022-11-22 RX ADMIN — PANTOPRAZOLE SODIUM 40 MG: 40 INJECTION, POWDER, FOR SOLUTION INTRAVENOUS at 13:20

## 2022-11-22 RX ADMIN — DEXAMETHASONE SODIUM PHOSPHATE 6 MG: 10 INJECTION INTRAMUSCULAR; INTRAVENOUS at 18:15

## 2022-11-22 RX ADMIN — Medication 10 ML: at 22:18

## 2022-11-22 RX ADMIN — ONDANSETRON 4 MG: 2 INJECTION INTRAMUSCULAR; INTRAVENOUS at 18:15

## 2022-11-22 RX ADMIN — PANTOPRAZOLE SODIUM 40 MG: 40 INJECTION, POWDER, FOR SOLUTION INTRAVENOUS at 22:18

## 2022-11-22 RX ADMIN — SODIUM CHLORIDE 8 MG/HR: 9 INJECTION, SOLUTION INTRAVENOUS at 02:06

## 2022-11-22 ASSESSMENT — PAIN SCALES - WONG BAKER
WONGBAKER_NUMERICALRESPONSE: 0

## 2022-11-22 ASSESSMENT — PAIN SCALES - GENERAL
PAINLEVEL_OUTOF10: 0
PAINLEVEL_OUTOF10: 0

## 2022-11-22 NOTE — PROGRESS NOTES
Childrens contact information  [address] [facility] [address]   Terrence Cook [phone] Wife of Katia Oropeza [phone]

## 2022-11-22 NOTE — FLOWSHEET NOTE
Treatment time: 2 hrs 45 min (3 hr treatment ordered)    Net UF: 1781 ml    Pre weight: 85.7 kg  Post weight: 85.7 kg  EDW: TBD    Access used: LFA AVG  Access function: Difficulty with pt movement. Infiltrated arterial. Re-stuck without difficulty.  per orders. Medications or blood products given: None    Regular outpatient schedule: TTS    Summary of response to treatment: Pt increased cramping and request to come off machine last 15 min. Hemodynamically stable entire treatment.      Copy of dialysis treatment record placed in chart, to be scanned into EMR.        11/21/22 0721 11/21/22 1100   Treatment   Time On 0721  --    Time Off  --  1030   Vital Signs   BP (!) 160/66 (!) 142/57   Heart Rate (!) 101 99   Weight 188 lb 15 oz (85.7 kg) 184 lb 15.5 oz (83.9 kg)   Weight Method Bed scale Bed scale   Treatment Initiation   Dialyze Hours 3  --    Post-Hemodialysis Assessment   Blood Volume Processed (Liters)  --  47.8 l/min   Duration of Treatment (minutes)  --  165 minutes   NET Removed (ml)  --  1781   Dialysis Bath   K+ (Potassium) 3  --    Ca+ (Calcium) 2.5  --    Na+ (Sodium) 138  --    HCO3 (Bicarb) 35  --

## 2022-11-22 NOTE — PLAN OF CARE
Problem: Discharge Planning  Goal: Discharge to home or other facility with appropriate resources  Outcome: Progressing     Problem: Safety - Medical Restraint  Goal: Remains free of injury from restraints (Restraint for Interference with Medical Device)  Description: INTERVENTIONS:  1. Determine that other, less restrictive measures have been tried or would not be effective before applying the restraint  2. Evaluate the patient's condition at the time of restraint application  3. Inform patient/family regarding the reason for restraint  4. Q2H: Monitor safety, psychosocial status, comfort, nutrition and hydration  Outcome: Progressing     Problem: Pain  Goal: Verbalizes/displays adequate comfort level or baseline comfort level  Outcome: Progressing     Problem: Skin/Tissue Integrity  Goal: Absence of new skin breakdown  Description: 1. Monitor for areas of redness and/or skin breakdown  2. Assess vascular access sites hourly  3. Every 4-6 hours minimum:  Change oxygen saturation probe site  4. Every 4-6 hours:  If on nasal continuous positive airway pressure, respiratory therapy assess nares and determine need for appliance change or resting period. Outcome: Progressing     Problem: ABCDS Injury Assessment  Goal: Absence of physical injury  Outcome: Progressing     Problem: Safety - Adult  Goal: Free from fall injury  Outcome: Progressing     Problem: Confusion  Goal: Confusion, delirium, dementia, or psychosis is improved or at baseline  Description: INTERVENTIONS:  1. Assess for possible contributors to thought disturbance, including medications, impaired vision or hearing, underlying metabolic abnormalities, dehydration, psychiatric diagnoses, and notify attending LIP  2. [place] high risk fall precautions, as indicated  3. Provide frequent short contacts to provide reality reorientation, refocusing and direction  4. Decrease environmental stimuli, including noise as appropriate  5.  Monitor and intervene to maintain adequate nutrition, hydration, elimination, sleep and activity  6. If unable to ensure safety without constant attention obtain sitter and review sitter guidelines with assigned personnel  7.  Initiate Psychosocial CNS and Spiritual Care consult, as indicated  Outcome: Progressing     Problem: Chronic Conditions and Co-morbidities  Goal: Patient's chronic conditions and co-morbidity symptoms are monitored and maintained or improved  Outcome: Progressing

## 2022-11-22 NOTE — FLOWSHEET NOTE
11/21/22 2000   Assessment   Charting Type Shift assessment   Psychosocial   Psychosocial (WDL) X   Patient Behaviors Restless   Neurological   Neuro (WDL) X   Level of Consciousness 0   Orientation Level Other (comment)  (MALIK)   Cognition Other (comment)  (MALIK)   Speech Other (comment)  (Pt moaning)   Nasra Coma Scale   Eye Opening 4   Best Verbal Response 5   Best Motor Response 6   [place] Coma Scale Score 15   HEENT (Head, Ears, Eyes, Nose, & Throat)   HEENT (WDL) X   Respiratory   Respiratory (WDL) WDL   Respiratory Quality/Effort Unlabored   Cardiac   Cardiac (WDL) WDL   Cardiac Rhythm Sinus tachy   Rhythm Interpretation   Heart Rate 93   Gastrointestinal   Abdominal (WDL) WDL   Abdomen Inspection Taut   RUQ Bowel Sounds Hypoactive   LUQ Bowel Sounds Hypoactive   RLQ Bowel Sounds Hypoactive   LLQ Bowel Sounds Hypoactive   NG/OG/NJ/NE Tube Nasogastric 16 fr Left nostril   Placement Date/Time: 11/20/22 6870   Insertion attempts: 1  Tube Type: Nasogastric  Tube Size (fr): 16 fr  Tube Location: Left nostril  External Catheter Length (cm): 55 cm   Surrounding Skin Clean, dry & intact   Securement device Adhesive based chaparro   Status Suction-low continuous   NG/OG/NJ/NE External Measurement (cm) 55 cm   Drainage Appearance Brown   Genitourinary   Genitourinary (WDL) X  (purewick)   Peripheral Vascular   Peripheral Vascular (WDL) X   Edema Right lower extremity; Left lower extremity   RLE Edema +1   LLE Edema +1   Musculoskeletal   Musculoskeletal (WDL) X   RL Extremity Weakness   LL Extremity Weakness

## 2022-11-22 NOTE — PROGRESS NOTES
Progress Note - Dr. James Negron - Internal Medicine  PCP: Shantell Avelar MD [address] [phone]    Hospital Day: 2  Code Status: DNR-CCA  Current Diet: Diet NPO Exceptions are: Sips of Water with Meds        CC: follow up on medical issues    Subjective:   Elo Grant is a 67 y.o. female. Pt seen and examined  Chart reviewed since last visit, labs and imaging below      Pt unable to take pills today - SLP eval from 11/21 rec strict NPO  GI asked to see. NGT in place    Pt remains on steroids and in isolation for covid    Creat 6.8, may need repeat HD today    Pt required restraints overnight as she was pulling at NGT    Review of Systems: (1 system for EPF, 2-9 for detailed, 10+ for comprehensive)  Cannot obtain 2/2 mental status alteration    I have reviewed the patient's medical and social history in detail and updated the computerized patient record. To recap: She  has a past medical history of Arthritis, CAD (coronary artery disease), Diabetes ([address] Hemodialysis patient ([address] Hyperlipidemia, Hypertension, Kidney disease, Neuropathy, Pneumonia, and Thyroid disease. . She  has a past surgical history that includes Appendectomy; Mastectomy, bilateral; Rotator cuff repair (Left); Cholecystectomy; LAPAROSCOPY INSERTION PERITONEAL CATHETER (N/A, 4/3/2020); Upper gastrointestinal endoscopy (N/A, 11/24/2020); Colonoscopy (11/24/2020); Hysterectomy; Dialysis Catheter Removal (N/A, 5/17/2021); and shunt revision (Left, 8/19/2021). . She  reports that she quit smoking about 44 years ago. Her smoking use included cigarettes. She has a 5.00 pack-year smoking history. She has never used smokeless tobacco. She reports that she does not currently use alcohol. She reports that she does not use drugs. .        Active Hospital Problems    Diagnosis Date Noted    End stage renal disease ([facility] [N18.6] 11/20/2022     Priority: Medium    COVID-19 [U07.1] 02/08/2021    Essential hypertension [I10] 01/23/2017    Hyperlipidemia [E78.5] 01/23/2017    Diabetes mellitus type 2 in obese ([facility] [E11.69, E66.9] 01/23/2017       Current Facility-Administered Medications: sodium chloride flush 0.9 % injection 5-40 mL, 5-40 mL, IntraVENous, 2 times per day  sodium chloride flush 0.9 % injection 5-40 mL, 5-40 mL, IntraVENous, PRN  0.9 % sodium chloride infusion, 25 mL, IntraVENous, PRN  insulin lispro (HUMALOG) injection vial 0-4 Units, 0-4 Units, SubCUTAneous, TID WC  insulin lispro (HUMALOG) injection vial 0-4 Units, 0-4 Units, SubCUTAneous, Nightly  pantoprazole (PROTONIX) 80 mg in sodium chloride 0.9 % 100 mL infusion, 8 mg/hr, IntraVENous, Continuous  sodium chloride flush 0.9 % injection 10 mL, 10 mL, IntraVENous, 2 times per day  sodium chloride flush 0.9 % injection 10 mL, 10 mL, IntraVENous, PRN  0.9 % sodium chloride infusion, , IntraVENous, PRN  ondansetron (ZOFRAN-ODT) disintegrating tablet 4 mg, 4 mg, Oral, Q8H PRN **OR** ondansetron (ZOFRAN) injection 4 mg, 4 mg, IntraVENous, Q6H PRN  acetaminophen (TYLENOL) tablet 650 mg, 650 mg, Oral, Q6H PRN **OR** acetaminophen (TYLENOL) suppository 650 mg, 650 mg, Rectal, Q6H PRN  [Held by provider] clopidogrel (PLAVIX) tablet 75 mg, 75 mg, Oral, Daily  allopurinol (ZYLOPRIM) tablet 100 mg, 100 mg, Oral, Daily  metoprolol succinate (TOPROL XL) extended release tablet 25 mg, 25 mg, Oral, Daily  spironolactone (ALDACTONE) tablet 25 mg, 25 mg, Oral, Daily  rosuvastatin (CRESTOR) tablet 20 mg, 20 mg, Oral, Nightly  aspirin EC tablet 81 mg, 81 mg, Oral, Once  nortriptyline (PAMELOR) capsule 10 mg, 10 mg, Oral, Nightly  Vitamin D (CHOLECALCIFEROL) tablet 1,000 Units, 1,000 Units, Oral, Daily  lidocaine 4 % external patch 1 patch, 1 patch, TransDERmal, Daily  gabapentin (NEURONTIN) capsule 200 mg, 200 mg, Oral, Nightly  rOPINIRole (REQUIP) tablet 0.25 mg, 0.25 mg, Oral, Nightly  levothyroxine (SYNTHROID) tablet 150 mcg, 150 mcg, Oral, QAM AC  isosorbide mononitrate (IMDUR) extended release tablet 30 mg, 30 mg, Oral, Daily  baclofen (LIORESAL) tablet 10 mg, 10 mg, Oral, TID PRN  torsemide (DEMADEX) tablet 120 mg, 120 mg, Oral, BID  morphine (PF) injection 1 mg, 1 mg, IntraVENous, Q4H PRN  losartan (COZAAR) tablet 50 mg, 50 mg, Oral, Daily **AND** hydroCHLOROthiazide (HYDRODIURIL) tablet 12.5 mg, 12.5 mg, Oral, Daily  hydrALAZINE (APRESOLINE) injection 10 mg, 10 mg, IntraVENous, Q6H PRN  0.9 % sodium chloride bolus, 500 mL, IntraVENous, PRN  dexamethasone (PF) (DECADRON) injection 6 mg, 6 mg, IntraVENous, Q24H  glucose-vitamin C chewable tablet 4 tablet, 4 tablet, Oral, PRN  dextrose bolus 10% 125 mL, 125 mL, IntraVENous, PRN **OR** dextrose bolus 10% 250 mL, 250 mL, IntraVENous, PRN  glucagon (rDNA) injection 1 mg, 1 mg, SubCUTAneous, PRN  dextrose 10 % infusion, , IntraVENous, Continuous PRN         Objective:  BP (!) 103/57   Pulse 83   Temp 96.9 °F (36.1 °C) (Temporal)   Resp 12   Ht 5' 5\" (1.651 m)   Wt 184 lb 15.5 oz (83.9 kg)   LMP  (LMP Unknown)   SpO2 97%   BMI 30.78 kg/m²      Patient Vitals for the past 24 hrs:   BP Temp Temp src Pulse Resp SpO2 Height Weight   11/22/22 0600 (!) 103/57 -- -- 83 12 97 % -- --   11/22/22 0357 (!) 100/55 96.9 °F (36.1 °C) Temporal 90 12 97 % -- --   11/21/22 2345 (!) 179/81 97 °F (36.1 °C) Temporal 94 18 97 % -- --   11/21/22 2300 (!) 165/70 -- -- 95 19 -- -- --   11/21/22 2045 -- -- -- -- -- -- 5' 5\" (1.651 m) --   11/21/22 2000 (!) 148/55 98 °F (36.7 °C) Temporal 93 15 97 % -- --   11/21/22 1800 (!) 152/75 97.8 °F (36.6 °C) Temporal 99 (!) 31 96 % -- --   11/21/22 1600 (!) 151/78 97.6 °F (36.4 °C) Temporal (!) 107 20 96 % -- --   11/21/22 1200 (!) 148/59 98.3 °F (36.8 °C) Temporal 100 22 96 % -- --   11/21/22 1100 (!) 142/57 97.8 °F (36.6 °C) -- 99 21 -- -- 184 lb 15.5 oz (83.9 kg)   11/21/22 1025 125/67 98.4 °F (36.9 °C) Temporal (!) 101 18 99 % -- --   11/21/22 0925 (!) 148/67 -- -- (!) 102 15 99 % -- -- Patient Vitals for the past 96 hrs (Last 3 readings):   Weight   11/21/22 1100 184 lb 15.5 oz (83.9 kg)   11/21/22 0721 188 lb 15 oz (85.7 kg)   11/21/22 0500 187 lb 9.8 oz (85.1 kg)             Intake/Output Summary (Last 24 hours) at 11/22/2022 4828  Last data filed at 11/22/2022 0400  Gross per 24 hour   Intake 410 ml   Output 3081 ml   Net -2671 ml           Physical Exam: (2-7 system for EPF/Detailed, ?8 for Comprehensive)  BP (!) 103/57   Pulse 83   Temp 96.9 °F (36.1 °C) (Temporal)   Resp 12   Ht 5' 5\" (1.651 m)   Wt 184 lb 15.5 oz (83.9 kg)   LMP  (LMP Unknown)   SpO2 97%   BMI 30.78 kg/m²   Constitutional: vitals as above:  appears stated age and cooperative    Head: Normocephalic, without obvious abnormality, atraumatic    Eyes:lids and lashes normal, conjunctivae and sclerae normal and pupils equal, round, reactive to light and accomodation    EMNT: external ears normal, nares midline, +NGT  Neck: no carotid bruit, supple, symmetrical, trachea midline and thyroid not enlarged, symmetric, no tenderness/mass/nodules     Respiratory: clear to auscultation and percussion bilaterally with normal respiratory effort    Cardiovascular: normal rate, regular rhythm, normal S1 and S2 and no murmurs    Gastrointestinal: soft, non-tender, non-distended, normal bowel sounds, no masses or organomegaly    Extremities: no clubbing, no edema    Skin:No rashes or nodules noted.     Neurologic:negative         Labs:  Lab Results   Component Value Date    WBC 11.8 (H) 11/22/2022    HGB 10.4 (L) 11/22/2022    HCT 32.1 (L) 11/22/2022     11/22/2022    CHOL 96 08/12/2021    TRIG 132 08/12/2021    HDL 32 (L) 08/12/2021    LDLDIRECT 124 (H) 08/15/2019    ALT 18 11/21/2022    AST 23 11/21/2022     11/22/2022    K 4.7 11/22/2022    CL 95 (L) 11/22/2022    CREATININE 6.8 (HH) 11/22/2022    BUN 56 (H) 11/22/2022    CO2 26 11/22/2022    TSH 2.64 08/15/2019    INR 1.03 09/01/2021    LABA1C 5.9 11/21/2022 LABMICR YES 08/26/2021     Lab Results   Component Value Date    CKTOTAL 243 (H) 08/25/2021    TROPONINI 0.04 (H) 11/20/2022       Recent Imaging Results are Reviewed:  XR ABDOMEN (KUB) (SINGLE AP VIEW)    Result Date: 11/21/2022  EXAMINATION: ONE SUPINE XRAY VIEW(S) OF THE ABDOMEN 11/20/2022 11:36 pm COMPARISON: None. HISTORY: ORDERING SYSTEM PROVIDED HISTORY: NG placement/distention TECHNOLOGIST PROVIDED HISTORY: Reason for exam:->NG placement/distention FINDINGS: The gas pattern is unremarkable. There is a nasogastric tube in place with the tip and side port along the fundus of the stomach. No urinary calculi are seen. Status post gastric tube placement along the fundus of the stomach with a non-specific gas pattern. CT HEAD WO CONTRAST    Result Date: 11/20/2022  EXAMINATION: CT OF THE HEAD WITHOUT CONTRAST  11/20/2022 12:35 pm TECHNIQUE: CT of the head was performed without the administration of intravenous contrast. Automated exposure control, iterative reconstruction, and/or weight based adjustment of the mA/kV was utilized to reduce the radiation dose to as low as reasonably achievable. COMPARISON: 08/25/2021 HISTORY: ORDERING SYSTEM PROVIDED HISTORY: AMS TECHNOLOGIST PROVIDED HISTORY: Reason for exam:->AMS Has a \"code stroke\" or \"stroke alert\" been called? ->No Decision Support Exception - unselect if not a suspected or confirmed emergency medical condition->Emergency Medical Condition (MA) Reason for Exam: AMS Additional signs and symptoms: Altered Mental Status (Pt from assisted living, was seen yesterday for back pain, now altered, did not finish dialysis yesterday and skipped Thursday. Pt alert to verbal but will not answer questions. ) FINDINGS: BRAIN/VENTRICLES: There is no acute intracranial hemorrhage, mass effect or midline shift. No abnormal extra-axial fluid collection. The gray-white differentiation is maintained without evidence of an acute infarct.   There is no evidence of hydrocephalus. ORBITS: The visualized portion of the orbits demonstrate no acute abnormality. SINUSES: The visualized paranasal sinuses and mastoid air cells demonstrate no acute abnormality. SOFT TISSUES/SKULL:  No acute abnormality of the visualized skull or soft tissues. No acute intracranial abnormality. Lab Results   Component Value Date/Time    GLUCOSE 153 11/22/2022 04:20 AM     Lab Results   Component Value Date/Time    POCGLU 153 11/21/2022 08:10 PM     BP (!) 103/57   Pulse 83   Temp 96.9 °F (36.1 °C) (Temporal)   Resp 12   Ht 5' 5\" (1.651 m)   Wt 184 lb 15.5 oz (83.9 kg)   LMP  (LMP Unknown)   SpO2 97%   BMI 30.78 kg/m²     Assessment and Plan:  Principal Problem:    End stage renal disease ([facility] -Established problem. Stable. Plan: cont on HD treatments per renal  Active Problems:    Essential hypertension -Established problem. Stable. 103/57  Plan: stay on home meds    Hyperlipidemia -Established problem. Stable. Plan: Continue present orders/plan. Diabetes mellitus type 2 in [facility]) -Established problem. Stable. Glu 153  Plan: for now, npo. Sliding scale ordered    COVID-19 -Established problem. Stable. Plan: keep in isolation.  Cont steroids      (Please note that portions of this note were completed with a voice recognition program.  Efforts were made to edit the dictations but occasionally words are mis-transcribed.)        Rosalio Rodriguez MD  11/22/2022

## 2022-11-22 NOTE — PROGRESS NOTES
Shift assessment complete, pt able to answer some questions, but answers are not relating to questions, MALIK orientation, zhanna 13, H. O.B Elevated, NSR, NG to suction low intermittent, at 55, green/bile coming out. Pt has bilateral lower extremity edema, pt IV infusing, no phlebitis/no infiltration at this time, pt resting in bed, 2L NC, Guajardo-Bakers scale -0.  Care on going

## 2022-11-22 NOTE — PLAN OF CARE
Problem: Discharge Planning  Goal: Discharge to home or other facility with appropriate resources  11/21/2022 2043 by Tiffanie Hinson RN  Outcome: Progressing

## 2022-11-22 NOTE — PROGRESS NOTES
[facility] [address] Department   Phone: [phone]    Occupational Therapy    [x] Initial Evaluation            [] Daily Treatment Note         [] Discharge Summary      Patient: Carly Tello   : 1945   MRN: [id number]   Date of Service:  2022    Admitting Diagnosis:  End stage renal disease [facility])  Current Admission Summary: pt not at HD for one week, admitted with confusion and AMS. Pt ESRD and currently on HD  Past Medical History:  has a past medical history of Arthritis, CAD (coronary artery disease), Diabetes ([facility] Hemodialysis patient ([facility] Hyperlipidemia, Hypertension, Kidney disease, Neuropathy, Pneumonia, and Thyroid disease. Past Surgical History:  has a past surgical history that includes Appendectomy; Mastectomy, bilateral; Rotator cuff repair (Left); Cholecystectomy; LAPAROSCOPY INSERTION PERITONEAL CATHETER (N/A, 4/3/2020); Upper gastrointestinal endoscopy (N/A, 2020); Colonoscopy (2020); Hysterectomy; Dialysis Catheter Removal (N/A, 2021); and shunt revision (Left, 2021). Discharge Recommendations: Carly Tello scored a 15/24 on the AM-PAC ADL Inpatient form. Current research shows that an AM-PAC score of 17 or less is typically not associated with a discharge to the patient's home setting. Based on the patient's AM-PAC score and their current ADL deficits, it is recommended that the patient have 3-5 sessions per week of Occupational Therapy at d/c to increase the patient's independence. Please see assessment section for further patient specific details. If patient discharges prior to next session this note will serve as a discharge summary. Please see below for the latest assessment towards goals.       DME Required For Discharge: DME to be determined at next level of care, DME to be determined pending patient progress    Precautions/Restrictions: high fall risk  Weight Bearing Restrictions: no restrictions  [] Right Upper Extremity  [] Left Upper Extremity [] Right Lower Extremity  [] Left Lower Extremity     Required Braces/Orthotics: no braces required   [] Right  [] Left  Positional Restrictions:no positional restrictions    Pre-Admission Information   Lives With: alone    Type of Home: apartment- pt in the [address] [place] [address] [place]: one level  Home Access: level entry  Bathroom Layout: walk in shower  Bathroom Equipment:  no prior equipment  Toilet Height: elevated height  Home Equipment: no prior equipment  Transfer Assistance: Independent without use of device  Ambulation Assistance:Independent without use of device  ADL Assistance: independent with all ADL's  IADL Assistance: independent with homemaking tasks  Active :        [x] Yes  [] No  Hobbies: pt takes DIANE out to eat  Recent Falls:no falls reported    Examination   Vision:   Vision Gross Assessment: WFL  Hearing:   WFL  Perception:   Initiation: cues to initiate tasks  Observation:   General Observation:  significant bruising on R lateral back  Posture:    Fair-posterior lean  Sensation:   WFL  Proprioception:    WFL  Tone:   Normotonic  Coordination Testing:   Coordination and Movement Description: decreased speed    ROM:   (B) Shoulder AROM WFL  Strength:   (B) UE strength grossly +3    Therapist Clinical Decision Making (Complexity): medium complexity  Clinical Presentation: evolving      Subjective  General: pt supine upon arrival and agreeable to eval with encouragement. Pt denies pain. confused during session  Pain: 0/10  Pain Interventions: not applicable        Activities of Daily Living  Basic Activities of Daily Living  Feeding Comments: NPO  Grooming Comments: sponge for oral care after emesis, face washing with set up assist  Bathing Comments: UE bathing with Vcs and modA  Dressing Comments: pt total assist to doff brief, total assist to don brief. Kyra care with modA. Pt refusing purewick placement back after removed.  Pt encouraged to call RN when she needed to void, pt becoming agitated when educated. Instrumental Activities of Daily Living  No IADL completed on this date. Functional Mobility  Bed Mobility  Supine to Sit: minimal assistance  Scooting: contact guard assistance  Comments: VCs  Transfers  Sit to stand transfer:contact guard assistance  Stand to sit transfer: contact guard assistance  Comments: stand step ~2 ft with RW to chair from EOB. No carryover of education on transfers and hand placement  Functional Mobility:  Sitting Balance: stand by assistance, contact guard assistance. Standing Balance: contact guard assistance. Standing Balance Comment: mild posterior lean, Bles resting on bed and recliner in standing  Functional Mobility: .  contact guard assistance  Functional Mobility Comment: with RW, mobility 10 ft, rest break on couch, 10 ft. RW used and Vcs for mobility    Other Therapeutic Interventions    Functional Outcomes  AM-PAC Inpatient Daily Activity Raw Score: 15    Cognition  Overall Cognitive Status: Impaired  Arousal/Alterness: delayed responses to stimuli  Following Commands: follows one step commands with increased time, inconsistently follows commands  Attention Span: attends with cues to redirect, difficulty attending to directions, difficulty dividing attention, unable to maintain attention  Memory: decreased recall of recent events, decreased short term memory  Safety Judgement: decreased awareness of need for assistance, decreased awareness of need for safety  Problem Solving: assistance required to generate solutions, assistance required to implement solutions, assistance required to identify errors made, assistance required to correct errors made  Insights: decreased awareness of deficits  Initiation: requires cues for some  Sequencing: requires cues for some  Follows one step commands most of the time, occasionally becoming agitated.    Orientation:    disoriented to time  and disoriented to situation  Command Following: Follows most one step commands     Education  Barriers To Learning: cognition  Patient Education: patient educated on goals, plan of care, transfer training, discharge recommendations  Learning Assessment:  patient verbalizes understanding, would benefit from continued reinforcement, patient will require reinforcement due to cognitive deficits    Assessment  Activity Tolerance: once in chair, pt reporting nausea. Having green bile emesis with a little blood in it. RN called. HR elevated during emesis. On 1  L O2 during session, SPO2 above 90% during session, left on RA. RN in room   Impairments Requiring Therapeutic Intervention: decreased functional mobility, decreased ADL status, decreased strength, decreased cognition, decreased endurance, decreased balance, decreased IADL, decreased fine motor control, decreased coordination  Prognosis: good  Clinical Assessment: pt independent and lives at the [facility]. Pt not at baseline and missing HD. Pt would benefit from ongoing skilled OT services in order to [facility].  Pt unsafe to d.c home independently at this time  Safety Interventions: patient left in chair, chair alarm in place, call light within reach, gait belt, patient at risk for falls, and nurse notified    Plan  Frequency: 3-5 x/per week  Current Treatment Recommendations: strengthening, balance training, functional mobility training, transfer training, endurance training, patient/caregiver education, ADL/self-care training, and IADL training    Goals  Patient Goals: pt did not state, when asked about HD, pt stating, \"I didn't want to go''   Short Term Goals:  Time Frame: discharge  Bed mobility supervision  Functional ADL transfer supervision  Functional mobility with RW or LRAD and supervision  UB ADLs set up  LB ADLs Eri      Therapy Session Time     Individual Group Co-treatment   Time In    1320   Time Out    1415   Minutes    55        Timed Code Treatment Minutes:   40 minutes  Total Treatment Minutes:  55 minutes       Electronically Signed By: Bowen Macias, [address], OTR/L [id number]

## 2022-11-22 NOTE — PROGRESS NOTES
Facility/Department: [facility] ICU  Speech Language Pathology   Dysphagia and Speech Language/Cognitive Treatment Note    Patient: Ghanshyam Middleton   : 1945   MRN: [id number]      Evaluation Date: 2022      Admitting Dx: End stage renal disease ([facility] [N18.6]  Polypharmacy [Z79.899]  ESRD (end stage renal disease) ([facility] [N18.6]  Exposure to carbon monoxide [A04.47]  Metabolic acidosis with respiratory alkalosis [E87.20, E87.3]  Treatment Diagnosis: Cognitive-Linguistic Deficits , Speech Language Deficits , Oropharyngeal Dysphagia   Pain: Did not state                                                Subjective:  Pt sitting upright in bed requiring cuing to maintain alertness. Pt's friend visiting outside of pt's room. Dysphagia Treatment:   Diet and Treatment Recommendations 2022:  Diet Solids Recommendation:  NPO , allow limited sips of water via tsp Liquid Consistency Recommendation:  NPO Recommended form of Meds: Meds via alternative means        Compensatory strategies: Upright as possible with all PO intake , To be determined     Assessment of Texture Tolerance:  Diet level prior to treatment: NPO , NPO  Tolerance of Current Diet Level:Poor oral intake      -Impressions: Pt was positioned Upright in bed , awake but requiring cuing to open eyes throughout session. Currently on  2L O2 via nasal cannula . Provided pt with oral care before PO trials and after pt vomited. Limited trials of ice chips  and thin liquids were provided to assess swallow function. Attempted to give pt ice chips but she refused to open mouth, appearing to relate to her feeling nauseous and proceeded to vomit yellow bile, RN notified. Pt willing to trial x2 teaspoon of thin liquid following oral care and then declined further PO trials. No overt s/s of aspiration were noted with limited thin liquid trials via tsp. Pt demonstrates increased risk for aspiration due to cognitive state , co morbidities , and deconditioning . Based on today's limited assessment recommend NPO  with the allowance of limited sips of water via tsp ONLY, Meds via alternative means  with use of compensatory swallow strategies (see above). Dysphagia Goals:  Pt will functionally tolerate ongoing assessment of swallow function with diet to be determined as indicated (Ongoing 11/22/22)  Pt will demonstrate understanding of aspiration risk and precautions via education/demonstration with occasional prompting (Ongoing 11/22/22)      Speech Language/Cognitive Treatment:  Impressions: Pt demonstrated slightly improved auditory comprehension and verbal expression, but continues to present with inconsistent verbalizations. RN noted pt's verbal output consisting of mostly jargon this morning. This date goals were targeted via basic yes/no questions and following directions. Pt able to follow basic commands during oral care and PO trials (stick out tongue, open mouth, open eyes, swallow), typically requiring repetition of commands. Pt with more consistent initiation of responses of basic yes/no questions, but able to correctly answer with 40% accuracy. Pt able to state first and last name when prompted. When pt was told her friend was sitting outside of her room, she opened her eyes and with assistance was able to turn her head to look at her friend and wave. Based on today's assessment, pt would continue to benefit from SLP intervention to target ongoing assessment and maximize functional communication.      Speech Language Short Term Goals:  Pt will improve auditory processing/comprehension of commands and questions to 80%, via graded tasks (Ongoing 11/22/22)  Pt will improve orientation and short term recall via graded tasks to 80% (Ongoing 11/22/22)  Pt will improve verbal expression for functional expression via graded tasks to 80% (Ongoing 11/22/22)  Pt will participate in ongoing cognitive assessment with goals to be established as indicated (Ongoing 11/22/22)    Assessment: Patient progressing toward goals    Plan: 3-5 times per week during acute care stay. Patient/Family Education:  Provided education regarding role of SLP, recommendations and general speech pathology plan of care. [] Pt verbalized understanding and agreement   [x] Pt requires ongoing learning   [x] No evidence of comprehension     Discharge Recommendations:  Discharge recommendations to be determined pending ongoing follow-up during acute care stay    Treatment time  Timed Code Treatment Minutes: 0 minutes  Total Treatment time: 25 minutes     If patient discharges prior to next session this note will serve as a discharge summary. Signature: HANNAH Alvarez.  Clinician     The speech-language pathologist was present, directed the patient's care, made skilled judgment and was responsible for assessment and treatment.     Diann Vargas M.A., [address] [place] [address]  Speech-Language Pathologist

## 2022-11-22 NOTE — PROGRESS NOTES
Gastroenterology Progress Note            Jessica Frankel is a 67 y.o. female patient. Principal Problem:    End stage renal disease ([facility]  Active Problems:    Essential hypertension    Hyperlipidemia    Diabetes mellitus type 2 in obese ([facility]    COVID-19  Resolved Problems:    * No resolved hospital problems. *      SUBJECTIVE:  More alert today, conversant, but mildly confused still. ROS:    Gastrointestinal ROS: no abdominal pain, change in bowel habits, or black or bloody stools. Cardiovascular ROS: negative  Respiratory ROS: negative    Physical    VITALS:  BP (!) 103/57   Pulse 83   Temp 96.9 °F (36.1 °C) (Temporal)   Resp 12   Ht 5' 5\" (1.651 m)   Wt 184 lb 15.5 oz (83.9 kg)   LMP  (LMP Unknown)   SpO2 97%   BMI 30.78 kg/m²   TEMPERATURE:  Current - Temp: 96.9 °F (36.1 °C); Max - Temp  Av.7 °F (36.5 °C)  Min: 96.9 °F (36.1 °C)  Max: 98.4 °F (36.9 °C)    NAD  RRR, Nl s1s2  Lungs CTA Bilaterally, normal effort  Abdomen soft, ND, NT, no HSM, Bowel sounds normal.    Data    Data Review:    Recent Labs     22  1154 22  0415 22  0420   WBC 11.7* 14.2* 11.8*   HGB 10.1* 11.2* 10.4*   HCT 30.3* 33.0* 32.1*   MCV 95.9 95.2 96.8    393 375     Recent Labs     22  1209 22  0415 22  0420    137 139   K 4.8 4.7 4.7   CL 99 94* 95*   CO2  26   BUN 73* 50* 56*   CREATININE 9.4* 7.5* 6.8*     Recent Labs     22  1016 22  0415   AST 14* 23   ALT 14 18   BILITOT 0.4 0.6   ALKPHOS 159* 182*     No results for input(s): LIPASE, AMYLASE in the last 72 hours. No results for input(s): PROTIME, INR in the last 72 hours. No results for input(s): PTT in the last 72 hours. Radiology Review:  Abdomen Xray - nonspecific bowel gas. ASSESSMENT :  Bloody NG tube aspirate/UGI hemorrhage - she is more lucid this AM.  Denies abdominal pain to me and exam is benign. NG tube now draining bile.    She is on Plavix and ASA and receives HD for ESRD.  She is COVID (+). She may have ileus from COVID infection, and perhaps self-limited bleeding due to NG trauma. She has normal VS and stable Hb. OK to restart ASA and Plavix and change Protonix IV infusion to bolus. Will repeat AXR and if normal -> remove NG tube and observe. We will plan EGD electively after recovers from [facility] unless additional bleeding occurs. Can discontinue NG from GI standpoint        PLAN  :  1) Repeat ABD XR   2) Remove NG tube if AXR ok  3) Defer EGD since no bleeding  4) D/C serial Hb/Hct and check daily CBC  5) Change Protonix to IV bolus BID and change to po when tolerating diet. 6) Trial of clear liquids after NG out.     Vishnu Zaragoza  11/22/2022

## 2022-11-22 NOTE — PROGRESS NOTES
[facility] Department   Phone: [phone]    Physical Therapy    [x] Initial Evaluation            [] Daily Treatment Note         [] Discharge Summary      Patient: Andrade Rodríguez   : 1945   MRN: [id number]   Date of Service:  2022  Admitting Diagnosis: End stage renal disease [facility])  Current Admission Summary: pt not at HD for one week, admitted with confusion and AMS. Pt ESRD and currently on HD  Past Medical History:  has a past medical history of Arthritis, CAD (coronary artery disease), Diabetes ([facility] Hemodialysis patient ([facility] Hyperlipidemia, Hypertension, Kidney disease, Neuropathy, Pneumonia, and Thyroid disease. Past Surgical History:  has a past surgical history that includes Appendectomy; Mastectomy, bilateral; Rotator cuff repair (Left); Cholecystectomy; LAPAROSCOPY INSERTION PERITONEAL CATHETER (N/A, 4/3/2020); Upper gastrointestinal endoscopy (N/A, 2020); Colonoscopy (2020); Hysterectomy; Dialysis Catheter Removal (N/A, 2021); and shunt revision (Left, 2021). Discharge Recommendations: Andrade Rodríguez scored a 15/24 on the AM-PAC short mobility form. Current research shows that an AM-PAC score of 17 or less is typically not associated with a discharge to the patient's home setting. Based on the patient's AM-PAC score and their current functional mobility deficits, it is recommended that the patient have 3-5 sessions per week of Physical Therapy at d/c to increase the patient's independence. Please see assessment section for further patient specific details. If patient discharges prior to next session this note will serve as a discharge summary. Please see below for the latest assessment towards goals.  ,  DME Required For Discharge: DME to be determined at next level of care  Precautions/Restrictions: high fall risk  Weight Bearing Restrictions: no restrictions  [] Right Upper Extremity  [] Left Upper Extremity [] Right Lower Extremity  [] Left Lower Extremity     Required Braces/Orthotics: no braces required   [] Right  [] Left  Positional Restrictions:no positional restrictions    Pre-Admission Information   Lives With: alone                     Type of Home: apartment- pt in the [address] [place] [address] [place]: one level  Home Access: level entry  Bathroom Layout: walk in shower  Bathroom Equipment:  no prior equipment  Toilet Height: elevated height  Home Equipment: no prior equipment  Transfer Assistance: Independent without use of device  Ambulation Assistance:Independent without use of device  ADL Assistance: independent with all ADL's  IADL Assistance: independent with homemaking tasks  Active :        [x] Yes                 [] No  Hobbies: pt takes [employer]ANE out to eat  Recent Falls:no falls reported    Information from grandson due to patient's poor historian and impaired cognition. Examination   Vision:   Vision Gross Assessment: WFL  Hearing:   WFL  Posture:   Rounded shoulders   Sensation:   WFL  ROM:   (B) LE AROM WFL  Strength:   (B) LE strength grossly WFL  Therapist Clinical Decision Making (Complexity): medium complexity  Clinical Presentation: evolving      Subjective  General: patient was supine in bed with head of bed elevated when PT arrived. Agreeable to OT/PT co evaluation. Pain: 0/10  Pain Interventions: not applicable       Functional Mobility  Bed Mobility  Supine to Sit: contact guard assistance  Scooting: contact guard assistance  Comments: Head of bed elevated and required increased time   Transfers  Sit to stand transfer: contact guard assistance  Stand to sit transfer: contact guard assistance  Comments: Max verbal and tactile cues for hand placement with transfers. No carry over with transfers. Sit >stand from EOB x 1. Chair x 2. Couch x1.    Ambulation  Assistive Device: rolling walker  Assistance: contact guard assistance  Distance: 2 feet + 10 feet + 10 feet   Gait Mechanics: decreased foot clearance, minimal sway, decreased agatha, decreased gait speed, decreased heel strike   Comments:    Stair Mobility  Stair mobility not completed on this date. Comments:  Wheelchair Mobility:  No w/c mobility completed on this date. Comments:  Balance  Static Sitting Balance: fair: maintains balance at CGA without use of UE support  Dynamic Sitting Balance: fair: maintains balance at CGA without use of UE support  Static Standing Balance: fair (-): maintains balance at CGA with use of UE support  Dynamic Standing Balance: fair (-): maintains balance at CGA with use of UE support  Comments:    Other Therapeutic Interventions  See OT note for ADLs.    Functional Outcomes  AM-PAC Inpatient Mobility Raw Score : 15              Cognition  Overall Cognitive Status: Impaired  Arousal/Alterness: delayed responses to stimuli, inconsistent responses to stimuli  Following Commands: follows one step commands with repetition, follows one step commands with increased time  Memory: decreased recall of biographical information, decreased recall of recent events  Safety Judgement: decreased awareness of need for assistance, decreased awareness of need for safety  Problem Solving: assistance required to generate solutions, assistance required to implement solutions, decreased awareness of errors  Insights: not aware of deficits  Initiation: requires cues for all  Sequencing: requires cues for all  Orientation:    oriented to person, disoriented to time , and disoriented to situation  Command Following:   impaired    Education  Barriers To Learning: cognition  Patient Education: patient educated on PT role and benefits, plan of care, functional mobility training, proper use of assistive device/equipment, transfer training, discharge recommendations  Learning Assessment:  patient will require reinforcement due to cognitive deficits    Assessment  Activity Tolerance: Patient tolerated treatment well with no reports of increased pain. Patient was fatigued with initiation of ambulation  Impairments Requiring Therapeutic Intervention: decreased functional mobility, decreased ADL status, decreased strength, decreased endurance, decreased balance  Prognosis: good  Clinical Assessment: Patient presents with above deficits and is currently below baseline function. Per nurse, patient's cognition improved as of today. Patient was confused during session and was inconsistent with answers to questions. Decreased insight into her current situation and deficits. Patient would benefit from continued skilled therapy in order to return to OF and increase safety with mobility. Safety Interventions: patient left in chair, chair alarm in place, call light within reach, gait belt, patient at risk for falls, and nurse notified    Plan  Frequency: 3-5 x/per week  Current Treatment Recommendations: strengthening, ROM, balance training, functional mobility training, transfer training, gait training, stair training, endurance training, and neuromuscular re-education    Goals  Patient Goals: Patient did not state goals    Short Term Goals:  Time Frame: Discharge   Patient will complete bed mobility at stand by assistance   Patient will complete transfers at stand by assistance   Patient will ambulate 50 ft with use of LRAD at contact guard assistance  Patient will ascend/descend 10 stairs with (B) handrail at contact guard assistance  Patient to maintain standing at contact guard assistance for 5 minutes. Therapy Session Time      Individual Group Co-treatment   Time In     1320   Time Out     2117   Minutes     55     Timed Code Treatment Minutes:  40 Minutes  Total Treatment Minutes:  [address], SPT   Electronically Signed By: Kiki Moreno PT  I agree with the above note. PT directly observed the SPT with the patient.   Wendi Davis, [address] [facility] DPT [id number]

## 2022-11-22 NOTE — CONSULTS
In patient Neurology consult        [facility] Neurology      MD Gloria Tolbert  1945    Date of Service: 11/22/2022    Referring Physician: Yanick You MD    Most of the history was obtained from detailed chart reviewing and discussion with the patient's nurse. The patient is currently confused and unable to provide me with accurate history. Reason for the consult and CC: Acute confusion    HPI:   The patient is a 67y.o.  years old female with multiple medical problems who was admitted to the hospital few days ago with acute confusion. Symptoms started 2 to 3 days prior to admission. Description confusion at her facility  and difficulties with following direction and ADL. Degree was severe. Duration was at least 2 to 3 days prior to admission. No relieving or aggravating factors or clear triggers. No other associated symptoms, witnessed seizures or falling. Patient came to the ED for evaluation where she was diagnosed COVID-19. She was placed in the ICU. Patient has been seen by different specialist since that time. Neurology was consulted today for increased confusion last 2 days. According to her nurse, she can follow direction but not consistent. Now the patient is awake and alert. She is currently by herself. She denies any headache or neck pain. She denies any focal weakness. Other  review of system was unremarkable.     Family History   Problem Relation Age of Onset    Cancer Mother         lung, breast, liver    Stroke Father     Stomach Cancer Brother     Cancer Maternal Grandmother     No Known Problems Paternal Grandmother     No Known Problems Paternal Grandfather          Past Medical History:   Diagnosis Date    Arthritis     CAD (coronary artery disease)     Diabetes ([facility] [address]     Hemodialysis patient ([facility] [address]     Hyperlipidemia     Hypertension     Kidney disease     Neuropathy     Pneumonia     IN February, 2021, with COVID    Thyroid disease      Past Surgical History:   Procedure Laterality Date    APPENDECTOMY      CHOLECYSTECTOMY      COLONOSCOPY  2020    COLONOSCOPY POLYPECTOMY SNARE/COLD BIOPSY performed by Seven Koo MD at [place] [address] 2021    PERITONEAL DIALYSIS CATHETER REMOVAL.  [address]. performed by Colton Knight DO at [address] ([address] [facility])      FULL    LAPAROSCOPY INSERTION PERITONEAL CATHETER N/A 4/3/2020    LAPAROSCOPIC LYSIS OF ADHESIONS, LAPAROSCOPIC PERITONEAL DIALYSIS CATHETER PLACEMENT, LAPAROSCOPIC OMENTOPEXY performed by Colton Knight DO at [address] BILATERAL      preventive    ROTATOR CUFF REPAIR Left     SHUNT REVISION Left 2021    LEFT FOREARM ARTERIO VENOUS GRAFT performed by Amirah Morales MD at [address] [address] N/A 2020    EGD BIOPSY performed by Seven Koo MD at [address] History     Tobacco Use    Smoking status: Former     Packs/day: 0.50     Years: 10.00     Pack years: 5.00     Types: Cigarettes     Quit date: 1978     Years since quittin.4    Smokeless tobacco: Never   Vaping Use    Vaping Use: Never used   Substance Use Topics    Alcohol use: Not Currently     Comment: every other week one glass of wine    Drug use: Never     Allergies   Allergen Reactions    Amoxicillin Anaphylaxis    Demeclocycline Anaphylaxis    Penicillins Anaphylaxis    Tetracyclines & Related Anaphylaxis    Ozempic (0.25 Or 0.5 Mg-Dose) [Semaglutide(0.25 Or 0.5mg-Dos)] Other (See Comments)     Lost large amount weight and loss  Of appetite    Codeine Itching and Rash     Current Facility-Administered Medications   Medication Dose Route Frequency Provider Last Rate Last Admin    sodium chloride flush 0.9 % injection 5-40 mL  5-40 mL IntraVENous 2 times per day Stephenie Lees MD        sodium chloride flush 0.9 % injection 5-40 mL  5-40 mL IntraVENous PRN Alejandro Gallegos Lucrezia Phalen, MD        0.9 % sodium chloride infusion  25 mL IntraVENous PRN Aggie Hong MD        pantoprazole (PROTONIX) injection 40 mg  40 mg IntraVENous BID Aggie Hong MD   40 mg at 11/22/22 1320    insulin lispro (HUMALOG) injection vial 0-4 Units  0-4 Units SubCUTAneous TID WC Serenity Pandya MD        insulin lispro (HUMALOG) injection vial 0-4 Units  0-4 Units SubCUTAneous Nightly Serenity Pandya MD        sodium chloride flush 0.9 % injection 10 mL  10 mL IntraVENous 2 times per day Serenity Pandya MD   10 mL at 11/21/22 1122    sodium chloride flush 0.9 % injection 10 mL  10 mL IntraVENous PRN Serenity Pandya MD        0.9 % sodium chloride infusion   IntraVENous PRN Serenity Pandya MD        ondansetron (ZOFRAN-ODT) disintegrating tablet 4 mg  4 mg Oral Q8H PRN Serenity Pandya MD        Or    [facility] injection 4 mg  4 mg IntraVENous Q6H PRN Serenity Pandya MD   4 mg at 11/20/22 2244    acetaminophen (TYLENOL) tablet 650 mg  650 mg Oral Q6H PRN Serenity Pandya MD        Or    acetaminophen (TYLENOL) suppository 650 mg  650 mg Rectal Q6H PRN Serenity Pandya MD        [facility] [place] by provider] clopidogrel (PLAVIX) tablet 75 mg  75 mg Oral Daily Serenity Pandya MD        allopurinol (ZYLOPRIM) tablet 100 mg  100 mg Oral Daily Serenity Pandya MD        metoprolol succinate (TOPROL XL) extended release tablet 25 mg  25 mg Oral Daily Serenity Pandya MD        spironolactone (ALDACTONE) tablet 25 mg  25 mg Oral Daily Serenity Pandya MD        rosuvastatin (CRESTOR) tablet 20 mg  20 mg Oral Nightly Serenity Pandya MD        aspirin EC tablet 81 mg  81 mg Oral Once Serenity Pandya MD        nortriptyline (PAMELOR) capsule 10 mg  10 mg Oral Nightly Serenity Pandya MD        Vitamin D (CHOLECALCIFEROL) tablet 1,000 Units  1,000 Units Oral Daily Serenity Pandya MD        lidocaine 4 % external patch 1 patch  1 patch TransDERmal Daily Serenity Pandya MD        gabapentin (NEURONTIN) capsule 200 mg  200 mg Oral Nightly Raf Walker MD        rOPINIRole (REQUIP) tablet 0.25 mg  0.25 mg Oral Nightly Raf Walker MD        levothyroxine (SYNTHROID) tablet 150 mcg  150 mcg Oral QAM AC Raf Walker MD        isosorbide mononitrate (IMDUR) extended release tablet 30 mg  30 mg Oral Daily Raf Walker MD        baclofen (LIORESAL) tablet 10 mg  10 mg Oral TID PRN Raf Walker MD        torsemide [facility]) tablet 120 mg  120 mg Oral BID Raf Walker MD        morphine (PF) injection 1 mg  1 mg IntraVENous Q4H PRN Raf Walker MD   1 mg at 11/21/22 2220    hydroCHLOROthiazide (HYDRODIURIL) tablet 12.5 mg  12.5 mg Oral Daily Raf Walker MD        And    losartan (COZAAR) tablet 50 mg  50 mg Oral Daily Raf Walker MD        hydrALAZINE (APRESOLINE) injection 10 mg  10 mg IntraVENous Q6H PRN Raf Walker MD   10 mg at 11/21/22 2347    0.9 % sodium chloride bolus  500 mL IntraVENous PRN Raf Walker MD        dexamethasone (PF) (DECADRON) injection 6 mg  6 mg IntraVENous Q24H Raf Walker MD   6 mg at 11/21/22 1855    glucose-vitamin C chewable tablet 4 tablet  4 tablet Oral PRN Raf Walker MD        dextrose bolus 10% 125 mL  125 mL IntraVENous PRN Raf Walker MD        Or    dextrose bolus 10% 250 mL  250 mL IntraVENous PRN Raf Walker MD        glucagon (rDNA) injection 1 mg  1 mg SubCUTAneous PRN Raf Walker MD        dextrose 10 % infusion   IntraVENous Continuous PRN Raf Walker MD           ROS: 10-14 system review, reviewed with patient's family and/or nurse was unremarkable except mentioned in HPI.      Constitutional:   Vitals:    11/22/22 0357 11/22/22 0600 11/22/22 0800 11/22/22 1415   BP: (!) 100/55 (!) 103/57 (!) 163/78    Pulse: 90 83 94    Resp: 12 12 14    Temp: 96.9 °F (36.1 °C)  97.3 °F (36.3 °C)    TempSrc: Temporal  Temporal    SpO2: 97% 97% 98% 98%   Weight:       Height:        General appearance: Confused   Eye: Examination of conjunctiva and lids: No eye lid drooping, no redness or abnormal conjunctival coloration. Examination of pupil and irises: Pupil round, regular and reactive bilaterally direct and consensual. Iris is symmetric. Neck: Neck is supple. No thyromegaly  Cardiovascular: No lower leg edema with good pulsation. No carotid bruit  Neurological: Cranial nerves: Pupil round regular and reactive, extraocular muscle intact, no gaze preference, face is symmetric, uvula midline, tongue is midline. DTRs: Symmetric 2+ throughout arms and legs                          Sensation: No sensory deficit or abnormal sensation                          Plantars: Flexor bilaterally. Musculoskeletal:  The patient can move all 4 extremities. No apparent focal weakness. Normal tone and range of motion. Psychiatric: Poor judgment and insight. Poor orientation, waxing and waning. Easily distracted. Labile affect. Respiratory: Respiratory effort: Normal.  Palpation: No abnormal deformities. Skin: Inspection of the skin: Normal , no abnormal lesion. Palpation: No palpable nodules  ENT: No abnormalities with nasal mucosa.   No abnormalities with oropharynx and palate is symmetric    Data:  LABS:   Lab Results   Component Value Date/Time     11/22/2022 04:20 AM    K 4.7 11/22/2022 04:20 AM    K 4.7 11/21/2022 04:15 AM    CL 95 11/22/2022 04:20 AM    CO2 26 11/22/2022 04:20 AM    BUN 56 11/22/2022 04:20 AM    CREATININE 6.8 11/22/2022 04:20 AM    GFRAA 8 01/20/2022 06:16 AM    LABGLOM 6 11/22/2022 04:20 AM    GLUCOSE 153 11/22/2022 04:20 AM    PHOS 6.9 09/24/2021 07:50 AM    MG 2.70 11/20/2022 12:09 PM    CALCIUM 9.8 11/22/2022 04:20 AM     Lab Results   Component Value Date/Time    WBC 11.8 11/22/2022 04:20 AM    RBC 3.32 11/22/2022 04:20 AM    HGB 10.4 11/22/2022 04:20 AM    HCT 32.1 11/22/2022 04:20 AM    MCV 96.8 11/22/2022 04:20 AM    RDW 15.8 11/22/2022 04:20 AM     11/22/2022 04:20 AM     Lab Results   Component Value Date    INR 1.03 09/01/2021    PROTIME 11.7 09/01/2021       Neuroimaging were independently reviewed by me. Reviewed notes from different physicians  Reviewed lab and blood testing    Impression:  Acute encephalopathy, not controlled. Possible related to COVID encephalopathy, metabolic encephalopathy and viral syndrome. Less likely encephalitis given examination today. Will order MRI of the brain to rule out new ischemic stroke  COVID-19  Diabetes, not controlled  Hypertension  Hyperlipidemia  End-stage renal disease      Recommendation:  MRI brain without contrast  Continue respiratory support and droplet isolation  Continue current supportive care  Dialysis  Follow electrolytes  DVT and GI prophylaxis  Continue Decadron  Insulin sliding scale  Continue blood pressure monitor and blood pressure control  Aspirin  Statin  Gabapentin the same dose for neuropathy  Neurochecks  Avoid sedation  Check B12 and TFT  DC planning when more medically stable  Discussed with ICU nurse. Thank you for referring such patient. If you have any questions regarding my consult note, please don't hesitate to call me. Alberto Em MD  [phone]    This dictation was generated by voice recognition computer software.  Although all attempts are made to edit the dictation for accuracy, there may be errors in the  transcription that are not intended

## 2022-11-22 NOTE — PROGRESS NOTES
Office : [phone]     Fax :[phone]       Nephrology  progress Note      Patient's Name: Ghanshyam Middleton  9:37 AM  11/22/2022    Reason for Consult:  ESRD       Requesting Physician:  Jia Simms MD      Chief Complaint:    Chief Complaint   Patient presents with    Altered Mental Status     Pt from assisted living, was seen yesterday for back pain, now altered, did not finish dialysis yesterday and skipped Thursday. Pt alert to verbal but will not answer questions. History of Present iIlness:      Ghanshyam Middleton is a 67 y.o. female with prior history of diabetic nephropathy on dialysis who was admitted with main c/o back pain. She was also looking drowsy when admitted. Had urgent HD yesterday for 2 hrs. She was positive for COVID 19 infection . Interval hx     Has NG tube   Had HD yesterday   No fever        I/O last 3 completed shifts: In: 80 [I.V.:10]  Out: 2088 [Urine:200; Emesis/NG output:700]    Past Medical History:   Diagnosis Date    Arthritis     CAD (coronary artery disease)     Diabetes ([facility] [address]     Hemodialysis patient ([facility] [address]     Hyperlipidemia     Hypertension     Kidney disease     Neuropathy     Pneumonia     IN February, 2021, with COVID    Thyroid disease        Past Surgical History:   Procedure Laterality Date    APPENDECTOMY      CHOLECYSTECTOMY      COLONOSCOPY  11/24/2020    COLONOSCOPY POLYPECTOMY SNARE/COLD BIOPSY performed by David Trinidad MD at [place] [address] 5/17/2021    PERITONEAL DIALYSIS CATHETER REMOVAL.  [address]. performed by Marcos Alex DO at [address] (CERVIX STATUS UNKNOWN)      FULL    LAPAROSCOPY INSERTION PERITONEAL CATHETER N/A 4/3/2020 LAPAROSCOPIC LYSIS OF ADHESIONS, LAPAROSCOPIC PERITONEAL DIALYSIS CATHETER PLACEMENT, LAPAROSCOPIC OMENTOPEXY performed by Idalmis Lee DO at [address] BILATERAL      preventive    ROTATOR CUFF REPAIR Left     SHUNT REVISION Left 8/19/2021    LEFT FOREARM ARTERIO VENOUS GRAFT performed by Sandra Cerda MD at [address] [facility] N/A 11/24/2020    EGD BIOPSY performed by Brayden Schulz MD at [address] [facility]       Family History   Problem Relation Age of Onset    Cancer Mother         lung, breast, liver    Stroke Father     Stomach Cancer Brother     Cancer Maternal Grandmother     No Known Problems Paternal Grandmother     No Known Problems Paternal Grandfather         reports that she quit smoking about 44 years ago. Her smoking use included cigarettes. She has a 5.00 pack-year smoking history. She has never used smokeless tobacco. She reports that she does not currently use alcohol. She reports that she does not use drugs.         Allergies:  Amoxicillin, Demeclocycline, Penicillins, Tetracyclines & related, Ozempic (0.25 or 0.5 mg-dose) [semaglutide(0.25 or 0.5mg-dos)], and Codeine    Current Medications:    sodium chloride flush 0.9 % injection 5-40 mL, 2 times per day  sodium chloride flush 0.9 % injection 5-40 mL, PRN  0.9 % sodium chloride infusion, PRN  insulin lispro (HUMALOG) injection vial 0-4 Units, TID WC  insulin lispro (HUMALOG) injection vial 0-4 Units, Nightly  pantoprazole (PROTONIX) 80 mg in sodium chloride 0.9 % 100 mL infusion, Continuous  sodium chloride flush 0.9 % injection 10 mL, 2 times per day  sodium chloride flush 0.9 % injection 10 mL, PRN  0.9 % sodium chloride infusion, PRN  ondansetron (ZOFRAN-ODT) disintegrating tablet 4 mg, Q8H PRN   Or  ondansetron (ZOFRAN) injection 4 mg, Q6H PRN  acetaminophen (TYLENOL) tablet 650 mg, Q6H PRN   Or  acetaminophen (TYLENOL) suppository 650 mg, Q6H PRN  [Held by provider] clopidogrel (PLAVIX) tablet 75 mg, Daily  allopurinol (ZYLOPRIM) tablet 100 mg, Daily  metoprolol succinate (TOPROL XL) extended release tablet 25 mg, Daily  spironolactone (ALDACTONE) tablet 25 mg, Daily  rosuvastatin (CRESTOR) tablet 20 mg, Nightly  aspirin EC tablet 81 mg, Once  nortriptyline (PAMELOR) capsule 10 mg, Nightly  Vitamin D (CHOLECALCIFEROL) tablet 1,000 Units, Daily  lidocaine 4 % external patch 1 patch, Daily  gabapentin (NEURONTIN) capsule 200 mg, Nightly  rOPINIRole (REQUIP) tablet 0.25 mg, Nightly  levothyroxine (SYNTHROID) tablet 150 mcg, QAM AC  isosorbide mononitrate (IMDUR) extended release tablet 30 mg, Daily  baclofen (LIORESAL) tablet 10 mg, TID PRN  torsemide (DEMADEX) tablet 120 mg, BID  morphine (PF) injection 1 mg, Q4H PRN  hydroCHLOROthiazide (HYDRODIURIL) tablet 12.5 mg, Daily   And  losartan (COZAAR) tablet 50 mg, Daily  hydrALAZINE (APRESOLINE) injection 10 mg, Q6H PRN  0.9 % sodium chloride bolus, PRN  dexamethasone (PF) (DECADRON) injection 6 mg, Q24H  glucose-vitamin C chewable tablet 4 tablet, PRN  dextrose bolus 10% 125 mL, PRN   Or  dextrose bolus 10% 250 mL, PRN  glucagon (rDNA) injection 1 mg, PRN  dextrose 10 % infusion, Continuous PRN        Physical exam:     Vitals:  BP (!) 103/57   Pulse 83   Temp 96.9 °F (36.1 °C) (Temporal)   Resp 12   Ht 5' 5\" (1.651 m)   Wt 184 lb 15.5 oz (83.9 kg)   LMP  (LMP Unknown)   SpO2 97%   BMI 30.78 kg/m²   Constitutional:  awake but very weak   Skin: no rash, turgor wnl  Heent:  eomi, mmm  Neck: no bruits or jvd noted  Cardiovascular:  S1, S2 without m/r/g  Respiratory: CTA B without w/r/r  Abdomen:  +bs, soft, nt, nd  Ext: no  lower extremity edema      Labs:  CBC:   Recent Labs     11/20/22  1154 11/21/22  0415 11/22/22  0420   WBC 11.7* 14.2* 11.8*   HGB 10.1* 11.2* 10.4*    393 375     BMP:    Recent Labs     11/20/22  1209 11/21/22  0415 11/22/22  0420    137 139   K 4.8 4.7 4.7   CL 99 94* 95*   CO2 21 22 26   BUN 73* 50* 56*   CREATININE 9. 4* 7.5* 6.8*   GLUCOSE 102* 169* 153*     Ca/Mg/Phos:   Recent Labs     11/20/22  1209 11/21/22  0415 11/22/22  0420   CALCIUM 9.8 9.8 9.8   MG 2.70*  --   --      Hepatic:   Recent Labs     11/19/22  1016 11/21/22  0415   AST 14* 23   ALT 14 18   BILITOT 0.4 0.6   ALKPHOS 159* 182*     Troponin:   Recent Labs     11/20/22  1209   TROPONINI 0.04*     BNP: No results for input(s): BNP in the last 72 hours. Lipids: No results for input(s): CHOL, TRIG, HDL, LDLCALC, LABVLDL in the last 72 hours. ABGs: No results for input(s): PHART, PO2ART, GMP3NTJ in the last 72 hours. INR: No results for input(s): INR in the last 72 hours. UA:No results for input(s): Jacqulyne Longs, GLUCOSEU, BILIRUBINUR, Basilia Lapine, BLOODU, PHUR, PROTEINU, UROBILINOGEN, NITRU, LEUKOCYTESUR, LABMICR, URINETYPE in the last 72 hours. Urine Microscopic: No results for input(s): LABCAST, BACTERIA, COMU, HYALCAST, WBCUA, RBCUA, EPIU in the last 72 hours. Urine Culture: No results for input(s): LABURIN in the last 72 hours. Urine Chemistry: No results for input(s): Marlise Garrett, PROTEINUR, NAUR in the last 72 hours. IMAGING:  XR ABDOMEN (KUB) (SINGLE AP VIEW)   Final Result   Status post gastric tube placement along the fundus of the stomach with a   non-specific gas pattern. CT HEAD WO CONTRAST   Final Result   No acute intracranial abnormality. XR ABDOMEN (2 VIEWS)    (Results Pending)                       Assessment/Plan :      1. ESRD   Will get HD today   Usually goes for HD tue/ thu/ sat   Will get HD here MWF       2. HTN  UF on hd   BP meds     3. COVID 19   Infection  Mgmt per primary attending     4. Acid- base disorder. Monitor   Correct with HD     5. Electrolytes.    Monitor       Plan for HD tomorrow       D/w primary team      Thank you for allowing us to participate in care of [facility]         Electronically signed by: Zoya Rojas MD, 11/22/2022, 9:37 AM      [facility] [address]  Office : [phone]  Fax :[phone]

## 2022-11-22 NOTE — PROGRESS NOTES
Pt able to communicate name, and location, unaware of time and situation, stated it was 2003.  Assisted pt on calling daughter and friend on cell phone

## 2022-11-23 LAB
ANION GAP SERPL CALCULATED.3IONS-SCNC: 23 MMOL/L (ref 3–16)
BASOPHILS ABSOLUTE: 0 K/UL (ref 0–0.2)
BASOPHILS RELATIVE PERCENT: 0 %
BUN BLDV-MCNC: 92 MG/DL (ref 7–20)
C-REACTIVE PROTEIN: 29.1 MG/L (ref 0–5.1)
CALCIUM SERPL-MCNC: 9.4 MG/DL (ref 8.3–10.6)
CHLORIDE BLD-SCNC: 92 MMOL/L (ref 99–110)
CO2: 23 MMOL/L (ref 21–32)
CREAT SERPL-MCNC: 8.5 MG/DL (ref 0.6–1.2)
EOSINOPHILS ABSOLUTE: 0 K/UL (ref 0–0.6)
EOSINOPHILS RELATIVE PERCENT: 0 %
GFR SERPL CREATININE-BSD FRML MDRD: 4 ML/MIN/{1.73_M2}
GLUCOSE BLD-MCNC: 106 MG/DL (ref 70–99)
GLUCOSE BLD-MCNC: 114 MG/DL (ref 70–99)
GLUCOSE BLD-MCNC: 118 MG/DL (ref 70–99)
GLUCOSE BLD-MCNC: 132 MG/DL (ref 70–99)
GLUCOSE BLD-MCNC: 144 MG/DL (ref 70–99)
HCT VFR BLD CALC: 33.3 % (ref 36–48)
HEMOGLOBIN: 10.7 G/DL (ref 12–16)
LYMPHOCYTES ABSOLUTE: 0.7 K/UL (ref 1–5.1)
LYMPHOCYTES RELATIVE PERCENT: 5.8 %
MCH RBC QN AUTO: 31.7 PG (ref 26–34)
MCHC RBC AUTO-ENTMCNC: 32.1 G/DL (ref 31–36)
MCV RBC AUTO: 98.7 FL (ref 80–100)
MONOCYTES ABSOLUTE: 0.4 K/UL (ref 0–1.3)
MONOCYTES RELATIVE PERCENT: 3 %
NEUTROPHILS ABSOLUTE: 10.9 K/UL (ref 1.7–7.7)
NEUTROPHILS RELATIVE PERCENT: 91.2 %
PDW BLD-RTO: 16 % (ref 12.4–15.4)
PERFORMED ON: ABNORMAL
PLATELET # BLD: 441 K/UL (ref 135–450)
PMV BLD AUTO: 7.9 FL (ref 5–10.5)
POTASSIUM SERPL-SCNC: 5.1 MMOL/L (ref 3.5–5.1)
PROCALCITONIN: 0.71 NG/ML (ref 0–0.15)
RBC # BLD: 3.38 M/UL (ref 4–5.2)
SODIUM BLD-SCNC: 138 MMOL/L (ref 136–145)
WBC # BLD: 11.9 K/UL (ref 4–11)

## 2022-11-23 PROCEDURE — 6360000002 HC RX W HCPCS: Performed by: INTERNAL MEDICINE

## 2022-11-23 PROCEDURE — 6370000000 HC RX 637 (ALT 250 FOR IP): Performed by: INTERNAL MEDICINE

## 2022-11-23 PROCEDURE — 92526 ORAL FUNCTION THERAPY: CPT

## 2022-11-23 PROCEDURE — 2580000003 HC RX 258: Performed by: INTERNAL MEDICINE

## 2022-11-23 PROCEDURE — 84145 PROCALCITONIN (PCT): CPT

## 2022-11-23 PROCEDURE — 36415 COLL VENOUS BLD VENIPUNCTURE: CPT

## 2022-11-23 PROCEDURE — 2060000000 HC ICU INTERMEDIATE R&B

## 2022-11-23 PROCEDURE — C9113 INJ PANTOPRAZOLE SODIUM, VIA: HCPCS | Performed by: INTERNAL MEDICINE

## 2022-11-23 PROCEDURE — 99232 SBSQ HOSP IP/OBS MODERATE 35: CPT | Performed by: PSYCHIATRY & NEUROLOGY

## 2022-11-23 PROCEDURE — 86140 C-REACTIVE PROTEIN: CPT

## 2022-11-23 PROCEDURE — 97116 GAIT TRAINING THERAPY: CPT

## 2022-11-23 PROCEDURE — 90935 HEMODIALYSIS ONE EVALUATION: CPT

## 2022-11-23 PROCEDURE — 85025 COMPLETE CBC W/AUTO DIFF WBC: CPT

## 2022-11-23 PROCEDURE — 97129 THER IVNTJ 1ST 15 MIN: CPT

## 2022-11-23 PROCEDURE — 99233 SBSQ HOSP IP/OBS HIGH 50: CPT | Performed by: INTERNAL MEDICINE

## 2022-11-23 PROCEDURE — 80048 BASIC METABOLIC PNL TOTAL CA: CPT

## 2022-11-23 PROCEDURE — 97530 THERAPEUTIC ACTIVITIES: CPT

## 2022-11-23 RX ORDER — LANOLIN ALCOHOL/MO/W.PET/CERES
5 CREAM (GRAM) TOPICAL ONCE
Status: COMPLETED | OUTPATIENT
Start: 2022-11-23 | End: 2022-11-23

## 2022-11-23 RX ADMIN — ISOSORBIDE MONONITRATE 30 MG: 30 TABLET, EXTENDED RELEASE ORAL at 10:59

## 2022-11-23 RX ADMIN — TORSEMIDE 120 MG: 20 TABLET ORAL at 21:04

## 2022-11-23 RX ADMIN — DEXAMETHASONE SODIUM PHOSPHATE 6 MG: 10 INJECTION INTRAMUSCULAR; INTRAVENOUS at 20:55

## 2022-11-23 RX ADMIN — SODIUM CHLORIDE, PRESERVATIVE FREE 10 ML: 5 INJECTION INTRAVENOUS at 11:19

## 2022-11-23 RX ADMIN — PANTOPRAZOLE SODIUM 40 MG: 40 INJECTION, POWDER, FOR SOLUTION INTRAVENOUS at 10:58

## 2022-11-23 RX ADMIN — TORSEMIDE 120 MG: 20 TABLET ORAL at 10:58

## 2022-11-23 RX ADMIN — Medication 1000 UNITS: at 10:58

## 2022-11-23 RX ADMIN — LOSARTAN POTASSIUM 50 MG: 25 TABLET, FILM COATED ORAL at 10:59

## 2022-11-23 RX ADMIN — ONDANSETRON 4 MG: 2 INJECTION INTRAMUSCULAR; INTRAVENOUS at 08:38

## 2022-11-23 RX ADMIN — NORTRIPTYLINE HYDROCHLORIDE 10 MG: 10 CAPSULE ORAL at 21:15

## 2022-11-23 RX ADMIN — METOPROLOL SUCCINATE 25 MG: 25 TABLET, EXTENDED RELEASE ORAL at 10:58

## 2022-11-23 RX ADMIN — GABAPENTIN 200 MG: 100 CAPSULE ORAL at 20:54

## 2022-11-23 RX ADMIN — ROSUVASTATIN CALCIUM 20 MG: 20 TABLET, FILM COATED ORAL at 20:55

## 2022-11-23 RX ADMIN — ONDANSETRON 4 MG: 2 INJECTION INTRAMUSCULAR; INTRAVENOUS at 22:02

## 2022-11-23 RX ADMIN — SPIRONOLACTONE 25 MG: 25 TABLET ORAL at 10:59

## 2022-11-23 RX ADMIN — SODIUM CHLORIDE, PRESERVATIVE FREE 10 ML: 5 INJECTION INTRAVENOUS at 20:55

## 2022-11-23 RX ADMIN — HYDROCHLOROTHIAZIDE 12.5 MG: 25 TABLET ORAL at 10:59

## 2022-11-23 RX ADMIN — LEVOTHYROXINE SODIUM 150 MCG: 0.15 TABLET ORAL at 11:00

## 2022-11-23 RX ADMIN — ROPINIROLE HYDROCHLORIDE 0.25 MG: 0.25 TABLET, FILM COATED ORAL at 20:55

## 2022-11-23 RX ADMIN — ALLOPURINOL 100 MG: 100 TABLET ORAL at 10:59

## 2022-11-23 RX ADMIN — PANTOPRAZOLE SODIUM 40 MG: 40 INJECTION, POWDER, FOR SOLUTION INTRAVENOUS at 20:55

## 2022-11-23 RX ADMIN — MELATONIN TAB 3 MG 4.5 MG: 3 TAB at 23:37

## 2022-11-23 ASSESSMENT — PAIN SCALES - WONG BAKER
WONGBAKER_NUMERICALRESPONSE: 0

## 2022-11-23 ASSESSMENT — PAIN SCALES - GENERAL
PAINLEVEL_OUTOF10: 0

## 2022-11-23 NOTE — CARE COORDINATION
CM called/faxed referral to [facility] with daughter's approval. Had to leave  for Luz Marina Messer at [facility]: [facility] will follow patient    Electronically signed by Rozina El RN on 11/23/2022 at 3:30 PM .

## 2022-11-23 NOTE — DIALYSIS
Multiple attempts made to reach patient's RN for report; no call back or answer. Patient placed in transport for HD treatment. [facility] notified.

## 2022-11-23 NOTE — PROGRESS NOTES
April Parcel  Neurology Follow-up  [facility] Neurology    Date of Service: 11/23/2022    Subjective:   CC: Follow up today regarding: Acute encephalopathy    Events noted. Chart and lab reviewed. The patient is awake and alert. She is back to her baseline. No headache, dysphagia or dysarthria, weakness or numbness or tingling. MRI showed no acute lesion. Other review of system was unremarkable. ROS : A 10-12 system review obtained and updated today and is unremarkable except as mentioned  in my interval history. family history includes Cancer in her maternal grandmother and mother; No Known Problems in her paternal grandfather and paternal grandmother; Stomach Cancer in her brother; Stroke in her father.     Past Medical History:   Diagnosis Date    Arthritis     CAD (coronary artery disease)     Diabetes ([facility] [address]     Hemodialysis patient ([facility] [address]     Hyperlipidemia     Hypertension     Kidney disease     Neuropathy     Pneumonia     IN February, 2021, with COVID    Thyroid disease      Current Facility-Administered Medications   Medication Dose Route Frequency Provider Last Rate Last Admin    sodium chloride flush 0.9 % injection 5-40 mL  5-40 mL IntraVENous 2 times per day Cinthia Saavedra MD   10 mL at 11/23/22 1119    sodium chloride flush 0.9 % injection 5-40 mL  5-40 mL IntraVENous PRN Cinthia Saavedra MD        0.9 % sodium chloride infusion  25 mL IntraVENous PRN Cinthia Saavedra MD        pantoprazole (PROTONIX) injection 40 mg  40 mg IntraVENous BID Cinthia Saavedra MD   40 mg at 11/23/22 1058    insulin lispro (HUMALOG) injection vial 0-4 Units  0-4 Units SubCUTAneous TID  Diana Knight MD        insulin lispro (HUMALOG) injection vial 0-4 Units  0-4 Units SubCUTAneous Nightly Diana Knight MD        0.9 % sodium chloride infusion   IntraVENous PRN Diana Knight MD        ondansetron (ZOFRAN-ODT) disintegrating tablet 4 mg  4 mg Oral Q8H PRN Diana Knight MD        Or ondansetron (ZOFRAN) injection 4 mg  4 mg IntraVENous Q6H PRN Cirilo Lala MD   4 mg at 11/23/22 5016    acetaminophen (TYLENOL) tablet 650 mg  650 mg Oral Q6H PRN Cirilo Lala MD        Or    acetaminophen (TYLENOL) suppository 650 mg  650 mg Rectal Q6H PRN Cirilo Lala MD        [facility] [place] by provider] clopidogrel (PLAVIX) tablet 75 mg  75 mg Oral Daily Cirilo Lala MD        allopurinol (ZYLOPRIM) tablet 100 mg  100 mg Oral Daily Cirilo Lala MD   100 mg at 11/23/22 1059    metoprolol succinate (TOPROL XL) extended release tablet 25 mg  25 mg Oral Daily Cirilo Lala MD   25 mg at 11/23/22 1058    spironolactone (ALDACTONE) tablet 25 mg  25 mg Oral Daily Cirilo Lala MD   25 mg at 11/23/22 1059    rosuvastatin (CRESTOR) tablet 20 mg  20 mg Oral Nightly Cirilo Lala MD        aspirin EC tablet 81 mg  81 mg Oral Once Cirilo Lala MD        nortriptyline (PAMELOR) capsule 10 mg  10 mg Oral Nightly Cirilo Lala MD        Vitamin D (CHOLECALCIFEROL) tablet 1,000 Units  1,000 Units Oral Daily Cirilo Lala MD   1,000 Units at 11/23/22 1058    lidocaine 4 % external patch 1 patch  1 patch TransDERmal Daily Cirilo Lala MD        gabapentin (NEURONTIN) capsule 200 mg  200 mg Oral Nightly Cirilo Lala MD        rOPINIRole (REQUIP) tablet 0.25 mg  0.25 mg Oral Nightly Cirilo Lala MD        levothyroxine (SYNTHROID) tablet 150 mcg  150 mcg Oral QAM AC Cirilo Lala MD   150 mcg at 11/23/22 1100    isosorbide mononitrate (IMDUR) extended release tablet 30 mg  30 mg Oral Daily Cirilo Lala MD   30 mg at 11/23/22 1059    baclofen (LIORESAL) tablet 10 mg  10 mg Oral TID PRN Cirilo Lala MD        torsemide [facility]) tablet 120 mg  120 mg Oral BID Cirilo Lala MD   120 mg at 11/23/22 1058    morphine (PF) injection 1 mg  1 mg IntraVENous Q4H PRN Cirilo Lala MD   1 mg at 11/21/22 2220    hydroCHLOROthiazide (HYDRODIURIL) tablet 12.5 mg  12.5 mg Oral Daily Elver Dwyer Venessa Walter MD   12.5 mg at 11/23/22 1059    And    losartan (COZAAR) tablet 50 mg  50 mg Oral Daily Kendal Castrejon MD   50 mg at 11/23/22 1059    hydrALAZINE (APRESOLINE) injection 10 mg  10 mg IntraVENous Q6H PRN Kendal Castrejon MD   10 mg at 11/21/22 2347    0.9 % sodium chloride bolus  500 mL IntraVENous PRN Kendal Castrejon MD        dexamethasone (PF) (DECADRON) injection 6 mg  6 mg IntraVENous Q24H Kendal Castrejon MD   6 mg at 11/22/22 1815    glucose-vitamin C chewable tablet 4 tablet  4 tablet Oral PRN Kendal Castrejon MD        dextrose bolus 10% 125 mL  125 mL IntraVENous PRN Kendal Castrejon MD        Or    dextrose bolus 10% 250 mL  250 mL IntraVENous PRN Kendal Castrejon MD        glucagon (rDNA) injection 1 mg  1 mg SubCUTAneous PRN Kendal Castrejon MD        dextrose 10 % infusion   IntraVENous Continuous PRN Kendal Castrejon MD         Allergies   Allergen Reactions    Amoxicillin Anaphylaxis    Demeclocycline Anaphylaxis    Penicillins Anaphylaxis    Tetracyclines & Related Anaphylaxis    Ozempic (0.25 Or 0.5 Mg-Dose) [Semaglutide(0.25 Or 0.5mg-Dos)] Other (See Comments)     Lost large amount weight and loss  Of appetite    Codeine Itching and Rash      reports that she quit smoking about 44 years ago. Her smoking use included cigarettes. She has a 5.00 pack-year smoking history. She has never used smokeless tobacco. She reports that she does not currently use alcohol. She reports that she does not use drugs. Objective:  Exam:   Constitutional:   Vitals:    11/22/22 2003 11/23/22 0400 11/23/22 0410 11/23/22 1058   BP: (!) 151/62  (!) 148/57 (!) 166/71   Pulse:    85   Resp: 16  14    Temp: 98.7 °F (37.1 °C) 97.4 °F (36.3 °C) 97.4 °F (36.3 °C)    TempSrc: Temporal  Temporal    SpO2:   97%    Weight:       Height:         General appearance:  Normal development and appear in no acute distress. Mental Status:   Oriented to person, place, problem, and time. Memory: Good immediate recall. Intact remote memory  Normal attention span and concentration. Language: intact naming, repeating and fluency   Good fund of Knowledge. Cranial Nerves:   II: Visual fields: Full. Pupils: equal, round, reactive to light  III,IV,VI: Extra Ocular Movements are intact. No nystagmus  V: Facial sensation is intact  VII: Facial strength and movements: intact and symmetric  IX: Palate elevation is symmetric  XI: Shoulder shrug is intact  XII: Tongue movements are normal  Musculoskeletal: 5/5 in all 4 extremities. Tone: Normal tone. Reflexes: Symmetric 2+ in both arms and legs. Coordination: no pronator drift, no dysmetria with FNF  Sensation: normal to all modalities in both arms and legs. Gait/Posture: steady gait        Data:  LABS:   Lab Results   Component Value Date/Time     11/23/2022 05:01 AM    K 5.1 11/23/2022 05:01 AM    K 4.7 11/21/2022 04:15 AM    CL 92 11/23/2022 05:01 AM    CO2 23 11/23/2022 05:01 AM    BUN 92 11/23/2022 05:01 AM    CREATININE 8.5 11/23/2022 05:01 AM    GFRAA 8 01/20/2022 06:16 AM    LABGLOM 4 11/23/2022 05:01 AM    GLUCOSE 144 11/23/2022 05:01 AM    PHOS 6.9 09/24/2021 07:50 AM    MG 2.70 11/20/2022 12:09 PM    CALCIUM 9.4 11/23/2022 05:01 AM     Lab Results   Component Value Date/Time    WBC 11.9 11/23/2022 05:01 AM    RBC 3.38 11/23/2022 05:01 AM    HGB 10.7 11/23/2022 05:01 AM    HCT 33.3 11/23/2022 05:01 AM    MCV 98.7 11/23/2022 05:01 AM    RDW 16.0 11/23/2022 05:01 AM     11/23/2022 05:01 AM     Lab Results   Component Value Date    INR 1.03 09/01/2021    PROTIME 11.7 09/01/2021       Neuroimaging was independently reviewed by me and discussed results with the patient  I reviewed blood testing and other test results and discussed results with the patient      Impression:  Acute and persistent delirium and encephalopathy seems to be improving. Likely combination of COVID-19 admission to metabolic encephalopathy.   Diabetes, not controlled  Hypertension  Hyperlipidemia  End-stage renal disease      Recommendation  MRI result discussed with the patient and family  Continue current supportive care  Speech, PT and OT  Blood pressure control  Insulin sliding scale  DVT and GI prophylaxis  Respiratory support and supportive measures for COVID-19  Can be discharged from neurology once medically stable  No further recommendation  We will sign off           Raoul Gordon MD   [phone]      This dictation was generated by voice recognition computer software. Although all attempts are made to edit the dictation for accuracy, there may be errors in the transcription that are not intended.

## 2022-11-23 NOTE — PROGRESS NOTES
[facility]   Phone: [phone]    Physical Therapy    [] Initial Evaluation            [x] Daily Treatment Note         [] Discharge Summary      Patient: Aakash Narvaez   : 1945   MRN: [id number]   Date of Service:  2022  Admitting Diagnosis: End stage renal disease [facility])  Current Admission Summary: pt not at HD for one week, admitted with confusion and AMS. Pt ESRD and currently on HD  Past Medical History:  has a past medical history of Arthritis, CAD (coronary artery disease), Diabetes ([facility] [address] Hemodialysis patient ([facility] [address] Hyperlipidemia, Hypertension, Kidney disease, Neuropathy, Pneumonia, and Thyroid disease. Past Surgical History:  has a past surgical history that includes Appendectomy; Mastectomy, bilateral; Rotator cuff repair (Left); Cholecystectomy; LAPAROSCOPY INSERTION PERITONEAL CATHETER (N/A, 4/3/2020); Upper gastrointestinal endoscopy (N/A, 2020); Colonoscopy (2020); Hysterectomy; Dialysis Catheter Removal (N/A, 2021); and shunt revision (Left, 2021). Discharge Recommendations: Aakash Narvaez scored a 18/ on the AM-PAC short mobility form. Current research shows that an AM-PAC score of 18 or greater is typically associated with a discharge to the patient's home setting. Based on the patient's AM-PAC score and their current functional mobility deficits, it is recommended that the patient have 2-3 sessions per week of Physical Therapy at d/c to increase the patient's independence. At this time, this patient demonstrates the endurance and safety to discharge home with home physical therapy and a follow up treatment frequency of 2-3x/wk. Please see assessment section for further patient specific details. If patient discharges prior to next session this note will serve as a discharge summary. Please see below for the latest assessment towards goals.     DME Required For Discharge: rolling walker  Precautions/Restrictions: high fall risk  Weight Bearing Restrictions: no restrictions  [] Right Upper Extremity  [] Left Upper Extremity [] Right Lower Extremity  [] Left Lower Extremity     Required Braces/Orthotics: no braces required   [] Right  [] Left  Positional Restrictions:no positional restrictions    Pre-Admission Information   Lives With: alone                     Type of Home: apartment- pt in the [address] [place] [address] [place]: one level  Home Access: level entry  Bathroom Layout: walk in shower  Bathroom Equipment:  no prior equipment  Toilet Height: elevated height  Home Equipment: no prior equipment  Transfer Assistance: Independent without use of device  Ambulation Assistance:Independent without use of device  ADL Assistance: independent with all ADL's  IADL Assistance: independent with homemaking tasks  Active :        [x] Yes                 [] No  Hobbies: pt takes DIANE out to eat  Recent Falls:no falls reported    Information from grandson due to patient's poor historian and impaired cognition. Examination   Vision:   Vision Gross Assessment: WFL  Hearing:   WFL  Posture:   Rounded shoulders   Sensation:   WFL  ROM:   (B) LE AROM WFL  Strength:   (B) LE strength grossly WFL  Therapist Clinical Decision Making (Complexity): medium complexity  Clinical Presentation: evolving      Subjective  General: Patient was in chair finishing up lunch when PT arrived. Pain: 0/10  Pain Interventions: not applicable       Functional Mobility  Bed Mobility  Bed mobility not completed on this date. Comments: Patient in chair upon arrival    Transfers  Sit to stand transfer: stand by assistance  Stand to sit transfer: stand by assistance  Comments: verbal and tactile cues for hand placement with transfers. Some carryover with instructions this visit. Sit >stand from chair x 2. Couch x2.    Ambulation  Assistive Device: no device  Assistance: stand by assistance  Distance: 90 feet + 90 feet   Gait Mechanics: decreased foot clearance, minimal sway, decreased agatha  Comments: Patient initially used RW for 90 feet within room. Patient with increased sway with turning around in room. Second bout of 90 feet no device used. Patient reported feeling good with ambulation. Stair Mobility  Stair mobility not completed on this date. Comments:  Wheelchair Mobility:  No w/c mobility completed on this date. Comments:  Balance  Static Sitting Balance: good: independent with functional balance in unsupported position  Dynamic Sitting Balance: good: independent with functional balance in unsupported position  Static Standing Balance: fair (+): maintains balance at SBA/supervision without use of UE support  Dynamic Standing Balance: fair (+): maintains balance at SBA/supervision without use of UE support  Comments:    Other Therapeutic Interventions  Brief change and pericare completed this visit.    Functional Outcomes  AM-PAC Inpatient Mobility Raw Score : 18              Cognition  Overall Cognitive Status: Impaired  Arousal/Alterness: appropriate responses to stimuli  Following Commands: follows one step commands consistently  Memory: decreased recall of recent events  Safety Judgement: decreased awareness of need for assistance, decreased awareness of need for safety  Problem Solving: assistance required to generate solutions, assistance required to implement solutions, decreased awareness of errors  Insights: decreased awareness of deficits  Initiation: requires cues for some  Sequencing: requires cues for some  Orientation:    alert and oriented x 4  Command Following:   impaired    Education  Barriers To Learning: cognition  Patient Education: patient educated on PT role and benefits, plan of care, functional mobility training, proper use of assistive device/equipment, transfer training, discharge recommendations  Learning Assessment:  patient will require reinforcement due to cognitive deficits    Assessment  Activity Tolerance: Patient tolerated treatment well with no reports of increased pain. Patient enjoyed ambulating in room with no reports of fatigue. Impairments Requiring Therapeutic Intervention: decreased functional mobility, decreased ADL status, decreased strength, decreased endurance, decreased balance  Prognosis: good  Clinical Assessment: Patient presents with above deficits and is currently below baseline function. Per nurse, patient's cognition improved as of today. Patient was slightly confused today but has improved since last seen yesterday. Patient still with  decreased insight into her current deficits. Patient would benefit from continued skilled therapy in order to return to PLOF and increase safety with mobility. Recommend discharge home with 24/7 assistance from her daughter, HHPT, use of RW for longer distances or when fatigued. Safety Interventions: patient left in chair, chair alarm in place, call light within reach, gait belt, patient at risk for falls, and nurse notified    Plan  Frequency: 3-5 x/per week  Current Treatment Recommendations: strengthening, ROM, balance training, functional mobility training, transfer training, gait training, stair training, endurance training, and neuromuscular re-education    Goals  Patient Goals: Patient did not state goals    Short Term Goals:  Time Frame: Discharge   Patient will complete bed mobility at stand by assistance   Patient will complete transfers at stand by assistance - MET  Patient will ambulate 50 ft with use of LRAD at contact guard assistance- MET  Patient will ascend/descend 10 stairs with (B) handrail at contact guard assistance  Patient to maintain standing at contact guard assistance for 5 minutes. 2 goals met this date- 11/23  New goals:   Patient will complete transfers at independent. Patient will ambulate 300 feet with use of LRAD at stand by assistance.      Therapy Session Time      Individual Group Co-treatment   Time In 5408       Time Out 0224       Minutes 49         Timed Code Treatment Minutes:  49 Minutes  Total Treatment Minutes:  [address] [place], [place]   Electronically Signed By: Asuncion Bah PT  This evaluation/treatment was observed and supervised by Asuncion Bah, [address] [place] [address]

## 2022-11-23 NOTE — PROGRESS NOTES
Facility/Department: [facility] ICU  Speech Language Pathology   Dysphagia and Speech Language/Cognitive Treatment Note    Patient: Tonya Escobar   : 1945   MRN: [id number]      Evaluation Date: 2022      Admitting Dx: End stage renal disease ([facility] [address] [N18.6]  Polypharmacy [Z79.899]  ESRD (end stage renal disease) ([facility] [address] [N18.6]  Exposure to carbon monoxide [U34.32]  Metabolic acidosis with respiratory alkalosis [E87.20, E87.3]  Treatment Diagnosis: Cognitive-Linguistic Deficits , Speech Language Deficits , Oropharyngeal Dysphagia   Pain: Did not state                                                Subjective:  Pt sitting upright in bed on cell phone with overall improved awareness, communication and swallow function. Dysphagia Treatment:   Diet and Treatment Recommendations 2022:  Diet Solids Recommendation:  Regular texture diet  Liquid Consistency Recommendation: Thin liquids  Recommended form of Meds: Meds whole with water or Meds in puree        Compensatory strategies: Upright as possible with all PO intake , To be determined     Assessment of Texture Tolerance:  Diet level prior to treatment: NPO , NPO  Tolerance of Current Diet Level:N/A     -Impressions: Pt was positioned Upright in bed , awake and alert texting on phone. Significant improvement in alertness noted from prior dates. Currently on  2L O2 via nasal cannula . Oral mech exam revealed grossly WFL. Trials of thin liquids, puree , soft solids, and regular solids  were provided to assess swallow function. Pt tolerated small sips of water via cup and straw with out any episodes of coughing or change in vocal quality, but observed mildly decreased laryngeal elevation via manual palpation. With soft solid and regular solid consistency pt demonstrated prolonged but functional mastication, timely A-P transit, adequate swallow initiation and good oral clearance.  Pt did not present with any overt clinical s/s of aspiration during PO trials. Pt demonstrates increased risk for aspiration due to co morbidities  and recent cognitive status. Based on today's limited assessment recommend initiation of Regular texture diet , with thin liquids Meds whole with water or Meds in puree  with use of compensatory swallow strategies (see above). Recommend close diet tolerance monitoring and ensure Pt is fully awake prior to PO intake. Dysphagia Goals:  Pt will functionally tolerate ongoing assessment of swallow function with diet to be determined as indicated (Ongoing 11/23/22)  Pt will demonstrate understanding of aspiration risk and precautions via education/demonstration with occasional prompting (Ongoing 11/23/22)      Speech Language/Cognitive Treatment:  Impressions: Pt upright in bed awake and alert on cell phone, with improved participation in conversation and increased initiation of verbal expression. This date goals were targeted via verbal expression, auditory comprehension, and memory. Pt with improved accuracy in responses to all basic questions and conversations throughout session. Pt demonstrated minor inaccuracies of timeline of events since admission, but was receptive to corrections. Pt followed all steps required for oral mech exam and PO trials with 100% accuracy without repetition of prompts. Pt discussed specific family members' names, ages and where they live. Based on today's assessment, pt would continue to benefit from SLP intervention to target ongoing assessment and maximize functional communication.      Speech Language Short Term Goals:  Pt will improve auditory processing/comprehension of commands and questions to 80%, via graded tasks (Ongoing 11/23/22)  Pt will improve orientation and short term recall via graded tasks to 80% (Ongoing 11/23/22)  Pt will improve verbal expression for functional expression via graded tasks to 80% (Ongoing 11/23/22)  Pt will participate in ongoing cognitive assessment with goals to be established as indicated (Ongoing 11/23/22)    Assessment: Patient progressing toward goals    Plan: 3-5 times per week during acute care stay. Patient/Family Education:  Provided education regarding role of SLP, recommendations and general speech pathology plan of care. [x] Pt verbalized understanding and agreement   [x] Pt requires ongoing learning   [] No evidence of comprehension     Discharge Recommendations:  Discharge recommendations to be determined pending ongoing follow-up during acute care stay    Treatment time  Timed Code Treatment Minutes: 10 minutes  Total Treatment time: 24  minutes     If patient discharges prior to next session this note will serve as a discharge summary. Signature: Sary Sampson, B.S.  Clinician     The speech-language pathologist was present, directed the patient's care, made skilled judgment and was responsible for assessment and treatment.     Jennyfer Sage M.A., [address] [place] [address]  Speech-Language Pathologist

## 2022-11-23 NOTE — PROGRESS NOTES
PIV leaking when flushed this AM. Removed per protocol. Pt has limb restriction; no bp/sticks on NOHEMI. Contacted PICC nurse to place new PIV. Awaiting response at this time. Will monitor.

## 2022-11-23 NOTE — PROGRESS NOTES
Report received from day shift RN. Patient sitting up in the chair watching television. No signs of discomfort or distress. Bedside table and call light within reach. White board updated. Will continue to monitor patient. Piper Hinojosa RN    11:23 PM  Shift assessment complete. (See findings in flowsheet). Med pass complete. (See MAR). VSS. Patient with no complaints at this time. Necklace found in MRI department and returned to patient. Piper Hinojosa RN     1:33 AM  Patient resting quietly in bed with eyes closed. No apparent needs at this time.  Piper Hinojosa RN

## 2022-11-23 NOTE — PROGRESS NOTES
Gastroenterology Progress Note            Claudia Acuña is a 67 y.o. female patient. Principal Problem:    End stage renal disease ([facility] [address]  Active Problems:    Encephalopathy, metabolic    HTN (hypertension), benign    Hyperlipidemia    Diabetes mellitus type 2 in obese ([facility] [address]    COVID  Resolved Problems:    * No resolved hospital problems. *      SUBJECTIVE:  Awake and alert. No abdominal pain. Some nausea but no vomiting. ROS:    Gastrointestinal ROS: no abdominal pain, change in bowel habits, or black or bloody stools. Cardiovascular ROS: negative  Respiratory ROS: negative    Physical    VITALS:  BP (!) 166/71   Pulse 85   Temp 97.4 °F (36.3 °C) (Temporal)   Resp 14   Ht 5' 5\" (1.651 m)   Wt 184 lb 15.5 oz (83.9 kg)   LMP  (LMP Unknown)   SpO2 97%   BMI 30.78 kg/m²   TEMPERATURE:  Current - Temp: 97.4 °F (36.3 °C); Max - Temp  Av.9 °F (36.6 °C)  Min: 97.4 °F (36.3 °C)  Max: 98.7 °F (37.1 °C)    NAD  RRR, Nl s1s2  Lungs CTA Bilaterally, normal effort  Abdomen soft, ND, NT, no HSM, Bowel sounds normal.    Data    Data Review:    Recent Labs     22  0501   WBC 14.2* 11.8* 11.9*   HGB 11.2* 10.4* 10.7*   HCT 33.0* 32.1* 33.3*   MCV 95.2 96.8 98.7    375 441       Recent Labs     220 22  0501    139 138   K 4.7 4.7 5.1   CL 94* 95* 92*   CO2    BUN 50* 56* 92*   CREATININE 7.5* 6.8* 8.5*       Recent Labs     22   AST 23   ALT 18   BILITOT 0.6   ALKPHOS 182*       No results for input(s): LIPASE, AMYLASE in the last 72 hours. No results for input(s): PROTIME, INR in the last 72 hours. No results for input(s): PTT in the last 72 hours. Radiology Review:  Abdomen Xray - nonspecific bowel gas. ASSESSMENT :  Bloody NG tube aspirate/UGI hemorrhage - she alert and oriented today. Denies abdominal pain to me and exam is benign. She is on Plavix and ASA and receives HD for ESRD. She is COVID (+). She may have had an ileus from COVID infection, and perhaps self-limited bleeding due to NG trauma. She has normal VS and stable Hb. OK to restart ASA and Plavix and change Protonix IV infusion to bolus. PLAN  :  1) Defer EGD since no bleeding  2) daily CBC  3) Protonix IV bolus BID  4) diet as tolerated    Discussed with Dr. Banks Speaker, PA-C  [address] [place] [address]  I have personally performed a face to face diagnostic evaluation on this patient. I have interviewed and examined the patient and I agree with the findings and recommended plan of care. In summary, my findings and plan are the following: As above, continues to improve. MRI shows no CVA. Suspect she had TIA causing her neuro decline since now resolved. No EGD needed in house since no bleeding. I gave her my card to call if bleeding (melena or hematemesis) recurs and we will proceed with EGD then. OK to discharge from GI standpoint. Will sign off. Please call with questions.     Hai Jose, [address] [place] [address]  11/23/2022

## 2022-11-24 VITALS
WEIGHT: 172.4 LBS | HEIGHT: 65 IN | OXYGEN SATURATION: 98 % | RESPIRATION RATE: 16 BRPM | TEMPERATURE: 97.8 F | DIASTOLIC BLOOD PRESSURE: 59 MMHG | HEART RATE: 79 BPM | SYSTOLIC BLOOD PRESSURE: 124 MMHG | BODY MASS INDEX: 28.72 KG/M2

## 2022-11-24 LAB
ALBUMIN SERPL-MCNC: 4.2 G/DL (ref 3.4–5)
ALP BLD-CCNC: 126 U/L (ref 40–129)
ALT SERPL-CCNC: 11 U/L (ref 10–40)
ANION GAP SERPL CALCULATED.3IONS-SCNC: 17 MMOL/L (ref 3–16)
AST SERPL-CCNC: 16 U/L (ref 15–37)
BASOPHILS ABSOLUTE: 0 K/UL (ref 0–0.2)
BASOPHILS RELATIVE PERCENT: 0.2 %
BILIRUB SERPL-MCNC: 0.7 MG/DL (ref 0–1)
BILIRUBIN DIRECT: <0.2 MG/DL (ref 0–0.3)
BILIRUBIN, INDIRECT: NORMAL MG/DL (ref 0–1)
BUN BLDV-MCNC: 65 MG/DL (ref 7–20)
CALCIUM SERPL-MCNC: 9.5 MG/DL (ref 8.3–10.6)
CHLORIDE BLD-SCNC: 91 MMOL/L (ref 99–110)
CO2: 25 MMOL/L (ref 21–32)
CREAT SERPL-MCNC: 6.5 MG/DL (ref 0.6–1.2)
EOSINOPHILS ABSOLUTE: 0 K/UL (ref 0–0.6)
EOSINOPHILS RELATIVE PERCENT: 0 %
GFR SERPL CREATININE-BSD FRML MDRD: 6 ML/MIN/{1.73_M2}
GLUCOSE BLD-MCNC: 122 MG/DL (ref 70–99)
GLUCOSE BLD-MCNC: 139 MG/DL (ref 70–99)
GLUCOSE BLD-MCNC: 147 MG/DL (ref 70–99)
HCT VFR BLD CALC: 32.3 % (ref 36–48)
HEMOGLOBIN: 10.6 G/DL (ref 12–16)
LYMPHOCYTES ABSOLUTE: 0.6 K/UL (ref 1–5.1)
LYMPHOCYTES RELATIVE PERCENT: 6.2 %
MCH RBC QN AUTO: 31.7 PG (ref 26–34)
MCHC RBC AUTO-ENTMCNC: 32.7 G/DL (ref 31–36)
MCV RBC AUTO: 96.9 FL (ref 80–100)
MONOCYTES ABSOLUTE: 0.3 K/UL (ref 0–1.3)
MONOCYTES RELATIVE PERCENT: 3 %
NEUTROPHILS ABSOLUTE: 9.4 K/UL (ref 1.7–7.7)
NEUTROPHILS RELATIVE PERCENT: 90.6 %
PDW BLD-RTO: 16 % (ref 12.4–15.4)
PERFORMED ON: ABNORMAL
PERFORMED ON: ABNORMAL
PLATELET # BLD: 392 K/UL (ref 135–450)
PMV BLD AUTO: 7.6 FL (ref 5–10.5)
POTASSIUM SERPL-SCNC: 4.8 MMOL/L (ref 3.5–5.1)
RBC # BLD: 3.34 M/UL (ref 4–5.2)
SODIUM BLD-SCNC: 133 MMOL/L (ref 136–145)
TOTAL PROTEIN: 7.5 G/DL (ref 6.4–8.2)
WBC # BLD: 10.4 K/UL (ref 4–11)

## 2022-11-24 PROCEDURE — 85025 COMPLETE CBC W/AUTO DIFF WBC: CPT

## 2022-11-24 PROCEDURE — 80048 BASIC METABOLIC PNL TOTAL CA: CPT

## 2022-11-24 PROCEDURE — 99232 SBSQ HOSP IP/OBS MODERATE 35: CPT | Performed by: HOSPITALIST

## 2022-11-24 PROCEDURE — 2580000003 HC RX 258: Performed by: INTERNAL MEDICINE

## 2022-11-24 PROCEDURE — 80076 HEPATIC FUNCTION PANEL: CPT

## 2022-11-24 PROCEDURE — C9113 INJ PANTOPRAZOLE SODIUM, VIA: HCPCS | Performed by: INTERNAL MEDICINE

## 2022-11-24 PROCEDURE — 36415 COLL VENOUS BLD VENIPUNCTURE: CPT

## 2022-11-24 PROCEDURE — 6360000002 HC RX W HCPCS: Performed by: INTERNAL MEDICINE

## 2022-11-24 PROCEDURE — 6370000000 HC RX 637 (ALT 250 FOR IP): Performed by: INTERNAL MEDICINE

## 2022-11-24 RX ORDER — DEXAMETHASONE 2 MG/1
2 TABLET ORAL 2 TIMES DAILY WITH MEALS
Qty: 10 TABLET | Refills: 0 | Status: SHIPPED | OUTPATIENT
Start: 2022-11-24 | End: 2022-11-29

## 2022-11-24 RX ORDER — TORSEMIDE 20 MG/1
120 TABLET ORAL 2 TIMES DAILY
Qty: 180 TABLET | Refills: 0
Start: 2022-11-24

## 2022-11-24 RX ADMIN — Medication 1000 UNITS: at 08:39

## 2022-11-24 RX ADMIN — METOPROLOL SUCCINATE 25 MG: 25 TABLET, EXTENDED RELEASE ORAL at 08:39

## 2022-11-24 RX ADMIN — LEVOTHYROXINE SODIUM 150 MCG: 0.15 TABLET ORAL at 06:04

## 2022-11-24 RX ADMIN — PANTOPRAZOLE SODIUM 40 MG: 40 INJECTION, POWDER, FOR SOLUTION INTRAVENOUS at 08:39

## 2022-11-24 RX ADMIN — HYDROCHLOROTHIAZIDE 12.5 MG: 25 TABLET ORAL at 08:39

## 2022-11-24 RX ADMIN — ALLOPURINOL 100 MG: 100 TABLET ORAL at 08:39

## 2022-11-24 RX ADMIN — SPIRONOLACTONE 25 MG: 25 TABLET ORAL at 08:38

## 2022-11-24 RX ADMIN — ISOSORBIDE MONONITRATE 30 MG: 30 TABLET, EXTENDED RELEASE ORAL at 08:39

## 2022-11-24 RX ADMIN — SODIUM CHLORIDE, PRESERVATIVE FREE 10 ML: 5 INJECTION INTRAVENOUS at 08:39

## 2022-11-24 RX ADMIN — TORSEMIDE 120 MG: 20 TABLET ORAL at 08:38

## 2022-11-24 RX ADMIN — LOSARTAN POTASSIUM 50 MG: 25 TABLET, FILM COATED ORAL at 08:39

## 2022-11-24 ASSESSMENT — PAIN SCALES - WONG BAKER
WONGBAKER_NUMERICALRESPONSE: 0

## 2022-11-24 ASSESSMENT — PAIN SCALES - GENERAL
PAINLEVEL_OUTOF10: 0

## 2022-11-24 NOTE — PROGRESS NOTES
Office : [phone]     Fax :[phone]       Nephrology  progress Note      Patient's Name: Alejandrina Rooney  9:42 AM  11/24/2022    Reason for Consult:  ESRD       Requesting Physician:  Yon Somers MD      Chief Complaint:    Chief Complaint   Patient presents with    Altered Mental Status     Pt from assisted living, was seen yesterday for back pain, now altered, did not finish dialysis yesterday and skipped Thursday. Pt alert to verbal but will not answer questions. History of Present iIlness:      Alejandrina Rooney is a 67 y.o. female with prior history of diabetic nephropathy on dialysis who was admitted with main c/o back pain. She was also looking drowsy when admitted. Had urgent HD yesterday for 2 hrs. She was positive for COVID 19 infection . Interval hx       She is feeling better and wants to go  home  NG tube  removed   Doing much better today           I/O last 3 completed shifts:  In: -   Out: 300 [Urine:300]    Past Medical History:   Diagnosis Date    Arthritis     CAD (coronary artery disease)     Diabetes ([facility] [address]     Hemodialysis patient ([facility] [address]     Hyperlipidemia     Hypertension     Kidney disease     Neuropathy     Pneumonia     IN February, 2021, with COVID    Thyroid disease        Past Surgical History:   Procedure Laterality Date    APPENDECTOMY      CHOLECYSTECTOMY      COLONOSCOPY  11/24/2020    COLONOSCOPY POLYPECTOMY SNARE/COLD BIOPSY performed by Fady Rock MD at [place] [address] 5/17/2021    PERITONEAL DIALYSIS CATHETER REMOVAL.  EXCISSION OF ABDOMINAL CITRIX. performed by Kee Gipson DO at [address] (CERVIX STATUS UNKNOWN)      FULL    LAPAROSCOPY INSERTION PERITONEAL CATHETER N/A Daily  spironolactone (ALDACTONE) tablet 25 mg, Daily  rosuvastatin (CRESTOR) tablet 20 mg, Nightly  aspirin EC tablet 81 mg, Once  nortriptyline (PAMELOR) capsule 10 mg, Nightly  Vitamin D (CHOLECALCIFEROL) tablet 1,000 Units, Daily  lidocaine 4 % external patch 1 patch, Daily  gabapentin (NEURONTIN) capsule 200 mg, Nightly  rOPINIRole (REQUIP) tablet 0.25 mg, Nightly  levothyroxine (SYNTHROID) tablet 150 mcg, QAM AC  isosorbide mononitrate (IMDUR) extended release tablet 30 mg, Daily  baclofen (LIORESAL) tablet 10 mg, TID PRN  torsemide (DEMADEX) tablet 120 mg, BID  morphine (PF) injection 1 mg, Q4H PRN  hydroCHLOROthiazide (HYDRODIURIL) tablet 12.5 mg, Daily   And  losartan (COZAAR) tablet 50 mg, Daily  hydrALAZINE (APRESOLINE) injection 10 mg, Q6H PRN  0.9 % sodium chloride bolus, PRN  dexamethasone (PF) (DECADRON) injection 6 mg, Q24H  glucose-vitamin C chewable tablet 4 tablet, PRN  dextrose bolus 10% 125 mL, PRN   Or  dextrose bolus 10% 250 mL, PRN  glucagon (rDNA) injection 1 mg, PRN  dextrose 10 % infusion, Continuous PRN        Physical exam:     Vitals:  BP (!) 124/59   Pulse 79   Temp 97.8 °F (36.6 °C) (Temporal)   Resp 16   Ht 5' 5\" (1.651 m)   Wt 172 lb 6.4 oz (78.2 kg)   LMP  (LMP Unknown)   SpO2 96%   BMI 28.69 kg/m²   Constitutional:  awake but very weak   Skin: no rash, turgor wnl  Heent:  eomi, mmm  Neck: no bruits or jvd noted  Cardiovascular:  S1, S2 without m/r/g  Respiratory: CTA B without w/r/r  Abdomen:  +bs, soft, nt, nd  Ext: no  lower extremity edema      Labs:  CBC:   Recent Labs     11/22/22 0420 11/23/22  0501 11/24/22  0336   WBC 11.8* 11.9* 10.4   HGB 10.4* 10.7* 10.6*    441 392       BMP:    Recent Labs     11/22/22 0420 11/23/22  0501 11/24/22  0336    138 133*   K 4.7 5.1 4.8   CL 95* 92* 91*   CO2 26 23 25   BUN 56* 92* 65*   CREATININE 6.8* 8.5* 6.5*   GLUCOSE 153* 144* 147*       Ca/Mg/Phos:   Recent Labs     11/22/22  0420 11/23/22  0501 11/24/22  6836 CALCIUM 9.8 9.4 9.5       Hepatic:   Recent Labs     11/24/22  0336   AST 16   ALT 11   BILITOT 0.7   ALKPHOS 126       Troponin:   No results for input(s): TROPONINI in the last 72 hours. BNP: No results for input(s): BNP in the last 72 hours. Lipids: No results for input(s): CHOL, TRIG, HDL, LDLCALC, LABVLDL in the last 72 hours. ABGs: No results for input(s): PHART, PO2ART, CTM5CKN in the last 72 hours. INR: No results for input(s): INR in the last 72 hours. UA:No results for input(s): Laqueta Reach, GLUCOSEU, BILIRUBINUR, Varinder Caldron, BLOODU, PHUR, PROTEINU, UROBILINOGEN, NITRU, LEUKOCYTESUR, LABMICR, URINETYPE in the last 72 hours. Urine Microscopic: No results for input(s): LABCAST, BACTERIA, COMU, HYALCAST, WBCUA, RBCUA, EPIU in the last 72 hours. Urine Culture: No results for input(s): LABURIN in the last 72 hours. Urine Chemistry: No results for input(s): Etheleen Clock, PROTEINUR, NAUR in the last 72 hours. IMAGING:  MRI BRAIN WO CONTRAST   Final Result   Cerebral atrophy. Moderate to severe chronic small vessel ischemic changes. No acute brain parenchymal abnormality. RECOMMENDATIONS:   Unavailable         XR ABDOMEN (2 VIEWS)   Final Result   Nonspecific nonobstructive bowel gas pattern         XR ABDOMEN (KUB) (SINGLE AP VIEW)   Final Result   Status post gastric tube placement along the fundus of the stomach with a   non-specific gas pattern. CT HEAD WO CONTRAST   Final Result   No acute intracranial abnormality. Assessment/Plan :      1. ESRD   Had HD on 11/21 /2022    Zechariah urgent indication for HD on 11/24    Stable renal standpoint    2. HTN  Meds   UF on hd   BP meds     3. COVID 19   Clinically improved   Infection  Mgmt per primary attending     4. Acid- base disorder. Monitor   Correct with HD     5. Electrolytes. Monitor       Stable from nephrology standpoint to discharge home if clinically stable.                Thank you for allowing us to participate in care of Gelene Barthel         Electronically signed by: Cathie Edwards MD, 11/24/2022, 9:42 AM      [facility] [address]  Office : [phone]  Fax :[phone]

## 2022-11-24 NOTE — PROGRESS NOTES
Spoke with CM. Aware that pt lives alone in independent living facility; made aware of new orders for DME and consult for home care needs. Stated that they would be in to see pt shortly prior to discharge.

## 2022-11-24 NOTE — DIALYSIS
Treatment time: 3 hours  Net UF: 1800 ml     Pre weight: 80 kg  Post weight:78.2 kg    Access used: L AVG  Access function: Well with  ml/min with difficult cannulation     Medications or blood products given: n/a     Regular outpatient schedule: MWF     Summary of response to treatment: Patient tolerated treatment well and without any complications. Patient remained stable throughout entire treatment and upon exiting the dialysis suite via transport. Report given to [facility], HELEN and copy of dialysis treatment record placed in chart, to be scanned into EMR.

## 2022-11-24 NOTE — PROGRESS NOTES
Discharge instructions reviewed with patient/responsible adult (daughter). All home medications have been reviewed, questions answered and patient verbalized understanding of medications, follow-up appointments, and HD schedule. Discharge instructions signed and copies given. Patient discharged 11/24/2022 with belongings.

## 2022-11-24 NOTE — PLAN OF CARE
Problem: Discharge Planning  Goal: Discharge to home or other facility with appropriate resources  Outcome: Completed     Problem: Pain  Goal: Verbalizes/displays adequate comfort level or baseline comfort level  Outcome: Completed     Problem: Skin/Tissue Integrity  Goal: Absence of new skin breakdown  Description: 1. Monitor for areas of redness and/or skin breakdown  2. Assess vascular access sites hourly  3. Every 4-6 hours minimum:  Change oxygen saturation probe site  4. Every 4-6 hours:  If on nasal continuous positive airway pressure, respiratory therapy assess nares and determine need for appliance change or resting period. Outcome: Completed     Problem: ABCDS Injury Assessment  Goal: Absence of physical injury  Outcome: Completed     Problem: Safety - Adult  Goal: Free from fall injury  Outcome: Completed     Problem: Confusion  Goal: Confusion, delirium, dementia, or psychosis is improved or at baseline  Description: INTERVENTIONS:  1. Assess for possible contributors to thought disturbance, including medications, impaired vision or hearing, underlying metabolic abnormalities, dehydration, psychiatric diagnoses, and notify attending LIP  2. [place] high risk fall precautions, as indicated  3. Provide frequent short contacts to provide reality reorientation, refocusing and direction  4. Decrease environmental stimuli, including noise as appropriate  5. Monitor and intervene to maintain adequate nutrition, hydration, elimination, sleep and activity  6. If unable to ensure safety without constant attention obtain sitter and review sitter guidelines with assigned personnel  7.  Initiate Psychosocial CNS and Spiritual Care consult, as indicated  Outcome: Completed     Problem: Chronic Conditions and Co-morbidities  Goal: Patient's chronic conditions and co-morbidity symptoms are monitored and maintained or improved  Outcome: Completed

## 2022-11-24 NOTE — DISCHARGE SUMMARY
[facility] -- Physician Discharge Summary     Henrietta Zapata  1945  MRN: [id number]    Admit Date: 11/20/2022  Discharge Date: No discharge date for patient encounter. Attending MD: Edie Collins MD  Discharging MD: Edie Collins MD  PCP: Chaz Lott MD [address] [facility] / [address] [place] [address] [phone]    Admission Diagnosis: End stage renal disease [facility]) [N18.6]  Polypharmacy [Z79.899]  ESRD (end stage renal disease) ([facility] [address] [N18.6]  Exposure to carbon monoxide [I49.99]  Metabolic acidosis with respiratory alkalosis [E87.20, E87.3]  DISCHARGE DIAGNOSIS: same    Full Hospital Problem List:  Active Hospital Problems    Diagnosis Date Noted    Encephalopathy, metabolic [S24.65] 71/92/1772     Priority: Medium    End stage renal disease ([facility] [address] [N18.6] 11/20/2022     Priority: Medium    COVID [U07.1] 02/08/2021    HTN (hypertension), benign [I10] 01/23/2017    Hyperlipidemia [E78.5] 01/23/2017    Diabetes mellitus type 2 in obese ([facility] [address] [E11.69, E66.9] 01/23/2017           Hospital Course:      67 y.o. female who presents to the emergency department from assisted living facility with altered mentation. Patient unable to provide any significant history. Per squad she received 1 hour total of dialysis yesterday and has not had full session of dialysis since last Tuesday. Yesterday patient was in the emergency room for low back pain which is chronic in nature. Given muscle relaxants but was not sent home on opiates. Nephrology was consulted, patient was deemed to be stable for dialysis on Monday.       She is confused in ER  I requested COVID swab to be sent, it turns out to be positive     To be admitted for steroids, isolation for covid    Maintained on dialysis while here      Consults made during Hospitalization:  IP CONSULT TO NEPHROLOGY  IP CONSULT TO NEPHROLOGY  PHARMACY TO DOSE MEDICATION  IP CONSULT TO Jennifer CONSULT TO GI  IP CONSULT TO NEUROLOGY  IP CONSULT TO SOCIAL WORK  IP CONSULT TO HOME CARE NEEDS    Treatment team at time of Discharge: Treatment Team: Attending Provider: Little Bruno MD; Consulting Physician: Mack Giron MD; Consulting Physician: Alex Aguayo MD; Consulting Physician: Cecilia Bolden, RN; Utilization Reviewer: Kitty Mcbride RN; Consulting Physician: Vanessa Boothe MD; Utilization Reviewer: Malena Lambert LPN; Respiratory Therapist (Day): Kendal Roa RCP; Registered Nurse: Genevieve Sauceda RN    Imaging Results:  XR ABDOMEN (KUB) (SINGLE AP VIEW)    Result Date: 11/21/2022  EXAMINATION: ONE SUPINE XRAY VIEW(S) OF THE ABDOMEN 11/20/2022 11:36 pm COMPARISON: None. HISTORY: ORDERING SYSTEM PROVIDED HISTORY: NG placement/distention TECHNOLOGIST PROVIDED HISTORY: Reason for exam:->NG placement/distention FINDINGS: The gas pattern is unremarkable. There is a nasogastric tube in place with the tip and side port along the fundus of the stomach. No urinary calculi are seen. Status post gastric tube placement along the fundus of the stomach with a non-specific gas pattern. CT HEAD WO CONTRAST    Result Date: 11/20/2022  EXAMINATION: CT OF THE HEAD WITHOUT CONTRAST  11/20/2022 12:35 pm TECHNIQUE: CT of the head was performed without the administration of intravenous contrast. Automated exposure control, iterative reconstruction, and/or weight based adjustment of the mA/kV was utilized to reduce the radiation dose to as low as reasonably achievable. COMPARISON: 08/25/2021 HISTORY: ORDERING SYSTEM PROVIDED HISTORY: AMS TECHNOLOGIST PROVIDED HISTORY: Reason for exam:->AMS Has a \"code stroke\" or \"stroke alert\" been called? ->No Decision Support Exception - unselect if not a suspected or confirmed emergency medical condition->Emergency Medical Condition (MA) Reason for Exam: AMS Additional signs and symptoms: Altered Mental Status (Pt from assisted living, was seen yesterday for back pain, now altered, did not finish dialysis yesterday and skipped Thursday. Pt alert to verbal but will not answer questions. ) FINDINGS: BRAIN/VENTRICLES: There is no acute intracranial hemorrhage, mass effect or midline shift. No abnormal extra-axial fluid collection. The gray-white differentiation is maintained without evidence of an acute infarct. There is no evidence of hydrocephalus. ORBITS: The visualized portion of the orbits demonstrate no acute abnormality. SINUSES: The visualized paranasal sinuses and mastoid air cells demonstrate no acute abnormality. SOFT TISSUES/SKULL:  No acute abnormality of the visualized skull or soft tissues. No acute intracranial abnormality. MRI BRAIN WO CONTRAST    Result Date: 11/23/2022  EXAMINATION: MRI OF THE BRAIN WITHOUT CONTRAST  11/22/2022 6:42 pm TECHNIQUE: Multiplanar multisequence MRI of the brain was performed without the administration of intravenous contrast. COMPARISON: 07/18/2020 HISTORY: ORDERING SYSTEM PROVIDED HISTORY: AMS TECHNOLOGIST PROVIDED HISTORY: Reason for exam:->AMS Reason for Exam: AMS Initial evaluation FINDINGS: INTRACRANIAL STRUCTURES/VENTRICLES: There is no acute infarct. The ventricles and cisternal spaces are prominent consistent with cerebral atrophy. There are multiple punctate and confluent areas of high signal in the periventricular white matter and centrum semiovale and central zari that are likely related to chronic small vessel ischemic disease. There is no midline shift or mass effect. There are no areas of restricted diffusion. ORBITS: The visualized portion of the orbits demonstrate no acute abnormality. SINUSES:  The visualized paranasal sinuses and mastoid air cells are for the most part clear. BONES/SOFT TISSUES: The bone marrow signal intensity appears normal. The soft tissues demonstrate no acute abnormality. Cerebral atrophy. Moderate to severe chronic small vessel ischemic changes.  No acute brain parenchymal abnormality. RECOMMENDATIONS: Unavailable     XR ABDOMEN (2 VIEWS)    Result Date: 11/22/2022  EXAMINATION: TWO XRAY VIEWS OF THE ABDOMEN 11/22/2022 9:16 am COMPARISON: None. HISTORY: ORDERING SYSTEM PROVIDED HISTORY: F/u possible ileus TECHNOLOGIST PROVIDED HISTORY: ICU Reason for exam:->F/u possible ileus Reason for Exam: F/u possible ileus FINDINGS: Hazy opacity is seen in the lungs, incompletely imaged. Gaseous distention is seen throughout the abdomen, predominantly involving the colon. No significant small bowel distention noted. Moderate stool load is seen in the colon Calcifications are seen in the pelvis, compatible with phleboliths. There are clips from prior surgery. Nonspecific nonobstructive bowel gas pattern         Discharge Exam:  Constitutional: vitals as above:  appears stated age and cooperative    Head: Normocephalic, without obvious abnormality, atraumatic    Eyes:lids and lashes normal, conjunctivae and sclerae normal and pupils equal, round, reactive to light and accomodation    EMNT: external ears normal, nares midline, +NGT  Neck: no carotid bruit, supple, symmetrical, trachea midline and thyroid not enlarged, symmetric, no tenderness/mass/nodules     Respiratory: clear to auscultation and percussion bilaterally with normal respiratory effort    Cardiovascular: normal rate, regular rhythm, normal S1 and S2 and no murmurs    Gastrointestinal: soft, non-tender, non-distended, normal bowel sounds, no masses or organomegaly    Extremities: no clubbing, no edema    Skin:No rashes or nodules noted.     Neurologic:negative            Disposition: AL    Condition: stable    Discharge Medications:     Medication List        START taking these medications      dexamethasone 2 MG tablet  Commonly known as: DECADRON  Take 1 tablet by mouth 2 times daily (with meals) for 5 days            CONTINUE taking these medications      allopurinol 100 MG tablet  Commonly known as: ZYLOPRIM  TAKE 1 TABLET EVERY DAY     Aspirin Low Dose 81 MG EC tablet  Generic drug: aspirin  TAKE 1 TABLET BY MOUTH EVERY DAY     baclofen 10 MG tablet  Commonly known as: LIORESAL  Take 1 tablet by mouth 3 times daily as needed (muscle spasms)     clopidogrel 75 MG tablet  Commonly known as: Plavix  Take 1 tablet by mouth daily     gabapentin 100 MG capsule  Commonly known as: NEURONTIN     irbesartan-hydroCHLOROthiazide 150-12.5 MG per tablet  Commonly known as: AVALIDE  TAKE 1 TABLET EVERY DAY     isosorbide mononitrate 30 MG extended release tablet  Commonly known as: IMDUR     levothyroxine 150 MCG tablet  Commonly known as: SYNTHROID  Take 1 tablet by mouth every morning (before breakfast)     lidocaine 5 %  Commonly known as: Lidoderm  Place 1 patch onto the skin daily 12 hours on, 12 hours off.     metoprolol succinate 25 MG extended release tablet  Commonly known as: TOPROL XL  TAKE 1 TABLET EVERY DAY     nitroGLYCERIN 0.4 MG SL tablet  Commonly known as: Nitrostat  Place 1 tablet under the tongue every 5 minutes as needed for Chest pain     nortriptyline 10 MG capsule  Commonly known as: PAMELOR  TAKE 1 CAPSULE BY MOUTH EVERY EVENING     pantoprazole 40 MG tablet  Commonly known as: Protonix  Take 1 tablet by mouth daily (with breakfast)     rOPINIRole 0.25 MG tablet  Commonly known as: Requip  Take 1 tablet by mouth nightly     rosuvastatin 20 MG tablet  Commonly known as: CRESTOR  TAKE 1 TABLET BY MOUTH EVERY DAY     spironolactone 25 MG tablet  Commonly known as: ALDACTONE  Take 1 tablet by mouth daily     torsemide 20 MG tablet  Commonly known as: DEMADEX  Take 6 tablets by mouth 2 times daily 6 tabs bid     vitamin D3 25 MCG (1000 UT) Tabs tablet  Commonly known as: CHOLECALCIFEROL  Take 1 tablet by mouth daily               Where to Get Your Medications        These medications were sent to [facility] [id number] Claudeen Mccune, [place] - [address] [place] [address] Road - P [phone] Mayra Carry [phone]  [address] [place] [address], [address] [place] [address] Phone: [phone]   dexamethasone 2 MG tablet       Information about where to get these medications is not yet available    Ask your nurse or doctor about these medications  torsemide 20 MG tablet            Allergies: Allergies   Allergen Reactions    Amoxicillin Anaphylaxis    Demeclocycline Anaphylaxis    Penicillins Anaphylaxis    Tetracyclines & Related Anaphylaxis    Ozempic (0.25 Or 0.5 Mg-Dose) [Semaglutide(0.25 Or 0.5mg-Dos)] Other (See Comments)     Lost large amount weight and loss  Of appetite    Codeine Itching and Rash       Follow up Instructions: Follow-up with PCP: Reggie Slaughter MD in 2 wk .       Total time spent on day of discharge including face-to-face visit, examination, documentation, counseling, preparation of discharge plans and followup, and discharge medicine reconciliation and presciptions is 33 minutes    Signed:  Tenisha Sandoval MD  11/24/2022

## 2022-11-24 NOTE — DISCHARGE INSTR - COC
Continuity of Care Form    Patient Name: Janice Freeman   :  1945  MRN:  [id number]    Admit date:  2022  Discharge date:  2022    Code Status Order: DNR-CCA   Advance Directives:     Admitting Physician:  Diana Knight MD  PCP: Jael Carbone MD    Discharging Nurse:   [address] Unit/Room#: URT-9266/8726-32  Discharging Unit Phone Number:     Emergency Contact:   Extended Emergency Contact Information  Primary Emergency Contact: [address] Phone: [phone]  Relation: Child    Past Surgical History:  Past Surgical History:   Procedure Laterality Date    APPENDECTOMY      CHOLECYSTECTOMY      COLONOSCOPY  2020    COLONOSCOPY POLYPECTOMY SNARE/COLD BIOPSY performed by Jase Parson MD at [place] [address] 2021    PERITONEAL DIALYSIS CATHETER REMOVAL.  EXCISSION OF ABDOMINAL CITRIX. performed by Jose Alba DO at [address] [facility] ([facility])      FULL    LAPAROSCOPY INSERTION PERITONEAL CATHETER N/A 4/3/2020    LAPAROSCOPIC LYSIS OF ADHESIONS, LAPAROSCOPIC PERITONEAL DIALYSIS CATHETER PLACEMENT, LAPAROSCOPIC OMENTOPEXY performed by Jose Alba DO at [address] [facility] [address] BILATERAL      preventive    ROTATOR CUFF REPAIR Left     SHUNT REVISION Left 2021    LEFT FOREARM ARTERIO VENOUS GRAFT performed by Bonifacio Blanco MD at [address] [facility] N/A 2020    EGD BIOPSY performed by Jase Parson MD at [address] [facility] ENDOSCOPY       Immunization History:   Immunization History   Administered Date(s) Administered    COVID-19, J&J, (age 18y+), IM, 0.5 mL 10/26/2021, 2022    Influenza, High Dose (Fluzone 65 yrs and older) 2017, 2017    Influenza, Triv, inactivated, subunit, adjuvanted, IM (Fluad 65 yrs and older) 10/28/2019    Pneumococcal Conjugate 13-valent ([id number]) 2017    Pneumococcal Polysaccharide (Auwudpumh37) 05/10/2019       Active Problems:  Patient Active Problem List   Diagnosis Code    HTN (hypertension), benign I10    Hyperlipidemia E78.5    Acquired hypothyroidism E03.9    Diabetes mellitus type 2 in obese ([facility]) E11.69, E66.9    Chronic kidney 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 ([facility] E11.22, N18.6, Z99.2    Closed nondisplaced fracture of fifth metatarsal bone of right foot S92.354A    Arthritis of right knee M17.11    Acute pain of both knees M25.561, M25.562    Primary osteoarthritis of both knees M17.0    Bursitis M71.9    Memory loss R41. 3    Bilateral leg edema R60.0    Primary osteoarthritis of left knee M17.12    Atypical chest pain R07.89    Encounter for medication counseling Z71.89    Tubular adenoma of colon D12.6    Primary insomnia F51.01    Diarrhea of presumed infectious origin R19.7    Diabetic polyneuropathy associated with type 2 diabetes mellitus ([facility]) E11.42    COVID U07.1    Coronary artery disease due to lipid rich plaque I25.10, I25.83    Pain of left calf M79.662    Atherosclerosis of native arteries of extremities with intermittent claudication, bilateral legs ([facility]) I70.213    ESRD (end stage renal disease) ([facility]) N18.6    Arteriovenous fistula infection, initial encounter ([facility] T82. 7XXA    Frequent falls R29.6    Hyperkalemia E87.5    Hyponatremia E87.1    Ex-smoker Z87.891    Class 1 obesity due to excess calories with body mass index (BMI) of 30.0 to 30.9 in adult E66.09, Z68.30    Antibiotic-associated diarrhea K52.1, T36.95XA    Pericarditis I31.9    Pericardial effusion I31.39    Chest pain R07.9    History of anemia due to chronic kidney disease N18.9, Z86.2    Pleural effusion J90    Hepatitis B immune Z78.9    Bilateral pleural effusion J90    Parkinson's disease G20    RLS (restless legs syndrome) G25.81    Chronic systolic (congestive) heart failure I50.22    End stage renal disease (HCC) N18.6    Encephalopathy, metabolic D67.36       Isolation/Infection: Isolation            Droplet Plus          Patient Infection Status       Infection Onset Added Last Indicated Last Indicated By Review Planned Expiration Resolved Resolved By    COVID-19 11/20/22 11/20/22 11/20/22 COVID-19, Rapid 11/30/22 12/04/22      Resolved    COVID-19 (Rule Out) 11/20/22 11/20/22 11/20/22 COVID-19, Rapid (Ordered)   11/20/22 Rule-Out Test Resulted    COVID-19 (Rule Out) 01/19/22 01/19/22 01/19/22 COVID-19 (Ordered)   01/20/22 Rule-Out Test Resulted    COVID-19 (Rule Out) 11/29/21 11/29/21 11/29/21 COVID-19 (Ordered)   11/30/21 Rule-Out Test Resulted    C-diff Rule Out 08/30/21 08/30/21 08/30/21 Clostridium difficile toxin/antigen (Ordered)   08/30/21 Rule-Out Test Resulted    COVID-19 (Rule Out) 08/25/21 08/25/21 08/25/21 COVID-19 (Ordered)   08/26/21 Rule-Out Test Resulted    COVID-19 (Rule Out) 08/12/21 08/12/21 08/12/21 COVID-19 (Ordered)   08/13/21 Rule-Out Test Resulted    C-diff Rule Out 03/22/21 03/22/21 03/22/21 Clostridium difficile toxin/antigen (Ordered)   05/17/21 Dasia Mcdermott, RN    This order is discontinued    COVID-19 (Rule Out) 11/24/20 11/24/20 11/24/20 COVID-19 (Ordered)   11/24/20 Rule-Out Test Resulted            Nurse Assessment:  Last Vital Signs: BP (!) 124/59   Pulse 79   Temp 97.8 °F (36.6 °C) (Temporal)   Resp 16   Ht 5' 5\" (1.651 m)   Wt 172 lb 6.4 oz (78.2 kg)   LMP  (LMP Unknown)   SpO2 96%   BMI 28.69 kg/m²     Last documented pain score (0-10 scale): Pain Level: 0  Last Weight:   Wt Readings from Last 1 Encounters:   11/24/22 172 lb 6.4 oz (78.2 kg)     Mental Status:  oriented and alert    IV Access:  - None    Nursing Mobility/ADLs:  Walking   Independent  Transfer  Independent  Bathing  Independent  Dressing  Independent  Toileting  Independent  Feeding  Independent  Med Admin  Assisted  Med Delivery   whole    Wound Care Documentation and Therapy:        Elimination:  Continence:    Bowel: Yes  Bladder: Yes  Urinary Catheter: None Colostomy/Ileostomy/Ileal Conduit: No       Date of Last BM: 11/24/2022    Intake/Output Summary (Last 24 hours) at 11/24/2022 1033  Last data filed at 11/24/2022 0010  Gross per 24 hour   Intake --   Output 100 ml   Net -100 ml     I/O last 3 completed shifts:  In: -   Out: 300 [Urine:300]    Safety Concerns:     None    Impairments/Disabilities:      None    Nutrition Therapy:  Current Nutrition Therapy:   - Oral Diet:  General    Routes of Feeding: Oral  Liquids: Thin Liquids  Daily Fluid Restriction: no  Last Modified Barium Swallow with Video (Video Swallowing Test): not done    Treatments at the Time of Hospital Discharge:   Respiratory Treatments:   Oxygen Therapy:  is not on home oxygen therapy. Ventilator:    - No ventilator support    Rehab Therapies: Physical Therapy and Occupational Therapy  Weight Bearing Status/Restrictions: No weight bearing restrictions  Other Medical Equipment (for information only, NOT a DME order):  walker  Other Treatments:     Patient's personal belongings (please select all that are sent with patient):  None    RN SIGNATURE:  Electronically signed by John Jackson on 11/24/22 at 11:05 AM EST    CASE MANAGEMENT/SOCIAL WORK SECTION    Inpatient Status Date: ***    Readmission Risk Assessment Score:  Readmission Risk              Risk of Unplanned Readmission:  26           Discharging to Facility/ Agency   Name:   Address:  Phone:  Fax:    Dialysis Facility (if applicable)   Name:  Address:  Dialysis Schedule:  Phone:  Fax:    / signature: {Esignature:[id number]}    PHYSICIAN SECTION    Prognosis: Fair    Condition at Discharge: Stable    Rehab Potential (if transferring to Rehab): Good    Recommended Labs or Other Treatments After Discharge: ***    Physician Certification: I certify the above information and transfer of Elder Soto  is necessary for the continuing treatment of the diagnosis listed and that she requires Home Care for greater 30 days. Update Admission H&P: No change in H&P    PHYSICIAN SIGNATURE:  Electronically signed by Annita Barfield MD on 11/24/22 at 10:33 AM EST

## 2022-11-24 NOTE — PLAN OF CARE
Problem: Discharge Planning  Goal: Discharge to home or other facility with appropriate resources  Outcome: Progressing     Problem: Safety - Medical Restraint  Goal: Remains free of injury from restraints (Restraint for Interference with Medical Device)  Description: INTERVENTIONS:  1. Determine that other, less restrictive measures have been tried or would not be effective before applying the restraint  2. Evaluate the patient's condition at the time of restraint application  3. Inform patient/family regarding the reason for restraint  4. Q2H: Monitor safety, psychosocial status, comfort, nutrition and hydration  11/23/2022 1106 by Lc Nassar RN  Outcome: Completed     Problem: Pain  Goal: Verbalizes/displays adequate comfort level or baseline comfort level  Outcome: Progressing     Problem: Skin/Tissue Integrity  Goal: Absence of new skin breakdown  Description: 1. Monitor for areas of redness and/or skin breakdown  2. Assess vascular access sites hourly  3. Every 4-6 hours minimum:  Change oxygen saturation probe site  4. Every 4-6 hours:  If on nasal continuous positive airway pressure, respiratory therapy assess nares and determine need for appliance change or resting period. Outcome: Progressing     Problem: ABCDS Injury Assessment  Goal: Absence of physical injury  Outcome: Progressing     Problem: Safety - Adult  Goal: Free from fall injury  Outcome: Progressing     Problem: Confusion  Goal: Confusion, delirium, dementia, or psychosis is improved or at baseline  Description: INTERVENTIONS:  1. Assess for possible contributors to thought disturbance, including medications, impaired vision or hearing, underlying metabolic abnormalities, dehydration, psychiatric diagnoses, and notify attending LIP  2. Weinert high risk fall precautions, as indicated  3. Provide frequent short contacts to provide reality reorientation, refocusing and direction  4.  Decrease environmental stimuli, including noise as appropriate  5. Monitor and intervene to maintain adequate nutrition, hydration, elimination, sleep and activity  6. If unable to ensure safety without constant attention obtain sitter and review sitter guidelines with assigned personnel  7.  Initiate Psychosocial CNS and Spiritual Care consult, as indicated  Outcome: Progressing     Problem: Chronic Conditions and Co-morbidities  Goal: Patient's chronic conditions and co-morbidity symptoms are monitored and maintained or improved  Outcome: Progressing

## 2022-11-24 NOTE — PROGRESS NOTES
PM assessment completed. Please see complex vital flowsheet for complete assessment. Pt arrived on unit from HD at this time. 1.8 L removed. Pt in bed, awake. Moved to chair after ambulating with standby assist and walker to bathroom. Tolerated well. Unmeasured BM (pt flushed before nurse could see). Pt A&O x4. Restless. Able to follow commands. VSS. Denies pain. Pure wick placed. Bundled and lights dimmed for comfort.

## 2022-11-24 NOTE — CARE COORDINATION
SW met with patient who is discharge ready today. Patient was recommended [address] and a walker. Patient agreeable to both these. Referral was sent to [facility] yesterday. Guero Russo in admissions who stated they accepted patient and will start for home services. Esha Parks was delivered to pt's room. Pt reported she would not be able to get transport to/from dialysis tomorrow because dtr works but reported her dtr will take her to the dialysis center Saturday.     Discharge Plan:  Home w/[place] [address] and a walker    Electronically signed by JOSE Marcelino, BEATRIZW on 11/24/2022 at 12:28 PM

## 2022-11-26 ENCOUNTER — HOSPITAL ENCOUNTER (INPATIENT)
Age: 77
LOS: 1 days | Discharge: HOME | DRG: 682 | End: 2022-11-27
Attending: EMERGENCY MEDICINE | Admitting: INTERNAL MEDICINE
Payer: MEDICARE

## 2022-11-26 DIAGNOSIS — N17.9 ACUTE RENAL FAILURE SUPERIMPOSED ON CHRONIC KIDNEY DISEASE, ON CHRONIC DIALYSIS, UNSPECIFIED ACUTE RENAL FAILURE TYPE (HCC): Primary | ICD-10-CM

## 2022-11-26 DIAGNOSIS — Z99.2 ESRD NEEDING DIALYSIS (HCC): ICD-10-CM

## 2022-11-26 DIAGNOSIS — N18.9 ACUTE RENAL FAILURE SUPERIMPOSED ON CHRONIC KIDNEY DISEASE, ON CHRONIC DIALYSIS, UNSPECIFIED ACUTE RENAL FAILURE TYPE (HCC): Primary | ICD-10-CM

## 2022-11-26 DIAGNOSIS — Z99.2 ACUTE RENAL FAILURE SUPERIMPOSED ON CHRONIC KIDNEY DISEASE, ON CHRONIC DIALYSIS, UNSPECIFIED ACUTE RENAL FAILURE TYPE (HCC): Primary | ICD-10-CM

## 2022-11-26 DIAGNOSIS — N18.6 ESRD NEEDING DIALYSIS (HCC): ICD-10-CM

## 2022-11-26 LAB
A/G RATIO: 1.4 (ref 1.1–2.2)
ALBUMIN SERPL-MCNC: 4.4 G/DL (ref 3.4–5)
ALP BLD-CCNC: 134 U/L (ref 40–129)
ALT SERPL-CCNC: 15 U/L (ref 10–40)
ANION GAP SERPL CALCULATED.3IONS-SCNC: 29 MMOL/L (ref 3–16)
AST SERPL-CCNC: 16 U/L (ref 15–37)
BASOPHILS ABSOLUTE: 0 K/UL (ref 0–0.2)
BASOPHILS RELATIVE PERCENT: 0.1 %
BILIRUB SERPL-MCNC: 0.5 MG/DL (ref 0–1)
BUN BLDV-MCNC: 135 MG/DL (ref 7–20)
CALCIUM SERPL-MCNC: 8.2 MG/DL (ref 8.3–10.6)
CHLORIDE BLD-SCNC: 88 MMOL/L (ref 99–110)
CO2: 20 MMOL/L (ref 21–32)
CREAT SERPL-MCNC: 10.3 MG/DL (ref 0.6–1.2)
EOSINOPHILS ABSOLUTE: 0.2 K/UL (ref 0–0.6)
EOSINOPHILS RELATIVE PERCENT: 1.4 %
GFR SERPL CREATININE-BSD FRML MDRD: 4 ML/MIN/{1.73_M2}
GLUCOSE BLD-MCNC: 105 MG/DL (ref 70–99)
HCT VFR BLD CALC: 32.5 % (ref 36–48)
HEMOGLOBIN: 10.8 G/DL (ref 12–16)
LYMPHOCYTES ABSOLUTE: 1.3 K/UL (ref 1–5.1)
LYMPHOCYTES RELATIVE PERCENT: 8.6 %
MCH RBC QN AUTO: 32.1 PG (ref 26–34)
MCHC RBC AUTO-ENTMCNC: 33.2 G/DL (ref 31–36)
MCV RBC AUTO: 96.5 FL (ref 80–100)
MONOCYTES ABSOLUTE: 1.3 K/UL (ref 0–1.3)
MONOCYTES RELATIVE PERCENT: 8.5 %
NEUTROPHILS ABSOLUTE: 12.6 K/UL (ref 1.7–7.7)
NEUTROPHILS RELATIVE PERCENT: 81.4 %
PDW BLD-RTO: 15.9 % (ref 12.4–15.4)
PLATELET # BLD: 389 K/UL (ref 135–450)
PMV BLD AUTO: 8.2 FL (ref 5–10.5)
POTASSIUM REFLEX MAGNESIUM: 4.7 MMOL/L (ref 3.5–5.1)
RBC # BLD: 3.36 M/UL (ref 4–5.2)
SODIUM BLD-SCNC: 137 MMOL/L (ref 136–145)
TOTAL PROTEIN: 7.6 G/DL (ref 6.4–8.2)
WBC # BLD: 15.5 K/UL (ref 4–11)

## 2022-11-26 PROCEDURE — 99285 EMERGENCY DEPT VISIT HI MDM: CPT

## 2022-11-26 PROCEDURE — 90935 HEMODIALYSIS ONE EVALUATION: CPT

## 2022-11-26 PROCEDURE — 80053 COMPREHEN METABOLIC PANEL: CPT

## 2022-11-26 PROCEDURE — 2580000003 HC RX 258: Performed by: INTERNAL MEDICINE

## 2022-11-26 PROCEDURE — 6360000002 HC RX W HCPCS: Performed by: INTERNAL MEDICINE

## 2022-11-26 PROCEDURE — 93005 ELECTROCARDIOGRAM TRACING: CPT | Performed by: EMERGENCY MEDICINE

## 2022-11-26 PROCEDURE — 1200000000 HC SEMI PRIVATE

## 2022-11-26 PROCEDURE — 6370000000 HC RX 637 (ALT 250 FOR IP): Performed by: INTERNAL MEDICINE

## 2022-11-26 PROCEDURE — 5A1D70Z PERFORMANCE OF URINARY FILTRATION, INTERMITTENT, LESS THAN 6 HOURS PER DAY: ICD-10-PCS | Performed by: HOSPITALIST

## 2022-11-26 PROCEDURE — 02HV33Z INSERTION OF INFUSION DEVICE INTO SUPERIOR VENA CAVA, PERCUTANEOUS APPROACH: ICD-10-PCS | Performed by: HOSPITALIST

## 2022-11-26 PROCEDURE — 85025 COMPLETE CBC W/AUTO DIFF WBC: CPT

## 2022-11-26 RX ORDER — DEXAMETHASONE 4 MG/1
2 TABLET ORAL 2 TIMES DAILY WITH MEALS
Status: DISCONTINUED | OUTPATIENT
Start: 2022-11-26 | End: 2022-11-27 | Stop reason: HOSPADM

## 2022-11-26 RX ORDER — METOPROLOL SUCCINATE 25 MG/1
25 TABLET, EXTENDED RELEASE ORAL DAILY
Status: DISCONTINUED | OUTPATIENT
Start: 2022-11-26 | End: 2022-11-27 | Stop reason: HOSPADM

## 2022-11-26 RX ORDER — ROPINIROLE 0.25 MG/1
0.25 TABLET, FILM COATED ORAL NIGHTLY
Status: DISCONTINUED | OUTPATIENT
Start: 2022-11-26 | End: 2022-11-27 | Stop reason: HOSPADM

## 2022-11-26 RX ORDER — ONDANSETRON 2 MG/ML
4 INJECTION INTRAMUSCULAR; INTRAVENOUS EVERY 6 HOURS PRN
Status: DISCONTINUED | OUTPATIENT
Start: 2022-11-26 | End: 2022-11-27 | Stop reason: HOSPADM

## 2022-11-26 RX ORDER — SPIRONOLACTONE 25 MG/1
25 TABLET ORAL DAILY
Status: DISCONTINUED | OUTPATIENT
Start: 2022-11-26 | End: 2022-11-27 | Stop reason: HOSPADM

## 2022-11-26 RX ORDER — SODIUM CHLORIDE 9 MG/ML
INJECTION, SOLUTION INTRAVENOUS PRN
Status: DISCONTINUED | OUTPATIENT
Start: 2022-11-26 | End: 2022-11-27 | Stop reason: HOSPADM

## 2022-11-26 RX ORDER — NITROGLYCERIN 0.4 MG/1
0.4 TABLET SUBLINGUAL EVERY 5 MIN PRN
Status: DISCONTINUED | OUTPATIENT
Start: 2022-11-26 | End: 2022-11-27 | Stop reason: HOSPADM

## 2022-11-26 RX ORDER — PANTOPRAZOLE SODIUM 40 MG/1
40 TABLET, DELAYED RELEASE ORAL
Status: DISCONTINUED | OUTPATIENT
Start: 2022-11-27 | End: 2022-11-27 | Stop reason: HOSPADM

## 2022-11-26 RX ORDER — SODIUM CHLORIDE 0.9 % (FLUSH) 0.9 %
10 SYRINGE (ML) INJECTION PRN
Status: DISCONTINUED | OUTPATIENT
Start: 2022-11-26 | End: 2022-11-27 | Stop reason: HOSPADM

## 2022-11-26 RX ORDER — CLOPIDOGREL BISULFATE 75 MG/1
75 TABLET ORAL DAILY
Status: DISCONTINUED | OUTPATIENT
Start: 2022-11-26 | End: 2022-11-27 | Stop reason: HOSPADM

## 2022-11-26 RX ORDER — ALLOPURINOL 100 MG/1
100 TABLET ORAL DAILY
Status: DISCONTINUED | OUTPATIENT
Start: 2022-11-27 | End: 2022-11-27 | Stop reason: HOSPADM

## 2022-11-26 RX ORDER — ACETAMINOPHEN 650 MG/1
650 SUPPOSITORY RECTAL EVERY 6 HOURS PRN
Status: DISCONTINUED | OUTPATIENT
Start: 2022-11-26 | End: 2022-11-27 | Stop reason: HOSPADM

## 2022-11-26 RX ORDER — NORTRIPTYLINE HYDROCHLORIDE 25 MG/1
25 CAPSULE ORAL NIGHTLY
Status: DISCONTINUED | OUTPATIENT
Start: 2022-11-26 | End: 2022-11-27 | Stop reason: HOSPADM

## 2022-11-26 RX ORDER — MELATONIN
1000 DAILY
Status: DISCONTINUED | OUTPATIENT
Start: 2022-11-26 | End: 2022-11-26 | Stop reason: ALTCHOICE

## 2022-11-26 RX ORDER — ONDANSETRON 4 MG/1
4 TABLET, ORALLY DISINTEGRATING ORAL EVERY 8 HOURS PRN
Status: DISCONTINUED | OUTPATIENT
Start: 2022-11-26 | End: 2022-11-27 | Stop reason: HOSPADM

## 2022-11-26 RX ORDER — ASPIRIN 81 MG/1
81 TABLET ORAL DAILY
Status: DISCONTINUED | OUTPATIENT
Start: 2022-11-27 | End: 2022-11-27 | Stop reason: HOSPADM

## 2022-11-26 RX ORDER — SODIUM CHLORIDE 0.9 % (FLUSH) 0.9 %
10 SYRINGE (ML) INJECTION EVERY 12 HOURS SCHEDULED
Status: DISCONTINUED | OUTPATIENT
Start: 2022-11-26 | End: 2022-11-27 | Stop reason: HOSPADM

## 2022-11-26 RX ORDER — ROSUVASTATIN CALCIUM 10 MG/1
10 TABLET, COATED ORAL NIGHTLY
Status: DISCONTINUED | OUTPATIENT
Start: 2022-11-26 | End: 2022-11-27 | Stop reason: HOSPADM

## 2022-11-26 RX ORDER — HEPARIN SODIUM 5000 [USP'U]/ML
5000 INJECTION, SOLUTION INTRAVENOUS; SUBCUTANEOUS EVERY 8 HOURS SCHEDULED
Status: DISCONTINUED | OUTPATIENT
Start: 2022-11-26 | End: 2022-11-27 | Stop reason: HOSPADM

## 2022-11-26 RX ORDER — LIDOCAINE 4 G/G
1 PATCH TOPICAL DAILY
Status: DISCONTINUED | OUTPATIENT
Start: 2022-11-26 | End: 2022-11-27 | Stop reason: HOSPADM

## 2022-11-26 RX ORDER — BACLOFEN 10 MG/1
5 TABLET ORAL 2 TIMES DAILY PRN
Status: DISCONTINUED | OUTPATIENT
Start: 2022-11-26 | End: 2022-11-27 | Stop reason: HOSPADM

## 2022-11-26 RX ORDER — ACETAMINOPHEN 325 MG/1
650 TABLET ORAL EVERY 6 HOURS PRN
Status: DISCONTINUED | OUTPATIENT
Start: 2022-11-26 | End: 2022-11-27 | Stop reason: HOSPADM

## 2022-11-26 RX ORDER — LEVOTHYROXINE SODIUM 0.15 MG/1
150 TABLET ORAL
Status: DISCONTINUED | OUTPATIENT
Start: 2022-11-27 | End: 2022-11-27 | Stop reason: HOSPADM

## 2022-11-26 RX ORDER — ISOSORBIDE MONONITRATE 30 MG/1
30 TABLET, EXTENDED RELEASE ORAL DAILY
Status: DISCONTINUED | OUTPATIENT
Start: 2022-11-27 | End: 2022-11-27 | Stop reason: HOSPADM

## 2022-11-26 RX ORDER — GABAPENTIN 100 MG/1
200 CAPSULE ORAL NIGHTLY
Status: DISCONTINUED | OUTPATIENT
Start: 2022-11-26 | End: 2022-11-27 | Stop reason: HOSPADM

## 2022-11-26 RX ORDER — IRBESARTAN AND HYDROCHLOROTHIAZIDE 150; 12.5 MG/1; MG/1
1 TABLET, FILM COATED ORAL DAILY
Status: DISCONTINUED | OUTPATIENT
Start: 2022-11-26 | End: 2022-11-26 | Stop reason: ALTCHOICE

## 2022-11-26 RX ADMIN — ROSUVASTATIN CALCIUM 10 MG: 10 TABLET, COATED ORAL at 19:55

## 2022-11-26 RX ADMIN — DEXAMETHASONE 2 MG: 4 TABLET ORAL at 19:54

## 2022-11-26 RX ADMIN — ROPINIROLE HYDROCHLORIDE 0.25 MG: 0.25 TABLET, FILM COATED ORAL at 19:55

## 2022-11-26 RX ADMIN — METOPROLOL SUCCINATE 25 MG: 25 TABLET, EXTENDED RELEASE ORAL at 19:54

## 2022-11-26 RX ADMIN — NORTRIPTYLINE HYDROCHLORIDE 25 MG: 25 CAPSULE ORAL at 19:55

## 2022-11-26 RX ADMIN — CLOPIDOGREL BISULFATE 75 MG: 75 TABLET ORAL at 19:53

## 2022-11-26 RX ADMIN — SPIRONOLACTONE 25 MG: 25 TABLET ORAL at 19:54

## 2022-11-26 RX ADMIN — Medication 10 ML: at 19:56

## 2022-11-26 RX ADMIN — HEPARIN SODIUM 5000 UNITS: 5000 INJECTION INTRAVENOUS; SUBCUTANEOUS at 21:30

## 2022-11-26 ASSESSMENT — PAIN SCALES - WONG BAKER
WONGBAKER_NUMERICALRESPONSE: 0

## 2022-11-26 ASSESSMENT — PAIN - FUNCTIONAL ASSESSMENT: PAIN_FUNCTIONAL_ASSESSMENT: NONE - DENIES PAIN

## 2022-11-26 ASSESSMENT — PAIN SCALES - GENERAL
PAINLEVEL_OUTOF10: 0
PAINLEVEL_OUTOF10: 0

## 2022-11-26 ASSESSMENT — LIFESTYLE VARIABLES
HOW OFTEN DO YOU HAVE A DRINK CONTAINING ALCOHOL: NEVER
HOW OFTEN DO YOU HAVE A DRINK CONTAINING ALCOHOL: MONTHLY OR LESS
HOW MANY STANDARD DRINKS CONTAINING ALCOHOL DO YOU HAVE ON A TYPICAL DAY: PATIENT DOES NOT DRINK

## 2022-11-26 NOTE — ED NOTES
Report given to dialysis nurse and 5T RN. No further questions at this time. Pt is at dialysis currently.      Prashant Lucio, HELEN  11/26/22 5917

## 2022-11-26 NOTE — PROGRESS NOTES
Treatment time: 3 hrs     Net UF: 1.7 kg     Pre weight: 78.2 kg   Post weight: 76.5 kg  EDW: TBD     Access used: L FA AVF   Access function: Fair; pt could probably benefit from fistulagram     Medications or blood products given: None     Regular outpatient schedule: TTS     Summary of response to treatment: Pt tolerated tx well    Copy of dialysis treatment record placed in chart, to be scanned into EMR.

## 2022-11-26 NOTE — PROGRESS NOTES
4 Eyes Skin Assessment     NAME:  Carter Umaña  YOB: 1945  MEDICAL RECORD NUMBER:  [id number]    The patient is being assessed for  Admission    I agree that One RN have performed a thorough Head to Toe Skin Assessment on the patient. ALL assessment sites listed below have been assessed. Areas assessed by both nurses:    Head, Face, Ears, Shoulders, Back, Chest, Arms, Elbows, Hands, Sacrum. Buttock, Coccyx, Ischium, and Legs. Feet and Heels        Does the Patient have a Wound? No noted wound(s)     Patient has scattered bruises on arms and a bruise on the right side of her back.    Zhou Prevention initiated by RN: Yes   Wound Care Orders initiated by RN: Yes    Pressure Injury (Stage 3,4, Unstageable, DTI, NWPT, and Complex wounds) if present place referral order by RN under : No    New and Established Ostomies, if present place, referral order under : No      Nurse 1 eSignature: Electronically signed by Norris Walls RN on 11/26/22 at 6:37 PM EST    **SHARE this note so that the co-signing nurse is able to place an eSignature**    Nurse 2 eSignature: Electronically signed by Varsha Mcgowan RN on 11/26/22 at 7:09 PM EST

## 2022-11-26 NOTE — ED PROVIDER NOTES
[address] PROVIDER NOTE    Patient Identification  Pt Name: Aakash Narvaez  MRN: [id number]  [place]air 1945  Date of evaluation: 11/26/2022  Provider: Aaron Leyva MD  PCP: Rhona Nieves MD    Chief Complaint  Other (Here for dialysis- advised to come by [place] alvarez to come to hospital. Last dialysis 3 days ago, discharged from hospital 3 days ago. Normally has dialysis 3 times/week (Tuesday, Thursday, Saturday), but there was a scheduling mix up at dialysis clinic today.)      HPI  (History provided by patient)  This is a 67 y.o. female who was brought in by self for dialysis. The patient's normal dialysis schedule is Tuesday, Thursday, Saturday. Her last dialysis was on Tuesday. She did not get dialysis on Thursday to Thanksgiving. She was scheduled to start again today. However, due to a scheduling issue, the dialysis center was not ready for her. Because they were afraid they would not be able to dialyze her today, [facility] dialysis and sent her to the emergency department for further evaluation management. Despite missing dialysis, patient states she is currently symptom-free. Specifically she denies chest pain, shortness of breath, lower extremity edema, lightheadedness, generalized weakness, and other symptoms. However, she is very concerned about going longer than 5 days between dialysis sessions. ROS  10 systems reviewed, pertinent positives/negatives per HPI otherwise noted to be negative.     I have reviewed the following nursing documentation:  Allergies: Amoxicillin, Demeclocycline, Penicillins, Tetracyclines & related, Ozempic (0.25 or 0.5 mg-dose) [semaglutide(0.25 or 0.5mg-dos)], and Codeine    Past medical history:   Past Medical History:   Diagnosis Date    Arthritis     CAD (coronary artery disease)     Diabetes ([facility]     Hemodialysis patient ([facility]     Hyperlipidemia     Hypertension     Kidney disease     Neuropathy     Pneumonia IN February, 2021, with COVID    Thyroid disease      Past surgical history:   Past Surgical History:   Procedure Laterality Date    APPENDECTOMY      CHOLECYSTECTOMY      COLONOSCOPY  11/24/2020    COLONOSCOPY POLYPECTOMY SNARE/COLD BIOPSY performed by Skip Hutchison MD at [place] [address] 5/17/2021    PERITONEAL DIALYSIS CATHETER REMOVAL. [address]. performed by Cherylene Glee, DO at [address] [facility] ([facility])      FULL    LAPAROSCOPY INSERTION PERITONEAL CATHETER N/A 4/3/2020    LAPAROSCOPIC LYSIS OF ADHESIONS, LAPAROSCOPIC PERITONEAL DIALYSIS CATHETER PLACEMENT, LAPAROSCOPIC OMENTOPEXY performed by Cherylene Glee, DO at [address] BILATERAL      preventive    ROTATOR CUFF REPAIR Left     SHUNT REVISION Left 8/19/2021    LEFT FOREARM ARTERIO VENOUS GRAFT performed by Sandra Marino MD at [address] [place] [address] 11/24/2020    EGD BIOPSY performed by Skip Hutchison MD at [facility] medications:   Previous Medications    ALLOPURINOL (ZYLOPRIM) 100 MG TABLET    TAKE 1 TABLET EVERY DAY    ASPIRIN LOW DOSE 81 MG EC TABLET    TAKE 1 TABLET BY MOUTH EVERY DAY    BACLOFEN (LIORESAL) 10 MG TABLET    Take 1 tablet by mouth 3 times daily as needed (muscle spasms)    CLOPIDOGREL (PLAVIX) 75 MG TABLET    Take 1 tablet by mouth daily    DEXAMETHASONE (DECADRON) 2 MG TABLET    Take 1 tablet by mouth 2 times daily (with meals) for 5 days    GABAPENTIN (NEURONTIN) 100 MG CAPSULE    Take 200 mg by mouth at bedtime. IRBESARTAN-HYDROCHLOROTHIAZIDE (AVALIDE) 150-12.5 MG PER TABLET    TAKE 1 TABLET EVERY DAY    ISOSORBIDE MONONITRATE (IMDUR) 30 MG EXTENDED RELEASE TABLET    Take 30 mg by mouth daily    LEVOTHYROXINE (SYNTHROID) 150 MCG TABLET    Take 1 tablet by mouth every morning (before breakfast)    LIDOCAINE (LIDODERM) 5 %    Place 1 patch onto the skin daily 12 hours on, 12 hours off. METOPROLOL SUCCINATE (TOPROL XL) 25 MG EXTENDED RELEASE TABLET    TAKE 1 TABLET EVERY DAY    NITROGLYCERIN (NITROSTAT) 0.4 MG SL TABLET    Place 1 tablet under the tongue every 5 minutes as needed for Chest pain    NORTRIPTYLINE (PAMELOR) 10 MG CAPSULE    TAKE 1 CAPSULE BY MOUTH EVERY EVENING    PANTOPRAZOLE (PROTONIX) 40 MG TABLET    Take 1 tablet by mouth daily (with breakfast)    ROPINIROLE (REQUIP) 0.25 MG TABLET    Take 1 tablet by mouth nightly    ROSUVASTATIN (CRESTOR) 20 MG TABLET    TAKE 1 TABLET BY MOUTH EVERY DAY    SPIRONOLACTONE (ALDACTONE) 25 MG TABLET    Take 1 tablet by mouth daily    TORSEMIDE (DEMADEX) 20 MG TABLET    Take 6 tablets by mouth 2 times daily 6 tabs bid    VITAMIN D3 (CHOLECALCIFEROL) 25 MCG (1000 UT) TABS TABLET    Take 1 tablet by mouth daily       Social history:  reports that she quit smoking about 44 years ago. Her smoking use included cigarettes. She has a 5.00 pack-year smoking history. She has never used smokeless tobacco. She reports that she does not currently use alcohol. She reports that she does not use drugs. Family history:    Family History   Problem Relation Age of Onset    Cancer Mother         lung, breast, liver    Stroke Father     Stomach Cancer Brother     Cancer Maternal Grandmother     No Known Problems Paternal Grandmother     No Known Problems Paternal Grandfather        Exam  ED Triage Vitals [11/26/22 0839]   BP Temp Temp Source Heart Rate Resp SpO2 Height Weight   (!) 155/66 98.1 °F (36.7 °C) Oral 79 16 100 % 5' 5\" (1.651 m) 172 lb (78 kg)       Nursing note and vitals reviewed. Constitutional: Well developed, well nourished. Non-toxic in appearance. HENT:      Head: Normocephalic and atraumatic. Ears: External ears normal.      Nose: Nose normal.     Mouth: Membrane mucosa moist and pink. Eyes: Anicteric sclera. No discharge. Neck: Supple. Trachea midline. Cardiovascular: RRR; no murmurs, rubs, or gallops.   Pulmonary/Chest: Effort normal. No respiratory distress. CTAB. No stridor. No wheezes. No rales. Abdominal: Soft. No distension. Non-tender to palpation  Musculoskeletal: Moves all extremities. No gross deformity. No lower extremity edema. Neurological: Alert and oriented. Face symmetric. Speech is clear. Skin: Warm and dry. No rash. Psychiatric: Normal mood and affect. Behavior is normal.    EKG  The Ekg interpreted by me in the absence of a cardiologist shows. normal sinus rhythm with a rate of 75  Axis is   Left axis deviation  QTc is   prolonged  LVH with repolarization abnormality. No specific ST-T wave changes appreciated. No evidence of acute ischemia.    No significant change from prior EKG dated November 20, 2022        Labs  Results for orders placed or performed during the hospital encounter of 11/26/22   CBC with Auto Differential   Result Value Ref Range    WBC 15.5 (H) 4.0 - 11.0 K/uL    RBC 3.36 (L) 4.00 - 5.20 M/uL    Hemoglobin 10.8 (L) 12.0 - 16.0 g/dL    Hematocrit 32.5 (L) 36.0 - 48.0 %    MCV 96.5 80.0 - 100.0 fL    MCH 32.1 26.0 - 34.0 pg    MCHC 33.2 31.0 - 36.0 g/dL    RDW 15.9 (H) 12.4 - 15.4 %    Platelets 445 665 - 906 K/uL    MPV 8.2 5.0 - 10.5 fL    Neutrophils % 81.4 %    Lymphocytes % 8.6 %    Monocytes % 8.5 %    Eosinophils % 1.4 %    Basophils % 0.1 %    Neutrophils Absolute 12.6 (H) 1.7 - 7.7 K/uL    Lymphocytes Absolute 1.3 1.0 - 5.1 K/uL    Monocytes Absolute 1.3 0.0 - 1.3 K/uL    Eosinophils Absolute 0.2 0.0 - 0.6 K/uL    Basophils Absolute 0.0 0.0 - 0.2 K/uL   Comprehensive Metabolic Panel w/ Reflex to MG   Result Value Ref Range    Sodium 137 136 - 145 mmol/L    Potassium reflex Magnesium 4.7 3.5 - 5.1 mmol/L    Chloride 88 (L) 99 - 110 mmol/L    CO2 20 (L) 21 - 32 mmol/L    Anion Gap 29 (H) 3 - 16    Glucose 105 (H) 70 - 99 mg/dL     (HH) 7 - 20 mg/dL    Creatinine 10.3 (HH) 0.6 - 1.2 mg/dL    Est, Glom Filt Rate 4 (A) >60    Calcium 8.2 (L) 8.3 - 10.6 mg/dL    Total Protein 7.6 6.4 - 8.2 g/dL Albumin 4.4 3.4 - 5.0 g/dL    Albumin/Globulin Ratio 1.4 1.1 - 2.2    Total Bilirubin 0.5 0.0 - 1.0 mg/dL    Alkaline Phosphatase 134 (H) 40 - 129 U/L    ALT 15 10 - 40 U/L    AST 16 15 - 37 U/L       MDM and ED Course  Patient presented to the emergency department after being sent here by Wilfredoita shani. She has end-stage renal disease on chronic dialysis, following a Tuesday, Thursday, Saturday schedule. Last dialysis was on Tuesday. It was skipped on Thursday due to Thanksgiving. She went to dialysis today, but due to a scheduling snafu, they were not ready for her, so they sent her to the emergency department. She has no complaints, but is very concerned about going any longer without dialysis. Her creatinine is significantly elevated to 10.8, but her potassium and sodium are okay. I consulted with her nephrologist, speaking to Dr. Tracie Thomas. We thoroughly discussed her case and he recommends admitting her to the hospital for dialysis. Of note, patient has a leukocytosis, but I suspect this is unrelated to her current presentation. She has had elevated leukocytes previously. She has no symptoms today, so infection is unlikely. She also does not meet SIRS criteria for sepsis    I consulted Dr. González Robert through [address] [place] [address] for admission. He reviewed the patient's history, physical exam, labs, imaging studies, and emergency department course and has decided to admit Alejandrina Rooney for further evaluation and treatment. Final Impression  1. Acute renal failure superimposed on chronic kidney disease, on chronic dialysis, unspecified acute renal failure type ([address]    2. ESRD needing dialysis (HCC)        Blood pressure (!) 155/66, pulse 79, temperature 98.1 °F (36.7 °C), temperature source Oral, resp. rate 16, height 5' 5\" (1.651 m), weight 172 lb (78 kg), SpO2 100 %, not currently breastfeeding.      Disposition:  DISPOSITION Decision To Admit 11/26/2022 10:41:07 AM    This chart was generated using the Dragon dictation system. I created this record but it may contain dictation errors given the limitations of this technology.         Aleksandr De Luna MD  11/26/22 5271

## 2022-11-26 NOTE — H&P
History and Physical  Dr. Kannan Hernández  11/26/2022    PCP: Juliana Russell MD    Cc:   Chief Complaint   Patient presents with    Other     Here for dialysis- advised to come by Kavya Walton to come to hospital. Last dialysis 3 days ago, discharged from hospital 3 days ago. Normally has dialysis 3 times/week (Tuesday, Thursday, Saturday), but there was a scheduling mix up at dialysis clinic today. HPI:  Alicia Rivera is a 67 y.o. female who has a past medical history of Arthritis, CAD (coronary artery disease), Diabetes ([address] Hemodialysis patient ([facility] [address] Hyperlipidemia, Hypertension, Kidney disease, Neuropathy, Pneumonia, and Thyroid disease. Patient presents with End stage renal disease ([facility] [address] HPI  (1-3 for expanded problem focused, ?4 for detailed/comprehensive)      67 y.o. female who was brought in by self for dialysis. The patient's normal dialysis schedule is Tuesday, Thursday, Saturday. Her last dialysis was on Tuesday. She did not get dialysis on Thursday to Thanksgiving. She was scheduled to start again today. However, due to a scheduling issue, the dialysis center was not ready for her. Because they were afraid they would not be able to dialyze her today, [facility] dialysis and sent her to the emergency department for further evaluation management. as she has been missing dialysis, she has metabolic derangement    To be admitted  Renal to see  May require emergent dialysis    Problem list of hospitalization thus far: Active Hospital Problems    Diagnosis     End stage renal disease ([facility] [address] [N18.6]      Priority: Medium    HTN (hypertension), benign [I10]     Hyperlipidemia [E78.5]     Diabetes mellitus type 2 in obese ([facility] [address] [E11.69, E66.9]          Review of Systems: (1 system for EPF, 2-9 for detailed, 10+ for comprehensive)  Constitutional: Negative for chills and fever. HENT: Negative for dental problem, nosebleeds and rhinorrhea.       Eyes: Negative for photophobia and visual disturbance. Respiratory: Negative for cough, chest tightness and shortness of breath. Cardiovascular: Negative for chest pain and leg swelling. Gastrointestinal: Negative for diarrhea, nausea and vomiting. Endocrine: Negative for polydipsia and polyphagia. Genitourinary: Negative for frequency, hematuria and urgency. Musculoskeletal: Negative for back pain and myalgias. Skin: Negative for rash. Allergic/Immunologic: Negative for food allergies. Neurological: Negative for dizziness, seizures, syncope and facial asymmetry. Hematological: Negative for adenopathy. Psychiatric/Behavioral: Negative for dysphoric mood. The patient is not nervous/anxious. Past Medical History:   Past Medical History:   Diagnosis Date    Arthritis     CAD (coronary artery disease)     Diabetes ([facility] [address]     Hemodialysis patient ([facility] [address]     Hyperlipidemia     Hypertension     Kidney disease     Neuropathy     Pneumonia     IN February, 2021, with COVID    Thyroid disease        Past Surgical History:   Past Surgical History:   Procedure Laterality Date    APPENDECTOMY      CHOLECYSTECTOMY      COLONOSCOPY  11/24/2020    COLONOSCOPY POLYPECTOMY SNARE/COLD BIOPSY performed by Yuni Salamanca MD at [place] [id number] 5/17/2021    PERITONEAL DIALYSIS CATHETER REMOVAL.  [address] performed by Sherri Tavera DO at [address] [facility] ([address] [facility])      FULL    LAPAROSCOPY INSERTION PERITONEAL CATHETER N/A 4/3/2020    LAPAROSCOPIC LYSIS OF ADHESIONS, LAPAROSCOPIC PERITONEAL DIALYSIS CATHETER PLACEMENT, LAPAROSCOPIC OMENTOPEXY performed by Sherri Tavera DO at [address] BILATERAL      preventive    ROTATOR CUFF REPAIR Left     SHUNT REVISION Left 8/19/2021    LEFT FOREARM ARTERIO VENOUS GRAFT performed by Ella Curtis MD at [address] 11/24/2020    EGD BIOPSY performed by Diane Thomas Tammy Bustamante MD at [address], [place] History:   Social History       Tobacco History       Smoking Status  Former Quit Date  7/2/1978 Smoking Frequency  0.50 packs/day for 10.00 years (5.00 pk-yrs) Smoking Tobacco Type  Cigarettes quit in 7/2/1978      Smokeless Tobacco Use  Never              Alcohol History       Alcohol Use Status  Not Currently Comment  every other week one glass of wine              Drug Use       Drug Use Status  Never              Sexual Activity       Sexually Active  Not Currently                    Fam History:   Family History   Problem Relation Age of Onset    Cancer Mother         lung, breast, liver    Stroke Father     Stomach Cancer Brother     Cancer Maternal Grandmother     No Known Problems Paternal Grandmother     No Known Problems Paternal Grandfather        PFSH: The above PMHx, PSHx, SocHx, FamHx has been reviewed by myself. (1 area for detailed, 2-3 for comprehensive)      Code Status: Prior    Meds - following list of home medications fromelectronic chart has been reviewed by myself  Prior to Admission medications    Medication Sig Start Date End Date Taking? Authorizing Provider   torsemide (DEMADEX) 20 MG tablet Take 6 tablets by mouth 2 times daily 6 tabs bid 11/24/22   Serenity Pandya MD   dexamethasone (DECADRON) 2 MG tablet Take 1 tablet by mouth 2 times daily (with meals) for 5 days 11/24/22 11/29/22  Serenity Pandya MD   gabapentin (NEURONTIN) 100 MG capsule Take 200 mg by mouth at bedtime.     Historical Provider, MD   lidocaine (LIDODERM) 5 % Place 1 patch onto the skin daily 12 hours on, 12 hours off. 11/19/22   Chun Rowell PA-C   isosorbide mononitrate (IMDUR) 30 MG extended release tablet Take 30 mg by mouth daily 9/19/22   Historical Provider, MD   baclofen (LIORESAL) 10 MG tablet Take 1 tablet by mouth 3 times daily as needed (muscle spasms) 11/18/22   Baltazar Cadena MD   irbesartan-hydroCHLOROthiazide (AVALIDE) 150-12.5 MG per tablet TAKE 1 TABLET EVERY DAY 7/27/22   Benjamin Valdivia MD   rOPINIRole (REQUIP) 0.25 MG tablet Take 1 tablet by mouth nightly 7/21/22   Benjamin Valdivia MD   vitamin D3 (CHOLECALCIFEROL) 25 MCG (1000 UT) TABS tablet Take 1 tablet by mouth daily 7/8/22   MARCIE Mosher CNP   pantoprazole (PROTONIX) 40 MG tablet Take 1 tablet by mouth daily (with breakfast) 6/14/22   Benjamin Valdivia MD   ASPIRIN LOW DOSE 81 MG EC tablet TAKE 1 TABLET BY MOUTH EVERY DAY 6/2/22   MARCIE Mendoza CNP   nortriptyline (PAMELOR) 10 MG capsule TAKE 1 CAPSULE BY MOUTH EVERY EVENING 5/19/22   Benjamin Valdivia MD   spironolactone (ALDACTONE) 25 MG tablet Take 1 tablet by mouth daily 3/29/22   Benjamin Valdivia MD   clopidogrel (PLAVIX) 75 MG tablet Take 1 tablet by mouth daily 3/29/22   Benjamin Valdivia MD   rosuvastatin (CRESTOR) 20 MG tablet TAKE 1 TABLET BY MOUTH EVERY DAY 3/29/22   Benjamin Valdivia MD   nitroGLYCERIN (NITROSTAT) 0.4 MG SL tablet Place 1 tablet under the tongue every 5 minutes as needed for Chest pain 3/22/22   MARCIE Mendoza CNP   levothyroxine (SYNTHROID) 150 MCG tablet Take 1 tablet by mouth every morning (before breakfast) 9/8/21   Benjamin Valdivia MD   allopurinol (ZYLOPRIM) 100 MG tablet TAKE 1 TABLET EVERY DAY 9/8/21   Benjamin Valdivia MD   metoprolol succinate (TOPROL XL) 25 MG extended release tablet TAKE 1 TABLET EVERY DAY 9/8/21   Benjamin Valdivia MD         Allergies   Allergen Reactions    Amoxicillin Anaphylaxis    Demeclocycline Anaphylaxis    Penicillins Anaphylaxis    Tetracyclines & Related Anaphylaxis    Ozempic (0.25 Or 0.5 Mg-Dose) [Semaglutide(0.25 Or 0.5mg-Dos)] Other (See Comments)     Lost large amount weight and loss  Of appetite    Codeine Itching and Rash             EXAM: (2-7 system for EPF/Detailed, ?8 for Comprehensive)  BP (!) 155/66   Pulse 79   Temp 98.1 °F (36.7 °C) (Oral)   Resp 16   Ht 5' 5\" (1.651 m)   Wt 172 lb (78 kg)   LMP (LMP Unknown)   SpO2 100%   BMI 28.62 kg/m²   Constitutional: vitals as above: alert, appears stated age and cooperative    Psychiatric: normal insight and judgment, oriented to person, place, time, and general circumstances    Head: Normocephalic, without obvious abnormality, atraumatic    Eyes:lids and lashes normal, conjunctivae and sclerae normal and pupils equal, round, reactive to light and accomodation    EMNT: external ears normal, nares midline    Neck: no carotid bruit, supple, symmetrical, trachea midline and thyroid not enlarged, symmetric, no tenderness/mass/nodules     Respiratory: clear to auscultation and percussion bilaterally with normal respiratory effort    Cardiovascular: normal rate, regular rhythm, normal S1 and S2 and no murmurs    Gastrointestinal: soft, non-tender, non-distended, normal bowel sounds, no masses or organomegaly    Extremities: no clubbing, trace edema    Skin:No rashes or nodules noted. Neurologic:negative         LABS:  Labs Reviewed   CBC WITH AUTO DIFFERENTIAL - Abnormal; Notable for the following components:       Result Value    WBC 15.5 (*)     RBC 3.36 (*)     Hemoglobin 10.8 (*)     Hematocrit 32.5 (*)     RDW 15.9 (*)     Neutrophils Absolute 12.6 (*)     All other components within normal limits   COMPREHENSIVE METABOLIC PANEL W/ REFLEX TO MG FOR LOW K - Abnormal; Notable for the following components:    Chloride 88 (*)     CO2 20 (*)     Anion Gap 29 (*)     Glucose 105 (*)      (*)     Creatinine 10.3 (*)     Est, Glom Filt Rate 4 (*)     Calcium 8.2 (*)     Alkaline Phosphatase 134 (*)     All other components within normal limits    Narrative:     CALL  [facility] tel. [phone],  Chemistry results called to and read back by Hermelinda Joseph, 11/26/2022  10:22, by Yumiko Trinidad         IMAGING:  Imaging results from the ER have been reviewed in the computerized chart.   XR ABDOMEN (KUB) (SINGLE AP VIEW)    Result Date: 11/21/2022  EXAMINATION: ONE SUPINE XRAY VIEW(S) OF THE ABDOMEN 11/20/2022 11:36 pm COMPARISON: None. HISTORY: ORDERING SYSTEM PROVIDED HISTORY: NG placement/distention TECHNOLOGIST PROVIDED HISTORY: Reason for exam:->NG placement/distention FINDINGS: The gas pattern is unremarkable. There is a nasogastric tube in place with the tip and side port along the fundus of the stomach. No urinary calculi are seen. Status post gastric tube placement along the fundus of the stomach with a non-specific gas pattern. CT HEAD WO CONTRAST    Result Date: 11/20/2022  EXAMINATION: CT OF THE HEAD WITHOUT CONTRAST  11/20/2022 12:35 pm TECHNIQUE: CT of the head was performed without the administration of intravenous contrast. Automated exposure control, iterative reconstruction, and/or weight based adjustment of the mA/kV was utilized to reduce the radiation dose to as low as reasonably achievable. COMPARISON: 08/25/2021 HISTORY: ORDERING SYSTEM PROVIDED HISTORY: AMS TECHNOLOGIST PROVIDED HISTORY: Reason for exam:->AMS Has a \"code stroke\" or \"stroke alert\" been called? ->No Decision Support Exception - unselect if not a suspected or confirmed emergency medical condition->Emergency Medical Condition (MA) Reason for Exam: AMS Additional signs and symptoms: Altered Mental Status (Pt from assisted living, was seen yesterday for back pain, now altered, did not finish dialysis yesterday and skipped Thursday. Pt alert to verbal but will not answer questions. ) FINDINGS: BRAIN/VENTRICLES: There is no acute intracranial hemorrhage, mass effect or midline shift. No abnormal extra-axial fluid collection. The gray-white differentiation is maintained without evidence of an acute infarct. There is no evidence of hydrocephalus. ORBITS: The visualized portion of the orbits demonstrate no acute abnormality. SINUSES: The visualized paranasal sinuses and mastoid air cells demonstrate no acute abnormality.  SOFT TISSUES/SKULL:  No acute abnormality of the visualized skull or soft tissues. No acute intracranial abnormality. MRI BRAIN WO CONTRAST    Result Date: 11/23/2022  EXAMINATION: MRI OF THE BRAIN WITHOUT CONTRAST  11/22/2022 6:42 pm TECHNIQUE: Multiplanar multisequence MRI of the brain was performed without the administration of intravenous contrast. COMPARISON: 07/18/2020 HISTORY: ORDERING SYSTEM PROVIDED HISTORY: AMS TECHNOLOGIST PROVIDED HISTORY: Reason for exam:->AMS Reason for Exam: AMS Initial evaluation FINDINGS: INTRACRANIAL STRUCTURES/VENTRICLES: There is no acute infarct. The ventricles and cisternal spaces are prominent consistent with cerebral atrophy. There are multiple punctate and confluent areas of high signal in the periventricular white matter and centrum semiovale and central zari that are likely related to chronic small vessel ischemic disease. There is no midline shift or mass effect. There are no areas of restricted diffusion. ORBITS: The visualized portion of the orbits demonstrate no acute abnormality. SINUSES:  The visualized paranasal sinuses and mastoid air cells are for the most part clear. BONES/SOFT TISSUES: The bone marrow signal intensity appears normal. The soft tissues demonstrate no acute abnormality. Cerebral atrophy. Moderate to severe chronic small vessel ischemic changes. No acute brain parenchymal abnormality. RECOMMENDATIONS: Unavailable     XR ABDOMEN (2 VIEWS)    Result Date: 11/22/2022  EXAMINATION: TWO XRAY VIEWS OF THE ABDOMEN 11/22/2022 9:16 am COMPARISON: None. HISTORY: ORDERING SYSTEM PROVIDED HISTORY: F/u possible ileus TECHNOLOGIST PROVIDED HISTORY: ICU Reason for exam:->F/u possible ileus Reason for Exam: F/u possible ileus FINDINGS: Hazy opacity is seen in the lungs, incompletely imaged. Gaseous distention is seen throughout the abdomen, predominantly involving the colon. No significant small bowel distention noted.   Moderate stool load is seen in the colon Calcifications are seen in the pelvis, compatible with phleboliths. There are clips from prior surgery. Nonspecific nonobstructive bowel gas pattern         EKG:   EKG from ER, reviewed by self - it shows sinus at 76  Old chart reviewed, EKG dated 11/20/22 is reviewed, there is not difference noted. Old study shows sinus at 80    Lab Results   Component Value Date/Time    GLUCOSE 105 11/26/2022 09:43 AM     Lab Results   Component Value Date/Time    POCGLU 139 11/24/2022 11:20 AM     BP (!) 155/66   Pulse 79   Temp 98.1 °F (36.7 °C) (Oral)   Resp 16   Ht 5' 5\" (1.651 m)   Wt 172 lb (78 kg)   LMP  (LMP Unknown)   SpO2 100%   BMI 28.62 kg/m²     MEDICAL DECISION MAKING:    Principal Problem:    End stage renal disease ([facility] [address] -Established problem. Uncontrolled. Plan: admit, renal to see. Will need HD  Active Problems:    HTN (hypertension), benign -Established problem. Uncontrolled 155/66  Plan: Pt home BP meds reviewed and will be continued. IV Hydralazine ordered for control of extremely high blood pressures. Will monitor labs to assess Creat/K for possible complications of medications. Hyperlipidemia -Established problem. Stable. Plan: cont meds    Diabetes mellitus type 2 in obese [facility])  Plan: Patient placed on controlled carbohydrate diet. Fingerstick sugars to be checked to monitor for both hypoglycemia as well as hyperglycemia. Sliding scale insulin ordered. Glucagon and dextrose ordered for hypoglycemia. Patient will be continued on home medications. Hemoglobin a1c to be ordered to assess efficacy of therapy. COVID 19 - Established problem. Stable. Plan - cont isolation        I have discussed with the patient the rationale for blood component transfusion; its benefits in treating or preventing fatigue, organ damage, or death; and its risk which includes mild transfusion reactions, rare risk of blood borne infection, or more serious but rare reactions.  I have discussed the alternatives to transfusion, including the risk and consequences of not receiving transfusion. The patient had an opportunity to ask questions and had agreed to proceed with transfusion of blood components. Diagnoses as listed above, designated as new or established and plan outlined for each. Data Reviewed:   (1) Lab tests were reviewed or ordered. (1) Radiology tests were reviewed or ordered. (1) Medical test (Echo, EKG, PFT/leonarda) were ordered. (1)History was not obtained from someone other than patient  (1) Old records were reviewed - see HPI/MDM for pertinent details if review done. (2) Case was discussed with another health care provider: Dr Nima Aleman  (2) Imaging was viewed by myself. (2) EKG  was viewed by myself. The patient is being placed in inpatient status with the expectation of requiring a hospital stay spanning at least two midnights for care and treatment of the problems noted in the problem list.  This determination is also based on thepatients comorbidities and past medical history, the severity and timing of the signs and symptoms upon presentation.     (Please note that portions of this note were completed with a voice recognition program.  Efforts were made to edit the dictations but occasionally words are mis-transcribed.)      Electronically signed by: Isra Seymour MD 11/26/2022

## 2022-11-27 VITALS
TEMPERATURE: 97.6 F | DIASTOLIC BLOOD PRESSURE: 63 MMHG | RESPIRATION RATE: 16 BRPM | WEIGHT: 167.6 LBS | SYSTOLIC BLOOD PRESSURE: 137 MMHG | BODY MASS INDEX: 27.92 KG/M2 | HEIGHT: 65 IN | HEART RATE: 74 BPM | OXYGEN SATURATION: 97 %

## 2022-11-27 LAB
A/G RATIO: 1.7 (ref 1.1–2.2)
ALBUMIN SERPL-MCNC: 4.6 G/DL (ref 3.4–5)
ALP BLD-CCNC: 138 U/L (ref 40–129)
ALT SERPL-CCNC: 15 U/L (ref 10–40)
ANION GAP SERPL CALCULATED.3IONS-SCNC: 25 MMOL/L (ref 3–16)
AST SERPL-CCNC: 15 U/L (ref 15–37)
BASOPHILS ABSOLUTE: 0 K/UL (ref 0–0.2)
BASOPHILS RELATIVE PERCENT: 0 %
BILIRUB SERPL-MCNC: 0.6 MG/DL (ref 0–1)
BUN BLDV-MCNC: 76 MG/DL (ref 7–20)
CALCIUM SERPL-MCNC: 9.1 MG/DL (ref 8.3–10.6)
CHLORIDE BLD-SCNC: 89 MMOL/L (ref 99–110)
CO2: 19 MMOL/L (ref 21–32)
CREAT SERPL-MCNC: 7.3 MG/DL (ref 0.6–1.2)
EKG ATRIAL RATE: 75 BPM
EKG DIAGNOSIS: NORMAL
EKG P AXIS: 52 DEGREES
EKG P-R INTERVAL: 172 MS
EKG Q-T INTERVAL: 442 MS
EKG QRS DURATION: 90 MS
EKG QTC CALCULATION (BAZETT): 493 MS
EKG R AXIS: -19 DEGREES
EKG T AXIS: 99 DEGREES
EKG VENTRICULAR RATE: 75 BPM
EOSINOPHILS ABSOLUTE: 0 K/UL (ref 0–0.6)
EOSINOPHILS RELATIVE PERCENT: 0 %
GFR SERPL CREATININE-BSD FRML MDRD: 5 ML/MIN/{1.73_M2}
GLUCOSE BLD-MCNC: 146 MG/DL (ref 70–99)
HCT VFR BLD CALC: 31.7 % (ref 36–48)
HEMOGLOBIN: 10.3 G/DL (ref 12–16)
LYMPHOCYTES ABSOLUTE: 0.9 K/UL (ref 1–5.1)
LYMPHOCYTES RELATIVE PERCENT: 6 %
MCH RBC QN AUTO: 31.2 PG (ref 26–34)
MCHC RBC AUTO-ENTMCNC: 32.5 G/DL (ref 31–36)
MCV RBC AUTO: 96 FL (ref 80–100)
MONOCYTES ABSOLUTE: 0.2 K/UL (ref 0–1.3)
MONOCYTES RELATIVE PERCENT: 1.3 %
NEUTROPHILS ABSOLUTE: 14 K/UL (ref 1.7–7.7)
NEUTROPHILS RELATIVE PERCENT: 92.7 %
PDW BLD-RTO: 15.3 % (ref 12.4–15.4)
PLATELET # BLD: 344 K/UL (ref 135–450)
PMV BLD AUTO: 8.4 FL (ref 5–10.5)
POTASSIUM REFLEX MAGNESIUM: 5.4 MMOL/L (ref 3.5–5.1)
RBC # BLD: 3.3 M/UL (ref 4–5.2)
SODIUM BLD-SCNC: 133 MMOL/L (ref 136–145)
TOTAL PROTEIN: 7.3 G/DL (ref 6.4–8.2)
WBC # BLD: 15.1 K/UL (ref 4–11)

## 2022-11-27 PROCEDURE — 93010 ELECTROCARDIOGRAM REPORT: CPT | Performed by: INTERNAL MEDICINE

## 2022-11-27 PROCEDURE — 80053 COMPREHEN METABOLIC PANEL: CPT

## 2022-11-27 PROCEDURE — 85025 COMPLETE CBC W/AUTO DIFF WBC: CPT

## 2022-11-27 PROCEDURE — 6370000000 HC RX 637 (ALT 250 FOR IP): Performed by: HOSPITALIST

## 2022-11-27 PROCEDURE — 6360000002 HC RX W HCPCS: Performed by: INTERNAL MEDICINE

## 2022-11-27 PROCEDURE — 6370000000 HC RX 637 (ALT 250 FOR IP): Performed by: INTERNAL MEDICINE

## 2022-11-27 PROCEDURE — 99222 1ST HOSP IP/OBS MODERATE 55: CPT | Performed by: HOSPITALIST

## 2022-11-27 PROCEDURE — 36415 COLL VENOUS BLD VENIPUNCTURE: CPT

## 2022-11-27 RX ADMIN — DEXAMETHASONE 2 MG: 4 TABLET ORAL at 07:59

## 2022-11-27 RX ADMIN — ISOSORBIDE MONONITRATE 30 MG: 30 TABLET, EXTENDED RELEASE ORAL at 07:59

## 2022-11-27 RX ADMIN — ONDANSETRON 4 MG: 4 TABLET, ORALLY DISINTEGRATING ORAL at 08:13

## 2022-11-27 RX ADMIN — SODIUM ZIRCONIUM CYCLOSILICATE 10 G: 10 POWDER, FOR SUSPENSION ORAL at 10:33

## 2022-11-27 RX ADMIN — SPIRONOLACTONE 25 MG: 25 TABLET ORAL at 08:12

## 2022-11-27 RX ADMIN — HEPARIN SODIUM 5000 UNITS: 5000 INJECTION INTRAVENOUS; SUBCUTANEOUS at 05:05

## 2022-11-27 RX ADMIN — BACLOFEN 5 MG: 10 TABLET ORAL at 08:13

## 2022-11-27 RX ADMIN — METOPROLOL SUCCINATE 25 MG: 25 TABLET, EXTENDED RELEASE ORAL at 07:58

## 2022-11-27 RX ADMIN — ALLOPURINOL 100 MG: 100 TABLET ORAL at 07:59

## 2022-11-27 RX ADMIN — PANTOPRAZOLE SODIUM 40 MG: 40 TABLET, DELAYED RELEASE ORAL at 07:59

## 2022-11-27 RX ADMIN — LEVOTHYROXINE SODIUM 150 MCG: 0.15 TABLET ORAL at 05:05

## 2022-11-27 RX ADMIN — ASPIRIN 81 MG: 81 TABLET, COATED ORAL at 08:00

## 2022-11-27 RX ADMIN — CLOPIDOGREL BISULFATE 75 MG: 75 TABLET ORAL at 07:59

## 2022-11-27 RX ADMIN — TORSEMIDE 120 MG: 20 TABLET ORAL at 08:13

## 2022-11-27 ASSESSMENT — PAIN SCALES - WONG BAKER
WONGBAKER_NUMERICALRESPONSE: 0

## 2022-11-27 ASSESSMENT — PAIN DESCRIPTION - LOCATION: LOCATION: LEG

## 2022-11-27 ASSESSMENT — PAIN - FUNCTIONAL ASSESSMENT: PAIN_FUNCTIONAL_ASSESSMENT: ACTIVITIES ARE NOT PREVENTED

## 2022-11-27 ASSESSMENT — PAIN SCALES - GENERAL: PAINLEVEL_OUTOF10: 4

## 2022-11-27 NOTE — PROGRESS NOTES
Patient at high risk for fall. Fuentes fall score (50) ABCDS score (0) and reviewed with patient/family. Patient refuses bed alarm/camera. Reviewed risk of falling and injury with patient/family. Fall risk arm band on patient. Fall risk sign and light visible outside door. Call light in reach . Side rails up 2/4. Personal items within reach of patient. Patient instructed to call for assistance with toileting/ambulation or any other needs.

## 2022-11-27 NOTE — PROGRESS NOTES
Patient has arrived to unit in stable condition. Vitals obtained. Patient is awake, alert and oriented. Patient does not appear to be in distress. Patient oriented to room and call light. Plan of care discussed with patient, patient agreeable. Call light within reach. No further concerns expressed at this time.

## 2022-11-27 NOTE — CONSULTS
Nephrology Consult Note                                                                                                                                                                                                                                                                                                                                                               Office : [phone]     Fax :[phone]              Patient's Name: Joby Espitia  8:49 AM  11/27/2022    Reason for Consult:  ESRD  Requesting Physician:  Frieda Herron MD      Chief Complaint:  missed HD    History of Present Ilness:    Joby Espitia is a 67 y.o. female with PMH of ESRD on HD    CAD , DLP , HTN , recent COVID 23 infection    Presented to ED after she missed her outpatient HD     Denies SOB or chest pain    Nephrology consult for ESRD management    Past Medical History:   Diagnosis Date    Arthritis     CAD (coronary artery disease)     Diabetes ([facility]     Hemodialysis patient ([facility]     Hyperlipidemia     Hypertension     Kidney disease     Neuropathy     Pneumonia     IN February, 2021, with COVID    Thyroid disease        Past Surgical History:   Procedure Laterality Date    APPENDECTOMY      CHOLECYSTECTOMY      COLONOSCOPY  11/24/2020    COLONOSCOPY POLYPECTOMY SNARE/COLD BIOPSY performed by Dejon Pacheco MD at [place] [address] 5/17/2021    PERITONEAL DIALYSIS CATHETER REMOVAL.  [address] performed by Jesus Shah DO at [address] [facility] ([facility])      FULL    LAPAROSCOPY INSERTION PERITONEAL CATHETER N/A 4/3/2020    LAPAROSCOPIC LYSIS OF ADHESIONS, LAPAROSCOPIC PERITONEAL DIALYSIS CATHETER PLACEMENT, LAPAROSCOPIC OMENTOPEXY performed by Jesus Shah DO at [address] [facility] [address] BILATERAL      preventive    ROTATOR CUFF REPAIR Left     SHUNT REVISION Left 8/19/2021    LEFT FOREARM ARTERIO VENOUS GRAFT performed by Brooklynn Villeda MD at [facility] OR    UPPER GASTROINTESTINAL ENDOSCOPY N/A 11/24/2020    EGD BIOPSY performed by Jase Parson MD at [facility]       Family History   Problem Relation Age of Onset    Cancer Mother         lung, breast, liver    Stroke Father     Stomach Cancer Brother     Cancer Maternal Grandmother     No Known Problems Paternal Grandmother     No Known Problems Paternal Grandfather         reports that she quit smoking about 44 years ago. Her smoking use included cigarettes. She has a 5.00 pack-year smoking history. She has never used smokeless tobacco. She reports that she does not currently use alcohol. She reports that she does not use drugs.     Allergies:  Amoxicillin, Demeclocycline, Penicillins, Tetracyclines & related, Ozempic (0.25 or 0.5 mg-dose) [semaglutide(0.25 or 0.5mg-dos)], and Codeine    Current Medications:    sodium zirconium cyclosilicate (LOKELMA) oral suspension 10 g, TID  levothyroxine (SYNTHROID) tablet 150 mcg, QAM AC  allopurinol (ZYLOPRIM) tablet 100 mg, Daily  metoprolol succinate (TOPROL XL) extended release tablet 25 mg, Daily  nitroGLYCERIN (NITROSTAT) SL tablet 0.4 mg, Q5 Min PRN  spironolactone (ALDACTONE) tablet 25 mg, Daily  clopidogrel (PLAVIX) tablet 75 mg, Daily  rosuvastatin (CRESTOR) tablet 10 mg, Nightly  nortriptyline (PAMELOR) capsule 25 mg, Nightly  aspirin EC tablet 81 mg, Daily  pantoprazole (PROTONIX) tablet 40 mg, Daily with breakfast  rOPINIRole (REQUIP) tablet 0.25 mg, Nightly  isosorbide mononitrate (IMDUR) extended release tablet 30 mg, Daily  baclofen (LIORESAL) tablet 5 mg, BID PRN  lidocaine 4 % external patch 1 patch, Daily  gabapentin (NEURONTIN) capsule 200 mg, Nightly  torsemide (DEMADEX) tablet 120 mg, Daily  dexamethasone (DECADRON) tablet 2 mg, BID WC  sodium chloride flush 0.9 % injection 10 mL, 2 times per day  sodium chloride flush 0.9 % injection 10 mL, PRN  0.9 % sodium chloride infusion, PRN  heparin (porcine) injection 5,000 Units, 3 times per day  ondansetron (ZOFRAN-ODT) disintegrating tablet 4 mg, Q8H PRN   Or  ondansetron (ZOFRAN) injection 4 mg, Q6H PRN  acetaminophen (TYLENOL) tablet 650 mg, Q6H PRN   Or  acetaminophen (TYLENOL) suppository 650 mg, Q6H PRN        Review of Systems:   14 point ROS obtained but were negative except mentioned in HPI      Physical exam:     Vitals:  /63   Pulse 74   Temp 97.6 °F (36.4 °C) (Oral)   Resp 16   Ht 5' 5\" (1.651 m)   Wt 167 lb 9.6 oz (76 kg)   LMP  (LMP Unknown)   SpO2 97%   BMI 27.89 kg/m²   Constitutional:  OAA X3 NAD  Skin: no rash, turgor wnl  Heent:  eomi, mmm  Neck: no bruits or jvd noted  Cardiovascular:  S1, S2 without m/r/g  Respiratory: CTA B without w/r/r  Abdomen:  +bs, soft, nt, nd  Ext:  no lower extremity edema  Psychiatric: mood and affect appropriate  Musculoskeletal:  Rom, muscular strength intact    Data:   Labs:  CBC:   Recent Labs     11/26/22 0943 11/27/22  0717   WBC 15.5* 15.1*   HGB 10.8* 10.3*    344     BMP:    Recent Labs     11/26/22  0943 11/27/22  0717    133*   K 4.7 5.4*   CL 88* 89*   CO2 20* 19*   * 76*   CREATININE 10.3* 7.3*   GLUCOSE 105* 146*     Ca/Mg/Phos:   Recent Labs     11/26/22 0943 11/27/22  0717   CALCIUM 8.2* 9.1     Hepatic:   Recent Labs     11/26/22 0943 11/27/22  0717   AST 16 15   ALT 15 15   BILITOT 0.5 0.6   ALKPHOS 134* 138*     Troponin: No results for input(s): TROPONINI in the last 72 hours. BNP: No results for input(s): BNP in the last 72 hours. Lipids: No results for input(s): CHOL, TRIG, HDL, LDLCALC, LABVLDL in the last 72 hours. ABGs: No results for input(s): PHART, PO2ART, HWO1XDI in the last 72 hours. INR: No results for input(s): INR in the last 72 hours. UA:No results for input(s): Elan Crown, GLUCOSEU, BILIRUBINUR, Norden Kilts, BLOODU, PHUR, PROTEINU, UROBILINOGEN, NITRU, LEUKOCYTESUR, LABMICR, URINETYPE in the last 72 hours.    Urine Microscopic: No results for input(s): LABCAST, BACTERIA, COMU, HYALCAST, WBCUA, RBCUA, EPIU in the last 72 hours. Urine Culture: No results for input(s): LABURIN in the last 72 hours. Urine Chemistry: No results for input(s): Jeff Ibarra, PROTEINSHIVA, NASHIVA in the last 72 hours.           IMAGING:  No orders to display       Assessment/Plan       ESRD on HD    Outpatient HD schedule : TTS    Last HD on Wednesday on 11/23/22         S/p HD x 3 hrs on 11/26    Medication dose per ESRD          Hyperkalemia    Renal diet    Give Helen Evans 19 infection     Management per primary team      Stable renal standpoint    She will continue outpatient HD as per TTS schedule                Thank you for allowing us to participate in care of [facility], MD  Feel free to contact me   [facility] [address]  Office : [phone]  Fax :[phone]

## 2022-11-27 NOTE — PLAN OF CARE
Problem: Safety - Adult  Goal: Free from fall injury  11/27/2022 0024 by Nina Campos RN  Outcome: Progressing

## 2022-11-27 NOTE — DISCHARGE SUMMARY
[facility] -- Physician Discharge Summary     Elder Soto  1945  MRN: [id number]    Admit Date: 11/26/2022  Discharge Date: No discharge date for patient encounter. Attending MD: Isra Seymour MD  Discharging MD: Isra Seymour MD  PCP: MD Rodriguez Bruce [address] [facility] [address] [phone]    Admission Diagnosis: ESRD (end stage renal disease) ([facility] [N18.6]  ESRD needing dialysis ([facility] [N18.6, Z99.2]  Acute renal failure superimposed on chronic kidney disease, on chronic dialysis, unspecified acute renal failure type ([facility] [N17.9, N18.9, Z99.2]  DISCHARGE DIAGNOSIS: same    Full Hospital Problem List:  SHARON/Jac[facility] Problems    Diagnosis Date Noted    End stage renal disease ([facility] [N18.6] 11/20/2022     Priority: Medium    ESRD (end stage renal disease) ([facility] [N18.6]     HTN (hypertension), benign [I10] 01/23/2017    Hyperlipidemia [E78.5] 01/23/2017    Diabetes mellitus type 2 in obese ([facility] [E11.69, E66.9] 01/23/2017           Hospital Course:   67 y.o. female who was brought in by self for dialysis. The patient's normal dialysis schedule is Tuesday, Thursday, Saturday. Her last dialysis was on Tuesday. She did not get dialysis on Thursday to Thanksgiving. She was scheduled to start again today. However, due to a scheduling issue, the dialysis center was not ready for her. Because they were afraid they would not be able to dialyze her today, [facility] dialysis and sent her to the emergency department for further evaluation management. due to missing dialysis, she has metabolic abnormalities. Requires admission and emergent dialysis.   Post dialysis, her labs trend to normal. She is cleared for d/c and recommended to keep to her usual schedule per renal, Dr Cassi Velasco made during Hospitalization:  One Jose Alberto Rizvi    Treatment team at time of Discharge: Treatment Team: Attending Provider: Dawit Castañeda Joshua Gutierrez MD; Consulting Physician: Felice Paget, MD; Consulting Physician: Frank Ferguson MD; Consulting Physician: Ron Cannon MD; Respiratory Therapist (Day): Adelita Lanier RCP; Registered Nurse: Kika Price RN    Imaging Results:  XR ABDOMEN (KUB) (SINGLE AP VIEW)    Result Date: 11/21/2022  EXAMINATION: ONE SUPINE XRAY VIEW(S) OF THE ABDOMEN 11/20/2022 11:36 pm COMPARISON: None. HISTORY: ORDERING SYSTEM PROVIDED HISTORY: NG placement/distention TECHNOLOGIST PROVIDED HISTORY: Reason for exam:->NG placement/distention FINDINGS: The gas pattern is unremarkable. There is a nasogastric tube in place with the tip and side port along the fundus of the stomach. No urinary calculi are seen. Status post gastric tube placement along the fundus of the stomach with a non-specific gas pattern. CT HEAD WO CONTRAST    Result Date: 11/20/2022  EXAMINATION: CT OF THE HEAD WITHOUT CONTRAST  11/20/2022 12:35 pm TECHNIQUE: CT of the head was performed without the administration of intravenous contrast. Automated exposure control, iterative reconstruction, and/or weight based adjustment of the mA/kV was utilized to reduce the radiation dose to as low as reasonably achievable. COMPARISON: 08/25/2021 HISTORY: ORDERING SYSTEM PROVIDED HISTORY: AMS TECHNOLOGIST PROVIDED HISTORY: Reason for exam:->AMS Has a \"code stroke\" or \"stroke alert\" been called? ->No Decision Support Exception - unselect if not a suspected or confirmed emergency medical condition->Emergency Medical Condition (MA) Reason for Exam: AMS Additional signs and symptoms: Altered Mental Status (Pt from assisted living, was seen yesterday for back pain, now altered, did not finish dialysis yesterday and skipped Thursday. Pt alert to verbal but will not answer questions. ) FINDINGS: BRAIN/VENTRICLES: There is no acute intracranial hemorrhage, mass effect or midline shift. No abnormal extra-axial fluid collection.   The gray-white differentiation is maintained without evidence of an acute infarct. There is no evidence of hydrocephalus. ORBITS: The visualized portion of the orbits demonstrate no acute abnormality. SINUSES: The visualized paranasal sinuses and mastoid air cells demonstrate no acute abnormality. SOFT TISSUES/SKULL:  No acute abnormality of the visualized skull or soft tissues. No acute intracranial abnormality. MRI BRAIN WO CONTRAST    Result Date: 11/23/2022  EXAMINATION: MRI OF THE BRAIN WITHOUT CONTRAST  11/22/2022 6:42 pm TECHNIQUE: Multiplanar multisequence MRI of the brain was performed without the administration of intravenous contrast. COMPARISON: 07/18/2020 HISTORY: ORDERING SYSTEM PROVIDED HISTORY: AMS TECHNOLOGIST PROVIDED HISTORY: Reason for exam:->AMS Reason for Exam: AMS Initial evaluation FINDINGS: INTRACRANIAL STRUCTURES/VENTRICLES: There is no acute infarct. The ventricles and cisternal spaces are prominent consistent with cerebral atrophy. There are multiple punctate and confluent areas of high signal in the periventricular white matter and centrum semiovale and central zari that are likely related to chronic small vessel ischemic disease. There is no midline shift or mass effect. There are no areas of restricted diffusion. ORBITS: The visualized portion of the orbits demonstrate no acute abnormality. SINUSES:  The visualized paranasal sinuses and mastoid air cells are for the most part clear. BONES/SOFT TISSUES: The bone marrow signal intensity appears normal. The soft tissues demonstrate no acute abnormality. Cerebral atrophy. Moderate to severe chronic small vessel ischemic changes. No acute brain parenchymal abnormality. RECOMMENDATIONS: Unavailable     XR ABDOMEN (2 VIEWS)    Result Date: 11/22/2022  EXAMINATION: TWO XRAY VIEWS OF THE ABDOMEN 11/22/2022 9:16 am COMPARISON: None.  HISTORY: ORDERING SYSTEM PROVIDED HISTORY: F/u possible ileus TECHNOLOGIST PROVIDED HISTORY: ICU Reason for exam:->F/u possible ileus Reason for Exam: F/u possible ileus FINDINGS: Hazy opacity is seen in the lungs, incompletely imaged. Gaseous distention is seen throughout the abdomen, predominantly involving the colon. No significant small bowel distention noted. Moderate stool load is seen in the colon Calcifications are seen in the pelvis, compatible with phleboliths. There are clips from prior surgery. Nonspecific nonobstructive bowel gas pattern         Discharge Exam: (2-7 system for EPF/Detailed, ?8 for Comprehensive)  /63   Pulse 74   Temp 97.6 °F (36.4 °C) (Oral)   Resp 16   Ht 5' 5\" (1.651 m)   Wt 167 lb 9.6 oz (76 kg)   LMP  (LMP Unknown)   SpO2 97%   BMI 27.89 kg/m²   Constitutional: vitals as above: alert, appears stated age and cooperative    Psychiatric: normal insight and judgment, oriented to person, place, time, and general circumstances    Head: Normocephalic, without obvious abnormality, atraumatic    Eyes:lids and lashes normal, conjunctivae and sclerae normal and pupils equal, round, reactive to light and accomodation    EMNT: external ears normal, nares midline    Neck: no carotid bruit, supple, symmetrical, trachea midline and thyroid not enlarged, symmetric, no tenderness/mass/nodules     Respiratory: clear to auscultation and percussion bilaterally with normal respiratory effort    Cardiovascular: normal rate, regular rhythm, normal S1 and S2 and no murmurs    Gastrointestinal: soft, non-tender, non-distended, normal bowel sounds, no masses or organomegaly    Extremities: no clubbing, no edema    Skin:No rashes or nodules noted.     Neurologic:negative             Disposition: home    Condition: stable    Discharge Medications:  [unfilled]       Medication List      CONTINUE taking these medications     allopurinol 100 MG tablet; Commonly known as: ZYLOPRIM; TAKE 1 TABLET   EVERY DAY   Aspirin Low Dose 81 MG EC tablet; Generic drug: aspirin; TAKE 1 TABLET   BY MOUTH EVERY DAY   baclofen 10 MG tablet; Commonly known as: LIORESAL; Take 1 tablet by   mouth 3 times daily as needed (muscle spasms)   clopidogrel 75 MG tablet; Commonly known as: Plavix; Take 1 tablet by   mouth daily   dexamethasone 2 MG tablet; Commonly known as: DECADRON; Take 1 tablet by   mouth 2 times daily (with meals) for 5 days   gabapentin 100 MG capsule; Commonly known as: NEURONTIN   levothyroxine 150 MCG tablet; Commonly known as: SYNTHROID; Take 1   tablet by mouth every morning (before breakfast)   lidocaine 5 %; Commonly known as: Lidoderm; Place 1 patch onto the skin   daily 12 hours on, 12 hours off.   metoprolol succinate 25 MG extended release tablet; Commonly known as:   TOPROL XL; TAKE 1 TABLET EVERY DAY   nitroGLYCERIN 0.4 MG SL tablet; Commonly known as: Nitrostat; Place 1   tablet under the tongue every 5 minutes as needed for Chest pain   nortriptyline 10 MG capsule; Commonly known as: PAMELOR; TAKE 1 CAPSULE   BY MOUTH EVERY EVENING   pantoprazole 40 MG tablet; Commonly known as: Protonix; Take 1 tablet by   mouth daily (with breakfast)   rOPINIRole 0.25 MG tablet; Commonly known as: Requip; Take 1 tablet by   mouth nightly   rosuvastatin 20 MG tablet; Commonly known as: CRESTOR; TAKE 1 TABLET BY   MOUTH EVERY DAY   spironolactone 25 MG tablet; Commonly known as: ALDACTONE; Take 1 tablet   by mouth daily   torsemide 20 MG tablet; Commonly known as: DEMADEX; Take 6 tablets by   mouth 2 times daily 6 tabs bid     STOP taking these medications     irbesartan-hydroCHLOROthiazide 150-12.5 MG per tablet; Commonly known   as: AVALIDE     ASK your doctor about these medications     isosorbide mononitrate 30 MG extended release tablet; Commonly known as:   IMDUR   vitamin D3 25 MCG (1000 UT) Tabs tablet; Commonly known as:   CHOLECALCIFEROL; Take 1 tablet by mouth daily         Allergies:   Allergies   Allergen Reactions    Amoxicillin Anaphylaxis    Demeclocycline Anaphylaxis    Penicillins Anaphylaxis    Tetracyclines & Related Anaphylaxis    Ozempic (0.25 Or 0.5 Mg-Dose) [Semaglutide(0.25 Or 0.5mg-Dos)] Other (See Comments)     Lost large amount weight and loss  Of appetite    Codeine Itching and Rash       Follow up Instructions: Follow-up with PCP: Dino Johnson MD in  2 wk . Total time spent on day of discharge including face-to-face visit, examination, documentation, counseling, preparation of discharge plans and followup, and discharge medicine reconciliation and presciptions is 31 minutes      ---       Subjective from day of d/c:   Bigg Perez is a 67 y.o. female. Pt seen and examined  Chart reviewed since last visit, labs and imaging below      Doing ok  Tolerated HD well  Can return home per Renal    Review of Systems: (1 system for EPF, 2-9 for detailed, 10+ for comprehensive)  Constitutional: Negative for chills and fever. HENT: Negative for dental problem, nosebleeds and rhinorrhea. Eyes: Negative for photophobia and visual disturbance. Respiratory: Negative for cough, chest tightness and shortness of breath. Cardiovascular: Negative for chest pain and leg swelling. Gastrointestinal: Negative for diarrhea, nausea and vomiting. Endocrine: Negative for polydipsia and polyphagia. Genitourinary: Negative for frequency, hematuria and urgency. Musculoskeletal: Negative for back pain and myalgias. Skin: Negative for rash. Allergic/Immunologic: Negative for food allergies. Neurological: Negative for dizziness, seizures, syncope and facial asymmetry. Hematological: Negative for adenopathy. Psychiatric/Behavioral: Negative for dysphoric mood. The patient is not nervous/anxious. I have reviewed the patient's medical and social history in detail and updated the computerized patient record.   To recap: She  has a past medical history of Arthritis, CAD (coronary artery disease), Diabetes ([address] Hemodialysis patient ([facility] Hyperlipidemia, Hypertension, Kidney disease, Neuropathy, Pneumonia, and Thyroid disease. . She  has a past surgical history that includes Appendectomy; Mastectomy, bilateral; Rotator cuff repair (Left); Cholecystectomy; LAPAROSCOPY INSERTION PERITONEAL CATHETER (N/A, 4/3/2020); Upper gastrointestinal endoscopy (N/A, 11/24/2020); Colonoscopy (11/24/2020); Hysterectomy; Dialysis Catheter Removal (N/A, 5/17/2021); and shunt revision (Left, 8/19/2021). . She  reports that she quit smoking about 44 years ago. Her smoking use included cigarettes. She has a 5.00 pack-year smoking history. She has never used smokeless tobacco. She reports that she does not currently use alcohol. She reports that she does not use drugs. .      Current Facility-Administered Medications: sodium zirconium cyclosilicate (LOKELMA) oral suspension 10 g, 10 g, Oral, TID  levothyroxine (SYNTHROID) tablet 150 mcg, 150 mcg, Oral, QAM AC  allopurinol (ZYLOPRIM) tablet 100 mg, 100 mg, Oral, Daily  metoprolol succinate (TOPROL XL) extended release tablet 25 mg, 25 mg, Oral, Daily  nitroGLYCERIN (NITROSTAT) SL tablet 0.4 mg, 0.4 mg, SubLINGual, Q5 Min PRN  spironolactone (ALDACTONE) tablet 25 mg, 25 mg, Oral, Daily  clopidogrel (PLAVIX) tablet 75 mg, 75 mg, Oral, Daily  rosuvastatin (CRESTOR) tablet 10 mg, 10 mg, Oral, Nightly  nortriptyline (PAMELOR) capsule 25 mg, 25 mg, Oral, Nightly  aspirin EC tablet 81 mg, 81 mg, Oral, Daily  pantoprazole (PROTONIX) tablet 40 mg, 40 mg, Oral, Daily with breakfast  rOPINIRole (REQUIP) tablet 0.25 mg, 0.25 mg, Oral, Nightly  isosorbide mononitrate (IMDUR) extended release tablet 30 mg, 30 mg, Oral, Daily  baclofen (LIORESAL) tablet 5 mg, 5 mg, Oral, BID PRN  lidocaine 4 % external patch 1 patch, 1 patch, TransDERmal, Daily  gabapentin (NEURONTIN) capsule 200 mg, 200 mg, Oral, Nightly  torsemide (DEMADEX) tablet 120 mg, 120 mg, Oral, Daily  dexamethasone (DECADRON) tablet 2 mg, 2 mg, Oral, BID WC  sodium chloride flush 0.9 % injection 10 mL, 10 mL, IntraVENous, 2 times per day  sodium chloride flush 0.9 % injection 10 mL, 10 mL, IntraVENous, PRN  0.9 % sodium chloride infusion, , IntraVENous, PRN  heparin (porcine) injection 5,000 Units, 5,000 Units, SubCUTAneous, 3 times per day  ondansetron (ZOFRAN-ODT) disintegrating tablet 4 mg, 4 mg, Oral, Q8H PRN **OR** ondansetron (ZOFRAN) injection 4 mg, 4 mg, IntraVENous, Q6H PRN  acetaminophen (TYLENOL) tablet 650 mg, 650 mg, Oral, Q6H PRN **OR** acetaminophen (TYLENOL) suppository 650 mg, 650 mg, Rectal, Q6H PRN         Objective (exam): see above    Labs at time of d/c:  Lab Results   Component Value Date    WBC 15.1 (H) 11/27/2022    HGB 10.3 (L) 11/27/2022    HCT 31.7 (L) 11/27/2022     11/27/2022    CHOL 96 08/12/2021    TRIG 132 08/12/2021    HDL 32 (L) 08/12/2021    LDLDIRECT 124 (H) 08/15/2019    ALT 15 11/27/2022    AST 15 11/27/2022     (L) 11/27/2022    K 5.4 (H) 11/27/2022    CL 89 (L) 11/27/2022    CREATININE 7.3 (HH) 11/27/2022    BUN 76 (H) 11/27/2022    CO2 19 (L) 11/27/2022    TSH 2.64 08/15/2019    INR 1.03 09/01/2021    LABA1C 5.9 11/21/2022    LABMICR YES 08/26/2021     Lab Results   Component Value Date    CKTOTAL 243 (H) 08/25/2021    TROPONINI 0.04 (H) 11/20/2022       (Please note that portions of this note were completed with a voice recognition program.  Efforts were made to edit the dictations but occasionally words are mis-transcribed.)      Signed:  Alea Glasgow MD  11/27/2022

## 2022-11-28 ENCOUNTER — CARE COORDINATION (OUTPATIENT)
Dept: CASE MANAGEMENT | Age: 77
End: 2022-11-28

## 2022-11-28 NOTE — CARE COORDINATION
Alexandra Riley from [facility] called and confirmed that orders for home care have been received and patient will be seen tomorrow.

## 2022-11-28 NOTE — CARE COORDINATION
[facility] Care Transitions Initial Follow Up Call    Call within 2 business days of discharge: Yes      First attempt at 24 hour discharge call, patient answered and said that she does not have time to speak at this time, she is trying to schedule an appointment. CTN will reach out at a later time. CTN left message with Alexandra Crooks at [facility] requesting confirmation [facility]. CTN will remain available.         Follow Up  Future Appointments   Date Time Provider Rod Plummer   12/19/2022  2:15 PM Emily Lincoln MD [place] [id number] [address] [facility]       Kirby Rich RN

## 2022-11-29 ENCOUNTER — CARE COORDINATION (OUTPATIENT)
Dept: CASE MANAGEMENT | Age: 77
End: 2022-11-29

## 2022-11-29 DIAGNOSIS — N18.6 END STAGE RENAL DISEASE (HCC): Primary | ICD-10-CM

## 2022-11-29 PROCEDURE — 1111F DSCHRG MED/CURRENT MED MERGE: CPT | Performed by: FAMILY MEDICINE

## 2022-11-29 RX ORDER — GABAPENTIN 100 MG/1
200 CAPSULE ORAL NIGHTLY
Qty: 90 CAPSULE | OUTPATIENT
Start: 2022-11-29

## 2022-11-29 NOTE — CARE COORDINATION
[facility] Care Transitions Initial Follow Up Call    Call within 2 business days of discharge: Yes    Care Transition Nurse contacted the patient by telephone to perform post hospital discharge assessment. Verified name and  with patient as identifiers. Provided introduction to self, and explanation of the Care Transition Nurse role. Patient: Fernando Boyd Patient : 1945   MRN: [id number]  Reason for Admission: ESRD, COVID+ 22  Discharge Date: 22 RARS: Readmission Risk Score: 24.2      Last Discharge  Street       Date Complaint Diagnosis Description Type Department Provider    22 Other Acute renal failure superimposed on chronic kidney disease, on chronic dialysis, unspecified acute renal failure type ([facility] [address] . .. ED to Hosp-Admission (Discharged) (ADMITTED) [facility] [address] Esther Roberts MD; Osvaldo Guillory. .. Was this an external facility discharge? No Discharge Facility:     Challenges to be reviewed by the provider   Additional needs identified to be addressed with provider: No  none               Method of communication with provider: none. Patient reports that she is doing very well. She reports that she just woke up and is a little confused about what day it is. CTN explained that it was Tuesday and she thought that she had missed her dialysis that was scheduled for today. She, then realized that it is scheduled for Wednesday, Thursday & Saturday this week. Next week, she will resume her Tues, Thurs, Sat schedule. She has an appointment with Dr. Cordell Rangel 22, CTN will route note to try & move this up for a hospital follow up appointment. She is also almost out of gabapentin, CTN will let PCP know this as well. [facility] is scheduled to visit today. Patient denies any further questions or concerns at this time. CTN will resolve episode & remain available.     Care Transition Nurse reviewed discharge instructions and red flags with patient who verbalized understanding. The patient was given an opportunity to ask questions and does not have any further questions or concerns at this time. Were discharge instructions available to patient? Yes. Reviewed appropriate site of care based on symptoms and resources available to patient including: PCP  Urgent care clinics  [facility]  When to call 911. The patient agrees to contact the PCP office for questions related to their healthcare. Advance Care Planning:   Does patient have an Advance Directive: reviewed and current. Medication reconciliation was performed with patient, who verbalizes understanding of administration of home medications. Medications reviewed, 1111F entered: yes    Was patient discharged with a pulse oximeter? no    Non-face-to-face services provided:  Obtained and reviewed discharge summary and/or continuity of care documents    Offered patient enrollment in the Remote Patient Monitoring (RPM) program for in-home monitoring:  No, will offer on follow up call. .    Care Transitions 24 Hour Call    Do you have a copy of your discharge instructions?: Yes  Do you have all of your prescriptions and are they filled?: Yes (Comment: reviewed 8.19.22)  Have you been contacted by a [employer] [address]?: No  Have you scheduled your follow up appointment?: No  Do you have support at home?: Alone  Do you feel like you have everything you need to keep you well at home?: Yes  Are you an active caregiver in your home?: No  Care Transitions Interventions         Follow Up  Future Appointments   Date Time Provider Rod Plummer   12/19/2022  2:15 PM Reggie Slaughter MD [facility] [address] Transition Nurse provided contact information. Plan for follow-up call in 5-7 days based on severity of symptoms and risk factors.   Plan for next call: symptom management-.  self management-.  follow-up appointment-.    Jacobo Parson RN

## 2022-12-06 ENCOUNTER — CARE COORDINATION (OUTPATIENT)
Dept: CASE MANAGEMENT | Age: 77
End: 2022-12-06

## 2022-12-06 NOTE — CARE COORDINATION
[address] Care Transitions Follow Up Call    Care Transition Nurse contacted the patient. Verified name and  with patient as identifiers. Patient: Tushar Block  Patient : 1945   MRN: [id number]  Reason for Admission: ESRD, COVID+ 22  Discharge Date: 22 RARS: Readmission Risk Score: 24.2      Needs to be reviewed by the provider   Additional needs identified to be addressed with provider: No  none             Method of communication with provider: none. Patient reports that she is feeling \"OK\". She just returned from dialysis. She denies any questions or concerns at this time and was a little rushed to end this call. CTN will continue with outreach follow up outreach calls. Follow Up  Future Appointments   Date Time Provider Rod Plummer   2022  2:15 PM Bobo Park MD [facility] [address] [place] - DYD     [facility] follow up appointment(s):     Care Transition Nurse reviewed red flags with patient and discussed any barriers to care and/or understanding of plan of care after discharge. Discussed appropriate site of care based on symptoms and resources available to patient including: PCP  Urgent care clinics  When to call 911. The patient agrees to contact the PCP office for questions related to their healthcare. Advance Care Planning:   reviewed and current. Offered patient enrollment in the Remote Patient Monitoring (RPM) program for in-home monitoring:  No, will offer on follow up call .      Care Transitions Subsequent and Final Call    Subsequent and Final Calls  Do you have any ongoing symptoms?: No  Have your medications changed?: No  Do you have any questions related to your medications?: No  Do you currently have any active services?: No  Do you have any needs or concerns that I can assist you with?: No  Identified Barriers: None  Care Transitions Interventions  Other Interventions:             Care Transition Nurse provided contact information for future needs. Plan for follow-up call in 5-7 days based on severity of symptoms and risk factors.   Plan for next call: symptom management-.  self management-.  follow-up appointment-.    Kirby Rich RN

## 2022-12-08 NOTE — PROGRESS NOTES
Cosmo Millan (:  1945) is a 67 y.o. female,Established patient, here for evaluation of the following chief complaint(s): Medication Check and Insomnia (might need sleep study)      ASSESSMENT/PLAN:  1. Chronic kidney 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 [facility])  Assessment & Plan:   Well-controlled, continue current treatment plan  2. Diabetes mellitus type 2 in obese [facility])  Assessment & Plan:   Well-controlled, continue current treatment plan--last A1c 6.2  3. Memory loss  Assessment & Plan:  unchanged   4. Primary insomnia  Assessment & Plan:   Uncontrolled, changes made today: ^ neurontin to 200 nightly--use melatonin  5. Diarrhea of presumed infectious origin  Assessment & Plan:   Uncontrolled, continue current plan pending work up below--labs--diet  Orders:  -     C DIFF TOXIN/ANTIGEN; Future  -     Fecal Leukocytes; Future  6. Diabetic polyneuropathy associated with type 2 diabetes mellitus ([facility] [address]  Assessment & Plan:   Uncontrolled, continue current treatment plan and worse at night--^ neurontin to 200 nightly  7. COVID-19 virus infection  Assessment & Plan:   had in [facility] and was hospitalized--no remnants  --vaccine end of May    No follow-ups on file. SUBJECTIVE/OBJECTIVE:  cecil    Diarrhea   This is a recurrent problem. The current episode started more than 1 month ago. The problem occurs 5 to 10 times per day. The problem has been waxing and waning. The stool consistency is described as watery. Pertinent negatives include no abdominal pain, arthralgias, bloating, chills, coughing, fever, headaches, increased  flatus, myalgias, sweats, URI, vomiting or weight loss. Nothing aggravates the symptoms. Risk factors include recent hospitalization and recent antibiotic use. She has tried nothing for the symptoms. The treatment provided no relief.  There is no history of bowel resection, inflammatory bowel disease, irritable bowel syndrome, malabsorption, a recent abdominal surgery or short gut syndrome. DM in dialysis   Neurologic Problem  The patient's primary symptoms include an altered mental status. The patient's pertinent negatives include no clumsiness, focal sensory loss (and dysesthesias in feet--worse at night), focal weakness, loss of balance, memory loss, near-syncope, slurred speech, syncope, visual change or weakness. This is a chronic problem. The current episode started more than 1 year ago. The neurological problem developed gradually. The problem has been gradually worsening since onset. There was lower extremity, left-sided and right-sided focality noted. Pertinent negatives include no abdominal pain, auditory change, aura, back pain, bladder incontinence, bowel incontinence, chest pain, confusion, diaphoresis, dizziness, fatigue, fever, headaches, light-headedness, nausea, neck pain, palpitations, shortness of breath, vertigo or vomiting. Treatments tried: neurontin. The treatment provided mild relief. (DM in dialysis)       Review of Systems   Constitutional: Negative for chills, diaphoresis, fatigue, fever and weight loss. Respiratory: Negative for cough and shortness of breath. Cardiovascular: Negative for chest pain, palpitations and near-syncope. Gastrointestinal: Positive for diarrhea. Negative for abdominal pain, bloating, bowel incontinence, flatus, nausea and vomiting. Genitourinary: Negative for bladder incontinence. Musculoskeletal: Negative for arthralgias, back pain, myalgias and neck pain. Neurological: Negative for dizziness, vertigo, focal weakness, syncope, weakness, light-headedness, headaches and loss of balance. Psychiatric/Behavioral: Negative for confusion and memory loss.        Patient-Reported Vitals 7/8/2020   Patient-Reported Weight 184LB   Patient-Reported Height 62IN   Patient-Reported Systolic 471   Patient-Reported Diastolic 88   Patient-Reported Pulse 95        Physical Exam        On this date 4/8/2021 I have spent 30 minutes reviewing previous notes, test results and face to face (virtual) with the patient discussing the diagnosis and importance of compliance with the treatment plan as well as documenting on the day of the visit. [employer], was evaluated through a synchronous (real-time) audio-video encounter. The patient (or guardian if applicable) is aware that this is a billable service. Verbal consent to proceed has been obtained within the past 12 months. The visit was conducted pursuant to the emergency declaration under the [address] [place] [address] authority and the [employer] Act. Patient identification was verified, and a caregiver was present when appropriate. The patient was located in a state where the provider was credentialed to provide care. An electronic signature was used to authenticate this note.     --Kiran Alvarenga MD altered mental status

## 2022-12-09 RX ORDER — ASPIRIN 81 MG/1
TABLET, COATED ORAL
Qty: 90 TABLET | Refills: 1 | OUTPATIENT
Start: 2022-12-09

## 2022-12-13 ENCOUNTER — CARE COORDINATION (OUTPATIENT)
Dept: CASE MANAGEMENT | Age: 77
End: 2022-12-13

## 2022-12-15 ENCOUNTER — CARE COORDINATION (OUTPATIENT)
Dept: CASE MANAGEMENT | Age: 77
End: 2022-12-15

## 2022-12-15 NOTE — CARE COORDINATION
Second & final follow up outreach call attempt, no answer. CTN left VM with contact information and request for return call. CTN will resolve episode & remain available.

## 2022-12-19 ENCOUNTER — OFFICE VISIT (OUTPATIENT)
Dept: FAMILY MEDICINE CLINIC | Age: 77
End: 2022-12-19
Payer: MEDICARE

## 2022-12-19 VITALS
OXYGEN SATURATION: 95 % | BODY MASS INDEX: 29.49 KG/M2 | WEIGHT: 177 LBS | HEART RATE: 94 BPM | TEMPERATURE: 97.9 F | HEIGHT: 65 IN

## 2022-12-19 DIAGNOSIS — Z00.00 MEDICARE ANNUAL WELLNESS VISIT, SUBSEQUENT: Primary | ICD-10-CM

## 2022-12-19 DIAGNOSIS — E11.22 CHRONIC KIDNEY DISEASE WITH END STAGE RENAL DISEASE ON DIALYSIS DUE TO TYPE 2 DIABETES MELLITUS (HCC): ICD-10-CM

## 2022-12-19 DIAGNOSIS — E11.69 DIABETES MELLITUS TYPE 2 IN OBESE (HCC): ICD-10-CM

## 2022-12-19 DIAGNOSIS — E11.42 DIABETIC POLYNEUROPATHY ASSOCIATED WITH TYPE 2 DIABETES MELLITUS (HCC): ICD-10-CM

## 2022-12-19 DIAGNOSIS — E66.9 DIABETES MELLITUS TYPE 2 IN OBESE (HCC): ICD-10-CM

## 2022-12-19 DIAGNOSIS — G25.81 RLS (RESTLESS LEGS SYNDROME): ICD-10-CM

## 2022-12-19 DIAGNOSIS — I10 HTN (HYPERTENSION), BENIGN: ICD-10-CM

## 2022-12-19 DIAGNOSIS — E03.9 ACQUIRED HYPOTHYROIDISM: ICD-10-CM

## 2022-12-19 DIAGNOSIS — N18.6 ESRD (END STAGE RENAL DISEASE) (HCC): ICD-10-CM

## 2022-12-19 DIAGNOSIS — N18.6 END STAGE RENAL DISEASE (HCC): ICD-10-CM

## 2022-12-19 DIAGNOSIS — Z99.2 CHRONIC KIDNEY DISEASE WITH END STAGE RENAL DISEASE ON DIALYSIS DUE TO TYPE 2 DIABETES MELLITUS (HCC): ICD-10-CM

## 2022-12-19 DIAGNOSIS — I50.22 CHRONIC SYSTOLIC (CONGESTIVE) HEART FAILURE (HCC): ICD-10-CM

## 2022-12-19 DIAGNOSIS — N18.6 CHRONIC KIDNEY DISEASE WITH END STAGE RENAL DISEASE ON DIALYSIS DUE TO TYPE 2 DIABETES MELLITUS (HCC): ICD-10-CM

## 2022-12-19 PROCEDURE — G8484 FLU IMMUNIZE NO ADMIN: HCPCS | Performed by: FAMILY MEDICINE

## 2022-12-19 PROCEDURE — G0439 PPPS, SUBSEQ VISIT: HCPCS | Performed by: FAMILY MEDICINE

## 2022-12-19 PROCEDURE — 1123F ACP DISCUSS/DSCN MKR DOCD: CPT | Performed by: FAMILY MEDICINE

## 2022-12-19 PROCEDURE — 3044F HG A1C LEVEL LT 7.0%: CPT | Performed by: FAMILY MEDICINE

## 2022-12-19 RX ORDER — GABAPENTIN 100 MG/1
200 CAPSULE ORAL NIGHTLY
Qty: 180 CAPSULE | Refills: 1 | Status: SHIPPED | OUTPATIENT
Start: 2022-12-19 | End: 2022-12-19

## 2022-12-19 RX ORDER — ROPINIROLE 0.25 MG/1
0.25 TABLET, FILM COATED ORAL NIGHTLY
Qty: 90 TABLET | Refills: 3 | Status: ON HOLD | OUTPATIENT
Start: 2022-12-19

## 2022-12-19 RX ORDER — GABAPENTIN 100 MG/1
100 CAPSULE ORAL NIGHTLY
Qty: 90 CAPSULE | Refills: 1 | Status: ON HOLD | OUTPATIENT
Start: 2022-12-19 | End: 2023-03-19

## 2022-12-19 RX ORDER — ROPINIROLE 0.25 MG/1
0.25 TABLET, FILM COATED ORAL NIGHTLY
Qty: 90 TABLET | Refills: 3 | Status: SHIPPED | OUTPATIENT
Start: 2022-12-19 | End: 2022-12-19 | Stop reason: SDUPTHER

## 2022-12-19 ASSESSMENT — PATIENT HEALTH QUESTIONNAIRE - PHQ9
SUM OF ALL RESPONSES TO PHQ QUESTIONS 1-9: 0
SUM OF ALL RESPONSES TO PHQ9 QUESTIONS 1 & 2: 0
2. FEELING DOWN, DEPRESSED OR HOPELESS: 0
1. LITTLE INTEREST OR PLEASURE IN DOING THINGS: 0

## 2022-12-19 ASSESSMENT — LIFESTYLE VARIABLES
HOW MANY STANDARD DRINKS CONTAINING ALCOHOL DO YOU HAVE ON A TYPICAL DAY: 1 OR 2
HOW OFTEN DO YOU HAVE A DRINK CONTAINING ALCOHOL: MONTHLY OR LESS

## 2022-12-19 NOTE — PROGRESS NOTES
Medicare Annual Wellness Visit    Pako Crump is here for Other (Discuss meds/) and Medicare AWV    Assessment & Plan   Chronic systolic (congestive) heart failure  Assessment & Plan:   Well-controlled, continue current medications  End stage renal disease ([facility] [address]  Assessment & Plan:   Well-controlled, continue current medications  HTN (hypertension), benign  Assessment & Plan:   Well-controlled, continue current medications  Acquired hypothyroidism  Assessment & Plan:   Well-controlled, continue current medications  Diabetes mellitus type 2 in [facility])  Assessment & Plan:   Well-controlled, continue current medications--last A1c   Chronic kidney 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 ([facility] [address]  Assessment & Plan:   Well-controlled, continue current medications  Diabetic polyneuropathy associated with type 2 diabetes mellitus ([facility] [address]  Assessment & Plan:   Well-controlled, continue current medications  ESRD (end stage renal disease) ([facility] [address]  Assessment & Plan:   Well-controlled, continue current medications  RLS (restless legs syndrome)  Assessment & Plan:   Well-controlled, continue current medications    Recommendations for Preventive Services Due: see orders and patient instructions/AVS.  Recommended screening schedule for the next 5-10 years is provided to the patient in written form: see Patient Instructions/AVS.     No follow-ups on file.      Subjective   The following acute and/or chronic problems were also addressed today:  Chronic systolic (congestive) heart failure   Well-controlled, continue current medications    End stage renal disease (HCC)   Well-controlled, continue current medications    HTN (hypertension), benign   Well-controlled, continue current medications    Acquired hypothyroidism   Well-controlled, continue current medications    Diabetes mellitus type 2 in [facility])   Well-controlled, continue current medications--last A1c     Chronic kidney 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 ([facility] [address]   Well-controlled, continue current medications    Diabetic polyneuropathy associated with type 2 diabetes mellitus ([address]   Well-controlled, continue current medications    ESRD (end stage renal disease) ([facility] [address]   Well-controlled, continue current medications    RLS (restless legs syndrome)   Well-controlled, continue current medications      Patient's complete Health Risk Assessment and screening values have been reviewed and are found in Flowsheets. The following problems were reviewed today and where indicated follow up appointments were made and/or referrals ordered. Positive Risk Factor Screenings with Interventions:                     Safety:  Do you have either shower bars, grab bars, non-slip mats or non-slip surfaces in your shower or bathtub?: (!) No    Interventions:  See above                     Objective   Vitals:    12/19/22 1419   Pulse: 94   Temp: 97.9 °F (36.6 °C)   SpO2: 95%   Weight: 177 lb (80.3 kg)   Height: 5' 5\" (1.651 m)      Body mass index is 29.45 kg/m².       General Appearance: alert and oriented to person, place and time, well developed and well- nourished, in no acute distress  Skin: warm and dry, no rash or erythema  Head: normocephalic and atraumatic  Eyes: pupils equal, round, and reactive to light, extraocular eye movements intact, conjunctivae normal  ENT: tympanic membrane, external ear and ear canal normal bilaterally, nose without deformity, nasal mucosa and turbinates normal without polyps  Neck: supple and non-tender without mass, no thyromegaly or thyroid nodules, no cervical lymphadenopathy  Pulmonary/Chest: clear to auscultation bilaterally- no wheezes, rales or rhonchi, normal air movement, no respiratory distress  Cardiovascular: normal rate, regular rhythm, normal S1 and S2, no murmurs, rubs, clicks, or gallops, distal pulses intact, no carotid bruits  Abdomen: soft, non-tender, non-distended, normal bowel sounds, no masses or organomegaly  Extremities: no cyanosis, clubbing or edema  Musculoskeletal: normal range of motion, no joint swelling, deformity or tenderness  Neurologic: reflexes normal and symmetric, no cranial nerve deficit, gait, coordination and speech normal       Allergies   Allergen Reactions    Amoxicillin Anaphylaxis    Demeclocycline Anaphylaxis    Penicillins Anaphylaxis    Tetracyclines & Related Anaphylaxis    Ozempic (0.25 Or 0.5 Mg-Dose) [Semaglutide(0.25 Or 0.5mg-Dos)] Other (See Comments)     Lost large amount weight and loss  Of appetite    Codeine Itching and Rash     Prior to Visit Medications    Medication Sig Taking? Authorizing Provider   torsemide (DEMADEX) 20 MG tablet Take 6 tablets by mouth 2 times daily 6 tabs bid Yes Jonathan Ashby MD   gabapentin (NEURONTIN) 100 MG capsule Take 200 mg by mouth at bedtime. Yes Historical Provider, MD   lidocaine (LIDODERM) 5 % Place 1 patch onto the skin daily 12 hours on, 12 hours off.  Yes Fatemeh Rowell PA-C   isosorbide mononitrate (IMDUR) 30 MG extended release tablet Take 30 mg by mouth daily Yes Historical Provider, MD   vitamin D3 (CHOLECALCIFEROL) 25 MCG (1000 UT) TABS tablet Take 1 tablet by mouth daily Yes Lonza ProudMARCIE CNP   ASPIRIN LOW DOSE 81 MG EC tablet TAKE 1 TABLET BY MOUTH EVERY DAY Yes MARCIE Harper CNP   spironolactone (ALDACTONE) 25 MG tablet Take 1 tablet by mouth daily Yes Lizbet Crump MD   clopidogrel (PLAVIX) 75 MG tablet Take 1 tablet by mouth daily Yes Lizbet Crump MD   rosuvastatin (CRESTOR) 20 MG tablet TAKE 1 TABLET BY MOUTH EVERY DAY Yes Lizbet Crump MD   nitroGLYCERIN (NITROSTAT) 0.4 MG SL tablet Place 1 tablet under the tongue every 5 minutes as needed for Chest pain Yes MARCIE Harper CNP   levothyroxine (SYNTHROID) 150 MCG tablet Take 1 tablet by mouth every morning (before breakfast) Yes Lizbet Crump MD   allopurinol (ZYLOPRIM) 100 MG tablet TAKE 1 TABLET EVERY DAY Yes Jamison Lopez MD   metoprolol succinate (TOPROL XL) 25 MG extended release tablet TAKE 1 TABLET EVERY DAY Yes Jamison Lopez MD   rOPINIRole (REQUIP) 0.25 MG tablet Take 1 tablet by mouth nightly  Jamison Lopez MD       [facility] (Including outside providers/suppliers regularly involved in providing care):   Patient Care Team:  Jamison Lopez MD as PCP - General (Family Medicine)  Jamison Lopez MD as PCP - [facility]  Mack Giron MD as Consulting Physician (Nephrology)  Mirlande Shafer DO as Surgeon (Bariatric Surgery)  Froilan Lei MD as Consulting Physician (Vascular Surgery)     Reviewed and updated this visit:  Allergies  Meds

## 2022-12-19 NOTE — PATIENT INSTRUCTIONS
Advance Directives: Care Instructions  Overview  An advance directive is a legal way to state your wishes at the end of your life. It tells your family and your doctor what to do if you can't say what you want. There are two main types of advance directives. You can change them any time your wishes change. Living will. This form tells your family and your doctor your wishes about life support and other treatment. The form is also called a declaration. Medical power of . This form lets you name a person to make treatment decisions for you when you can't speak for yourself. This person is called a health care agent (health care proxy, health care surrogate). The form is also called a durable power of  for health care. If you do not have an advance directive, decisions about your medical care may be made by a family member, or by a doctor or a  who doesn't know you. It may help to think of an advance directive as a gift to the people who care for you. If you have one, they won't have to make tough decisions by themselves. For more information, including forms for your state, see the [address] website (www.caringinfo.org/planning/advance-directives/). Follow-up care is a key part of your treatment and safety. Be sure to make and go to all appointments, and call your doctor if you are having problems. It's also a good idea to know your test results and keep a list of the medicines you take. What should you include in an advance directive? Many states have a unique advance directive form. (It may ask you to address specific issues.) Or you might use a universal form that's approved by many states. If your form doesn't tell you what to address, it may be hard to know what to include in your advance directive. Use the questions below to help you get started. Who do you want to make decisions about your medical care if you are not able to?   What life-support measures do you want if you have a serious illness that gets worse over time or can't be cured? What are you most afraid of that might happen? (Maybe you're afraid of having pain, losing your independence, or being kept alive by machines.)  Where would you prefer to die? (Your home? A hospital? A nursing home?)  Do you want to donate your organs when you die? Do you want certain [employer] practices performed before you die? When should you call for help? Be sure to contact your doctor if you have any questions. Where can you learn more? Go to http://www.rawls.com/ and enter R264 to learn more about \"Advance Directives: Care Instructions. \"  Current as of: June 16, 2022               Content Version: 13.5  © 2796-4669 Healthwise, Incorporated. Care instructions adapted under license by [facility] ([facility]). If you have questions about a medical condition or this instruction, always ask your healthcare professional. [place] [address] any warranty or liability for your use of this information. Personalized Preventive Plan for Alejandrina Rooney - 12/19/2022  Medicare offers a range of preventive health benefits. Some of the tests and screenings are paid in full while other may be subject to a deductible, co-insurance, and/or copay. Some of these benefits include a comprehensive review of your medical history including lifestyle, illnesses that may run in your family, and various assessments and screenings as appropriate. After reviewing your medical record and screening and assessments performed today your provider may have ordered immunizations, labs, imaging, and/or referrals for you. A list of these orders (if applicable) as well as your Preventive Care list are included within your After Visit Summary for your review.     Other Preventive Recommendations:    A preventive eye exam performed by an eye specialist is recommended every 1-2 years to screen for glaucoma; cataracts, macular degeneration, and other eye disorders. A preventive dental visit is recommended every 6 months. Try to get at least 150 minutes of exercise per week or 10,000 steps per day on a pedometer . Order or download the FREE \"Exercise & Physical Activity: Your Everyday Guide\" from [employer]. Call 1-751.753.2194 or search [employer] online. You need 4457-1381 mg of calcium and 6353-1588 IU of vitamin D per day. It is possible to meet your calcium requirement with diet alone, but a vitamin D supplement is usually necessary to meet this goal.  When exposed to the sun, use a sunscreen that protects against both UVA and UVB radiation with an SPF of 30 or greater. Reapply every 2 to 3 hours or after sweating, drying off with a towel, or swimming. Always wear a seat belt when traveling in a car. Always wear a helmet when riding a bicycle or motorcycle.

## 2022-12-22 ENCOUNTER — TELEPHONE (OUTPATIENT)
Dept: OTHER | Facility: CLINIC | Age: 77
End: 2022-12-22

## 2022-12-22 ENCOUNTER — APPOINTMENT (OUTPATIENT)
Dept: CT IMAGING | Age: 77
DRG: 304 | End: 2022-12-22
Payer: MEDICARE

## 2022-12-22 ENCOUNTER — APPOINTMENT (OUTPATIENT)
Dept: GENERAL RADIOLOGY | Age: 77
DRG: 304 | End: 2022-12-22
Payer: MEDICARE

## 2022-12-22 ENCOUNTER — APPOINTMENT (OUTPATIENT)
Dept: MRI IMAGING | Age: 77
DRG: 304 | End: 2022-12-22
Payer: MEDICARE

## 2022-12-22 ENCOUNTER — HOSPITAL ENCOUNTER (INPATIENT)
Age: 77
LOS: 5 days | Discharge: HOME | DRG: 304 | End: 2022-12-27
Attending: EMERGENCY MEDICINE | Admitting: INTERNAL MEDICINE
Payer: MEDICARE

## 2022-12-22 DIAGNOSIS — Z99.2 DIALYSIS PATIENT (HCC): ICD-10-CM

## 2022-12-22 DIAGNOSIS — G93.40 ACUTE ENCEPHALOPATHY: Primary | ICD-10-CM

## 2022-12-22 DIAGNOSIS — D64.9 ANEMIA, UNSPECIFIED TYPE: ICD-10-CM

## 2022-12-22 DIAGNOSIS — E04.1 THYROID NODULE: ICD-10-CM

## 2022-12-22 DIAGNOSIS — R93.89 ABNORMAL CT SCAN: ICD-10-CM

## 2022-12-22 PROBLEM — R19.7 DIARRHEA OF PRESUMED INFECTIOUS ORIGIN: Status: RESOLVED | Noted: 2021-04-08 | Resolved: 2022-12-22

## 2022-12-22 PROBLEM — D12.6 TUBULAR ADENOMA OF COLON: Status: RESOLVED | Noted: 2020-11-25 | Resolved: 2022-12-22

## 2022-12-22 PROBLEM — S92.354A CLOSED NONDISPLACED FRACTURE OF FIFTH METATARSAL BONE OF RIGHT FOOT: Status: RESOLVED | Noted: 2017-07-25 | Resolved: 2022-12-22

## 2022-12-22 PROBLEM — M17.12 PRIMARY OSTEOARTHRITIS OF LEFT KNEE: Status: RESOLVED | Noted: 2019-08-16 | Resolved: 2022-12-22

## 2022-12-22 PROBLEM — N18.6 END STAGE RENAL DISEASE (HCC): Status: RESOLVED | Noted: 2022-11-20 | Resolved: 2022-12-22

## 2022-12-22 PROBLEM — M79.662 PAIN OF LEFT CALF: Status: RESOLVED | Noted: 2021-07-26 | Resolved: 2022-12-22

## 2022-12-22 PROBLEM — M25.562 ACUTE PAIN OF BOTH KNEES: Status: RESOLVED | Noted: 2017-11-15 | Resolved: 2022-12-22

## 2022-12-22 PROBLEM — M17.11 ARTHRITIS OF RIGHT KNEE: Status: RESOLVED | Noted: 2017-11-15 | Resolved: 2022-12-22

## 2022-12-22 PROBLEM — I10 HTN (HYPERTENSION), BENIGN: Status: RESOLVED | Noted: 2017-01-23 | Resolved: 2022-12-22

## 2022-12-22 PROBLEM — Z71.89 ENCOUNTER FOR MEDICATION COUNSELING: Status: RESOLVED | Noted: 2020-04-01 | Resolved: 2022-12-22

## 2022-12-22 PROBLEM — M71.9 BURSITIS: Status: RESOLVED | Noted: 2017-11-15 | Resolved: 2022-12-22

## 2022-12-22 PROBLEM — R60.0 BILATERAL LEG EDEMA: Status: RESOLVED | Noted: 2019-06-10 | Resolved: 2022-12-22

## 2022-12-22 PROBLEM — R07.9 CHEST PAIN: Status: RESOLVED | Noted: 2021-09-22 | Resolved: 2022-12-22

## 2022-12-22 PROBLEM — E11.69 DIABETES MELLITUS TYPE 2 IN OBESE (HCC): Status: RESOLVED | Noted: 2017-01-23 | Resolved: 2022-12-22

## 2022-12-22 PROBLEM — M25.561 ACUTE PAIN OF BOTH KNEES: Status: RESOLVED | Noted: 2017-11-15 | Resolved: 2022-12-22

## 2022-12-22 PROBLEM — F51.01 PRIMARY INSOMNIA: Status: RESOLVED | Noted: 2021-04-08 | Resolved: 2022-12-22

## 2022-12-22 PROBLEM — I50.22 CHRONIC SYSTOLIC (CONGESTIVE) HEART FAILURE (HCC): Status: RESOLVED | Noted: 2022-06-14 | Resolved: 2022-12-22

## 2022-12-22 PROBLEM — R07.89 ATYPICAL CHEST PAIN: Status: RESOLVED | Noted: 2020-03-30 | Resolved: 2022-12-22

## 2022-12-22 PROBLEM — T82.7XXA ARTERIOVENOUS FISTULA INFECTION, INITIAL ENCOUNTER (HCC): Status: RESOLVED | Noted: 2021-08-24 | Resolved: 2022-12-22

## 2022-12-22 PROBLEM — U07.1 COVID: Status: RESOLVED | Noted: 2021-02-08 | Resolved: 2022-12-22

## 2022-12-22 PROBLEM — E66.9 DIABETES MELLITUS TYPE 2 IN OBESE (HCC): Status: RESOLVED | Noted: 2017-01-23 | Resolved: 2022-12-22

## 2022-12-22 PROBLEM — R09.89 SUSPECTED STROKE PATIENT LAST KNOWN TO BE WELL 3-4.5 HOURS AGO: Status: ACTIVE | Noted: 2022-12-22

## 2022-12-22 LAB
A/G RATIO: 1.1 (ref 1.1–2.2)
A/G RATIO: 1.3 (ref 1.1–2.2)
ALBUMIN SERPL-MCNC: 3.4 G/DL (ref 3.4–5)
ALBUMIN SERPL-MCNC: 3.5 G/DL (ref 3.4–5)
ALP BLD-CCNC: 100 U/L (ref 40–129)
ALP BLD-CCNC: 106 U/L (ref 40–129)
ALT SERPL-CCNC: 8 U/L (ref 10–40)
ALT SERPL-CCNC: 9 U/L (ref 10–40)
AMMONIA: 25 UMOL/L (ref 11–51)
AMPHETAMINE SCREEN, URINE: NORMAL
ANION GAP SERPL CALCULATED.3IONS-SCNC: 16 MMOL/L (ref 3–16)
ANION GAP SERPL CALCULATED.3IONS-SCNC: 18 MMOL/L (ref 3–16)
ANISOCYTOSIS: ABNORMAL
APTT: 26.1 SEC (ref 23–34.3)
AST SERPL-CCNC: 12 U/L (ref 15–37)
AST SERPL-CCNC: 17 U/L (ref 15–37)
BARBITURATE SCREEN URINE: NORMAL
BASE EXCESS ARTERIAL: 0 (ref -3–3)
BASE EXCESS VENOUS: 0.5 MMOL/L (ref -3–3)
BASOPHILS ABSOLUTE: 0 K/UL (ref 0–0.2)
BASOPHILS ABSOLUTE: 0 K/UL (ref 0–0.2)
BASOPHILS RELATIVE PERCENT: 0 %
BASOPHILS RELATIVE PERCENT: 0.4 %
BENZODIAZEPINE SCREEN, URINE: NORMAL
BILIRUB SERPL-MCNC: 0.3 MG/DL (ref 0–1)
BILIRUB SERPL-MCNC: <0.2 MG/DL (ref 0–1)
BUN BLDV-MCNC: 57 MG/DL (ref 7–20)
BUN BLDV-MCNC: 58 MG/DL (ref 7–20)
CALCIUM IONIZED: 1.03 MMOL/L (ref 1.12–1.32)
CALCIUM SERPL-MCNC: 8.7 MG/DL (ref 8.3–10.6)
CALCIUM SERPL-MCNC: 8.8 MG/DL (ref 8.3–10.6)
CANNABINOID SCREEN URINE: NORMAL
CARBOXYHEMOGLOBIN: 4.5 % (ref 0–1.5)
CHLORIDE BLD-SCNC: 100 MMOL/L (ref 99–110)
CHLORIDE BLD-SCNC: 100 MMOL/L (ref 99–110)
CO2: 24 MMOL/L (ref 21–32)
CO2: 26 MMOL/L (ref 21–32)
COCAINE METABOLITE SCREEN URINE: NORMAL
CREAT SERPL-MCNC: 8.4 MG/DL (ref 0.6–1.2)
CREAT SERPL-MCNC: 9 MG/DL (ref 0.6–1.2)
EKG ATRIAL RATE: 70 BPM
EKG DIAGNOSIS: NORMAL
EKG P AXIS: 53 DEGREES
EKG P-R INTERVAL: 176 MS
EKG Q-T INTERVAL: 466 MS
EKG QRS DURATION: 94 MS
EKG QTC CALCULATION (BAZETT): 503 MS
EKG R AXIS: -14 DEGREES
EKG T AXIS: 52 DEGREES
EKG VENTRICULAR RATE: 70 BPM
EOSINOPHILS ABSOLUTE: 0 K/UL (ref 0–0.6)
EOSINOPHILS ABSOLUTE: 0.2 K/UL (ref 0–0.6)
EOSINOPHILS RELATIVE PERCENT: 0 %
EOSINOPHILS RELATIVE PERCENT: 2 %
FENTANYL SCREEN, URINE: NORMAL
GFR SERPL CREATININE-BSD FRML MDRD: 4 ML/MIN/{1.73_M2}
GFR SERPL CREATININE-BSD FRML MDRD: 4 ML/MIN/{1.73_M2}
GLUCOSE BLD-MCNC: 113 MG/DL (ref 70–99)
GLUCOSE BLD-MCNC: 122 MG/DL (ref 70–99)
GLUCOSE BLD-MCNC: 80 MG/DL (ref 70–99)
GLUCOSE BLD-MCNC: 84 MG/DL (ref 70–99)
GLUCOSE BLD-MCNC: 91 MG/DL (ref 70–99)
GLUCOSE BLD-MCNC: 95 MG/DL (ref 70–99)
GLUCOSE BLD-MCNC: 96 MG/DL (ref 70–99)
HCO3 ARTERIAL: 24.5 MMOL/L (ref 21–29)
HCO3 VENOUS: 24.8 MMOL/L (ref 23–29)
HCT VFR BLD CALC: 27.9 % (ref 36–48)
HCT VFR BLD CALC: 30.1 % (ref 36–48)
HEMOGLOBIN: 10.4 GM/DL (ref 12–16)
HEMOGLOBIN: 8.9 G/DL (ref 12–16)
HEMOGLOBIN: 9.7 G/DL (ref 12–16)
INR BLD: 0.97 (ref 0.87–1.14)
LACTATE: 1.38 MMOL/L (ref 0.4–2)
LYMPHOCYTES ABSOLUTE: 1.1 K/UL (ref 1–5.1)
LYMPHOCYTES ABSOLUTE: 1.3 K/UL (ref 1–5.1)
LYMPHOCYTES RELATIVE PERCENT: 13.7 %
LYMPHOCYTES RELATIVE PERCENT: 15 %
Lab: NORMAL
MCH RBC QN AUTO: 31.1 PG (ref 26–34)
MCH RBC QN AUTO: 31.7 PG (ref 26–34)
MCHC RBC AUTO-ENTMCNC: 32 G/DL (ref 31–36)
MCHC RBC AUTO-ENTMCNC: 32.1 G/DL (ref 31–36)
MCV RBC AUTO: 96.7 FL (ref 80–100)
MCV RBC AUTO: 99.1 FL (ref 80–100)
METHADONE SCREEN, URINE: NORMAL
METHEMOGLOBIN VENOUS: 0 %
MONOCYTES ABSOLUTE: 0.2 K/UL (ref 0–1.3)
MONOCYTES ABSOLUTE: 0.6 K/UL (ref 0–1.3)
MONOCYTES RELATIVE PERCENT: 2 %
MONOCYTES RELATIVE PERCENT: 6.8 %
NEUTROPHILS ABSOLUTE: 6.2 K/UL (ref 1.7–7.7)
NEUTROPHILS ABSOLUTE: 7 K/UL (ref 1.7–7.7)
NEUTROPHILS RELATIVE PERCENT: 77.1 %
NEUTROPHILS RELATIVE PERCENT: 83 %
O2 CONTENT, VEN: 14 VOL %
O2 SAT, ARTERIAL: 96 % (ref 93–100)
O2 SAT, VEN: >100 %
O2 THERAPY: ABNORMAL
OPIATE SCREEN URINE: NORMAL
OXYCODONE URINE: NORMAL
PCO2 ARTERIAL: 36.9 MM HG (ref 35–45)
PCO2, VEN: 38 MMHG (ref 40–50)
PDW BLD-RTO: 15.1 % (ref 12.4–15.4)
PDW BLD-RTO: 15.6 % (ref 12.4–15.4)
PERFORMED ON: ABNORMAL
PERFORMED ON: ABNORMAL
PERFORMED ON: NORMAL
PH ARTERIAL: 7.43 (ref 7.35–7.45)
PH UA: 7
PH VENOUS: 7.42 (ref 7.35–7.45)
PHENCYCLIDINE SCREEN URINE: NORMAL
PLATELET # BLD: 327 K/UL (ref 135–450)
PLATELET # BLD: 381 K/UL (ref 135–450)
PLATELET SLIDE REVIEW: ADEQUATE
PMV BLD AUTO: 7.5 FL (ref 5–10.5)
PMV BLD AUTO: 7.8 FL (ref 5–10.5)
PO2 ARTERIAL: 77.9 MM HG (ref 75–108)
PO2, VEN: 155 MMHG (ref 25–40)
POC HEMATOCRIT: 31 % (ref 36–48)
POC POTASSIUM: 4.5 MMOL/L (ref 3.5–5.1)
POC SAMPLE TYPE: ABNORMAL
POC SODIUM: 141 MMOL/L (ref 136–145)
POTASSIUM REFLEX MAGNESIUM: 4.3 MMOL/L (ref 3.5–5.1)
POTASSIUM SERPL-SCNC: 4.6 MMOL/L (ref 3.5–5.1)
PROTHROMBIN TIME: 12.7 SEC (ref 11.7–14.5)
RBC # BLD: 2.81 M/UL (ref 4–5.2)
RBC # BLD: 3.11 M/UL (ref 4–5.2)
SLIDE REVIEW: ABNORMAL
SODIUM BLD-SCNC: 142 MMOL/L (ref 136–145)
SODIUM BLD-SCNC: 142 MMOL/L (ref 136–145)
TCO2 ARTERIAL: 26 MMOL/L
TCO2 CALC VENOUS: 58 MMOL/L
TOTAL PROTEIN: 6.1 G/DL (ref 6.4–8.2)
TOTAL PROTEIN: 6.4 G/DL (ref 6.4–8.2)
TROPONIN: 0.03 NG/ML
WBC # BLD: 8.1 K/UL (ref 4–11)
WBC # BLD: 8.4 K/UL (ref 4–11)

## 2022-12-22 PROCEDURE — 82330 ASSAY OF CALCIUM: CPT

## 2022-12-22 PROCEDURE — 84484 ASSAY OF TROPONIN QUANT: CPT

## 2022-12-22 PROCEDURE — 93005 ELECTROCARDIOGRAM TRACING: CPT | Performed by: EMERGENCY MEDICINE

## 2022-12-22 PROCEDURE — 80053 COMPREHEN METABOLIC PANEL: CPT

## 2022-12-22 PROCEDURE — 71045 X-RAY EXAM CHEST 1 VIEW: CPT

## 2022-12-22 PROCEDURE — 6360000004 HC RX CONTRAST MEDICATION: Performed by: EMERGENCY MEDICINE

## 2022-12-22 PROCEDURE — 6360000002 HC RX W HCPCS: Performed by: INTERNAL MEDICINE

## 2022-12-22 PROCEDURE — 83605 ASSAY OF LACTIC ACID: CPT

## 2022-12-22 PROCEDURE — 82803 BLOOD GASES ANY COMBINATION: CPT

## 2022-12-22 PROCEDURE — 82947 ASSAY GLUCOSE BLOOD QUANT: CPT

## 2022-12-22 PROCEDURE — 84443 ASSAY THYROID STIM HORMONE: CPT

## 2022-12-22 PROCEDURE — 84132 ASSAY OF SERUM POTASSIUM: CPT

## 2022-12-22 PROCEDURE — 85014 HEMATOCRIT: CPT

## 2022-12-22 PROCEDURE — 70551 MRI BRAIN STEM W/O DYE: CPT

## 2022-12-22 PROCEDURE — 87040 BLOOD CULTURE FOR BACTERIA: CPT

## 2022-12-22 PROCEDURE — 82140 ASSAY OF AMMONIA: CPT

## 2022-12-22 PROCEDURE — 99285 EMERGENCY DEPT VISIT HI MDM: CPT

## 2022-12-22 PROCEDURE — 70450 CT HEAD/BRAIN W/O DYE: CPT

## 2022-12-22 PROCEDURE — 70498 CT ANGIOGRAPHY NECK: CPT

## 2022-12-22 PROCEDURE — 84439 ASSAY OF FREE THYROXINE: CPT

## 2022-12-22 PROCEDURE — 85025 COMPLETE CBC W/AUTO DIFF WBC: CPT

## 2022-12-22 PROCEDURE — 85610 PROTHROMBIN TIME: CPT

## 2022-12-22 PROCEDURE — 6360000002 HC RX W HCPCS

## 2022-12-22 PROCEDURE — 93010 ELECTROCARDIOGRAM REPORT: CPT | Performed by: INTERNAL MEDICINE

## 2022-12-22 PROCEDURE — 82607 VITAMIN B-12: CPT

## 2022-12-22 PROCEDURE — 5A1D70Z PERFORMANCE OF URINARY FILTRATION, INTERMITTENT, LESS THAN 6 HOURS PER DAY: ICD-10-PCS | Performed by: INTERNAL MEDICINE

## 2022-12-22 PROCEDURE — 85730 THROMBOPLASTIN TIME PARTIAL: CPT

## 2022-12-22 PROCEDURE — G0257 UNSCHED DIALYSIS ESRD PT HOS: HCPCS

## 2022-12-22 PROCEDURE — 84295 ASSAY OF SERUM SODIUM: CPT

## 2022-12-22 PROCEDURE — 36415 COLL VENOUS BLD VENIPUNCTURE: CPT

## 2022-12-22 PROCEDURE — 80307 DRUG TEST PRSMV CHEM ANLYZR: CPT

## 2022-12-22 PROCEDURE — 4A03X5D MEASUREMENT OF ARTERIAL FLOW, INTRACRANIAL, EXTERNAL APPROACH: ICD-10-PCS | Performed by: STUDENT IN AN ORGANIZED HEALTH CARE EDUCATION/TRAINING PROGRAM

## 2022-12-22 PROCEDURE — 82746 ASSAY OF FOLIC ACID SERUM: CPT

## 2022-12-22 PROCEDURE — 1200000000 HC SEMI PRIVATE

## 2022-12-22 PROCEDURE — 2580000003 HC RX 258: Performed by: INTERNAL MEDICINE

## 2022-12-22 RX ORDER — SODIUM CHLORIDE 0.9 % (FLUSH) 0.9 %
5-40 SYRINGE (ML) INJECTION EVERY 12 HOURS SCHEDULED
Status: DISCONTINUED | OUTPATIENT
Start: 2022-12-22 | End: 2022-12-27 | Stop reason: HOSPADM

## 2022-12-22 RX ORDER — ROSUVASTATIN CALCIUM 20 MG/1
20 TABLET, COATED ORAL DAILY
Status: DISCONTINUED | OUTPATIENT
Start: 2022-12-22 | End: 2022-12-27 | Stop reason: HOSPADM

## 2022-12-22 RX ORDER — SODIUM CHLORIDE 0.9 % (FLUSH) 0.9 %
5-40 SYRINGE (ML) INJECTION PRN
Status: DISCONTINUED | OUTPATIENT
Start: 2022-12-22 | End: 2022-12-27 | Stop reason: HOSPADM

## 2022-12-22 RX ORDER — LIDOCAINE 4 G/G
1 PATCH TOPICAL DAILY
Status: DISCONTINUED | OUTPATIENT
Start: 2022-12-22 | End: 2022-12-27 | Stop reason: HOSPADM

## 2022-12-22 RX ORDER — POLYETHYLENE GLYCOL 3350 17 G/17G
17 POWDER, FOR SOLUTION ORAL DAILY PRN
Status: DISCONTINUED | OUTPATIENT
Start: 2022-12-22 | End: 2022-12-27 | Stop reason: HOSPADM

## 2022-12-22 RX ORDER — ACETAMINOPHEN 650 MG/1
650 SUPPOSITORY RECTAL EVERY 6 HOURS PRN
Status: DISCONTINUED | OUTPATIENT
Start: 2022-12-22 | End: 2022-12-23 | Stop reason: ALTCHOICE

## 2022-12-22 RX ORDER — ONDANSETRON 2 MG/ML
4 INJECTION INTRAMUSCULAR; INTRAVENOUS EVERY 6 HOURS PRN
Status: DISCONTINUED | OUTPATIENT
Start: 2022-12-22 | End: 2022-12-27 | Stop reason: HOSPADM

## 2022-12-22 RX ORDER — SPIRONOLACTONE 25 MG/1
25 TABLET ORAL DAILY
Status: DISCONTINUED | OUTPATIENT
Start: 2022-12-22 | End: 2022-12-23

## 2022-12-22 RX ORDER — LEVOTHYROXINE SODIUM 0.15 MG/1
150 TABLET ORAL
Status: DISCONTINUED | OUTPATIENT
Start: 2022-12-23 | End: 2022-12-27 | Stop reason: HOSPADM

## 2022-12-22 RX ORDER — CLOPIDOGREL BISULFATE 75 MG/1
75 TABLET ORAL DAILY
Status: DISCONTINUED | OUTPATIENT
Start: 2022-12-22 | End: 2022-12-27 | Stop reason: HOSPADM

## 2022-12-22 RX ORDER — NALOXONE HYDROCHLORIDE 0.4 MG/ML
INJECTION, SOLUTION INTRAMUSCULAR; INTRAVENOUS; SUBCUTANEOUS
Status: COMPLETED
Start: 2022-12-22 | End: 2022-12-22

## 2022-12-22 RX ORDER — ACETAMINOPHEN 325 MG/1
650 TABLET ORAL EVERY 6 HOURS PRN
Status: DISCONTINUED | OUTPATIENT
Start: 2022-12-22 | End: 2022-12-27 | Stop reason: HOSPADM

## 2022-12-22 RX ORDER — ASPIRIN 81 MG/1
81 TABLET ORAL DAILY
Status: DISCONTINUED | OUTPATIENT
Start: 2022-12-22 | End: 2022-12-27 | Stop reason: HOSPADM

## 2022-12-22 RX ORDER — DEXTROSE MONOHYDRATE 100 MG/ML
INJECTION, SOLUTION INTRAVENOUS CONTINUOUS PRN
Status: DISCONTINUED | OUTPATIENT
Start: 2022-12-22 | End: 2022-12-27 | Stop reason: HOSPADM

## 2022-12-22 RX ORDER — VITAMIN B COMPLEX
1000 TABLET ORAL DAILY
Status: DISCONTINUED | OUTPATIENT
Start: 2022-12-22 | End: 2022-12-27 | Stop reason: HOSPADM

## 2022-12-22 RX ORDER — DEXTROSE AND SODIUM CHLORIDE 5; .45 G/100ML; G/100ML
INJECTION, SOLUTION INTRAVENOUS CONTINUOUS
Status: DISCONTINUED | OUTPATIENT
Start: 2022-12-22 | End: 2022-12-25

## 2022-12-22 RX ORDER — SODIUM CHLORIDE 9 MG/ML
INJECTION, SOLUTION INTRAVENOUS PRN
Status: DISCONTINUED | OUTPATIENT
Start: 2022-12-22 | End: 2022-12-27 | Stop reason: HOSPADM

## 2022-12-22 RX ORDER — HEPARIN SODIUM 5000 [USP'U]/ML
5000 INJECTION, SOLUTION INTRAVENOUS; SUBCUTANEOUS EVERY 8 HOURS SCHEDULED
Status: DISCONTINUED | OUTPATIENT
Start: 2022-12-22 | End: 2022-12-27 | Stop reason: HOSPADM

## 2022-12-22 RX ORDER — ONDANSETRON 4 MG/1
4 TABLET, ORALLY DISINTEGRATING ORAL EVERY 8 HOURS PRN
Status: DISCONTINUED | OUTPATIENT
Start: 2022-12-22 | End: 2022-12-27 | Stop reason: HOSPADM

## 2022-12-22 RX ORDER — METOPROLOL SUCCINATE 25 MG/1
25 TABLET, EXTENDED RELEASE ORAL DAILY
Status: DISCONTINUED | OUTPATIENT
Start: 2022-12-22 | End: 2022-12-27 | Stop reason: HOSPADM

## 2022-12-22 RX ORDER — ALLOPURINOL 100 MG/1
100 TABLET ORAL DAILY
Status: DISCONTINUED | OUTPATIENT
Start: 2022-12-22 | End: 2022-12-27 | Stop reason: HOSPADM

## 2022-12-22 RX ORDER — NALOXONE HYDROCHLORIDE 0.4 MG/ML
0.4 INJECTION, SOLUTION INTRAMUSCULAR; INTRAVENOUS; SUBCUTANEOUS ONCE
Status: COMPLETED | OUTPATIENT
Start: 2022-12-22 | End: 2022-12-22

## 2022-12-22 RX ADMIN — HEPARIN SODIUM 5000 UNITS: 5000 INJECTION INTRAVENOUS; SUBCUTANEOUS at 22:35

## 2022-12-22 RX ADMIN — NALXONE HYDROCHLORIDE 0.4 MG: 0.4 INJECTION INTRAMUSCULAR; INTRAVENOUS; SUBCUTANEOUS at 16:55

## 2022-12-22 RX ADMIN — IOPAMIDOL 75 ML: 755 INJECTION, SOLUTION INTRAVENOUS at 07:42

## 2022-12-22 RX ADMIN — NALOXONE HYDROCHLORIDE 0.4 MG: 0.4 INJECTION, SOLUTION INTRAMUSCULAR; INTRAVENOUS; SUBCUTANEOUS at 16:55

## 2022-12-22 RX ADMIN — DEXTROSE AND SODIUM CHLORIDE: 5; 450 INJECTION, SOLUTION INTRAVENOUS at 19:01

## 2022-12-22 ASSESSMENT — PAIN - FUNCTIONAL ASSESSMENT: PAIN_FUNCTIONAL_ASSESSMENT: NONE - DENIES PAIN

## 2022-12-22 ASSESSMENT — PAIN SCALES - WONG BAKER
WONGBAKER_NUMERICALRESPONSE: 0
WONGBAKER_NUMERICALRESPONSE: 0

## 2022-12-22 NOTE — PROGRESS NOTES
Patient to unit from ED, no bedside report or assessment in ED, ED nurse unavailable. Upon arrival to the unit, assessed patient and unresponsive per phone report, but pupils were pinpoint and not reactive. Notified charge RN, rapid response called. Dr Fabio Vasquez responded and assisted in patient care. ABG orders, BG, labs, nephrology consult, neurology consult, EEG, MRI. Patient with stable vitals. Only responds to painful stimuli, Dr spoke with nephro and dialysis was ordered. PAtietn down for stat MRI. Will continue to monitor.

## 2022-12-22 NOTE — ED NOTES
Bedside report given to SALBADOR DOWD [facility], pt. Transported to floor.      Letcher Schirmer, RN  12/22/22 7871

## 2022-12-22 NOTE — ED PROVIDER NOTES
[facility] Emergency Department      Pt Name: Carter Umaña  MRN: [id number]  [place] 1945  Date of evaluation: 12/22/2022  Provider: Kayley Drake MD  CHIEF COMPLAINT  Chief Complaint   Patient presents with    Altered Mental Status     Arrived by  EMS from [address] [facility] [address] [facility]; pt was unable to awaken for transportation to dialysis this morning. Snoring resp, but reponds to voice. Unknown last normal.  . HPI  Carter Umaña is a 67 y.o. female who presents because of concern for altered mental status. She is a dialysis patient and lives in some sort of assisted living facility. Staff went to get her to take her to dialysis this morning and could not wake her up. EMS was called. They were not able to wake her up either initially, even with sternal rub. Her glucose was adequately elevated. She did start to wake up and answer some questions but was very confused so they called a stroke alert. Her last known well was some time yesterday and she was well enough to go shopping with her daughter yesterday. REVIEW OF SYSTEMS:  See HPI for further details. Remainder of review of systems limited by patient ability to provide history. Nursing notes reviewed. PAST MEDICAL HISTORY  Past Medical History:   Diagnosis Date    Arthritis     CAD (coronary artery disease)     Diabetes ([facility] [address]     Hemodialysis patient ([facility]     Hyperlipidemia     Hypertension     Kidney disease     Neuropathy     Pneumonia     IN February, 2021, with COVID    Thyroid disease      SURGICAL HISTORY  Past Surgical History:   Procedure Laterality Date    APPENDECTOMY      CHOLECYSTECTOMY      COLONOSCOPY  11/24/2020    COLONOSCOPY POLYPECTOMY SNARE/COLD BIOPSY performed by Aaron Jimenez MD at [place] [address] 5/17/2021    PERITONEAL DIALYSIS CATHETER REMOVAL.  [address]. performed by Jomar Ortega DO at [address] [facility] ([address] [place] [address] Street UNKNOWN)      FULL    LAPAROSCOPY INSERTION PERITONEAL CATHETER N/A 4/3/2020    LAPAROSCOPIC LYSIS OF ADHESIONS, LAPAROSCOPIC PERITONEAL DIALYSIS CATHETER PLACEMENT, LAPAROSCOPIC OMENTOPEXY performed by Lamin Ang DO at [address] [facility], BILATERAL      preventive    ROTATOR CUFF REPAIR Left     SHUNT REVISION Left 8/19/2021    LEFT FOREARM ARTERIO VENOUS GRAFT performed by Ino Pacheco MD at [address] [place] [address] 11/24/2020    EGD BIOPSY performed by Marlena Giles MD at [address]:  No current facility-administered medications on file prior to encounter. Current Outpatient Medications on File Prior to Encounter   Medication Sig Dispense Refill    gabapentin (NEURONTIN) 100 MG capsule Take 1 capsule by mouth at bedtime for 90 days. 90 capsule 1    rOPINIRole (REQUIP) 0.25 MG tablet Take 1 tablet by mouth nightly 90 tablet 3    torsemide (DEMADEX) 20 MG tablet Take 6 tablets by mouth 2 times daily 6 tabs bid 180 tablet 0    lidocaine (LIDODERM) 5 % Place 1 patch onto the skin daily 12 hours on, 12 hours off.  30 patch 0    isosorbide mononitrate (IMDUR) 30 MG extended release tablet Take 30 mg by mouth daily      vitamin D3 (CHOLECALCIFEROL) 25 MCG (1000 UT) TABS tablet Take 1 tablet by mouth daily 90 tablet 1    ASPIRIN LOW DOSE 81 MG EC tablet TAKE 1 TABLET BY MOUTH EVERY DAY 90 tablet 1    spironolactone (ALDACTONE) 25 MG tablet Take 1 tablet by mouth daily 90 tablet 3    clopidogrel (PLAVIX) 75 MG tablet Take 1 tablet by mouth daily 90 tablet 3    rosuvastatin (CRESTOR) 20 MG tablet TAKE 1 TABLET BY MOUTH EVERY DAY 90 tablet 1    nitroGLYCERIN (NITROSTAT) 0.4 MG SL tablet Place 1 tablet under the tongue every 5 minutes as needed for Chest pain 25 tablet 3    levothyroxine (SYNTHROID) 150 MCG tablet Take 1 tablet by mouth every morning (before breakfast) 90 tablet 3    allopurinol (ZYLOPRIM) 100 MG tablet TAKE 1 TABLET EVERY DAY 90 tablet 3 metoprolol succinate (TOPROL XL) 25 MG extended release tablet TAKE 1 TABLET EVERY DAY 90 tablet 3     ALLERGIES  Amoxicillin, Demeclocycline, Penicillins, Tetracyclines & related, Ozempic (0.25 or 0.5 mg-dose) [semaglutide(0.25 or 0.5mg-dos)], and Codeine  FAMILY HISTORY:  Family History   Problem Relation Age of Onset    Cancer Mother         lung, breast, liver    Stroke Father     Stomach Cancer Brother     Cancer Maternal Grandmother     No Known Problems Paternal Grandmother     No Known Problems Paternal Grandfather      SOCIAL HISTORY:  Social History     Tobacco Use    Smoking status: Former     Packs/day: 0.50     Years: 10.00     Pack years: 5.00     Types: Cigarettes     Quit date: 1978     Years since quittin.5    Smokeless tobacco: Never   Vaping Use    Vaping Use: Never used   Substance Use Topics    Alcohol use: Not Currently     Comment: every other week one glass of wine    Drug use: Never     IMMUNIZATIONS:  Noncontributory    PHYSICAL EXAM  VITAL SIGNS:  Blood pressure (!) 144/56, pulse 70, temperature 97.3 °F (36.3 °C), resp. rate 18, weight 185 lb 3.2 oz (84 kg), SpO2 94 %, not currently breastfeeding.   Constitutional:  67 y.o. female eyes closed, opens eyes to voice but closes them again  HENT:  Atraumatic, mucous membranes dry  Eyes:  Conjunctiva clear, no discharge, no icterus  Neck:  Without masses, no adenopathy, supple  Cardiovascular:  Regular, no rubs  Thorax & Lungs:  No accessory muscle usage, clear  Abdomen:  Soft, non distended, bowel sounds present, NT  Back:  No deformity  Genitalia:  Deferred  Rectal:  Deferred  Extremities:  No cyanosis, no edema  Skin:  Exposed areas of skin warm, dry  Neurologic: Eyes closed, oriented to self only, no facial droop, no obvious slurring, does not respond to all questions and does not follow commands, does spontaneously move extremities but cannot perform NIH testing due to lack of cooperation  Psychiatric:  Not assessable    RESULTS / MEDICAL DECISION MAKING:  Labs resulted at the time of this note reviewed. Labs Reviewed   CBC WITH AUTO DIFFERENTIAL - Abnormal; Notable for the following components:       Result Value    RBC 2.81 (*)     Hemoglobin 8.9 (*)     Hematocrit 27.9 (*)     RDW 15.6 (*)     Anisocytosis Occasional (*)     All other components within normal limits   COMPREHENSIVE METABOLIC PANEL W/ REFLEX TO MG FOR LOW K - Abnormal; Notable for the following components:    Anion Gap 18 (*)     Glucose 122 (*)     BUN 58 (*)     Creatinine 8.4 (*)     Est, Glom Filt Rate 4 (*)     Total Protein 6.1 (*)     ALT 8 (*)     All other components within normal limits    Narrative:     CALL  [facility] tel. [phone],                  Chemistry results called to and read back by Claudette Laos, rn, 12/22/2022                  08:29, by Coby Katz   TROPONIN - Abnormal; Notable for the following components:    Troponin 0.03 (*)     All other components within normal limits    Narrative:     [facility] tel. [phone],                  Chemistry results called to and read back by Claudette Laos, rn, 12/22/2022                  08:29, by Coby Katz   BLOOD GAS, VENOUS - Abnormal; Notable for the following components:    pCO2, Vinicio 38.0 (*)     pO2, Vinicio 155.0 (*)     Carboxyhemoglobin 4.5 (*)     All other components within normal limits   POCT GLUCOSE - Abnormal; Notable for the following components:    POC Glucose 113 (*)     All other components within normal limits   PROTIME-INR   APTT   AMMONIA   URINE DRUG SCREEN     EKG:  Read by me in the absence of a cardiologist shows: Sinus rhythm, rate 70, QTc is 503, nonspecific ST-T wave abnormality, axis -14    RADIOLOGY:  Plain x-rays were viewed by me:   XR CHEST PORTABLE   Final Result   Cardiomegaly with no acute pulmonary finding         CTA HEAD NECK W CONTRAST   Final Result   Severe narrowing of the left supraclinoid ICA segment. No evidence of large vessel occlusion.       No hemodynamically significant stenosis within the cervical ICAs per NASCET   criteria. Patent cervical vertebral arteries. Subpleural pulmonary opacity measuring up to 18 mm within the left upper   lobe, persistent over several months and slightly more conspicuous. Findings   may reflect an underlying inflammatory process, however underlying neoplasm   is possible. Recommend prompt CT chest follow-up, PET CT imaging and/or   tissue sampling. 15 mm mixed density nodule within the right thyroid lobe. Nonemergent   thyroid ultrasound recommended on an outpatient basis. Status post left   thyroidectomy. Correlate with prior operative reports. CT HEAD WO CONTRAST   Final Result   No acute intracranial abnormality. Findings were discussed with Gloria Alex at 7:54 am on 12/22/2022. ED COURSE:  remains drowsy thus far, sleeping but spontaneously moves extremities    PROCEDURES:  None    CRITICAL CARE:  Total critical care time of 31 minutes (excludes any time for procedures). This includes but not limited to vital sign monitoring, interpretation of diagnostics, review of nursing notes, pertinent record review, discussions about patient condition, consultation time, documentation time. Critical care due to the patient's AMS, concern for acute CVA. CONSULTATIONS:  Dr Veronica Murdock Stroke Team, Dr Neema Anthony is a 67 y.o. female who presented because of concern for altered mental status. [facility] Stroke Team has been consulted and thrombolytics will NOT be administered due to the risk/benefit profile of the drug and the patient's clinical presentation. Etiology for her altered state is unknown at this point in time. She does appear to be sedated though is not prescribed many medications that cause sedation. I discussed the case in full with the admitting physician.    Differential Diagnosis:  Infection, dehydration, metabolic causes, neurologic causes such as ischemic or hemorrhagic stroke, medication overdose, other    FINAL IMPRESSION:    1. Acute encephalopathy    2. Anemia, unspecified type    3. Thyroid nodule    4. Abnormal CT scan        (Please note that I used voice recognition software to generate this note.   Occasionally words are mistranscribed despite my efforts to edit errors.)        Joo Ribeiro MD  12/22/22 5925

## 2022-12-22 NOTE — TELEPHONE ENCOUNTER
Writer contacted Dr Dara Christina to inform of 30 day readmission risk. ED provider informed writer of readmission.     Call Back: If you need to call back to inform of disposition you can contact me at [phone]

## 2022-12-22 NOTE — ED NOTES
Arrived by [facility] from [address] [facility]; pt was unable to awaken for transportation to dialysis this morning. Snoring resp, but reponds to voice. Able to answer some questions & follow commands with loud reminders. Unknown last normal; unable to obtain complete history at this time. . Monitors in place.        Lillie Greene RN  12/22/22 0808

## 2022-12-23 ENCOUNTER — APPOINTMENT (OUTPATIENT)
Dept: GENERAL RADIOLOGY | Age: 77
DRG: 304 | End: 2022-12-23
Payer: MEDICARE

## 2022-12-23 PROBLEM — R56.9 NEW ONSET SEIZURE (HCC): Status: ACTIVE | Noted: 2022-12-23

## 2022-12-23 PROBLEM — I10 HTN (HYPERTENSION), BENIGN: Status: ACTIVE | Noted: 2022-12-23

## 2022-12-23 LAB
FOLATE: 11.8 NG/ML (ref 4.78–24.2)
GLUCOSE BLD-MCNC: 115 MG/DL (ref 70–99)
GLUCOSE BLD-MCNC: 129 MG/DL (ref 70–99)
GLUCOSE BLD-MCNC: 138 MG/DL (ref 70–99)
GLUCOSE BLD-MCNC: 147 MG/DL (ref 70–99)
PERFORMED ON: ABNORMAL
T4 FREE: 1 NG/DL (ref 0.9–1.8)
TSH REFLEX FT4: 5.15 UIU/ML (ref 0.27–4.2)
VITAMIN B-12: 445 PG/ML (ref 211–911)

## 2022-12-23 PROCEDURE — 6360000002 HC RX W HCPCS: Performed by: INTERNAL MEDICINE

## 2022-12-23 PROCEDURE — 99223 1ST HOSP IP/OBS HIGH 75: CPT | Performed by: PSYCHIATRY & NEUROLOGY

## 2022-12-23 PROCEDURE — 6360000002 HC RX W HCPCS: Performed by: PSYCHIATRY & NEUROLOGY

## 2022-12-23 PROCEDURE — 6360000002 HC RX W HCPCS

## 2022-12-23 PROCEDURE — 2580000003 HC RX 258: Performed by: PSYCHIATRY & NEUROLOGY

## 2022-12-23 PROCEDURE — 1200000000 HC SEMI PRIVATE

## 2022-12-23 PROCEDURE — 92610 EVALUATE SWALLOWING FUNCTION: CPT

## 2022-12-23 PROCEDURE — 95819 EEG AWAKE AND ASLEEP: CPT

## 2022-12-23 PROCEDURE — 95816 EEG AWAKE AND DROWSY: CPT | Performed by: PSYCHIATRY & NEUROLOGY

## 2022-12-23 PROCEDURE — 2580000003 HC RX 258: Performed by: INTERNAL MEDICINE

## 2022-12-23 PROCEDURE — 71045 X-RAY EXAM CHEST 1 VIEW: CPT

## 2022-12-23 RX ORDER — TORSEMIDE 20 MG/1
60 TABLET ORAL 2 TIMES DAILY
Status: DISCONTINUED | OUTPATIENT
Start: 2022-12-23 | End: 2022-12-23

## 2022-12-23 RX ORDER — FENTANYL 12 UG/H
1 PATCH TRANSDERMAL
Status: DISCONTINUED | OUTPATIENT
Start: 2022-12-23 | End: 2022-12-23

## 2022-12-23 RX ORDER — ACETAMINOPHEN 650 MG/1
650 SUPPOSITORY RECTAL EVERY 4 HOURS PRN
Status: DISCONTINUED | OUTPATIENT
Start: 2022-12-23 | End: 2022-12-27 | Stop reason: HOSPADM

## 2022-12-23 RX ORDER — BACLOFEN 10 MG/1
10 TABLET ORAL 3 TIMES DAILY
Status: ON HOLD | COMMUNITY
End: 2022-12-27 | Stop reason: HOSPADM

## 2022-12-23 RX ORDER — LORAZEPAM 2 MG/ML
1 INJECTION INTRAMUSCULAR ONCE
Status: COMPLETED | OUTPATIENT
Start: 2022-12-23 | End: 2022-12-23

## 2022-12-23 RX ORDER — HYDRALAZINE HYDROCHLORIDE 20 MG/ML
10 INJECTION INTRAMUSCULAR; INTRAVENOUS EVERY 6 HOURS PRN
Status: DISCONTINUED | OUTPATIENT
Start: 2022-12-23 | End: 2022-12-27 | Stop reason: HOSPADM

## 2022-12-23 RX ORDER — HYDROCODONE BITARTRATE AND ACETAMINOPHEN 5; 325 MG/1; MG/1
1 TABLET ORAL EVERY 6 HOURS PRN
Status: ON HOLD | COMMUNITY
End: 2022-12-27 | Stop reason: HOSPADM

## 2022-12-23 RX ORDER — NORTRIPTYLINE HYDROCHLORIDE 10 MG/1
10 CAPSULE ORAL NIGHTLY
Status: ON HOLD | COMMUNITY
End: 2022-12-27 | Stop reason: HOSPADM

## 2022-12-23 RX ORDER — IRBESARTAN AND HYDROCHLOROTHIAZIDE 150; 12.5 MG/1; MG/1
1 TABLET, FILM COATED ORAL DAILY
Status: ON HOLD | COMMUNITY
End: 2022-12-27 | Stop reason: HOSPADM

## 2022-12-23 RX ORDER — LORAZEPAM 2 MG/ML
INJECTION INTRAMUSCULAR
Status: COMPLETED
Start: 2022-12-23 | End: 2022-12-23

## 2022-12-23 RX ORDER — HYDROMORPHONE HYDROCHLORIDE 1 MG/ML
0.25 INJECTION, SOLUTION INTRAMUSCULAR; INTRAVENOUS; SUBCUTANEOUS EVERY 4 HOURS PRN
Status: DISCONTINUED | OUTPATIENT
Start: 2022-12-23 | End: 2022-12-27 | Stop reason: HOSPADM

## 2022-12-23 RX ADMIN — HEPARIN SODIUM 5000 UNITS: 5000 INJECTION INTRAVENOUS; SUBCUTANEOUS at 06:10

## 2022-12-23 RX ADMIN — HEPARIN SODIUM 5000 UNITS: 5000 INJECTION INTRAVENOUS; SUBCUTANEOUS at 14:35

## 2022-12-23 RX ADMIN — Medication 10 ML: at 10:03

## 2022-12-23 RX ADMIN — LORAZEPAM 1 MG: 2 INJECTION INTRAMUSCULAR; INTRAVENOUS at 10:24

## 2022-12-23 RX ADMIN — HYDROMORPHONE HYDROCHLORIDE 0.25 MG: 1 INJECTION, SOLUTION INTRAMUSCULAR; INTRAVENOUS; SUBCUTANEOUS at 22:06

## 2022-12-23 RX ADMIN — PHENYTOIN SODIUM 1260 MG: 50 INJECTION INTRAMUSCULAR; INTRAVENOUS at 10:49

## 2022-12-23 RX ADMIN — LORAZEPAM 1 MG: 2 INJECTION INTRAMUSCULAR at 10:24

## 2022-12-23 RX ADMIN — ONDANSETRON 4 MG: 2 INJECTION INTRAMUSCULAR; INTRAVENOUS at 03:22

## 2022-12-23 RX ADMIN — HEPARIN SODIUM 5000 UNITS: 5000 INJECTION INTRAVENOUS; SUBCUTANEOUS at 22:06

## 2022-12-23 ASSESSMENT — PAIN SCALES - WONG BAKER
WONGBAKER_NUMERICALRESPONSE: 0

## 2022-12-23 ASSESSMENT — PAIN SCALES - GENERAL
PAINLEVEL_OUTOF10: 8
PAINLEVEL_OUTOF10: 7
PAINLEVEL_OUTOF10: 4
PAINLEVEL_OUTOF10: 8

## 2022-12-23 ASSESSMENT — PAIN DESCRIPTION - LOCATION
LOCATION: GENERALIZED

## 2022-12-23 NOTE — PROGRESS NOTES
Facility/Department: [address]  Initial Assessment  DYSPHAGIA BEDSIDE SWALLOW EVALUATION     Patient: Pamela Deng   : 1945   MRN: [id number]      Evaluation Date: 2022   Admitting Diagnosis: Thyroid nodule [E04.1]  Abnormal CT scan [R93.89]  Dialysis patient [facility]) [Z99.2]  Acute encephalopathy [G93.40]  Anemia, unspecified type [D64.9]  Pain: Pt calling out \"ow\", did not elaborate                                                       H&P: 2022  \"Judy Fragoso is a 67 y.o. female who presents because of concern for altered mental status. She is a dialysis patient and lives in some sort of assisted living facility. Staff went to get her to take her to dialysis this morning and could not wake her up. EMS was called. They were not able to wake her up either initially, even with sternal rub. Her glucose was adequately elevated. She did start to wake up and answer some questions but was very confused so they called a stroke alert. Her last known well was some time yesterday and she was well enough to go shopping with her daughter yesterday. \"    Imaging:  Chest X-ray: 2022  Impression   Cardiomegaly with no acute pulmonary finding     MRI: 2022  Impression   No acute intracranial abnormality visualized. History/Prior Level of Function:   Living Status: Assisted living facility  Prior Dysphagia History: Seen by speech therapy at this facility -2022, initially NPO due to   Reason for referral: SLP evaluation orders received due to altered mental status    Dysphagia Impressions/Diagnosis: Oropharyngeal Dysphagia   Pt alert but with reduced responsiveness, calling out \"ow\" and \"please no\" with no additional verbalizations. Pt did not answer simple questions, follow multimodality commands, or attend to SLP during session. Positioned Upright in bed . On room air with RR fluctuating between 20-30/min.  Audible breathing with intermittent throat clearing at baseline, no saliva swallows observed during session. Oral motor exam and laryngeal function assessment not completed as pt did not follow directions despite cueing. Attempted oral care with moist swab, repeated tactile stimuli for minimal labial opening but no lingual protrusion or manipulation assessed. Tactile presentations of spoons with minimal boluses (ice chips, applesauce) and straw, pt orally defensive as demonstrated by tight labial seal and head turn to left. Due to inconsistent respiratory rate, poor attention to bolus, and oral defensiveness, no additional PO presentations offered. Pt with clinical s/s oropharyngeal dysphagia at bedside. Factors affecting performance include impaired mental status and difficulty following directions. Dysphagia risk factors include cognitive status, co-morbidities, and suspected deconditioning. At this time, pt does not appear to safely or functionally tolerate a PO diet, recommend NPO with ongoing assessment of swallowing. Recommended Diet and Intervention 12/23/2022:  Diet Solids Recommendation:  NPO  Liquid Consistency Recommendation:  NPO Recommended form of Meds: Meds via alternative means        Compensatory Swallowing Strategies:  Upright positioning as able; Daily oral care    SHORT TERM DYSPHAGIA GOALS/PLAN OF CARE: Speech therapy for dysphagia tx 3-5 times per week during acute care stay.     Pt will functionally tolerate ongoing assessment of swallow function with diet to be determined as indicated   Pt will advance to least restrictive diet as indicated   If clinical s/s of aspiration/penetration continue to be noted, Pt will participate in Modified Barium Swallow Study     Dysphagia Therapeutic Intervention:  Oral Care, Diet Tolerance Monitoring , Patient/Family Education , Therapeutic Trials with SLP     Discharge Recommendations: Discharge recommendations to be determined pending ongoing follow-up during acute care stay    Patient Positioning: Upright in bed Current Diet Level (prior to evaluation): NPO      Respiratory Status:   [x]Room Air   []O2 via nasal cannula   []Other:    Dentition:  [x]Adequate  []Dentures   []Missing Many Teeth  []Edentulous  []Other:    Baseline Vocal Quality:  [x]Normal  []Dysphonic   []Aphonic   []Hoarse  []Wet  []Weak  []Other:    Volitional Cough:  Not Elicited     Volitional Swallow:   []Absent   []Delayed     []Adequate     [x]Not elicited    Oral Mechanism Exam:  []WFL []Mild   [] Moderate  []Severe  [x]To be assessed  Impaired:   []Left side      []Right side    []Labial ROM/Coordination    []Labial Symmetry   []Lingual ROM/Coordination   []Lingual Symmetry  []Gag  []Other:     Oral Phase: []WFL []Mild   [] Moderate  []Severe  [x]To be assessed   []Impaired/Prolonged Mastication:   []Oral Holding:   []Spillage Left:   []Spillage Right:  []Pocketing Left:   []Pocketing Right:   []Decreased Anterior to Posterior Transit:   []Suspected Premature Bolus Loss:   []Lingual/Palatal Residue:   [x]Other: orally defensive    Pharyngeal Phase: []WFL []Mild   [] Moderate  []Severe  [x]To be assessed   []Delayed Swallow:   []Suspected Pharyngeal Pooling:   []Decreased Laryngeal Elevation:   []Absent Swallow:  []Wet Vocal Quality:   []Throat Clearing-Immediate:   []Throat Clearing-Delayed:   []Cough-Immediate:   []Cough-Delayed:  []Change in Vital Signs:  []Suspected Delayed Pharyngeal Clearing:  []Other:     Eating Assistance:  []Independent  []Setup or clean-up assistance   [] Supervision or touching assistance   [] Partial or moderate assistance   [] Substantial or maximal assistance  [x] Dependent     EDUCATION:   Provided education regarding role of SLP, results of assessment, recommendations and general speech pathology plan of care. [] Pt verbalized understanding and agreement   [] Pt requires ongoing learning   [x] No evidence of comprehension     If patient discharges prior to next visit, this note will serve as discharge. Treatment time:  Timed Code Treatment Minutes: 0  Total Treatment time: 14    Electronically signed by:    Maria Antonia Nash  N Fidel Jacome Pathologist  [place]. [id number]

## 2022-12-23 NOTE — PLAN OF CARE
Problem: Discharge Planning  Goal: Discharge to home or other facility with appropriate resources  12/23/2022 1457 by Mamie Melgar RN  Outcome: Progressing  12/23/2022 0506 by Lexi Davila RN  Outcome: Progressing     Problem: Pain  Goal: Verbalizes/displays adequate comfort level or baseline comfort level  12/23/2022 1457 by Mamie Melgar RN  Outcome: Progressing  12/23/2022 0506 by Lexi Davila RN  Outcome: Progressing     Problem: Skin/Tissue Integrity  Goal: Absence of new skin breakdown  Description: 1. Monitor for areas of redness and/or skin breakdown  2. Assess vascular access sites hourly  3. Every 4-6 hours minimum:  Change oxygen saturation probe site  4. Every 4-6 hours:  If on nasal continuous positive airway pressure, respiratory therapy assess nares and determine need for appliance change or resting period.   12/23/2022 1457 by Mamie Melgar RN  Outcome: Progressing  12/23/2022 0506 by Lexi Davila RN  Outcome: Progressing     Problem: Chronic Conditions and Co-morbidities  Goal: Patient's chronic conditions and co-morbidity symptoms are monitored and maintained or improved  Outcome: Progressing

## 2022-12-23 NOTE — CARE COORDINATION
Discharge Planning Assessment  Readmission Risk 28%  SW discharge spoke with Patient's daughter to discuss reason for admission, current living situation, and potential needs at the time of discharge    Demographics/Insurance verified Yes. Current type of dwelling: [facility] [place] at [place]. Patient from ECF/SW confirmed with: Yes- [facility] and Patient can return upon D/C. Living arrangements: Patient lives alone in an apartment at [place]. Level of function/Support: Patient's daughter reported that her mother is independent with her ADL's. PCP: Dr. Lizbet Crump    Last Visit to PCP: Monday 12/29/2022    DME: None. Active with any community resources/agencies/skilled home care: [facility]. Medication compliance issues: None that daughter is aware of. Financial issues that could impact healthcare: None reported. Tentative discharge plan:    Back [facility]. Transportation at the time of discharge: Family member.        Electronically signed by JOSE Ellsworth on 12/23/2022 at 3:05 PM

## 2022-12-23 NOTE — CARE COORDINATION
12/23/22 1459   Readmission Assessment   Number of Days since last admission? 8-30 days   Previous Disposition Other (comment)  ([facility]. Pt has her own apartment.)   Who is being Brandon Garcia   (Patient's daughter via phone.)   Did you visit your Primary Care Physician after you left the hospital, before you returned this time? Yes  (On Monday 12/19/2022)   Did you see a specialist, such as Cardiac, Pulmonary, Orthopedic Physician, etc. after you left the hospital? No   Who advised the patient to return to the hospital? Other (Comment)  (Nursing staff at [facility])   Does the patient report anything that got in the way of taking their medications? No   In our efforts to provide the best possible care to you and others like you, can you think of anything that we could have done to help you after you left the hospital the first time, so that you might not have needed to return so soon?  Other (Comment)       Electronically signed by JOSE Smith on 12/23/2022 at 3:01 PM

## 2022-12-23 NOTE — PROGRESS NOTES
Rapid Response Quick Summary    Room: I1X-7090/1894-85    Assessment of concern / patient:  patient unresponsive     Physician involved:Dr Butcher    Interventions:  Monitoring of vitals and oxygen levels, abgs per epoc,  lab work done, narcan given as ordered Patient arouses briefly to sternal rub and nailbed pressure     Disposition:  to remain in current room Dr Vipul Salazar updated on events

## 2022-12-23 NOTE — H&P
[place] [address]                     [facility], [address]                              HISTORY AND PHYSICAL    PATIENT NAME: Yolis Stevens                    :        1945  MED REC NO:   [id number]                          ROOM:       2107  ACCOUNT NO:   [de-identified]                           ADMIT DATE: 2022  PROVIDER:     Shanice Eldridge MD      HISTORY OF PRESENT ILLNESS:  The patient is a 25-year-old white American  lady, who came to the emergency room with history of altered mental  status from an extended care facility. She is resident of [place] [address] and she had altered mental status. The patient is  usually up and around and coherent. The patient was unable to awaken  for transportation to dialysis this morning. She had snoring  respirations. She did respond partially to their voice. Her blood  sugar was 113. The patient did not verbalize anything. She did not  follow any commands. PAST MEDICAL HISTORY:  Pertinent for end-stage renal disease. She is on  hemodialysis. Osteoarthritis, coronary artery disease, diabetes,  hyperlipidemia, hypertension, kidney disease, peripheral neuropathy,  pneumonia, hypothyroidism. PAST SURGICAL HISTORY:  Pertinent for laparoscopy, insertion of  peritoneal catheter upper GI endoscopy, colonoscopy, dialysis shunt  creation, appendectomy, mastectomy bilateral and breast reconstruction,  rotator cuff repair, cholecystectomy, hysterectomy, coronary stent  placement, atherosclerotic heart disease. MEDICATIONS:  The patient is on allopurinol, aspirin, Plavix,  levothyroxine, Lidoderm patch, metoprolol, Requip, rosuvastatin,  spironolactone, Demadex, gabapentin, and vitamin D3. ALLERGIES:  She is allergic to AMOXICILLIN, DEMECLOCYCLINE, PENICILLIN,  TETRACYCLINE, OZEMPIC OR SEMAGLUTIDE, CODEINE. SOCIAL HISTORY:  The patient is a . She has 3 children.   She quit  smoking 44 years ago. She would not have any alcohol usage of any  significance. 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 There is  no history of substance abuse. FAMILY HISTORY:  Both the parents are  because of natural  causes. Mother had some kind of cancer. Father had a stroke. One  brother  of gastric carcinoma. A maternal grandmother also had some  kind of cancer.  _____. REVIEW OF SYSTEMS:  Review of system is negative for convulsions. She  does have alteration of the alertness. She is aphasic. She does not  have any spontaneous movements. No recent oropharyngeal dysphagia. No  angina pectoris. No obvious shortness of breath. No abdominal pain. No hematemesis. No melena. Her usual status is ambulatory. No  genitourinary complaint. Does have chronic musculoskeletal pain. She  does have restless leg syndrome. There is no seizure activity. PHYSICAL EXAMINATION:  GENERAL:  Lethargic, somnolent, incoherent, totally disoriented, aphasic  14-year-old white American woman. VITAL SIGNS:  Her temperature is 97.3, blood pressure 119/49,  respirations 18, heart rate is 70. O2 sat 96% on room air. HEENT:  Oral mucosa dry. There is no respiratory distress. SKIN:  Warm and pale. NECK:  Neck is supple. Faint carotid bruit. No jugular venous  distention. No lymphadenopathy. No thyromegaly. LUNGS:  Vesicular breath sounds. Poor inspiration. No crackles or  wheezing. HEART:  Regular rate and rhythm. S1, S2 without any S3 or S4 gallop. ABDOMEN:  Soft, nontender. Bowel sounds present. EXTREMITIES:  No cyanosis or edema. Distal pulsations are weak. NEUROLOGIC:  Babinski is absent. The patient does not have any  spontaneous motor movement. The patient is unable to participate in any  active sensorimotor evaluation. Detailed sensory evaluation also not  possible.     LABORATORY DATA:  Lab evaluation shows sodium 142, potassium _____,  chloride 100, CO2 of 26, BUN 57, creatinine 9.0. Her baseline  creatinine was 7.3 and baseline BUN was 76; although creatinine has gone  as high as 10.3 in recent times. Glomerular filtration rate is 4,  calcium is 8.8. Troponin was 0.03. Liver panel within normal limit. Urine drug screen is negative. White blood cell count 8.1,  hemoglobin/hematocrit 9.7 and 30.1, platelet count is 458. Carboxyhemoglobin 4.5. ABG shows pH of 7.43, pCO2 of 36.9, pO2 of 77.9,  bicarbonate 24.5. Urine drug screen is negative. IMAGING DATA:  EKG is normal sinus rhythm, no acute ischemic changes. Chest x-ray, cardiomegaly. No acute cardiopulmonary finding. Head CT  shows severe narrowing of the left supraclinoid ICA ligament. No  evidence of large-vessel occlusion. No hemodynamically significant  stenosis within cervical ICA. No acute osseous abnormality. Head CT  did not reveal acute intracranial infarction or hemorrhage. Subsequently, an MRI of the brain was done and that did not show any  hemorrhage or infarction either. No acute intracranial abnormality  visualized. ASSESSMENT:  Acute encephalopathy, possibly metabolic, cannot exclude a  stroke and involution, end-stage renal disease, acute-on-chronic renal  failure, osteoarthritis, restless leg syndrome, hypertension, anemia of  chronic disease. PLAN:  Plan is get her admitted. Treat her with IV normal saline. Neuro checks. MRI of the brain. Neurology consultation. Electroencephalogram.  The patient is a full code.         Lorenzo Gutierrez MD    D: 12/22/2022 21:11:12       T: 12/22/2022 21:15:30     [email or web address]  Job#: [id number]     Doc#: [id number]    CC:  Rafy

## 2022-12-23 NOTE — PROGRESS NOTES
Treatment time: 3 hours  Net UF: 2404 ml     Pre weight: 80.9 kg  Post weight:78.5 kg  EDW: TBD kg    Access used: LLAG    Access function: good  with  ml/min     Medications or blood products given: none     Regular outpatient schedule: TTS     Summary of response to treatment: Patient tolerated treatment without distress and without any complications. Patient remained stable throughout entire treatment and upon the       Report given to Carmina Leal RN and copy of dialysis treatment record placed in chart, to be scanned into EMR.

## 2022-12-23 NOTE — PROGRESS NOTES
Assessment completed. VSS. Patient responsive to painful stimuli. Medications given per MAR. Fluids continued. BS 94. Dialysis started at bedside. Safety and aspiration precautions in place. Call light within reach. Will continue to monitor.

## 2022-12-23 NOTE — PROGRESS NOTES
[facility]                                            Advanced Care Planning Note. Purpose of Encounter: Advanced care planning in light of   ESRD , parkinsons  disease debiliyty  Parties In Attendance: Patient,    Decisional Capacity: Yes, partially impaired   Subjective: Patient/family understand that this conversation is to address long term care goal  Objective:   CPR-Yes  Intubation-yes                      Defibrillation-Yes   Mechanical Vent- yes    G tube placement -Yes    Ct Hemodialysis- yes   Goals of Care Determination: Patient/POA   Code Status: Full code    Time spent on Advanced care Plannin minutes  Advanced Care Planning Documents: Completed advanced directives on chart, Sean Perez  is the POA.     Meredith Robison MD  2022 1:51 PM

## 2022-12-23 NOTE — PROGRESS NOTES
Daughter, Odilon Brown updated on patient status and current plan of care. All questions and concerns with POC addressed. Per Odilon Brown, pt is a resident @ [facility] and she administers her own daily medications. Odilon Brown is not aware of pt's current medication list and will have her brother stop by and  the prescriptions so that staff can complete the med rec.

## 2022-12-23 NOTE — CONSULTS
Consult received   D/w Dr. Lizz Flood   Missed HD today   Ordered HD now.  Orders given   D/w HD nurse

## 2022-12-23 NOTE — CONSULTS
Office : [phone]     Fax :[phone]       Nephrology Consult Note      Patient's Name: Andrade Rodríguez  8:20 AM  12/23/2022    Reason for Consult:  ESRD      Requesting Physician:  Chris Jordan MD      Chief Complaint:    Chief Complaint   Patient presents with    Altered Mental Status     Arrived by  EMS from [facility] rt AMS; pt was unable to awaken for transportation to dialysis this morning. Snoring resp, but reponds to voice. Unknown last normal.  . History of Present iIlness:      Andrade Rodríguez is a 67 y.o. female with prior history of ESRD., anemia in ESRD , HTN , recent COVID infection who was admitted with AMS. She missed her morning dialysis session yesterday so urgent dialysis was done yesterday . At this time she is in pain and confused. No SOB. Potassium is 4.6             No intake/output data recorded. Past Medical History:   Diagnosis Date    Arthritis     CAD (coronary artery disease)     Diabetes ([facility] [address]     Hemodialysis patient ([facility]     Hyperlipidemia     Hypertension     Kidney disease     Neuropathy     Pneumonia     IN February, 2021, with COVID    Thyroid disease        Past Surgical History:   Procedure Laterality Date    APPENDECTOMY      CHOLECYSTECTOMY      COLONOSCOPY  11/24/2020    COLONOSCOPY POLYPECTOMY SNARE/COLD BIOPSY performed by Ratna Mcmahan MD at [facility] N/A 05/17/2021    PERITONEAL DIALYSIS CATHETER REMOVAL.  [address] performed by Karmen Vargas DO at [address] (CERVIX STATUS UNKNOWN)      FULL    LAPAROSCOPY INSERTION PERITONEAL CATHETER N/A 04/03/2020    LAPAROSCOPIC LYSIS OF ADHESIONS, LAPAROSCOPIC PERITONEAL DIALYSIS CATHETER PLACEMENT, LAPAROSCOPIC OMENTOPEXY performed by Dorothy Macnilla DO at [address] [facility] [address] BILATERAL      preventive    ROTATOR CUFF REPAIR Left     SHUNT REVISION Left 08/19/2021    LEFT FOREARM ARTERIO VENOUS GRAFT performed by Joe Akhtar MD at [address] N/A 11/24/2020    EGD BIOPSY performed by Delfin Alvarez MD at [facility]       Family History   Problem Relation Age of Onset    Cancer Mother         lung, breast, liver    Stroke Father     Stomach Cancer Brother     Cancer Maternal Grandmother     No Known Problems Paternal Grandmother     No Known Problems Paternal Grandfather            Current Medications:    allopurinol (ZYLOPRIM) tablet 100 mg, Daily  aspirin EC tablet 81 mg, Daily  clopidogrel (PLAVIX) tablet 75 mg, Daily  levothyroxine (SYNTHROID) tablet 150 mcg, QAM AC  lidocaine 4 % external patch 1 patch, Daily  metoprolol succinate (TOPROL XL) extended release tablet 25 mg, Daily  rosuvastatin (CRESTOR) tablet 20 mg, Daily  spironolactone (ALDACTONE) tablet 25 mg, Daily  torsemide (DEMADEX) tablet 120 mg, BID  vitamin D3 (CHOLECALCIFEROL) tablet 1,000 Units, Daily  dextrose 5 % and 0.45 % sodium chloride infusion, Continuous  sodium chloride flush 0.9 % injection 5-40 mL, 2 times per day  sodium chloride flush 0.9 % injection 5-40 mL, PRN  0.9 % sodium chloride infusion, PRN  heparin (porcine) injection 5,000 Units, 3 times per day  ondansetron (ZOFRAN-ODT) disintegrating tablet 4 mg, Q8H PRN   Or  ondansetron (ZOFRAN) injection 4 mg, Q6H PRN  polyethylene glycol (GLYCOLAX) packet 17 g, Daily PRN  acetaminophen (TYLENOL) tablet 650 mg, Q6H PRN   Or  acetaminophen (TYLENOL) suppository 650 mg, Q6H PRN  dextrose bolus 10% 125 mL, PRN   Or  dextrose bolus 10% 250 mL, PRN  glucagon (rDNA) injection 1 mg, PRN  dextrose 10 % infusion, Continuous PRN            Physical exam:     Vitals:  BP (!) 160/57   Pulse 96 Temp 97 °F (36.1 °C) (Temporal)   Resp 19   Wt 184 lb 15.5 oz (83.9 kg)   LMP  (LMP Unknown)   SpO2 93%   BMI 30.78 kg/m²   Constitutional:  OAA X3 NAD  Skin: no rash, turgor wnl  Heent:  eomi, mmm  Neck: no bruits or jvd noted  Cardiovascular:  S1, S2 without m/r/g  Respiratory: CTA B without w/r/r  Abdomen:  +bs, soft, nt, nd  Ext: no  lower extremity edema  Psychiatric: mood and affect appropriate  Musculoskeletal:  Rom, muscular strength intact    Labs:  CBC:   Recent Labs     12/22/22  0804 12/22/22  1713 12/22/22  1718   WBC 8.4  --  8.1   HGB 8.9* 10.4* 9.7*     --  381     BMP:    Recent Labs     12/22/22  0804 12/22/22  1718    142   K 4.3 4.6    100   CO2 24 26   BUN 58* 57*   CREATININE 8.4* 9.0*   GLUCOSE 122* 84     Ca/Mg/Phos:   Recent Labs     12/22/22  0804 12/22/22  1718   CALCIUM 8.7 8.8     Hepatic:   Recent Labs     12/22/22  0804 12/22/22  1718   AST 17 12*   ALT 8* 9*   BILITOT <0.2 0.3   ALKPHOS 106 100     Troponin:   Recent Labs     12/22/22  0804   TROPONINI 0.03*     BNP: No results for input(s): BNP in the last 72 hours. Lipids: No results for input(s): CHOL, TRIG, HDL, LDLCALC, LABVLDL in the last 72 hours. ABGs:   Recent Labs     12/22/22  1713   PHART 7.430   PO2ART 77.9   XXF2EQU 36.9     INR:   Recent Labs     12/22/22  0804   INR 0.97     UA:  Recent Labs     12/22/22  1110   PHUR 7.0           IMAGING:  MRI BRAIN WO CONTRAST   Final Result   No acute intracranial abnormality visualized. XR CHEST PORTABLE   Final Result   Cardiomegaly with no acute pulmonary finding         CTA HEAD NECK W CONTRAST   Final Result   Severe narrowing of the left supraclinoid ICA segment. No evidence of large vessel occlusion. No hemodynamically significant stenosis within the cervical ICAs per NASCET   criteria. Patent cervical vertebral arteries.       Subpleural pulmonary opacity measuring up to 18 mm within the left upper   lobe, persistent over several months and slightly more conspicuous. Findings   may reflect an underlying inflammatory process, however underlying neoplasm   is possible. Recommend prompt CT chest follow-up, PET CT imaging and/or   tissue sampling. 15 mm mixed density nodule within the right thyroid lobe. Nonemergent   thyroid ultrasound recommended on an outpatient basis. Status post left   thyroidectomy. Correlate with prior operative reports. CT HEAD WO CONTRAST   Final Result   No acute intracranial abnormality. Findings were discussed with Saima Abernathy at 7:54 am on 12/22/2022. Assessment/Plan :      1. ESRD   Had HD yesterday   Tolerated well   Will plan HD tomorrow     2. HTN. Controlled       3. Anemia in esrd  Give retacrit     4. Acid- base disorder. correct with HD     5. Electrolytes. Monitor     6. AMS.   r/o infection   Mgmt per primary attending             Thank you for allowing us to participate in care of [facility]         Electronically signed by: Erika Darby MD, 12/23/2022, 8:20 AM      [facility] [address]  Office : [phone]  Fax :[phone]

## 2022-12-23 NOTE — PROCEDURES
Patient: Lefty Villasenor    MR Number: [id number]  YOB: 1945  Date of Visit: 12/23/2022    Clinical History:  The patient is a 67y.o. years old female with acute encephalopathy. Method: The EEG was performed utilizing the international 10/20 of electrode placements of both referential and bipolar montages. The patient was  drowsy and lethargic through out the recording. Photic stimulation was performed. Findings: The background of the EEG showed generalized diffuse delta slowing throughout the recording which was waxing and waning, symmetric and continuous. This background was disrupted by frequent generalized periodic pattern with triphasic waves which disrupted the background but no significant EEG or ictal buildup. No normal background. Impression: This EEG is abnormal.  The generalized diffuse slowing with periodic pattern is suggestive of severe diffuse encephalopathy which could be seen in metabolic encephalopathy. Cannot exclude possibility of nonconvulsive status epilepticus although there was no clear EEG seizures. Clinical correlation is indicated.         Jareth Mercado MD      Board certified in clinical neurophysiology

## 2022-12-23 NOTE — PROGRESS NOTES
Patient Active Problem List   Diagnosis    Hyperlipidemia    Acquired hypothyroidism    Chronic kidney 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 ([facility] [address]    Primary osteoarthritis of both knees    Memory loss    Diabetic polyneuropathy associated with type 2 diabetes mellitus ([facility] [address]    Coronary artery disease due to lipid rich plaque    [facility] stage renal disease) [facility]    Class 1 obesity due to excess calories with body mass index (BMI) of 30.0 to 30.9 in adult    History of anemia due to chronic kidney disease    Parkinson's disease    RLS (restless legs syndrome)    Metabolic encephalopathy    Acute encephalopathy    Suspected stroke patient last known to be well 3-4.5 hours ago   H&P dictated

## 2022-12-23 NOTE — CONSULTS
In patient Neurology consult        [facility] Neurology      MD Claudette Selby  1945    Date of Service: 12/23/2022    Referring Physician: Юлия Bishop MD    Most of the history was obtained from detailed chart reviewing and discussion with the patient's nurse. The patient is currently confused and unable to provide me with accurate history. Reason for the consult and CC: Acute encephalopathy    HPI:   The patient is a 67y.o.  years old female with multiple medical problem including hypertension, CAD and end-stage renal disease who was admitted to the hospital last night with acute encephalopathy. According to report she is a resident of independent living facility and she became confused and poorly responsive hours prior to admission. Description difficulties with following direction yesterday morning and she missed her dialysis. Degree was severe. Duration was persistent. No clear triggers. Blood sugar was in the 100 range. No fever or chills. No other relieving or aggravating factors. She came to the ED for evaluation. Initial imaging with CT head showed no acute finding with severe distal left ICA stenosis. She was admitted to the hospital.  Patient continues have waxing and waning confusion today. Urgent EEG showed periodic pattern concerning for seizure. She was loaded with Dilantin and Ativan. Now she is waxing and waning but able to answer questions. She was able to mention her name to me, day of the weekend snf place where she is at. Blood pressure is waxing and waning but in 040 systolic. She had MRI of the brain from yesterday showed no acute lesion. Other review of system was unremarkable.     Family History   Problem Relation Age of Onset    Cancer Mother         lung, breast, liver    Stroke Father     Stomach Cancer Brother     Cancer Maternal Grandmother     No Known Problems Paternal Grandmother     No Known Problems Paternal Grandfather Past Medical History:   Diagnosis Date    Arthritis     CAD (coronary artery disease)     Diabetes ([facility] [address]     Hemodialysis patient ([facility] [address]     Hyperlipidemia     Hypertension     Kidney disease     Neuropathy     Pneumonia     IN , with COVID    Thyroid disease      Past Surgical History:   Procedure Laterality Date    APPENDECTOMY      CHOLECYSTECTOMY      COLONOSCOPY  2020    COLONOSCOPY POLYPECTOMY SNARE/COLD BIOPSY performed by Wendy Joyner MD at [address] [facility] N/A 2021    PERITONEAL DIALYSIS CATHETER REMOVAL.  EXCISSION OF ABDOMINAL CITRIX. performed by Rasta Powers DO at [facility] ([facility])      FULL    LAPAROSCOPY INSERTION PERITONEAL CATHETER N/A 2020    LAPAROSCOPIC LYSIS OF ADHESIONS, LAPAROSCOPIC PERITONEAL DIALYSIS CATHETER PLACEMENT, LAPAROSCOPIC OMENTOPEXY performed by Rasta Powers DO at [address] BILATERAL      preventive    ROTATOR CUFF REPAIR Left     SHUNT REVISION Left 2021    LEFT FOREARM ARTERIO VENOUS GRAFT performed by Huan Feliz MD at [facility] N/A 2020    EGD BIOPSY performed by Wendy Joyner MD at [address] History     Tobacco Use    Smoking status: Former     Packs/day: 0.50     Years: 10.00     Pack years: 5.00     Types: Cigarettes     Quit date: 1978     Years since quittin.5    Smokeless tobacco: Never   Vaping Use    Vaping Use: Never used   Substance Use Topics    Alcohol use: Not Currently     Comment: every other week one glass of wine    Drug use: Never     Allergies   Allergen Reactions    Amoxicillin Anaphylaxis    Demeclocycline Anaphylaxis    Penicillins Anaphylaxis    Tetracyclines & Related Anaphylaxis    Ozempic (0.25 Or 0.5 Mg-Dose) [Semaglutide(0.25 Or 0.5mg-Dos)] Other (See Comments)     Lost large amount weight and loss  Of appetite    Codeine Itching and Rash     Current Facility-Administered Medications   Medication Dose Route Frequency Provider Last Rate Last Admin    allopurinol (ZYLOPRIM) tablet 100 mg  100 mg Oral Daily Bouchra Mancini MD        aspirin EC tablet 81 mg  81 mg Oral Daily Bouchra Mancini MD        clopidogrel (PLAVIX) tablet 75 mg  75 mg Oral Daily Bouchra Mancini MD        levothyroxine (SYNTHROID) tablet 150 mcg  150 mcg Oral QAM AC Bouchra Mancini MD        lidocaine 4 % external patch 1 patch  1 patch TransDERmal Daily Bouchra Mancini MD        metoprolol succinate (TOPROL XL) extended release tablet 25 mg  25 mg Oral Daily Bouchra Mancini MD        rosuvastatin (CRESTOR) tablet 20 mg  20 mg Oral Daily Bouchra Mancini MD        Vitamin D (CHOLECALCIFEROL) tablet 1,000 Units  1,000 Units Oral Daily Bouchra Mancini MD        dextrose 5 % and 0.45 % sodium chloride infusion   IntraVENous Continuous Tami Parrish MD 50 mL/hr at 12/22/22 1901 New Bag at 12/22/22 1901    sodium chloride flush 0.9 % injection 5-40 mL  5-40 mL IntraVENous 2 times per day Tami Parrish MD   10 mL at 12/23/22 1003    sodium chloride flush 0.9 % injection 5-40 mL  5-40 mL IntraVENous PRN Tami Parrish MD        0.9 % sodium chloride infusion   IntraVENous PRN Tami Parrish MD        heparin (porcine) injection 5,000 Units  5,000 Units SubCUTAneous 3 times per day Tami Parrish MD   5,000 Units at 12/23/22 0610    ondansetron (ZOFRAN-ODT) disintegrating tablet 4 mg  4 mg Oral Q8H PRN Tami Parrish MD        Or    ondansetron [facility]) injection 4 mg  4 mg IntraVENous Q6H PRN Tami Parrish MD   4 mg at 12/23/22 0322    polyethylene glycol (GLYCOLAX) packet 17 g  17 g Oral Daily PRN Tami Parrish MD        acetaminophen (TYLENOL) tablet 650 mg  650 mg Oral Q6H PRN Tami Parrish MD        Or    acetaminophen (TYLENOL) suppository 650 mg  650 mg Rectal Q6H PRN Tami Parrish MD        dextrose bolus 10% 125 mL  125 mL IntraVENous PRN Maco Espitia MD        Or    dextrose bolus 10% 250 mL  250 mL IntraVENous PRN Maco Espitia MD        glucagon (rDNA) injection 1 mg  1 mg SubCUTAneous PRN Maco Espitia MD        dextrose 10 % infusion   IntraVENous Continuous PRN Maco Espitia MD           ROS: 1014 system review, reviewed with patient's family and/or nurse was unremarkable except mentioned in HPI. Constitutional:   Vitals:    22 0000 22 0400 22 0911 22 1200   BP: (!) 153/71 (!) 160/57 (!) 169/64 (!) 154/67   Pulse: 85 96 (!) 102 97   Resp:    Temp: 97.1 °F (36.2 °C) 97 °F (36.1 °C) 99.2 °F (37.3 °C)    TempSrc: Temporal Temporal Temporal    SpO2: 94% 93% 96% 95%   Weight:  184 lb 15.5 oz (83.9 kg)      General appearance: Confused   Eye: Examination of conjunctiva and lids: No eye lid drooping, no redness or abnormal conjunctival coloration. Examination of pupil and irises: Pupil round, regular and reactive bilaterally direct and consensual. Iris is symmetric. Neck: Neck is supple. No thyromegaly  Cardiovascular: No lower leg edema with good pulsation. No carotid bruit  Neurological: Mental status: Waxing and waning, poor attention and concentration but able to answer questions. She is a x2. C  Cranial nerves: Pupil round regular and reactive, extraocular muscle intact, no gaze preference, face is symmetric, uvula midline, tongue is midline. DTRs: Symmetric 2+ throughout arms and legs                          Sensation: No sensory deficit or abnormal sensation                          Plantars: Flexor bilaterally. Musculoskeletal:  The patient can move all 4 extremities. No apparent focal weakness. Normal tone and range of motion. Psychiatric: Poor judgment and insight. Poor orientation, waxing and waning. Easily distracted. Labile affect. Respiratory: Respiratory effort: Normal.  Palpation: No abnormal deformities.   Skin: Inspection of the skin: Normal , no abnormal lesion. Palpation: No palpable nodules  ENT: No abnormalities with nasal mucosa. No abnormalities with oropharynx and palate is symmetric    Data:  LABS:   Lab Results   Component Value Date/Time     12/22/2022 05:18 PM    K 4.6 12/22/2022 05:18 PM    K 4.3 12/22/2022 08:04 AM     12/22/2022 05:18 PM    CO2 26 12/22/2022 05:18 PM    BUN 57 12/22/2022 05:18 PM    CREATININE 9.0 12/22/2022 05:18 PM    GFRAA 8 01/20/2022 06:16 AM    LABGLOM 4 12/22/2022 05:18 PM    GLUCOSE 84 12/22/2022 05:18 PM    PHOS 6.9 09/24/2021 07:50 AM    MG 2.70 11/20/2022 12:09 PM    CALCIUM 8.8 12/22/2022 05:18 PM     Lab Results   Component Value Date/Time    WBC 8.1 12/22/2022 05:18 PM    RBC 3.11 12/22/2022 05:18 PM    HGB 9.7 12/22/2022 05:18 PM    HCT 30.1 12/22/2022 05:18 PM    MCV 96.7 12/22/2022 05:18 PM    RDW 15.1 12/22/2022 05:18 PM     12/22/2022 05:18 PM     Lab Results   Component Value Date    INR 0.97 12/22/2022    PROTIME 12.7 12/22/2022       Neuroimaging were independently reviewed by me. Reviewed notes from different physicians  Reviewed lab and blood testing    Impression:  Acute encephalopathy, severe. Likely severe acute metabolic encephalopathy which could be multifactorial.  Less likely CNS infection. EEG reviewed today and showed evidence of periodic pattern which can be seen in patient with severe metabolic encephalopathy. Nonconvulsive status epilepticus is still in our differential diagnosis but the patient was able to answer questions today. We will continue with Dilantin and maintain on 100 mg twice daily for now. Will need blood pressure monitor and control closely with frequent neurochecks. End-stage renal disease, worse  Hypertension with hypertensive urgency, not controlled  Hyperlipidemia  Left ICA stenosis more distal than proximal.  Maximize medical therapy. Less likely the need for urgent intervention at this point.     Recommendation:  Continue Dilantin and will maintain with 100 mg three daily  Check Dilantin level   Continue aspirin and Plavix   Blood pressure monitor and blood pressure control. Goal inpatient 160/90  Neurochecks  Aspiration precautions  Continue scheduled dialysis and follow kidney function test  Statin  DVT and GI prophylaxis  PT and OT when medically stable  Speech  Neurochecks  ID work-up with panculture  Discussed with patient's primary team and and her nurse. MDM: High due to severe encephalopathy and risk of high morbidity       Thank you for referring such patient. If you have any questions regarding my consult note, please don't hesitate to call me. Enid Marin MD  [phone]    This dictation was generated by voice recognition computer software.  Although all attempts are made to edit the dictation for accuracy, there may be errors in the  transcription that are not intended

## 2022-12-24 LAB
GLUCOSE BLD-MCNC: 106 MG/DL (ref 70–99)
GLUCOSE BLD-MCNC: 112 MG/DL (ref 70–99)
PERFORMED ON: ABNORMAL
PERFORMED ON: ABNORMAL

## 2022-12-24 PROCEDURE — 6360000002 HC RX W HCPCS: Performed by: INTERNAL MEDICINE

## 2022-12-24 PROCEDURE — 2500000003 HC RX 250 WO HCPCS: Performed by: INTERNAL MEDICINE

## 2022-12-24 PROCEDURE — 6360000002 HC RX W HCPCS: Performed by: PSYCHIATRY & NEUROLOGY

## 2022-12-24 PROCEDURE — 1200000000 HC SEMI PRIVATE

## 2022-12-24 PROCEDURE — 2580000003 HC RX 258: Performed by: INTERNAL MEDICINE

## 2022-12-24 PROCEDURE — 6370000000 HC RX 637 (ALT 250 FOR IP): Performed by: INTERNAL MEDICINE

## 2022-12-24 PROCEDURE — 90935 HEMODIALYSIS ONE EVALUATION: CPT

## 2022-12-24 PROCEDURE — 99233 SBSQ HOSP IP/OBS HIGH 50: CPT | Performed by: PSYCHIATRY & NEUROLOGY

## 2022-12-24 RX ORDER — PHENYTOIN SODIUM 50 MG/ML
100 INJECTION, SOLUTION INTRAMUSCULAR; INTRAVENOUS EVERY 8 HOURS
Status: DISCONTINUED | OUTPATIENT
Start: 2022-12-24 | End: 2022-12-25

## 2022-12-24 RX ORDER — DIPHENHYDRAMINE HYDROCHLORIDE 50 MG/ML
12.5 INJECTION INTRAMUSCULAR; INTRAVENOUS ONCE
Status: COMPLETED | OUTPATIENT
Start: 2022-12-24 | End: 2022-12-24

## 2022-12-24 RX ADMIN — PHENYTOIN SODIUM 100 MG: 50 INJECTION INTRAMUSCULAR; INTRAVENOUS at 21:55

## 2022-12-24 RX ADMIN — Medication 10 ML: at 21:56

## 2022-12-24 RX ADMIN — DIPHENHYDRAMINE HYDROCHLORIDE 12.5 MG: 50 INJECTION, SOLUTION INTRAMUSCULAR; INTRAVENOUS at 11:45

## 2022-12-24 RX ADMIN — HEPARIN SODIUM 5000 UNITS: 5000 INJECTION INTRAVENOUS; SUBCUTANEOUS at 21:55

## 2022-12-24 RX ADMIN — HYDROMORPHONE HYDROCHLORIDE 0.25 MG: 1 INJECTION, SOLUTION INTRAMUSCULAR; INTRAVENOUS; SUBCUTANEOUS at 02:30

## 2022-12-24 RX ADMIN — CLOPIDOGREL BISULFATE 75 MG: 75 TABLET ORAL at 08:54

## 2022-12-24 RX ADMIN — HEPARIN SODIUM 5000 UNITS: 5000 INJECTION INTRAVENOUS; SUBCUTANEOUS at 05:54

## 2022-12-24 RX ADMIN — HEPARIN SODIUM 5000 UNITS: 5000 INJECTION INTRAVENOUS; SUBCUTANEOUS at 15:37

## 2022-12-24 RX ADMIN — HYDROMORPHONE HYDROCHLORIDE 0.25 MG: 1 INJECTION, SOLUTION INTRAMUSCULAR; INTRAVENOUS; SUBCUTANEOUS at 06:03

## 2022-12-24 RX ADMIN — METOPROLOL SUCCINATE 25 MG: 25 TABLET, EXTENDED RELEASE ORAL at 08:54

## 2022-12-24 RX ADMIN — ROSUVASTATIN CALCIUM 20 MG: 20 TABLET, FILM COATED ORAL at 08:54

## 2022-12-24 RX ADMIN — ASPIRIN 81 MG: 81 TABLET, COATED ORAL at 08:54

## 2022-12-24 RX ADMIN — PHENYTOIN SODIUM 100 MG: 50 INJECTION INTRAMUSCULAR; INTRAVENOUS at 15:37

## 2022-12-24 RX ADMIN — ALLOPURINOL 100 MG: 100 TABLET ORAL at 08:54

## 2022-12-24 RX ADMIN — Medication 1000 UNITS: at 08:54

## 2022-12-24 RX ADMIN — Medication 10 ML: at 09:06

## 2022-12-24 ASSESSMENT — PAIN DESCRIPTION - ORIENTATION: ORIENTATION: RIGHT;LEFT

## 2022-12-24 ASSESSMENT — PAIN SCALES - GENERAL: PAINLEVEL_OUTOF10: 7

## 2022-12-24 ASSESSMENT — PAIN DESCRIPTION - LOCATION: LOCATION: LEG

## 2022-12-24 NOTE — PROGRESS NOTES
Department of Internal Medicine  General Internal Medicine   Progress Note      SUBJECTIVE: lethargic incoherent disoriented no spontaneous verbalization , does not respond meaningfully    History obtained from chart review, the patient, and nursing staff   General ROS: positive for  - fatigue, malaise, and weight loss  negative for - chills, fever, or night sweats  Psychological ROS: positive for - anxiety, disorientation, irritability, and memory difficulties  negative for - hallucinations or hostility  Ophthalmic ROS: negative  Respiratory ROS: no cough, shortness of breath, or wheezing  Cardiovascular ROS: no chest pain or dyspnea on exertion  Gastrointestinal ROS: no abdominal pain, change in bowel habits, or black or bloody stools  Genito-Urinary ROS: oliguric , no hematuria ,   Musculoskeletal ROS: positive for - pain in chronic generalized pain    negative for - joint swelling  Neurological ROS: diffuse slowing down of intellectual and sensory motor function  Dermatological ROS: negative    OBJECTIVE      Medications      Current Facility-Administered Medications: hydrALAZINE (APRESOLINE) injection 10 mg, 10 mg, IntraVENous, Q6H PRN  acetaminophen (TYLENOL) suppository 650 mg, 650 mg, Rectal, Q4H PRN  HYDROmorphone HCl PF (DILAUDID) injection 0.25 mg, 0.25 mg, IntraVENous, Q4H PRN  allopurinol (ZYLOPRIM) tablet 100 mg, 100 mg, Oral, Daily  aspirin EC tablet 81 mg, 81 mg, Oral, Daily  clopidogrel (PLAVIX) tablet 75 mg, 75 mg, Oral, Daily  levothyroxine (SYNTHROID) tablet 150 mcg, 150 mcg, Oral, QAM AC  lidocaine 4 % external patch 1 patch, 1 patch, TransDERmal, Daily  metoprolol succinate (TOPROL XL) extended release tablet 25 mg, 25 mg, Oral, Daily  rosuvastatin (CRESTOR) tablet 20 mg, 20 mg, Oral, Daily  Vitamin D (CHOLECALCIFEROL) tablet 1,000 Units, 1,000 Units, Oral, Daily  dextrose 5 % and 0.45 % sodium chloride infusion, , IntraVENous, Continuous  sodium chloride flush 0.9 % injection 5-40 mL, 5-40 mL, IntraVENous, 2 times per day  sodium chloride flush 0.9 % injection 5-40 mL, 5-40 mL, IntraVENous, PRN  0.9 % sodium chloride infusion, , IntraVENous, PRN  heparin (porcine) injection 5,000 Units, 5,000 Units, SubCUTAneous, 3 times per day  ondansetron (ZOFRAN-ODT) disintegrating tablet 4 mg, 4 mg, Oral, Q8H PRN **OR** ondansetron (ZOFRAN) injection 4 mg, 4 mg, IntraVENous, Q6H PRN  polyethylene glycol (GLYCOLAX) packet 17 g, 17 g, Oral, Daily PRN  acetaminophen (TYLENOL) tablet 650 mg, 650 mg, Oral, Q6H PRN **OR** [DISCONTINUED] acetaminophen (TYLENOL) suppository 650 mg, 650 mg, Rectal, Q6H PRN  dextrose bolus 10% 125 mL, 125 mL, IntraVENous, PRN **OR** dextrose bolus 10% 250 mL, 250 mL, IntraVENous, PRN  glucagon (rDNA) injection 1 mg, 1 mg, SubCUTAneous, PRN  dextrose 10 % infusion, , IntraVENous, Continuous PRN    Physical      Vitals: BP (!) 169/77   Pulse 86   Temp (!) 96.4 °F (35.8 °C) (Temporal)   Resp 15   Ht 5' 5\" (1.651 m)   Wt 184 lb 15.5 oz (83.9 kg)   LMP  (LMP Unknown)   SpO2 (!) 85%   BMI 30.78 kg/m²   Temp: Temp: (!) 96.4 °F (35.8 °C)  Max: Temp  Av.7 °F (36.5 °C)  Min: 96.4 °F (35.8 °C)  Max: 99.2 °F (37.3 °C)  Respiration range:  Resp  Av.1  Min: 15  Max: 25  Pulse Range:  Pulse  Av.1  Min: 80  Max: 102  Blood pressure range:  Systolic (24TIK), FCH:063 , Min:123 , YEU:734   , Diastolic (47IMF), VV, Min:64, Max:77    SpO2  Av.7 %  Min: 85 %  Max: 96 %    Intake/Output Summary (Last 24 hours) at 2022 6956  Last data filed at 2022 1455  Gross per 24 hour   Intake 1058.22 ml   Output --   Net 1058.22 ml       Vent settings:  Pulse  Av.7  Min: 63  Max: 114  Resp  Av.1  Min: 11  Max: 37  SpO2  Av.4 %  Min: 85 %  Max: 100 %    CONSTITUTIONAL:  fatigued, somnolent, uncooperative, distracted, mild distress, appears stated age, and mildly obese  EYES:  unremarkable   NECK:  mild JVD   BACK:  symmetric and no curvature  LUNGS:  No increased work of breathing, good air exchange, clear to auscultation bilaterally, no crackles or wheezing  CARDIOVASCULAR:  Normal apical impulse, regular rate and rhythm, normal S1 and S2, no S3 or S4, and no murmur noted  ABDOMEN:  soft BS +   MUSCULOSKELETAL:  trace edema   NEUROLOGIC:  general weakness lack of co-ordination , babinski absent   SKIN:  warm and dry  and no bruising or bleeding    Data      Recent Results (from the past 96 hour(s))   POCT Glucose    Collection Time: 12/22/22  7:29 AM   Result Value Ref Range    POC Glucose 113 (H) 70 - 99 mg/dl    Performed on ACCU-CHEK    Blood Gas, Venous    Collection Time: 12/22/22  7:57 AM   Result Value Ref Range    pH, Vinicio 7.424 7.350 - 7.450    pCO2, Vinicio 38.0 (L) 40.0 - 50.0 mmHg    pO2, Vinicio 155.0 (H) 25.0 - 40.0 mmHg    HCO3, Venous 24.8 23.0 - 29.0 mmol/L    Base Excess, Vinicio 0.5 -3.0 - 3.0 mmol/L    O2 Sat, Vinicio >100 Not Established %    Carboxyhemoglobin 4.5 (H) 0.0 - 1.5 %    MetHgb, Vinicio 0.0 <1.5 %    TC02 (Calc), Vinicio 58 Not Established mmol/L    O2 Content, Vinicio 14 Not Established VOL %    O2 Therapy See comment    CBC with Auto Differential    Collection Time: 12/22/22  8:04 AM   Result Value Ref Range    WBC 8.4 4.0 - 11.0 K/uL    RBC 2.81 (L) 4.00 - 5.20 M/uL    Hemoglobin 8.9 (L) 12.0 - 16.0 g/dL    Hematocrit 27.9 (L) 36.0 - 48.0 %    MCV 99.1 80.0 - 100.0 fL    MCH 31.7 26.0 - 34.0 pg    MCHC 32.0 31.0 - 36.0 g/dL    RDW 15.6 (H) 12.4 - 15.4 %    Platelets 150 877 - 450 K/uL    MPV 7.8 5.0 - 10.5 fL    PLATELET SLIDE REVIEW Adequate     SLIDE REVIEW see below     Neutrophils % 83.0 %    Lymphocytes % 15.0 %    Monocytes % 2.0 %    Eosinophils % 0.0 %    Basophils % 0.0 %    Neutrophils Absolute 7.0 1.7 - 7.7 K/uL    Lymphocytes Absolute 1.3 1.0 - 5.1 K/uL    Monocytes Absolute 0.2 0.0 - 1.3 K/uL    Eosinophils Absolute 0.0 0.0 - 0.6 K/uL    Basophils Absolute 0.0 0.0 - 0.2 K/uL    Anisocytosis Occasional (A)    Comprehensive Metabolic Panel w/ Reflex to MG Collection Time: 12/22/22  8:04 AM   Result Value Ref Range    Sodium 142 136 - 145 mmol/L    Potassium reflex Magnesium 4.3 3.5 - 5.1 mmol/L    Chloride 100 99 - 110 mmol/L    CO2 24 21 - 32 mmol/L    Anion Gap 18 (H) 3 - 16    Glucose 122 (H) 70 - 99 mg/dL    BUN 58 (H) 7 - 20 mg/dL    Creatinine 8.4 (HH) 0.6 - 1.2 mg/dL    Est, Glom Filt Rate 4 (A) >60    Calcium 8.7 8.3 - 10.6 mg/dL    Total Protein 6.1 (L) 6.4 - 8.2 g/dL    Albumin 3.5 3.4 - 5.0 g/dL    Albumin/Globulin Ratio 1.3 1.1 - 2.2    Total Bilirubin <0.2 0.0 - 1.0 mg/dL    Alkaline Phosphatase 106 40 - 129 U/L    ALT 8 (L) 10 - 40 U/L    AST 17 15 - 37 U/L   Troponin    Collection Time: 12/22/22  8:04 AM   Result Value Ref Range    Troponin 0.03 (H) <0.01 ng/mL   Protime-INR    Collection Time: 12/22/22  8:04 AM   Result Value Ref Range    Protime 12.7 11.7 - 14.5 sec    INR 0.97 0.87 - 1.14   APTT    Collection Time: 12/22/22  8:04 AM   Result Value Ref Range    aPTT 26.1 23.0 - 34.3 sec   EKG 12 Lead    Collection Time: 12/22/22  8:04 AM   Result Value Ref Range    Ventricular Rate 70 BPM    Atrial Rate 70 BPM    P-R Interval 176 ms    QRS Duration 94 ms    Q-T Interval 466 ms    QTc Calculation (Bazett) 503 ms    P Axis 53 degrees    R Axis -14 degrees    T Axis 52 degrees    Diagnosis       Normal sinus rhythmModerate voltage criteria for LVH, may be normal variantProlonged QTAbnormal ECGConfirmed by Quyen Lee ([id number]) on 12/22/2022 8:55:16 AM   Ammonia    Collection Time: 12/22/22  8:12 AM   Result Value Ref Range    Ammonia 25 11 - 51 umol/L   Urine Drug Screen    Collection Time: 12/22/22 11:10 AM   Result Value Ref Range    Amphetamine Screen, Urine Neg Negative <1000ng/mL    Barbiturate Screen, Ur Neg Negative <200 ng/mL    Benzodiazepine Screen, Urine Neg Negative <200 ng/mL    Cannabinoid Scrn, Ur Neg Negative <50 ng/mL    Cocaine Metabolite Screen, Urine Neg Negative <300 ng/mL    Opiate Scrn, Ur Neg Negative <300 ng/mL    PCP Screen, Urine Neg Negative <25 ng/mL    Methadone Screen, Urine Neg Negative <300 ng/mL    Oxycodone Urine Neg Negative <100 ng/ml    FENTANYL SCREEN, URINE Neg Negative <5 ng/mL    pH, UA 7.0     Drug Screen Comment: see below    POCT Glucose    Collection Time: 12/22/22  4:00 PM   Result Value Ref Range    POC Glucose 96 70 - 99 mg/dl    Performed on ACCU-CHEK    POCT Glucose    Collection Time: 12/22/22  4:37 PM   Result Value Ref Range    POC Glucose 91 70 - 99 mg/dl    Performed on ACCU-CHEK    POCT Arterial    Collection Time: 12/22/22  5:13 PM   Result Value Ref Range    POC Sodium 141 136 - 145 mmol/L    POC Potassium 4.5 3.5 - 5.1 mmol/L    POC Glucose 80 70 - 99 mg/dl    Calcium, Ionized 1.03 (L) 1.12 - 1.32 mmol/L    pH, Arterial 7.430 7.350 - 7.450    pCO2, Arterial 36.9 35.0 - 45.0 mm Hg    pO2, Arterial 77.9 75.0 - 108.0 mm Hg    HCO3, Arterial 24.5 21.0 - 29.0 mmol/L    Base Excess, Arterial 0 -3 - 3    O2 Sat, Arterial 96 93 - 100 %    TCO2, Arterial 26 Not Established mmol/L    Lactate 1.38 0.40 - 2.00 mmol/L    POC Hematocrit 31.0 (L) 36.0 - 48.0 %    Hemoglobin 10.4 (L) 12.0 - 16.0 gm/dL    Sample Type ART     Performed on SEE BELOW    Culture, Blood 1    Collection Time: 12/22/22  5:18 PM    Specimen: Blood   Result Value Ref Range    Blood Culture, Routine       No Growth to date. Any change in status will be called. Culture, Blood 2    Collection Time: 12/22/22  5:18 PM    Specimen: Blood   Result Value Ref Range    Culture, Blood 2       No Growth to date. Any change in status will be called.    CBC with Auto Differential    Collection Time: 12/22/22  5:18 PM   Result Value Ref Range    WBC 8.1 4.0 - 11.0 K/uL    RBC 3.11 (L) 4.00 - 5.20 M/uL    Hemoglobin 9.7 (L) 12.0 - 16.0 g/dL    Hematocrit 30.1 (L) 36.0 - 48.0 %    MCV 96.7 80.0 - 100.0 fL    MCH 31.1 26.0 - 34.0 pg    MCHC 32.1 31.0 - 36.0 g/dL    RDW 15.1 12.4 - 15.4 %    Platelets 641 057 - 005 K/uL    MPV 7.5 5.0 - 10.5 fL    Neutrophils % 77.1 %    Lymphocytes % 13.7 %    Monocytes % 6.8 %    Eosinophils % 2.0 %    Basophils % 0.4 %    Neutrophils Absolute 6.2 1.7 - 7.7 K/uL    Lymphocytes Absolute 1.1 1.0 - 5.1 K/uL    Monocytes Absolute 0.6 0.0 - 1.3 K/uL    Eosinophils Absolute 0.2 0.0 - 0.6 K/uL    Basophils Absolute 0.0 0.0 - 0.2 K/uL   TSH with Reflex to FT4    Collection Time: 12/22/22  5:18 PM   Result Value Ref Range    TSH Reflex FT4 5.15 (H) 0.27 - 4.20 uIU/mL   Vitamin B12 & Folate    Collection Time: 12/22/22  5:18 PM   Result Value Ref Range    Vitamin B-12 445 211 - 911 pg/mL    Folate 11.80 4.78 - 24.20 ng/mL   Comprehensive Metabolic Panel    Collection Time: 12/22/22  5:18 PM   Result Value Ref Range    Sodium 142 136 - 145 mmol/L    Potassium 4.6 3.5 - 5.1 mmol/L    Chloride 100 99 - 110 mmol/L    CO2 26 21 - 32 mmol/L    Anion Gap 16 3 - 16    Glucose 84 70 - 99 mg/dL    BUN 57 (H) 7 - 20 mg/dL    Creatinine 9.0 (HH) 0.6 - 1.2 mg/dL    Est, Glom Filt Rate 4 (A) >60    Calcium 8.8 8.3 - 10.6 mg/dL    Total Protein 6.4 6.4 - 8.2 g/dL    Albumin 3.4 3.4 - 5.0 g/dL    Albumin/Globulin Ratio 1.1 1.1 - 2.2    Total Bilirubin 0.3 0.0 - 1.0 mg/dL    Alkaline Phosphatase 100 40 - 129 U/L    ALT 9 (L) 10 - 40 U/L    AST 12 (L) 15 - 37 U/L   T4, Free    Collection Time: 12/22/22  5:18 PM   Result Value Ref Range    T4 Free 1.0 0.9 - 1.8 ng/dL   POCT Glucose    Collection Time: 12/22/22  8:31 PM   Result Value Ref Range    POC Glucose 95 70 - 99 mg/dl    Performed on ACCU-CHEK    POCT Glucose    Collection Time: 12/23/22  2:42 AM   Result Value Ref Range    POC Glucose 147 (H) 70 - 99 mg/dl    Performed on ACCU-CHEK    POCT Glucose    Collection Time: 12/23/22  8:27 AM   Result Value Ref Range    POC Glucose 115 (H) 70 - 99 mg/dl    Performed on ACCU-CHEK    POCT Glucose    Collection Time: 12/23/22 11:50 AM   Result Value Ref Range    POC Glucose 138 (H) 70 - 99 mg/dl    Performed on ACCU-CHEK    POCT Glucose    Collection Time: 12/23/22 5:12 PM   Result Value Ref Range    POC Glucose 129 (H) 70 - 99 mg/dl    Performed on [address] Problems             Last Modified POA    * (Principal) Acute encephalopathy 12/22/2022 Yes    Encephalopathy, metabolic 03/61/1213 Yes    Suspected stroke patient last known to be well 3-4.5 hours ago 12/22/2022 Yes    New onset seizure ([address] 12/23/2022 Yes    HTN (hypertension), benign 12/23/2022 Yes    Hyperlipidemia 12/22/2022 Yes    Acquired hypothyroidism 12/22/2022 Yes    Chronic kidney 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 ([address] 12/22/2022 Yes    Primary osteoarthritis of both knees 12/22/2022 Yes    Memory loss 12/22/2022 Yes    Diabetic polyneuropathy associated with type 2 diabetes mellitus ([address] 12/22/2022 Yes    Coronary artery disease due to lipid rich plaque 12/22/2022 Yes    ESRD (end stage renal disease) ([address] 12/22/2022 Yes    Class 1 obesity due to excess calories with body mass index (BMI) of 30.0 to 30.9 in adult 12/22/2022 Yes    History of anemia due to chronic kidney disease 12/22/2022 Yes    Parkinson's disease 12/22/2022 Yes    RLS (restless legs syndrome) 12/22/2022 Yes     EEG equivocal for  diffuse encephalopathy vs Status epilepticus    Discussed with Neurologist times two   Agree with his POC including IV Phenytoin   Maintain hydration    Ct HD as treatment for metabolic encephalopathy   Prn tylenol suppository for pain relief    Patient is NPO   IV hydralazine prn for anti HTN   May cautiously try  IV Dilaudid 0.25 mg if pain uncontrolled    Communicated with pharmacist and nursing staff multiple times   MRI showed no acute stroke

## 2022-12-24 NOTE — PROGRESS NOTES
Speech Language Pathology  Attempt/Hold Note    Sandra Ray  1945    SLP attempted to see pt this date for dysphagia intervention. Chart reviewed, spoke with RN. Pt currently off floor for dialysis. Will hold and attempt again as pt and therapy schedules allow.     José Luis Roa, [address] [place] [address]  Speech-Language Pathologist  [phone]

## 2022-12-24 NOTE — PROGRESS NOTES
PROCEDURE:  Patient seen on hemodialysis at 8:50 AM    PHYSICIAN:  Freida Logan MD    INDICATION:  End-stage renal disease    Wt Readings from Last 3 Encounters:   12/23/22 184 lb 15.5 oz (83.9 kg)   12/22/22 185 lb (83.9 kg)   12/19/22 177 lb (80.3 kg)     Temp Readings from Last 3 Encounters:   12/23/22 (!) 96.4 °F (35.8 °C) (Temporal)   12/19/22 97.9 °F (36.6 °C)   11/27/22 97.6 °F (36.4 °C) (Oral)     BP Readings from Last 3 Encounters:   12/24/22 (!) 169/77   11/27/22 137/63   11/24/22 (!) 124/59     Pulse Readings from Last 3 Encounters:   12/24/22 86   12/19/22 94   11/27/22 74      In: 1058.2 [I.V.:958.2]  Out: -      CBC:   Recent Labs     12/22/22  0804 12/22/22  1713 12/22/22  1718   WBC 8.4  --  8.1   HGB 8.9* 10.4* 9.7*     --  381     BMP:    Recent Labs     12/22/22  0804 12/22/22  1718    142   K 4.3 4.6    100   CO2 24 26   BUN 58* 57*   CREATININE 8.4* 9.0*   GLUCOSE 122* 84     BMD:   Lab Results   Component Value Date    .3 (H) 02/10/2020    CALCIUM 8.8 12/22/2022    CAION 1.03 (L) 12/22/2022    PHOS 6.9 (H) 09/24/2021       Iron:     Lab Results   Component Value Date    IRON 64 03/29/2018    TIBC 351 03/29/2018            RX:  See dialysis flowsheet for specifics on access, blood flow rate, dialysate baths, duration of dialysis, anticoagulation and other technical information. COMMENTS:        Tolerating dialysis well   BP elevated   Will increase UF   Monitor electrolytes       Freida Logan MD  Nephrology     [place] Office : [address], [address] , [address] [facility] Office: [facility] [place]  [place] Office: [facility], [address] [place] [address].   [place] Office: [address] [place] [address], [address] [facility] [address]  Office : [phone]  Fax :[phone]

## 2022-12-24 NOTE — PROGRESS NOTES
Fernando Boyd  Neurology Follow-up  [facility] Neurology    Date of Service: 12/24/2022    Subjective:   CC: Follow up today regarding: Acute encephalopathy    Events noted. Chart and lab reviewed. The patient was seen today during dialysis. Events noted. She is more awake and alert today. She was able to answer questions and is complaining of diffuse pain. She denies any headache or neck or chest pain. She denies visual changes but able to move her arms and legs spontaneously. Blood pressure is on the low side today. Other review of system was unremarkable. ROS : A 10-12 system review obtained from discussion with patient's family/and or nurse, was unremarkable. family history includes Cancer in her maternal grandmother and mother; No Known Problems in her paternal grandfather and paternal grandmother; Stomach Cancer in her brother; Stroke in her father.     Past Medical History:   Diagnosis Date    Arthritis     CAD (coronary artery disease)     Diabetes ([facility]     Hemodialysis patient ([facility]     Hyperlipidemia     Hypertension     Kidney disease     Neuropathy     Pneumonia     IN February, 2021, with COVID    Thyroid disease      Current Facility-Administered Medications   Medication Dose Route Frequency Provider Last Rate Last Admin    phenytoin (DILANTIN) injection 100 mg  100 mg IntraVENous Q8H Matias Peralta MD        diphenhydrAMINE (BENADRYL) injection 12.5 mg  12.5 mg IntraVENous Once Soumya Talley MD        hydrALAZINE (APRESOLINE) injection 10 mg  10 mg IntraVENous Q6H PRN Leslie Rubin MD        acetaminophen (TYLENOL) suppository 650 mg  650 mg Rectal Q4H PRN Leslie Rubin MD        HYDROmorphone HCl PF (DILAUDID) injection 0.25 mg  0.25 mg IntraVENous Q4H PRN Leslie Rubin MD   0.25 mg at 12/24/22 0603    allopurinol (ZYLOPRIM) tablet 100 mg  100 mg Oral Daily Leslie Rubin MD   100 mg at 12/24/22 0854    aspirin EC tablet 81 mg  81 mg Oral Daily Devorah Santiago MD   81 mg at 12/24/22 0854    clopidogrel (PLAVIX) tablet 75 mg  75 mg Oral Daily Lazarus Powers, MD   75 mg at 12/24/22 0854    levothyroxine (SYNTHROID) tablet 150 mcg  150 mcg Oral QAM AC Lazarus Powers, MD        lidocaine 4 % external patch 1 patch  1 patch TransDERmal Daily Lazarus Powers, MD   1 patch at 12/24/22 0854    metoprolol succinate (TOPROL XL) extended release tablet 25 mg  25 mg Oral Daily Lazarus Powers, MD   25 mg at 12/24/22 0854    rosuvastatin (CRESTOR) tablet 20 mg  20 mg Oral Daily Lazarus Powers, MD   20 mg at 12/24/22 0337    Vitamin D (CHOLECALCIFEROL) tablet 1,000 Units  1,000 Units Oral Daily Lazarus Powers, MD   1,000 Units at 12/24/22 0854    dextrose 5 % and 0.45 % sodium chloride infusion   IntraVENous Continuous Brandon Arnett MD   Stopped at 12/24/22 0432    sodium chloride flush 0.9 % injection 5-40 mL  5-40 mL IntraVENous 2 times per day Brandon Arnett MD   10 mL at 12/24/22 0906    sodium chloride flush 0.9 % injection 5-40 mL  5-40 mL IntraVENous PRN Brandon Arnett MD        0.9 % sodium chloride infusion   IntraVENous PRN Brandon Arnett MD        heparin (porcine) injection 5,000 Units  5,000 Units SubCUTAneous 3 times per day Brandon Arnett MD   5,000 Units at 12/24/22 0554    ondansetron (ZOFRAN-ODT) disintegrating tablet 4 mg  4 mg Oral Q8H PRN Brandon Arnett MD        Or    ondansetron [facility]) injection 4 mg  4 mg IntraVENous Q6H PRN Brandon Arnett MD   4 mg at 12/23/22 0322    polyethylene glycol (GLYCOLAX) packet 17 g  17 g Oral Daily PRN Brandon Arnett MD        acetaminophen (TYLENOL) tablet 650 mg  650 mg Oral Q6H PRN Brandon Arnett MD        dextrose bolus 10% 125 mL  125 mL IntraVENous PRN Brandon Arnett MD        Or    dextrose bolus 10% 250 mL  250 mL IntraVENous PRJOSIE Arnett MD        glucagon (rDNA) injection 1 mg  1 mg SubCUTAneous PRJOSIE Arnett MD        dextrose 10 % infusion   IntraVENous Continuous CECILIO Arnett MD Allergies   Allergen Reactions    Amoxicillin Anaphylaxis    Demeclocycline Anaphylaxis    Penicillins Anaphylaxis    Tetracyclines & Related Anaphylaxis    Ozempic (0.25 Or 0.5 Mg-Dose) [Semaglutide(0.25 Or 0.5mg-Dos)] Other (See Comments)     Lost large amount weight and loss  Of appetite    Codeine Itching and Rash      reports that she quit smoking about 44 years ago. Her smoking use included cigarettes. She has a 5.00 pack-year smoking history. She has never used smokeless tobacco. She reports that she does not currently use alcohol. She reports that she does not use drugs. Objective:  Constitutional:   Vitals:    12/24/22 0900 12/24/22 0925 12/24/22 1028 12/24/22 1033   BP: (!) 174/89   85/63   Pulse: 83   87   Resp: 22 18   Temp: 97.6 °F (36.4 °C)   97.8 °F (36.6 °C)   TempSrc: Temporal      SpO2: 97%      Weight:  173 lb 1.6 oz (78.5 kg) 159 lb 3 oz (72.2 kg) 173 lb 1 oz (78.5 kg)   Height:           General appearance: More awake and alert, no acute distress but poor attention concentration  Mental Status:   AAO times one. Attention and concentration: poor, waxing and waning. Language: Fluent and can  follow direction. Recent memory: Impaired  Remote memory: Impaired  Poor fund of knowledge  Cranial Nerves:   II: Pupils: equal, round, reactive to light  III,IV,VI: No gaze preference. No nystagmus  V: Facial sensation: Grossly unremarkable. VII: Facial strength and movements: intact and symmetric  XII: Tongue movements : midline  Musculoskeletal:  The patient can move all 4 extremities. No apparent focal weakness. Tone: Normal tone. No rigidity.   Reflexes: symmetric 2+ in both arms and legs  Coordination: No tremors  Sensation: No sensory deficit        Data:  LABS:   Lab Results   Component Value Date/Time     12/22/2022 05:18 PM    K 4.6 12/22/2022 05:18 PM    K 4.3 12/22/2022 08:04 AM     12/22/2022 05:18 PM    CO2 26 12/22/2022 05:18 PM    BUN 57 12/22/2022 05:18 PM CREATININE 9.0 12/22/2022 05:18 PM    GFRAA 8 01/20/2022 06:16 AM    LABGLOM 4 12/22/2022 05:18 PM    GLUCOSE 84 12/22/2022 05:18 PM    PHOS 6.9 09/24/2021 07:50 AM    MG 2.70 11/20/2022 12:09 PM    CALCIUM 8.8 12/22/2022 05:18 PM     Lab Results   Component Value Date/Time    WBC 8.1 12/22/2022 05:18 PM    RBC 3.11 12/22/2022 05:18 PM    HGB 9.7 12/22/2022 05:18 PM    HCT 30.1 12/22/2022 05:18 PM    MCV 96.7 12/22/2022 05:18 PM    RDW 15.1 12/22/2022 05:18 PM     12/22/2022 05:18 PM     Lab Results   Component Value Date    INR 0.97 12/22/2022    PROTIME 12.7 12/22/2022       Neuroimaging and labs were independently reviewed by me  Reviewed notes from different physicians. Impression:  Acute encephalopathy, severe. Likely multifactorial metabolic encephalopathy and hypertensive urgency. Improving compared to yesterday. Less likely complex partialis continua as she was able to answer questions today. Would maintain with Dilantin however. Hypertension, not controlled  End-stage renal disease  Hyperlipidemia        Recommendation  Continue with Dilantin 100 mg IV 3 times daily. Can switch to orally when she is back to her baseline  Blood pressure monitor and avoid more hypotension with Dilantin. Seizure precautions  Continue dialysis  DVT and GI prophylaxis  Neurochecks  Continue DAPT  Statin  Metabolic work-up  Follow creatinine  Continue current blood pressure medications  Check Dilantin level tomorrow  Discussed with her nurse             Luba Maddox MD   [phone]      This dictation was generated by voice recognition computer software. Although all attempts are made to edit the dictation for accuracy, there may be errors in the transcription that are not intended.

## 2022-12-24 NOTE — DIALYSIS
Treatment time: 3 hours  Net UF: 2500 ml     Pre weight: 78.5 kg  Post weight:76.kg  EDW: TBD kg    Access used: L AVG    Access function: Well with  ml/min     Medications or blood products given: Benadryl      Regular outpatient schedule: TTS     Summary of response to treatment: Patient tolerated treatment fair with various non dialysis related complaints and anxiety. Patient given benadryl and supplemental oxygen for comfort. and without any complications. Patient remained stable throughout entire treatment and upon exiting the dialysis suite via transport. Report given to Joaquin Chi RN and copy of dialysis treatment record placed in chart, to be scanned into EMR.

## 2022-12-25 LAB
GLUCOSE BLD-MCNC: 122 MG/DL (ref 70–99)
GLUCOSE BLD-MCNC: 131 MG/DL (ref 70–99)
GLUCOSE BLD-MCNC: 145 MG/DL (ref 70–99)
GLUCOSE BLD-MCNC: 88 MG/DL (ref 70–99)
PERFORMED ON: ABNORMAL
PERFORMED ON: NORMAL
PHENYTOIN DOSE AMOUNT: ABNORMAL
PHENYTOIN LEVEL: 7 UG/ML (ref 10–20)

## 2022-12-25 PROCEDURE — 6360000002 HC RX W HCPCS: Performed by: PSYCHIATRY & NEUROLOGY

## 2022-12-25 PROCEDURE — 6360000002 HC RX W HCPCS: Performed by: INTERNAL MEDICINE

## 2022-12-25 PROCEDURE — 6370000000 HC RX 637 (ALT 250 FOR IP): Performed by: INTERNAL MEDICINE

## 2022-12-25 PROCEDURE — 1200000000 HC SEMI PRIVATE

## 2022-12-25 PROCEDURE — 92526 ORAL FUNCTION THERAPY: CPT

## 2022-12-25 PROCEDURE — 36415 COLL VENOUS BLD VENIPUNCTURE: CPT

## 2022-12-25 PROCEDURE — 80185 ASSAY OF PHENYTOIN TOTAL: CPT

## 2022-12-25 PROCEDURE — 2580000003 HC RX 258: Performed by: INTERNAL MEDICINE

## 2022-12-25 RX ORDER — PHENYTOIN SODIUM 100 MG/1
100 CAPSULE, EXTENDED RELEASE ORAL 3 TIMES DAILY
Status: DISCONTINUED | OUTPATIENT
Start: 2022-12-25 | End: 2022-12-26

## 2022-12-25 RX ADMIN — HEPARIN SODIUM 5000 UNITS: 5000 INJECTION INTRAVENOUS; SUBCUTANEOUS at 06:22

## 2022-12-25 RX ADMIN — ASPIRIN 81 MG: 81 TABLET, COATED ORAL at 08:47

## 2022-12-25 RX ADMIN — CLOPIDOGREL BISULFATE 75 MG: 75 TABLET ORAL at 08:47

## 2022-12-25 RX ADMIN — Medication 10 ML: at 22:33

## 2022-12-25 RX ADMIN — Medication 1000 UNITS: at 08:47

## 2022-12-25 RX ADMIN — PHENYTOIN SODIUM 100 MG: 100 CAPSULE ORAL at 14:47

## 2022-12-25 RX ADMIN — METOPROLOL SUCCINATE 25 MG: 25 TABLET, EXTENDED RELEASE ORAL at 08:47

## 2022-12-25 RX ADMIN — HEPARIN SODIUM 5000 UNITS: 5000 INJECTION INTRAVENOUS; SUBCUTANEOUS at 14:48

## 2022-12-25 RX ADMIN — ALLOPURINOL 100 MG: 100 TABLET ORAL at 08:47

## 2022-12-25 RX ADMIN — ROSUVASTATIN CALCIUM 20 MG: 20 TABLET, FILM COATED ORAL at 08:47

## 2022-12-25 RX ADMIN — PHENYTOIN SODIUM 100 MG: 100 CAPSULE ORAL at 20:54

## 2022-12-25 RX ADMIN — PHENYTOIN SODIUM 100 MG: 50 INJECTION INTRAMUSCULAR; INTRAVENOUS at 06:22

## 2022-12-25 RX ADMIN — Medication 10 ML: at 08:48

## 2022-12-25 RX ADMIN — ONDANSETRON 4 MG: 2 INJECTION INTRAMUSCULAR; INTRAVENOUS at 00:16

## 2022-12-25 RX ADMIN — HEPARIN SODIUM 5000 UNITS: 5000 INJECTION INTRAVENOUS; SUBCUTANEOUS at 20:36

## 2022-12-25 NOTE — PROGRESS NOTES
Assessment completed. VSS. Patient awake, alert, and in bed. Medications given per MAR. Patient remains NPO. . Patient up to bedside commode for bowel movement. Once back to bed, patient had an episode of emesis. Emesis was clear/yellow in appearance. Patient denies any nausea at this time. Grandson came in to visit patient. All questions answered. Safety precautions in place. Call light within reach. Will continue to monitor.

## 2022-12-25 NOTE — PROGRESS NOTES
Facility/Department: [address] ORTHO/NEURO NURSING  Speech Language Pathology   Dysphagia Treatment Note    Patient: Sandra Ray   : 1945   MRN: [id number]      Evaluation Date: 2022      Admitting Dx: Thyroid nodule [E04.1]  Abnormal CT scan [R93.89]  Dialysis patient [facility]) [Z99.2]  Acute encephalopathy [G93.40]  Anemia, unspecified type [D64.9]  Treatment Diagnosis: Oropharyngeal Dysphagia   Pain: Did not state                                              Diet and Treatment Recommendations 2022:  Diet Solids Recommendation:  Regular texture diet  Liquid Consistency Recommendation: Thin liquids  Recommended form of Meds: Meds whole with water        Compensatory strategies:   Upright as possible with all PO intake , Eat/feed slowly, Remain upright 30-45 min     Assessment of Texture Tolerance:  Diet level prior to treatment: NPO ,    Tolerance of Current Diet Level:N/A    Impressions: Pt was positioned Upright in chair, awake and alert. Currently on room air. Trials of thin liquids, puree , soft solids, and regular solids  were provided to assess swallow function. Pt able to self feed all trials this date. Oral phase characterized by functional mastication and sufficient oral clearance. Thin liquids revealed a timely swallow initiation and laryngeal elevation felt upon manual palpation. No overt clinical s/s of aspiration assessed. Overall, pt demonstrates significant improvement with alertness, cognition and swallow function. Based on today's assessment recommend Regular texture diet  with Thin liquids , Meds whole with water.      Dysphagia Goals:   Pt will functionally tolerate recommended diet with no overt clinical s/s of aspiration (Ongoing 22)  Pt will functionally tolerate ongoing assessment of swallow function with diet to be determined as indicated (Ongoing 22)  Pt will advance to least restrictive diet as indicated (Ongoing 22)  If clinical s/s of aspiration/penetration continue to be noted, Pt will participate in Modified Barium Swallow Study (Ongoing 12/25/22)    Plan:   1-2 times to ensure diet tolerance. Patient/Family Education:  Provided education regarding role of SLP, recommendations and general speech pathology plan of care. [x] Pt verbalized understanding and agreement   [] Pt requires ongoing learning   [] No evidence of comprehension     Discharge Recommendations:    Discharge recommendations to be determined pending ongoing follow-up during acute care stay    Treatment time:  Timed Code Treatment Minutes: 0  Total Treatment time: 15 min    If patient discharges prior to next session this note will serve as a discharge summary.      Signature:   Samm Rubalcava M.A., [address]  Speech-Language Pathologist

## 2022-12-25 NOTE — PROGRESS NOTES
Department of Internal Medicine  General Internal Medicine   Progress Note      SUBJECTIVE: looking distinctly improved  more alert and responsive     History obtained from chart review, the patient, and nursing staff   General ROS: positive for  - fatigue, malaise, and weight loss  negative for - chills, fever, or night sweats  Psychological ROS: positive for - anxiety, disorientation, irritability, and memory difficulties  negative for - hallucinations or hostility  Ophthalmic ROS: negative  Respiratory ROS: no cough, shortness of breath, or wheezing  Cardiovascular ROS: no chest pain or dyspnea on exertion  Gastrointestinal ROS: no abdominal pain, change in bowel habits, or black or bloody stools  Genito-Urinary ROS: oliguric , no hematuria ,   Musculoskeletal ROS: positive for - pain in chronic generalized pain    negative for - joint swelling  Neurological ROS: diffuse slowing down of intellectual and sensory motor function  Dermatological ROS: negative    OBJECTIVE      Medications      Current Facility-Administered Medications: phenytoin (DILANTIN) injection 100 mg, 100 mg, IntraVENous, Q8H  hydrALAZINE (APRESOLINE) injection 10 mg, 10 mg, IntraVENous, Q6H PRN  acetaminophen (TYLENOL) suppository 650 mg, 650 mg, Rectal, Q4H PRN  HYDROmorphone HCl PF (DILAUDID) injection 0.25 mg, 0.25 mg, IntraVENous, Q4H PRN  allopurinol (ZYLOPRIM) tablet 100 mg, 100 mg, Oral, Daily  aspirin EC tablet 81 mg, 81 mg, Oral, Daily  clopidogrel (PLAVIX) tablet 75 mg, 75 mg, Oral, Daily  levothyroxine (SYNTHROID) tablet 150 mcg, 150 mcg, Oral, QAM AC  lidocaine 4 % external patch 1 patch, 1 patch, TransDERmal, Daily  metoprolol succinate (TOPROL XL) extended release tablet 25 mg, 25 mg, Oral, Daily  rosuvastatin (CRESTOR) tablet 20 mg, 20 mg, Oral, Daily  Vitamin D (CHOLECALCIFEROL) tablet 1,000 Units, 1,000 Units, Oral, Daily  dextrose 5 % and 0.45 % sodium chloride infusion, , IntraVENous, Continuous  sodium chloride flush 0.9 % injection 5-40 mL, 5-40 mL, IntraVENous, 2 times per day  sodium chloride flush 0.9 % injection 5-40 mL, 5-40 mL, IntraVENous, PRN  0.9 % sodium chloride infusion, , IntraVENous, PRN  heparin (porcine) injection 5,000 Units, 5,000 Units, SubCUTAneous, 3 times per day  ondansetron (ZOFRAN-ODT) disintegrating tablet 4 mg, 4 mg, Oral, Q8H PRN **OR** ondansetron (ZOFRAN) injection 4 mg, 4 mg, IntraVENous, Q6H PRN  polyethylene glycol (GLYCOLAX) packet 17 g, 17 g, Oral, Daily PRN  acetaminophen (TYLENOL) tablet 650 mg, 650 mg, Oral, Q6H PRN **OR** [DISCONTINUED] acetaminophen (TYLENOL) suppository 650 mg, 650 mg, Rectal, Q6H PRN  dextrose bolus 10% 125 mL, 125 mL, IntraVENous, PRN **OR** dextrose bolus 10% 250 mL, 250 mL, IntraVENous, PRN  glucagon (rDNA) injection 1 mg, 1 mg, SubCUTAneous, PRN  dextrose 10 % infusion, , IntraVENous, Continuous PRN    Physical      Vitals: /82   Pulse 82   Temp 99.6 °F (37.6 °C) (Axillary)   Resp 18   Ht 5' 5\" (1.651 m)   Wt 173 lb 4.5 oz (78.6 kg)   LMP  (LMP Unknown)   SpO2 95%   BMI 28.84 kg/m²   Temp: Temp: 99.6 °F (37.6 °C)  Max: Temp  Av.2 °F (36.8 °C)  Min: 97.6 °F (36.4 °C)  Max: 99.6 °F (37.6 °C)  Respiration range:  Resp  Av.7  Min: 18  Max: 24  Pulse Range:  Pulse  Av.4  Min: 82  Max: 97  Blood pressure range:  Systolic (31GYM), NICOLE:671 , Min:85 , LVR:188   , Diastolic (04MXS), BHAVIN:48, Min:63, Max:89    SpO2  Av %  Min: 93 %  Max: 100 %    Intake/Output Summary (Last 24 hours) at 2022 0830  Last data filed at 2022 2156  Gross per 24 hour   Intake 10 ml   Output --   Net 10 ml       Vent settings:  Pulse  Av  Min: 63  Max: 114  Resp  Av.2  Min: 11  Max: 37  SpO2  Av.4 %  Min: 85 %  Max: 100 %    CONSTITUTIONAL:  fatigued, somnolent, uncooperative, distracted, mild distress, appears stated age, and mildly obese  EYES:  unremarkable   NECK:  mild JVD   BACK:  symmetric and no curvature  LUNGS:  No increased work of breathing, good air exchange, clear to auscultation bilaterally, no crackles or wheezing  CARDIOVASCULAR:  Normal apical impulse, regular rate and rhythm, normal S1 and S2, no S3 or S4, and no murmur noted  ABDOMEN:  soft BS +   MUSCULOSKELETAL:  trace edema   NEUROLOGIC:  general weakness lack of co-ordination , babinski absent   SKIN:  warm and dry  and no bruising or bleeding    Data      Recent Results (from the past 96 hour(s))   POCT Glucose    Collection Time: 12/22/22  7:29 AM   Result Value Ref Range    POC Glucose 113 (H) 70 - 99 mg/dl    Performed on ACCU-CHEK    Blood Gas, Venous    Collection Time: 12/22/22  7:57 AM   Result Value Ref Range    pH, Vinicio 7.424 7.350 - 7.450    pCO2, Vinicio 38.0 (L) 40.0 - 50.0 mmHg    pO2, Vinicio 155.0 (H) 25.0 - 40.0 mmHg    HCO3, Venous 24.8 23.0 - 29.0 mmol/L    Base Excess, Vinicio 0.5 -3.0 - 3.0 mmol/L    O2 Sat, Vinicio >100 Not Established %    Carboxyhemoglobin 4.5 (H) 0.0 - 1.5 %    MetHgb, Vinicio 0.0 <1.5 %    TC02 (Calc), Vinicio 58 Not Established mmol/L    O2 Content, Vinicio 14 Not Established VOL %    O2 Therapy See comment    CBC with Auto Differential    Collection Time: 12/22/22  8:04 AM   Result Value Ref Range    WBC 8.4 4.0 - 11.0 K/uL    RBC 2.81 (L) 4.00 - 5.20 M/uL    Hemoglobin 8.9 (L) 12.0 - 16.0 g/dL    Hematocrit 27.9 (L) 36.0 - 48.0 %    MCV 99.1 80.0 - 100.0 fL    MCH 31.7 26.0 - 34.0 pg    MCHC 32.0 31.0 - 36.0 g/dL    RDW 15.6 (H) 12.4 - 15.4 %    Platelets 455 488 - 277 K/uL    MPV 7.8 5.0 - 10.5 fL    PLATELET SLIDE REVIEW Adequate     SLIDE REVIEW see below     Neutrophils % 83.0 %    Lymphocytes % 15.0 %    Monocytes % 2.0 %    Eosinophils % 0.0 %    Basophils % 0.0 %    Neutrophils Absolute 7.0 1.7 - 7.7 K/uL    Lymphocytes Absolute 1.3 1.0 - 5.1 K/uL    Monocytes Absolute 0.2 0.0 - 1.3 K/uL    Eosinophils Absolute 0.0 0.0 - 0.6 K/uL    Basophils Absolute 0.0 0.0 - 0.2 K/uL    Anisocytosis Occasional (A)    Comprehensive Metabolic Panel w/ Reflex to MG Collection Time: 12/22/22  8:04 AM   Result Value Ref Range    Sodium 142 136 - 145 mmol/L    Potassium reflex Magnesium 4.3 3.5 - 5.1 mmol/L    Chloride 100 99 - 110 mmol/L    CO2 24 21 - 32 mmol/L    Anion Gap 18 (H) 3 - 16    Glucose 122 (H) 70 - 99 mg/dL    BUN 58 (H) 7 - 20 mg/dL    Creatinine 8.4 (HH) 0.6 - 1.2 mg/dL    Est, Glom Filt Rate 4 (A) >60    Calcium 8.7 8.3 - 10.6 mg/dL    Total Protein 6.1 (L) 6.4 - 8.2 g/dL    Albumin 3.5 3.4 - 5.0 g/dL    Albumin/Globulin Ratio 1.3 1.1 - 2.2    Total Bilirubin <0.2 0.0 - 1.0 mg/dL    Alkaline Phosphatase 106 40 - 129 U/L    ALT 8 (L) 10 - 40 U/L    AST 17 15 - 37 U/L   Troponin    Collection Time: 12/22/22  8:04 AM   Result Value Ref Range    Troponin 0.03 (H) <0.01 ng/mL   Protime-INR    Collection Time: 12/22/22  8:04 AM   Result Value Ref Range    Protime 12.7 11.7 - 14.5 sec    INR 0.97 0.87 - 1.14   APTT    Collection Time: 12/22/22  8:04 AM   Result Value Ref Range    aPTT 26.1 23.0 - 34.3 sec   EKG 12 Lead    Collection Time: 12/22/22  8:04 AM   Result Value Ref Range    Ventricular Rate 70 BPM    Atrial Rate 70 BPM    P-R Interval 176 ms    QRS Duration 94 ms    Q-T Interval 466 ms    QTc Calculation (Bazett) 503 ms    P Axis 53 degrees    R Axis -14 degrees    T Axis 52 degrees    Diagnosis       Normal sinus rhythmModerate voltage criteria for LVH, may be normal variantProlonged QTAbnormal ECGConfirmed by Annmarie Webster ([id number]) on 12/22/2022 8:55:16 AM   Ammonia    Collection Time: 12/22/22  8:12 AM   Result Value Ref Range    Ammonia 25 11 - 51 umol/L   Urine Drug Screen    Collection Time: 12/22/22 11:10 AM   Result Value Ref Range    Amphetamine Screen, Urine Neg Negative <1000ng/mL    Barbiturate Screen, Ur Neg Negative <200 ng/mL    Benzodiazepine Screen, Urine Neg Negative <200 ng/mL    Cannabinoid Scrn, Ur Neg Negative <50 ng/mL    Cocaine Metabolite Screen, Urine Neg Negative <300 ng/mL    Opiate Scrn, Ur Neg Negative <300 ng/mL    PCP Screen, Urine Neg Negative <25 ng/mL    Methadone Screen, Urine Neg Negative <300 ng/mL    Oxycodone Urine Neg Negative <100 ng/ml    FENTANYL SCREEN, URINE Neg Negative <5 ng/mL    pH, UA 7.0     Drug Screen Comment: see below    POCT Glucose    Collection Time: 12/22/22  4:00 PM   Result Value Ref Range    POC Glucose 96 70 - 99 mg/dl    Performed on ACCU-CHEK    POCT Glucose    Collection Time: 12/22/22  4:37 PM   Result Value Ref Range    POC Glucose 91 70 - 99 mg/dl    Performed on ACCU-CHEK    POCT Arterial    Collection Time: 12/22/22  5:13 PM   Result Value Ref Range    POC Sodium 141 136 - 145 mmol/L    POC Potassium 4.5 3.5 - 5.1 mmol/L    POC Glucose 80 70 - 99 mg/dl    Calcium, Ionized 1.03 (L) 1.12 - 1.32 mmol/L    pH, Arterial 7.430 7.350 - 7.450    pCO2, Arterial 36.9 35.0 - 45.0 mm Hg    pO2, Arterial 77.9 75.0 - 108.0 mm Hg    HCO3, Arterial 24.5 21.0 - 29.0 mmol/L    Base Excess, Arterial 0 -3 - 3    O2 Sat, Arterial 96 93 - 100 %    TCO2, Arterial 26 Not Established mmol/L    Lactate 1.38 0.40 - 2.00 mmol/L    POC Hematocrit 31.0 (L) 36.0 - 48.0 %    Hemoglobin 10.4 (L) 12.0 - 16.0 gm/dL    Sample Type ART     Performed on SEE BELOW    Culture, Blood 1    Collection Time: 12/22/22  5:18 PM    Specimen: Blood   Result Value Ref Range    Blood Culture, Routine       No Growth to date. Any change in status will be called. Culture, Blood 2    Collection Time: 12/22/22  5:18 PM    Specimen: Blood   Result Value Ref Range    Culture, Blood 2       No Growth to date. Any change in status will be called.    CBC with Auto Differential    Collection Time: 12/22/22  5:18 PM   Result Value Ref Range    WBC 8.1 4.0 - 11.0 K/uL    RBC 3.11 (L) 4.00 - 5.20 M/uL    Hemoglobin 9.7 (L) 12.0 - 16.0 g/dL    Hematocrit 30.1 (L) 36.0 - 48.0 %    MCV 96.7 80.0 - 100.0 fL    MCH 31.1 26.0 - 34.0 pg    MCHC 32.1 31.0 - 36.0 g/dL    RDW 15.1 12.4 - 15.4 %    Platelets 978 849 - 094 K/uL    MPV 7.5 5.0 - 10.5 fL    Neutrophils % 77.1 %    Lymphocytes % 13.7 %    Monocytes % 6.8 %    Eosinophils % 2.0 %    Basophils % 0.4 %    Neutrophils Absolute 6.2 1.7 - 7.7 K/uL    Lymphocytes Absolute 1.1 1.0 - 5.1 K/uL    Monocytes Absolute 0.6 0.0 - 1.3 K/uL    Eosinophils Absolute 0.2 0.0 - 0.6 K/uL    Basophils Absolute 0.0 0.0 - 0.2 K/uL   TSH with Reflex to FT4    Collection Time: 12/22/22  5:18 PM   Result Value Ref Range    TSH Reflex FT4 5.15 (H) 0.27 - 4.20 uIU/mL   Vitamin B12 & Folate    Collection Time: 12/22/22  5:18 PM   Result Value Ref Range    Vitamin B-12 445 211 - 911 pg/mL    Folate 11.80 4.78 - 24.20 ng/mL   Comprehensive Metabolic Panel    Collection Time: 12/22/22  5:18 PM   Result Value Ref Range    Sodium 142 136 - 145 mmol/L    Potassium 4.6 3.5 - 5.1 mmol/L    Chloride 100 99 - 110 mmol/L    CO2 26 21 - 32 mmol/L    Anion Gap 16 3 - 16    Glucose 84 70 - 99 mg/dL    BUN 57 (H) 7 - 20 mg/dL    Creatinine 9.0 (HH) 0.6 - 1.2 mg/dL    Est, Glom Filt Rate 4 (A) >60    Calcium 8.8 8.3 - 10.6 mg/dL    Total Protein 6.4 6.4 - 8.2 g/dL    Albumin 3.4 3.4 - 5.0 g/dL    Albumin/Globulin Ratio 1.1 1.1 - 2.2    Total Bilirubin 0.3 0.0 - 1.0 mg/dL    Alkaline Phosphatase 100 40 - 129 U/L    ALT 9 (L) 10 - 40 U/L    AST 12 (L) 15 - 37 U/L   T4, Free    Collection Time: 12/22/22  5:18 PM   Result Value Ref Range    T4 Free 1.0 0.9 - 1.8 ng/dL   POCT Glucose    Collection Time: 12/22/22  8:31 PM   Result Value Ref Range    POC Glucose 95 70 - 99 mg/dl    Performed on ACCU-CHEK    POCT Glucose    Collection Time: 12/23/22  2:42 AM   Result Value Ref Range    POC Glucose 147 (H) 70 - 99 mg/dl    Performed on ACCU-CHEK    POCT Glucose    Collection Time: 12/23/22  8:27 AM   Result Value Ref Range    POC Glucose 115 (H) 70 - 99 mg/dl    Performed on ACCU-CHEK    POCT Glucose    Collection Time: 12/23/22 11:50 AM   Result Value Ref Range    POC Glucose 138 (H) 70 - 99 mg/dl    Performed on ACCU-CHEK    POCT Glucose    Collection Time: 12/23/22 5:12 PM   Result Value Ref Range    POC Glucose 129 (H) 70 - 99 mg/dl    Performed on ACCU-CHEK    POCT Glucose    Collection Time: 12/24/22  8:24 AM   Result Value Ref Range    POC Glucose 112 (H) 70 - 99 mg/dl    Performed on ACCU-CHEK    Phenytoin Level, Total    Collection Time: 12/24/22  6:14 PM   Result Value Ref Range    Phenytoin Dose Amount Unknown    POCT Glucose    Collection Time: 12/24/22  9:04 PM   Result Value Ref Range    POC Glucose 106 (H) 70 - 99 mg/dl    Performed on ACCU-CHEK    POCT Glucose    Collection Time: 12/25/22  7:36 AM   Result Value Ref Range    POC Glucose 122 (H) 70 - 99 mg/dl    Performed on [address] [place] [address] Problems             Last Modified POA    * (Principal) Acute encephalopathy 12/22/2022 Yes    Encephalopathy, metabolic 36/24/3402 Yes    Suspected stroke patient last known to be well 3-4.5 hours ago 12/22/2022 Yes    New onset seizure ([address] 12/23/2022 Yes    HTN (hypertension), benign 12/23/2022 Yes    Hyperlipidemia 12/22/2022 Yes    Acquired hypothyroidism 12/22/2022 Yes    Chronic kidney 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 ([address] 12/22/2022 Yes    Primary osteoarthritis of both knees 12/22/2022 Yes    Memory loss 12/22/2022 Yes    Diabetic polyneuropathy associated with type 2 diabetes mellitus ([address] 12/22/2022 Yes    Coronary artery disease due to lipid rich plaque 12/22/2022 Yes    ESRD (end stage renal disease) ([address] 12/22/2022 Yes    Class 1 obesity due to excess calories with body mass index (BMI) of 30.0 to 30.9 in adult 12/22/2022 Yes    History of anemia due to chronic kidney disease 12/22/2022 Yes    Parkinson's disease 12/22/2022 Yes    RLS (restless legs syndrome) 12/22/2022 Yes     Ct present care , Phenytoin could be helping her  if encephalopathy resolved in am can be sent back to long term care

## 2022-12-25 NOTE — PROGRESS NOTES
Patient A&Ox3, occasionally disoriented to time and situation. VSS. Neuro checks unchanged, pt reports baseline neuropathy in bilateral feet. Patient denies pain, diet advanced per SLP, tolerated well. Patient up to the commode, tolerated poorly with unsteady gait. Patient instructed to call out for needs. Fall precautions in place.

## 2022-12-25 NOTE — PLAN OF CARE
Problem: Discharge Planning  Goal: Discharge to home or other facility with appropriate resources  12/25/2022 1144 by Delfin Alex RN  Outcome: Progressing     Problem: Pain  Goal: Verbalizes/displays adequate comfort level or baseline comfort level  12/25/2022 1144 by Delfin Alex RN  Outcome: Progressing     Problem: Skin/Tissue Integrity  Goal: Absence of new skin breakdown  Description: 1. Monitor for areas of redness and/or skin breakdown  2. Assess vascular access sites hourly  3. Every 4-6 hours minimum:  Change oxygen saturation probe site  4. Every 4-6 hours:  If on nasal continuous positive airway pressure, respiratory therapy assess nares and determine need for appliance change or resting period.   12/25/2022 1144 by Delfin Alex RN  Outcome: Progressing

## 2022-12-25 NOTE — PROGRESS NOTES
Office : [phone]     Fax :[phone]       Nephrology note     Patient's Name: Ghanshyam Middleton  4:10 PM  12/25/2022    Reason for Consult:  ESRD      Requesting Physician:  Jia Simms MD      Chief Complaint:    Chief Complaint   Patient presents with    Altered Mental Status     Arrived by  EMS from [address] [place] [address] rt AMS; pt was unable to awaken for transportation to dialysis this morning. Snoring resp, but reponds to voice. Unknown last normal.  . History of Present iIlness:      Ghanshyam Middleton is a 67 y.o. female with prior history of ESRD., anemia in ESRD , HTN , recent COVID infection who was admitted with AMS. She missed her morning dialysis session yesterday so urgent dialysis was done yesterday . At this time she is in pain and confused. No SOB. Potassium is 4.6       Interval hx     Had HD yesterday   Feels better today   Awake and alert       I/O last 3 completed shifts: In: 10 [I.V.:10]  Out: -     Past Medical History:   Diagnosis Date    Arthritis     CAD (coronary artery disease)     Diabetes [facility]     Hemodialysis patient ([facility]     Hyperlipidemia     Hypertension     Kidney disease     Neuropathy     Pneumonia     IN February, 2021, with COVID    Thyroid disease        Past Surgical History:   Procedure Laterality Date    APPENDECTOMY      CHOLECYSTECTOMY      COLONOSCOPY  11/24/2020    COLONOSCOPY POLYPECTOMY SNARE/COLD BIOPSY performed by David Trinidad MD at [address] [place] [address] N/A 05/17/2021    PERITONEAL DIALYSIS CATHETER REMOVAL.  EXCISSION OF ABDOMINAL CITRIX. performed by Marcos Alex DO at [facility] (CERVIX STATUS UNKNOWN)      FULL    LAPAROSCOPY INSERTION PERITONEAL CATHETER N/A 04/03/2020    LAPAROSCOPIC LYSIS OF ADHESIONS, LAPAROSCOPIC PERITONEAL DIALYSIS CATHETER PLACEMENT, LAPAROSCOPIC OMENTOPEXY performed by Karmen Vargas DO at [address] BILATERAL      preventive    ROTATOR CUFF REPAIR Left     SHUNT REVISION Left 08/19/2021    LEFT FOREARM ARTERIO VENOUS GRAFT performed by Brenna Chino MD at [address] [facility] N/A 11/24/2020    EGD BIOPSY performed by Ratna Mcmahan MD at [facility]       Family History   Problem Relation Age of Onset    Cancer Mother         lung, breast, liver    Stroke Father     Stomach Cancer Brother     Cancer Maternal Grandmother     No Known Problems Paternal Grandmother     No Known Problems Paternal Grandfather            Current Medications:    phenytoin (DILANTIN) ER capsule 100 mg, TID  hydrALAZINE (APRESOLINE) injection 10 mg, Q6H PRN  acetaminophen (TYLENOL) suppository 650 mg, Q4H PRN  HYDROmorphone HCl PF (DILAUDID) injection 0.25 mg, Q4H PRN  allopurinol (ZYLOPRIM) tablet 100 mg, Daily  aspirin EC tablet 81 mg, Daily  clopidogrel (PLAVIX) tablet 75 mg, Daily  levothyroxine (SYNTHROID) tablet 150 mcg, QAM AC  lidocaine 4 % external patch 1 patch, Daily  metoprolol succinate (TOPROL XL) extended release tablet 25 mg, Daily  rosuvastatin (CRESTOR) tablet 20 mg, Daily  Vitamin D (CHOLECALCIFEROL) tablet 1,000 Units, Daily  sodium chloride flush 0.9 % injection 5-40 mL, 2 times per day  sodium chloride flush 0.9 % injection 5-40 mL, PRN  0.9 % sodium chloride infusion, PRN  heparin (porcine) injection 5,000 Units, 3 times per day  ondansetron (ZOFRAN-ODT) disintegrating tablet 4 mg, Q8H PRN   Or  ondansetron (ZOFRAN) injection 4 mg, Q6H PRN  polyethylene glycol (GLYCOLAX) packet 17 g, Daily PRN  acetaminophen (TYLENOL) tablet 650 mg, Q6H PRN  dextrose bolus 10% 125 mL, PRN   Or  dextrose bolus 10% 250 mL, PRN  glucagon (rDNA) injection 1 mg, PRN  dextrose 10 % infusion, Continuous PRN          Physical exam:     Vitals:  /76   Pulse 90   Temp 97.9 °F (36.6 °C) (Axillary)   Resp 18   Ht 5' 5\" (1.651 m)   Wt 173 lb 4.5 oz (78.6 kg)   LMP  (LMP Unknown)   SpO2 96%   BMI 28.84 kg/m²   Constitutional:  OAA X3 NAD  Skin: no rash, turgor wnl  Heent:  eomi, mmm  Neck: no bruits or jvd noted  Cardiovascular:  S1, S2 without m/r/g  Respiratory: CTA B without w/r/r  Abdomen:  +bs, soft, nt, nd  Ext: no  lower extremity edema  Psychiatric: mood and affect appropriate  Musculoskeletal:  Rom, muscular strength intact    Labs:  CBC:   Recent Labs     12/22/22 1713 12/22/22 1718   WBC  --  8.1   HGB 10.4* 9.7*   PLT  --  381     BMP:    Recent Labs     12/22/22 1718      K 4.6      CO2 26   BUN 57*   CREATININE 9.0*   GLUCOSE 84     Ca/Mg/Phos:   Recent Labs     12/22/22  1718   CALCIUM 8.8     Hepatic:   Recent Labs     12/22/22 1718   AST 12*   ALT 9*   BILITOT 0.3   ALKPHOS 100     Troponin:   No results for input(s): TROPONINI in the last 72 hours. BNP: No results for input(s): BNP in the last 72 hours. Lipids: No results for input(s): CHOL, TRIG, HDL, LDLCALC, LABVLDL in the last 72 hours. ABGs:   Recent Labs     12/22/22 1713   PHART 7.430   PO2ART 77.9   BKQ9HCY 36.9     INR:   No results for input(s): INR in the last 72 hours. UA:  No results for input(s): Laren Dollar, GLUCOSEU, BILIRUBINUR, KETUA, SPECGRAV, BLOODU, PHUR, PROTEINU, UROBILINOGEN, NITRU, LEUKOCYTESUR, Ezzie Desanctis in the last 72 hours. IMAGING:  XR CHEST PORTABLE   Final Result   Stable enlargement of the cardiac silhouette. MRI BRAIN WO CONTRAST   Final Result   No acute intracranial abnormality visualized. XR CHEST PORTABLE   Final Result   Cardiomegaly with no acute pulmonary finding         CTA HEAD NECK W CONTRAST   Final Result   Severe narrowing of the left supraclinoid ICA segment. No evidence of large vessel occlusion.       No hemodynamically significant stenosis within the cervical ICAs per NASCET   criteria. Patent cervical vertebral arteries. Subpleural pulmonary opacity measuring up to 18 mm within the left upper   lobe, persistent over several months and slightly more conspicuous. Findings   may reflect an underlying inflammatory process, however underlying neoplasm   is possible. Recommend prompt CT chest follow-up, PET CT imaging and/or   tissue sampling. 15 mm mixed density nodule within the right thyroid lobe. Nonemergent   thyroid ultrasound recommended on an outpatient basis. Status post left   thyroidectomy. Correlate with prior operative reports. CT HEAD WO CONTRAST   Final Result   No acute intracranial abnormality. Findings were discussed with Serina Arreaga at 7:54 am on 12/22/2022. Assessment/Plan :      1. ESRD   Had HD yesterday   Tolerated well   Will plan HD tomorrow     2. HTN. Controlled       3. Anemia in esrd  Give retacrit     4. Acid- base disorder. correct with HD     5. Electrolytes. Monitor     6. AMS.     Resolved         Thank you for allowing us to participate in care of [facility]         Electronically signed by: Marcelle Christian MD, 12/25/2022, 4:10 PM      [facility] [id number]  Office : [phone]  Fax :[phone]

## 2022-12-25 NOTE — PLAN OF CARE

## 2022-12-26 LAB
ALBUMIN SERPL-MCNC: 4.3 G/DL (ref 3.4–5)
ANION GAP SERPL CALCULATED.3IONS-SCNC: 21 MMOL/L (ref 3–16)
ANISOCYTOSIS: ABNORMAL
BASOPHILS ABSOLUTE: 0.1 K/UL (ref 0–0.2)
BASOPHILS RELATIVE PERCENT: 1 %
BLOOD CULTURE, ROUTINE: NORMAL
BUN BLDV-MCNC: 59 MG/DL (ref 7–20)
CALCIUM SERPL-MCNC: 9.7 MG/DL (ref 8.3–10.6)
CHLORIDE BLD-SCNC: 89 MMOL/L (ref 99–110)
CO2: 29 MMOL/L (ref 21–32)
CREAT SERPL-MCNC: 9.8 MG/DL (ref 0.6–1.2)
CULTURE, BLOOD 2: NORMAL
EOSINOPHILS ABSOLUTE: 0.3 K/UL (ref 0–0.6)
EOSINOPHILS RELATIVE PERCENT: 2 %
GFR SERPL CREATININE-BSD FRML MDRD: 4 ML/MIN/{1.73_M2}
GLUCOSE BLD-MCNC: 111 MG/DL (ref 70–99)
GLUCOSE BLD-MCNC: 137 MG/DL (ref 70–99)
GLUCOSE BLD-MCNC: 160 MG/DL (ref 70–99)
GLUCOSE BLD-MCNC: 164 MG/DL (ref 70–99)
HCT VFR BLD CALC: 34.5 % (ref 36–48)
HEMOGLOBIN: 11.1 G/DL (ref 12–16)
LYMPHOCYTES ABSOLUTE: 1.4 K/UL (ref 1–5.1)
LYMPHOCYTES RELATIVE PERCENT: 11 %
MCH RBC QN AUTO: 30.9 PG (ref 26–34)
MCHC RBC AUTO-ENTMCNC: 32.1 G/DL (ref 31–36)
MCV RBC AUTO: 96.2 FL (ref 80–100)
METAMYELOCYTES RELATIVE PERCENT: 1 %
MONOCYTES ABSOLUTE: 0.8 K/UL (ref 0–1.3)
MONOCYTES RELATIVE PERCENT: 6 %
NEUTROPHILS ABSOLUTE: 10.2 K/UL (ref 1.7–7.7)
NEUTROPHILS RELATIVE PERCENT: 79 %
PDW BLD-RTO: 15.6 % (ref 12.4–15.4)
PERFORMED ON: ABNORMAL
PHOSPHORUS: 8.9 MG/DL (ref 2.5–4.9)
PLATELET # BLD: 402 K/UL (ref 135–450)
PLATELET SLIDE REVIEW: ADEQUATE
PMV BLD AUTO: 8.2 FL (ref 5–10.5)
POTASSIUM SERPL-SCNC: 4.6 MMOL/L (ref 3.5–5.1)
RBC # BLD: 3.59 M/UL (ref 4–5.2)
SLIDE REVIEW: ABNORMAL
SODIUM BLD-SCNC: 139 MMOL/L (ref 136–145)
WBC # BLD: 12.7 K/UL (ref 4–11)

## 2022-12-26 PROCEDURE — 90935 HEMODIALYSIS ONE EVALUATION: CPT

## 2022-12-26 PROCEDURE — 85025 COMPLETE CBC W/AUTO DIFF WBC: CPT

## 2022-12-26 PROCEDURE — 80069 RENAL FUNCTION PANEL: CPT

## 2022-12-26 PROCEDURE — 6360000002 HC RX W HCPCS: Performed by: INTERNAL MEDICINE

## 2022-12-26 PROCEDURE — 99232 SBSQ HOSP IP/OBS MODERATE 35: CPT | Performed by: PSYCHIATRY & NEUROLOGY

## 2022-12-26 PROCEDURE — 2580000003 HC RX 258: Performed by: INTERNAL MEDICINE

## 2022-12-26 PROCEDURE — 36415 COLL VENOUS BLD VENIPUNCTURE: CPT

## 2022-12-26 PROCEDURE — 1200000000 HC SEMI PRIVATE

## 2022-12-26 PROCEDURE — 6370000000 HC RX 637 (ALT 250 FOR IP): Performed by: INTERNAL MEDICINE

## 2022-12-26 RX ADMIN — HEPARIN SODIUM 5000 UNITS: 5000 INJECTION INTRAVENOUS; SUBCUTANEOUS at 22:05

## 2022-12-26 RX ADMIN — HEPARIN SODIUM 5000 UNITS: 5000 INJECTION INTRAVENOUS; SUBCUTANEOUS at 15:25

## 2022-12-26 RX ADMIN — HEPARIN SODIUM 5000 UNITS: 5000 INJECTION INTRAVENOUS; SUBCUTANEOUS at 06:34

## 2022-12-26 RX ADMIN — METOPROLOL SUCCINATE 25 MG: 25 TABLET, EXTENDED RELEASE ORAL at 12:11

## 2022-12-26 RX ADMIN — LEVOTHYROXINE SODIUM 150 MCG: 0.15 TABLET ORAL at 06:33

## 2022-12-26 RX ADMIN — ALLOPURINOL 100 MG: 100 TABLET ORAL at 12:11

## 2022-12-26 RX ADMIN — Medication 1000 UNITS: at 12:11

## 2022-12-26 RX ADMIN — ASPIRIN 81 MG: 81 TABLET, COATED ORAL at 12:16

## 2022-12-26 RX ADMIN — Medication 10 ML: at 12:16

## 2022-12-26 RX ADMIN — ROSUVASTATIN CALCIUM 20 MG: 20 TABLET, FILM COATED ORAL at 12:11

## 2022-12-26 RX ADMIN — PHENYTOIN SODIUM 100 MG: 100 CAPSULE ORAL at 12:11

## 2022-12-26 RX ADMIN — Medication 10 ML: at 22:06

## 2022-12-26 RX ADMIN — CLOPIDOGREL BISULFATE 75 MG: 75 TABLET ORAL at 12:11

## 2022-12-26 NOTE — PROGRESS NOTES
Department of Internal Medicine  General Internal Medicine   Progress Note      SUBJECTIVE: looks and feels miraculously improved     History obtained from chart review, the patient, and nursing staff   General ROS: positive for  - fatigue, malaise, and weight loss  negative for - chills, fever, or night sweats  Psychological ROS: positive for - anxiety, disorientation, irritability, and memory difficulties  negative for - hallucinations or hostility  Ophthalmic ROS: negative  Respiratory ROS: no cough, shortness of breath, or wheezing  Cardiovascular ROS: no chest pain or dyspnea on exertion  Gastrointestinal ROS: no abdominal pain, change in bowel habits, or black or bloody stools  Genito-Urinary ROS: oliguric , no hematuria ,   Musculoskeletal ROS: positive for - pain in chronic generalized pain    negative for - joint swelling  Neurological ROS: diffuse slowing down of intellectual and sensory motor function  Dermatological ROS: negative    OBJECTIVE      Medications      Current Facility-Administered Medications: phenytoin (DILANTIN) ER capsule 100 mg, 100 mg, Oral, TID  [START ON 12/26/2022] epoetin boris-epbx (RETACRIT) injection 3,000 Units, 3,000 Units, IntraVENous, Once per day on Mon Wed Fri  hydrALAZINE (APRESOLINE) injection 10 mg, 10 mg, IntraVENous, Q6H PRN  acetaminophen (TYLENOL) suppository 650 mg, 650 mg, Rectal, Q4H PRN  HYDROmorphone HCl PF (DILAUDID) injection 0.25 mg, 0.25 mg, IntraVENous, Q4H PRN  allopurinol (ZYLOPRIM) tablet 100 mg, 100 mg, Oral, Daily  aspirin EC tablet 81 mg, 81 mg, Oral, Daily  clopidogrel (PLAVIX) tablet 75 mg, 75 mg, Oral, Daily  levothyroxine (SYNTHROID) tablet 150 mcg, 150 mcg, Oral, QAM AC  lidocaine 4 % external patch 1 patch, 1 patch, TransDERmal, Daily  metoprolol succinate (TOPROL XL) extended release tablet 25 mg, 25 mg, Oral, Daily  rosuvastatin (CRESTOR) tablet 20 mg, 20 mg, Oral, Daily  Vitamin D (CHOLECALCIFEROL) tablet 1,000 Units, 1,000 Units, Oral, Daily  sodium chloride flush 0.9 % injection 5-40 mL, 5-40 mL, IntraVENous, 2 times per day  sodium chloride flush 0.9 % injection 5-40 mL, 5-40 mL, IntraVENous, PRN  0.9 % sodium chloride infusion, , IntraVENous, PRN  heparin (porcine) injection 5,000 Units, 5,000 Units, SubCUTAneous, 3 times per day  ondansetron (ZOFRAN-ODT) disintegrating tablet 4 mg, 4 mg, Oral, Q8H PRN **OR** ondansetron (ZOFRAN) injection 4 mg, 4 mg, IntraVENous, Q6H PRN  polyethylene glycol (GLYCOLAX) packet 17 g, 17 g, Oral, Daily PRN  acetaminophen (TYLENOL) tablet 650 mg, 650 mg, Oral, Q6H PRN **OR** [DISCONTINUED] acetaminophen (TYLENOL) suppository 650 mg, 650 mg, Rectal, Q6H PRN  dextrose bolus 10% 125 mL, 125 mL, IntraVENous, PRN **OR** dextrose bolus 10% 250 mL, 250 mL, IntraVENous, PRN  glucagon (rDNA) injection 1 mg, 1 mg, SubCUTAneous, PRN  dextrose 10 % infusion, , IntraVENous, Continuous PRN    Physical      Vitals: /76   Pulse 85   Temp 98.5 °F (36.9 °C) (Oral)   Resp 16   Ht 5' 5\" (1.651 m)   Wt 173 lb 4.5 oz (78.6 kg)   LMP  (LMP Unknown)   SpO2 95%   BMI 28.84 kg/m²   Temp: Temp: 98.5 °F (36.9 °C)  Max: Temp  Av.3 °F (36.8 °C)  Min: 97.6 °F (36.4 °C)  Max: 99.6 °F (37.6 °C)  Respiration range:  Resp  Av.7  Min: 16  Max: 18  Pulse Range:  Pulse  Av.7  Min: 82  Max: 97  Blood pressure range:  Systolic (27BCA), QF , Min:104 , KXV:682   , Diastolic (78LWT), GHQ:34, Min:67, Max:82    SpO2  Av.2 %  Min: 93 %  Max: 97 %    Intake/Output Summary (Last 24 hours) at 2022  Last data filed at 2022 2321  Gross per 24 hour   Intake 120 ml   Output --   Net 120 ml       Vent settings:  Pulse  Av  Min: 63  Max: 114  Resp  Av.2  Min: 11  Max: 37  SpO2  Av.4 %  Min: 85 %  Max: 100 %    CONSTITUTIONAL:  fatigued, somnolent, uncooperative, distracted, mild distress, appears stated age, and mildly obese  EYES:  unremarkable   NECK:  mild JVD   BACK:  symmetric and no curvature  LUNGS:  No increased work of breathing, good air exchange, clear to auscultation bilaterally, no crackles or wheezing  CARDIOVASCULAR:  Normal apical impulse, regular rate and rhythm, normal S1 and S2, no S3 or S4, and no murmur noted  ABDOMEN:  soft BS +   MUSCULOSKELETAL:  trace edema   NEUROLOGIC:  general weakness lack of co-ordination , babinski absent   SKIN:  warm and dry  and no bruising or bleeding    Data      Recent Results (from the past 96 hour(s))   POCT Glucose    Collection Time: 12/22/22  7:29 AM   Result Value Ref Range    POC Glucose 113 (H) 70 - 99 mg/dl    Performed on ACCU-CHEK    Blood Gas, Venous    Collection Time: 12/22/22  7:57 AM   Result Value Ref Range    pH, Vinicio 7.424 7.350 - 7.450    pCO2, Vinicio 38.0 (L) 40.0 - 50.0 mmHg    pO2, Vinicio 155.0 (H) 25.0 - 40.0 mmHg    HCO3, Venous 24.8 23.0 - 29.0 mmol/L    Base Excess, Vinicio 0.5 -3.0 - 3.0 mmol/L    O2 Sat, Vinicio >100 Not Established %    Carboxyhemoglobin 4.5 (H) 0.0 - 1.5 %    MetHgb, Vinicio 0.0 <1.5 %    TC02 (Calc), Vinicio 58 Not Established mmol/L    O2 Content, Vinicio 14 Not Established VOL %    O2 Therapy See comment    CBC with Auto Differential    Collection Time: 12/22/22  8:04 AM   Result Value Ref Range    WBC 8.4 4.0 - 11.0 K/uL    RBC 2.81 (L) 4.00 - 5.20 M/uL    Hemoglobin 8.9 (L) 12.0 - 16.0 g/dL    Hematocrit 27.9 (L) 36.0 - 48.0 %    MCV 99.1 80.0 - 100.0 fL    MCH 31.7 26.0 - 34.0 pg    MCHC 32.0 31.0 - 36.0 g/dL    RDW 15.6 (H) 12.4 - 15.4 %    Platelets 761 993 - 574 K/uL    MPV 7.8 5.0 - 10.5 fL    PLATELET SLIDE REVIEW Adequate     SLIDE REVIEW see below     Neutrophils % 83.0 %    Lymphocytes % 15.0 %    Monocytes % 2.0 %    Eosinophils % 0.0 %    Basophils % 0.0 %    Neutrophils Absolute 7.0 1.7 - 7.7 K/uL    Lymphocytes Absolute 1.3 1.0 - 5.1 K/uL    Monocytes Absolute 0.2 0.0 - 1.3 K/uL    Eosinophils Absolute 0.0 0.0 - 0.6 K/uL    Basophils Absolute 0.0 0.0 - 0.2 K/uL    Anisocytosis Occasional (A)    Comprehensive Metabolic Panel w/ Reflex to MG    Collection Time: 12/22/22  8:04 AM   Result Value Ref Range    Sodium 142 136 - 145 mmol/L    Potassium reflex Magnesium 4.3 3.5 - 5.1 mmol/L    Chloride 100 99 - 110 mmol/L    CO2 24 21 - 32 mmol/L    Anion Gap 18 (H) 3 - 16    Glucose 122 (H) 70 - 99 mg/dL    BUN 58 (H) 7 - 20 mg/dL    Creatinine 8.4 (HH) 0.6 - 1.2 mg/dL    Est, Glom Filt Rate 4 (A) >60    Calcium 8.7 8.3 - 10.6 mg/dL    Total Protein 6.1 (L) 6.4 - 8.2 g/dL    Albumin 3.5 3.4 - 5.0 g/dL    Albumin/Globulin Ratio 1.3 1.1 - 2.2    Total Bilirubin <0.2 0.0 - 1.0 mg/dL    Alkaline Phosphatase 106 40 - 129 U/L    ALT 8 (L) 10 - 40 U/L    AST 17 15 - 37 U/L   Troponin    Collection Time: 12/22/22  8:04 AM   Result Value Ref Range    Troponin 0.03 (H) <0.01 ng/mL   Protime-INR    Collection Time: 12/22/22  8:04 AM   Result Value Ref Range    Protime 12.7 11.7 - 14.5 sec    INR 0.97 0.87 - 1.14   APTT    Collection Time: 12/22/22  8:04 AM   Result Value Ref Range    aPTT 26.1 23.0 - 34.3 sec   EKG 12 Lead    Collection Time: 12/22/22  8:04 AM   Result Value Ref Range    Ventricular Rate 70 BPM    Atrial Rate 70 BPM    P-R Interval 176 ms    QRS Duration 94 ms    Q-T Interval 466 ms    QTc Calculation (Bazett) 503 ms    P Axis 53 degrees    R Axis -14 degrees    T Axis 52 degrees    Diagnosis       Normal sinus rhythmModerate voltage criteria for LVH, may be normal variantProlonged QTAbnormal ECGConfirmed by Flor Vance ([id number]) on 12/22/2022 8:55:16 AM   Ammonia    Collection Time: 12/22/22  8:12 AM   Result Value Ref Range    Ammonia 25 11 - 51 umol/L   Urine Drug Screen    Collection Time: 12/22/22 11:10 AM   Result Value Ref Range    Amphetamine Screen, Urine Neg Negative <1000ng/mL    Barbiturate Screen, Ur Neg Negative <200 ng/mL    Benzodiazepine Screen, Urine Neg Negative <200 ng/mL    Cannabinoid Scrn, Ur Neg Negative <50 ng/mL    Cocaine Metabolite Screen, Urine Neg Negative <300 ng/mL    Opiate Scrn, Ur Neg Negative <300 ng/mL    PCP Screen, Urine Neg Negative <25 ng/mL    Methadone Screen, Urine Neg Negative <300 ng/mL    Oxycodone Urine Neg Negative <100 ng/ml    FENTANYL SCREEN, URINE Neg Negative <5 ng/mL    pH, UA 7.0     Drug Screen Comment: see below    POCT Glucose    Collection Time: 12/22/22  4:00 PM   Result Value Ref Range    POC Glucose 96 70 - 99 mg/dl    Performed on ACCU-CHEK    POCT Glucose    Collection Time: 12/22/22  4:37 PM   Result Value Ref Range    POC Glucose 91 70 - 99 mg/dl    Performed on ACCU-CHEK    POCT Arterial    Collection Time: 12/22/22  5:13 PM   Result Value Ref Range    POC Sodium 141 136 - 145 mmol/L    POC Potassium 4.5 3.5 - 5.1 mmol/L    POC Glucose 80 70 - 99 mg/dl    Calcium, Ionized 1.03 (L) 1.12 - 1.32 mmol/L    pH, Arterial 7.430 7.350 - 7.450    pCO2, Arterial 36.9 35.0 - 45.0 mm Hg    pO2, Arterial 77.9 75.0 - 108.0 mm Hg    HCO3, Arterial 24.5 21.0 - 29.0 mmol/L    Base Excess, Arterial 0 -3 - 3    O2 Sat, Arterial 96 93 - 100 %    TCO2, Arterial 26 Not Established mmol/L    Lactate 1.38 0.40 - 2.00 mmol/L    POC Hematocrit 31.0 (L) 36.0 - 48.0 %    Hemoglobin 10.4 (L) 12.0 - 16.0 gm/dL    Sample Type ART     Performed on SEE BELOW    Culture, Blood 1    Collection Time: 12/22/22  5:18 PM    Specimen: Blood   Result Value Ref Range    Blood Culture, Routine       No Growth to date. Any change in status will be called. Culture, Blood 2    Collection Time: 12/22/22  5:18 PM    Specimen: Blood   Result Value Ref Range    Culture, Blood 2       No Growth to date. Any change in status will be called.    CBC with Auto Differential    Collection Time: 12/22/22  5:18 PM   Result Value Ref Range    WBC 8.1 4.0 - 11.0 K/uL    RBC 3.11 (L) 4.00 - 5.20 M/uL    Hemoglobin 9.7 (L) 12.0 - 16.0 g/dL    Hematocrit 30.1 (L) 36.0 - 48.0 %    MCV 96.7 80.0 - 100.0 fL    MCH 31.1 26.0 - 34.0 pg    MCHC 32.1 31.0 - 36.0 g/dL    RDW 15.1 12.4 - 15.4 %    Platelets 008 505 - 293 K/uL    MPV 7.5 5.0 - 10.5 fL    Neutrophils % 77.1 %    Lymphocytes % 13.7 %    Monocytes % 6.8 %    Eosinophils % 2.0 %    Basophils % 0.4 %    Neutrophils Absolute 6.2 1.7 - 7.7 K/uL    Lymphocytes Absolute 1.1 1.0 - 5.1 K/uL    Monocytes Absolute 0.6 0.0 - 1.3 K/uL    Eosinophils Absolute 0.2 0.0 - 0.6 K/uL    Basophils Absolute 0.0 0.0 - 0.2 K/uL   TSH with Reflex to FT4    Collection Time: 12/22/22  5:18 PM   Result Value Ref Range    TSH Reflex FT4 5.15 (H) 0.27 - 4.20 uIU/mL   Vitamin B12 & Folate    Collection Time: 12/22/22  5:18 PM   Result Value Ref Range    Vitamin B-12 445 211 - 911 pg/mL    Folate 11.80 4.78 - 24.20 ng/mL   Comprehensive Metabolic Panel    Collection Time: 12/22/22  5:18 PM   Result Value Ref Range    Sodium 142 136 - 145 mmol/L    Potassium 4.6 3.5 - 5.1 mmol/L    Chloride 100 99 - 110 mmol/L    CO2 26 21 - 32 mmol/L    Anion Gap 16 3 - 16    Glucose 84 70 - 99 mg/dL    BUN 57 (H) 7 - 20 mg/dL    Creatinine 9.0 (HH) 0.6 - 1.2 mg/dL    Est, Glom Filt Rate 4 (A) >60    Calcium 8.8 8.3 - 10.6 mg/dL    Total Protein 6.4 6.4 - 8.2 g/dL    Albumin 3.4 3.4 - 5.0 g/dL    Albumin/Globulin Ratio 1.1 1.1 - 2.2    Total Bilirubin 0.3 0.0 - 1.0 mg/dL    Alkaline Phosphatase 100 40 - 129 U/L    ALT 9 (L) 10 - 40 U/L    AST 12 (L) 15 - 37 U/L   T4, Free    Collection Time: 12/22/22  5:18 PM   Result Value Ref Range    T4 Free 1.0 0.9 - 1.8 ng/dL   POCT Glucose    Collection Time: 12/22/22  8:31 PM   Result Value Ref Range    POC Glucose 95 70 - 99 mg/dl    Performed on ACCU-CHEK    POCT Glucose    Collection Time: 12/23/22  2:42 AM   Result Value Ref Range    POC Glucose 147 (H) 70 - 99 mg/dl    Performed on ACCU-CHEK    POCT Glucose    Collection Time: 12/23/22  8:27 AM   Result Value Ref Range    POC Glucose 115 (H) 70 - 99 mg/dl    Performed on ACCU-CHEK    POCT Glucose    Collection Time: 12/23/22 11:50 AM   Result Value Ref Range    POC Glucose 138 (H) 70 - 99 mg/dl    Performed on ACCU-CHEK    POCT Glucose    Collection Time: 12/23/22  5:12 PM   Result Value Ref Range    POC Glucose 129 (H) 70 - 99 mg/dl    Performed on ACCU-CHEK    POCT Glucose    Collection Time: 12/24/22  8:24 AM   Result Value Ref Range    POC Glucose 112 (H) 70 - 99 mg/dl    Performed on ACCU-CHEK    POCT Glucose    Collection Time: 12/24/22  9:04 PM   Result Value Ref Range    POC Glucose 106 (H) 70 - 99 mg/dl    Performed on ACCU-CHEK    POCT Glucose    Collection Time: 12/25/22  7:36 AM   Result Value Ref Range    POC Glucose 122 (H) 70 - 99 mg/dl    Performed on ACCU-CHEK    Phenytoin Level, Total    Collection Time: 12/25/22  8:36 AM   Result Value Ref Range    Phenytoin Lvl 7.0 (L) 10.0 - 20.0 ug/mL    Phenytoin Dose Amount Unknown    POCT Glucose    Collection Time: 12/25/22 11:16 AM   Result Value Ref Range    POC Glucose 131 (H) 70 - 99 mg/dl    Performed on ACCU-CHEK    POCT Glucose    Collection Time: 12/25/22  4:19 PM   Result Value Ref Range    POC Glucose 88 70 - 99 mg/dl    Performed on ACCU-CHEK    POCT Glucose    Collection Time: 12/25/22  8:32 PM   Result Value Ref Range    POC Glucose 145 (H) 70 - 99 mg/dl    Performed on [address] [place] [address] Problems             Last Modified POA    * (Principal) Acute encephalopathy 12/22/2022 Yes    Encephalopathy, metabolic 74/46/2659 Yes    Suspected stroke patient last known to be well 3-4.5 hours ago 12/22/2022 Yes    New onset seizure ([address] 12/23/2022 Yes    HTN (hypertension), benign 12/23/2022 Yes    Hyperlipidemia 12/22/2022 Yes    Acquired hypothyroidism 12/22/2022 Yes    Chronic kidney 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 ([address] 12/22/2022 Yes    Primary osteoarthritis of both knees 12/22/2022 Yes    Memory loss 12/22/2022 Yes    Diabetic polyneuropathy associated with type 2 diabetes mellitus ([address] 12/22/2022 Yes    Coronary artery disease due to lipid rich plaque 12/22/2022 Yes    [facility] 12/22/2022 Yes    Class 1 obesity due to excess calories with body mass index (BMI) of 30.0 to 30.9 in adult 12/22/2022 Yes    History of anemia due to chronic kidney disease 12/22/2022 Yes    Parkinson's disease 12/22/2022 Yes    RLS (restless legs syndrome) 12/22/2022 Yes      Switch to PO dilantin  She is dismissal appropriate in am to a SNF  after HD in am

## 2022-12-26 NOTE — PROGRESS NOTES
Pt alert and oriented x3 with periods of forgetfulness. VSS, IV ns locked in right arm. No sticks in LUE d/t fistula. Pt ambulated to bedside commode with contact guard. Pt tolerated it fairly well and showed a steady gait. Bed alarm on, bedside table and call light in reach.

## 2022-12-26 NOTE — PROGRESS NOTES
Treatment time: 3 hours     Net UF: 2 L     Pre weight: 75.6kg standing weight   Post weight: 73.6kg  EDW:     Access used: Left lower arm graft  Access function: well, tolerated 350 BFR , 16g     Medications or blood products given: NA     Regular outpatient schedule: MWF     Summary of response to treatment: tolerated well, no issues.  VS     Copy of dialysis treatment record placed in chart, to be scanned into EMR.     12/26/22 0811 12/26/22 1100   Treatment   Time On 0811  --    Time Off  --  1110   Vital Signs   BP (!) 149/77 125/62   Temp 97.2 °F (36.2 °C) 97.2 °F (36.2 °C)   Heart Rate 78 (!) 111   Resp 16 16   Weight 166 lb 10.7 oz (75.6 kg) 162 lb 4.1 oz (73.6 kg)   Dialysis Bath   K+ (Potassium) 3  --    Ca+ (Calcium) 2.5  --    Na+ (Sodium) 140  --    HCO3 (Bicarb) 40  --

## 2022-12-26 NOTE — PLAN OF CARE
Problem: Discharge Planning  Goal: Discharge to home or other facility with appropriate resources  Outcome: Progressing     Problem: Pain  Goal: Verbalizes/displays adequate comfort level or baseline comfort level  12/26/2022 1030 by Laurie Chase RN  Outcome: Progressing  12/26/2022 0258 by Lawson Hernandez RN  Outcome: Progressing     Problem: Skin/Tissue Integrity  Goal: Absence of new skin breakdown  Description: 1. Monitor for areas of redness and/or skin breakdown  2. Assess vascular access sites hourly  3. Every 4-6 hours minimum:  Change oxygen saturation probe site  4. Every 4-6 hours:  If on nasal continuous positive airway pressure, respiratory therapy assess nares and determine need for appliance change or resting period.   Outcome: Progressing     Problem: Chronic Conditions and Co-morbidities  Goal: Patient's chronic conditions and co-morbidity symptoms are monitored and maintained or improved  Outcome: Progressing     Problem: Safety - Adult  Goal: Free from fall injury  12/26/2022 1030 by Laurie Chase RN  Outcome: Progressing  12/26/2022 0258 by Lawson Hernandez RN  Outcome: Progressing     Problem: ABCDS Injury Assessment  Goal: Absence of physical injury  Outcome: Progressing

## 2022-12-26 NOTE — PROGRESS NOTES
PROCEDURE:  Patient seen on hemodialysis at 9:39 AM    PHYSICIAN:  Raquel Ramírez MD    INDICATION:  End-stage renal disease    Wt Readings from Last 3 Encounters:   12/25/22 173 lb 4.5 oz (78.6 kg)   12/22/22 185 lb (83.9 kg)   12/19/22 177 lb (80.3 kg)     Temp Readings from Last 3 Encounters:   12/26/22 97.9 °F (36.6 °C) (Oral)   12/19/22 97.9 °F (36.6 °C)   11/27/22 97.6 °F (36.4 °C) (Oral)     BP Readings from Last 3 Encounters:   12/26/22 (!) 142/81   11/27/22 137/63   11/24/22 (!) 124/59     Pulse Readings from Last 3 Encounters:   12/26/22 83   12/19/22 94   11/27/22 74      In: 120 [P.O.:120]  Out: -      CBC:   Recent Labs     12/26/22  0817   WBC 12.7*   HGB 11.1*        BMP:  No results for input(s): NA, K, CL, CO2, BUN, CREATININE, GLUCOSE in the last 72 hours. BMD:   Lab Results   Component Value Date    .3 (H) 02/10/2020    CALCIUM 8.8 12/22/2022    CAION 1.03 (L) 12/22/2022    PHOS 6.9 (H) 09/24/2021       Iron:     Lab Results   Component Value Date    IRON 64 03/29/2018    TIBC 351 03/29/2018            RX:  See dialysis flowsheet for specifics on access, blood flow rate, dialysate baths, duration of dialysis, anticoagulation and other technical information. COMMENTS:      AAox3   No fever  Vitals stable   No SOB   Tolerated dialysis well. NO cramps   Will resume dialysis outpt tomorrow if discharged home today           Raquel Ramírez MD  Nephrology     [facility] Office : [address] , [address]  [place] Office: [facility]  [address] [facility] Office: [address] [address].   [place] Office: [address] [place] [address] [address] [facility], [facility] [address]  Office : [phone]  Fax :[phone]

## 2022-12-26 NOTE — PROGRESS NOTES
Chico Owens  Neurology Follow-up  [facility] Neurology    Date of Service: 12/26/2022    Subjective:   CC: Follow up today regarding: Acute encephalopathy    Events noted. Chart and lab reviewed. The patient looks much better today. She is back to his baseline. She denies any headache, dysphagia or dysarthria. No weakness or numbness or tingling. No chest pain. Other review of system was unremarkable. ROS : A 10-12 system review obtained from discussion with patient's family/and or nurse, was unremarkable. family history includes Cancer in her maternal grandmother and mother; No Known Problems in her paternal grandfather and paternal grandmother; Stomach Cancer in her brother; Stroke in her father.     Past Medical History:   Diagnosis Date    Arthritis     CAD (coronary artery disease)     Di[facility] ([facility] [address]     Hemodialysis patient ([facility] [address]     Hyperlipidemia     Hypertension     Kidney disease     Neuropathy     Pneumonia     IN February, 2021, with COVID    Thyroid disease      Current Facility-Administered Medications   Medication Dose Route Frequency Provider Last Rate Last Admin    phenytoin (DILANTIN) ER capsule 100 mg  100 mg Oral TID Jeff Alexander MD   100 mg at 12/25/22 2054    epoetin boris-epbx (RETACRIT) injection 3,000 Units  3,000 Units IntraVENous Once per day on Mon Wed Fri Vin Escudero MD        hydrALAZINE (APRESOLINE) injection 10 mg  10 mg IntraVENous Q6H PRN Jeff Alexander MD        acetaminophen (TYLENOL) suppository 650 mg  650 mg Rectal Q4H PRN Jeff Alexander MD        HYDROmorphone HCl PF (DILAUDID) injection 0.25 mg  0.25 mg IntraVENous Q4H PRN Jeff Alexander MD   0.25 mg at 12/24/22 0603    allopurinol (ZYLOPRIM) tablet 100 mg  100 mg Oral Daily Jeff Alexander MD   100 mg at 12/25/22 0847    aspirin EC tablet 81 mg  81 mg Oral Daily Jeff Alexander MD   81 mg at 12/25/22 0847    clopidogrel (PLAVIX) tablet 75 mg  75 mg Oral Daily Jeff Alexander MD   75 mg at 12/25/22 0847    levothyroxine (SYNTHROID) tablet 150 mcg  150 mcg Oral QAM AC Jose Angel Bernstein MD   150 mcg at 12/26/22 8440    lidocaine 4 % external patch 1 patch  1 patch TransDERmal Daily Jose Angel eBrnstein MD   1 patch at 12/24/22 0854    metoprolol succinate (TOPROL XL) extended release tablet 25 mg  25 mg Oral Daily Jose Angel Bernstein MD   25 mg at 12/25/22 0847    rosuvastatin (CRESTOR) tablet 20 mg  20 mg Oral Daily Jose Angel Bernstein MD   20 mg at 12/25/22 4083    Vitamin D (CHOLECALCIFEROL) tablet 1,000 Units  1,000 Units Oral Daily Jose Angel Bernstein MD   1,000 Units at 12/25/22 0847    sodium chloride flush 0.9 % injection 5-40 mL  5-40 mL IntraVENous 2 times per day Isra Gutierres MD   10 mL at 12/25/22 2233    sodium chloride flush 0.9 % injection 5-40 mL  5-40 mL IntraVENous PRN Isra Gutierres MD        0.9 % sodium chloride infusion   IntraVENous PRN Isra Gutierres MD        heparin (porcine) injection 5,000 Units  5,000 Units SubCUTAneous 3 times per day Isra Gutierres MD   5,000 Units at 12/26/22 0634    ondansetron (ZOFRAN-ODT) disintegrating tablet 4 mg  4 mg Oral Q8H PRN Isra Gutierres MD        Or    ondansetron [facility]) injection 4 mg  4 mg IntraVENous Q6H PRN Isra Gutierres MD   4 mg at 12/25/22 0016    polyethylene glycol (GLYCOLAX) packet 17 g  17 g Oral Daily PRN Isra Gutierres MD        acetaminophen (TYLENOL) tablet 650 mg  650 mg Oral Q6H PRN Isra Gutierres MD        dextrose bolus 10% 125 mL  125 mL IntraVENous PRN Isra Gutierres MD        Or    dextrose bolus 10% 250 mL  250 mL IntraVENous PRN Isra Gutierres MD        glucagon (rDNA) injection 1 mg  1 mg SubCUTAneous PRN Isra Gutierres MD        dextrose 10 % infusion   IntraVENous Continuous PRN Isra Gutierres MD         Allergies   Allergen Reactions    Amoxicillin Anaphylaxis    Demeclocycline Anaphylaxis    Penicillins Anaphylaxis    Tetracyclines & Related Anaphylaxis    Ozempic (0.25 Or 0.5 Mg-Dose) [Semaglutide(0.25 Or 0.5mg-Dos)] Other (See Comments)     Lost large amount weight and loss  Of appetite    Codeine Itching and Rash      reports that she quit smoking about 44 years ago. Her smoking use included cigarettes. She has a 5.00 pack-year smoking history. She has never used smokeless tobacco. She reports that she does not currently use alcohol. She reports that she does not use drugs. Objective:  Constitutional:   Vitals:    12/25/22 2036 12/26/22 0011 12/26/22 0434 12/26/22 1200   BP: 124/76 138/61 (!) 142/81 119/71   Pulse: 85 92 83 (!) 110   Resp: 16 16 16 18   Temp: 98.5 °F (36.9 °C) 97.7 °F (36.5 °C) 97.9 °F (36.6 °C) 98 °F (36.7 °C)   TempSrc: Oral Oral Oral Axillary   SpO2: 95% 96% 94% 98%   Weight:       Height:           General appearance: No acute distress  Mental Status:   AAO times 3  Good attention and good concentration  Good fund of knowledge  Speech is fluent  Good immediate recall    cranial Nerves:   II: Pupils: equal, round, reactive to light  III,IV,VI: No gaze preference. No nystagmus  V: Facial sensation: Grossly unremarkable. VII: Facial strength and movements: intact and symmetric  XII: Tongue movements : midline  Musculoskeletal:  The patient can move all 4 extremities. No apparent focal weakness. Tone: Normal tone. No rigidity.   Reflexes: symmetric 2+ in both arms and legs  Coordination: No tremors  Sensation: No sensory deficit        Data:  LABS:   Lab Results   Component Value Date/Time     12/26/2022 08:17 AM    K 4.6 12/26/2022 08:17 AM    K 4.3 12/22/2022 08:04 AM    CL 89 12/26/2022 08:17 AM    CO2 29 12/26/2022 08:17 AM    BUN 59 12/26/2022 08:17 AM    CREATININE 9.8 12/26/2022 08:17 AM    GFRAA 8 01/20/2022 06:16 AM    LABGLOM 4 12/26/2022 08:17 AM    GLUCOSE 160 12/26/2022 08:17 AM    PHOS 8.9 12/26/2022 08:17 AM    MG 2.70 11/20/2022 12:09 PM    CALCIUM 9.7 12/26/2022 08:17 AM     Lab Results   Component Value Date/Time    WBC 12.7 12/26/2022 08:17 AM    RBC 3.59 12/26/2022 08:17 AM    HGB 11.1 12/26/2022 08:17 AM    HCT 34.5 12/26/2022 08:17 AM    MCV 96.2 12/26/2022 08:17 AM    RDW 15.6 12/26/2022 08:17 AM     12/26/2022 08:17 AM     Lab Results   Component Value Date    INR 0.97 12/22/2022    PROTIME 12.7 12/22/2022       labs were independently reviewed by me  Reviewed notes from different physicians. Impression: Improving and back to her baseline. Acute encephalopathy, severe. Likely multifactorial metabolic encephalopathy and hypertensive urgency. Improving compared to yesterday. Hypertension, not controlled  End-stage renal disease  Hyperlipidemia        Recommendation    Will DC Dilantin today since she is back to her baseline. I feel the risk outweigh the benefit with the ED giving high risk for side effect and interaction with other medications. Continue dialysis  Blood pressure control  Aspirin  Statin  DVT and GI prophylaxis  PT OT  Speech  No driving  Discussed seizure precautions with the patient  Follow-up with me outpatient if symptoms recur  Can be discharged once medically stable  No further recommendation             Desiree Ramirez MD   [phone]      This dictation was generated by voice recognition computer software. Although all attempts are made to edit the dictation for accuracy, there may be errors in the transcription that are not intended.

## 2022-12-26 NOTE — PLAN OF CARE
Problem: Pain  Goal: Verbalizes/displays adequate comfort level or baseline comfort level  Outcome: Progressing   Pt denies pain at this time, RN encouraged pt to call out if needed anything. Will continue to assess pain level throughout shift. Problem: Safety - Adult  Goal: Free from fall injury  Outcome: Progressing   Pt has been free from falls this shift, bed alarm on, bed in lowest position, 2/4 side rails up, nonskid socks on, wheels locked, bedside table and call light in reach. Encouraged pt to call out if needed anything.

## 2022-12-26 NOTE — PROGRESS NOTES
0800- Patient left unit and went down to Dialysis. 1200- Pt returned back on the unit. Pt is in bed with call light in reach, medications given per [facility], shift assessment completed. The care plan and education has been reviewed and mutually agreed upon with the patient. 1530- Patient returned to bed after sitting in the chair, pt ambulated well with contact guard X1 assist, Epoetin medication not given due to hgb >10. Pt made aware.

## 2022-12-27 VITALS
HEART RATE: 87 BPM | OXYGEN SATURATION: 99 % | SYSTOLIC BLOOD PRESSURE: 127 MMHG | HEIGHT: 65 IN | RESPIRATION RATE: 17 BRPM | TEMPERATURE: 97.6 F | WEIGHT: 160.94 LBS | BODY MASS INDEX: 26.81 KG/M2 | DIASTOLIC BLOOD PRESSURE: 86 MMHG

## 2022-12-27 LAB
ALBUMIN SERPL-MCNC: 4.2 G/DL (ref 3.4–5)
ANION GAP SERPL CALCULATED.3IONS-SCNC: 8 MMOL/L (ref 3–16)
ANISOCYTOSIS: ABNORMAL
BANDED NEUTROPHILS RELATIVE PERCENT: 7 % (ref 0–7)
BASOPHILS ABSOLUTE: 0 K/UL (ref 0–0.2)
BASOPHILS RELATIVE PERCENT: 0 %
BUN BLDV-MCNC: 52 MG/DL (ref 7–20)
CALCIUM SERPL-MCNC: 10.1 MG/DL (ref 8.3–10.6)
CHLORIDE BLD-SCNC: 89 MMOL/L (ref 99–110)
CO2: 40 MMOL/L (ref 21–32)
CREAT SERPL-MCNC: 7.8 MG/DL (ref 0.6–1.2)
EOSINOPHILS ABSOLUTE: 0.2 K/UL (ref 0–0.6)
EOSINOPHILS RELATIVE PERCENT: 1 %
GFR SERPL CREATININE-BSD FRML MDRD: 5 ML/MIN/{1.73_M2}
GLUCOSE BLD-MCNC: 132 MG/DL (ref 70–99)
HCT VFR BLD CALC: 39.4 % (ref 36–48)
HEMOGLOBIN: 12.4 G/DL (ref 12–16)
LYMPHOCYTES ABSOLUTE: 2.1 K/UL (ref 1–5.1)
LYMPHOCYTES RELATIVE PERCENT: 14 %
MCH RBC QN AUTO: 30.2 PG (ref 26–34)
MCHC RBC AUTO-ENTMCNC: 31.4 G/DL (ref 31–36)
MCV RBC AUTO: 96.2 FL (ref 80–100)
METAMYELOCYTES RELATIVE PERCENT: 2 %
MONOCYTES ABSOLUTE: 1.2 K/UL (ref 0–1.3)
MONOCYTES RELATIVE PERCENT: 8 %
MYELOCYTE PERCENT: 1 %
NEUTROPHILS ABSOLUTE: 11.8 K/UL (ref 1.7–7.7)
NEUTROPHILS RELATIVE PERCENT: 67 %
PDW BLD-RTO: 15.6 % (ref 12.4–15.4)
PHOSPHORUS: 6.9 MG/DL (ref 2.5–4.9)
PLATELET # BLD: 434 K/UL (ref 135–450)
PLATELET SLIDE REVIEW: ADEQUATE
PMV BLD AUTO: 8.1 FL (ref 5–10.5)
POLYCHROMASIA: ABNORMAL
POTASSIUM SERPL-SCNC: 4.8 MMOL/L (ref 3.5–5.1)
RBC # BLD: 4.1 M/UL (ref 4–5.2)
SLIDE REVIEW: ABNORMAL
SODIUM BLD-SCNC: 137 MMOL/L (ref 136–145)
TOXIC GRANULATION: PRESENT
WBC # BLD: 15.3 K/UL (ref 4–11)

## 2022-12-27 PROCEDURE — 36415 COLL VENOUS BLD VENIPUNCTURE: CPT

## 2022-12-27 PROCEDURE — 2580000003 HC RX 258: Performed by: INTERNAL MEDICINE

## 2022-12-27 PROCEDURE — 6370000000 HC RX 637 (ALT 250 FOR IP): Performed by: INTERNAL MEDICINE

## 2022-12-27 PROCEDURE — 85025 COMPLETE CBC W/AUTO DIFF WBC: CPT

## 2022-12-27 PROCEDURE — 97161 PT EVAL LOW COMPLEX 20 MIN: CPT

## 2022-12-27 PROCEDURE — 6360000002 HC RX W HCPCS: Performed by: INTERNAL MEDICINE

## 2022-12-27 PROCEDURE — 80069 RENAL FUNCTION PANEL: CPT

## 2022-12-27 PROCEDURE — 97165 OT EVAL LOW COMPLEX 30 MIN: CPT

## 2022-12-27 PROCEDURE — 90935 HEMODIALYSIS ONE EVALUATION: CPT

## 2022-12-27 RX ORDER — POLYETHYLENE GLYCOL 3350 17 G/17G
17 POWDER, FOR SOLUTION ORAL DAILY PRN
Qty: 527 G | Refills: 1 | Status: SHIPPED | OUTPATIENT
Start: 2022-12-27 | End: 2023-01-26

## 2022-12-27 RX ORDER — LEVOFLOXACIN 250 MG/1
250 TABLET ORAL DAILY
Qty: 5 TABLET | Refills: 0 | Status: SHIPPED | OUTPATIENT
Start: 2022-12-27 | End: 2023-01-01

## 2022-12-27 RX ORDER — ONDANSETRON 4 MG/1
4 TABLET, ORALLY DISINTEGRATING ORAL EVERY 8 HOURS PRN
Qty: 20 TABLET | Refills: 0 | Status: SHIPPED | OUTPATIENT
Start: 2022-12-27

## 2022-12-27 RX ADMIN — ALLOPURINOL 100 MG: 100 TABLET ORAL at 09:29

## 2022-12-27 RX ADMIN — LEVOTHYROXINE SODIUM 150 MCG: 0.15 TABLET ORAL at 05:37

## 2022-12-27 RX ADMIN — Medication 1000 UNITS: at 09:29

## 2022-12-27 RX ADMIN — ASPIRIN 81 MG: 81 TABLET, COATED ORAL at 09:28

## 2022-12-27 RX ADMIN — METOPROLOL SUCCINATE 25 MG: 25 TABLET, EXTENDED RELEASE ORAL at 09:29

## 2022-12-27 RX ADMIN — HEPARIN SODIUM 5000 UNITS: 5000 INJECTION INTRAVENOUS; SUBCUTANEOUS at 05:37

## 2022-12-27 RX ADMIN — ROSUVASTATIN CALCIUM 20 MG: 20 TABLET, FILM COATED ORAL at 09:28

## 2022-12-27 RX ADMIN — CLOPIDOGREL BISULFATE 75 MG: 75 TABLET ORAL at 09:28

## 2022-12-27 RX ADMIN — Medication 10 ML: at 09:30

## 2022-12-27 NOTE — PROGRESS NOTES
Speech Language Pathology  Attempt    Corona Dear  1945    SLP attempted to see the pt for dysphagia treatment. Pt's chart reviewed and consultation completed with nursing staff, pt is currently off the unit for dialysis. Will re-attempt as schedule allows and pt appropriate. Randee Cnoroy M.S. Sukhwinder Warren #SP. [id number] [facility]

## 2022-12-27 NOTE — PROGRESS NOTES
Physical Therapy    [address] [facility] [address] Department   Phone: [phone]    Physical Therapy    [x] Initial Evaluation            [] Daily Treatment Note         [x] Discharge Summary      Patient: Chico Owens   : 1945   MRN: [id number]   Date of Service:  2022  Admitting Diagnosis: Acute encephalopathy  Current Admission Summary: Per H&P \"70 y.o. female who presents because of concern for altered mental status. She is a dialysis patient and lives in some sort of assisted living facility. Staff went to get her to take her to dialysis this morning and could not wake her up. EMS was called. They were not able to wake her up either initially, even with sternal rub. Her glucose was adequately elevated. She did start to wake up and answer some questions but was very confused so they called a stroke alert. Her last known well was some time yesterday and she was well enough to go shopping with her daughter yesterday. \"  Past Medical History:  has a past medical history of Arthritis, CAD (coronary artery disease), Diabetes ([facility] [address] Hemodialysis patient ([facility] [address] Hyperlipidemia, Hypertension, Kidney disease, Neuropathy, Pneumonia, and Thyroid disease. Past Surgical History:  has a past surgical history that includes Appendectomy; Mastectomy, bilateral; Rotator cuff repair (Left); Cholecystectomy; LAPAROSCOPY INSERTION PERITONEAL CATHETER (N/A, 2020); Upper gastrointestinal endoscopy (N/A, 2020); Colonoscopy (2020); Hysterectomy; Dialysis Catheter Removal (N/A, 2021); shunt revision (Left, 2021); and Coronary stent placement. Discharge Recommendations: Chico Owens scored a  on the AM-PAC short mobility form. At this time, no further PT is recommended upon discharge due to patient at independent level. Recommend patient returns to prior setting with prior services.     DME Required For Discharge: No new DME required  Precautions/Restrictions: high fall risk  Positional Restrictions:no positional restrictions    Pre-Admission Information   Lives With: alone                     Type of Home:  [facility]  Home Layout: one level  Home Access:  1 step step to enter with handrail. Handrails are located on R side. Bathroom Layout: walk in shower  Bathroom Equipment: grab bars in shower, shower chair, hand held shower head  Home Equipment: rollator - 4 wheeled walker  Transfer Assistance: Independent without use of device  Ambulation Assistance:Independent without use of device  ADL Assistance: independent with all ADL's  IADL Assistance: independent with homemaking tasks  Active : [] yes             [x]no  Current Employment: retired. Occupation:    Hobbies:   Recent Falls: 1 recent fall leading to this admission, no additional falls in the last 6 months     Examination   Vision:   Vision Gross Assessment: Impaired and Vision Corrective Device: wears glasses at all times  [facility]  Posture: Forward head, rounded shoulders   Sensation:   reports numbness and tingling in (B) LE pt reports from neuropathy  ROM:   (B) LE ROM WFL  Strength:   (B) LE gross strength WFL  Therapist Clinical Decision Making (Complexity): low complexity  Clinical Presentation: stable      Subjective  General: Pt seated EOB upon arrival. Pt up at helga. Pt pleasant and agreeable to PT/OT eval.   Pain: 0/10  Pain Interventions: not applicable       Functional Mobility  Bed Mobility  Sit to Supine: Independent  Comments:  Transfers  Sit to stand transfer: Independent  Stand to sit transfer: Independent  Comments:  Ambulation  Surface:level surface  Assistive Device: rolling walker  Assistance: modified independent  Distance: 80ft   Gait Mechanics: decreased agatha   Comments:    Stair Mobility  Stair mobility not completed on this date.   Comments:  Balance  Static Sitting Balance: good(+): independent with high level dynamic balance in unsupported position  Dynamic Sitting Balance: good(+): independent with high level dynamic balance in unsupported position  Static Standing Balance: good(+): independent with high level dynamic balance in unsupported position  Dynamic Standing Balance: good(+): independent with high level dynamic balance in unsupported position  Comments:    Other Therapeutic Interventions    Functional Outcomes  AM-PAC Inpatient Mobility Raw Score : 24              Cognition  WFL  Orientation:    A&O x 4    Education  Barriers To Learning: none  Patient Education: patient educated on discharge recommendations  Learning Assessment:  Pt verbalized and demonstrates understanding    Assessment  Impairments Requiring Therapeutic Intervention: none - eval with same day discharge  Prognosis: good without need for therapy intervention  Clinical Assessment: Patient presenting at modified independent level for completion of required mobility tasks for return to home. Eval with d/c at this time. No therapy services indicated. Safety Interventions: patient left in bed and nurse notified Pt left supine in bed with transport at [facility]     Plan  Frequency: Eval with same day discharge. No follow up required. Current Treatment Recommendations: Not applicable, evaluation completed with same day discharge. Goals  Patient eval with same day discharge. No goals set as patient is at baseline mobility status.       Therapy Session Time      Individual Group Co-treatment   Time In     1250   Time Out     1303   Minutes     13     Timed Code Treatment Minutes:  0   Total Treatment Minutes:  13       Electronically Signed By: Carson Dolan Do [facility] [address], [address] [address]

## 2022-12-27 NOTE — PROGRESS NOTES
Office : [phone]     Fax :[phone]       Nephrology note     Patient's Name: Aakash Narvaez  10:55 AM  12/27/2022    Reason for Consult:  ESRD      Requesting Physician:  Rhona Nieves MD      Chief Complaint:    Chief Complaint   Patient presents with    Altered Mental Status     Arrived by  EMS from [address] [facility] rt AMS; pt was unable to awaken for transportation to dialysis this morning. Snoring resp, but reponds to voice. Unknown last normal.  . History of Present iIlness:      Aakash Narvaez is a 67 y.o. female with prior history of ESRD., anemia in ESRD , HTN , recent COVID infection who was admitted with AMS. She missed her morning dialysis session yesterday so urgent dialysis was done yesterday . At this time she is in pain and confused. No SOB. Potassium is 4.6       Interval hx     Had HD yesterday   Feels better today   Awake and alert       I/O last 3 completed shifts: In: 65 [P.O.:320]  Out: -     Past Medical History:   Diagnosis Date    Arthritis     CAD (coronary artery disease)     Diabetes ([facility] [address]     Hemodialysis patient ([facility] [address]     Hyperlipidemia     Hypertension     Kidney disease     Neuropathy     Pneumonia     IN February, 2021, with COVID    Thyroid disease        Past Surgical History:   Procedure Laterality Date    APPENDECTOMY      CHOLECYSTECTOMY      COLONOSCOPY  11/24/2020    COLONOSCOPY POLYPECTOMY SNARE/COLD BIOPSY performed by Reginald Tirado MD at [address] [facility] N/A 05/17/2021    PERITONEAL DIALYSIS CATHETER REMOVAL.  EXCISSION OF ABDOMINAL CITRIX. performed by Joe Weiss DO at [address] (CERVIX STATUS UNKNOWN)      FULL    LAPAROSCOPY INSERTION PERITONEAL CATHETER N/A 04/03/2020    LAPAROSCOPIC LYSIS OF ADHESIONS, LAPAROSCOPIC PERITONEAL DIALYSIS CATHETER PLACEMENT, LAPAROSCOPIC OMENTOPEXY performed by Kenna Pascual DO at [address] [facility], BILATERAL      preventive    ROTATOR CUFF REPAIR Left     SHUNT REVISION Left 08/19/2021    LEFT FOREARM ARTERIO VENOUS GRAFT performed by Mac Veliz MD at [address] [facility] N/A 11/24/2020    EGD BIOPSY performed by Kory Bush MD at [address] [place] [address]       Family History   Problem Relation Age of Onset    Cancer Mother         lung, breast, liver    Stroke Father     Stomach Cancer Brother     Cancer Maternal Grandmother     No Known Problems Paternal Grandmother     No Known Problems Paternal Grandfather            Current Medications:    epoetin boris-epbx (RETACRIT) injection 3,000 Units, Once per day on Mon Wed Fri  hydrALAZINE (APRESOLINE) injection 10 mg, Q6H PRN  acetaminophen (TYLENOL) suppository 650 mg, Q4H PRN  HYDROmorphone HCl PF (DILAUDID) injection 0.25 mg, Q4H PRN  allopurinol (ZYLOPRIM) tablet 100 mg, Daily  aspirin EC tablet 81 mg, Daily  clopidogrel (PLAVIX) tablet 75 mg, Daily  levothyroxine (SYNTHROID) tablet 150 mcg, QAM AC  lidocaine 4 % external patch 1 patch, Daily  metoprolol succinate (TOPROL XL) extended release tablet 25 mg, Daily  rosuvastatin (CRESTOR) tablet 20 mg, Daily  Vitamin D (CHOLECALCIFEROL) tablet 1,000 Units, Daily  sodium chloride flush 0.9 % injection 5-40 mL, 2 times per day  sodium chloride flush 0.9 % injection 5-40 mL, PRN  0.9 % sodium chloride infusion, PRN  heparin (porcine) injection 5,000 Units, 3 times per day  ondansetron (ZOFRAN-ODT) disintegrating tablet 4 mg, Q8H PRN   Or  ondansetron (ZOFRAN) injection 4 mg, Q6H PRN  polyethylene glycol (GLYCOLAX) packet 17 g, Daily PRN  acetaminophen (TYLENOL) tablet 650 mg, Q6H PRN  dextrose bolus 10% 125 mL, PRN   Or  dextrose bolus 10% 250 mL, PRN  glucagon (rDNA) injection 1 mg, PRN  dextrose 10 % infusion, Continuous PRN          Physical exam:     Vitals:  /77   Pulse 91   Temp 98.1 °F (36.7 °C) (Oral)   Resp 18   Ht 5' 5\" (1.651 m)   Wt 162 lb 4.1 oz (73.6 kg)   LMP  (LMP Unknown)   SpO2 96%   BMI 27.00 kg/m²   Constitutional:  OAA X3 NAD  Skin: no rash, turgor wnl  Heent:  eomi, mmm  Neck: no bruits or jvd noted  Cardiovascular:  S1, S2 without m/r/g  Respiratory: CTA B without w/r/r  Abdomen:  +bs, soft, nt, nd  Ext: no  lower extremity edema  Psychiatric: mood and affect appropriate  Musculoskeletal:  Rom, muscular strength intact    Labs:  CBC:   Recent Labs     12/26/22  0817 12/27/22 0457   WBC 12.7* 15.3*   HGB 11.1* 12.4    434     BMP:    Recent Labs     12/26/22  0817 12/27/22 0457    137   K 4.6 4.8   CL 89* 89*   CO2 29 40*   BUN 59* 52*   CREATININE 9.8* 7.8*   GLUCOSE 160* 132*     Ca/Mg/Phos:   Recent Labs     12/26/22  0817 12/27/22  0457   CALCIUM 9.7 10.1   PHOS 8.9* 6.9*     Hepatic:   No results for input(s): AST, ALT, ALB, BILITOT, ALKPHOS in the last 72 hours. Troponin:   No results for input(s): TROPONINI in the last 72 hours. BNP: No results for input(s): BNP in the last 72 hours. Lipids: No results for input(s): CHOL, TRIG, HDL, LDLCALC, LABVLDL in the last 72 hours. ABGs:   No results for input(s): PHART, PO2ART, PUZ8NSD in the last 72 hours. INR:   No results for input(s): INR in the last 72 hours. UA:  No results for input(s): Froilan Dominique, GLUCOSEU, BILIRUBINUR, KETUA, SPECGRAV, BLOODU, PHUR, PROTEINU, UROBILINOGEN, NITRU, LEUKOCYTESUR, Barry Guild in the last 72 hours. IMAGING:  XR CHEST PORTABLE   Final Result   Stable enlargement of the cardiac silhouette. MRI BRAIN WO CONTRAST   Final Result   No acute intracranial abnormality visualized.          XR CHEST PORTABLE   Final Result   Cardiomegaly with no acute pulmonary finding         CTA HEAD NECK W CONTRAST   Final Result Severe narrowing of the left supraclinoid ICA segment. No evidence of large vessel occlusion. No hemodynamically significant stenosis within the cervical ICAs per NASCET   criteria. Patent cervical vertebral arteries. Subpleural pulmonary opacity measuring up to 18 mm within the left upper   lobe, persistent over several months and slightly more conspicuous. Findings   may reflect an underlying inflammatory process, however underlying neoplasm   is possible. Recommend prompt CT chest follow-up, PET CT imaging and/or   tissue sampling. 15 mm mixed density nodule within the right thyroid lobe. Nonemergent   thyroid ultrasound recommended on an outpatient basis. Status post left   thyroidectomy. Correlate with prior operative reports. CT HEAD WO CONTRAST   Final Result   No acute intracranial abnormality. Findings were discussed with Mariangel Teixeira at 7:54 am on 12/22/2022. Assessment/Plan :      1. ESRD . Had HD yesterday. If not discharged today will get dialysis here . If discharged then will go for dialysis tomorrow outpt  D/w Ms. Handley     2. HTN. Controlled       3. Anemia in esrd  Give retacrit     4. Acid- base disorder. correct with HD     5. Electrolytes. Monitor     6. AMS.     Resolved         Thank you for allowing us to participate in care of [facility]         Electronically signed by: Priyanka Izaguirre MD, 12/27/2022, 10:55 AM      [facility] [address]  Office : [phone]  Fax :[phone]

## 2022-12-27 NOTE — PROGRESS NOTES
Treatment time: 1.5 hours  Net UF: 793 ml     Pre weight: 73.6 kg  Post weight:73.0 kg    Access used: RTDC    Access function: well with  ml/min     Medications or blood products given: hep dwells     Regular outpatient schedule: TTS     Summary of response to treatment: Patient tolerated treatment well and without any complications.  Patient remained stable throughout entire treatment        12/27/22 1408 12/27/22 1505   Treatment   Time On 1327  --    Time Off  --  1505   Treatment Goal 1400 1193   Vital Signs   /71 127/86   Temp 97.6 °F (36.4 °C) 97.6 °F (36.4 °C)   Heart Rate 94 87   Resp 17 17   Weight 162 lb 4.1 oz (73.6 kg) 160 lb 15 oz (73 kg)

## 2022-12-27 NOTE — PROGRESS NOTES
[facility] [address]   Phone: [phone]    Occupational Therapy    [x] Initial Evaluation            [] Daily Treatment Note         [x] Discharge Summary      Patient: Andrade Rodríguez   : 1945   MRN: [id number]   Date of Service:  2022    Admitting Diagnosis:  Acute encephalopathy  Current Admission Summary: Per H&P \"70 y.o. female who presents because of concern for altered mental status. She is a dialysis patient and lives in some sort of assisted living facility. Staff went to get her to take her to dialysis this morning and could not wake her up. EMS was called. They were not able to wake her up either initially, even with sternal rub. Her glucose was adequately elevated. She did start to wake up and answer some questions but was very confused so they called a stroke alert. Her last known well was some time yesterday and she was well enough to go shopping with her daughter yesterday. \"  Past Medical History:  has a past medical history of Arthritis, CAD (coronary artery disease), Diabetes ([facility] [address] Hemodialysis patient ([facility] [address] Hyperlipidemia, Hypertension, Kidney disease, Neuropathy, Pneumonia, and Thyroid disease. Past Surgical History:  has a past surgical history that includes Appendectomy; Mastectomy, bilateral; Rotator cuff repair (Left); Cholecystectomy; LAPAROSCOPY INSERTION PERITONEAL CATHETER (N/A, 2020); Upper gastrointestinal endoscopy (N/A, 2020); Colonoscopy (2020); Hysterectomy; Dialysis Catheter Removal (N/A, 2021); shunt revision (Left, 2021); and Coronary stent placement. Discharge Recommendations: Andrade Rodríguez scored a  on the AM-PAC ADL Inpatient form. Current research shows that an AM-PAC score of 18 or greater is typically associated with a discharge to the patient's home setting.  Based on the patient's AM-PAC score, and their current ADL status, at this time, no further OT is recommended at discharge as patient has  no current deficits which would warrant therapy intervention. Recommend patient returns to prior setting with prior services. DME Required For Discharge: patient has all required DME for discharge    Precautions/Restrictions: high fall risk  Weight Bearing Restrictions: no restrictions  [] Right Upper Extremity  [] Left Upper Extremity [] Right Lower Extremity  [] Left Lower Extremity     Required Braces/Orthotics: no braces required   [] Right  [] Left  Positional Restrictions:no positional restrictions      Pre-Admission Information     Lives With: alone    Type of Home:  [facility]  Home Layout: one level  Home Access:  1 step step to enter with handrail. Handrails are located on R side. Bathroom Layout: walk in shower  Bathroom Equipment: grab bars in shower, shower chair, hand held shower head  Home Equipment: rollator - 4 wheeled walker  Transfer Assistance: Independent without use of device  Ambulation Assistance:Independent without use of device  ADL Assistance: independent with all ADL's  IADL Assistance: independent with homemaking tasks  Active : [] yes  [x]no  Current Employment: retired.   Occupation:    Hobbies:   Recent Falls: 1 recent fall leading to this admission, no additional falls in the last 6 months       Examination     Vision:   Vision Gross Assessment: Impaired and Vision Corrective Device: wears glasses at all times  [facility]  Perception:   WFL  Posture:   Good, slightly rounded  Sensation:   reports numbness and tingling in (B) LE pt reports from neuropathy  Proprioception:    WFL  Tone:   Normotonic  Coordination Testing:   WFL    ROM:   (B) UE AROM WFL  Strength:   (B) UE strength grossly WFL    Decision Making: low complexity  Clinical Presentation: stable      Subjective    General: pt seated EOB upon arrival, pleasant and agreeable to OT evaluation  Pain: 0/10  Pain Interventions: not applicable           Activities of Daily Living    Basic Activities of Daily Living  General Comments: pt declined ADL participation this date however states that she has been IND ambulating in room to complete ADLs. Instrumental Activities of Daily Living  No IADL completed on this date. Functional Mobility    Bed Mobility  Sit to Supine: modified independent  Comments: pt seated EOB upon arrival  Transfers  Sit to stand transfer:modified independent  Stand to sit transfer: modified independent  Toilet transfer comments: not assessed however pt reports she has been IND completing during hospital stay  Comments:  Functional Mobility:  Sitting Balance: modified independent. Sitting Balance Comment: seated EOB  Standing Balance: modified independent. Standing Balance Comment: with use of RW  Functional Mobility: .  modified independent  Functional Mobility Activity: [de-identified]' in hospital [facility] hospital  Functional Mobility Device Use: rolling walker  Functional Mobility Comment: steady gait and good safety awareness with use of RW    Other Therapeutic Interventions      Functional Outcomes  AM-PAC Inpatient Daily Activity Raw Score: 24      Cognition  WFL  Orientation:    alert and oriented x 4  Command Following:   [facility]     Education    Barriers To Learning: none  Patient Education: patient educated on goals, OT role and benefits, plan of care, transfer training, discharge recommendations  Learning Assessment:  patient verbalizes and demonstrates understanding    Assessment    Activity Tolerance: tolerated treatment well  Impairments Requiring Therapeutic Intervention: none - eval with same day discharge  Prognosis: good  Clinical Assessment: Pt is currently functioning at occupational baseline and demo the safety and endurance needed to return home safely. No skilled OT services recommended at this time. Recommend pt return to prior setting with prior services.    Safety Interventions: patient left in bed and transport in room to take pt to dialysis. Plan    Frequency: Eval with same day discharge. No follow up required. Current Treatment Recommendations: not applicable, evaluation completed with same day discharge. Goals    Patient Goals: to return home     Short Term Goals:    Patient eval with same day discharge. No goals set as patient is at baseline status.        Therapy Session Time     Individual Group Co-treatment   Time In    1250   Time Out    1303   Minutes    13        Timed Code Treatment Minutes:  Timed Code Treatment Minutes: 0 Minutes    Total Treatment Minutes:  13 minutes total    Electronically Signed By: Gerhardt Pih, OT, Gerhardt Pih, OTR/L [id number]

## 2022-12-27 NOTE — PLAN OF CARE
Problem: Discharge Planning  Goal: Discharge to home or other facility with appropriate resources  12/27/2022 1243 by Laurie Gaston RN  Outcome: Progressing  12/27/2022 0124 by Lorry Koyanagi, RN  Outcome: Progressing  Flowsheets (Taken 12/26/2022 2200)  Discharge to home or other facility with appropriate resources: Identify barriers to discharge with patient and caregiver     Problem: Pain  Goal: Verbalizes/displays adequate comfort level or baseline comfort level  12/27/2022 1243 by Laurie Gaston RN  Outcome: Progressing  12/27/2022 0124 by Lorry Koyanagi, RN  Outcome: Progressing  Flowsheets (Taken 12/26/2022 2200)  Verbalizes/displays adequate comfort level or baseline comfort level: Encourage patient to monitor pain and request assistance     Problem: Skin/Tissue Integrity  Goal: Absence of new skin breakdown  Description: 1. Monitor for areas of redness and/or skin breakdown  2. Assess vascular access sites hourly  3. Every 4-6 hours minimum:  Change oxygen saturation probe site  4. Every 4-6 hours:  If on nasal continuous positive airway pressure, respiratory therapy assess nares and determine need for appliance change or resting period.   12/27/2022 1243 by Laurie Gaston RN  Outcome: Progressing  12/27/2022 0124 by Lorry Koyanagi, RN  Outcome: Progressing     Problem: Chronic Conditions and Co-morbidities  Goal: Patient's chronic conditions and co-morbidity symptoms are monitored and maintained or improved  12/27/2022 1243 by Laurie Gaston RN  Outcome: Progressing  12/27/2022 0124 by Lorry Koyanagi, RN  Outcome: Progressing  Flowsheets (Taken 12/26/2022 2200)  Care Plan - Patient's Chronic Conditions and Co-Morbidity Symptoms are Monitored and Maintained or Improved: Monitor and assess patient's chronic conditions and comorbid symptoms for stability, deterioration, or improvement     Problem: Safety - Adult  Goal: Free from fall injury  12/27/2022 1243 by Laurie Gaston RN  Outcome: Progressing  12/27/2022 0124 by Basilia Melgar RN  Outcome: Progressing  Flowsheets (Taken 12/27/2022 0122)  Free From Fall Injury: Instruct family/caregiver on patient safety     Problem: ABCDS Injury Assessment  Goal: Absence of physical injury  12/27/2022 1243 by Laurie Dial RN  Outcome: Progressing  12/27/2022 0124 by Basilia Melgar RN  Outcome: Progressing  Flowsheets (Taken 12/27/2022 0122)  Absence of Physical Injury: Implement safety measures based on patient assessment No

## 2022-12-27 NOTE — PROGRESS NOTES
Shift assessment completed. Vital signs are stable. Patient is alert and oriented. Lung sounds are clear. S1 S2 audible, no irregular beats. Bowel sounds present in all four quadrants, abdomen is soft and non-tender. No edema observed. Pedal pulses present. Patient is resting with bed in lowest position, call light in reach, and bed alarm engaged.

## 2022-12-27 NOTE — CARE COORDINATION
Discharge Planning Note:    Chart reviewed and it appears that patient has minimal needs for discharge at this time. Discussed with patient and requested that case management be notified if discharge needs are identified.     - Current discharge plan is for the patient to return home. Case management will continue to follow progress and update discharge plan as needed.       Risk of Readmission Score: 24%    BRANDT BellN RN    [facility]  Phone: [phone]

## 2022-12-27 NOTE — PROGRESS NOTES
Patient WBC count appears high , she is afebrile, no obvious  source of infection  but will empirically give Levaquin 250 mg daily  for 5 days , nursing staff instructed to relay to the patient meds e scribed to [place]

## 2022-12-27 NOTE — PROGRESS NOTES
Department of Internal Medicine  General Internal Medicine   Progress Note      SUBJECTIVE: looks like a new persons playing games on her smart phone     History obtained from chart review, the patient, and nursing staff   General ROS: positive for  - fatigue, malaise, and weight loss  negative for - chills, fever, or night sweats  Psychological ROS: positive for - anxiety, disorientation, irritability, and memory difficulties  negative for - hallucinations or hostility  Ophthalmic ROS: negative  Respiratory ROS: no cough, shortness of breath, or wheezing  Cardiovascular ROS: no chest pain or dyspnea on exertion  Gastrointestinal ROS: no abdominal pain, change in bowel habits, or black or bloody stools  Genito-Urinary ROS: oliguric , no hematuria ,   Musculoskeletal ROS: positive for - pain in chronic generalized pain    negative for - joint swelling  Neurological ROS: diffuse slowing down of intellectual and sensory motor function  Dermatological ROS: negative    OBJECTIVE      Medications      Current Facility-Administered Medications: epoetin boris-epbx (RETACRIT) injection 3,000 Units, 3,000 Units, IntraVENous, Once per day on Mon Wed Fri  hydrALAZINE (APRESOLINE) injection 10 mg, 10 mg, IntraVENous, Q6H PRN  acetaminophen (TYLENOL) suppository 650 mg, 650 mg, Rectal, Q4H PRN  HYDROmorphone HCl PF (DILAUDID) injection 0.25 mg, 0.25 mg, IntraVENous, Q4H PRN  allopurinol (ZYLOPRIM) tablet 100 mg, 100 mg, Oral, Daily  aspirin EC tablet 81 mg, 81 mg, Oral, Daily  clopidogrel (PLAVIX) tablet 75 mg, 75 mg, Oral, Daily  levothyroxine (SYNTHROID) tablet 150 mcg, 150 mcg, Oral, QAM AC  lidocaine 4 % external patch 1 patch, 1 patch, TransDERmal, Daily  metoprolol succinate (TOPROL XL) extended release tablet 25 mg, 25 mg, Oral, Daily  rosuvastatin (CRESTOR) tablet 20 mg, 20 mg, Oral, Daily  Vitamin D (CHOLECALCIFEROL) tablet 1,000 Units, 1,000 Units, Oral, Daily  sodium chloride flush 0.9 % injection 5-40 mL, 5-40 mL, IntraVENous, 2 times per day  sodium chloride flush 0.9 % injection 5-40 mL, 5-40 mL, IntraVENous, PRN  0.9 % sodium chloride infusion, , IntraVENous, PRN  heparin (porcine) injection 5,000 Units, 5,000 Units, SubCUTAneous, 3 times per day  ondansetron (ZOFRAN-ODT) disintegrating tablet 4 mg, 4 mg, Oral, Q8H PRN **OR** ondansetron (ZOFRAN) injection 4 mg, 4 mg, IntraVENous, Q6H PRN  polyethylene glycol (GLYCOLAX) packet 17 g, 17 g, Oral, Daily PRN  acetaminophen (TYLENOL) tablet 650 mg, 650 mg, Oral, Q6H PRN **OR** [DISCONTINUED] acetaminophen (TYLENOL) suppository 650 mg, 650 mg, Rectal, Q6H PRN  dextrose bolus 10% 125 mL, 125 mL, IntraVENous, PRN **OR** dextrose bolus 10% 250 mL, 250 mL, IntraVENous, PRN  glucagon (rDNA) injection 1 mg, 1 mg, SubCUTAneous, PRN  dextrose 10 % infusion, , IntraVENous, Continuous PRN    Physical      Vitals: /62   Pulse 94   Temp 98.2 °F (36.8 °C) (Oral)   Resp 16   Ht 5' 5\" (1.651 m)   Wt 162 lb 4.1 oz (73.6 kg)   LMP  (LMP Unknown)   SpO2 98%   BMI 27.00 kg/m²   Temp: Temp: 98.2 °F (36.8 °C)  Max: Temp  Av.9 °F (36.6 °C)  Min: 97.2 °F (36.2 °C)  Max: 98.2 °F (36.8 °C)  Respiration range:  Resp  Av.5  Min: 16  Max: 18  Pulse Range:  Pulse  Av  Min: 94  Max: 111  Blood pressure range:  Systolic (74IDJ), HFT:661 , Min:100 , XZF:028   , Diastolic (63DHJ), ODM:29, Min:61, Max:71    SpO2  Av %  Min: 98 %  Max: 98 %    Intake/Output Summary (Last 24 hours) at 2022 0828  Last data filed at 2022 2200  Gross per 24 hour   Intake 200 ml   Output --   Net 200 ml       Vent settings:  Pulse  Av.5  Min: 63  Max: 114  Resp  Av.1  Min: 11  Max: 37  SpO2  Av.5 %  Min: 85 %  Max: 100 %    CONSTITUTIONAL:  fatigued, somnolent, uncooperative, distracted, mild distress, appears stated age, and mildly obese  EYES:  unremarkable   NECK:  mild JVD   BACK:  symmetric and no curvature  LUNGS:  No increased work of breathing, good air exchange, clear to auscultation bilaterally, no crackles or wheezing  CARDIOVASCULAR:  Normal apical impulse, regular rate and rhythm, normal S1 and S2, no S3 or S4, and no murmur noted  ABDOMEN:  soft BS +   MUSCULOSKELETAL:  trace edema   NEUROLOGIC:  general weakness lack of co-ordination , babinski absent   SKIN:  warm and dry  and no bruising or bleeding    Data      Recent Results (from the past 96 hour(s))   POCT Glucose    Collection Time: 12/23/22 11:50 AM   Result Value Ref Range    POC Glucose 138 (H) 70 - 99 mg/dl    Performed on ACCU-CHEK    POCT Glucose    Collection Time: 12/23/22  5:12 PM   Result Value Ref Range    POC Glucose 129 (H) 70 - 99 mg/dl    Performed on ACCU-CHEK    POCT Glucose    Collection Time: 12/24/22  8:24 AM   Result Value Ref Range    POC Glucose 112 (H) 70 - 99 mg/dl    Performed on ACCU-CHEK    POCT Glucose    Collection Time: 12/24/22  9:04 PM   Result Value Ref Range    POC Glucose 106 (H) 70 - 99 mg/dl    Performed on ACCU-CHEK    POCT Glucose    Collection Time: 12/25/22  7:36 AM   Result Value Ref Range    POC Glucose 122 (H) 70 - 99 mg/dl    Performed on ACCU-CHEK    Phenytoin Level, Total    Collection Time: 12/25/22  8:36 AM   Result Value Ref Range    Phenytoin Lvl 7.0 (L) 10.0 - 20.0 ug/mL    Phenytoin Dose Amount Unknown    POCT Glucose    Collection Time: 12/25/22 11:16 AM   Result Value Ref Range    POC Glucose 131 (H) 70 - 99 mg/dl    Performed on ACCU-CHEK    POCT Glucose    Collection Time: 12/25/22  4:19 PM   Result Value Ref Range    POC Glucose 88 70 - 99 mg/dl    Performed on ACCU-CHEK    POCT Glucose    Collection Time: 12/25/22  8:32 PM   Result Value Ref Range    POC Glucose 145 (H) 70 - 99 mg/dl    Performed on ACCU-CHEK    POCT Glucose    Collection Time: 12/26/22  7:29 AM   Result Value Ref Range    POC Glucose 111 (H) 70 - 99 mg/dl    Performed on ACCU-CHEK    Renal Function Panel    Collection Time: 12/26/22  8:17 AM   Result Value Ref Range    Sodium 139 136 - 145 mmol/L    Potassium 4.6 3.5 - 5.1 mmol/L    Chloride 89 (L) 99 - 110 mmol/L    CO2 29 21 - 32 mmol/L    Anion Gap 21 (H) 3 - 16    Glucose 160 (H) 70 - 99 mg/dL    BUN 59 (H) 7 - 20 mg/dL    Creatinine 9.8 (HH) 0.6 - 1.2 mg/dL    Est, Glom Filt Rate 4 (A) >60    Calcium 9.7 8.3 - 10.6 mg/dL    Phosphorus 8.9 (H) 2.5 - 4.9 mg/dL    Albumin 4.3 3.4 - 5.0 g/dL   CBC with Auto Differential    Collection Time: 12/26/22  8:17 AM   Result Value Ref Range    WBC 12.7 (H) 4.0 - 11.0 K/uL    RBC 3.59 (L) 4.00 - 5.20 M/uL    Hemoglobin 11.1 (L) 12.0 - 16.0 g/dL    Hematocrit 34.5 (L) 36.0 - 48.0 %    MCV 96.2 80.0 - 100.0 fL    MCH 30.9 26.0 - 34.0 pg    MCHC 32.1 31.0 - 36.0 g/dL    RDW 15.6 (H) 12.4 - 15.4 %    Platelets 395 136 - 556 K/uL    MPV 8.2 5.0 - 10.5 fL    PLATELET SLIDE REVIEW Adequate     SLIDE REVIEW see below     Neutrophils % 79.0 %    Lymphocytes % 11.0 %    Monocytes % 6.0 %    Eosinophils % 2.0 %    Basophils % 1.0 %    Neutrophils Absolute 10.2 (H) 1.7 - 7.7 K/uL    Lymphocytes Absolute 1.4 1.0 - 5.1 K/uL    Monocytes Absolute 0.8 0.0 - 1.3 K/uL    Eosinophils Absolute 0.3 0.0 - 0.6 K/uL    Basophils Absolute 0.1 0.0 - 0.2 K/uL    Metamyelocytes Relative 1 (A) %    Anisocytosis Occasional (A)    POCT Glucose    Collection Time: 12/26/22  4:07 PM   Result Value Ref Range    POC Glucose 137 (H) 70 - 99 mg/dl    Performed on ACCU-CHEK    POCT Glucose    Collection Time: 12/26/22  7:52 PM   Result Value Ref Range    POC Glucose 164 (H) 70 - 99 mg/dl    Performed on ACCU-CHEK    Renal Function Panel    Collection Time: 12/27/22  4:57 AM   Result Value Ref Range    Sodium 137 136 - 145 mmol/L    Potassium 4.8 3.5 - 5.1 mmol/L    Chloride 89 (L) 99 - 110 mmol/L    CO2 40 (H) 21 - 32 mmol/L    Anion Gap 8 3 - 16    Glucose 132 (H) 70 - 99 mg/dL    BUN 52 (H) 7 - 20 mg/dL    Creatinine 7.8 (HH) 0.6 - 1.2 mg/dL    Est, Glom Filt Rate 5 (A) >60    Calcium 10.1 8.3 - 10.6 mg/dL    Phosphorus 6.9 (H) 2.5 - 4.9 mg/dL    Albumin 4.2 3.4 - 5.0 g/dL   CBC with Auto Differential    Collection Time: 12/27/22  4:57 AM   Result Value Ref Range    WBC 15.3 (H) 4.0 - 11.0 K/uL    RBC 4.10 4.00 - 5.20 M/uL    Hemoglobin 12.4 12.0 - 16.0 g/dL    Hematocrit 39.4 36.0 - 48.0 %    MCV 96.2 80.0 - 100.0 fL    MCH 30.2 26.0 - 34.0 pg    MCHC 31.4 31.0 - 36.0 g/dL    RDW 15.6 (H) 12.4 - 15.4 %    Platelets 207 813 - 369 K/uL    MPV 8.1 5.0 - 10.5 fL    PLATELET SLIDE REVIEW Adequate     SLIDE REVIEW see below     Neutrophils % 67.0 %    Lymphocytes % 14.0 %    Monocytes % 8.0 %    Eosinophils % 1.0 %    Basophils % 0.0 %    Neutrophils Absolute 11.8 (H) 1.7 - 7.7 K/uL    Lymphocytes Absolute 2.1 1.0 - 5.1 K/uL    Monocytes Absolute 1.2 0.0 - 1.3 K/uL    Eosinophils Absolute 0.2 0.0 - 0.6 K/uL    Basophils Absolute 0.0 0.0 - 0.2 K/uL    Bands Relative 7 0 - 7 %    Metamyelocytes Relative 2 (A) %    Myelocyte Percent 1 (A) %    Toxic Granulation Present (A)     Anisocytosis Occasional (A)     Polychromasia Occasional (A)        ASSESSMENT AND PLAN     Hospital Problems             Last Modified POA    * (Principal) Acute encephalopathy 12/22/2022 Yes    Encephalopathy, metabolic 50/93/4263 Yes    Suspected stroke patient last known to be well 3-4.5 hours ago 12/22/2022 Yes    New onset seizure ([facility] [address] 12/23/2022 Yes    HTN (hypertension), benign 12/23/2022 Yes    Hyperlipidemia 12/22/2022 Yes    Acquired hypothyroidism 12/22/2022 Yes    Chronic kidney 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 ([address] 12/22/2022 Yes    Primary osteoarthritis of both knees 12/22/2022 Yes    Memory loss 12/22/2022 Yes    Diabetic polyneuropathy associated with type 2 diabetes mellitus ([address] 12/22/2022 Yes    Coronary artery disease due to lipid rich plaque 12/22/2022 Yes    ESRD (end stage renal disease) ([facility] [address] 12/22/2022 Yes    Class 1 obesity due to excess calories with body mass index (BMI) of 30.0 to 30.9 in adult 12/22/2022 Yes    History of anemia due to chronic kidney disease 12/22/2022 Yes    Parkinson's disease 12/22/2022 Yes    RLS (restless legs syndrome) 12/22/2022 Yes      If Nephrology agrees can be dismissed after HD in AM , Neurology D/C d Dilantin which I totally support , not worth the risk involved , she is complex to begin with

## 2022-12-27 NOTE — DISCHARGE INSTR - COC
Continuity of Care Form    Patient Name: Pako Crump   :  1945  MRN:  [id number]    Admit date:  2022  Discharge date:  2022    Code Status Order: Full Code   Advance Directives:     Admitting Physician:  Shanice Eldridge MD  PCP: Keven Bundy MD    Discharging Nurse: [facility] Unit/Room#: [id number]/4732-98  Discharging Unit Phone Number: ***    Emergency Contact:   Extended Emergency Contact Information  Primary Emergency Contact: [address] [place] [address] Tony Phone: [phone]  Relation: Child  Secondary Emergency Contact: [address] [place] [address] Phone: [phone]  Relation: Child    Past Surgical History:  Past Surgical History:   Procedure Laterality Date    APPENDECTOMY      CHOLECYSTECTOMY      COLONOSCOPY  2020    COLONOSCOPY POLYPECTOMY SNARE/COLD BIOPSY performed by Jeffry Malhotra MD at [facility] N/A 2021    PERITONEAL DIALYSIS CATHETER REMOVAL.  EXCISSION OF ABDOMINAL CITRIX. performed by Zach Spivey DO at [address] [facility] ([facility])      FULL    LAPAROSCOPY INSERTION PERITONEAL CATHETER N/A 2020    LAPAROSCOPIC LYSIS OF ADHESIONS, LAPAROSCOPIC PERITONEAL DIALYSIS CATHETER PLACEMENT, LAPAROSCOPIC OMENTOPEXY performed by Zach Spivey DO at [address] [facility] [address] BILATERAL      preventive    ROTATOR CUFF REPAIR Left     SHUNT REVISION Left 2021    LEFT FOREARM ARTERIO VENOUS GRAFT performed by Jerry John MD at [address] [facility] 2020    EGD BIOPSY performed by Jeffry Malhotra MD at [address] [facility] ENDOSCOPY       Immunization History:   Immunization History   Administered Date(s) Administered    COVID-19, J&J, (age 18y+), IM, 0.5 mL 10/26/2021, 2022    Influenza, High Dose (Fluzone 65 yrs and older) 2017, 2017    Influenza, Triv, inactivated, subunit, adjuvanted, IM (Fluad 65 yrs and older) 10/28/2019 Pneumococcal Conjugate 13-valent ([id number]) 2017    Pneumococcal Polysaccharide ([id number]) 05/10/2019       Active Problems:  Patient Active Problem List   Diagnosis Code    Hyperlipidemia E78.5    Acquired hypothyroidism E03.9    Chronic kidney 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 ([facility] E11.22, N18.6, Z99.2    Primary osteoarthritis of both knees M17.0    Memory loss R41.3    Diabetic polyneuropathy associated with type 2 diabetes mellitus ([facility] E11.42    Coronary artery disease due to lipid rich plaque I25.10, I25.83    ESRD (end stage renal disease) ([facility] N18.6    Class 1 obesity due to excess calories with body mass index (BMI) of 30.0 to 30.9 in adult E66.09, Z68.30    History of anemia due to chronic kidney disease N18.9, Z86.2    Parkinson's disease G20    RLS (restless legs syndrome) G25.81    Encephalopathy, metabolic Z59.53    Acute encephalopathy G93.40    Suspected stroke patient last known to be well 3-4.5 hours ago R09.89    New onset seizure ([facility] R56.9    HTN (hypertension), benign I10       Isolation/Infection:   Isolation            No Isolation          Patient Infection Status       Infection Onset Added Last Indicated Last Indicated By Review Planned Expiration Resolved Resolved By    None active    Resolved    COVID-19 22 COVID-19, Rapid   22     COVID-19 (Rule Out) 22 COVID-19, Rapid (Ordered)   22 Rule-Out Test Resulted    COVID-19 (Rule Out) 22 COVID-19 (Ordered)   22 Rule-Out Test Resulted    COVID-19 (Rule Out) 21 COVID-19 (Ordered)   21 Rule-Out Test Resulted    C-diff Rule Out 21 Clostridium difficile toxin/antigen (Ordered)   21 Rule-Out Test Resulted    COVID-19 (Rule Out) 21 COVID-19 (Ordered)   21 Rule-Out Test Resulted    COVID-19 (Rule Out) 08/12/21 08/12/21 08/12/21 COVID-19 (Ordered)   08/13/21 Rule-Out Test Resulted    C-diff Rule Out 03/22/21 03/22/21 03/22/21 Clostridium difficile toxin/antigen (Ordered)   05/17/21 Roslyn Mcdermott RN    This order is discontinued    COVID-19 (Rule Out) 11/24/20 11/24/20 11/24/20 COVID-19 (Ordered)   11/24/20 Rule-Out Test Resulted            Nurse Assessment:  Last Vital Signs: /62   Pulse 94   Temp 98.2 °F (36.8 °C) (Oral)   Resp 16   Ht 5' 5\" (1.651 m)   Wt 162 lb 4.1 oz (73.6 kg)   LMP  (LMP Unknown)   SpO2 98%   BMI 27.00 kg/m²     Last documented pain score (0-10 scale): Pain Level: 7  Last Weight:   Wt Readings from Last 1 Encounters:   12/26/22 162 lb 4.1 oz (73.6 kg)     Mental Status:  {IP PT MENTAL STATUS:20030}    IV Access:  { CHAITANYA IV ACCESS:566792418}    Nursing Mobility/ADLs:  Walking   {CHP DME YDOM:866265787}  Transfer  {CHP DME YBUX:056925502}  Bathing  {CHP DME SGUI:384985630}  Dressing  {CHP DME IYMT:037219618}  Toileting  {CHP DME RIEA:776538172}  Feeding  {P DME GESM:685156638}  Med Admin  {CHP DME ADHH:365498491}  Med Delivery   { CHAITANYA MED Delivery:669715821}    Wound Care Documentation and Therapy:        Elimination:  Continence: Bowel: {YES / SZ:85220}  Bladder: {YES / HC:72158}  Urinary Catheter: {Urinary Catheter:902337334}   Colostomy/Ileostomy/Ileal Conduit: {YES / MD:07595}       Date of Last BM: ***    Intake/Output Summary (Last 24 hours) at 12/27/2022 0834  Last data filed at 12/26/2022 2200  Gross per 24 hour   Intake 200 ml   Output --   Net 200 ml     I/O last 3 completed shifts:   In: 320 [P.O.:320]  Out: -     Safety Concerns:     [address] KROGNI Safety Concerns:101991464}    Impairments/Disabilities:      [address] KROGNI Impairments/Disabilities:190410235}    Nutrition Therapy:  Current Nutrition Therapy:   [address] KROGNI Diet List:094438451}    Routes of Feeding: {CHP DME Other Feedings:404286519}  Liquids: {Slp liquid thickness:59014}  Daily Fluid Restriction: {CHP DME [facility]t [facility]:503527568}  Last Modified Barium Swallow with Video (Video Swallowing Test): {Done Not Done QKUF:581706010}    Treatments at the Time of Hospital Discharge:   Respiratory Treatments: ***  Oxygen Therapy:  {Therapy; copd oxygen:08610}  Ventilator:    {[facility] Vent TYGW:880635646}    Rehab Therapies: {THERAPEUTIC INTERVENTION:9358713099}  Weight Bearing Status/Restrictions: {[facility] Weight Bearin}  Other Medical Equipment (for information only, NOT a DME order):  {EQUIPMENT:514637687}  Other Treatments: ***    Patient's personal belongings (please select all that are sent with patient):  {CHP DME Belongings:554362822}    RN SIGNATURE:  {Esignature:409803161}    CASE MANAGEMENT/SOCIAL WORK SECTION    Inpatient Status Date: ***    Readmission Risk Assessment Score:  Readmission Risk              Risk of Unplanned Readmission:  28           Discharging to Facility/ Agency   Name:   Address:  Phone:  Fax:    Dialysis Facility (if applicable)   Name:  Address:  Dialysis Schedule:  Phone:  Fax:    / signature: {Esignature:214825321}    PHYSICIAN SECTION    Prognosis: Fair    Condition at Discharge: Stable    Rehab Potential (if transferring to Rehab): Good    Recommended Labs or Other Treatments After Discharge: out patient HD , PT OT     Physician Certification: I certify the above information and transfer of Pamela Deng  is necessary for the continuing treatment of the diagnosis listed and that she requires Home Care for less 30 days.      Update Admission H&P: No change in H&P    PHYSICIAN SIGNATURE:  Electronically signed by Destin Willams MD on 22 at 8:35 AM EST

## 2022-12-27 NOTE — PROGRESS NOTES
8973- Patient is in the bed with call light in reach, medications given per [facility], shift assessment completed. Patient walked to the bathroom with walker and stand by assist X1. Tolerated ambulation well. The care plan and education has been reviewed and mutually agreed upon with the patient. [address] Dr. Rodriguez Spear about pt WBC of 15.3, if its okay to be discharged. MD stated he will look over chart and call back. 12- MD ordered Levaquin for spiked WBC, prescription sent to pharmacy, MD said pt to follow up with PCP when discharged.

## 2022-12-27 NOTE — PROGRESS NOTES
Discharge instructions and medications reviewed with patient. Patient voices understanding and denies further questions/ concerns at this time. Patient discharged by wheelchair to car.

## 2022-12-27 NOTE — PLAN OF CARE
Problem: Discharge Planning  Goal: Discharge to home or other facility with appropriate resources  Outcome: Progressing  Flowsheets (Taken 12/26/2022 2200)  Discharge to home or other facility with appropriate resources: Identify barriers to discharge with patient and caregiver     Problem: Pain  Goal: Verbalizes/displays adequate comfort level or baseline comfort level  Outcome: Progressing  Flowsheets (Taken 12/26/2022 2200)  Verbalizes/displays adequate comfort level or baseline comfort level: Encourage patient to monitor pain and request assistance     Problem: Skin/Tissue Integrity  Goal: Absence of new skin breakdown  Description: 1. Monitor for areas of redness and/or skin breakdown  2. Assess vascular access sites hourly  3. Every 4-6 hours minimum:  Change oxygen saturation probe site  4. Every 4-6 hours:  If on nasal continuous positive airway pressure, respiratory therapy assess nares and determine need for appliance change or resting period.   Outcome: Progressing     Problem: Chronic Conditions and Co-morbidities  Goal: Patient's chronic conditions and co-morbidity symptoms are monitored and maintained or improved  Outcome: Progressing  Flowsheets (Taken 12/26/2022 2200)  Care Plan - Patient's Chronic Conditions and Co-Morbidity Symptoms are Monitored and Maintained or Improved: Monitor and assess patient's chronic conditions and comorbid symptoms for stability, deterioration, or improvement     Problem: Safety - Adult  Goal: Free from fall injury  Outcome: Progressing  Flowsheets (Taken 12/27/2022 0122)  Free From Fall Injury: Instruct family/caregiver on patient safety     Problem: ABCDS Injury Assessment  Goal: Absence of physical injury  Outcome: Progressing  Flowsheets (Taken 12/27/2022 0122)  Absence of Physical Injury: Implement safety measures based on patient assessment

## 2022-12-28 ENCOUNTER — CARE COORDINATION (OUTPATIENT)
Dept: CASE MANAGEMENT | Age: 77
End: 2022-12-28

## 2022-12-28 DIAGNOSIS — G93.41 ENCEPHALOPATHY, METABOLIC: Primary | ICD-10-CM

## 2022-12-28 PROCEDURE — 1111F DSCHRG MED/CURRENT MED MERGE: CPT | Performed by: FAMILY MEDICINE

## 2022-12-28 NOTE — CARE COORDINATION
[facility] Care Transitions Initial Follow Up Call    Call within 2 business days of discharge: Yes    Care Transition Nurse contacted the patient by telephone to perform post hospital discharge assessment. Verified name and  with patient as identifiers. Provided introduction to self, and explanation of the Care Transition Nurse role. Patient: Toya Veloz Patient : 1945   MRN: [id number]  Reason for Admission:   Discharge Date: 22 RARS: Readmission Risk Score: 21.8      Last Discharge  Street       Date Complaint Diagnosis Description Type Department Provider    22 Altered Mental Status Acute encephalopathy . .. ED to Hosp-Admission (Discharged) (ADMITTED) Reuben Mathur MD; Sadie Ornelas. .. Was this an external facility discharge? No Discharge Facility:     Challenges to be reviewed by the provider   Additional needs identified to be addressed with provider: No  none               Method of communication with provider: none. Pt states doing pretty well, just really tired. Agreed to more CTC f/u calls. Care Transition Nurse reviewed discharge instructions with patient who verbalized understanding. The patient was given an opportunity to ask questions and does not have any further questions or concerns at this time. Were discharge instructions available to patient? Yes. Reviewed appropriate site of care based on symptoms and resources available to patient including: When to call 911. The patient agrees to contact the PCP office for questions related to their healthcare. Advance Care Planning:   Does patient have an Advance Directive: reviewed and current. Medication reconciliation was performed with patient, who verbalizes understanding of administration of home medications.  Medications reviewed, 1111F entered: yes    Was patient discharged with a pulse oximeter? no    Non-face-to-face services provided:  Obtained and reviewed discharge summary and/or continuity of care documents    Offered patient enrollment in the Remote Patient Monitoring (RPM) program for in-home monitoring:  next call . Care Transitions 24 Hour Call    Do you have a copy of your discharge instructions?: Yes  Do you have all of your prescriptions and are they filled?: Yes  Have you been contacted by a [address]?: No  Have you scheduled your follow up appointment?: No  Do you have support at home?: Alone  Do you feel like you have everything you need to keep you well at home?: Yes  Are you an active caregiver in your home?: No  Care Transitions Interventions  No Identified Needs         Follow Up  Future Appointments   Date Time Provider Rod Plummer   1/20/2023 12:30 PM [facility]  [place] [facility]   4/26/2023  2:15 PM Elise Hardy MD [facility] access hospital       Care Transition Nurse provided contact information. Plan for follow-up call in 5-7 days based on severity of symptoms and risk factors.   Plan for next call: self management-  Encephalopathy, metabolic, f/u appts    Shanda Salvador, RN

## 2022-12-30 NOTE — PROGRESS NOTES
Physician Progress Note      PATIENT:               Aiden Stone  CSN #:                  [id number]  :                       1945  ADMIT DATE:       2022 7:27 AM  DISCH DATE:        2022 4:45 PM  RESPONDING  PROVIDER #:        Jenn Meyer MD          QUERY TEXT:    Pt admitted with Acute encephalopathy, possibly metabolic. Noted   documentation of HTN in H&P and per Neuro consult. If possible, please   document in progress notes and/or discharge summary whether: The medical record reflects the following:  Risk Factors: HX ESRD and HTN  Clinical Indicators: Per Neuro consult: \"Encephalopathy severe - Likely   multifactorial metabolic encephalopathy and hypertensive urgency. \" BUN 57,   creatinine 9.0, GFR 4, b/p as high as 174/89  Treatment: Neuro consult, IV Apresoline, PO Aldactone  Options provided:  -- Acute Encephalopathy likely due to metabolic issues and HTN  -- Other - I will add my own diagnosis  -- Disagree - Not applicable / Not valid  -- Disagree - Clinically unable to determine / Unknown  -- Refer to Clinical Documentation Reviewer    PROVIDER RESPONSE TEXT:    Hypertensive encephalopathy combined with metabolic encephalopathy    Query created by: Hemalatha Romero on 2022 10:03 AM      Electronically signed by:   Jenn Meyer MD 2022 4:23 PM

## 2023-01-01 NOTE — ASSESSMENT & PLAN NOTE
The history is provided by the patient. Fall  The accident occurred Less than 1 hour ago. Fall occurred: from a toilet. She fell from a height of ground level. She landed on Hard floor. The volume of blood lost was minimal. The point of impact was the head and neck. The pain is present in the head and neck. The pain is moderate. She was Not ambulatory at the scene. Associated symptoms include laceration (bleeding from the lips and mouth). Pertinent negatives include no nausea and no vomiting. Treatment on scene includes A c-collar. She has tried nothing for the symptoms.       Past Medical History:   Diagnosis Date    Anxiety and depression     Diverticular disease of colon     High cholesterol     HTN (hypertension)     Hypothyroid     IBS (irritable bowel syndrome)        Past Surgical History:   Procedure Laterality Date    ENDOSCOPY, COLON, DIAGNOSTIC  3/10    Tics        o/w  neg    HX CATARACT REMOVAL      HX CHOLECYSTECTOMY      HX GYN  1985    hysterectomy    HX KNEE REPLACEMENT  8/2012    Right    [place] ERCP W/SPHINCTEROTOMY/PAPILLOTOMY  6/01         Family History:   Problem Relation Age of Onset    Heart Disease Mother     Heart Disease Father     Heart Disease Brother     Heart Disease Brother        Social History     Socioeconomic History    Marital status:      Spouse name: Not on file    Number of children: Not on file    Years of education: Not on file    Highest education level: Not on file   Occupational History    Not on file   Tobacco Use    Smoking status: Never    Smokeless tobacco: Never   Substance and Sexual Activity    Alcohol use: No    Drug use: No    Sexual activity: Not on file   Other Topics Concern    Not on file   Social History Narrative    Not on file     Social Determinants of Health     Financial Resource Strain: Not on file   Food Insecurity: Not on file   Transportation Needs: Not on file   Physical Activity: Not on file   Stress: Not on file   Social Connections: Not on Well-controlled, continue current treatment plan file   Intimate Partner Violence: Not on file   Housing Stability: Not on file         ALLERGIES: Seroquel [quetiapine]    Review of Systems   HENT:  Positive for facial swelling. Respiratory:  Positive for shortness of breath and wheezing. Gastrointestinal:  Negative for nausea and vomiting. Musculoskeletal:  Positive for myalgias and neck pain. Psychiatric/Behavioral:  The patient is nervous/anxious. All other systems reviewed and are negative. Vitals:    12/31/22 2227 12/31/22 2238 01/01/23 0151 01/01/23 0642   BP: (!) 133/54  (!) 100/49 (!) 100/52   Pulse: (!) 57  (!) 56 (!) 55   Resp: 24  16 16   Temp:       SpO2: 94%  98% 92%   Weight:  63.5 kg (140 lb)     Height:  5' 5\" (1.651 m)              Physical Exam  Vitals and nursing note reviewed. Constitutional:       Appearance: She is well-developed. She is not ill-appearing. HENT:      Head: Normocephalic. Contusion present. Jaw: There is normal jaw occlusion. Pain on movement present. Eyes:      General: No scleral icterus. Cardiovascular:      Rate and Rhythm: Normal rate. Pulmonary:      Effort: Pulmonary effort is normal.   Abdominal:      General: There is no distension. Musculoskeletal:      Cervical back: Normal range of motion. Skin:     General: Skin is warm and dry. Findings: Laceration (bleeding from the lips and mouth) present. No erythema or rash. Neurological:      General: No focal deficit present. Mental Status: She is alert and oriented to person, place, and time. Motor: Tremor present. Psychiatric:         Mood and Affect: Mood is anxious. Behavior: Behavior normal.         Cognition and Memory: Cognition and memory normal.        MDM         Procedures    The patient presented after a fall. The patient is now resting comfortably and feels better, is alert and in no distress.  The history, exam, diagnostic testing (if any), and current condition do not demonstrate signs of clinically significant intra-cranial, intra-thoracic, intra-abdominal, or musculoskeletal trauma. The vital signs have been stable. However, the patient is unable to ambulate independently where she previously was able to get up and walk around and subsequent blood work shows an acute kidney injury and dehydration. There is no evidence of electrolyte abnormality, sepsis, SIRS. Patient is being admitted to the hospital.  The results of their tests and reasons for their admission have been discussed with them and/or available family. They convey agreement and understanding for the need to be admitted and for their admission diagnosis. Consultation will be made now with the inpatient physician for hospitalization. Perfect Serve Consult for Admission  7:01 AM    ED Room Number: Chris Hawkins  Patient Name and age: Hari Mcintosh 79 y.o.  female  Working Diagnosis:   1. Closed head injury, initial encounter    2. Multiple contusions    3. Weakness    4.  DEBORAH (acute kidney injury) ([facility]        COVID-19 Suspicion:  no  Sepsis present:  no  Reassessment needed: no  Code Status:  Full Code  Readmission: yes  Isolation Requirements:  no  Recommended Level of Care:  telemetry  Department:[facility] ED - [phone]  Other:  fell off the toilet today and hit her head but was unable to ambulate due to weakness, blood was sent and showed DEBORAH & dehydration

## 2023-01-04 ENCOUNTER — CARE COORDINATION (OUTPATIENT)
Dept: CASE MANAGEMENT | Age: 78
End: 2023-01-04

## 2023-01-04 NOTE — CARE COORDINATION
[facility] Care Transitions Follow Up Call      Patient: Judy Handley  Patient : 1945   MRN: [id number]  Reason for Admission:   Discharge Date: 22 RARS: Readmission Risk Score: 21.8    Follow up call attempted, left contact info on .      Follow Up  Future Appointments   Date Time Provider Department Center   2023 12:30 PM Davian Vieyra MD [facility] [address]  [place] - DYD   2023  2:15 PM MD Dony Boothe [facility]       Del Landa RN

## 2023-01-06 ENCOUNTER — CARE COORDINATION (OUTPATIENT)
Dept: CASE MANAGEMENT | Age: 78
End: 2023-01-06

## 2023-01-06 NOTE — CARE COORDINATION
[facility] Transitions Follow Up Call    2023    Patient: Lucille Renner  Patient : 1945   MRN: [id number]  Reason for Admission: Acute encephalopathy Discharge Date: 22 RARS: Readmission Risk Score: 21.8         Spoke with: logan    [facility] 2nd attempted outreach. Left message and contact information for call back. No further outreach will be attempted. Eliana Barfield LPN  Care Coordinator     Care Transitions Subsequent and Final Call    Subsequent and Final Calls  Care Transitions Interventions  Other Interventions:              Follow Up  Future Appointments   Date Time Provider Rod Plummer   2023 12:30 PM MD ABBEY Ch[facility] [address] [facility] [place] - [facility]   2023  2:15 PM MD Maritn Craft [facility]       Eliana Barfield LPN

## 2023-01-12 NOTE — ANESTHESIA PRE PROCEDURE
8/23/19   Burton Mack MD   diclofenac sodium 1 % GEL Apply 4 g topically 4 times daily 8/16/19   Burton Mack MD   Lancets 67A MISC 1 applicator by Does not apply route 2 times daily 3/6/17   MARCIE Bob - CNP       Current medications:    Current Facility-Administered Medications   Medication Dose Route Frequency Provider Last Rate Last Dose    0.9 % sodium chloride infusion   Intravenous Continuous Love Daley MD         Facility-Administered Medications Ordered in Other Encounters   Medication Dose Route Frequency Provider Last Rate Last Dose    rocuronium (ZEMURON) injection    PRN Ocie Clayton, APRN - CRNA   30 mg at 04/03/20 1115    lidocaine 2 % injection    PRN Ocie Clayton, APRN - CRNA   100 mg at 04/03/20 1101    succinylcholine (ANECTINE) injection    PRN Ocie Clayton, APRN - CRNA   100 mg at 04/03/20 1106    propofol injection    PRN Ocie Clayton, APRN - CRNA   100 mg at 04/03/20 1106    ondansetron (ZOFRAN) injection    PRN Ocie Clayton, APRN - CRNA   4 mg at 04/03/20 1145    glycopyrrolate injection    PRN Ocie Clayton, APRN - CRNA   0.4 mg at 04/03/20 1149    phenylephrine (RUSTAM-SYNEPHRINE) injection    PRN Ocie Clayton, APRN - CRNA   100 mcg at 04/03/20 1149       Allergies:     Allergies   Allergen Reactions    Demeclocycline Anaphylaxis    Tetracyclines & Related Anaphylaxis    Amoxicillin     Codeine     Ozempic (0.25 Or 0.5 Mg-Dose) [Semaglutide(0.25 Or 0.5mg-Dos)]     Penicillins        Problem List:    Patient Active Problem List   Diagnosis Code    Essential hypertension I10    High cholesterol E78.00    Acquired hypothyroidism E03.9    Diabetes mellitus type 2 in obese ([facility] E11.69, E66.9    CKD stage 4 due to type 2 diabetes mellitus ([facility] E11.22, N18.4    Closed nondisplaced fracture of fifth metatarsal bone of right foot S92.354A    Arthritis of right knee M17.11    Acute pain of both knees M25.561, M25.562    Tetracycline Counseling: Patient counseled regarding possible photosensitivity and increased risk for sunburn.  Patient instructed to avoid sunlight, if possible.  When exposed to sunlight, patients should wear protective clothing, sunglasses, and sunscreen.  The patient was instructed to call the office immediately if the following severe adverse effects occur:  hearing changes, easy bruising/bleeding, severe headache, or vision changes.  The patient verbalized understanding of the proper use and possible adverse effects of tetracycline.  All of the patient's questions and concerns were addressed. Patient understands to avoid pregnancy while on therapy due to potential birth defects.

## 2023-01-16 NOTE — H&P
Consultation/History & Physical    Date of Admission:  8/18/2021  Date of Consultation:  8/18/2021    PCP:  Natalee Johnson MD     Chief Complaint: BLE pain    History of Present Illness:  Az Avery is a 67 y.o. female who presents with leg pain. Presents today for peripheral angiogram.     PMH:   has a past medical history of Arthritis, Diabetes ([address] Hemodialysis patient ([facility] [address] Hyperlipidemia, Hypertension, Kidney disease, Neuropathy, Pneumonia, and Thyroid disease. PSH:   has a past surgical history that includes Appendectomy; Mastectomy, bilateral; Rotator cuff repair (Left); Cholecystectomy; LAPAROSCOPY INSERTION PERITONEAL CATHETER (N/A, 4/3/2020); Upper gastrointestinal endoscopy (N/A, 11/24/2020); Colonoscopy (11/24/2020); Hysterectomy; and Dialysis Catheter Removal (N/A, 5/17/2021). Allergies: Allergies   Allergen Reactions    Amoxicillin Anaphylaxis    Demeclocycline Anaphylaxis    Penicillins Anaphylaxis    Tetracyclines & Related Anaphylaxis    Ozempic (0.25 Or 0.5 Mg-Dose) [Semaglutide(0.25 Or 0.5mg-Dos)] Other (See Comments)     Lost large amount weight and loss  Of appetite    Codeine Itching and Rash        Home Meds:    Prior to Admission medications    Medication Sig Start Date End Date Taking? Authorizing Provider   nortriptyline (PAMELOR) 10 MG capsule TAKE 1 CAPSULE BY MOUTH EVERY EVENING 8/17/21  Yes Natalee Johnson MD   rosuvastatin (CRESTOR) 20 MG tablet Take 1 tablet by mouth daily 7/8/21  Yes Osvaldo Shahid MD   clopidogrel (PLAVIX) 75 MG tablet Take 1 tablet by mouth daily 7/8/21  Yes Osvaldo Shahid MD   aspirin EC 81 MG EC tablet Take 1 tablet by mouth daily 7/8/21  Yes Osvaldo Shahid MD   gabapentin (NEURONTIN) 100 MG capsule Take 2 capsules by mouth nightly for 30 days.  7/7/21 8/18/21 Yes Natalee Johnson MD   metoprolol tartrate (LOPRESSOR) 25 MG tablet Take 12.5 mg by mouth 2 times daily 2/14/21 2/14/22 Yes Historical Provider, MD Physical Therapy    Visit Type: treatment  Born at Gestational Age: 33w1d and now corrected gestational age 35w 6d    Present at bedside: Nurse, Mother and Father  Precautions: per protocol and per guidelines    SUBJECTIVE  Pt was born at 33 1/7 weeks GA via  2* PPROM on . Mother with good prenatal care. Labor progressed on . BMZ given  and .   Patient / Family Goals: meet developmental milestones    OBJECTIVE    Autonomic Stability:    Heart Rate: stable    Blood Pressure: stable    Respiratory: stable    Oxygen Saturations: stable    Color: stable color    Temperature: stable    Visceral/GI: stable    Bed Type: open crib  Respiratory Support: room air  Respiratory Comments: tolerating well  Current Positioning Device: Zflo, small and Swaddle    State: dozing, eyes opening/closing and awake and moderate movement    Neurobehavioral:    Infant stress cues:      -physiological: yawning      -motor: finger splay, sitting on air/leg extension and stop sign/salute      -behavioral: grimace    Self regulation:       -achieved with assistance of: containment and boundaries    Postural Assessment:   Head posture:     - in supine: midline  Resting trunk posture: trunk midline and symmetrical  UE position:    - Left: shoulder elevated and arm at side    - Right: shoulder elevated and arm at side  LE position:    - Left: hip midrange flexion and knee flexed   - Right: hip midrange flexion and knee flexed    Muscle Tone: typical tone  Range of Motion (noted in % unless otherwise indicated, active reported unless noted):  Comments / Details: Pt displays WNL ROM and muscle tone for his GA.         Movement:  Overall analysis of movement during session: uncoordinated and symmetrical    Interventions    Neuromuscular Re-Education:   -facilitate symmetry, facilitate hand to mouth activity, facilitate anti-gravity movement, facilitate alignment and facilitate grasping     Positioning: positional supports and  alternate positioning    Education/Training: reinforcement of midline activity, reinforce postural symmetry, encourage quiet wakefulness, promote smooth, controlled movement and reinforce physiological flexion    Skilled input: Facilitation       ASSESSMENT    - Impairments: strength, physiologic stability, movement quality, head shape, tolerance to positioning, state regulation, postural control, motor control, head control, activity tolerance and tolerance to handling  - Functional Limitations: transitional movement  Pt alert looking around his environment. He displays cervical rotation R and L with increasing midline head in supine. He displays LE movements into flexion and extension  With some maintaining flexion in supine 1-2 sec before kicking into extension and hips moving into abduction. He displays intermittent hands to face with A for shoulder support. He displays some shoulder elevation and retraction postures.      Discharge Recommendations        PT Referrals/Discharge Recommendations: Other (comments) (TBA)     Recommend ongoing PT while inpatient to promote age appropriate neuromotor and sensorimotor development and to provide ongoing parent education.  Skilled therapy is required to address these limitations in attempt to maximize the patient's independence.  - Predicted patient presentation: Moderate (evolving) - Patient comorbidities and complexities, as defined above, may have varying impact on steady progress for prescribed plan of care.    End of Session:  Location: open crib  Safety measures: alarm system in place/re-engaged  Handoff to: nurse  PLAN    Suggestions for next session as indicated:   Frequency of Treatment: 4x/week, seen 1/16/23  Interventions: state regulation, facilitation of symmetrical movement patterns, strengthening, ROM, positioning, motor training, handling, facilitation of transitions and facilitation of head control      GOALS    Long Term Goals: (to be met by time of  levothyroxine (SYNTHROID) 150 MCG tablet Take 150 mcg by mouth every morning (before breakfast)   Yes Historical Provider, MD   torsemide (DEMADEX) 20 MG tablet Take 20 mg by mouth 3 tablets in am, 3 tablets in pm   Yes Historical Provider, MD   allopurinol (ZYLOPRIM) 100 MG tablet TAKE 1 TABLET EVERY DAY 20  Yes Douglas Nicolas MD   spironolactone (ALDACTONE) 25 MG tablet TAKE 1 TABLET EVERY DAY  Patient taking differently: 25 mg daily  20  Yes Douglas Nicolas MD   UNABLE TO FIND Dialysis     Historical Provider, MD   loperamide (IMODIUM) 2 MG capsule Take 2 mg by mouth 4 times daily as needed 21   Historical Provider, MD        Hospital Meds:    No current facility-administered medications for this encounter. Social History:       Social History     Socioeconomic History    Marital status:       Spouse name: Not on file    Number of children: 3    Years of education: Not on file    Highest education level: Not on file   Occupational History    Occupation: retired clerical   Tobacco Use    Smoking status: Former Smoker     Packs/day: 0.50     Years: 10.00     Pack years: 5.00     Types: Cigarettes     Quit date: 1978     Years since quittin.1    Smokeless tobacco: Never Used   Vaping Use    Vaping Use: Never used   Substance and Sexual Activity    Alcohol use: Yes     Comment: every other week one glass of wine    Drug use: Never    Sexual activity: Not Currently   Other Topics Concern    Not on file   Social History Narrative    Lives with son--recently moved from [facility] and now here    [facility]     Social Determinants of Health     Financial Resource Strain: Low Risk     Difficulty of Paying Living Expenses: Not hard at all   Food Insecurity: No Food Insecurity    Worried About [address] in the Last Year: Never true    [place] of Food in the Last Year: Never true   Transportation Needs: No Transportation Needs    Lack of Transportation (Medical): No    Lack of Transportation (Non-Medical): No   Physical Activity:     Days of Exercise per Week:     Minutes of Exercise per Session:    Stress:     Feeling of Stress :    Social Connections:     Frequency of Communication with Friends and Family:     Frequency of Social Gatherings with Friends and Family:     Attends Orthodoxy Services:     Active Member of Clubs or Organizations:     Attends Club or Organization Meetings:     Marital Status:    Intimate Partner Violence:     Fear of Current or Ex-Partner:     Emotionally Abused:     Physically Abused:     Sexually Abused:        Family Histroy:      Family History   Problem Relation Age of Onset    Cancer Mother         lung, breast, liver    Stroke Father     Stomach Cancer Brother     Cancer Maternal Grandmother     No Known Problems Paternal Grandmother     No Known Problems Paternal Grandfather        Review of Systems:  Review of systems performed and negative with the exception of the above findings        Physical Exam   Vital Signs: BP (!) 168/105   Pulse 91   Temp 98.3 °F (36.8 °C) (Infrared)   Resp 18   Ht 5' 2\" (1.575 m)   Wt 179 lb (81.2 kg)   LMP  (LMP Unknown)   SpO2 99%   BMI 32.74 kg/m²        Admission Weight: 179 lb (81.2 kg)   ASA 3 - Patient with moderate systemic disease with functional limitations    Mallampati Airway Assessment:  Mallampati Class II - (soft palate, fauces & uvula are visible)    General appearance: alert, appears stated age, cooperative and no distress  Head: Normocephalic, without obvious abnormality, atraumatic  Lungs: clear to auscultation bilaterally  Heart: regular rate and rhythm, S1, S2 normal, no murmur, click, rub or gallop  Abdomen: soft, non-tender.  Bowel sounds normal. No masses,  no organomegaly  Extremities: extremities normal, atraumatic, no cyanosis or edema  Pulses:      Labs:    BMP:   Lab Results   Component Value Date     08/12/2021    K 4.7 discharge from hospital)  Clear Airway: Patient will turn head in prone to clear airway Status: progressing/ongoing  Head midline: Patient able to hold head in midline in supine and supported sitting for 2-3 sec Status: progressing/ongoing  Head shape: Patient will demonstrate symmetrical rounded head shape development Status: progressing/ongoing  Physiological flexion: Patient will assume and maintain physiological flexion for 3 seconds Status: progressing/ongoing  Tolerate: Infant will tolerate position changes, handling and supported sitting for 2 min with stable vitals Status: progressing/ongoing        Documented in the chart in the following areas: Assessment.      Therapy procedure time and total treatment time can be found documented on the Time Entry flowsheet   08/18/2021    K 4.9 03/22/2021    CL 98 08/12/2021    CO2 24 08/12/2021    PHOS 8.8 03/23/2021    BUN 41 08/12/2021    CREATININE 7.5 08/12/2021      No components found for: GLUNo results found for: MG   CBC:   Lab Results   Component Value Date    WBC 7.8 08/12/2021    HGB 12.7 08/12/2021    HCT 39.1 08/12/2021    MCV 95.4 08/12/2021     08/12/2021      Coagulation:   Lab Results   Component Value Date    INR 0.91 08/12/2021    APTT 34.9 08/12/2021     Cardiac markers:   Lab Results   Component Value Date    TROPONINI 0.02 03/22/2021     Lab Results   Component Value Date    LABA1C 5.2 07/28/2021    No results found for: ALB    Lab Results   Component Value Date    BILITOT 0.4 08/12/2021    BILIDIR <0.2 08/12/2021    AST 25 08/12/2021    ALT 30 08/12/2021    ALKPHOS 97 08/12/2021      Lab Results   Component Value Date    CHOL 96 08/12/2021    HDL 32 08/12/2021    LDLCALC 38 08/12/2021    TRIG 132 08/12/2021          Diagnosis:  Patient Active Problem List   Diagnosis    Essential hypertension    High cholesterol    Acquired hypothyroidism    Diabetes mellitus type 2 in obese (HCC)    Chronic kidney 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 (HCC)    Closed nondisplaced fracture of fifth metatarsal bone of right foot    Arthritis of right knee    Acute pain of both knees    Primary osteoarthritis of both knees    Bursitis    Memory loss    Bilateral leg edema    Primary osteoarthritis of left knee    Atypical chest pain    Tubular adenoma of colon    Primary insomnia    Diarrhea of presumed infectious origin    Diabetic polyneuropathy associated with type 2 diabetes mellitus ([address]    COVID-19 virus infection    Coronary artery disease of native artery with stable angina pectoris ([address]    Pain of left calf           Plan: Aortogram with runoff        Paul Monroe M.D., FACS.   8/18/2021  10:37 AM

## 2023-01-20 ENCOUNTER — OFFICE VISIT (OUTPATIENT)
Dept: FAMILY MEDICINE CLINIC | Age: 78
End: 2023-01-20

## 2023-01-20 VITALS
HEIGHT: 65 IN | WEIGHT: 181.2 LBS | BODY MASS INDEX: 30.19 KG/M2 | DIASTOLIC BLOOD PRESSURE: 70 MMHG | OXYGEN SATURATION: 97 % | HEART RATE: 78 BPM | SYSTOLIC BLOOD PRESSURE: 130 MMHG

## 2023-01-20 DIAGNOSIS — T14.8XXD DELAYED WOUND HEALING: ICD-10-CM

## 2023-01-20 DIAGNOSIS — Z99.2 CHRONIC KIDNEY DISEASE WITH END STAGE RENAL DISEASE ON DIALYSIS DUE TO TYPE 2 DIABETES MELLITUS (HCC): ICD-10-CM

## 2023-01-20 DIAGNOSIS — N18.9 HISTORY OF ANEMIA DUE TO CHRONIC KIDNEY DISEASE: ICD-10-CM

## 2023-01-20 DIAGNOSIS — N18.6 ESRD (END STAGE RENAL DISEASE) (HCC): ICD-10-CM

## 2023-01-20 DIAGNOSIS — G93.41 ENCEPHALOPATHY, METABOLIC: ICD-10-CM

## 2023-01-20 DIAGNOSIS — E11.42 DIABETIC POLYNEUROPATHY ASSOCIATED WITH TYPE 2 DIABETES MELLITUS (HCC): ICD-10-CM

## 2023-01-20 DIAGNOSIS — E03.9 ACQUIRED HYPOTHYROIDISM: ICD-10-CM

## 2023-01-20 DIAGNOSIS — E11.22 CHRONIC KIDNEY DISEASE WITH END STAGE RENAL DISEASE ON DIALYSIS DUE TO TYPE 2 DIABETES MELLITUS (HCC): ICD-10-CM

## 2023-01-20 DIAGNOSIS — K29.60 BILE REFLUX GASTRITIS: ICD-10-CM

## 2023-01-20 DIAGNOSIS — N18.6 CHRONIC KIDNEY DISEASE WITH END STAGE RENAL DISEASE ON DIALYSIS DUE TO TYPE 2 DIABETES MELLITUS (HCC): ICD-10-CM

## 2023-01-20 DIAGNOSIS — Z86.2 HISTORY OF ANEMIA DUE TO CHRONIC KIDNEY DISEASE: ICD-10-CM

## 2023-01-20 PROBLEM — R09.89 SUSPECTED STROKE PATIENT LAST KNOWN TO BE WELL 3-4.5 HOURS AGO: Status: RESOLVED | Noted: 2022-12-22 | Resolved: 2023-01-20

## 2023-01-20 PROBLEM — R56.9 NEW ONSET SEIZURE (HCC): Status: RESOLVED | Noted: 2022-12-23 | Resolved: 2023-01-20

## 2023-01-20 RX ORDER — LEVOTHYROXINE SODIUM 0.15 MG/1
150 TABLET ORAL
Qty: 90 TABLET | Refills: 3 | Status: SHIPPED | OUTPATIENT
Start: 2023-01-20

## 2023-01-20 RX ORDER — CHOLESTYRAMINE 4 G/9G
1 POWDER, FOR SUSPENSION ORAL DAILY
Qty: 90 PACKET | Refills: 3 | Status: SHIPPED | OUTPATIENT
Start: 2023-01-20

## 2023-01-20 ASSESSMENT — PATIENT HEALTH QUESTIONNAIRE - PHQ9
SUM OF ALL RESPONSES TO PHQ9 QUESTIONS 1 & 2: 0
SUM OF ALL RESPONSES TO PHQ QUESTIONS 1-9: 0
1. LITTLE INTEREST OR PLEASURE IN DOING THINGS: 0
SUM OF ALL RESPONSES TO PHQ QUESTIONS 1-9: 0
SUM OF ALL RESPONSES TO PHQ QUESTIONS 1-9: 0
2. FEELING DOWN, DEPRESSED OR HOPELESS: 0
SUM OF ALL RESPONSES TO PHQ QUESTIONS 1-9: 0

## 2023-01-20 ASSESSMENT — ENCOUNTER SYMPTOMS
NAUSEA: 0
ABDOMINAL PAIN: 0
DIARRHEA: 1
VOMITING: 0
SHORTNESS OF BREATH: 0
BACK PAIN: 0
VISUAL CHANGE: 0
BOWEL INCONTINENCE: 0

## 2023-01-20 NOTE — PROGRESS NOTES
Subjective:     Patient ID:Judy Handley is a 77 y.o. female.    Neurologic Problem  The patient's primary symptoms include an altered mental status and memory loss. The patient's pertinent negatives include no clumsiness, focal sensory loss, focal weakness, loss of balance, near-syncope, slurred speech, syncope, visual change or weakness. This is a new problem. The current episode started 1 to 4 weeks ago. The neurological problem developed suddenly. The problem has been resolved since onset. There was no focality noted. Pertinent negatives include no abdominal pain, auditory change, aura, back pain, bladder incontinence, bowel incontinence, chest pain, confusion, diaphoresis, dizziness, fatigue, fever, headaches, light-headedness, nausea, neck pain, palpitations, shortness of breath, vertigo or vomiting. Treatments tried: with held meds--^ time of dialysis-neg EEG. The treatment provided significant relief. There is no history of a bleeding disorder, a clotting disorder, a CVA, dementia, head trauma, liver disease, mood changes or seizures.   Was at --neg CTA and brain MRI  Allergies   Allergen Reactions    Amoxicillin Anaphylaxis    Demeclocycline Anaphylaxis    Penicillins Anaphylaxis    Tetracyclines & Related Anaphylaxis    Ozempic (0.25 Or 0.5 Mg-Dose) [Semaglutide(0.25 Or 0.5mg-Dos)] Other (See Comments)     Lost large amount weight and loss  Of appetite    Codeine Itching and Rash       Current Outpatient Medications   Medication Sig Dispense Refill    mupirocin (BACTROBAN) 2 % ointment Apply topically 3 times daily Apply topically 3 times daily.      cholestyramine (QUESTRAN) 4 g packet Take 1 packet by mouth daily 90 packet 3    levothyroxine (SYNTHROID) 150 MCG tablet Take 1 tablet by mouth every morning (before breakfast) 90 tablet 3    ondansetron (ZOFRAN-ODT) 4 MG disintegrating tablet Take 1 tablet by mouth every 8 hours as needed for Nausea or Vomiting 20 tablet 0    lidocaine (LIDODERM) 5 %  Place 1 patch onto the skin daily 12 hours on, 12 hours off. 30 patch 0    vitamin D3 (CHOLECALCIFEROL) 25 MCG (1000 UT) TABS tablet Take 1 tablet by mouth daily 90 tablet 1    ASPIRIN LOW DOSE 81 MG EC tablet TAKE 1 TABLET BY MOUTH EVERY DAY 90 tablet 1    rosuvastatin (CRESTOR) 20 MG tablet TAKE 1 TABLET BY MOUTH EVERY DAY 90 tablet 1    metoprolol succinate (TOPROL XL) 25 MG extended release tablet TAKE 1 TABLET EVERY DAY 90 tablet 3     No current facility-administered medications for this visit. Past Medical History:   Diagnosis Date    Arthritis     CAD (coronary artery disease)     Diabetes ([facility] [address]     Hemodialysis patient ([facility] [address]     Hyperlipidemia     Hypertension     Kidney disease     Neuropathy     Pneumonia     IN February, 2021, with COVID    Thyroid disease        Past Surgical History:   Procedure Laterality Date    APPENDECTOMY      CHOLECYSTECTOMY      COLONOSCOPY  11/24/2020    COLONOSCOPY POLYPECTOMY SNARE/COLD BIOPSY performed by Tyson Juarez MD at [address] [facility] N/A 05/17/2021    PERITONEAL DIALYSIS CATHETER REMOVAL. EXCISSION OF ABDOMINAL CITRIX. performed by Jose Ruiz DO at [address] [facility] ([facility])      FULL    LAPAROSCOPY INSERTION PERITONEAL CATHETER N/A 04/03/2020    LAPAROSCOPIC LYSIS OF ADHESIONS, LAPAROSCOPIC PERITONEAL DIALYSIS CATHETER PLACEMENT, LAPAROSCOPIC OMENTOPEXY performed by Jose Ruiz DO at [address] [facility] [address] BILATERAL      preventive    ROTATOR CUFF REPAIR Left     SHUNT REVISION Left 08/19/2021    LEFT FOREARM ARTERIO VENOUS GRAFT performed by Aldo Finley MD at [address] [facility] N/A 11/24/2020    EGD BIOPSY performed by Tyson Juarez MD at [address] [facility] History     Socioeconomic History    Marital status:       Spouse name: Not on file    Number of children: 3    Years of education: Not on file    Highest education level: Not on file   Occupational History    Occupation: retired clerical   Tobacco Use    Smoking status: Former     Packs/day: 0.50     Years: 10.00     Pack years: 5.00     Types: Cigarettes     Quit date: 1978     Years since quittin.5    Smokeless tobacco: Never   Vaping Use    Vaping Use: Never used   Substance and Sexual Activity    Alcohol use: Not Currently     Comment: every other week one glass of wine    Drug use: Never    Sexual activity: Not Currently   Other Topics Concern    Not on file   Social History Narrative    Lives with son--recently moved from [facility] and now here    [facility]     Social Determinants of Health     Financial Resource Strain: Low Risk     Difficulty of Paying Living Expenses: Not hard at all   Food Insecurity: No Food Insecurity    Worried About Running Out of Food in the Last Year: Never true    [address] in the Last Year: Never true   Transportation Needs: Not on file   Physical Activity: Sufficiently Active    Days of Exercise per Week: 7 days    Minutes of Exercise per Session: 40 min   Stress: Not on file   Social Connections: Not on file   Intimate Partner Violence: Not on file   Housing Stability: Not on file       Family History   Problem Relation Age of Onset    Cancer Mother         lung, breast, liver    Stroke Father     Stomach Cancer Brother     Cancer Maternal Grandmother     No Known Problems Paternal Grandmother     No Known Problems Paternal Grandfather        Immunization History   Administered Date(s) Administered    COVID-19, J&J, (age 18y+), IM, 0.5 mL 10/26/2021, 2022    Influenza, High Dose (Fluzone 65 yrs and older) 2017, 2017    Influenza, Triv, inactivated, subunit, adjuvanted, IM (Fluad 65 yrs and older) 10/28/2019    Pneumococcal Conjugate 13-valent (Idella Brown) 2017    Pneumococcal Polysaccharide ([id number]) 05/10/2019       Review of Systems  Review of Systems   Constitutional:  Negative for diaphoresis, fatigue and fever. Respiratory:  Negative for shortness of breath. Cardiovascular:  Negative for chest pain, palpitations and near-syncope. Gastrointestinal:  Positive for diarrhea (freq--no antibiotics). Negative for abdominal pain, bowel incontinence, nausea and vomiting. Genitourinary:  Negative for bladder incontinence. Musculoskeletal:  Negative for back pain and neck pain. Neurological:  Negative for dizziness, vertigo, focal weakness, syncope, weakness, light-headedness, headaches and loss of balance. Psychiatric/Behavioral:  Positive for memory loss. Negative for confusion. Objective:   Physical Exam  Physical Exam  Vitals reviewed. Constitutional:       General: She is not in acute distress. Appearance: She is well-developed. HENT:      Head: Normocephalic. Right Ear: Tympanic membrane and ear canal normal.      Left Ear: Tympanic membrane and ear canal normal.      Nose: No rhinorrhea. Mouth/Throat:      Pharynx: No oropharyngeal exudate or posterior oropharyngeal erythema. Eyes:      General:         Right eye: No discharge. Left eye: No discharge. Conjunctiva/sclera: Conjunctivae normal.      Pupils: Pupils are equal, round, and reactive to light. Neck:      Thyroid: No thyromegaly. Vascular: No carotid bruit or JVD. Trachea: No tracheal deviation. Cardiovascular:      Rate and Rhythm: Normal rate and regular rhythm. Pulses: Normal pulses. Heart sounds: Normal heart sounds. No murmur heard. No gallop. Pulmonary:      Effort: Pulmonary effort is normal. No respiratory distress. Breath sounds: Normal breath sounds. No stridor. No wheezing or rales. Chest:      Chest wall: No tenderness. Abdominal:      General: Bowel sounds are normal. There is no distension. Palpations: Abdomen is soft. There is no mass. Tenderness: There is no abdominal tenderness.  There is no right CVA tenderness, left CVA tenderness, guarding or rebound. Hernia: No hernia is present. Musculoskeletal:         General: No swelling, tenderness, deformity or signs of injury. Cervical back: Normal range of motion. No rigidity or tenderness. Right lower leg: No edema. Left lower leg: No edema. Lymphadenopathy:      Cervical: No cervical adenopathy. Skin:     General: Skin is warm and dry. Coloration: Skin is not pale. Findings: No erythema or rash. Neurological:      Mental Status: She is alert and oriented to person, place, and time. Cranial Nerves: No cranial nerve deficit. Motor: No weakness or abnormal muscle tone. Coordination: Coordination normal.      Gait: Gait normal.      Deep Tendon Reflexes: Reflexes normal.   Psychiatric:         Mood and Affect: Mood normal.         Behavior: Behavior normal.         Thought Content:  Thought content normal.         Judgment: Judgment normal.       Assessment and Plan:     Bile reflux gastritis   Uncontrolled, trial of questran--chronic diarrhea--watch for constipation    Delayed wound healing   Refer to wound care    Encephalopathy, metabolic   Well-controlled, continue current treatment plan--unclear--?med vs renal failure and better since ^ time of dialysis    History of anemia due to chronic kidney disease   Well-controlled, continue current medications    ESRD (end stage renal disease) ([address]   Well-controlled, continue current treatment plan(dialsisi M,W,F    Diabetic polyneuropathy associated with type 2 diabetes mellitus ([address]   Well-controlled, continue current medications    Chronic kidney 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 ([address]   see above    Acquired hypothyroidism   Checked in hospital and ok

## 2023-01-20 NOTE — ASSESSMENT & PLAN NOTE
Well-controlled, continue current treatment plan--unclear--?med vs renal failure and better since ^ time of dialysis

## 2023-01-23 NOTE — DISCHARGE INSTRUCTIONS
[place], [address] [place] [address] Road  Telephone: [phone]    Discharge Instructions    Important reminders:    **If you have any signs and symptoms of illness (Cough, fever, congestion, nausea, vomiting, diarrhea, etc.) please call the wound care center prior to your appointment. 1. Increase Protein intake for optimal wound healing  2. No added salt to reduce any swelling  3. If diabetic, maintain good glucose control  4. If you smoke, smoking prohibits wound healing, we ask that you refrain from smoking. 5. When taking antibiotics take the entire prescription as ordered. Do not stop taking until medication is all gone unless otherwise instructed. 6. Exercise as tolerated. 7. Keep weight off wounds and reposition every 2 hours if applicable. 8. If wound(s) is on your lower extremity, elevate legs to the level of the heart or above for 30 minutes 4-5 times a day and/or when sitting. Avoid standing for long periods of time. 9. Do not get wounds wet in bath or shower unless otherwise instructed by your physician. If your wound is on your foot or leg, you may purchase a cast bag. Please ask at the pharmacy. If Vascular testing is ordered, please call [address] [place] [address] [phone]) to schedule. Vascular tests ordered by Wound Care Physicians may take up to 2 hours to complete. Please keep that in mind when scheduling. If Vascular testing is scheduled, please bring supplies to replace your dressing after testing is done. The vascular department does not stock supplies. Wound: Right wrist     With each dressing change, rinse wounds with 0.9% Saline. (May use wound wash or soft contact solution. Both can be purchased at a local drug store). If unable to obtain saline, may use a gentle soap and water. Dressing care: Mupirocin ointment, dry dressing (gauze & tape)- change daily. Do not use alcohol to clean.     Home Care Agency/Facility: Your wound-care supplies will be provided by: [address] f: [phone] f: [phone] p: [phone] [email or web address]    Please note, depending on your insurance coverage, you may have out-of-pocket expenses for these supplies. Someone from the company should call you to confirm your order and discuss those potential costs before they ship your products -- please anticipate that call. If your out-of-pocket cost could be substantial, Many companies have financial hardship programs for patients who qualify, so please ask about that if you might need a hand. If you have any questions about your supplies or your potential out-of-pocket costs, or if you need to place an order for a refill of supplies (typically monthly), please call the company directly. Your  is Joaquim Urban    Follow up with Dr Dedrick Song In 1 week(s) in the wound care center. Wound Care Center Information: Should you experience any significant changes in your wound(s) or have questions about your wound care, please [facility]  at [phone] Monday  - Thursday 8:00 am - 4:00 pm and Friday 8:00 am - 1:00pm. If you need help with your wound outside these hours and cannot wait until we are again available, contact your PCP or go to the hospital emergency room. PLEASE NOTE: IF YOU ARE UNABLE TO OBTAIN WOUND SUPPLIES, CONTINUE TO USE THE SUPPLIES YOU HAVE AVAILABLE UNTIL YOU ARE ABLE TO REACH US. IT IS MOST IMPORTANT TO KEEP THE WOUND COVERED AT ALL TIMES. Patient Experience    Thank you for trusting us with your care. You may receive a survey from a company called [employer] asking for your feedback. We would appreciate it if you took a few minutes to share your experience. Your input is very valuable to us.

## 2023-01-25 ENCOUNTER — HOSPITAL ENCOUNTER (OUTPATIENT)
Dept: WOUND CARE | Age: 78
Discharge: HOME | End: 2023-01-25
Payer: MEDICARE

## 2023-01-25 VITALS — HEART RATE: 88 BPM | SYSTOLIC BLOOD PRESSURE: 157 MMHG | DIASTOLIC BLOOD PRESSURE: 78 MMHG | TEMPERATURE: 95.5 F

## 2023-01-25 DIAGNOSIS — S41.101A OPEN WOUND OF RIGHT UPPER ARM, INITIAL ENCOUNTER: Primary | ICD-10-CM

## 2023-01-25 PROCEDURE — 99213 OFFICE O/P EST LOW 20 MIN: CPT

## 2023-01-25 PROCEDURE — 11042 DBRDMT SUBQ TIS 1ST 20SQCM/<: CPT

## 2023-01-25 RX ORDER — BACITRACIN, NEOMYCIN, POLYMYXIN B 400; 3.5; 5 [USP'U]/G; MG/G; [USP'U]/G
OINTMENT TOPICAL ONCE
OUTPATIENT
Start: 2023-01-25 | End: 2023-01-25

## 2023-01-25 RX ORDER — GENTAMICIN SULFATE 1 MG/G
OINTMENT TOPICAL ONCE
OUTPATIENT
Start: 2023-01-25 | End: 2023-01-25

## 2023-01-25 RX ORDER — GINSENG 100 MG
CAPSULE ORAL ONCE
OUTPATIENT
Start: 2023-01-25 | End: 2023-01-25

## 2023-01-25 RX ORDER — CLOBETASOL PROPIONATE 0.5 MG/G
OINTMENT TOPICAL ONCE
OUTPATIENT
Start: 2023-01-25 | End: 2023-01-25

## 2023-01-25 RX ORDER — BETAMETHASONE DIPROPIONATE 0.05 %
OINTMENT (GRAM) TOPICAL ONCE
OUTPATIENT
Start: 2023-01-25 | End: 2023-01-25

## 2023-01-25 RX ORDER — LIDOCAINE 50 MG/G
OINTMENT TOPICAL ONCE
OUTPATIENT
Start: 2023-01-25 | End: 2023-01-25

## 2023-01-25 RX ORDER — BACITRACIN ZINC AND POLYMYXIN B SULFATE 500; 1000 [USP'U]/G; [USP'U]/G
OINTMENT TOPICAL ONCE
OUTPATIENT
Start: 2023-01-25 | End: 2023-01-25

## 2023-01-25 RX ORDER — LIDOCAINE HYDROCHLORIDE 40 MG/ML
SOLUTION TOPICAL ONCE
OUTPATIENT
Start: 2023-01-25 | End: 2023-01-25

## 2023-01-25 RX ORDER — LIDOCAINE 40 MG/G
CREAM TOPICAL ONCE
OUTPATIENT
Start: 2023-01-25 | End: 2023-01-25

## 2023-01-25 RX ORDER — LIDOCAINE HYDROCHLORIDE 20 MG/ML
JELLY TOPICAL ONCE
OUTPATIENT
Start: 2023-01-25 | End: 2023-01-25

## 2023-01-25 RX ORDER — LIDOCAINE 40 MG/G
CREAM TOPICAL ONCE
Status: DISCONTINUED | OUTPATIENT
Start: 2023-01-25 | End: 2023-01-26 | Stop reason: HOSPADM

## 2023-01-25 RX ORDER — TORSEMIDE 10 MG/1
10 TABLET ORAL DAILY
COMMUNITY

## 2023-01-25 NOTE — PLAN OF CARE
Discharge instructions given. Patient verbalized understanding. Return to [facility] in 1 week(s).   Called/faxed orders to [facility]

## 2023-01-25 NOTE — PROGRESS NOTES
[address] [facility]:      [address] f: [phone] f: [phone] p: [phone] [email or web address]     Ordering Center: [facility] [address]  [phone]  Dept: [phone]   Fax# [phone]    Patient Information:      Cat Delaney  [address] [place] [address] [address] [facility]   [phone]   : 1945  AGE: 65 y.o. GENDER: female   TODAYS DATE:  2023    Insurance:      PRIMARY INSURANCE:  Plan: [employer] Fass PLUS HMO  Coverage: [employer] MEDICARE  Effective Date: 2022  Group Number: [unfilled]  Subscriber Number: [id number] - (Medicare Managed)    Payer/Plan Subscr  Sex Relation Sub. Ins. ID Effective Group Num   1. [address] R 1945 Female Self [id number]                                   [address] [address]   2.  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 ([facility] E11.22, N18.6, Z99.2    Primary osteoarthritis of both knees M17.0    Memory loss R41.3    Diabetic polyneuropathy associated with type 2 diabetes mellitus ([facility] E11.42    Coronary artery disease due to lipid rich plaque I25.10, I25.83    ESRD (end stage renal disease) (HCC) N18.6    Class 1 obesity due to excess calories with body mass index (BMI) of 30.0 to 30.9 in adult E66.09, Z68.30    History of anemia due to chronic kidney disease N18.9, Z86.2    Parkinson's disease G20    RLS (restless legs syndrome) G25.81    Encephalopathy, metabolic Y28.75    Acute encephalopathy G93.40    HTN (hypertension), benign I10    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 8XXD       WOUNDS REQUIRING DRESSING SUPPLIES:     Wound 01/25/23 Wrist Right; Anterior 1 (Active)   Wound Image   01/25/23 0847   Wound Etiology Traumatic 01/25/23 0847   Wound Cleansed Cleansed with saline 01/25/23 0847   Wound Length (cm) 0 cm 01/25/23 0847   Wound Width (cm) 0 cm 01/25/23 0847   Wound Depth (cm) 0 cm 01/25/23 0847   Wound Surface Area (cm^2) 0 cm^2 01/25/23 0847   Wound Volume (cm^3) 0 cm^3 01/25/23 0847   Post-Procedure Length (cm) 1.6 cm 01/25/23 0911   Post-Procedure Width (cm) 2 cm 01/25/23 0911   Post-Procedure Depth (cm) 0.2 cm 01/25/23 0911   Post-Procedure Surface Area (cm^2) 3.2 cm^2 01/25/23 0911   Post-Procedure Volume (cm^3) 0.64 cm^3 01/25/23 0911   Wound Assessment Bleeding 01/25/23 0911   Drainage Amount Moderate 01/25/23 0911   Odor None 01/25/23 0847   Kyra-wound Assessment Dry/flaky 01/25/23 0847   Margins Defined edges 01/25/23 0847   Number of days: 0          Supplies Requested :      DISPENSE AS WRITTEN    WOUND #: 1   PRIMARY DRESSING:    None   Cover and Secure with: 2X2  non woven gauze pad     FREQUENCY OF DRESSING CHANGES:  Daily    Wound Thickness [x] Full   []Partial     Patient Wound(s) Debrided: [x] Yes   [] No    Debridement Date: 1/25/2023    Debribement Type: Excisional/Sharp    ADDITIONAL ITEMS:  [] Gloves Small  [x] Gloves Medium [] Gloves Large [] Gloves XLarge [] Paper Tape 2\" [] Paper Tape 3\"  [] Medipore Tape 3\" [x] Medipore Tape 4\"    [] Hypofix skin sensitive tape 2\"  [] Hypofix skin sensitive tape 4\"  [x] Saline  [] Skin Prep   [] Adhesive Remover   [] Cotton Tip Applicators  [] Tubular Stocking   [] Size E  [] Size G  [] Other:    Patient currently being seen by Home Health: [] Yes   [x] No    Quantity of days dispensed:  []15  [x]30  []60  []90 Days    Order valid for 90 days    Provider Information:      PROVIDER'S NAME/NPI  Estee Dumont MD NPI: [id number]   I give permission to coordinate the care for this patient

## 2023-01-25 NOTE — PROGRESS NOTES
[address] Progress and Procedure Note    Judy Wells  AGE: 65 y.o. GENDER: female    : 1945  TODAY'S DATE: 2023    Chief Complaint   Patient presents with    Wound Check     Right Wrist 1st visit        History of Present Illness     Clayton Litten is a 65 y.o. female who presents today for wound evaluation. History of Wound: traumatic wound located on the  right wrist . Wound from IV after recent hospitalization  Wound Pain:  mild  Severity: 2/10   Wound Type: traumatic  Modifying Factors:  diabetes and anticoagulation therapy  Associated Signs/Symptoms:  none    Past Medical History:   Diagnosis Date    Arthritis     CAD (coronary artery disease)     Diabetes ([facility]     Hemodialysis patient ([facility]     Hyperlipidemia     Hypertension     Kidney disease     Neuropathy     Pneumonia     IN , with COVID    Thyroid disease      Past Surgical History:   Procedure Laterality Date    APPENDECTOMY      CHOLECYSTECTOMY      COLONOSCOPY  2020    COLONOSCOPY POLYPECTOMY SNARE/COLD BIOPSY performed by Esteban Beckman MD at [facility] N/A 2021    PERITONEAL DIALYSIS CATHETER REMOVAL.  EXCISSION OF ABDOMINAL CITRIX. performed by Srinivasa Ye DO at [facility] ([facility])      FULL    LAPAROSCOPY INSERTION PERITONEAL CATHETER N/A 2020    LAPAROSCOPIC LYSIS OF ADHESIONS, LAPAROSCOPIC PERITONEAL DIALYSIS CATHETER PLACEMENT, LAPAROSCOPIC OMENTOPEXY performed by Srinivasa Ye DO at [address] [facility] [address] BILATERAL      preventive    ROTATOR CUFF REPAIR Left     SHUNT REVISION Left 2021    LEFT FOREARM ARTERIO VENOUS GRAFT performed by Angus Ceballos MD at [address] [facility] 2020    EGD BIOPSY performed by Esteban Beckman MD at [address] [facility] ENDOSCOPY     Current Outpatient Medications   Medication Sig Dispense Refill torsemide (DEMADEX) 10 MG tablet Take 10 mg by mouth daily      mupirocin (BACTROBAN) 2 % ointment Apply topically 3 times daily Apply topically 3 times daily. cholestyramine (QUESTRAN) 4 g packet Take 1 packet by mouth daily 90 packet 3    levothyroxine (SYNTHROID) 150 MCG tablet Take 1 tablet by mouth every morning (before breakfast) 90 tablet 3    ondansetron (ZOFRAN-ODT) 4 MG disintegrating tablet Take 1 tablet by mouth every 8 hours as needed for Nausea or Vomiting 20 tablet 0    lidocaine (LIDODERM) 5 % Place 1 patch onto the skin daily 12 hours on, 12 hours off. 30 patch 0    vitamin D3 (CHOLECALCIFEROL) 25 MCG (1000 UT) TABS tablet Take 1 tablet by mouth daily 90 tablet 1    ASPIRIN LOW DOSE 81 MG EC tablet TAKE 1 TABLET BY MOUTH EVERY DAY 90 tablet 1    rosuvastatin (CRESTOR) 20 MG tablet TAKE 1 TABLET BY MOUTH EVERY DAY 90 tablet 1    metoprolol succinate (TOPROL XL) 25 MG extended release tablet TAKE 1 TABLET EVERY DAY 90 tablet 3     No current facility-administered medications for this encounter. Social History:   Social History     Tobacco Use    Smoking status: Former     Packs/day: 0.50     Years: 10.00     Pack years: 5.00     Types: Cigarettes     Quit date: 1978     Years since quittin.5    Smokeless tobacco: Never   Vaping Use    Vaping Use: Never used   Substance Use Topics    Alcohol use: Not Currently     Comment: every other week one glass of wine    Drug use: Never     Allergies:  Amoxicillin, Demeclocycline, Penicillins, Tetracyclines & related, Ozempic (0.25 or 0.5 mg-dose) [semaglutide(0.25 or 0.5mg-dos)], and Codeine    Procedure: Indications:  Based on my examination of this patient's wound(s)/ulcer(s) today, debridement is required to promote healing and evaluate the wound base.     Performed by: Cici Chavez MD    Consent obtained: Yes    Time out taken: Yes    Pain Control: Anesthetic  Anesthetic: 4% Lidocaine Cream     Debridement: Sharp excisional debridement  Using curette the wound was sharply debrided    down through and including the removal of epidermis, dermis, and subcutaneous tissue. Devitalized Tissue Debrided: fibrin, biofilm, slough, and necrotic tissue/eschar    Pre Debridement Measurements:  Are located in the Wound Documentation Flow Sheet     Wound #: 1     Post  Debridement Measurements:  Wound 01/25/23 Wrist Right; Anterior 1 (Active)   Wound Image   01/25/23 0847   Wound Etiology Traumatic 01/25/23 0847   Wound Cleansed Cleansed with saline 01/25/23 0847   Dressing/Treatment Antibacterial ointment;Dry dressing 01/25/23 1013   Wound Length (cm) 0 cm 01/25/23 0847   Wound Width (cm) 0 cm 01/25/23 0847   Wound Depth (cm) 0 cm 01/25/23 0847   Wound Surface Area (cm^2) 0 cm^2 01/25/23 0847   Wound Volume (cm^3) 0 cm^3 01/25/23 0847   Post-Procedure Length (cm) 1.6 cm 01/25/23 0911   Post-Procedure Width (cm) 2 cm 01/25/23 0911   Post-Procedure Depth (cm) 0.2 cm 01/25/23 0911   Post-Procedure Surface Area (cm^2) 3.2 cm^2 01/25/23 0911   Post-Procedure Volume (cm^3) 0.64 cm^3 01/25/23 0911   Wound Assessment Bleeding 01/25/23 0911   Drainage Amount Moderate 01/25/23 0911   Odor None 01/25/23 0847   Krya-wound Assessment Dry/flaky 01/25/23 0847   Margins Defined edges 01/25/23 0847   Number of days: 0       Percent of Wound(s)/Ulcer(s) Debrided: 100 %    Total Surface Area Debrided:  3.2 sq cm     Bleeding: Minimal    Hemostasis Achieved: Pressure    Procedural Pain:  2/10     Post Procedural Pain: 2/10     Response to treatment: Well tolerated by patient    Assessment:     Traumatic right wrist wound debrided today    Patient tolerated procedure well and was given proper instruction. The nature of the patient's condition was explained in depth.  The patient was informed that their compliance to the treatment plan is paramount to successful healing and prevention of further ulceration and/or infection     Plan:     Treatment Plan: Treatment Note please see attached Discharge Instructions    Written patient dismissal instructions given to patient and signed by patient or POA. Discharge [address], [address] [facility] [address]  Telephone: [phone]    Discharge Instructions    Important reminders:    **If you have any signs and symptoms of illness (Cough, fever, congestion, nausea, vomiting, diarrhea, etc.) please call the wound care center prior to your appointment. 1. Increase Protein intake for optimal wound healing  2. No added salt to reduce any swelling  3. If diabetic, maintain good glucose control  4. If you smoke, smoking prohibits wound healing, we ask that you refrain from smoking. 5. When taking antibiotics take the entire prescription as ordered. Do not stop taking until medication is all gone unless otherwise instructed. 6. Exercise as tolerated. 7. Keep weight off wounds and reposition every 2 hours if applicable. 8. If wound(s) is on your lower extremity, elevate legs to the level of the heart or above for 30 minutes 4-5 times a day and/or when sitting. Avoid standing for long periods of time. 9. Do not get wounds wet in bath or shower unless otherwise instructed by your physician. If your wound is on your foot or leg, you may purchase a cast bag. Please ask at the pharmacy. If Vascular testing is ordered, please call [address] [place] [address] [phone]) to schedule. Vascular tests ordered by Wound Care Physicians may take up to 2 hours to complete. Please keep that in mind when scheduling. If Vascular testing is scheduled, please bring supplies to replace your dressing after testing is done. The vascular department does not stock supplies. Wound: Right wrist     With each dressing change, rinse wounds with 0.9% Saline. (May use wound wash or soft contact solution. Both can be purchased at a local drug store).  If unable to obtain saline, may use a gentle soap and water. Dressing care: Mupirocin ointment, dry dressing (gauze & tape)- change daily. Do not use alcohol to clean. Home Care Agency/Facility:     Your wound-care supplies will be provided by: [address] f: [phone] f: [phone] p: [phone] [email or web address]    Please note, depending on your insurance coverage, you may have out-of-pocket expenses for these supplies. Someone from the company should call you to confirm your order and discuss those potential costs before they ship your products -- please anticipate that call. If your out-of-pocket cost could be substantial, Many companies have financial hardship programs for patients who qualify, so please ask about that if you might need a hand. If you have any questions about your supplies or your potential out-of-pocket costs, or if you need to place an order for a refill of supplies (typically monthly), please call the company directly. Your  is Alber Meeks    Follow up with Dr Louise Cooper In 1 week(s) in the wound care center. Wound Care Center Information: Should you experience any significant changes in your wound(s) or have questions about your wound care, please contact the [facility] at [phone] Monday  - Thursday 8:00 am - 4:00 pm and Friday 8:00 am - 1:00pm. If you need help with your wound outside these hours and cannot wait until we are again available, contact your PCP or go to the hospital emergency room. PLEASE NOTE: IF YOU ARE UNABLE TO OBTAIN WOUND SUPPLIES, CONTINUE TO USE THE SUPPLIES YOU HAVE AVAILABLE UNTIL YOU ARE ABLE TO REACH US. IT IS MOST IMPORTANT TO KEEP THE WOUND COVERED AT ALL TIMES. Patient Experience    Thank you for trusting us with your care. You may receive a survey from a company called [employer] asking for your feedback.   We would appreciate it if you took a few minutes to share your experience. Your input is very valuable to us. Nato Rodrigues 6  Demetrice Lora MD, FACS  1/25/2023  12:04 PM

## 2023-02-01 ENCOUNTER — HOSPITAL ENCOUNTER (OUTPATIENT)
Dept: WOUND CARE | Age: 78
Discharge: HOME | End: 2023-02-01
Payer: MEDICARE

## 2023-02-01 VITALS
HEART RATE: 79 BPM | TEMPERATURE: 96.1 F | SYSTOLIC BLOOD PRESSURE: 172 MMHG | DIASTOLIC BLOOD PRESSURE: 81 MMHG | RESPIRATION RATE: 16 BRPM

## 2023-02-01 DIAGNOSIS — S41.101A OPEN WOUND OF RIGHT UPPER ARM, INITIAL ENCOUNTER: Primary | ICD-10-CM

## 2023-02-01 PROCEDURE — 11042 DBRDMT SUBQ TIS 1ST 20SQCM/<: CPT | Performed by: SURGERY

## 2023-02-01 PROCEDURE — 11042 DBRDMT SUBQ TIS 1ST 20SQCM/<: CPT

## 2023-02-01 RX ORDER — LIDOCAINE 40 MG/G
CREAM TOPICAL ONCE
OUTPATIENT
Start: 2023-02-01 | End: 2023-02-01

## 2023-02-01 RX ORDER — CLOBETASOL PROPIONATE 0.5 MG/G
OINTMENT TOPICAL ONCE
OUTPATIENT
Start: 2023-02-01 | End: 2023-02-01

## 2023-02-01 RX ORDER — LIDOCAINE HYDROCHLORIDE 20 MG/ML
JELLY TOPICAL ONCE
OUTPATIENT
Start: 2023-02-01 | End: 2023-02-01

## 2023-02-01 RX ORDER — GINSENG 100 MG
CAPSULE ORAL ONCE
OUTPATIENT
Start: 2023-02-01 | End: 2023-02-01

## 2023-02-01 RX ORDER — LIDOCAINE 50 MG/G
OINTMENT TOPICAL ONCE
OUTPATIENT
Start: 2023-02-01 | End: 2023-02-01

## 2023-02-01 RX ORDER — BACITRACIN ZINC AND POLYMYXIN B SULFATE 500; 1000 [USP'U]/G; [USP'U]/G
OINTMENT TOPICAL ONCE
OUTPATIENT
Start: 2023-02-01 | End: 2023-02-01

## 2023-02-01 RX ORDER — BETAMETHASONE DIPROPIONATE 0.05 %
OINTMENT (GRAM) TOPICAL ONCE
OUTPATIENT
Start: 2023-02-01 | End: 2023-02-01

## 2023-02-01 RX ORDER — LIDOCAINE HYDROCHLORIDE 40 MG/ML
SOLUTION TOPICAL ONCE
OUTPATIENT
Start: 2023-02-01 | End: 2023-02-01

## 2023-02-01 RX ORDER — BACITRACIN, NEOMYCIN, POLYMYXIN B 400; 3.5; 5 [USP'U]/G; MG/G; [USP'U]/G
OINTMENT TOPICAL ONCE
OUTPATIENT
Start: 2023-02-01 | End: 2023-02-01

## 2023-02-01 RX ORDER — LIDOCAINE 40 MG/G
CREAM TOPICAL ONCE
Status: DISCONTINUED | OUTPATIENT
Start: 2023-02-01 | End: 2023-02-02 | Stop reason: HOSPADM

## 2023-02-01 RX ORDER — GENTAMICIN SULFATE 1 MG/G
OINTMENT TOPICAL ONCE
OUTPATIENT
Start: 2023-02-01 | End: 2023-02-01

## 2023-02-01 ASSESSMENT — PAIN DESCRIPTION - ONSET: ONSET: ON-GOING

## 2023-02-01 ASSESSMENT — PAIN - FUNCTIONAL ASSESSMENT: PAIN_FUNCTIONAL_ASSESSMENT: ACTIVITIES ARE NOT PREVENTED

## 2023-02-01 ASSESSMENT — PAIN DESCRIPTION - ORIENTATION: ORIENTATION: RIGHT

## 2023-02-01 ASSESSMENT — PAIN DESCRIPTION - PAIN TYPE: TYPE: ACUTE PAIN

## 2023-02-01 ASSESSMENT — PAIN DESCRIPTION - FREQUENCY: FREQUENCY: INTERMITTENT

## 2023-02-01 ASSESSMENT — PAIN SCALES - GENERAL: PAINLEVEL_OUTOF10: 2

## 2023-02-01 ASSESSMENT — PAIN DESCRIPTION - LOCATION: LOCATION: WRIST

## 2023-02-01 ASSESSMENT — PAIN DESCRIPTION - DESCRIPTORS: DESCRIPTORS: ACHING

## 2023-02-01 NOTE — PROGRESS NOTES
[address] Progress and Procedure Note    Judy Guzman  AGE: 65 y.o. GENDER: female    : 1945  TODAY'S DATE: 2023    Chief Complaint   Patient presents with    Wound Check     Right wrist        History of Present Illness     Yazmin Butterfield is a 65 y.o. female who presents today for wound evaluation. History of Wound: traumatic wound located on the  right wrist . Wound from IV after recent hospitalization  Wound Pain:  mild  Severity: 2/10   Wound Type: traumatic  Modifying Factors:  diabetes and anticoagulation therapy  Associated Signs/Symptoms:  none    Past Medical History:   Diagnosis Date    Arthritis     CAD (coronary artery disease)     Diabetes ([facility]     Hemodialysis patient ([facility]     Hyperlipidemia     Hypertension     Kidney disease     Neuropathy     Pneumonia     IN , with COVID    Thyroid disease      Past Surgical History:   Procedure Laterality Date    APPENDECTOMY      CHOLECYSTECTOMY      COLONOSCOPY  2020    COLONOSCOPY POLYPECTOMY SNARE/COLD BIOPSY performed by Irene Ochoa MD at [address] [facility] N/A 2021    PERITONEAL DIALYSIS CATHETER REMOVAL.  EXCISSION OF ABDOMINAL CITRIX. performed by Danika Fregoso DO at [address] [facility] ([facility])      FULL    LAPAROSCOPY INSERTION PERITONEAL CATHETER N/A 2020    LAPAROSCOPIC LYSIS OF ADHESIONS, LAPAROSCOPIC PERITONEAL DIALYSIS CATHETER PLACEMENT, LAPAROSCOPIC OMENTOPEXY performed by Danika Fregoso DO at [address] [facility] [address] BILATERAL      preventive    ROTATOR CUFF REPAIR Left     SHUNT REVISION Left 2021    LEFT FOREARM ARTERIO VENOUS GRAFT performed by Musa Herman MD at [address] [facility] [address] 2020    EGD BIOPSY performed by Irene Ochoa MD at [address] ENDOSCOPY     Current Outpatient Medications   Medication Sig Dispense Refill    torsemide (DEMADEX) 10 MG tablet Take 10 mg by mouth daily      mupirocin (BACTROBAN) 2 % ointment Apply topically 3 times daily Apply topically 3 times daily. cholestyramine (QUESTRAN) 4 g packet Take 1 packet by mouth daily 90 packet 3    levothyroxine (SYNTHROID) 150 MCG tablet Take 1 tablet by mouth every morning (before breakfast) 90 tablet 3    ondansetron (ZOFRAN-ODT) 4 MG disintegrating tablet Take 1 tablet by mouth every 8 hours as needed for Nausea or Vomiting 20 tablet 0    lidocaine (LIDODERM) 5 % Place 1 patch onto the skin daily 12 hours on, 12 hours off. 30 patch 0    vitamin D3 (CHOLECALCIFEROL) 25 MCG (1000 UT) TABS tablet Take 1 tablet by mouth daily 90 tablet 1    ASPIRIN LOW DOSE 81 MG EC tablet TAKE 1 TABLET BY MOUTH EVERY DAY 90 tablet 1    rosuvastatin (CRESTOR) 20 MG tablet TAKE 1 TABLET BY MOUTH EVERY DAY 90 tablet 1    metoprolol succinate (TOPROL XL) 25 MG extended release tablet TAKE 1 TABLET EVERY DAY 90 tablet 3     Current Facility-Administered Medications   Medication Dose Route Frequency Provider Last Rate Last Admin    lidocaine (LMX) 4 % cream   Topical Once Orvis MD Bailey          Social History:   Social History     Tobacco Use    Smoking status: Former     Packs/day: 0.50     Years: 10.00     Pack years: 5.00     Types: Cigarettes     Quit date: 1978     Years since quittin.6    Smokeless tobacco: Never   Vaping Use    Vaping Use: Never used   Substance Use Topics    Alcohol use: Not Currently     Comment: every other week one glass of wine    Drug use: Never     Allergies:  Amoxicillin, Demeclocycline, Penicillins, Tetracyclines & related, Ozempic (0.25 or 0.5 mg-dose) [semaglutide(0.25 or 0.5mg-dos)], and Codeine    Procedure: Indications:  Based on my examination of this patient's wound(s)/ulcer(s) today, debridement is required to promote healing and evaluate the wound base.     Performed by: Jose Saenz MD    Consent obtained: Yes    Time out taken: Yes    Pain Control: Anesthetic  Anesthetic: 4% Lidocaine Cream     Debridement: Sharp excisional debridement  Using curette the wound was sharply debrided    down through and including the removal of epidermis, dermis, and subcutaneous tissue. Devitalized Tissue Debrided: fibrin, biofilm, and slough    Pre Debridement Measurements:  Are located in the Wound Documentation Flow Sheet     Wound #: 1     Post  Debridement Measurements:  Wound 01/25/23 Wrist Right; Anterior 1 (Active)   Wound Image   01/25/23 0847   Wound Etiology Traumatic 02/01/23 0902   Wound Cleansed Cleansed with saline 02/01/23 0902   Dressing/Treatment Antibacterial ointment;Dry dressing 01/25/23 1013   Wound Length (cm) 1.2 cm 02/01/23 0902   Wound Width (cm) 1.2 cm 02/01/23 0902   Wound Depth (cm) 0.2 cm 02/01/23 0902   Wound Surface Area (cm^2) 1.44 cm^2 02/01/23 0902   Wound Volume (cm^3) 0.288 cm^3 02/01/23 0902   Post-Procedure Length (cm) 1.2 cm 02/01/23 0917   Post-Procedure Width (cm) 1.2 cm 02/01/23 0917   Post-Procedure Depth (cm) 0.2 cm 02/01/23 0917   Post-Procedure Surface Area (cm^2) 1.44 cm^2 02/01/23 0917   Post-Procedure Volume (cm^3) 0.288 cm^3 02/01/23 0917   Wound Assessment Bleeding 02/01/23 0917   Drainage Amount Moderate 02/01/23 0917   Drainage Description Serous 02/01/23 0902   Odor None 02/01/23 0902   Kyra-wound Assessment Intact 02/01/23 0902   Margins Defined edges 02/01/23 0902   Number of days: 7       Percent of Wound(s)/Ulcer(s) Debrided: 100 %    Total Surface Area Debrided:  1.44 sq cm     Bleeding: Minimal    Hemostasis Achieved: Pressure    Procedural Pain:  2/10     Post Procedural Pain: 2/10     Response to treatment: Well tolerated by patient    Assessment:     Wound looks Improved    Patient tolerated procedure well and was given proper instruction. The nature of the patient's condition was explained in depth.  The patient was informed that their compliance to the treatment plan is paramount to successful healing and prevention of further ulceration and/or infection     Plan:     Treatment Plan:       Treatment Note please see attached Discharge Instructions    Written patient dismissal instructions given to patient and signed by patient or POA. Discharge [address]  [address], [place] [facility] [address]  Telephone: [phone]     Discharge Instructions     Important reminders:     **If you have any signs and symptoms of illness (Cough, fever, congestion, nausea, vomiting, diarrhea, etc.) please call the wound care center prior to your appointment. 1. Increase Protein intake for optimal wound healing  2. No added salt to reduce any swelling  3. If diabetic, maintain good glucose control  4. If you smoke, smoking prohibits wound healing, we ask that you refrain from smoking. 5. When taking antibiotics take the entire prescription as ordered. Do not stop taking until medication is all gone unless otherwise instructed. 6. Exercise as tolerated. 7. Keep weight off wounds and reposition every 2 hours if applicable. 8. If wound(s) is on your lower extremity, elevate legs to the level of the heart or above for 30 minutes 4-5 times a day and/or when sitting. Avoid standing for long periods of time. 9. Do not get wounds wet in bath or shower unless otherwise instructed by your physician. If your wound is on your foot or leg, you may purchase a cast bag. Please ask at the pharmacy. If Vascular testing is ordered, please call [address] [place] [address] [phone]) to schedule. Vascular tests ordered by Wound Care Physicians may take up to 2 hours to complete. Please keep that in mind when scheduling. If Vascular testing is scheduled, please bring supplies to replace your dressing after testing is done. The vascular department does not stock supplies.       Wound: Right wrist      With each dressing change, rinse wounds with 0.9% Saline. (May use wound wash or soft contact solution. Both can be purchased at a local drug store). If unable to obtain saline, may use a gentle soap and water. Dressing care: Mupirocin ointment, dry dressing (gauze & tape)- change daily. Do not use alcohol to clean. Home Care Agency/Facility:      Your wound-care supplies will be provided by: [address] f: [phone] f: [phone] p: [phone] [email or web address]    Please note, depending on your insurance coverage, you may have out-of-pocket expenses for these supplies. Someone from the company should call you to confirm your order and discuss those potential costs before they ship your products -- please anticipate that call. If your out-of-pocket cost could be substantial, Many companies have financial hardship programs for patients who qualify, so please ask about that if you might need a hand. If you have any questions about your supplies or your potential out-of-pocket costs, or if you need to place an order for a refill of supplies (typically monthly), please call the company directly. Your  is Ritika Byrd     Follow up with Dr Tono Magana In 1 week(s) in the wound care center. Wound Care Center Information: Should you experience any significant changes in your wound(s) or have questions about your wound care, [facility] at [phone] Monday  - Thursday 8:00 am - 4:00 pm and Friday 8:00 am - 1:00pm. If you need help with your wound outside these hours and cannot wait until we are again available, contact your PCP or go to the hospital emergency room. PLEASE NOTE: IF YOU ARE UNABLE TO OBTAIN WOUND SUPPLIES, CONTINUE TO USE THE SUPPLIES YOU HAVE AVAILABLE UNTIL YOU ARE ABLE TO REACH US. IT IS MOST IMPORTANT TO KEEP THE WOUND COVERED AT ALL TIMES. Patient Experience     Thank you for trusting us with your care.   You may receive a survey from a company called [employer] asking for [employer]. We would appreciate it if you took a few minutes to share your experience. Your input is very valuable to us. Nato Rodrigues 6  Tanya Burgos MD, FACS  2/1/2023  9:42 AM

## 2023-02-01 NOTE — DISCHARGE INSTRUCTIONS
[facility], [address] [place] [address] Road  Telephone: [phone]     Discharge Instructions     Important reminders:     **If you have any signs and symptoms of illness (Cough, fever, congestion, nausea, vomiting, diarrhea, etc.) please call the wound care center prior to your appointment. 1. Increase Protein intake for optimal wound healing  2. No added salt to reduce any swelling  3. If diabetic, maintain good glucose control  4. If you smoke, smoking prohibits wound healing, we ask that you refrain from smoking. 5. When taking antibiotics take the entire prescription as ordered. Do not stop taking until medication is all gone unless otherwise instructed. 6. Exercise as tolerated. 7. Keep weight off wounds and reposition every 2 hours if applicable. 8. If wound(s) is on your lower extremity, elevate legs to the level of the heart or above for 30 minutes 4-5 times a day and/or when sitting. Avoid standing for long periods of time. 9. Do not get wounds wet in bath or shower unless otherwise instructed by your physician. If your wound is on your foot or leg, you may purchase a cast bag. Please ask at the pharmacy. If Vascular testing is ordered, please call [address] [place] [address] [phone]) to schedule. Vascular tests ordered by Wound Care Physicians may take up to 2 hours to complete. Please keep that in mind when scheduling. If Vascular testing is scheduled, please bring supplies to replace your dressing after testing is done. The vascular department does not stock supplies. Wound: Right wrist      With each dressing change, rinse wounds with 0.9% Saline. (May use wound wash or soft contact solution. Both can be purchased at a local drug store). If unable to obtain saline, may use a gentle soap and water. Dressing care: Mupirocin ointment, dry dressing (gauze & tape)- change daily. Do not use alcohol to clean.      Home Care Agency/Facility:      Your wound-care supplies will be provided by: [address] f: [phone] f: [phone] p: [phone] [email or web address]    Please note, depending on your insurance coverage, you may have out-of-pocket expenses for these supplies. Someone from the company should call you to confirm your order and discuss those potential costs before they ship your products -- please anticipate that call. If your out-of-pocket cost could be substantial, Many companies have financial hardship programs for patients who qualify, so please ask about that if you might need a hand. If you have any questions about your supplies or your potential out-of-pocket costs, or if you need to place an order for a refill of supplies (typically monthly), please call the company directly. Your  is Feliciano Bennett     Follow up with Dr Tanya Burgos In 1 week(s) in the wound care center. Wound Care Center Information: Should you experience any significant changes in your wound(s) or have questions about your wound care, please contact the [facility] at [phone] Monday  - Thursday 8:00 am - 4:00 pm and Friday 8:00 am - 1:00pm. If you need help with your wound outside these hours and cannot wait until we are again available, contact your PCP or go to the hospital emergency room. PLEASE NOTE: IF YOU ARE UNABLE TO OBTAIN WOUND SUPPLIES, CONTINUE TO USE THE SUPPLIES YOU HAVE AVAILABLE UNTIL YOU ARE ABLE TO REACH US. IT IS MOST IMPORTANT TO KEEP THE WOUND COVERED AT ALL TIMES. Patient Experience     Thank you for trusting us with your care. You may receive a survey from a company called [employer] asking for your feedback. We would appreciate it if you took a few minutes to share your experience. Your input is very valuable to us.

## 2023-02-01 NOTE — PLAN OF CARE
Discharge instructions given. Patient verbalized understanding. Return to [address] [place] [address],[address] in 1 week(s).

## 2023-02-07 ENCOUNTER — APPOINTMENT (OUTPATIENT)
Dept: GENERAL RADIOLOGY | Age: 78
DRG: 091 | End: 2023-02-07
Payer: MEDICARE

## 2023-02-07 ENCOUNTER — HOSPITAL ENCOUNTER (INPATIENT)
Age: 78
LOS: 8 days | Discharge: HOME HEALTH | DRG: 091 | End: 2023-02-15
Attending: EMERGENCY MEDICINE | Admitting: INTERNAL MEDICINE
Payer: MEDICARE

## 2023-02-07 ENCOUNTER — APPOINTMENT (OUTPATIENT)
Dept: CT IMAGING | Age: 78
DRG: 091 | End: 2023-02-07
Payer: MEDICARE

## 2023-02-07 DIAGNOSIS — G93.40 ACUTE ENCEPHALOPATHY: Primary | ICD-10-CM

## 2023-02-07 DIAGNOSIS — Z99.2 ESRD ON HEMODIALYSIS (HCC): ICD-10-CM

## 2023-02-07 DIAGNOSIS — N18.6 ESRD ON HEMODIALYSIS (HCC): ICD-10-CM

## 2023-02-07 PROBLEM — L40.9 PSORIASIS: Status: ACTIVE | Noted: 2023-02-07

## 2023-02-07 PROBLEM — D63.8 ANEMIA, CHRONIC DISEASE: Status: ACTIVE | Noted: 2023-02-07

## 2023-02-07 PROBLEM — E66.9 OBESE: Status: ACTIVE | Noted: 2021-02-09

## 2023-02-07 PROBLEM — D64.9 ANEMIA: Status: ACTIVE | Noted: 2023-02-07

## 2023-02-07 PROBLEM — I50.9 CHF (CONGESTIVE HEART FAILURE) (HCC): Status: ACTIVE | Noted: 2023-02-07

## 2023-02-07 PROBLEM — K31.84 GASTROPARESIS: Status: ACTIVE | Noted: 2021-02-12

## 2023-02-07 PROBLEM — N18.9 CKD (CHRONIC KIDNEY DISEASE): Status: ACTIVE | Noted: 2023-02-07

## 2023-02-07 LAB
A/G RATIO: 1.7 (ref 1.1–2.2)
ALBUMIN SERPL-MCNC: 4.8 G/DL (ref 3.4–5)
ALP BLD-CCNC: 123 U/L (ref 40–129)
ALT SERPL-CCNC: 14 U/L (ref 10–40)
ANION GAP SERPL CALCULATED.3IONS-SCNC: 16 MMOL/L (ref 3–16)
AST SERPL-CCNC: 15 U/L (ref 15–37)
BACTERIA: NORMAL /HPF
BASOPHILS ABSOLUTE: 0.1 K/UL (ref 0–0.2)
BASOPHILS RELATIVE PERCENT: 0.6 %
BILIRUB SERPL-MCNC: 0.3 MG/DL (ref 0–1)
BILIRUBIN URINE: NEGATIVE
BLOOD, URINE: ABNORMAL
BUN BLDV-MCNC: 58 MG/DL (ref 7–20)
CALCIUM SERPL-MCNC: 10.2 MG/DL (ref 8.3–10.6)
CHLORIDE BLD-SCNC: 98 MMOL/L (ref 99–110)
CLARITY: CLEAR
CO2: 25 MMOL/L (ref 21–32)
COLOR: YELLOW
CREAT SERPL-MCNC: 9.7 MG/DL (ref 0.6–1.2)
EKG ATRIAL RATE: 72 BPM
EKG DIAGNOSIS: NORMAL
EKG P AXIS: 39 DEGREES
EKG P-R INTERVAL: 172 MS
EKG Q-T INTERVAL: 424 MS
EKG QRS DURATION: 78 MS
EKG QTC CALCULATION (BAZETT): 464 MS
EKG R AXIS: -14 DEGREES
EKG T AXIS: 32 DEGREES
EKG VENTRICULAR RATE: 72 BPM
EOSINOPHILS ABSOLUTE: 0.3 K/UL (ref 0–0.6)
EOSINOPHILS RELATIVE PERCENT: 2.6 %
EPITHELIAL CELLS, UA: 2 /HPF (ref 0–5)
GFR SERPL CREATININE-BSD FRML MDRD: 4 ML/MIN/{1.73_M2}
GLUCOSE BLD-MCNC: 106 MG/DL (ref 70–99)
GLUCOSE URINE: NEGATIVE MG/DL
HCT VFR BLD CALC: 33.6 % (ref 36–48)
HEMOGLOBIN: 10.9 G/DL (ref 12–16)
HYALINE CASTS: 0 /LPF (ref 0–8)
INR BLD: 1.06 (ref 0.87–1.14)
KETONES, URINE: NEGATIVE MG/DL
LACTIC ACID, SEPSIS: 1.7 MMOL/L (ref 0.4–1.9)
LEUKOCYTE ESTERASE, URINE: NEGATIVE
LIPASE: 91 U/L (ref 13–60)
LYMPHOCYTES ABSOLUTE: 1.6 K/UL (ref 1–5.1)
LYMPHOCYTES RELATIVE PERCENT: 15.2 %
MCH RBC QN AUTO: 31.7 PG (ref 26–34)
MCHC RBC AUTO-ENTMCNC: 32.4 G/DL (ref 31–36)
MCV RBC AUTO: 97.9 FL (ref 80–100)
MICROSCOPIC EXAMINATION: YES
MONOCYTES ABSOLUTE: 0.8 K/UL (ref 0–1.3)
MONOCYTES RELATIVE PERCENT: 7.6 %
NEUTROPHILS ABSOLUTE: 7.9 K/UL (ref 1.7–7.7)
NEUTROPHILS RELATIVE PERCENT: 74 %
NITRITE, URINE: NEGATIVE
PDW BLD-RTO: 16.6 % (ref 12.4–15.4)
PH UA: 8 (ref 5–8)
PLATELET # BLD: 342 K/UL (ref 135–450)
PMV BLD AUTO: 8.6 FL (ref 5–10.5)
POTASSIUM REFLEX MAGNESIUM: 4.5 MMOL/L (ref 3.5–5.1)
PRO-BNP: 4765 PG/ML (ref 0–449)
PROTEIN UA: 100 MG/DL
PROTHROMBIN TIME: 13.7 SEC (ref 11.7–14.5)
RBC # BLD: 3.43 M/UL (ref 4–5.2)
RBC UA: 0 /HPF (ref 0–4)
SODIUM BLD-SCNC: 139 MMOL/L (ref 136–145)
SPECIFIC GRAVITY UA: 1.01 (ref 1–1.03)
TOTAL PROTEIN: 7.6 G/DL (ref 6.4–8.2)
TROPONIN: 0.04 NG/ML
URINE REFLEX TO CULTURE: ABNORMAL
URINE TYPE: ABNORMAL
UROBILINOGEN, URINE: 0.2 E.U./DL
WBC # BLD: 10.7 K/UL (ref 4–11)
WBC UA: 2 /HPF (ref 0–5)

## 2023-02-07 PROCEDURE — 90935 HEMODIALYSIS ONE EVALUATION: CPT

## 2023-02-07 PROCEDURE — 5A1D70Z PERFORMANCE OF URINARY FILTRATION, INTERMITTENT, LESS THAN 6 HOURS PER DAY: ICD-10-PCS | Performed by: INTERNAL MEDICINE

## 2023-02-07 PROCEDURE — 93010 ELECTROCARDIOGRAM REPORT: CPT | Performed by: INTERNAL MEDICINE

## 2023-02-07 PROCEDURE — 1200000000 HC SEMI PRIVATE

## 2023-02-07 PROCEDURE — 6360000002 HC RX W HCPCS: Performed by: INTERNAL MEDICINE

## 2023-02-07 PROCEDURE — 85025 COMPLETE CBC W/AUTO DIFF WBC: CPT

## 2023-02-07 PROCEDURE — 36415 COLL VENOUS BLD VENIPUNCTURE: CPT

## 2023-02-07 PROCEDURE — 6370000000 HC RX 637 (ALT 250 FOR IP): Performed by: INTERNAL MEDICINE

## 2023-02-07 PROCEDURE — 83605 ASSAY OF LACTIC ACID: CPT

## 2023-02-07 PROCEDURE — 83690 ASSAY OF LIPASE: CPT

## 2023-02-07 PROCEDURE — 87040 BLOOD CULTURE FOR BACTERIA: CPT

## 2023-02-07 PROCEDURE — 83880 ASSAY OF NATRIURETIC PEPTIDE: CPT

## 2023-02-07 PROCEDURE — 84484 ASSAY OF TROPONIN QUANT: CPT

## 2023-02-07 PROCEDURE — 80053 COMPREHEN METABOLIC PANEL: CPT

## 2023-02-07 PROCEDURE — 70450 CT HEAD/BRAIN W/O DYE: CPT

## 2023-02-07 PROCEDURE — 81001 URINALYSIS AUTO W/SCOPE: CPT

## 2023-02-07 PROCEDURE — 93005 ELECTROCARDIOGRAM TRACING: CPT | Performed by: EMERGENCY MEDICINE

## 2023-02-07 PROCEDURE — 71045 X-RAY EXAM CHEST 1 VIEW: CPT

## 2023-02-07 PROCEDURE — 85610 PROTHROMBIN TIME: CPT

## 2023-02-07 PROCEDURE — 99285 EMERGENCY DEPT VISIT HI MDM: CPT

## 2023-02-07 RX ORDER — TORSEMIDE 20 MG/1
10 TABLET ORAL DAILY
Status: DISCONTINUED | OUTPATIENT
Start: 2023-02-07 | End: 2023-02-09

## 2023-02-07 RX ORDER — LIDOCAINE 4 G/G
1 PATCH TOPICAL DAILY
Status: DISCONTINUED | OUTPATIENT
Start: 2023-02-08 | End: 2023-02-07

## 2023-02-07 RX ORDER — VITAMIN B COMPLEX
1000 TABLET ORAL DAILY
Status: DISCONTINUED | OUTPATIENT
Start: 2023-02-07 | End: 2023-02-15 | Stop reason: HOSPADM

## 2023-02-07 RX ORDER — CHOLESTYRAMINE 4 G/9G
1 POWDER, FOR SUSPENSION ORAL DAILY
Status: DISCONTINUED | OUTPATIENT
Start: 2023-02-07 | End: 2023-02-07

## 2023-02-07 RX ORDER — ROSUVASTATIN CALCIUM 20 MG/1
20 TABLET, COATED ORAL DAILY
Status: DISCONTINUED | OUTPATIENT
Start: 2023-02-08 | End: 2023-02-07

## 2023-02-07 RX ORDER — LEVOTHYROXINE SODIUM 0.15 MG/1
150 TABLET ORAL
Status: DISCONTINUED | OUTPATIENT
Start: 2023-02-08 | End: 2023-02-07

## 2023-02-07 RX ORDER — ASPIRIN 81 MG/1
81 TABLET ORAL DAILY
Status: DISCONTINUED | OUTPATIENT
Start: 2023-02-07 | End: 2023-02-15 | Stop reason: HOSPADM

## 2023-02-07 RX ORDER — ONDANSETRON 4 MG/1
4 TABLET, ORALLY DISINTEGRATING ORAL EVERY 8 HOURS PRN
Status: DISCONTINUED | OUTPATIENT
Start: 2023-02-07 | End: 2023-02-15 | Stop reason: HOSPADM

## 2023-02-07 RX ORDER — METOPROLOL SUCCINATE 25 MG/1
25 TABLET, EXTENDED RELEASE ORAL DAILY
Status: DISCONTINUED | OUTPATIENT
Start: 2023-02-07 | End: 2023-02-08

## 2023-02-07 RX ORDER — MAGNESIUM SULFATE IN WATER 40 MG/ML
2000 INJECTION, SOLUTION INTRAVENOUS ONCE
Status: COMPLETED | OUTPATIENT
Start: 2023-02-07 | End: 2023-02-07

## 2023-02-07 RX ADMIN — MUPIROCIN: 20 OINTMENT TOPICAL at 22:16

## 2023-02-07 RX ADMIN — MAGNESIUM SULFATE HEPTAHYDRATE 2000 MG: 40 INJECTION, SOLUTION INTRAVENOUS at 19:08

## 2023-02-07 ASSESSMENT — LIFESTYLE VARIABLES
HOW OFTEN DO YOU HAVE A DRINK CONTAINING ALCOHOL: NEVER
HOW MANY STANDARD DRINKS CONTAINING ALCOHOL DO YOU HAVE ON A TYPICAL DAY: 1 OR 2
HOW OFTEN DO YOU HAVE A DRINK CONTAINING ALCOHOL: MONTHLY OR LESS

## 2023-02-07 NOTE — ED PROVIDER NOTES
[address]  Emergency [address]    Nevin Beckman MD, am the primary clinician of record. CHIEF COMPLAINT  Chief Complaint   Patient presents with    Altered Mental Status     Arrived per [facility] from dialysis (pt resides at home with daughter) d/t altered mental status; tearful in triage; unable to state date, time, birth date which is not normal        Jesus Kemp is a 65 y.o. female  who presents to the ED complaining of altered mental status. History is obtained primarily from EMS. She is tearful and moaning but not really making any sense even though she does not have dysarthria on her initial evaluation. She is moving her arms and legs without difficulty. She was on her way to dialysis this morning and did not get it because of her confusion and was rerouted to the emergency department not having started it. She has a history of hypertension hyperlipidemia diabetes and CAD as well as end-stage renal disease on hemodialysis. She was admitted in December for similar encephalopathy that was felt to be hypertensive versus metabolic after having an unremarkable MRI. At 1 point neurology started her on Dilantin but then later discontinued it thinking it was not likely seizure activity after risk benefits were discussed. No reports of recent fevers. Patient does not complain of pain anywhere in her head chest or belly. No reports of vomiting or diarrhea recently. Daughter later states that the patient was fine last night and was confused this morning and she did attempt to bring the patient in dialysis anyway however she continued to get worse with her disorientation and was brought here instead. The patient was apparently fine yesterday earlier in the evening. The patient is on Neurontin but as far as we know there are no recent changes in dose or any other new medications that she is on.     No other complaints, modifying factors or associated symptoms. I have reviewed the following from the nursing documentation. Past Medical History:   Diagnosis Date    Arthritis     CAD (coronary artery disease)     Diabetes ([facility] [address]     Hemodialysis patient ([facility] [address]     Hyperlipidemia     Hypertension     Kidney disease     Neuropathy     Pneumonia     IN February, 2021, with COVID    Thyroid disease      Past Surgical History:   Procedure Laterality Date    APPENDECTOMY      CHOLECYSTECTOMY      COLONOSCOPY  11/24/2020    COLONOSCOPY POLYPECTOMY SNARE/COLD BIOPSY performed by Shannon Christianson MD at [address] [facility] N/A 05/17/2021    PERITONEAL DIALYSIS CATHETER REMOVAL. [address] performed by Dannis Brunner, DO at [address] [facility] ([facility])      FULL    LAPAROSCOPY INSERTION PERITONEAL CATHETER N/A 04/03/2020    LAPAROSCOPIC LYSIS OF ADHESIONS, LAPAROSCOPIC PERITONEAL DIALYSIS CATHETER PLACEMENT, LAPAROSCOPIC OMENTOPEXY performed by Dannis Brunner, DO at [address] [facility] [address] BILATERAL      preventive    ROTATOR CUFF REPAIR Left     SHUNT REVISION Left 08/19/2021    LEFT FOREARM ARTERIO VENOUS GRAFT performed by Sarah Genao MD at [address] [facility] N/A 11/24/2020    EGD BIOPSY performed by Shannon Christianson MD at [address] [facility]     Family History   Problem Relation Age of Onset    Cancer Mother         lung, breast, liver    Stroke Father     Stomach Cancer Brother     Cancer Maternal Grandmother     No Known Problems Paternal Grandmother     No Known Problems Paternal Grandfather      Social History     Socioeconomic History    Marital status:       Spouse name: Not on file    Number of children: 3    Years of education: Not on file    Highest education level: Not on file   Occupational History    Occupation: retired clerical   Tobacco Use    Smoking status: Former     Packs/day: 0.50     Years: 10.00     Pack years: 5.00 Types: Cigarettes     Quit date: 1978     Years since quittin.6    Smokeless tobacco: Never   Vaping Use    Vaping Use: Never used   Substance and Sexual Activity    Alcohol use: Not Currently     Comment: every other week one glass of wine    Drug use: Never    Sexual activity: Not Currently   Other Topics Concern    Not on file   Social History Narrative    Lives with son--recently moved from [facility] and now here    [facility]     Social Determinants of Health     Financial Resource Strain: Low Risk     Difficulty of Paying Living Expenses: Not hard at all   Food Insecurity: No Food Insecurity    Worried About Running Out of Food in the Last Year: Never true    [address] in the Last Year: Never true   Transportation Needs: Not on file   Physical Activity: Sufficiently Active    Days of Exercise per Week: 7 days    Minutes of Exercise per Session: 40 min   Stress: Not on file   Social Connections: Not on file   Intimate Partner Violence: Not on file   Housing Stability: Not on file     No current facility-administered medications for this encounter. Current Outpatient Medications   Medication Sig Dispense Refill    torsemide (DEMADEX) 10 MG tablet Take 10 mg by mouth daily      mupirocin (BACTROBAN) 2 % ointment Apply topically 3 times daily Apply topically 3 times daily. cholestyramine (QUESTRAN) 4 g packet Take 1 packet by mouth daily 90 packet 3    levothyroxine (SYNTHROID) 150 MCG tablet Take 1 tablet by mouth every morning (before breakfast) 90 tablet 3    ondansetron (ZOFRAN-ODT) 4 MG disintegrating tablet Take 1 tablet by mouth every 8 hours as needed for Nausea or Vomiting 20 tablet 0    lidocaine (LIDODERM) 5 % Place 1 patch onto the skin daily 12 hours on, 12 hours off.  30 patch 0    vitamin D3 (CHOLECALCIFEROL) 25 MCG (1000 UT) TABS tablet Take 1 tablet by mouth daily 90 tablet 1    ASPIRIN LOW DOSE 81 MG EC tablet TAKE 1 TABLET BY MOUTH EVERY DAY 90 tablet 1    rosuvastatin (CRESTOR) 20 MG tablet TAKE 1 TABLET BY MOUTH EVERY DAY 90 tablet 1    metoprolol succinate (TOPROL XL) 25 MG extended release tablet TAKE 1 TABLET EVERY DAY 90 tablet 3     Allergies   Allergen Reactions    Amoxicillin Anaphylaxis    Demeclocycline Anaphylaxis    Penicillins Anaphylaxis    Tetracyclines & Related Anaphylaxis    Ozempic (0.25 Or 0.5 Mg-Dose) [Semaglutide(0.25 Or 0.5mg-Dos)] Other (See Comments)     Lost large amount weight and loss  Of appetite    Codeine Itching and Rash       REVIEW OF SYSTEMS  10 systems reviewed, pertinent positives per HPI otherwise noted to be negative. PHYSICAL EXAM  /69   Pulse 74   Temp 97.5 °F (36.4 °C) (Oral)   Resp 19   LMP  (LMP Unknown)   SpO2 100%    GENERAL APPEARANCE: Awake and alert. Cooperative. Moaning, confused, mild distress. HENT: Normocephalic. Atraumatic. Mucous membranes are dry. NECK: Supple. EYES: PERRL. EOM's grossly intact. HEART/CHEST: RRR. No murmurs. No chest wall tenderness. LUNGS: Respirations unlabored. CTAB. Good air exchange. ABDOMEN: No tenderness. Soft. Mildly distended. No masses. No organomegaly. No guarding or rebound. Normal bowel sounds throughout. MUSCULOSKELETAL: No extremity edema. Compartments soft. No deformity. No tenderness in the extremities. All extremities neurovascularly intact. SKIN: Warm and dry. No acute rashes. NEUROLOGICAL: Alert and moaning, confused and disoriented, GCS 14. CN's 2-12 intact. No gross facial drooping. Strength 5/5, sensation intact. 2 plus DTR's in knees bilaterally. Gait not assessed. PSYCHIATRIC: Normal mood and affect. LABS  I have personally reviewed all labs for this visit.    Results for orders placed or performed during the hospital encounter of 02/07/23   CBC with Auto Differential   Result Value Ref Range    WBC 10.7 4.0 - 11.0 K/uL    RBC 3.43 (L) 4.00 - 5.20 M/uL    Hemoglobin 10.9 (L) 12.0 - 16.0 g/dL    Hematocrit 33.6 (L) 36.0 - 48.0 %    MCV 97.9 80.0 - 100.0 fL    MCH 31.7 26.0 - 34.0 pg    MCHC 32.4 31.0 - 36.0 g/dL    RDW 16.6 (H) 12.4 - 15.4 %    Platelets 107 121 - 378 K/uL    MPV 8.6 5.0 - 10.5 fL    Neutrophils % 74.0 %    Lymphocytes % 15.2 %    Monocytes % 7.6 %    Eosinophils % 2.6 %    Basophils % 0.6 %    Neutrophils Absolute 7.9 (H) 1.7 - 7.7 K/uL    Lymphocytes Absolute 1.6 1.0 - 5.1 K/uL    Monocytes Absolute 0.8 0.0 - 1.3 K/uL    Eosinophils Absolute 0.3 0.0 - 0.6 K/uL    Basophils Absolute 0.1 0.0 - 0.2 K/uL   Comprehensive Metabolic Panel w/ Reflex to MG   Result Value Ref Range    Sodium 139 136 - 145 mmol/L    Potassium reflex Magnesium 4.5 3.5 - 5.1 mmol/L    Chloride 98 (L) 99 - 110 mmol/L    CO2 25 21 - 32 mmol/L    Anion Gap 16 3 - 16    Glucose 106 (H) 70 - 99 mg/dL    BUN 58 (H) 7 - 20 mg/dL    Creatinine 9.7 (HH) 0.6 - 1.2 mg/dL    Est, Glom Filt Rate 4 (A) >60    Calcium 10.2 8.3 - 10.6 mg/dL    Total Protein 7.6 6.4 - 8.2 g/dL    Albumin 4.8 3.4 - 5.0 g/dL    Albumin/Globulin Ratio 1.7 1.1 - 2.2    Total Bilirubin 0.3 0.0 - 1.0 mg/dL    Alkaline Phosphatase 123 40 - 129 U/L    ALT 14 10 - 40 U/L    AST 15 15 - 37 U/L   Protime-INR   Result Value Ref Range    Protime 13.7 11.7 - 14.5 sec    INR 1.06 0.87 - 1.14   Troponin   Result Value Ref Range    Troponin 0.04 (H) <0.01 ng/mL   Brain Natriuretic Peptide   Result Value Ref Range    Pro-BNP 4,765 (H) 0 - 449 pg/mL   Urinalysis with Reflex to Culture    Specimen: Urine   Result Value Ref Range    Color, UA Yellow Straw/Yellow    Clarity, UA Clear Clear    Glucose, Ur Negative Negative mg/dL    Bilirubin Urine Negative Negative    Ketones, Urine Negative Negative mg/dL    Specific Gravity, UA 1.010 1.005 - 1.030    Blood, Urine SMALL (A) Negative    pH, UA 8.0 5.0 - 8.0    Protein,  (A) Negative mg/dL    Urobilinogen, Urine 0.2 <2.0 E.U./dL    Nitrite, Urine Negative Negative    Leukocyte Esterase, Urine Negative Negative    Microscopic Examination YES     Urine Type NotGiven Urine Reflex to Culture Not Indicated    Lactate, Sepsis   Result Value Ref Range    Lactic Acid, Sepsis 1.7 0.4 - 1.9 mmol/L   Lipase   Result Value Ref Range    Lipase 91.0 (H) 13.0 - 60.0 U/L   Microscopic Urinalysis   Result Value Ref Range    Bacteria, UA None Seen None Seen /HPF    Hyaline Casts, UA 0 0 - 8 /LPF    WBC, UA 2 0 - 5 /HPF    RBC, UA 0 0 - 4 /HPF    Epithelial Cells, UA 2 0 - 5 /HPF   EKG 12 Lead   Result Value Ref Range    Ventricular Rate 72 BPM    Atrial Rate 72 BPM    P-R Interval 172 ms    QRS Duration 78 ms    Q-T Interval 424 ms    QTc Calculation (Bazett) 464 ms    P Axis 39 degrees    R Axis -14 degrees    T Axis 32 degrees    Diagnosis       Normal sinus rhythmMinimal voltage criteria for LVH, may be normal variantBorderline ECG       EKG performed in ED:  The 12 lead EKG was interpreted by me independent of a cardiologist as follows:  Rate: normal with a rate of 72  Rhythm: sinus  Axis: normal  Intervals: normal WI, narrow QRS, normal QTc  ST segments: no ST elevations or depressions  T waves: no abnormal inversions  Non-specific T wave changes: not present  Prior EKG comparison: EKG dated 12/22/22 is not significantly different    RADIOLOGY  Any applicable radiology studies including x-ray, CT, MRI, and/or ultrasound, were reviewed independently by me in addition to the radiologist.  I reviewed all radiology images and reports as well from this evaluation. CT HEAD WO CONTRAST    Result Date: 2/7/2023  No acute intracranial abnormality. XR CHEST PORTABLE    Result Date: 2/7/2023  1. No acute abnormality. ED COURSE/MDM  Patient seen and evaluated. Old records reviewed. Labs and imaging reviewed.     After initial evaluation, differential diagnostic considerations included but not limited to: stroke, TIA, intracranial bleed, trauma, infection/sepsis, medication side effect, intoxication/withdrawal, metabolic/electrolyte abnormalities    The patient's ED workup was notable for acute encephalopathy. Etiology unclear, similar to admission she had in December 2022. It was felt to be potentially metabolic versus hypertensive. He was initially quite hypertensive here as well. A head CT was obtained which rules out significant intracranial findings such as bleed. Stroke is not ruled out by head CT however she has not been focalizing on her symptoms to suggest this and seems to be more of a generalized encephalopathy. She is not frankly aphasic or dysarthric. Her blood pressure actually improved to the 177C systolic without intervention and as such I did not administer any antihypertensives here that was in the 200s at 1 point here. She does have a BUN of 58 so uremic encephalopathy is a consideration. She did not get dialysis today however she is not hyperkalemic and does not appear to have hypoglycemia with a sugar of 106. Troponin is mildly elevated but in keeping with previous values and symptoms are not consistent with angina. Infection and sepsis felt to be unlikely without a leukocytosis fever or lactate elevation or source of infection identified or suspected. She will be admitted for further evaluation and nephrology was consulted. Polypharmacy is also a consideration or medication side effect as she took a dose of Neurontin last night however we do not know of any changes of dose at this time and it would be unusual for that effect to linger into today so profoundly unless she took more/different medication this morning that we are unaware of currently. Is this patient to be included in the SEP-1 Core Measure? No   Exclusion criteria - the patient is NOT to be included for SEP-1 Core Measure due to: Infection is not suspected      Consults:  Dr. Dasia Perez from nephrology was consulted about the patient's ED history, physical, workup, and course so far. Recommendations from this consultant included he will evaluate and arrange inpt HD when able.     History obtained from: Patient, Family (daughter), EMS, and Chart review (due to patient condition and lack of collateral history immediately available)    Pertinent social determinants of health: None applicable    Chronic conditions potentially affecting care:  ESRD on HD, HTN    Review of other records:  Inpatient notes from previous visit at this facility from December 2022, including neurology notes by Dr. Yuriy Abarca    Reassessment:  See MDM for details of medications given and reassessment       CLINICAL IMPRESSION  1. Acute encephalopathy    2. ESRD on hemodialysis (HCC)        Blood pressure 124/69, pulse 74, temperature 97.5 °F (36.4 °C), temperature source Oral, resp. rate 19, SpO2 100 %, not currently breastfeeding. DISPOSITION  Francheska Bueno was admitted in fair condition. The plan is to admit to the hospital at this time under the nursing home / SNF admitting service. Dr. Denise Santiago accepted the patient and will take over the patient's care. Critical care time:  The total critical care time I independently spent while evaluating and treating this patient was 40 minutes. This excludes time spent doing separately billable procedures. This includes time at the bedside, data interpretation, medication management, obtaining critical history from collateral sources if the patient is unable to provide it directly, and physician consultation. Specifics of interventions taken and potentially life-threatening diagnostic considerations are listed above in the medical decision making. If this was a shared visit with an SHIRAZ, the time in this attestation is non-concurrent critical care time out of the total shared critical care time provided by the SHIRAZ and myself. DISCLAIMER: This chart was created using Dragon dictation software. Efforts were made by me to ensure accuracy, however some errors may be present due to limitations of this technology and occasionally words are not transcribed correctly.         Isabella Jolley Yasir Siegel MD  02/07/23 0545

## 2023-02-07 NOTE — ED NOTES
Pt daughter Musa Darling) leaving at this time. Requests call upon admission.  Best reached at [address] [facility]  02/07/23 1100

## 2023-02-07 NOTE — PROGRESS NOTES
Hospital Problems             Last Modified POA    * (Principal) Acute encephalopathy 2/7/2023 Yes    Hypertension 2/7/2023 Yes    Anemia, chronic disease 2/7/2023 Yes    Hemodialysis patient ([facility] [address] 2/7/2023 Yes    Toxic encephalopathy 2/7/2023 Yes    Neuropathy 2/7/2023 Yes    CAD (coronary artery disease) 2/7/2023 Yes   H&P dictated

## 2023-02-07 NOTE — PROGRESS NOTES
Patient seen on [address], room 372. Admission questions for patient reviewed with daughter Corina Gan who is at bedside. Patient is experiencing altered mental status and confusion and was not able to state name, date of birth, year, or month. Admission completed except for: 4 Eyes Assessment, Immunizations, Covid Vaccines, Rights and Responsibilities, Orientation to room, Plan of Care, education, white board, height and weight, pain assessment and head to toe assessment. Patient is alert but is disoriented and not speaking clearly at times. Patient lives at [facility] independent living alone and is being admitted for acute encephalopathy. Home Medication Reconciliation has been verbally reviewed with daughter Genet Costa and updated as appropriate. It continues to be marked as \"In process\" as the verification with pharmacy has not been completed. Plan of care updated if indicated. All questions answered. No B/p or IV sticks in left arm.

## 2023-02-07 NOTE — DISCHARGE INSTRUCTIONS
[facility], [address] [place] [address] Road  Telephone: [phone]     Discharge Instructions     Important reminders:     **If you have any signs and symptoms of illness (Cough, fever, congestion, nausea, vomiting, diarrhea, etc.) please call the wound care center prior to your appointment. 1. Increase Protein intake for optimal wound healing  2. No added salt to reduce any swelling  3. If diabetic, maintain good glucose control  4. If you smoke, smoking prohibits wound healing, we ask that you refrain from smoking. 5. When taking antibiotics take the entire prescription as ordered. Do not stop taking until medication is all gone unless otherwise instructed. 6. Exercise as tolerated. 7. Keep weight off wounds and reposition every 2 hours if applicable. 8. If wound(s) is on your lower extremity, elevate legs to the level of the heart or above for 30 minutes 4-5 times a day and/or when sitting. Avoid standing for long periods of time. 9. Do not get wounds wet in bath or shower unless otherwise instructed by your physician. If your wound is on your foot or leg, you may purchase a cast bag. Please ask at the pharmacy. If Vascular testing is ordered, please call [address] [place] [address] [phone]) to schedule. Vascular tests ordered by Wound Care Physicians may take up to 2 hours to complete. Please keep that in mind when scheduling. If Vascular testing is scheduled, please bring supplies to replace your dressing after testing is done. The vascular department does not stock supplies. Wound: Right wrist      With each dressing change, rinse wounds with 0.9% Saline. (May use wound wash or soft contact solution. Both can be purchased at a local drug store). If unable to obtain saline, may use a gentle soap and water. Dressing care: Mupirocin ointment, dry dressing (gauze & tape)- change daily. Do not use alcohol to clean.      Home Care Agency/Facility:      Your wound-care supplies will be provided by: [address] f: [phone] f: [phone] p: [phone] [email or web address]    Please note, depending on your insurance coverage, you may have out-of-pocket expenses for these supplies. Someone from the company should call you to confirm your order and discuss those potential costs before they ship your products -- please anticipate that call. If your out-of-pocket cost could be substantial, Many companies have financial hardship programs for patients who qualify, so please ask about that if you might need a hand. If you have any questions about your supplies or your potential out-of-pocket costs, or if you need to place an order for a refill of supplies (typically monthly), please call the company directly. Your  is Calderon Manzano     Follow up with Dr Kaleb Hutchison In 1 week(s) in the wound care center. Wound Care Center Information: Should you experience any significant changes in your wound(s) or have questions about your wound care, please contact the [facility] at [phone] Monday  - Thursday 8:00 am - 4:00 pm and Friday 8:00 am - 1:00pm. If you need help with your wound outside these hours and cannot wait until we are again available, contact your PCP or go to the hospital emergency room. PLEASE NOTE: IF YOU ARE UNABLE TO OBTAIN WOUND SUPPLIES, CONTINUE TO USE THE SUPPLIES YOU HAVE AVAILABLE UNTIL YOU ARE ABLE TO REACH US. IT IS MOST IMPORTANT TO KEEP THE WOUND COVERED AT ALL TIMES. Patient Experience     Thank you for trusting us with your care. You may receive a survey from a company called [employer] asking for your feedback. We would appreciate it if you took a few minutes to share your experience. Your input is very valuable to us.

## 2023-02-07 NOTE — CONSULTS
Office : [phone]     Fax :[phone]       Nephrology Consult Note      Patient's Name: Mery Rg  1:19 PM  2/7/2023    Reason for Consult:  ESRD management       Requesting Physician:  Joseph Mcclure MD      Chief Complaint:    Chief Complaint   Patient presents with    Altered Mental Status     Arrived per  EMS from dialysis (pt resides at home with daughter) d/t altered mental status; tearful in triage; unable to state date, time, birth date which is not normal             History of Present Ilness:    Mery Rg is a 65 y.o. female with prior history of ESRD, HTN, DM2 came to dialysis unit this morning and was very confused. Her daughter was with her. She was send via EMS service to Emergency room. at present she is lethargic. Daughter mentioned that she did not sleep for last 2 days. She has taken gabapentin last night. Daughter does not know the dose. She did not get her dialysis today       No intake/output data recorded. Past Medical History:   Diagnosis Date    Arthritis     CAD (coronary artery disease)     Diabetes ([facility]     Hemodialysis patient ([facility]     Hyperlipidemia     Hypertension     Kidney disease     Neuropathy     Pneumonia     IN February, 2021, with COVID    Thyroid disease        Past Surgical History:   Procedure Laterality Date    APPENDECTOMY      CHOLECYSTECTOMY      COLONOSCOPY  11/24/2020    COLONOSCOPY POLYPECTOMY SNARE/COLD BIOPSY performed by John Washburn MD at [facility] N/A 05/17/2021    PERITONEAL DIALYSIS CATHETER REMOVAL.  [address]. performed by Danny Acuna DO at [address] [facility] ([facility])      FULL LAPAROSCOPY INSERTION PERITONEAL CATHETER N/A 04/03/2020    LAPAROSCOPIC LYSIS OF ADHESIONS, LAPAROSCOPIC PERITONEAL DIALYSIS CATHETER PLACEMENT, LAPAROSCOPIC OMENTOPEXY performed by Jerilyn Lino DO at [address] [facility] [address] BILATERAL      preventive    ROTATOR CUFF REPAIR Left     SHUNT REVISION Left 08/19/2021    LEFT FOREARM ARTERIO VENOUS GRAFT performed by Moni Alegre MD at [address] [facility] N/A 11/24/2020    EGD BIOPSY performed by Gertrude Roberts MD at [address] [facility]       Family History   Problem Relation Age of Onset    Cancer Mother         lung, breast, liver    Stroke Father     Stomach Cancer Brother     Cancer Maternal Grandmother     No Known Problems Paternal Grandmother     No Known Problems Paternal Grandfather         reports that she quit smoking about 44 years ago. Her smoking use included cigarettes. She has a 5.00 pack-year smoking history. She has never used smokeless tobacco. She reports that she does not currently use alcohol. She reports that she does not use drugs. Allergies:  Amoxicillin, Demeclocycline, Penicillins, Tetracyclines & related, Ozempic (0.25 or 0.5 mg-dose) [semaglutide(0.25 or 0.5mg-dos)], and Codeine    Current Medications:    No current facility-administered medications for this encounter.       Review of Systems:   Could not be obtained       Physical exam:     Vitals:  BP (!) 158/84   Pulse 65   Temp 97.5 °F (36.4 °C)   Resp 19   LMP  (LMP Unknown)   SpO2 100%   Constitutional:  lethargic   Skin: no rash, turgor wnl  Heent:  eomi, mmm  Neck: no bruits or jvd noted  Cardiovascular:  S1, S2 without m/r/g  Respiratory: CTA B without w/r/r  Abdomen:  +bs, soft, nt, nd  Ext: no  lower extremity edema  Psychiatric: deferred   Musculoskeletal:  deferred     Labs:  CBC:   Recent Labs     02/07/23  0750   WBC 10.7   HGB 10.9*        BMP:    Recent Labs     02/07/23  0750      K 4.5   CL 98*   CO2 25   BUN 58* CREATININE 9.7*   GLUCOSE 106*     Ca/Mg/Phos:   Recent Labs     02/07/23  0750   CALCIUM 10.2     Hepatic:   Recent Labs     02/07/23  0750   AST 15   ALT 14   BILITOT 0.3   ALKPHOS 123     Troponin:   Recent Labs     02/07/23  0750   TROPONINI 0.04*     BNP: No results for input(s): BNP in the last 72 hours. Lipids: No results for input(s): CHOL, TRIG, HDL, LDLCALC, LABVLDL in the last 72 hours. ABGs: No results for input(s): PHART, PO2ART, NOR8HEE in the last 72 hours. INR:   Recent Labs     02/07/23  0750   INR 1.06     UA:  Recent Labs     02/07/23  0844   COLORU Yellow   CLARITYU Clear   GLUCOSEU Negative   BILIRUBINUR Negative   KETUA Negative   SPECGRAV 1.010   BLOODU SMALL*   PHUR 8.0   PROTEINU 100*   UROBILINOGEN 0.2   NITRU Negative   LEUKOCYTESUR Negative   LABMICR YES   URINETYPE NotGiven      Urine Microscopic:   Recent Labs     02/07/23  0844   BACTERIA None Seen   HYALCAST 0   WBCUA 2   RBCUA 0   EPIU 2     Urine Culture: No results for input(s): LABURIN in the last 72 hours. Urine Chemistry: No results for input(s): Debora Sandman, PROTEINUR, NAUR in the last 72 hours. IMAGING:  CT HEAD WO CONTRAST   Final Result   No acute intracranial abnormality. XR CHEST PORTABLE   Final Result   1. No acute abnormality. Assessment/Plan :      1. ESRD   On HD tue/ thu/sat   Will do HD today     2. HTN  Controlled     3. AMS. Likely 2/2 meds  Hold gabapentin   Dialysis today     4. Acid- base disorder. Correct with HD     5.  Electrolytes  Monitor     Dialysis nurse notified             D/w primary team      Thank you for allowing us to participate in care of [facility]         Electronically signed by: Asha Pedraza MD, 2/7/2023, 1:19 PM      [facility] [address]  Office : [phone]  Fax :[phone]

## 2023-02-07 NOTE — FLOWSHEET NOTE
Treatment time: 1.5 hr completed     Net UF: 1 liter     Pre weight: MALIK   Post weight: MALIK  EDW: tbd    Access used: Left FA AVG  Access function: good     Medications or blood products given: none     Regular outpatient schedule: TTS     Summary of response to treatment: 1 liter removed. Pt tolerated tx fair, pt de cannulated access X1 with arm movements.  Post VSS, Report called to floor nurse pt going to 2030   Copy of dialysis treatment record placed in chart, to be scanned into EMR.      02/07/23 1234 02/07/23 1415   Vital Signs   BP (!) 158/84 (!) 125/58   Temp 97.5 °F (36.4 °C) 97.4 °F (36.3 °C)   Heart Rate 65 65   Post-Hemodialysis Assessment   Hemodialysis Intake (ml)  --  400 ml   Hemodialysis Output (ml)  --  1400 ml   NET Removed (ml)  --  1000   Tolerated Treatment  --  Good

## 2023-02-08 ENCOUNTER — HOSPITAL ENCOUNTER (OUTPATIENT)
Dept: WOUND CARE | Age: 78
Discharge: HOME | End: 2023-02-08

## 2023-02-08 LAB
ANION GAP SERPL CALCULATED.3IONS-SCNC: 16 MMOL/L (ref 3–16)
BUN BLDV-MCNC: 44 MG/DL (ref 7–20)
CALCIUM SERPL-MCNC: 9.8 MG/DL (ref 8.3–10.6)
CHLORIDE BLD-SCNC: 100 MMOL/L (ref 99–110)
CO2: 23 MMOL/L (ref 21–32)
CREAT SERPL-MCNC: 8.2 MG/DL (ref 0.6–1.2)
GFR SERPL CREATININE-BSD FRML MDRD: 5 ML/MIN/{1.73_M2}
GLUCOSE BLD-MCNC: 106 MG/DL (ref 70–99)
GLUCOSE BLD-MCNC: 92 MG/DL (ref 70–99)
HCT VFR BLD CALC: 33.6 % (ref 36–48)
HEMOGLOBIN: 11 G/DL (ref 12–16)
MCH RBC QN AUTO: 31.1 PG (ref 26–34)
MCHC RBC AUTO-ENTMCNC: 32.7 G/DL (ref 31–36)
MCV RBC AUTO: 95.2 FL (ref 80–100)
PDW BLD-RTO: 16.4 % (ref 12.4–15.4)
PERFORMED ON: ABNORMAL
PLATELET # BLD: 306 K/UL (ref 135–450)
PMV BLD AUTO: 7.9 FL (ref 5–10.5)
POTASSIUM SERPL-SCNC: 4.7 MMOL/L (ref 3.5–5.1)
RBC # BLD: 3.53 M/UL (ref 4–5.2)
SODIUM BLD-SCNC: 139 MMOL/L (ref 136–145)
WBC # BLD: 9.3 K/UL (ref 4–11)

## 2023-02-08 PROCEDURE — 85027 COMPLETE CBC AUTOMATED: CPT

## 2023-02-08 PROCEDURE — 2500000003 HC RX 250 WO HCPCS: Performed by: INTERNAL MEDICINE

## 2023-02-08 PROCEDURE — 36415 COLL VENOUS BLD VENIPUNCTURE: CPT

## 2023-02-08 PROCEDURE — 80048 BASIC METABOLIC PNL TOTAL CA: CPT

## 2023-02-08 PROCEDURE — 1200000000 HC SEMI PRIVATE

## 2023-02-08 PROCEDURE — 6360000002 HC RX W HCPCS: Performed by: INTERNAL MEDICINE

## 2023-02-08 PROCEDURE — 93005 ELECTROCARDIOGRAM TRACING: CPT | Performed by: INTERNAL MEDICINE

## 2023-02-08 PROCEDURE — 90935 HEMODIALYSIS ONE EVALUATION: CPT

## 2023-02-08 PROCEDURE — 2580000003 HC RX 258

## 2023-02-08 PROCEDURE — 2500000003 HC RX 250 WO HCPCS: Performed by: PHYSICIAN ASSISTANT

## 2023-02-08 RX ORDER — METOPROLOL TARTRATE 5 MG/5ML
5 INJECTION INTRAVENOUS EVERY 6 HOURS PRN
Status: DISCONTINUED | OUTPATIENT
Start: 2023-02-08 | End: 2023-02-15 | Stop reason: HOSPADM

## 2023-02-08 RX ORDER — WATER 1000 ML/1000ML
INJECTION, SOLUTION INTRAVENOUS
Status: COMPLETED
Start: 2023-02-08 | End: 2023-02-08

## 2023-02-08 RX ORDER — ACETAMINOPHEN 325 MG/1
650 TABLET ORAL EVERY 4 HOURS PRN
Status: DISCONTINUED | OUTPATIENT
Start: 2023-02-08 | End: 2023-02-15 | Stop reason: HOSPADM

## 2023-02-08 RX ORDER — HEPARIN SODIUM 1000 [USP'U]/ML
5000 INJECTION, SOLUTION INTRAVENOUS; SUBCUTANEOUS ONCE
Status: DISCONTINUED | OUTPATIENT
Start: 2023-02-08 | End: 2023-02-09

## 2023-02-08 RX ORDER — METOPROLOL SUCCINATE 50 MG/1
50 TABLET, EXTENDED RELEASE ORAL DAILY
Status: DISCONTINUED | OUTPATIENT
Start: 2023-02-08 | End: 2023-02-15 | Stop reason: HOSPADM

## 2023-02-08 RX ORDER — DIAZEPAM 5 MG/ML
5 INJECTION, SOLUTION INTRAMUSCULAR; INTRAVENOUS ONCE
Status: DISCONTINUED | OUTPATIENT
Start: 2023-02-08 | End: 2023-02-08

## 2023-02-08 RX ORDER — HYDRALAZINE HYDROCHLORIDE 20 MG/ML
20 INJECTION INTRAMUSCULAR; INTRAVENOUS EVERY 4 HOURS PRN
Status: DISCONTINUED | OUTPATIENT
Start: 2023-02-08 | End: 2023-02-08

## 2023-02-08 RX ORDER — FAMOTIDINE 20 MG/1
10 TABLET, FILM COATED ORAL DAILY
Status: DISCONTINUED | OUTPATIENT
Start: 2023-02-08 | End: 2023-02-15 | Stop reason: HOSPADM

## 2023-02-08 RX ORDER — DIPHENHYDRAMINE HYDROCHLORIDE 50 MG/ML
25 INJECTION INTRAMUSCULAR; INTRAVENOUS EVERY 6 HOURS PRN
Status: DISCONTINUED | OUTPATIENT
Start: 2023-02-08 | End: 2023-02-15 | Stop reason: HOSPADM

## 2023-02-08 RX ORDER — HYDROCODONE BITARTRATE AND ACETAMINOPHEN 5; 325 MG/1; MG/1
1 TABLET ORAL EVERY 6 HOURS PRN
Status: DISCONTINUED | OUTPATIENT
Start: 2023-02-08 | End: 2023-02-15 | Stop reason: HOSPADM

## 2023-02-08 RX ORDER — METOPROLOL TARTRATE 5 MG/5ML
5 INJECTION INTRAVENOUS EVERY 6 HOURS PRN
Status: DISCONTINUED | OUTPATIENT
Start: 2023-02-08 | End: 2023-02-08

## 2023-02-08 RX ORDER — OLANZAPINE 10 MG/1
5 INJECTION, POWDER, LYOPHILIZED, FOR SOLUTION INTRAMUSCULAR
Status: DISCONTINUED | OUTPATIENT
Start: 2023-02-08 | End: 2023-02-08

## 2023-02-08 RX ORDER — HYDROMORPHONE HYDROCHLORIDE 1 MG/ML
0.5 INJECTION, SOLUTION INTRAMUSCULAR; INTRAVENOUS; SUBCUTANEOUS EVERY 4 HOURS PRN
Status: DISCONTINUED | OUTPATIENT
Start: 2023-02-08 | End: 2023-02-15 | Stop reason: HOSPADM

## 2023-02-08 RX ORDER — DEXAMETHASONE SODIUM PHOSPHATE 4 MG/ML
4 INJECTION, SOLUTION INTRA-ARTICULAR; INTRALESIONAL; INTRAMUSCULAR; INTRAVENOUS; SOFT TISSUE EVERY 6 HOURS
Status: COMPLETED | OUTPATIENT
Start: 2023-02-08 | End: 2023-02-09

## 2023-02-08 RX ORDER — OLANZAPINE 10 MG/1
5 INJECTION, POWDER, LYOPHILIZED, FOR SOLUTION INTRAMUSCULAR
Status: COMPLETED | OUTPATIENT
Start: 2023-02-08 | End: 2023-02-08

## 2023-02-08 RX ORDER — ZIPRASIDONE MESYLATE 20 MG/ML
20 INJECTION, POWDER, LYOPHILIZED, FOR SOLUTION INTRAMUSCULAR EVERY 6 HOURS PRN
Status: DISCONTINUED | OUTPATIENT
Start: 2023-02-08 | End: 2023-02-15 | Stop reason: HOSPADM

## 2023-02-08 RX ADMIN — MUPIROCIN: 20 OINTMENT TOPICAL at 22:24

## 2023-02-08 RX ADMIN — OLANZAPINE 5 MG: 10 INJECTION, POWDER, FOR SOLUTION INTRAMUSCULAR at 03:17

## 2023-02-08 RX ADMIN — DIPHENHYDRAMINE HYDROCHLORIDE 25 MG: 50 INJECTION, SOLUTION INTRAMUSCULAR; INTRAVENOUS at 17:18

## 2023-02-08 RX ADMIN — HYDRALAZINE HYDROCHLORIDE 20 MG: 20 INJECTION INTRAMUSCULAR; INTRAVENOUS at 15:56

## 2023-02-08 RX ADMIN — WATER 10 ML: 1 INJECTION INTRAMUSCULAR; INTRAVENOUS; SUBCUTANEOUS at 03:17

## 2023-02-08 RX ADMIN — MUPIROCIN: 20 OINTMENT TOPICAL at 15:56

## 2023-02-08 RX ADMIN — HYDROMORPHONE HYDROCHLORIDE 0.5 MG: 1 INJECTION, SOLUTION INTRAMUSCULAR; INTRAVENOUS; SUBCUTANEOUS at 18:40

## 2023-02-08 RX ADMIN — MUPIROCIN: 20 OINTMENT TOPICAL at 08:30

## 2023-02-08 RX ADMIN — WATER: 1 INJECTION INTRAMUSCULAR; INTRAVENOUS; SUBCUTANEOUS at 10:20

## 2023-02-08 RX ADMIN — DEXAMETHASONE SODIUM PHOSPHATE 4 MG: 4 INJECTION, SOLUTION INTRAMUSCULAR; INTRAVENOUS at 17:18

## 2023-02-08 RX ADMIN — ZIPRASIDONE MESYLATE 20 MG: 20 INJECTION, POWDER, LYOPHILIZED, FOR SOLUTION INTRAMUSCULAR at 09:42

## 2023-02-08 RX ADMIN — METOPROLOL TARTRATE 5 MG: 5 INJECTION, SOLUTION INTRAVENOUS at 22:22

## 2023-02-08 ASSESSMENT — PAIN SCALES - GENERAL
PAINLEVEL_OUTOF10: 0
PAINLEVEL_OUTOF10: 10

## 2023-02-08 NOTE — DIALYSIS
Soft bilateral upper limb restraints applied to Pt for her safety during HD TX. PT is flailing her arms and trying to remove her HD needles.

## 2023-02-08 NOTE — PROGRESS NOTES
Pt daughter will be bringing in all 3 pages of the med list in am. Pt not following instructions safely enough to take any meds overnight anyway and daughter agreeable and aware.

## 2023-02-08 NOTE — DIALYSIS
3 hour HD TX completed as ordered. PT tolerated TX well, VSS but hypertensive from pre assessment. 1000ml total net UF removed. No change in PT condition from prior assessment. Restraints removed.

## 2023-02-08 NOTE — PROGRESS NOTES
This RN heard patient screaming. RN into room, pt speech unintelligible most of time and unable to follow commands. Pt able to get out \"please\", \"ouch\" and \"help\". Pt pointing to throat and this RN attempted to give water and patient spit it out at RN. Notified MD of patient current condition. Awaiting new orders for pt agitation.

## 2023-02-08 NOTE — PROGRESS NOTES
Pt daughter called RN stating patient was having chest pain. Upon assessment pt was flushed, tachypneic and slightly tachycardic. Pt stating ouch when RN touched other parts of body including abdomen and legs as well as chest. Stat EKG ordered, MD notified. This RN suspected possible allergic reaction to hydralazine which was a new medication, Dr. Ester Boyer ordered IV benadryl and decadron and this RN given. Pt left in stable condition.

## 2023-02-08 NOTE — PROGRESS NOTES
Office : [phone]     Fax :[phone]       Nephrology progress Note      Patient's Name: Nicki Day  8:47 AM  2/8/2023    Reason for Consult:  ESRD management       Requesting Physician:  Mica Alexandra MD      Chief Complaint:    Chief Complaint   Patient presents with    Altered Mental Status     Arrived per [facility] from dialysis (pt resides at home with daughter) d/t altered mental status; tearful in triage; unable to state date, time, birth date which is not normal             History of Present Ilness:    Nicki Day is a 65 y.o. female with prior history of ESRD, HTN, DM2 came to dialysis unit this morning and was very confused. Her daughter was with her. She was send via EMS service to Emergency room. at present she is lethargic. Daughter mentioned that she did not sleep for last 2 days. She has taken gabapentin last night. Daughter does not know the dose. She did not get her dialysis today     Interval hx     Had HD yesterday but only for 1.5 hrs    Still confused   No SOB       I/O last 3 completed shifts: In: 400   Out: 0     Past Medical History:   Diagnosis Date    Arthritis     CAD (coronary artery disease)     Diabetes ([facility]     Hemodialysis patient ([facility] [facility]     fistula left arm    Hyperlipidemia     Hypertension     Kidney disease     Neuropathy     Pneumonia     IN February, 2021, with COVID    Thyroid disease        Past Surgical History:   Procedure Laterality Date    APPENDECTOMY      CHOLECYSTECTOMY      COLONOSCOPY  11/24/2020    COLONOSCOPY POLYPECTOMY SNARE/COLD BIOPSY performed by Danielle Weems MD at [address] [facility] N/A 05/17/2021    PERITONEAL DIALYSIS CATHETER REMOVAL. [address]. performed by Kevin Mendes DO at [address] [facility] ([facility])      FULL    LAPAROSCOPY INSERTION PERITONEAL CATHETER N/A 04/03/2020    LAPAROSCOPIC LYSIS OF ADHESIONS, LAPAROSCOPIC PERITONEAL DIALYSIS CATHETER PLACEMENT, LAPAROSCOPIC OMENTOPEXY performed by Kevin Mendes DO at [address] BILATERAL      preventive    ROTATOR CUFF REPAIR Left     SHUNT REVISION Left 08/19/2021    LEFT FOREARM ARTERIO VENOUS GRAFT performed by Amirah Wilks MD at [address] N/A 11/24/2020    EGD BIOPSY performed by Jessica Haley MD at [address]       Family History   Problem Relation Age of Onset    Cancer Mother         lung, breast, liver    Stroke Father     Stomach Cancer Brother     Cancer Maternal Grandmother     No Known Problems Paternal Grandmother     No Known Problems Paternal Grandfather          Current Medications:    aspirin EC tablet 81 mg, Daily  metoprolol succinate (TOPROL XL) extended release tablet 25 mg, Daily  mupirocin (BACTROBAN) 2 % ointment, TID  ondansetron (ZOFRAN-ODT) disintegrating tablet 4 mg, Q8H PRN  torsemide (DEMADEX) tablet 10 mg, Daily  Vitamin D (CHOLECALCIFEROL) tablet 1,000 Units, Daily  calcium gluconate-NaCl 1-0.675 GM/50ML-% 1,000 mg IVPB, Once            Physical exam:     Vitals:  BP (!) 168/90   Pulse 68   Temp 98 °F (36.7 °C) (Axillary)   Resp 20   Wt 170 lb (77.1 kg)   LMP  (LMP Unknown)   SpO2 95%   BMI 28.29 kg/m²   Constitutional:  lethargic   Skin: no rash, turgor wnl  Heent:  eomi, mmm  Neck: no bruits or jvd noted  Cardiovascular:  S1, S2 without m/r/g  Respiratory: CTA B without w/r/r  Abdomen:  +bs, soft, nt, nd  Ext: no  lower extremity edema  Psychiatric: deferred   Musculoskeletal:  deferred     Labs:  CBC:   Recent Labs     02/07/23  0750 02/08/23  0536   WBC 10.7 9.3   HGB 10.9* 11.0*    306     BMP:    Recent Labs     02/07/23  0750 02/08/23  0536   NA 139 139   K 4.5 4.7   CL 98* 100   CO2 25 23   BUN 58* 44*   CREATININE 9.7* 8.2*   GLUCOSE 106* 92     Ca/Mg/Phos:   Recent Labs     02/07/23  0750 02/08/23  0536   CALCIUM 10.2 9.8     Hepatic:   Recent Labs     02/07/23  0750   AST 15   ALT 14   BILITOT 0.3   ALKPHOS 123     Troponin:   Recent Labs     02/07/23  0750   TROPONINI 0.04*     BNP: No results for input(s): BNP in the last 72 hours. Lipids: No results for input(s): CHOL, TRIG, HDL, LDLCALC, LABVLDL in the last 72 hours. ABGs: No results for input(s): PHART, PO2ART, YTG9FHB in the last 72 hours. INR:   Recent Labs     02/07/23  0750   INR 1.06     UA:  Recent Labs     02/07/23  0844   COLORU Yellow   CLARITYU Clear   GLUCOSEU Negative   BILIRUBINUR Negative   KETUA Negative   SPECGRAV 1.010   BLOODU SMALL*   PHUR 8.0   PROTEINU 100*   UROBILINOGEN 0.2   NITRU Negative   LEUKOCYTESUR Negative   LABMICR YES   URINETYPE NotGiven      Urine Microscopic:   Recent Labs     02/07/23  0844   BACTERIA None Seen   HYALCAST 0   WBCUA 2   RBCUA 0   EPIU 2     Urine Culture: No results for input(s): LABURIN in the last 72 hours. Urine Chemistry: No results for input(s): [place], PROTEINUR, NAUR in the last 72 hours. IMAGING:  CT HEAD WO CONTRAST   Final Result   No acute intracranial abnormality. XR CHEST PORTABLE   Final Result   1. No acute abnormality. Assessment/Plan :      1. ESRD   On HD tue/ thu/sat   Will do HD today for 3 hrs   D/w dialysis nurse     2. HTN  Controlled     3. AMS. Likely 2/2 meds  Hold gabapentin   Dialysis today     4. Acid- base disorder. Correct with HD     5.  Electrolytes  Monitor     Dialysis nurse notified     D/w daughter         D/w primary team      Thank you for allowing us to participate in care of [facility]         Electronically signed by: Nick Florence MD, 2/8/2023, 8:47 AM      [facility] of [address]  Office : [phone]  Fax :[phone]

## 2023-02-08 NOTE — H&P
[place] [address]                     [facility], [address]                              HISTORY AND PHYSICAL    PATIENT NAME: Stacie Ying                    :        1945  MED REC NO:   [id number]                          ROOM:       2852  ACCOUNT NO:   [de-identified]                           ADMIT DATE: 2023  PROVIDER:     Sissy Carson MD    HISTORY OF PRESENT ILLNESS:  The patient is a 59-year-old white American  lady who came to the emergency room with a history of poor  responsiveness, increased somnolence, increased lethargy. The patient  was totally disoriented and that is unusual for her. She was tearful  and moaning and groaning and did not make any sense. She was moving all  her arms and legs without any difficulty. She is a chronic hemodialysis  patient and she was on her way to dialysis this morning and did not get  it because of increased confusion. There is no history of any chest  pain. Does have moderate shortness of breath, worse on exertion. PAST MEDICAL HISTORY:  Pertinent for osteoarthritis, end-stage renal  disease, atherosclerotic heart disease, type 2 diabetes mellitus,  chronic hemodialysis, hyperlipidemia, hypertension, peripheral  neuropathy, pneumonia, and hypothyroidism. PAST SURGICAL HISTORY:  Pertinent for laparoscopic insertion of  peritoneal catheter, upper GI endoscopy, colonoscopy, dialysis catheter  insertion and removal, shunt revision, appendectomy, bilateral  mastectomy, rotator cuff repair, cholecystectomy, hysterectomy, and  coronary artery stent placement. MEDICATIONS:  Aspirin, cholestyramine, levothyroxine, recently started  on gabapentin, Lidoderm patch, metoprolol, mupirocin ointment,  ondansetron, rosuvastatin, torsemide and vitamin D3. ALLERGIES:  The patient is allergic to AMOXICILLIN, DEMECLOCYCLINE,  PENICILLIN, OZEMPIC. SOCIAL HISTORY:  She is a . She has three children. She quit  smoking 44 years ago. No history of alcohol usage of any significance. No history of substance abuse. 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 Does not have any history of  substance abuse. She is institutionalized for [facility] care, cannot  provide self-care anymore. FAMILY HISTORY:  Both her parents are . Mother had some kind of  cancer, father did die of stroke. REVIEW OF SYSTEMS:  Review of system is pertinent for loss of alertness  and consciousness. No convulsions. No dysphagia. The patient is  mildly obese. No angina pectoris. No orthopnea or paroxysmal nocturnal  dyspnea. Does have poor appetite. No abdominal pain. No hematemesis. No melena. No genitourinary complaint, except she is oligouric. No  hematuria. Does have chronic musculoskeletal pain. PHYSICAL EXAMINATION:  GENERAL:  Lethargic, incoherent, totally disoriented 62-year-old white  woman with unclear speech. VITAL SIGNS:  Temperature 97.5, blood pressure was 196/82, it went as  high as 204/85. HEENT:  Oral mucosa dry. SKIN:  Warm and dry. NECK:  Neck is supple. Mild jugular venous distention. Faint carotid  bruit. No lymphadenopathy. No thyromegaly. LUNGS:  Vesicular breath sounds. Poor inspiration. No crackles or  wheezing. CARDIOVASCULAR:  Heart is regular rate and rhythm. S1, S2. No gallop  rhythm. ABDOMEN:  Abdomen is soft, nontender. Bowel sounds present. EXTREMITIES:  Shows trace edema. NEUROLOGIC:  Neurologically, the patient cannot participate in any  active sensorimotor evaluation, but she had appears to be involuntarily  moving all the four extremities. Babinski is absent. LABORATORY DATA:  Lab evaluation shows sodium 139, potassium 4.5,  chloride 98, CO2 of 25, BUN 58, creatinine 9.7, anion gap is 16. Glomerular filtration rate is only 4. Lactic acid level is 1.7. ProBNP  level is 4765. Troponin is 0.04.   Liver panel within normal limit. Lipase is 91. Blood glucose is 106. White blood cell count is 10.7,  hemoglobin/hematocrit is 10.9 and 33.9, platelet count is 584. PT/INR  is 13.7 and 1.06. Blood cultures have been drawn. Urinalysis shows no  evidence of UTI. IMAGING DATA:  Chest x-ray shows no acute abnormality. Head CT shows no  acute intracranial abnormality. EKG is normal sinus rhythm. No acute  ischemic changes. ASSESSMENT:  Acute encephalopathy, end-stage renal disease, hypertensive  urgency, anemia of chronic disease, peripheral neuropathy,  atherosclerotic heart disease, toxic encephalopathy. PLAN:  Plan is get her admitted. Hemodialysis. Hold gabapentin. Neuro  checks. Monitor electrolyte. She is a full code. Dr. Michi Hernandez is  her nephrologist, who has already promptly seen her.         Alayna Munguia MD    D: 02/07/2023 15:06:17       T: 02/07/2023 15:10:42     [email or web address]  Job#: [id number]     Doc#: [id number]    CC:

## 2023-02-08 NOTE — PROGRESS NOTES
Pt PIV removed by patient, cath intact and site free of issues. Pt continues to cry out but is unable to verbalize what is wrong or what hurts.

## 2023-02-08 NOTE — PROGRESS NOTES
Pt returned from dialysis with no restraints, removed by dialysis RN. Order discontinued by this RN.

## 2023-02-08 NOTE — CARE COORDINATION
Discharge Planning Assessment  Readmission Risk 27%  RN/SW discharge planner met with patient/ (and family member) to discuss reason for admission, current living situation, and potential needs at the time of discharge    Demographics/Insurance verified Yes/No Yes, has [facility] [employer] Medicare    Current type of dwellin [facility]    Patient from ECF/SW confirmed with: n/a    Living arrangements: Lives alone at the [address] [place] [address]    Level of function/Support: Independent    PCP:Dr. lAex Ornelas    Last Visit to PCP: 2023    DME: None    Active with any community resources/agencies/skilled home care:  Does receive dialysis T//SAT at [facility]    Medication compliance issues: None reported    Financial issues that could impact healthcare: None reported        Tentative discharge plan:  [facility]      Discussed with patient and/or family that on the day of discharge home tentative time of discharge will be between 10 AM and noon.     Transportation at the time of discharge: Family member    Electronically signed by Yuriy Rios RN on 2023 at 2:39 PM

## 2023-02-08 NOTE — PROGRESS NOTES
Pt constantly crying and yelling out \"owie\" but can not verbalize what is hurting or wrong. Pt offered fluids she refused to allow anything by mouth and PIV access was unable to be established again D/T pt being so upset to the point she was red in the face screaming and crying. Orders for Zyprexa obtained then will call DIT for PIV.

## 2023-02-09 LAB
ANION GAP SERPL CALCULATED.3IONS-SCNC: 20 MMOL/L (ref 3–16)
BASOPHILS ABSOLUTE: 0 K/UL (ref 0–0.2)
BASOPHILS RELATIVE PERCENT: 0.2 %
BUN BLDV-MCNC: 44 MG/DL (ref 7–20)
CALCIUM SERPL-MCNC: 9.8 MG/DL (ref 8.3–10.6)
CHLORIDE BLD-SCNC: 98 MMOL/L (ref 99–110)
CO2: 20 MMOL/L (ref 21–32)
CREAT SERPL-MCNC: 7.1 MG/DL (ref 0.6–1.2)
EKG ATRIAL RATE: 110 BPM
EKG DIAGNOSIS: NORMAL
EKG P AXIS: 110 DEGREES
EKG P-R INTERVAL: 158 MS
EKG Q-T INTERVAL: 356 MS
EKG QRS DURATION: 82 MS
EKG QTC CALCULATION (BAZETT): 481 MS
EKG R AXIS: 200 DEGREES
EKG T AXIS: 106 DEGREES
EKG VENTRICULAR RATE: 110 BPM
EOSINOPHILS ABSOLUTE: 0 K/UL (ref 0–0.6)
EOSINOPHILS RELATIVE PERCENT: 0 %
GFR SERPL CREATININE-BSD FRML MDRD: 5 ML/MIN/{1.73_M2}
GLUCOSE BLD-MCNC: 133 MG/DL (ref 70–99)
GLUCOSE BLD-MCNC: 136 MG/DL (ref 70–99)
GLUCOSE BLD-MCNC: 138 MG/DL (ref 70–99)
GLUCOSE BLD-MCNC: 147 MG/DL (ref 70–99)
HCT VFR BLD CALC: 38.5 % (ref 36–48)
HEMOGLOBIN: 12.7 G/DL (ref 12–16)
LYMPHOCYTES ABSOLUTE: 0.9 K/UL (ref 1–5.1)
LYMPHOCYTES RELATIVE PERCENT: 9.2 %
MCH RBC QN AUTO: 31.6 PG (ref 26–34)
MCHC RBC AUTO-ENTMCNC: 33.1 G/DL (ref 31–36)
MCV RBC AUTO: 95.5 FL (ref 80–100)
MONOCYTES ABSOLUTE: 0.2 K/UL (ref 0–1.3)
MONOCYTES RELATIVE PERCENT: 1.9 %
NEUTROPHILS ABSOLUTE: 8.4 K/UL (ref 1.7–7.7)
NEUTROPHILS RELATIVE PERCENT: 88.7 %
PDW BLD-RTO: 16.6 % (ref 12.4–15.4)
PERFORMED ON: ABNORMAL
PLATELET # BLD: 372 K/UL (ref 135–450)
PMV BLD AUTO: 8.1 FL (ref 5–10.5)
POTASSIUM REFLEX MAGNESIUM: 5.4 MMOL/L (ref 3.5–5.1)
RBC # BLD: 4.03 M/UL (ref 4–5.2)
SODIUM BLD-SCNC: 138 MMOL/L (ref 136–145)
WBC # BLD: 9.4 K/UL (ref 4–11)

## 2023-02-09 PROCEDURE — 6360000002 HC RX W HCPCS: Performed by: INTERNAL MEDICINE

## 2023-02-09 PROCEDURE — 90935 HEMODIALYSIS ONE EVALUATION: CPT

## 2023-02-09 PROCEDURE — 85025 COMPLETE CBC W/AUTO DIFF WBC: CPT

## 2023-02-09 PROCEDURE — 92610 EVALUATE SWALLOWING FUNCTION: CPT

## 2023-02-09 PROCEDURE — 93010 ELECTROCARDIOGRAM REPORT: CPT | Performed by: INTERNAL MEDICINE

## 2023-02-09 PROCEDURE — 80048 BASIC METABOLIC PNL TOTAL CA: CPT

## 2023-02-09 PROCEDURE — 92526 ORAL FUNCTION THERAPY: CPT

## 2023-02-09 PROCEDURE — 99233 SBSQ HOSP IP/OBS HIGH 50: CPT | Performed by: INTERNAL MEDICINE

## 2023-02-09 PROCEDURE — 36415 COLL VENOUS BLD VENIPUNCTURE: CPT

## 2023-02-09 PROCEDURE — 1200000000 HC SEMI PRIVATE

## 2023-02-09 PROCEDURE — 92523 SPEECH SOUND LANG COMPREHEN: CPT

## 2023-02-09 RX ORDER — POTASSIUM CHLORIDE 20 MEQ/1
40 TABLET, EXTENDED RELEASE ORAL PRN
Status: DISCONTINUED | OUTPATIENT
Start: 2023-02-09 | End: 2023-02-09

## 2023-02-09 RX ORDER — POTASSIUM CHLORIDE 7.45 MG/ML
10 INJECTION INTRAVENOUS PRN
Status: DISCONTINUED | OUTPATIENT
Start: 2023-02-09 | End: 2023-02-09

## 2023-02-09 RX ORDER — 0.9 % SODIUM CHLORIDE 0.9 %
500 INTRAVENOUS SOLUTION INTRAVENOUS PRN
Status: DISCONTINUED | OUTPATIENT
Start: 2023-02-09 | End: 2023-02-15 | Stop reason: HOSPADM

## 2023-02-09 RX ADMIN — DEXAMETHASONE SODIUM PHOSPHATE 4 MG: 4 INJECTION, SOLUTION INTRAMUSCULAR; INTRAVENOUS at 00:21

## 2023-02-09 RX ADMIN — HYDROMORPHONE HYDROCHLORIDE 0.5 MG: 1 INJECTION, SOLUTION INTRAMUSCULAR; INTRAVENOUS; SUBCUTANEOUS at 00:21

## 2023-02-09 RX ADMIN — MUPIROCIN: 20 OINTMENT TOPICAL at 20:17

## 2023-02-09 RX ADMIN — HYDROMORPHONE HYDROCHLORIDE 0.5 MG: 1 INJECTION, SOLUTION INTRAMUSCULAR; INTRAVENOUS; SUBCUTANEOUS at 07:23

## 2023-02-09 RX ADMIN — MUPIROCIN: 20 OINTMENT TOPICAL at 09:51

## 2023-02-09 ASSESSMENT — PAIN SCALES - GENERAL: PAINLEVEL_OUTOF10: 10

## 2023-02-09 ASSESSMENT — PAIN DESCRIPTION - LOCATION: LOCATION: GENERALIZED

## 2023-02-09 NOTE — PROGRESS NOTES
Facility/Department: [address]  SLP Clinical Swallow Evaluation and Speech Language Cognitive Assessment     Patient: Anish Lyon   : 1945   MRN: [id number]      Evaluation Date: 2023      Admitting Dx: ESRD on hemodialysis ([facility] [address] [N18.6, Z99.2]  Acute encephalopathy [G93.40]  Pain: Did not state                                  H&P:  \"The patient is a 76-year-old white American  lady who came to the emergency room with a history of poor  responsiveness, increased somnolence, increased lethargy. The patient  was totally disoriented and that is unusual for her. She was tearful  and moaning and groaning and did not make any sense. She was moving all  her arms and legs without any difficulty. She is a chronic hemodialysis  patient and she was on her way to dialysis this morning and did not get  it because of increased confusion. There is no history of any chest  pain. Does have moderate shortness of breath, worse on exertion. \"    Imaging:  Chest X-ray:   Impression   1. No acute abnormality. Head CT:   Impression   No acute intracranial abnormality. History/Prior Level of Function:   Living Status: [place], independent living   Prior Dysphagia/Speech History: Pt with multiple recent admissions 2022 and 2022. Upon most recent discharge recommendations were for Regular texture diet with thin liquids. No prior instrumental completed. During admission 2022 pt demonstrated cognitive deficits that improved during hospitalization. Reason for referral: SLP evaluation orders received due to altered mental status. RN reported pt was ordered a Regular texture diet with thin liquids upon admission however due to cognitive state PO has been held since admission. DYSPHAGIA BEDSIDE SWALLOW EVALUATION   Dysphagia Impressions/Dysphagia Diagnosis: Oropharyngeal Dysphagia   Pt was positioned upright in bed by SLP. On RA. Intermittently followed 1 step commands.  Pt demonstrated hyperextended neck posture requiring manual assist from improved chin posture. Oriented to person only. Hyperextended neck posture was noted Oral mechanism exam; adequate dentition, adequate ROM/coordination. Pt required total feed assist. Various consistency trials were provided to assess swallow function. Pt demonstrated s/s of oropharyngeal dysphagia characterized by impaired mastication, intermittent oral holding, suspected decreased A-P propulsion, seemingly delayed swallow initiation and decreased laryngeal elevation. With ice chips pt demonstrated minimal anterior mastication with intermittent pause/holding. Soft solids were attempted however, pt unable to coordinate mastication and repeatedly spit bolus out of oral cavity. With thin liquids via straw and puree, pt did not demonstrate any immediate overt clinical s/s of aspiration/penetration however demonstrates increased risk due to current cognitive state and suspected deconditioning. See below for further recommendations. Recommended Diet and Intervention:  Diet Solids Recommendation:  Dysphagia I Puree  Liquid Consistency Recommendation: Thin liquids  Recommended form of Meds: Meds crushed as able in puree     Assistance with intake.         Dysphagia Therapeutic Intervention:  Diet Tolerance Monitoring , Patient/Family Education , Therapeutic Trials with SLP     Compensatory Swallowing Strategies:  Upright as possible with all PO intake , Small bites/sips , Eat/feed slowly, Remain upright 30-45 min , Total Feed     Oral Mechanism Exam:  []WFL [x]Mild   [] Moderate  []Severe  []To be assessed  Labial ROM, Labial Coordination , Lingual ROM, Lingual Coordination     SHORT TERM DYSPHAGIA GOALS  Pt will functionally tolerate recommended diet with no overt clinical s/s of aspiration   Pt will advance to least restrictive diet as indicated     Patient Positioning: Upright in bed       SPEECH LANGUAGE COGNITIVE ASSESSMENT:     Speech Diagnosis: Cognitive-Linguistic Deficits , Speech Language Deficits     Impressions: Pt was positioned upright in bed on RA. Able to maintain adequate alertness during evaluation. She demonstrated impaired auditory comprehension characterized by intermittent ability to follow 1 step commands, inconsistent responses to yes/no questions. Processing was delayed. She required frequent repetition. Verbal expression appeared to be impacted by current cognitive state with impaired thought organization, impaired ability to communicate functional wants/needs, impaired confrontational naming. Pt laughing inappropriately during evaluation. Oriented to person only. Unable to participate in structured cognitive assessment. Pt is not at her baseline for cognitive communicative skills, recommend speech therapy to address deficits and maximize pt's potential.       COMPREHENSION  Auditory Comprehension: Mild  and Moderate   Impaired Yes/no questions  Impaired Basic questions  Impaired One step commands  Impaired Two step commands    EXPRESSION  Verbal Expression: Mild  and Moderate   Impaired Repetition  Impaired Confrontational Naming  Impaired Conversation  Impaired Thought Organization      Pragmatics/Social Functioning: Moderate   Impaired Eye contact  Impaired Topic maintenance  Emotionally labile-frequently laughing       MOTOR SPEECH  Motor Speech: Within functional limits        VOICE  Voice: Within functional limits        COGNITION    Overall Orientation : Severe    Disoriented to time   Disoriented to situation   Disoriented to place   Oriented to self    Attention: To be assessed           Memory: To be assessed         Problem Solving: To be assessed         Safety/Judgement:  To be assessed       GOALS:  Short Term Speech/Language/Cognitive Goals:   Pt will improve auditory processing/comprehension of commands and questions to 80%, via graded tasks   Pt will improve orientation and short term recall via graded tasks to 80%  Pt will improve verbal expression for functional expression via graded tasks to 80%  Pt will participate in ongoing cognitive assessment with goals to be established as indicated     Plan of care: 3-5 times per week during acute care stay. Discharge Recommendations:  Discharge recommendations to be determined pending ongoing follow-up during acute care stay    EDUCATION:   Provided education regarding role of SLP, results of assessment, recommendations and general speech pathology plan of care. [] Pt verbalized understanding and agreement   [x] Pt requires ongoing learning   [x] No evidence of comprehension     If patient discharges prior to next visit, this note will serve as discharge.      Treatment time:  Timed Code Treatment Minutes: 0 minutes   Total Treatment time: 40 minutes     Electronically signed by:    Constantine Miranda MA [facility]  Speech Language Pathologist

## 2023-02-09 NOTE — PLAN OF CARE
Pt alert today, but remains very confused. Able to answer simple questions. Speech consulted for swallowing concern. See flowsheet for assessment. Problem: Discharge Planning  Goal: Discharge to home or other facility with appropriate resources  Outcome: Progressing  Flowsheets (Taken 2/9/2023 0900)  Discharge to home or other facility with appropriate resources: Identify barriers to discharge with patient and caregiver     Problem: Safety - Adult  Goal: Free from fall injury  Outcome: Progressing     Problem: Skin/Tissue Integrity  Goal: Absence of new skin breakdown  Description: 1. Monitor for areas of redness and/or skin breakdown  2. Assess vascular access sites hourly  3. Every 4-6 hours minimum:  Change oxygen saturation probe site  4. Every 4-6 hours:  If on nasal continuous positive airway pressure, respiratory therapy assess nares and determine need for appliance change or resting period. Outcome: Progressing     Problem: Safety - Medical Restraint  Goal: Remains free of injury from restraints (Restraint for Interference with Medical Device)  Description: INTERVENTIONS:  1. Determine that other, less restrictive measures have been tried or would not be effective before applying the restraint  2. Evaluate the patient's condition at the time of restraint application  3. Inform patient/family regarding the reason for restraint  4. Q2H: Monitor safety, psychosocial status, comfort, nutrition and hydration  Outcome: Progressing     Problem: Pain  Goal: Verbalizes/displays adequate comfort level or baseline comfort level  Outcome: Progressing     Problem: Coping  Goal: Pt/Family able to verbalize concerns and demonstrate effective coping strategies  Description: INTERVENTIONS:  1. Assist patient/family to identify coping skills, available support systems and cultural and spiritual values  2. Provide emotional support, including active listening and acknowledgement of concerns of patient and caregivers  3. Reduce environmental stimuli, as able  4. Instruct patient/family in relaxation techniques, as appropriate  5.  Assess for spiritual pain/suffering and initiate Spiritual Care, Psychosocial Clinical Specialist consults as needed  Outcome: Progressing     Problem: Neurosensory - Adult  Goal: Achieves stable or improved neurological status  Outcome: Progressing     Problem: Respiratory - Adult  Goal: Achieves optimal ventilation and oxygenation  Outcome: Progressing     Problem: Musculoskeletal - Adult  Goal: Return mobility to safest level of function  Outcome: Progressing     Problem: Gastrointestinal - Adult  Goal: Maintains or returns to baseline bowel function  Outcome: Progressing  Goal: Maintains adequate nutritional intake  Outcome: Progressing     Problem: Genitourinary - Adult  Goal: Absence of urinary retention  Outcome: Progressing     Problem: Metabolic/Fluid and Electrolytes - Adult  Goal: Electrolytes maintained within normal limits  Outcome: Progressing     Problem: Cardiovascular - Adult  Goal: Maintains optimal cardiac output and hemodynamic stability  Outcome: Progressing     Problem: Skin/Tissue Integrity - Adult  Goal: Skin integrity remains intact  Outcome: Progressing  Flowsheets (Taken 2/9/2023 0900)  Skin Integrity Remains Intact: Monitor for areas of redness and/or skin breakdown  Goal: Incisions, wounds, or drain sites healing without S/S of infection  Outcome: Progressing

## 2023-02-09 NOTE — ACP (ADVANCE CARE PLANNING)
Advance Care Planning   Healthcare Decision Maker:    Primary Decision Maker: Samantha Ann Child - [phone]    Secondary Decision Maker: Sky Winters - Child - [phone]    Supplemental (Other) Decision Maker: Savana Castillo - Child - [phone]    Documented decision maker(s) are consistent with ACP documents on file, signed

## 2023-02-09 NOTE — PROGRESS NOTES
Office : [phone]     Fax :[phone]       Nephrology progress Note      Patient's Name: Brandi Smith  9:29 AM  2/9/2023    Reason for Consult:  ESRD management       Requesting Physician:  Yennifer Harrison MD      Chief Complaint:    Chief Complaint   Patient presents with    Altered Mental Status     Arrived per  EMS from dialysis (pt resides at home with daughter) d/t altered mental status; tearful in triage; unable to state date, time, birth date which is not normal             History of Present Ilness:    Brandi Smith is a 65 y.o. female with prior history of ESRD, HTN, DM2 came to dialysis unit this morning and was very confused. Her daughter was with her. She was send via EMS service to Emergency room. at present she is lethargic. Daughter mentioned that she did not sleep for last 2 days. She has taken gabapentin last night. Daughter does not know the dose. She did not get her dialysis today     Interval hx :      Had HD yesterday   Mental status improved   No SOB   Decreased edema     Vitals stable   Denies any chest pain at this time       No intake/output data recorded.     Past Medical History:   Diagnosis Date    Arthritis     CAD (coronary artery disease)     Diabetes ([facility] [address]     Hemodialysis patient ([facility] [address]     fistula left arm    Hyperlipidemia     Hypertension     Kidney disease     Neuropathy     Pneumonia     IN February, 2021, with COVID    Thyroid disease        Past Surgical History:   Procedure Laterality Date    APPENDECTOMY      CHOLECYSTECTOMY      COLONOSCOPY  11/24/2020    COLONOSCOPY POLYPECTOMY SNARE/COLD BIOPSY performed by Ebony Kaur MD at [address] [facility] N/A 05/17/2021 PERITONEAL DIALYSIS CATHETER REMOVAL.  [address] performed by Quin Doe DO at [facility] ([facility])      FULL    LAPAROSCOPY INSERTION PERITONEAL CATHETER N/A 04/03/2020    LAPAROSCOPIC LYSIS OF ADHESIONS, LAPAROSCOPIC PERITONEAL DIALYSIS CATHETER PLACEMENT, LAPAROSCOPIC OMENTOPEXY performed by Quin Doe DO at [address] BILATERAL      preventive    ROTATOR CUFF REPAIR Left     SHUNT REVISION Left 08/19/2021    LEFT FOREARM ARTERIO VENOUS GRAFT performed by Jesse Ramey MD at [facility] N/A 11/24/2020    EGD BIOPSY performed by Ebony Kaur MD at [facility]       Family History   Problem Relation Age of Onset    Cancer Mother         lung, breast, liver    Stroke Father     Stomach Cancer Brother     Cancer Maternal Grandmother     No Known Problems Paternal Grandmother     No Known Problems Paternal Grandfather          Current Medications:    ziprasidone (GEODON) injection 20 mg, Q6H PRN  heparin (porcine) injection 5,000 Units, Once  diphenhydrAMINE (BENADRYL) injection 25 mg, Q6H PRN  metoprolol (LOPRESSOR) injection 5 mg, Q6H PRN  metoprolol succinate (TOPROL XL) extended release tablet 50 mg, Daily  famotidine (PEPCID) tablet 10 mg, Daily  acetaminophen (TYLENOL) tablet 650 mg, Q4H PRN  HYDROcodone-acetaminophen (NORCO) 5-325 MG per tablet 1 tablet, Q6H PRN  HYDROmorphone HCl PF (DILAUDID) injection 0.5 mg, Q4H PRN  aspirin EC tablet 81 mg, Daily  mupirocin (BACTROBAN) 2 % ointment, TID  ondansetron (ZOFRAN-ODT) disintegrating tablet 4 mg, Q8H PRN  torsemide (DEMADEX) tablet 10 mg, Daily  Vitamin D (CHOLECALCIFEROL) tablet 1,000 Units, Daily            Physical exam:     Vitals:  BP (!) 153/68   Pulse 94   Temp 97.9 °F (36.6 °C) (Oral)   Resp 22   Wt 165 lb 4.8 oz (75 kg)   LMP  (LMP Unknown)   SpO2 93%   BMI 27.51 kg/m²   Constitutional:  lethargic   Skin: no rash, turgor wnl  Heent:  eomi, mmm  Neck: no bruits or jvd noted  Cardiovascular:  S1, S2 without m/r/g  Respiratory: CTA B without w/r/r  Abdomen:  +bs, soft, nt, nd  Ext: no  lower extremity edema  Psychiatric: deferred   Musculoskeletal:  deferred     Labs:  CBC:   Recent Labs     02/07/23  0750 02/08/23  0536 02/09/23  0813   WBC 10.7 9.3 9.4   HGB 10.9* 11.0* 12.7    306 372     BMP:    Recent Labs     02/07/23  0750 02/08/23  0536 02/09/23  0813    139 138   K 4.5 4.7 5.4*   CL 98* 100 98*   CO2 25 23 20*   BUN 58* 44* 44*   CREATININE 9.7* 8.2* 7.1*   GLUCOSE 106* 92 136*     Ca/Mg/Phos:   Recent Labs     02/07/23  0750 02/08/23  0536 02/09/23  0813   CALCIUM 10.2 9.8 9.8     Hepatic:   Recent Labs     02/07/23  0750   AST 15   ALT 14   BILITOT 0.3   ALKPHOS 123     Troponin:   Recent Labs     02/07/23  0750   TROPONINI 0.04*     BNP: No results for input(s): BNP in the last 72 hours. Lipids: No results for input(s): CHOL, TRIG, HDL, LDLCALC, LABVLDL in the last 72 hours. ABGs: No results for input(s): PHART, PO2ART, XRA2RQH in the last 72 hours. INR:   Recent Labs     02/07/23  0750   INR 1.06     UA:  Recent Labs     02/07/23  0844   COLORU Yellow   CLARITYU Clear   GLUCOSEU Negative   BILIRUBINUR Negative   KETUA Negative   SPECGRAV 1.010   BLOODU SMALL*   PHUR 8.0   PROTEINU 100*   UROBILINOGEN 0.2   NITRU Negative   LEUKOCYTESUR Negative   LABMICR YES   URINETYPE NotGiven      Urine Microscopic:   Recent Labs     02/07/23  0844   BACTERIA None Seen   HYALCAST 0   WBCUA 2   RBCUA 0   EPIU 2     Urine Culture: No results for input(s): LABURIN in the last 72 hours. Urine Chemistry: No results for input(s): Suann Pummel, PROTEINUR, NAUR in the last 72 hours. IMAGING:  CT HEAD WO CONTRAST   Final Result   No acute intracranial abnormality. XR CHEST PORTABLE   Final Result   1. No acute abnormality. Assessment/Plan :      1.  ESRD   Regular HD tue/ thu/sat   Will do HD today for 3 hrs D/w dialysis nurse     2. HTN  Controlled     3. AMS. Likely 2/2 meds  Hold gabapentin   Improving mental status     4. Acid- base disorder. Correct with HD     5. Electrolytes  Monitor     6.  DM2     Dialysis nurse notified   Check BMP in am         D/w primary team      Thank you for allowing us to participate in care of [facility]         Electronically signed by: Debi Joyner MD, 2/9/2023, 9:29 AM      [facility] [address]  Office : [phone]  Fax :[phone]

## 2023-02-10 ENCOUNTER — APPOINTMENT (OUTPATIENT)
Dept: GENERAL RADIOLOGY | Age: 78
DRG: 091 | End: 2023-02-10
Payer: MEDICARE

## 2023-02-10 LAB
ANION GAP SERPL CALCULATED.3IONS-SCNC: 18 MMOL/L (ref 3–16)
BASOPHILS ABSOLUTE: 0.1 K/UL (ref 0–0.2)
BASOPHILS RELATIVE PERCENT: 0.6 %
BUN BLDV-MCNC: 44 MG/DL (ref 7–20)
C-REACTIVE PROTEIN: 8.7 MG/L (ref 0–5.1)
CALCIUM SERPL-MCNC: 10.2 MG/DL (ref 8.3–10.6)
CHLORIDE BLD-SCNC: 94 MMOL/L (ref 99–110)
CO2: 24 MMOL/L (ref 21–32)
CREAT SERPL-MCNC: 6.2 MG/DL (ref 0.6–1.2)
EOSINOPHILS ABSOLUTE: 0 K/UL (ref 0–0.6)
EOSINOPHILS RELATIVE PERCENT: 0.4 %
GFR SERPL CREATININE-BSD FRML MDRD: 6 ML/MIN/{1.73_M2}
GLUCOSE BLD-MCNC: 122 MG/DL (ref 70–99)
HCT VFR BLD CALC: 40.3 % (ref 36–48)
HEMOGLOBIN: 13.1 G/DL (ref 12–16)
LYMPHOCYTES ABSOLUTE: 1.9 K/UL (ref 1–5.1)
LYMPHOCYTES RELATIVE PERCENT: 14.1 %
MCH RBC QN AUTO: 31 PG (ref 26–34)
MCHC RBC AUTO-ENTMCNC: 32.6 G/DL (ref 31–36)
MCV RBC AUTO: 95 FL (ref 80–100)
MONOCYTES ABSOLUTE: 1.2 K/UL (ref 0–1.3)
MONOCYTES RELATIVE PERCENT: 9.4 %
NEUTROPHILS ABSOLUTE: 10 K/UL (ref 1.7–7.7)
NEUTROPHILS RELATIVE PERCENT: 75.5 %
PDW BLD-RTO: 17.3 % (ref 12.4–15.4)
PLATELET # BLD: 332 K/UL (ref 135–450)
PMV BLD AUTO: 7.8 FL (ref 5–10.5)
POTASSIUM SERPL-SCNC: 4.5 MMOL/L (ref 3.5–5.1)
PROCALCITONIN: 0.73 NG/ML (ref 0–0.15)
RBC # BLD: 4.24 M/UL (ref 4–5.2)
SODIUM BLD-SCNC: 136 MMOL/L (ref 136–145)
WBC # BLD: 13.3 K/UL (ref 4–11)

## 2023-02-10 PROCEDURE — 80048 BASIC METABOLIC PNL TOTAL CA: CPT

## 2023-02-10 PROCEDURE — 85025 COMPLETE CBC W/AUTO DIFF WBC: CPT

## 2023-02-10 PROCEDURE — 97129 THER IVNTJ 1ST 15 MIN: CPT

## 2023-02-10 PROCEDURE — 71046 X-RAY EXAM CHEST 2 VIEWS: CPT

## 2023-02-10 PROCEDURE — 97166 OT EVAL MOD COMPLEX 45 MIN: CPT

## 2023-02-10 PROCEDURE — 97530 THERAPEUTIC ACTIVITIES: CPT

## 2023-02-10 PROCEDURE — 97116 GAIT TRAINING THERAPY: CPT

## 2023-02-10 PROCEDURE — 36415 COLL VENOUS BLD VENIPUNCTURE: CPT

## 2023-02-10 PROCEDURE — 90935 HEMODIALYSIS ONE EVALUATION: CPT

## 2023-02-10 PROCEDURE — 97162 PT EVAL MOD COMPLEX 30 MIN: CPT

## 2023-02-10 PROCEDURE — 90935 HEMODIALYSIS ONE EVALUATION: CPT | Performed by: INTERNAL MEDICINE

## 2023-02-10 PROCEDURE — 6370000000 HC RX 637 (ALT 250 FOR IP): Performed by: INTERNAL MEDICINE

## 2023-02-10 PROCEDURE — 97535 SELF CARE MNGMENT TRAINING: CPT

## 2023-02-10 PROCEDURE — 6360000002 HC RX W HCPCS: Performed by: INTERNAL MEDICINE

## 2023-02-10 PROCEDURE — 84145 PROCALCITONIN (PCT): CPT

## 2023-02-10 PROCEDURE — 1200000000 HC SEMI PRIVATE

## 2023-02-10 PROCEDURE — 86140 C-REACTIVE PROTEIN: CPT

## 2023-02-10 PROCEDURE — 92526 ORAL FUNCTION THERAPY: CPT

## 2023-02-10 RX ADMIN — MUPIROCIN: 20 OINTMENT TOPICAL at 08:28

## 2023-02-10 RX ADMIN — FAMOTIDINE 10 MG: 20 TABLET, FILM COATED ORAL at 08:28

## 2023-02-10 RX ADMIN — MUPIROCIN: 20 OINTMENT TOPICAL at 19:33

## 2023-02-10 RX ADMIN — ASPIRIN 81 MG: 81 TABLET, COATED ORAL at 08:27

## 2023-02-10 RX ADMIN — METOPROLOL SUCCINATE 50 MG: 50 TABLET, EXTENDED RELEASE ORAL at 08:27

## 2023-02-10 RX ADMIN — Medication 1000 UNITS: at 08:27

## 2023-02-10 RX ADMIN — DIPHENHYDRAMINE HYDROCHLORIDE 25 MG: 50 INJECTION, SOLUTION INTRAMUSCULAR; INTRAVENOUS at 18:29

## 2023-02-10 ASSESSMENT — PAIN SCALES - GENERAL: PAINLEVEL_OUTOF10: 0

## 2023-02-10 NOTE — PROGRESS NOTES
Department of Internal Medicine  General Internal Medicine   Progress Note      SUBJECTIVE: somnolent and disoriented  tolerating HD     History obtained from chart review, the patient, and nursing staff   General ROS: positive for  - fatigue, malaise, and weight loss  negative for - chills, fever, or night sweats  Psychological ROS: positive for - anxiety, disorientation, irritability, and memory difficulties  negative for - hallucinations or hostility  Ophthalmic ROS: negative  Respiratory ROS: no cough, shortness of breath, or wheezing  Cardiovascular ROS: no chest pain or dyspnea on exertion  Gastrointestinal ROS: no abdominal pain, change in bowel habits, or black or bloody stools  Genito-Urinary ROS: oliguric , no hematuria ,   Musculoskeletal ROS: positive for - pain in chronic generalized pain    negative for - joint swelling  Neurological ROS: diffuse slowing down of intellectual and sensory motor function  Dermatological ROS: negative    OBJECTIVE      Medications      Current Facility-Administered Medications: 0.9 % sodium chloride bolus, 500 mL, IntraVENous, PRN  ziprasidone (GEODON) injection 20 mg, 20 mg, IntraMUSCular, Q6H PRN  diphenhydrAMINE (BENADRYL) injection 25 mg, 25 mg, IntraVENous, Q6H PRN  metoprolol (LOPRESSOR) injection 5 mg, 5 mg, IntraVENous, Q6H PRN  metoprolol succinate (TOPROL XL) extended release tablet 50 mg, 50 mg, Oral, Daily  famotidine (PEPCID) tablet 10 mg, 10 mg, Oral, Daily  acetaminophen (TYLENOL) tablet 650 mg, 650 mg, Oral, Q4H PRN  HYDROcodone-acetaminophen (NORCO) 5-325 MG per tablet 1 tablet, 1 tablet, Oral, Q6H PRN  HYDROmorphone HCl PF (DILAUDID) injection 0.5 mg, 0.5 mg, IntraVENous, Q4H PRN  aspirin EC tablet 81 mg, 81 mg, Oral, Daily  mupirocin (BACTROBAN) 2 % ointment, , Topical, TID  ondansetron (ZOFRAN-ODT) disintegrating tablet 4 mg, 4 mg, Oral, Q8H PRN  Vitamin D (CHOLECALCIFEROL) tablet 1,000 Units, 1,000 Units, Oral, Daily    Physical      Vitals: /77 Pulse (!) 106   Temp 97.9 °F (36.6 °C) (Oral)   Resp 18   Ht 5' 5\" (1.651 m)   Wt 163 lb 2.3 oz (74 kg)   LMP  (LMP Unknown)   SpO2 92%   BMI 27.15 kg/m²   Temp: Temp: 97.9 °F (36.6 °C)  Max: Temp  Av °F (36.7 °C)  Min: 97.6 °F (36.4 °C)  Max: 99.4 °F (37.4 °C)  Respiration range:  Resp  Av  Min: 18  Max: 24  Pulse Range:  Pulse  Av  Min: 91  Max: 113  Blood pressure range:  Systolic (27QZD), PKR:291 , Min:119 , KVR:511   , Diastolic (91GTZ), JOT:24, Min:68, Max:93    SpO2  Av.2 %  Min: 92 %  Max: 98 %    Intake/Output Summary (Last 24 hours) at 2/10/2023 0230  Last data filed at 2023  Gross per 24 hour   Intake 240 ml   Output --   Net 240 ml       Vent settings:  Pulse  Av.1  Min: 65  Max: 113  Resp  Av.7  Min: 12  Max: 24  SpO2  Av.5 %  Min: 92 %  Max: 100 %    CONSTITUTIONAL:  fatigued, somnolent, uncooperative, distracted, mild distress, appears stated age, and mildly obese  EYES:  unremarkable   NECK:  mild JVD   BACK:  symmetric and no curvature  LUNGS:  No increased work of breathing, good air exchange, clear to auscultation bilaterally, no crackles or wheezing  CARDIOVASCULAR:  Normal apical impulse, regular rate and rhythm, normal S1 and S2, no S3 or S4, and no murmur noted  ABDOMEN:  soft BS +   MUSCULOSKELETAL:  trace edema   NEUROLOGIC:  general weakness lack of co-ordination , babinski absent   SKIN:  warm and dry  and no bruising or bleeding    Data      Lab Results   Component Value Date    WBC 9.4 2023    HGB 12.7 2023    HCT 38.5 2023    MCV 95.5 2023     2023     Lab Results   Component Value Date/Time     2023 08:13 AM    K 5.4 2023 08:13 AM    CL 98 2023 08:13 AM    CO2 20 2023 08:13 AM    BUN 44 2023 08:13 AM    CREATININE 7.1 2023 08:13 AM    GLUCOSE 136 2023 08:13 AM    CALCIUM 9.8 2023 08:13 AM            [address] [place] [address] Last Modified POA    * (Principal) Acute encephalopathy 2/7/2023 Yes    Hypertension 2/7/2023 Yes    Anemia, chronic disease 2/7/2023 Yes    Hemodialysis patient ([address] 2/7/2023 Yes    Toxic encephalopathy 2/7/2023 Yes    Neuropathy 2/7/2023 Yes    CAD (coronary artery disease) 2/7/2023 Yes        Ct hemodialysis per Dr Donell Martínez   Hold gabapentin   Metabolic correction   PT OT

## 2023-02-10 NOTE — PROGRESS NOTES
[address] [facility],6Th Floor Msb Department   Phone: [phone]    Occupational Therapy    [x] Initial Evaluation            [] Daily Treatment Note         [] Discharge Summary      Patient: Lowell Gottlieb   : 1945   MRN: [id number]   Date of Service:  2/10/2023    Admitting Diagnosis:  Acute encephalopathy  Current Admission Summary:    Altered Mental Status       Arrived per  EMS from dialysis (pt resides at home with daughter) d/t altered mental status; tearful in triage; unable to state date, time, birth date which is not normal         HISTORY OF [address] [place] [address] is a 65 y.o. female  who presents to the ED complaining of altered mental status. History is obtained primarily from EMS. She is tearful and moaning but not really making any sense even though she does not have dysarthria on her initial evaluation. She is moving her arms and legs without difficulty. She was on her way to dialysis this morning and did not get it because of her confusion and was rerouted to the emergency department not having started it. She has a history of hypertension hyperlipidemia diabetes and CAD as well as end-stage renal disease on hemodialysis. She was admitted in December for similar encephalopathy that was felt to be hypertensive versus metabolic after having an unremarkable MRI. At 1 point neurology started her on Dilantin but then later discontinued it thinking it was not likely seizure activity after risk benefits were discussed. No reports of recent fevers. Patient does not complain of pain anywhere in her head chest or belly. No reports of vomiting or diarrhea recently. Daughter later states that the patient was fine last night and was confused this morning and she did attempt to bring the patient in dialysis anyway however she continued to get worse with her disorientation and was brought here instead.   The patient was apparently fine yesterday earlier in the evening. The patient is on Neurontin but as far as we know there are no recent changes in dose or any other new medications that she is on. Past Medical History:  has a past medical history of Arthritis, CAD (coronary artery disease), Diabetes ([facility] [address] Hemodialysis patient ([facility] [address] Hyperlipidemia, Hypertension, Kidney disease, Neuropathy, Pneumonia, and Thyroid disease. Past Surgical History:  has a past surgical history that includes Appendectomy; Mastectomy, bilateral; Rotator cuff repair (Left); Cholecystectomy; LAPAROSCOPY INSERTION PERITONEAL CATHETER (N/A, 04/03/2020); Upper gastrointestinal endoscopy (N/A, 11/24/2020); Colonoscopy (11/24/2020); Hysterectomy; Dialysis Catheter Removal (N/A, 05/17/2021); shunt revision (Left, 08/19/2021); and Coronary stent placement. Discharge Recommendations: Fabio Cavazos scored a 15/24 on the AM-PAC ADL Inpatient form. Current research shows that an AM-PAC score of 17 or less is typically not associated with a discharge to the patient's home setting. Based on the patient's AM-PAC score and their current ADL deficits, it is recommended that the patient have 3-5 sessions per week of Occupational Therapy at d/c to increase the patient's independence. Please see assessment section for further patient specific details. If patient discharges prior to next session this note will serve as a discharge summary. Please see below for the latest assessment towards goals. Patient and daughter both in agreement to go to a SNF, both aware patient is too weak to return home to [place].        DME Required For Discharge: DME to be determined at next level of care, DME to be determined pending patient progress    Precautions/Restrictions: high fall risk  Weight Bearing Restrictions: no restrictions  [] Right Upper Extremity  [] Left Upper Extremity [] Right Lower Extremity  [] Left Lower Extremity     Required Braces/Orthotics: no braces required   [] Right  [] Left  Positional Restrictions:no positional restrictions         Pre-Admission Information   Lives With: alone                     Type of Home:  [facility]  Home Layout: one level  Home Access: all level, 1st floor   Bathroom Layout: walk in shower  Bathroom Equipment: grab bars in shower, shower chair, hand held shower head  Home Equipment: rollator - 4 wheeled walker  Transfer Assistance: Independent without use of device  Ambulation Assistance:Independent without use of device  ADL Assistance: independent with all ADL's  IADL Assistance: independent with homemaking tasks-- daughter stated still cooks but does not follow a renal diet   Active : [] yes             [x]no  Current Employment: retired. Occupation:  administrative assitant  Hobbies: [employer]: 2 falls in the last 6 months  Daughter from [facility] stated that patient in non-compliant with diet and medication due to periods of confusion. Dialysis T/TH/SAT, daughter stated that patient may do well for several weeks but then becomes confused        Examination   Vision:   Vision Corrective Device: wears glasses at all times  Hearing:   [facility]  Perception:   WFL  Observation:   General Observation:  patient needing to go to the bathroom, just recently returned from dialysis   Posture:   Some posterior leaning when 1st stood up from bed  Sensation:   denies numbness and tingling  Proprioception:    WFL  Tone:   Normotonic  Coordination Testing:   WFL    ROM:   (B) UE AROM WFL  Strength:   (B) UE strength grossly [facility]    Therapist Clinical Decision Making (Complexity): medium complexity  Clinical Presentation: evolving-- needing dialysis 4 days in a row per nurse       Subjective  General: Patient supine in bed, pleasant and cooperative, mild confusion.    Pain: 0/10  Pain Interventions: not applicable        Activities of Daily Living  Basic Activities of Daily Living  Grooming: setup assistance stand by assistance  Grooming Comments: wash hands at sink   Lower Extremity Dressing: moderate assistance  Toileting: moderate assistance. Toileting Comments: patient had large bowel movement  General Comments: patient incont of small amount of BM prior to getting into bathroom   Instrumental Activities of Daily Living  No IADL completed on this date. Functional Mobility  Bed Mobility  Supine to Sit: minimal assistance  Comments:  Transfers  Sit to stand transfer:contact guard assistance, minimal assistance  Stand to sit transfer: contact guard assistance  Bed / Chair transfer: minimal assistance. Bed / Chair comments: multiple safety cues (did not reach back to sit in chair)  Toilet transfer: contact guard assistance, minimal assistance  Toilet transfer comments: ambulated into bathroom with RW, decreased   Comments:  Functional Mobility:  Sitting Balance: stand by assistance.     Functional Mobility: .  contact guard assistance, minimal assistance  Functional Mobility Device Use: rolling walker  Functional Mobility Comment: decreased overall safety with RW    Other Therapeutic Interventions    Functional Outcomes  AM-PAC Inpatient Daily Activity Raw Score: 15    Cognition  Overall Cognitive Status: Impaired  Arousal/Alterness: appropriate responses to stimuli  Following Commands: follows one step commands with repetition  Attention Span: appears intact  Memory: decreased recall of recent events, decreased short term memory  Safety Judgement: decreased awareness of need for assistance  Problem Solving: assistance required to implement solutions  Insights: decreased awareness of deficits  Initiation: requires cues for some  Sequencing: requires cues for some  Comments: Chart reports patient has periods of delusions and confusion,   Orientation:    alert and oriented x 4  Command Following:   impaired     Education  Barriers To Learning: cognition  Patient Education: patient educated on goals, OT role and benefits, plan of care, ADL adaptive strategies, transfer training, discharge recommendations  Learning Assessment:  patient verbalizes understanding, would benefit from continued reinforcement    Assessment  Activity Tolerance: Fair tolerance, only able to ambulate @ 30 feet with RW and CGA/min assist  Impairments Requiring Therapeutic Intervention: decreased functional mobility, decreased ADL status, decreased safety awareness, decreased cognition, decreased endurance, decreased balance  Prognosis: good  Clinical Assessment: Patient with periods of confusion per chart. Patient lives alone in [address] [place] [address], family stated does not think patient is safe to return home alone.    Safety Interventions: patient left in chair, chair alarm in place, call light within reach, nurse notified, and family/caregiver present    Plan  Frequency: 3-5 x/per week  Current Treatment Recommendations: strengthening, balance training, functional mobility training, ADL/self-care training, cognitive/perceptual training, cognitive reorientation, safety education, and equipment evaluation/education    Goals  Patient Goals: Get stronger (aware and in agreement can not go home)   Short Term Goals:  Time Frame: until discharge   Patient will complete lower body ADL at minimal assistance   Patient will complete toileting at stand by assistance   Patient will complete functional transfers at stand by assistance   Patient will complete functional mobility at stand by assistance   Patient will increase [facility] ADL score = to or > than 18/24  Patient will follow 1-step commands with 90% accuracy throughout session    Therapy Session Time     Individual Group Co-treatment   Time In    1335   Time Out    1416   Minutes    41        Timed Code Treatment Minutes:  26    Total Treatment Minutes:  41       Electronically Signed By: Patric Barthel, OTR/L Shagufta Lundy

## 2023-02-10 NOTE — PROGRESS NOTES
Department of Internal Medicine  General Internal Medicine   Progress Note      SUBJECTIVE: lethargic disoriented  and incoherent     History obtained from chart review, the patient, and nursing staff   General ROS: positive for  - fatigue, malaise, and weight loss  negative for - chills, fever, or night sweats  Psychological ROS: positive for - anxiety, disorientation, irritability, and memory difficulties  negative for - hallucinations or hostility  Ophthalmic ROS: negative  Respiratory ROS: no cough, shortness of breath, or wheezing  Cardiovascular ROS: no chest pain or dyspnea on exertion  Gastrointestinal ROS: no abdominal pain, change in bowel habits, or black or bloody stools  Genito-Urinary ROS: oliguric , no hematuria ,   Musculoskeletal ROS: positive for - pain in chronic generalized pain    negative for - joint swelling  Neurological ROS: diffuse slowing down of intellectual and sensory motor function  Dermatological ROS: negative    OBJECTIVE      Medications      Current Facility-Administered Medications: 0.9 % sodium chloride bolus, 500 mL, IntraVENous, PRN  ziprasidone (GEODON) injection 20 mg, 20 mg, IntraMUSCular, Q6H PRN  diphenhydrAMINE (BENADRYL) injection 25 mg, 25 mg, IntraVENous, Q6H PRN  metoprolol (LOPRESSOR) injection 5 mg, 5 mg, IntraVENous, Q6H PRN  metoprolol succinate (TOPROL XL) extended release tablet 50 mg, 50 mg, Oral, Daily  famotidine (PEPCID) tablet 10 mg, 10 mg, Oral, Daily  acetaminophen (TYLENOL) tablet 650 mg, 650 mg, Oral, Q4H PRN  HYDROcodone-acetaminophen (NORCO) 5-325 MG per tablet 1 tablet, 1 tablet, Oral, Q6H PRN  HYDROmorphone HCl PF (DILAUDID) injection 0.5 mg, 0.5 mg, IntraVENous, Q4H PRN  aspirin EC tablet 81 mg, 81 mg, Oral, Daily  mupirocin (BACTROBAN) 2 % ointment, , Topical, TID  ondansetron (ZOFRAN-ODT) disintegrating tablet 4 mg, 4 mg, Oral, Q8H PRN  Vitamin D (CHOLECALCIFEROL) tablet 1,000 Units, 1,000 Units, Oral, Daily    Physical      Vitals: /77 Pulse (!) 106   Temp 97.9 °F (36.6 °C) (Oral)   Resp 18   Ht 5' 5\" (1.651 m)   Wt 163 lb 2.3 oz (74 kg)   LMP  (LMP Unknown)   SpO2 92%   BMI 27.15 kg/m²   Temp: Temp: 97.9 °F (36.6 °C)  Max: Temp  Av °F (36.7 °C)  Min: 97.6 °F (36.4 °C)  Max: 99.4 °F (37.4 °C)  Respiration range:  Resp  Av  Min: 18  Max: 24  Pulse Range:  Pulse  Av  Min: 91  Max: 113  Blood pressure range:  Systolic (98SAR), OKW:042 , Min:119 , BWE:883   , Diastolic (83PIS), PKH:21, Min:68, Max:93    SpO2  Av.2 %  Min: 92 %  Max: 98 %    Intake/Output Summary (Last 24 hours) at 2/10/2023 022  Last data filed at 2023  Gross per 24 hour   Intake 240 ml   Output --   Net 240 ml       Vent settings:  Pulse  Av.1  Min: 65  Max: 113  Resp  Av.7  Min: 12  Max: 24  SpO2  Av.5 %  Min: 92 %  Max: 100 %    CONSTITUTIONAL:  fatigued, somnolent, uncooperative, distracted, mild distress, appears stated age, and mildly obese  EYES:  unremarkable   NECK:  mild JVD   BACK:  symmetric and no curvature  LUNGS:  No increased work of breathing, good air exchange, clear to auscultation bilaterally, no crackles or wheezing  CARDIOVASCULAR:  Normal apical impulse, regular rate and rhythm, normal S1 and S2, no S3 or S4, and no murmur noted  ABDOMEN:  soft BS +   MUSCULOSKELETAL:  trace edema   NEUROLOGIC:  general weakness lack of co-ordination , babinski absent   SKIN:  warm and dry  and no bruising or bleeding    Data      Lab Results   Component Value Date    WBC 9.4 2023    HGB 12.7 2023    HCT 38.5 2023    MCV 95.5 2023     2023     Lab Results   Component Value Date/Time     2023 08:13 AM    K 5.4 2023 08:13 AM    CL 98 2023 08:13 AM    CO2 20 2023 08:13 AM    BUN 44 2023 08:13 AM    CREATININE 7.1 2023 08:13 AM    GLUCOSE 136 2023 08:13 AM    CALCIUM 9.8 2023 08:13 AM            [facility] Last Modified POA    * (Principal) Acute encephalopathy 2/7/2023 Yes    Hypertension 2/7/2023 Yes    Anemia, chronic disease 2/7/2023 Yes    Hemodialysis patient ([address] 2/7/2023 Yes    Toxic encephalopathy 2/7/2023 Yes    Neuropathy 2/7/2023 Yes    CAD (coronary artery disease) 2/7/2023 Yes        Ct HD comanagement , presently on no psychoactive   Optimize BP management  she developed reaction to Hydralazine in terms of rash and flushing given Benadryl

## 2023-02-10 NOTE — CONSULTS
Palliative Care: Altered Mental Status (Arrived per [facility] from dialysis (pt resides at home with daughter) d/t altered mental status; tearful in triage; unable to state date, time, birth date which is not normal/)    H&P: 69-year-old white American lady who came to the emergency room with a history of poor responsiveness, increased somnolence, increased lethargy. The patient was totally disoriented and that is unusual for her. She was tearful and moaning and groaning and did not make any sense. She was moving all her arms and legs without any difficulty. She is a chronic hemodialysis patient and she was on her way to dialysis this morning and did not get it because of increased confusion. There is no history of any chest pain. Does have moderate shortness of breath, worse on exertion. Admit: 2/7/2023  Consult: 2/9/23    Past Medical History:   has a past medical history of Arthritis, CAD (coronary artery disease), Diabetes ([facility] [address] Hemodialysis patient ([facility] [address] Hyperlipidemia, Hypertension, Kidney disease, Neuropathy, Pneumonia, and Thyroid disease. Past Surgical History:   has a past surgical history that includes Appendectomy; Mastectomy, bilateral; Rotator cuff repair (Left); Cholecystectomy; LAPAROSCOPY INSERTION PERITONEAL CATHETER (N/A, 04/03/2020); Upper gastrointestinal endoscopy (N/A, 11/24/2020); Colonoscopy (11/24/2020); Hysterectomy; Dialysis Catheter Removal (N/A, 05/17/2021); shunt revision (Left, 08/19/2021); and Coronary stent placement.     Advance Directives:        Advance Care Planning   The patient has the following advanced directives on file:  Advance Directives       Power of [address] Will ACP-Advance Directive ACP-Power of     Not on File Filed on 11/27/22 Filed Not on File     The patient has appointed the following active healthcare agents:    Primary Decision Maker: Ranjan Marin - [phone]    Secondary Decision Maker: Ashli Marin - [phone]    Supplemental (Other) Decision Maker: Garry Turcios Child - [phone]    The Patient has the following current code status:    Code Status: Full Code     Extended Emergency Contact Information  Primary Emergency Contact: [address] [facility] [place] Phone: [phone]  Relation: Child  Secondary Emergency Contact: [address] [facility] Phone: [phone]  Relation: Child    Problem Severity: Pain/Other Symptoms:        Weight 165#  Body mass index is 27.55 kg/m². Bed Mobility/Toileting/Transfer:        PT/OT 16/15     Performance Status:        Palliative Performance Scale:  60%   [] Ambulation reduced; Significant disease; Can't do hobbies/housework; intake normal or reduced; occasional assist; LOC full/confusion  50%   [x] Mainly sit/lie; Extensive disease; Can't do any work; Considerable assist; intake normal  Or reduced; LOC full/confusion  40%   [] Mainly in bed; Extensive disease; Mainly assist; intake normal or reduced; occasional assist; LOC full/confusion  PPS 50%    Symptom Assessment: Appetite/Nausea/Bowels/Fatigue:        ADULT DIET; Dysphagia - Pureed; 3 carb choices (45 gm/meal); Low Sodium (2 gm); Low Potassium (Less than 3000 mg/day); Low Phosphorus (Less than 1000 mg); Less than 60 gm    Intake/Output Summary (Last 24 hours) at 2/10/2023 1150  Last data filed at 2/9/2023 2021  Gross per 24 hour   Intake 240 ml   Output --   Net 240 ml       Social History:   reports that she quit smoking about 44 years ago. Her smoking use included cigarettes. She has a 5.00 pack-year smoking history. She has never used smokeless tobacco. She reports current alcohol use. She reports that she does not use drugs. Family History:  family history includes Cancer in her maternal grandmother and mother; No Known Problems in her paternal grandfather and paternal grandmother; Stomach Cancer in her brother; Stroke in her father.     Family Discussion:        All MD/RN notes and personal interaction indicate that pt is currently capable of independent decision making given improvement in mentation. Advance Care Planning  (ACP) Conversation    Date of ACP Conversation: 02/10/23    Conversation Conducted with:   Patient with Decision Making Capacity with the support of her daughter, Erum Torres    *If present, Decision Maker was asked to consider and make decisions based on patient values, known preferences, or best interests. Current Designated Health Care Decision Maker:     Primary Decision Maker: Bart Beatty - [phone]    Secondary Decision Maker: [facility] - [phone]    Supplemental (Other) Decision Maker: Michelle Davidson - Child - [phone]     Hospitalization: \"If your health were to worsen after you left the hospital and it became clear that your chance of recovery was unlikely, would you want to return to the hospital? No, pt would not want to return to the hospital if she is worse than she is now. Resuscitation:  \"CPR works best to restart the heart when there is a sudden event, like a heart attack, in someone who is otherwise healthy. Unfortunately, CPR does not typically restart the heart for people who have serious health conditions or who are very sick. In the event your heart stopped and you , would you want attempts to restart your heart: chest compressions, shock, ventilator, and medications? Yes, pt would want to receive CPR. \"If your health got worse and it became clear that your chance of recovery was unlikely, would you still want CPR? \" In the event that pt is requiring more HD than 4hr 3x per week, pt would not want to receive CPR - no compressions, no shock, no intubation, no medications. Content Summary:  Spoke with pt at beside prior to and after PT/OT; pt's daughter Erum Torres was present for entire conversation and provided support, also. Discussed pt's current status, POC, and prognosis.  Discussed possibility that pt will eventually need more HD than she currently required - pt was adamant that she will NOT do more than she's currently prescribed, she would rather go with hospice and pass. Also discussed possibility that pt would need assisted living and pt indicated that her independence is a necessity for her and that she would probably rather pass than live in that environment. While pt was occupied with PT/OT, spoke with daughter Sosa Whitehead in the hallway. Encouraged her and her siblings to have conversations about what pt had said and to start conversations now with Hospice so that, whenever the time does come, they have the information and resources readily available. Pt voiced that she already hates 4hr HD sessions and is \"just about done\" with trying to stay alive. RN informed. No orders updated, no change to d/c plan. Action Overview: Has ACP document(s) on file - reflects the patient's care preferences  Reviewed DNR/DNI and patient elects Full Code (Attempt Resuscitation)  Additional topics of conversation included treatment goals, benefit/burden of treatment options, ventilation preferences, hospitalization preferences, resuscitation preferences, and hospice care      Will follow up and support patient/family as time allows or circumstances dictate. Please reach out via [id number], [employer], or email [email or web address] if pt condition changes significantly or if family requests support. Thank you.     Electronically signed by Dorma Collet, RN, BSN on 2/10/2023 at 3:27 PM

## 2023-02-10 NOTE — PROGRESS NOTES
[facility] - Inpatient Rehabilitation Department   Phone: [phone]    Physical Therapy    [] Initial Evaluation            [] Daily Treatment Note         [] Discharge Summary      Patient: Judy Handley   : 1945   MRN: [id number]   Date of Service:  2/10/2023  Admitting Diagnosis: Acute encephalopathy    Current Admission Summary: Judy Handley is a 77 y.o. female  who presents to the ED complaining of altered mental status.  History is obtained primarily from EMS.  She is tearful and moaning but not really making any sense even though she does not have dysarthria on her initial evaluation.  She is moving her arms and legs without difficulty.  She was on her way to dialysis this morning and did not get it because of her confusion and was rerouted to the emergency department not having started it.  She has a history of hypertension hyperlipidemia diabetes and CAD as well as end-stage renal disease on hemodialysis.  She was admitted in December for similar encephalopathy that was felt to be hypertensive versus metabolic after having an unremarkable MRI.  At 1 point neurology started her on Dilantin but then later discontinued it thinking it was not likely seizure activity after risk benefits were discussed.  No reports of recent fevers.  Patient does not complain of pain anywhere in her head chest or belly.  No reports of vomiting or diarrhea recently.  Daughter later states that the patient was fine last night and was confused this morning and she did attempt to bring the patient in dialysis anyway however she continued to get worse with her disorientation and was brought here instead.  The patient was apparently fine yesterday earlier in the evening.  The patient is on Neurontin but as far as we know there are no recent changes in dose or any other new medications that she is on.    Past Medical History:  has a past medical history of Arthritis, CAD (coronary artery disease), Diabetes  [facility]), Hemodialysis patient ([facility] [address] Hyperlipidemia, Hypertension, Kidney disease, Neuropathy, Pneumonia, and Thyroid disease. Past Surgical History:  has a past surgical history that includes Appendectomy; Mastectomy, bilateral; Rotator cuff repair (Left); Cholecystectomy; LAPAROSCOPY INSERTION PERITONEAL CATHETER (N/A, 04/03/2020); Upper gastrointestinal endoscopy (N/A, 11/24/2020); Colonoscopy (11/24/2020); Hysterectomy; Dialysis Catheter Removal (N/A, 05/17/2021); shunt revision (Left, 08/19/2021); and Coronary stent placement. Discharge Recommendations: Courtney Moore scored a 16/24 on the AM-PAC short mobility form. Current research shows that an AM-PAC score of 17 or less is typically not associated with a discharge to the patient's home setting. Based on the patient's AM-PAC score and their current functional mobility deficits, it is recommended that the patient have 3-5 sessions per week of Physical Therapy at d/c to increase the patient's independence. Please see assessment section for further patient specific details. If patient discharges prior to next session this note will serve as a discharge summary. Please see below for the latest assessment towards goals. DME Required For Discharge: DME to be determined at next level of care  Precautions/Restrictions: medium fall risk  Weight Bearing Restrictions: weight bearing as tolerated  [] Right Upper Extremity  [] Left Upper Extremity [] Right Lower Extremity  [] Left Lower Extremity     Required Braces/Orthotics: no braces required   [] Right  [] Left  Positional Restrictions:no positional restrictions    Pre-Admission Information   Lives With: alone                     Type of Home:  [facility]  Home Layout: one level  Home Access:  1 step step to enter with handrail. Handrails are located on R side.   Bathroom Layout: walk in shower  Bathroom Equipment: grab bars in shower, shower chair, hand held shower head  Home Equipment: rollator - 4 wheeled walker  Transfer Assistance: Independent without use of device  Ambulation Assistance:Independent without use of device  ADL Assistance: independent with all ADL's  IADL Assistance: independent with homemaking tasks  Active : [] yes             [x]no  Current Employment: retired. Occupation:    Hobbies:   Recent Falls: 1 recent fall leading to this admission, no additional falls in the last 6 months   Daughter from [facility] stated that patient in non-compliant with diet and medication due to periods of confusion. Dialysis T/TH/SAT     Examination   Vision:   Vision Gross Assessment: Impaired and Vision Corrective Device: wears glasses at all times  Hearing:   [facility]  Observation:   General Observation:  Patient supine in bed w/ HOB elevated. Posture:   WFL  Sensation:   WFL  Proprioception:    WFL  Tone:   Normotonic  Coordination Testing:   WFL    ROM:   (B) LE AROM WFL  Strength:   (B) LE strength grossly -4  Therapist Clinical Decision Making (Complexity): medium complexity  Clinical Presentation: stable      Subjective  General: Patient reports she has not been out of bed all week, feels weak. Does not feel she can go home alone. Pain: 0/10  Pain Interventions: RN notified and repositioned        Functional Mobility  Bed Mobility  Supine to Sit: stand by assistance  Scooting: stand by assistance  Comments:  Increased time to complete, elevated HOB, bed rail. Transfers  Sit to stand transfer: minimal assistance  Stand to sit transfer: minimal assistance  Bed to chair transfer: minimal assistance  Comments:  Gait belt donned. Patient stood w/ posterior lean and required MIN assist to maintain balance, unable to self-correct.   Ambulation  Surface:level surface  Assistive Device: rolling walker  Assistance: minimal assistance  Distance: 10', 15', 30'  Gait Mechanics: dec'd agatha, unsteady  Comments:  Ambulated to bathroom, then 15' to chair as patient was fatigued and required seated rest x2 minutes.  O2 96%.  Patient then ambulated 30' w/ RW, unable to ambulate further d/t fatigue.  Stair Mobility  Stair mobility not completed on this date.  Comments:  Wheelchair Mobility:  No w/c mobility completed on this date.  Comments:  Balance  Static Sitting Balance: good: independent with functional balance in unsupported position  Dynamic Sitting Balance: fair (+): maintains balance at SBA/supervision without use of UE support  Static Standing Balance: fair (-): maintains balance at CGA with use of UE support  Dynamic Standing Balance: fair (-): maintains balance at CGA with use of UE support  Comments:    Other Therapeutic Interventions    Functional Outcomes  AM-PAC Inpatient Mobility Raw Score : 16              Cognition  Overall Cognitive Status: Impaired  Arousal/Alterness: appropriate responses to stimuli  Following Commands: follows all commands without difficulty  Memory: decreased recall of recent events, decreased short term memory  Safety Judgement: decreased awareness of need for assistance, decreased awareness of need for safety  Problem Solving: assistance required to generate solutions, assistance required to implement solutions, decreased awareness of errors, assistance required to identify errors made, assistance required to correct errors made  Insights: decreased awareness of deficits  Sequencing: requires cues for some  Orientation:    oriented to person, oriented to place, oriented to time, and oriented to situation  Command Following:   WFL    Education  Barriers To Learning: cognition  Patient Education: patient educated on goals, PT role and benefits, plan of care, general safety, functional mobility training, proper use of assistive device/equipment, energy conservation, family education, transfer training, discharge recommendations  Learning Assessment:  patient verbalizes understanding, would benefit from continued reinforcement    Assessment  Activity Tolerance: Limited by  weakness and cardiovascular endurance. Impairments Requiring Therapeutic Intervention: decreased functional mobility, decreased ADL status, decreased strength, decreased safety awareness, decreased cognition, decreased endurance, decreased balance, decreased posture  Prognosis: good  Clinical Assessment: Patient was independent without a device in independent living prior to admission. She presently requires assistance for all transfers and ambulation w/ use of RW for balance and demonstrates impaired safety awareness, cognitive deficits, and balance/ambulation impairments. Recommend 24 hour assist and continued therapy at d/c. Safety Interventions: patient left in chair, chair alarm in place, call light within reach, gait belt, patient at risk for falls, telesitter in use, nurse notified, and family/caregiver present    Plan  Frequency: 3-5 x/per week  Current Treatment Recommendations: strengthening, balance training, functional mobility training, transfer training, gait training, endurance training, patient/caregiver education, safety education, and equipment evaluation/education    Goals  Patient Goals:  Get stronger, go home. Short Term Goals:  Time Frame: Discharge.   Patient will complete bed mobility at modified independent   Patient will complete transfers at [facility]   Patient will ambulate 50 ft with use of LRAD at [facility]    Therapy Session Time      Individual Group Co-treatment   Time In     1335   Time Out     1416   Minutes     41     Timed Code Treatment Minutes:  26  Total Treatment Minutes:  41       Electronically Signed By: Jason Oh PT, DPT, [id number] [id number]

## 2023-02-10 NOTE — PROGRESS NOTES
Bedside handoff report received and pt assessment completed. Oriented to name, place, and time, disoriented to situation. Able to follow simple commands. Intermittent delusions and illogical speech, repeats the same questions frequently, but overall calm and cooperative and easy to redirect. VSS. Denies any pain or discomfort. Incontinent of large amt of urine. Assisted to [facility] for unsuccessful attempt at [facility]. POC discussed and all questions addressed. Family at bedside and interactive in care. Will cont to monitor.

## 2023-02-10 NOTE — CARE COORDINATION
Discharge Planning:     (CM) had discussion with both of Patient's daughter's Allison Chapin and Jase Louise. They are wanting referrals to Home at [facility] and [facility]. CM called Shannon French and provided the Patients information. Shannon French is reviewing. Concerns about transport to HD but she advised she would try to work something out. Joann to call CM back. Patient will either need Medical Transport or  Family may be able to transport depending on the day. CM team to discuss with Jase Louise at D/C. CM team to follow.        Electronically signed by JOSE Rodriguez on 2/10/2023 at 4:17 PM

## 2023-02-10 NOTE — PROGRESS NOTES
Facility/Department: [address]  Speech Language Pathology   Dysphagia and Speech Language/Cognitive Treatment Note    Patient: Giovanny Garza   : 1945   MRN: [id number]      Evaluation Date: 2/10/2023      Admitting Dx: ESRD on hemodialysis ([address] [N18.6, Z99.2]  Acute encephalopathy [G93.40]  Treatment Diagnosis: Cognitive-Linguistic Deficits , Oropharyngeal Dysphagia   Pain: Denies                                                Subjective:  Pt was awake and alert, agreeable to follow-up tx. Daughter was present, following session she expressed concerns regarding pt's cognitive state and ability to return to independent living. Family is especially concerned about pt's ability to manage medications independently. They feel that although pt is demonstrating some improvement this is her 3rd \"episode\" recently and she continues to demonstrate cognitive deficits. Concerns communicate with OT/PT team and RN. Dysphagia Treatment:   Diet and Treatment Recommendations 2/10/2023:  Diet Solids Recommendation:  Regular texture diet  Liquid Consistency Recommendation: Thin liquids  Recommended form of Meds: Meds whole with water        Compensatory strategies: Upright as possible with all PO intake , Small bites/sips , Eat/feed slowly, Remain upright 30-45 min     Assessment of Texture Tolerance:  Diet level prior to treatment: Dysphagia I Puree , Thin liquids   Tolerance of Current Diet Level:RN reported pt appears to be tolerating current diet level . Pt was awake and alert last PM and family provided her with regular texture food. Pt reportedly tolerated with no overt difficulty however, then became confused and did not finish eating believing the food was \"raw\"      -Impressions: Pt was positioned Upright in bed , awake and alert. Currently on room air. Trials of thin liquids and regular solids  were provided to assess swallow function. Pt was able to self feed.  She demonstrated adequate mastication and bolus propulsion. Swallow appeared fairly timely with adequate laryngeal elevation. Post swallows with thin liquids pt demonstrated episodic vocal quality change however, improved with secondary swallow or throat clear. Respiratory state appeared to remain stable. Pt demonstrates increased risk for aspiration due to cognitive state  and co morbidities . Pt appeared to tolerate regular textures with thin liquids. She demonstrates overall improvement from prior assessment, Based on today's assessment recommend Regular texture diet  with Thin liquids , Meds whole with water with use of compensatory swallow strategies (see above). Dysphagia Goals:  Pt will functionally tolerate recommended diet with no overt clinical s/s of aspiration (Ongoing 02/10/23)  Pt will advance to least restrictive diet as indicated (Ongoing 02/10/23)      Speech Language/Cognitive Treatment:  Impressions: This date goals were targeted via orientation and short term recall. Pt demonstrating improved skills in the areas of auditory comprehension, level of alertness and verbal expression. Pt continues to demonstrate deficits in the areas of orientation, short term recall, sustained attention, insight/awareness. She was oriented to person, place, month but disoriented to overall situation. Pt believing she is going shopping this date with her daughter although aware she is currently in the hospital. She was unable to complete delayed recall of novel information.  Based on today's session recommend ongoing speech therapy to target cognitive linguistic deficits and maximize pt's overall potential.     Speech Language Short Term Goals:  Pt will improve auditory processing/comprehension of commands and questions to 80%, via graded tasks (Ongoing 02/10/23)  Pt will improve orientation and short term recall via graded tasks to 80% (Ongoing 02/10/23)  Pt will improve verbal expression for functional expression via graded tasks to 80% (Ongoing 02/10/23)  Pt will participate in ongoing cognitive assessment with goals to be established as indicated (Ongoing 02/10/23)    Assessment: Patient progressing toward goals    Plan: 3-5 times per week during acute care stay. Patient/Family Education:  Provided education regarding role of SLP, recommendations and general speech pathology plan of care. [] Pt verbalized understanding and agreement   [x] Pt requires ongoing learning   [] No evidence of comprehension     Discharge Recommendations:  Recommend ongoing SLP for speech and dysphagia therapy upon discharge from hospital     Treatment time  Timed Code Treatment Minutes: 10 minutes   Total Treatment time: 24 minutes     If patient discharges prior to next session this note will serve as a discharge summary.       Signature: Dee Bains, [facility]  Speech Language Pathologist    -

## 2023-02-10 NOTE — PROGRESS NOTES
Speech Language Pathology  Attempt   Fatuma Mclean   1945     Attempted to see pt for SLP follow-up. Pt off the floor to HD at time of attempt. Will re-attempt to follow-up as therapy schedule allows.     Thanks,  Agnieszka Johnson, [facility]  Speech Language Pathologist

## 2023-02-10 NOTE — PLAN OF CARE
Problem: Safety - Adult  Goal: Free from fall injury  Outcome: Progressing     Problem: Skin/Tissue Integrity  Goal: Absence of new skin breakdown  Description: 1. Monitor for areas of redness and/or skin breakdown  2. Assess vascular access sites hourly  3. Every 4-6 hours minimum:  Change oxygen saturation probe site  4. Every 4-6 hours:  If on nasal continuous positive airway pressure, respiratory therapy assess nares and determine need for appliance change or resting period. Outcome: Progressing     Problem: Pain  Goal: Verbalizes/displays adequate comfort level or baseline comfort level  Outcome: Progressing     Problem: Safety - Medical Restraint  Goal: Remains free of injury from restraints (Restraint for Interference with Medical Device)  Description: INTERVENTIONS:  1. Determine that other, less restrictive measures have been tried or would not be effective before applying the restraint  2. Evaluate the patient's condition at the time of restraint application  3. Inform patient/family regarding the reason for restraint  4.  Q2H: Monitor safety, psychosocial status, comfort, nutrition and hydration  Outcome: Completed

## 2023-02-10 NOTE — DIALYSIS
3 hour HD TX completed as ordered. PT tolerated TX well, VSS. 1000ml total net UF removed. No change in PT condition from prior assessment.

## 2023-02-10 NOTE — PROGRESS NOTES
PROCEDURE:  Patient seen on hemodialysis at 10:11 AM    PHYSICIAN:  Dionna Murray MD    INDICATION:  End-stage renal disease    Wt Readings from Last 3 Encounters:   02/10/23 165 lb 9.1 oz (75.1 kg)   01/20/23 181 lb 3.2 oz (82.2 kg)   12/27/22 160 lb 15 oz (73 kg)     Temp Readings from Last 3 Encounters:   02/10/23 98.4 °F (36.9 °C) (Temporal)   02/01/23 (!) 96.1 °F (35.6 °C) (Infrared)   01/25/23 (!) 95.5 °F (35.3 °C) (Tympanic)     BP Readings from Last 3 Encounters:   02/10/23 (!) 148/85   02/01/23 (!) 172/81   01/25/23 (!) 157/78     Pulse Readings from Last 3 Encounters:   02/10/23 97   02/01/23 79   01/25/23 88      In: 240 [P.O.:240]  Out: -      CBC:   Recent Labs     02/08/23  0536 02/09/23  0813 02/10/23  0508   WBC 9.3 9.4 13.3*   HGB 11.0* 12.7 13.1    372 332     BMP:    Recent Labs     02/08/23  0536 02/09/23  0813 02/10/23  0508    138 136   K 4.7 5.4* 4.5    98* 94*   CO2 23 20* 24   BUN 44* 44* 44*   CREATININE 8.2* 7.1* 6.2*   GLUCOSE 92 136* 122*     BMD:   Lab Results   Component Value Date    .3 (H) 02/10/2020    CALCIUM 10.2 02/10/2023    CAION 1.03 (L) 12/22/2022    PHOS 6.9 (H) 12/27/2022       Iron:     Lab Results   Component Value Date    IRON 64 03/29/2018    TIBC 351 03/29/2018            RX:  See dialysis flowsheet for specifics on access, blood flow rate, dialysate baths, duration of dialysis, anticoagulation and other technical information. COMMENTS:      Seen on HD     Mental status improved significantly   Getting dialysis 3rd day in a row    D/w her about 4 days / week dialysis and she does not want it         Dionna Murray MD  Nephrology     [place] Office : [address] , [address] Office: [facility]  [address] [facility] Jada Castillo Office: [place] [address].   [facility] Office: [address] [place] [address] [address] [facility], [address] [facility] [address]  Office : [phone]  Fax :[phone]

## 2023-02-10 NOTE — PROGRESS NOTES
Progress Note - Dr. Demarcus Benz - Internal Medicine  PCP: Luiz Montez MD [address] [phone]    Hospital Day: 3  Code Status: Full Code  Current Diet: ADULT DIET; Dysphagia - Pureed; 3 carb choices (45 gm/meal); Low Sodium (2 gm); Low Potassium (Less than 3000 mg/day); Low Phosphorus (Less than 1000 mg); Less than 60 gm        CC: follow up on medical issues    Subjective:   Beverley Michaels is a 65 y.o. female. Pt seen and examined  Chart reviewed since last visit, labs and imaging below      Still confused  Appears to have tolerated HD ok yest  Pt/ot pending      Review of Systems: (1 system for EPF, 2-9 for detailed, 10+ for comprehensive)  Cannot obtain from pt 2/2 alteration    I have reviewed the patient's medical and social history in detail and updated the computerized patient record. To recap: She  has a past medical history of Arthritis, CAD (coronary artery disease), Diabetes ([facility] [address] Hemodialysis patient ([facility] [address] Hyperlipidemia, Hypertension, Kidney disease, Neuropathy, Pneumonia, and Thyroid disease. . She  has a past surgical history that includes Appendectomy; Mastectomy, bilateral; Rotator cuff repair (Left); Cholecystectomy; LAPAROSCOPY INSERTION PERITONEAL CATHETER (N/A, 04/03/2020); Upper gastrointestinal endoscopy (N/A, 11/24/2020); Colonoscopy (11/24/2020); Hysterectomy; Dialysis Catheter Removal (N/A, 05/17/2021); shunt revision (Left, 08/19/2021); and Coronary stent placement. . She  reports that she quit smoking about 44 years ago. Her smoking use included cigarettes. She has a 5.00 pack-year smoking history. She has never used smokeless tobacco. She reports current alcohol use. She reports that she does not use drugs. .        Active Hospital Problems    Diagnosis Date Noted    Anemia, chronic disease [D63.8] 02/07/2023     Priority: Medium    Hemodialysis patient [facility]) [Z99.2]      Priority: Medium    Toxic encephalopathy [G92.9]      Priority: Medium    Neuropathy [G62.9]      Priority: Medium    Hypertension [I10] 12/23/2022     Priority: Medium    Acute encephalopathy [G93.40] 12/22/2022     Priority: Medium    CAD (coronary artery disease) [I25.10]        Current Facility-Administered Medications: 0.9 % sodium chloride bolus, 500 mL, IntraVENous, PRN  ziprasidone (GEODON) injection 20 mg, 20 mg, IntraMUSCular, Q6H PRN  diphenhydrAMINE (BENADRYL) injection 25 mg, 25 mg, IntraVENous, Q6H PRN  metoprolol (LOPRESSOR) injection 5 mg, 5 mg, IntraVENous, Q6H PRN  metoprolol succinate (TOPROL XL) extended release tablet 50 mg, 50 mg, Oral, Daily  famotidine (PEPCID) tablet 10 mg, 10 mg, Oral, Daily  acetaminophen (TYLENOL) tablet 650 mg, 650 mg, Oral, Q4H PRN  HYDROcodone-acetaminophen (NORCO) 5-325 MG per tablet 1 tablet, 1 tablet, Oral, Q6H PRN  HYDROmorphone HCl PF (DILAUDID) injection 0.5 mg, 0.5 mg, IntraVENous, Q4H PRN  aspirin EC tablet 81 mg, 81 mg, Oral, Daily  mupirocin (BACTROBAN) 2 % ointment, , Topical, TID  ondansetron (ZOFRAN-ODT) disintegrating tablet 4 mg, 4 mg, Oral, Q8H PRN  Vitamin D (CHOLECALCIFEROL) tablet 1,000 Units, 1,000 Units, Oral, Daily         Objective:  /71   Pulse (!) 101   Temp 98.3 °F (36.8 °C) (Oral)   Resp 18   Ht 5' 5\" (1.651 m)   Wt 166 lb (75.3 kg)   LMP  (LMP Unknown)   SpO2 94%   BMI 27.62 kg/m²      Patient Vitals for the past 24 hrs:   BP Temp Temp src Pulse Resp SpO2 Height Weight   02/10/23 0709 127/71 98.3 °F (36.8 °C) Oral (!) 101 18 94 % -- --   02/10/23 0541 -- -- -- -- -- -- -- 166 lb (75.3 kg)   02/10/23 0344 (!) 150/80 98.6 °F (37 °C) Oral (!) 102 18 94 % -- --   02/09/23 2321 119/77 97.9 °F (36.6 °C) Oral (!) 106 18 92 % -- --   02/09/23 1928 (!) 145/77 98.1 °F (36.7 °C) Oral (!) 111 20 98 % -- --   02/09/23 1620 138/83 97.7 °F (36.5 °C) Oral (!) 106 20 95 % -- --   02/09/23 1603 (!) 163/93 97.6 °F (36.4 °C) -- (!) 113 18 -- -- 163 lb 2.3 oz (74 kg)   02/09/23 1527 -- -- -- -- -- -- 5' 5\" (1.651 m) 165 lb 4.8 oz (75 kg)   02/09/23 1255 138/89 97.6 °F (36.4 °C) -- 91 18 -- -- --     Patient Vitals for the past 96 hrs (Last 3 readings):   Weight   02/10/23 0541 166 lb (75.3 kg)   02/09/23 1603 163 lb 2.3 oz (74 kg)   02/09/23 1527 165 lb 4.8 oz (75 kg)           Intake/Output Summary (Last 24 hours) at 2/10/2023 2090  Last data filed at 2/9/2023 2021  Gross per 24 hour   Intake 240 ml   Output --   Net 240 ml         Physical Exam: (2-7 system for EPF/Detailed, ?8 for Comprehensive)  /71   Pulse (!) 101   Temp 98.3 °F (36.8 °C) (Oral)   Resp 18   Ht 5' 5\" (1.651 m)   Wt 166 lb (75.3 kg)   LMP  (LMP Unknown)   SpO2 94%   BMI 27.62 kg/m²   Constitutional: vitals as above: alert, appears stated age and cooperative    Psychiatric: normal insight and judgment, oriented to person, place, time, and general circumstances    Head: Normocephalic, without obvious abnormality, atraumatic    Eyes:lids and lashes normal, conjunctivae and sclerae normal and pupils equal, round, reactive to light and accomodation    EMNT: external ears normal, nares midline    Neck: no carotid bruit, supple, symmetrical, trachea midline and thyroid not enlarged, symmetric, no tenderness/mass/nodules     Respiratory: clear to auscultation and percussion bilaterally with normal respiratory effort    Cardiovascular: normal rate, regular rhythm, normal S1 and S2 and no murmurs    Gastrointestinal: soft, non-tender, non-distended, normal bowel sounds, no masses or organomegaly    Extremities: no clubbing, 1+ edema    Skin:No rashes or nodules noted.     Neurologic:negative         Labs:  Lab Results   Component Value Date    WBC 13.3 (H) 02/10/2023    HGB 13.1 02/10/2023    HCT 40.3 02/10/2023     02/10/2023    CHOL 96 08/12/2021    TRIG 132 08/12/2021    HDL 32 (L) 08/12/2021    LDLDIRECT 124 (H) 08/15/2019    ALT 14 02/07/2023    AST 15 02/07/2023     02/10/2023    K 4.5 02/10/2023    CL 94 (L) 02/10/2023 CREATININE 6.2 (HH) 02/10/2023    BUN 44 (H) 02/10/2023    CO2 24 02/10/2023    TSH 2.64 08/15/2019    INR 1.06 02/07/2023    LABA1C 5.9 11/21/2022    LABMICR YES 02/07/2023     Lab Results   Component Value Date    CKTOTAL 243 (H) 08/25/2021    TROPONINI 0.04 (H) 02/07/2023       Recent Imaging Results are Reviewed:  CT HEAD WO CONTRAST    Result Date: 2/7/2023  EXAMINATION: CT OF THE HEAD WITHOUT CONTRAST  2/7/2023 9:21 am TECHNIQUE: CT of the head was performed without the administration of intravenous contrast. Automated exposure control, iterative reconstruction, and/or weight based adjustment of the mA/kV was utilized to reduce the radiation dose to as low as reasonably achievable. COMPARISON: 12/22/2022 HISTORY: ORDERING SYSTEM PROVIDED HISTORY: ams TECHNOLOGIST PROVIDED HISTORY: Reason for exam:->ams Has a \"code stroke\" or \"stroke alert\" been called? ->No Decision Support Exception - unselect if not a suspected or confirmed emergency medical condition->Emergency Medical Condition (MA) Reason for Exam: ams FINDINGS: BRAIN/VENTRICLES: There is no acute intracranial hemorrhage, mass effect or midline shift. No abnormal extra-axial fluid collection. The gray-white differentiation is maintained without evidence of an acute infarct. There is prominence of the ventricles and sulci due to global parenchymal volume loss. There are nonspecific areas of hypoattenuation within the periventricular and subcortical white matter, which likely represent chronic microvascular ischemic change. ORBITS: The visualized portion of the orbits demonstrate no acute abnormality. SINUSES: The visualized paranasal sinuses and mastoid air cells demonstrate no acute abnormality. SOFT TISSUES/SKULL: No acute abnormality of the visualized skull or soft tissues. No acute intracranial abnormality.      XR CHEST PORTABLE    Result Date: 2/7/2023  EXAMINATION: ONE XRAY VIEW OF THE CHEST 2/7/2023 7:44 am COMPARISON: 12/23/2022 HISTORY: ORDERING SYSTEM PROVIDED HISTORY: ams TECHNOLOGIST PROVIDED HISTORY: Reason for exam:->ams Reason for Exam: altered mental status FINDINGS: There is biapical scarring. Cardiomegaly is stable. There is no pneumothorax or pleural effusion. Status post left mastectomy. 1.  No acute abnormality. Lab Results   Component Value Date/Time    GLUCOSE 122 02/10/2023 05:08 AM     Lab Results   Component Value Date/Time    POCGLU 138 02/09/2023 04:15 PM     /71   Pulse (!) 101   Temp 98.3 °F (36.8 °C) (Oral)   Resp 18   Ht 5' 5\" (1.651 m)   Wt 166 lb (75.3 kg)   LMP  (LMP Unknown)   SpO2 94%   BMI 27.62 kg/m²     Assessment and Plan:  Principal Problem:    Acute encephalopathy -Established problem. Stable. Still confused. Wbc now up  Plan: pt/ot pending. Look for infection. Check u/a, cxr, crp, procal  Active Problems:    Hypertension -Established problem. Stable. 127/71  Plan: Continue medications. Continue to check BP q shift. CBC and BMP ordered to monitor for disease progression, medication side effect. Anemia, chronic disease -Established problem. Stable. Hgb 13  Plan: No indication for transfusion. Cont to monitor h/h to assess progression of anemia. Repeat Hgb level ordered. Recommend ferrous sulfate or MVI as outpatient. Hemodialysis patient [facility]) -Established problem. Stable. On TRSa schedule.   Creat 6.2   Plan: per Dr Mary Alejandro    Toxic encephalopathy    Neuropathy    CAD (coronary artery disease)      Case discussed with: renal  Tests ordered/reviewed: cbc, bmp, cxr, procal          (Please note that portions of this note were completed with a voice recognition program.  Efforts were made to edit the dictations but occasionally words are mis-transcribed.)        Natalee Greenfield MD  2/10/2023

## 2023-02-10 NOTE — PROGRESS NOTES
Physical & Occupational Therapy  Patient off floor at dialysis. Will continue to follow. Javi Solorzano PT, DPT, ATC-R [id number]  [facility].  [facility] [address]

## 2023-02-10 NOTE — PLAN OF CARE
Pt and family updated on current plan of care, see flowsheet for assessment. Problem: Discharge Planning  Goal: Discharge to home or other facility with appropriate resources  Outcome: Progressing  Flowsheets (Taken 2/10/2023 0830)  Discharge to home or other facility with appropriate resources: Identify barriers to discharge with patient and caregiver     Problem: Safety - Adult  Goal: Free from fall injury  2/10/2023 1334 by John Singh RN  Outcome: Progressing     Problem: Skin/Tissue Integrity  Goal: Absence of new skin breakdown  Description: 1. Monitor for areas of redness and/or skin breakdown  2. Assess vascular access sites hourly  3. Every 4-6 hours minimum:  Change oxygen saturation probe site  4. Every 4-6 hours:  If on nasal continuous positive airway pressure, respiratory therapy assess nares and determine need for appliance change or resting period. 2/10/2023 1334 by John Singh RN  Outcome: Progressing     Problem: Safety - Medical Restraint  Goal: Remains free of injury from restraints (Restraint for Interference with Medical Device)  Description: INTERVENTIONS:  1. Determine that other, less restrictive measures have been tried or would not be effective before applying the restraint  2. Evaluate the patient's condition at the time of restraint application  3. Inform patient/family regarding the reason for restraint  4. Q2H: Monitor safety, psychosocial status, comfort, nutrition and hydration  2/10/2023 0634 by Lex Peter RN  Outcome: Completed     Problem: Pain  Goal: Verbalizes/displays adequate comfort level or baseline comfort level  2/10/2023 1334 by John Singh RN  Outcome: Progressing     Problem: Coping  Goal: Pt/Family able to verbalize concerns and demonstrate effective coping strategies  Description: INTERVENTIONS:  1. Assist patient/family to identify coping skills, available support systems and cultural and spiritual values  2.  Provide emotional support, including active listening and acknowledgement of concerns of patient and caregivers  3. Reduce environmental stimuli, as able  4. Instruct patient/family in relaxation techniques, as appropriate  5.  Assess for spiritual pain/suffering and initiate Spiritual Care, Psychosocial Clinical Specialist consults as needed  Outcome: Progressing     Problem: Neurosensory - Adult  Goal: Achieves stable or improved neurological status  Outcome: Progressing  Flowsheets (Taken 2/10/2023 0830)  Achieves stable or improved neurological status: Assess for and report changes in neurological status     Problem: Respiratory - Adult  Goal: Achieves optimal ventilation and oxygenation  Outcome: Progressing     Problem: Musculoskeletal - Adult  Goal: Return mobility to safest level of function  Outcome: Progressing  Flowsheets (Taken 2/10/2023 0830)  Return Mobility to Safest Level of Function: Assess patient stability and activity tolerance for standing, transferring and ambulating with or without assistive devices     Problem: Gastrointestinal - Adult  Goal: Maintains or returns to baseline bowel function  Outcome: Progressing  Flowsheets (Taken 2/10/2023 0830)  Maintains or returns to baseline bowel function: Assess bowel function  Goal: Maintains adequate nutritional intake  Outcome: Progressing  Flowsheets (Taken 2/10/2023 0830)  Maintains adequate nutritional intake: Monitor percentage of each meal consumed

## 2023-02-11 PROBLEM — E87.8 ELECTROLYTE IMBALANCE: Status: ACTIVE | Noted: 2023-02-11

## 2023-02-11 LAB
ANION GAP SERPL CALCULATED.3IONS-SCNC: 21 MMOL/L (ref 3–16)
ANISOCYTOSIS: ABNORMAL
BASOPHILS ABSOLUTE: 0 K/UL (ref 0–0.2)
BASOPHILS RELATIVE PERCENT: 0 %
BLOOD CULTURE, ROUTINE: NORMAL
BUN BLDV-MCNC: 39 MG/DL (ref 7–20)
CALCIUM SERPL-MCNC: 10.5 MG/DL (ref 8.3–10.6)
CHLORIDE BLD-SCNC: 93 MMOL/L (ref 99–110)
CO2: 24 MMOL/L (ref 21–32)
CREAT SERPL-MCNC: 6.8 MG/DL (ref 0.6–1.2)
CULTURE, BLOOD 2: NORMAL
EOSINOPHILS ABSOLUTE: 0 K/UL (ref 0–0.6)
EOSINOPHILS RELATIVE PERCENT: 0 %
GFR SERPL CREATININE-BSD FRML MDRD: 6 ML/MIN/{1.73_M2}
GLUCOSE BLD-MCNC: 107 MG/DL (ref 70–99)
HCT VFR BLD CALC: 44.3 % (ref 36–48)
HEMATOLOGY PATH CONSULT: YES
HEMOGLOBIN: 13.9 G/DL (ref 12–16)
LACTIC ACID: 2.4 MMOL/L (ref 0.4–2)
LYMPHOCYTES ABSOLUTE: 3.1 K/UL (ref 1–5.1)
LYMPHOCYTES RELATIVE PERCENT: 21 %
MCH RBC QN AUTO: 30.1 PG (ref 26–34)
MCHC RBC AUTO-ENTMCNC: 31.3 G/DL (ref 31–36)
MCV RBC AUTO: 96.3 FL (ref 80–100)
MONOCYTES ABSOLUTE: 1.8 K/UL (ref 0–1.3)
MONOCYTES RELATIVE PERCENT: 12 %
NEUTROPHILS ABSOLUTE: 9.8 K/UL (ref 1.7–7.7)
NEUTROPHILS RELATIVE PERCENT: 67 %
PDW BLD-RTO: 17.2 % (ref 12.4–15.4)
PLATELET # BLD: 287 K/UL (ref 135–450)
PMV BLD AUTO: 7.9 FL (ref 5–10.5)
POTASSIUM SERPL-SCNC: 4.4 MMOL/L (ref 3.5–5.1)
RBC # BLD: 4.6 M/UL (ref 4–5.2)
SODIUM BLD-SCNC: 138 MMOL/L (ref 136–145)
WBC # BLD: 14.6 K/UL (ref 4–11)

## 2023-02-11 PROCEDURE — 80048 BASIC METABOLIC PNL TOTAL CA: CPT

## 2023-02-11 PROCEDURE — 36415 COLL VENOUS BLD VENIPUNCTURE: CPT

## 2023-02-11 PROCEDURE — 2580000003 HC RX 258: Performed by: INTERNAL MEDICINE

## 2023-02-11 PROCEDURE — 85025 COMPLETE CBC W/AUTO DIFF WBC: CPT

## 2023-02-11 PROCEDURE — 90935 HEMODIALYSIS ONE EVALUATION: CPT

## 2023-02-11 PROCEDURE — 6360000002 HC RX W HCPCS: Performed by: INTERNAL MEDICINE

## 2023-02-11 PROCEDURE — 99233 SBSQ HOSP IP/OBS HIGH 50: CPT | Performed by: INTERNAL MEDICINE

## 2023-02-11 PROCEDURE — 6370000000 HC RX 637 (ALT 250 FOR IP): Performed by: INTERNAL MEDICINE

## 2023-02-11 PROCEDURE — 2500000003 HC RX 250 WO HCPCS

## 2023-02-11 PROCEDURE — 83605 ASSAY OF LACTIC ACID: CPT

## 2023-02-11 PROCEDURE — 1200000000 HC SEMI PRIVATE

## 2023-02-11 RX ORDER — LIDOCAINE HYDROCHLORIDE 10 MG/ML
1 INJECTION, SOLUTION EPIDURAL; INFILTRATION; INTRACAUDAL; PERINEURAL PRN
Status: DISCONTINUED | OUTPATIENT
Start: 2023-02-11 | End: 2023-02-15 | Stop reason: HOSPADM

## 2023-02-11 RX ADMIN — LIDOCAINE HYDROCHLORIDE 1 ML: 10 INJECTION, SOLUTION EPIDURAL; INFILTRATION; INTRACAUDAL; PERINEURAL at 14:13

## 2023-02-11 RX ADMIN — METOPROLOL SUCCINATE 50 MG: 50 TABLET, EXTENDED RELEASE ORAL at 17:42

## 2023-02-11 RX ADMIN — CEFTRIAXONE SODIUM 1000 MG: 1 INJECTION, POWDER, FOR SOLUTION INTRAMUSCULAR; INTRAVENOUS at 18:03

## 2023-02-11 RX ADMIN — MUPIROCIN: 20 OINTMENT TOPICAL at 17:47

## 2023-02-11 RX ADMIN — MUPIROCIN: 20 OINTMENT TOPICAL at 20:59

## 2023-02-11 RX ADMIN — MUPIROCIN: 20 OINTMENT TOPICAL at 17:46

## 2023-02-11 RX ADMIN — FAMOTIDINE 10 MG: 20 TABLET, FILM COATED ORAL at 17:42

## 2023-02-11 RX ADMIN — ASPIRIN 81 MG: 81 TABLET, COATED ORAL at 17:42

## 2023-02-11 RX ADMIN — Medication 1000 UNITS: at 17:42

## 2023-02-11 NOTE — FLOWSHEET NOTE
Treatment time: 3 hours  Net UF: 1400 ml     Pre weight: 74.7 kg   Post weight: 73.7 kg  EDW: tbd kg     Access used: LFT FA AVG  Access function: GOOD with  ml/min     Medications or blood products given: n/a     Regular outpatient schedule: TTS     Summary of response to treatment:  HD tx completed in full, VSS, pt tolerated tx well w/o complications, 73MYGD per site heled hemostasis achieved     Copy of dialysis treatment record placed in chart, to be scanned into EMR.        02/11/23 1156 02/11/23 1523   Vital Signs   BP (!) 122/101 (!) 114/56   Temp 97.3 °F (36.3 °C) 97.6 °F (36.4 °C)   Heart Rate 83 79   Resp 17 17   Weight 164 lb 10.9 oz (74.7 kg) 162 lb 7.7 oz (73.7 kg)   Weight Method  --  Bed scale   Percent Weight Change 0.79 -1.34

## 2023-02-11 NOTE — PLAN OF CARE
Problem: Discharge Planning  Goal: Discharge to home or other facility with appropriate resources  2/10/2023 2209 by Duncan Fletcher RN  Outcome: Progressing  2/10/2023 1334 by Jodie Meyer RN  Outcome: Progressing  Flowsheets (Taken 2/10/2023 0830)  Discharge to home or other facility with appropriate resources: Identify barriers to discharge with patient and caregiver     Problem: Safety - Adult  Goal: Free from fall injury  2/10/2023 2209 by Duncan Fletcher RN  Outcome: Progressing  2/10/2023 1334 by Jodie Meyer RN  Outcome: Progressing     Problem: Skin/Tissue Integrity  Goal: Absence of new skin breakdown  Description: 1. Monitor for areas of redness and/or skin breakdown  2. Assess vascular access sites hourly  3. Every 4-6 hours minimum:  Change oxygen saturation probe site  4. Every 4-6 hours:  If on nasal continuous positive airway pressure, respiratory therapy assess nares and determine need for appliance change or resting period. 2/10/2023 2209 by Duncan Fletcher RN  Outcome: Progressing  2/10/2023 1334 by Jodie Meyer RN  Outcome: Progressing     Problem: Pain  Goal: Verbalizes/displays adequate comfort level or baseline comfort level  2/10/2023 2209 by Duncan Fletcher RN  Outcome: Progressing  2/10/2023 1334 by Jodie Meyer RN  Outcome: Progressing     Problem: Coping  Goal: Pt/Family able to verbalize concerns and demonstrate effective coping strategies  Description: INTERVENTIONS:  1. Assist patient/family to identify coping skills, available support systems and cultural and spiritual values  2. Provide emotional support, including active listening and acknowledgement of concerns of patient and caregivers  3. Reduce environmental stimuli, as able  4. Instruct patient/family in relaxation techniques, as appropriate  5.  Assess for spiritual pain/suffering and initiate Spiritual Care, Psychosocial Clinical Specialist consults as needed  2/10/2023 2209 by Duncan Fletcher RN  Outcome: Progressing  2/10/2023 1334 by Debbie Lomax RN  Outcome: Progressing     Problem: Neurosensory - Adult  Goal: Achieves stable or improved neurological status  2/10/2023 2209 by Aretha Baez RN  Outcome: Progressing  2/10/2023 1334 by Debbie Lomax RN  Outcome: Progressing  Flowsheets (Taken 2/10/2023 0830)  Achieves stable or improved neurological status: Assess for and report changes in neurological status     Problem: Respiratory - Adult  Goal: Achieves optimal ventilation and oxygenation  2/10/2023 2209 by rAetha Baez RN  Outcome: Progressing  2/10/2023 1334 by Debbie Lomax RN  Outcome: Progressing     Problem: Musculoskeletal - Adult  Goal: Return mobility to safest level of function  2/10/2023 2209 by Aretha Baez RN  Outcome: Progressing  2/10/2023 1334 by Debbie Lomax RN  Outcome: Progressing  Flowsheets (Taken 2/10/2023 0830)  Return Mobility to Safest Level of Function: Assess patient stability and activity tolerance for standing, transferring and ambulating with or without assistive devices     Problem: Gastrointestinal - Adult  Goal: Maintains or returns to baseline bowel function  2/10/2023 2209 by Aretha Baez RN  Outcome: Progressing  2/10/2023 1334 by Debbie Lomax RN  Outcome: Progressing  Flowsheets (Taken 2/10/2023 0830)  Maintains or returns to baseline bowel function: Assess bowel function  Goal: Maintains adequate nutritional intake  2/10/2023 2209 by Aretha Baez RN  Outcome: Progressing  2/10/2023 1334 by Debbie Lomax RN  Outcome: Progressing  Flowsheets (Taken 2/10/2023 0830)  Maintains adequate nutritional intake: Monitor percentage of each meal consumed     Problem: Genitourinary - Adult  Goal: Absence of urinary retention  2/10/2023 2209 by Aretha Baez RN  Outcome: Progressing  2/10/2023 1334 by Debbie Lomax RN  Outcome: Progressing  Flowsheets (Taken 2/10/2023 0830)  Absence of urinary retention: Assess patients ability to void and empty bladder     Problem: Metabolic/Fluid and Electrolytes - Adult  Goal: Electrolytes maintained within normal limits  2/10/2023 2209 by Rinku Fiore RN  Outcome: Progressing  2/10/2023 1334 by Bree Amaya RN  Outcome: Progressing  Flowsheets (Taken 2/10/2023 0830)  Electrolytes maintained within normal limits: Monitor labs and assess patient for signs and symptoms of electrolyte imbalances     Problem: Cardiovascular - Adult  Goal: Maintains optimal cardiac output and hemodynamic stability  2/10/2023 2209 by Rinku Fiore RN  Outcome: Progressing  2/10/2023 1334 by Bree Amaya RN  Outcome: Progressing  Flowsheets (Taken 2/10/2023 0830)  Maintains optimal cardiac output and hemodynamic stability: Monitor blood pressure and heart rate     Problem: Skin/Tissue Integrity - Adult  Goal: Skin integrity remains intact  2/10/2023 2209 by Rinku Fiore RN  Outcome: Progressing  2/10/2023 1334 by Bree Amaya RN  Outcome: Progressing  Flowsheets (Taken 2/10/2023 0830)  Skin Integrity Remains Intact: Monitor for areas of redness and/or skin breakdown  Goal: Incisions, wounds, or drain sites healing without S/S of infection  2/10/2023 2209 by Rinku Fiore RN  Outcome: Progressing  2/10/2023 1334 by Bree Amaya RN  Outcome: Progressing  Flowsheets (Taken 2/10/2023 0830)  Incisions, Wounds, or Drain Sites Healing Without Sign and Symptoms of Infection: ADMISSION and DAILY: Assess and document risk factors for pressure ulcer development

## 2023-02-11 NOTE — PROGRESS NOTES
Office : [phone]     Fax :[phone]       Nephrology progress Note      Patient's Name: Courtney Moore      Reason for Consult:  ESRD management       Requesting Physician:  Kristin Bains MD      Chief Complaint:    Chief Complaint   Patient presents with    Altered Mental Status     Arrived per  EMS from dialysis (pt resides at home with daughter) d/t altered mental status; tearful in triage; unable to state date, time, birth date which is not normal             History of Present Ilness:    Courtney Moore is a 65 y.o. female with prior history of ESRD, HTN, DM2 came to dialysis unit this morning and was very confused. Her daughter was with her. She was send via EMS service to Emergency room. at present she is lethargic. Daughter mentioned that she did not sleep for last 2 days. She has taken gabapentin last night. Daughter does not know the dose. She did not get her dialysis today     Interval hx :      Had HD yesterday   Mental status improved   No SOB   Decreased edema     Vitals stable   Denies any chest pain at this time       I/O last 3 completed shifts:   In: 0 [P.O.:600]  Out: -     Past Medical History:   Diagnosis Date    Arthritis     CAD (coronary artery disease)     Diabetes ([facility] [address]     Hemodialysis patient ([facility] [address]     fistula left arm    Hyperlipidemia     Hypertension     Kidney disease     Neuropathy     Pneumonia     IN February, 2021, with COVID    Thyroid disease        Past Surgical History:   Procedure Laterality Date    APPENDECTOMY      CHOLECYSTECTOMY      COLONOSCOPY  11/24/2020    COLONOSCOPY POLYPECTOMY SNARE/COLD BIOPSY performed by Jessica Haley MD at [facility] 05/17/2021    PERITONEAL DIALYSIS CATHETER REMOVAL.  EXCISSION OF ABDOMINAL CITRIX. performed by Danny Acuna DO at [facility] ([facility])      FULL    LAPAROSCOPY INSERTION PERITONEAL CATHETER N/A 04/03/2020    LAPAROSCOPIC LYSIS OF ADHESIONS, LAPAROSCOPIC PERITONEAL DIALYSIS CATHETER PLACEMENT, LAPAROSCOPIC OMENTOPEXY performed by Danny Acuna DO at [address] BILATERAL      preventive    ROTATOR CUFF REPAIR Left     SHUNT REVISION Left 08/19/2021    LEFT FOREARM ARTERIO VENOUS GRAFT performed by Vicky Pelletier MD at [facility] N/A 11/24/2020    EGD BIOPSY performed by John Washburn MD at [facility]       Family History   Problem Relation Age of Onset    Cancer Mother         lung, breast, liver    Stroke Father     Stomach Cancer Brother     Cancer Maternal Grandmother     No Known Problems Paternal Grandmother     No Known Problems Paternal Grandfather          Current Medications:    0.9 % sodium chloride bolus, PRN  ziprasidone (GEODON) injection 20 mg, Q6H PRN  diphenhydrAMINE (BENADRYL) injection 25 mg, Q6H PRN  metoprolol (LOPRESSOR) injection 5 mg, Q6H PRN  metoprolol succinate (TOPROL XL) extended release tablet 50 mg, Daily  famotidine (PEPCID) tablet 10 mg, Daily  acetaminophen (TYLENOL) tablet 650 mg, Q4H PRN  HYDROcodone-acetaminophen (NORCO) 5-325 MG per tablet 1 tablet, Q6H PRN  HYDROmorphone HCl PF (DILAUDID) injection 0.5 mg, Q4H PRN  aspirin EC tablet 81 mg, Daily  mupirocin (BACTROBAN) 2 % ointment, TID  ondansetron (ZOFRAN-ODT) disintegrating tablet 4 mg, Q8H PRN  Vitamin D (CHOLECALCIFEROL) tablet 1,000 Units, Daily            Physical exam:     Vitals:  /69   Pulse 82   Temp 97.7 °F (36.5 °C) (Oral)   Resp 18   Ht 5' 5\" (1.651 m)   Wt 163 lb 5.8 oz (74.1 kg)   LMP  (LMP Unknown)   SpO2 93%   BMI 27.18 kg/m²   Constitutional:  lethargic   Skin: no rash, turgor wnl  Heent:  eomi, mmm  Neck: no bruits or jvd noted  Cardiovascular:  S1, S2 without m/r/g  Respiratory: CTA B without w/r/r  Abdomen:  +bs, soft, nt, nd  Ext: no  lower extremity edema  Psychiatric: deferred   Musculoskeletal:  deferred     Labs:  CBC:   Recent Labs     02/08/23  0536 02/09/23  0813 02/10/23  0508   WBC 9.3 9.4 13.3*   HGB 11.0* 12.7 13.1    372 332       BMP:    Recent Labs     02/08/23  0536 02/09/23  0813 02/10/23  0508    138 136   K 4.7 5.4* 4.5    98* 94*   CO2 23 20* 24   BUN 44* 44* 44*   CREATININE 8.2* 7.1* 6.2*   GLUCOSE 92 136* 122*       Ca/Mg/Phos:   Recent Labs     02/08/23  0536 02/09/23  0813 02/10/23  0508   CALCIUM 9.8 9.8 10.2       Hepatic:   No results for input(s): AST, ALT, ALB, BILITOT, ALKPHOS in the last 72 hours. Troponin:   No results for input(s): TROPONINI in the last 72 hours. BNP: No results for input(s): BNP in the last 72 hours. Lipids: No results for input(s): CHOL, TRIG, HDL, LDLCALC, LABVLDL in the last 72 hours. ABGs: No results for input(s): PHART, PO2ART, OCK7NRP in the last 72 hours. INR:   No results for input(s): INR in the last 72 hours. UA:  No results for input(s): [address], GLUCOSEU, BILIRUBINUR, KETUA, SPECGRAV, BLOODU, PHUR, PROTEINU, UROBILINOGEN, NITRU, LEUKOCYTESUR, [place] in the last 72 hours. Urine Microscopic:   No results for input(s): LABCAST, BACTERIA, COMU, HYALCAST, WBCUA, RBCUA, EPIU in the last 72 hours. Urine Culture: No results for input(s): LABURIN in the last 72 hours. Urine Chemistry: No results for input(s): Nallely Quitter, PROTEINUR, NAUR in the last 72 hours. IMAGING:  XR CHEST (2 VW)   Final Result   No acute disease         CT HEAD WO CONTRAST   Final Result   No acute intracranial abnormality. XR CHEST PORTABLE   Final Result   1. No acute abnormality. Assessment/Plan :      1. ESRD   Regular HD tue/ thu/sat   S/p HD yesterday       2. HTN  Controlled     3. AMS. Likely 2/2 meds  Hold gabapentin   Improving mental status     4. Acid- base disorder. Correct with HD     5. Electrolytes  Monitor     6.  DM2     Dialysis nurse notified   Check BMP in am         D/w primary team      Thank you for allowing us to participate in care of [facility]         Electronically signed by: Sandra Crooks MD, 2/11/2023, 12:30 AM      [facility] of [address] [place] [address] S  Office : [phone]  Fax :[phone]

## 2023-02-11 NOTE — PROGRESS NOTES
Progress Note - Dr. Federico Noble - Internal Medicine  PCP: Ta Barrett MD [address] [phone]    Hospital Day: 4  Code Status: Full Code  Current Diet: ADULT DIET; Regular; 3 carb choices (45 gm/meal); Low Sodium (2 gm); Low Potassium (Less than 3000 mg/day); Low Phosphorus (Less than 1000 mg); Less than 60 gm        CC: follow up on medical issues    Subjective:   Giovanny Garza is a 65 y.o. female. Pt seen and examined  Chart reviewed since last visit, labs and imaging below      Still confused  Appears to have tolerated HD ok yest, 3rd day in a row  Pt/ot rec SNF  Family and pt appear to be in agreement that this is a good choice      Review of Systems: (1 system for EPF, 2-9 for detailed, 10+ for comprehensive)  Review of Systems - General ROS: negative  Psychological ROS: negative  ENT ROS: negative  Allergy and Immunology ROS: negative  Endocrine ROS: negative  Respiratory ROS: no cough, shortness of breath, or wheezing  Cardiovascular ROS: no chest pain or dyspnea on exertion  Gastrointestinal ROS: no abdominal pain, change in bowel habits, or black or bloody stools  Musculoskeletal ROS: negative        I have reviewed the patient's medical and social history in detail and updated the computerized patient record. To recap: She  has a past medical history of Arthritis, CAD (coronary artery disease), Diabetes ([facility] [address] Hemodialysis patient ([facility] [address] Hyperlipidemia, Hypertension, Kidney disease, Neuropathy, Pneumonia, and Thyroid disease. . She  has a past surgical history that includes Appendectomy; Mastectomy, bilateral; Rotator cuff repair (Left); Cholecystectomy; LAPAROSCOPY INSERTION PERITONEAL CATHETER (N/A, 04/03/2020); Upper gastrointestinal endoscopy (N/A, 11/24/2020); Colonoscopy (11/24/2020); Hysterectomy; Dialysis Catheter Removal (N/A, 05/17/2021); shunt revision (Left, 08/19/2021); and Coronary stent placement. . She  reports that she quit smoking about 44 years ago. Her smoking use included cigarettes. She has a 5.00 pack-year smoking history. She has never used smokeless tobacco. She reports current alcohol use. She reports that she does not use drugs. .        Active Hospital Problems    Diagnosis Date Noted    Anemia, chronic disease [D63.8] 02/07/2023     Priority: Medium    Hemodialysis patient [facility]) [Z99.2]      Priority: Medium    Toxic encephalopathy [G92.9]      Priority: Medium    Neuropathy [G62.9]      Priority: Medium    Hypertension [I10] 12/23/2022     Priority: Medium    Acute encephalopathy [G93.40] 12/22/2022     Priority: Medium    CAD (coronary artery disease) [I25.10]        Current Facility-Administered Medications: 0.9 % sodium chloride bolus, 500 mL, IntraVENous, PRN  ziprasidone (GEODON) injection 20 mg, 20 mg, IntraMUSCular, Q6H PRN  diphenhydrAMINE (BENADRYL) injection 25 mg, 25 mg, IntraVENous, Q6H PRN  metoprolol (LOPRESSOR) injection 5 mg, 5 mg, IntraVENous, Q6H PRN  metoprolol succinate (TOPROL XL) extended release tablet 50 mg, 50 mg, Oral, Daily  famotidine (PEPCID) tablet 10 mg, 10 mg, Oral, Daily  acetaminophen (TYLENOL) tablet 650 mg, 650 mg, Oral, Q4H PRN  HYDROcodone-acetaminophen (NORCO) 5-325 MG per tablet 1 tablet, 1 tablet, Oral, Q6H PRN  HYDROmorphone HCl PF (DILAUDID) injection 0.5 mg, 0.5 mg, IntraVENous, Q4H PRN  aspirin EC tablet 81 mg, 81 mg, Oral, Daily  mupirocin (BACTROBAN) 2 % ointment, , Topical, TID  ondansetron (ZOFRAN-ODT) disintegrating tablet 4 mg, 4 mg, Oral, Q8H PRN  Vitamin D (CHOLECALCIFEROL) tablet 1,000 Units, 1,000 Units, Oral, Daily         Objective:  /77   Pulse 88   Temp 97.7 °F (36.5 °C) (Oral)   Resp 16   Ht 5' 5\" (1.651 m)   Wt 163 lb 6.4 oz (74.1 kg)   LMP  (LMP Unknown)   SpO2 93%   BMI 27.19 kg/m²      Patient Vitals for the past 24 hrs:   BP Temp Temp src Pulse Resp SpO2 Weight   02/11/23 0755 127/77 97.7 °F (36.5 °C) Oral 88 16 -- --   02/11/23 0312 116/74 97.7 °F (36.5 °C) Oral 84 18 93 % 163 lb 6.4 oz (74.1 kg)   02/11/23 0009 104/69 97.7 °F (36.5 °C) Oral 82 18 93 % --   02/10/23 1928 115/75 97.9 °F (36.6 °C) Oral 91 16 93 % --   02/10/23 1655 105/67 98 °F (36.7 °C) Oral 88 16 92 % --   02/10/23 1315 109/73 97.7 °F (36.5 °C) Oral 94 18 -- --   02/10/23 1300 -- -- -- -- -- 95 % --   02/10/23 1230 126/73 97.9 °F (36.6 °C) Temporal 98 18 -- 163 lb 5.8 oz (74.1 kg)   02/10/23 1130 (!) 187/61 97.2 °F (36.2 °C) Oral (!) 128 20 94 % --       Patient Vitals for the past 96 hrs (Last 3 readings):   Weight   02/11/23 0312 163 lb 6.4 oz (74.1 kg)   02/10/23 1230 163 lb 5.8 oz (74.1 kg)   02/10/23 0855 165 lb 9.1 oz (75.1 kg)             Intake/Output Summary (Last 24 hours) at 2/11/2023 0920  Last data filed at 2/10/2023 1800  Gross per 24 hour   Intake 360 ml   Output --   Net 360 ml           Physical Exam: (2-7 system for EPF/Detailed, ?8 for Comprehensive)  /77   Pulse 88   Temp 97.7 °F (36.5 °C) (Oral)   Resp 16   Ht 5' 5\" (1.651 m)   Wt 163 lb 6.4 oz (74.1 kg)   LMP  (LMP Unknown)   SpO2 93%   BMI 27.19 kg/m²   Constitutional: vitals as above: alert, appears stated age and cooperative    Psychiatric: normal insight and judgment, oriented to person, place, time, and general circumstances    Head: Normocephalic, without obvious abnormality, atraumatic    Eyes:lids and lashes normal, conjunctivae and sclerae normal and pupils equal, round, reactive to light and accomodation    EMNT: external ears normal, nares midline    Neck: no carotid bruit, supple, symmetrical, trachea midline and thyroid not enlarged, symmetric, no tenderness/mass/nodules     Respiratory: clear to auscultation and percussion bilaterally with normal respiratory effort    Cardiovascular: normal rate, regular rhythm, normal S1 and S2 and no murmurs    Gastrointestinal: soft, non-tender, non-distended, normal bowel sounds, no masses or organomegaly    Extremities: no clubbing, 1+ edema    Skin:No rashes or nodules noted.    Neurologic:negative         Labs:  Lab Results   Component Value Date    WBC 14.6 (H) 02/11/2023    HGB 13.9 02/11/2023    HCT 44.3 02/11/2023     02/11/2023    CHOL 96 08/12/2021    TRIG 132 08/12/2021    HDL 32 (L) 08/12/2021    LDLDIRECT 124 (H) 08/15/2019    ALT 14 02/07/2023    AST 15 02/07/2023     02/11/2023    K 4.4 02/11/2023    CL 93 (L) 02/11/2023    CREATININE 6.8 (HH) 02/11/2023    BUN 39 (H) 02/11/2023    CO2 24 02/11/2023    TSH 2.64 08/15/2019    INR 1.06 02/07/2023    LABA1C 5.9 11/21/2022    LABMICR YES 02/07/2023     Lab Results   Component Value Date    CKTOTAL 243 (H) 08/25/2021    TROPONINI 0.04 (H) 02/07/2023       Recent Imaging Results are Reviewed:  CT HEAD WO CONTRAST    Result Date: 2/7/2023  EXAMINATION: CT OF THE HEAD WITHOUT CONTRAST  2/7/2023 9:21 am TECHNIQUE: CT of the head was performed without the administration of intravenous contrast. Automated exposure control, iterative reconstruction, and/or weight based adjustment of the mA/kV was utilized to reduce the radiation dose to as low as reasonably achievable. COMPARISON: 12/22/2022 HISTORY: ORDERING SYSTEM PROVIDED HISTORY: ams TECHNOLOGIST PROVIDED HISTORY: Reason for exam:->ams Has a \"code stroke\" or \"stroke alert\" been called? ->No Decision Support Exception - unselect if not a suspected or confirmed emergency medical condition->Emergency Medical Condition (MA) Reason for Exam: ams FINDINGS: BRAIN/VENTRICLES: There is no acute intracranial hemorrhage, mass effect or midline shift. No abnormal extra-axial fluid collection. The gray-white differentiation is maintained without evidence of an acute infarct. There is prominence of the ventricles and sulci due to global parenchymal volume loss. There are nonspecific areas of hypoattenuation within the periventricular and subcortical white matter, which likely represent chronic microvascular ischemic change.  ORBITS: The visualized portion of the orbits demonstrate no acute abnormality. SINUSES: The visualized paranasal sinuses and mastoid air cells demonstrate no acute abnormality. SOFT TISSUES/SKULL: No acute abnormality of the visualized skull or soft tissues. No acute intracranial abnormality. XR CHEST PORTABLE    Result Date: 2/7/2023  EXAMINATION: ONE XRAY VIEW OF THE CHEST 2/7/2023 7:44 am COMPARISON: 12/23/2022 HISTORY: ORDERING SYSTEM PROVIDED HISTORY: ams TECHNOLOGIST PROVIDED HISTORY: Reason for exam:->ams Reason for Exam: altered mental status FINDINGS: There is biapical scarring. Cardiomegaly is stable. There is no pneumothorax or pleural effusion. Status post left mastectomy. 1.  No acute abnormality. Lab Results   Component Value Date/Time    GLUCOSE 107 02/11/2023 05:58 AM     Lab Results   Component Value Date/Time    POCGLU 138 02/09/2023 04:15 PM     /77   Pulse 88   Temp 97.7 °F (36.5 °C) (Oral)   Resp 16   Ht 5' 5\" (1.651 m)   Wt 163 lb 6.4 oz (74.1 kg)   LMP  (LMP Unknown)   SpO2 93%   BMI 27.19 kg/m²     Assessment and Plan:  Principal Problem:    Acute encephalopathy -Established problem. Stable. Still confused. Wbc still elevated 14k  Plan: Check u/a, cxr, crp, procal  Active Problems:    Hypertension -Established problem. Stable. 127/77  Plan: Continue medications. Continue to check BP q shift. CBC and BMP ordered to monitor for disease progression, medication side effect. Anemia, chronic disease -Established problem. Stable. Hgb 13.9  Plan: No indication for transfusion. Cont to monitor h/h to assess progression of anemia. Repeat Hgb level ordered. Recommend ferrous sulfate or MVI as outpatient. Hemodialysis patient [facility]) -Established problem. Stable. On TRSa schedule usually, but has been getting it more frequently while here.   Creat 6.8   Plan: per Dr Neff Morning    Toxic encephalopathy    Neuropathy    CAD (coronary artery disease)    Disp - looking towards SNF when stable from renal standpoint.     Case discussed with: renal  Tests ordered/reviewed: cbc, bmp, cxr, procal          (Please note that portions of this note were completed with a voice recognition program.  Efforts were made to edit the dictations but occasionally words are mis-transcribed.)        Jax Berman MD  2/11/2023

## 2023-02-12 ENCOUNTER — APPOINTMENT (OUTPATIENT)
Dept: GENERAL RADIOLOGY | Age: 78
DRG: 091 | End: 2023-02-12
Payer: MEDICARE

## 2023-02-12 LAB
ANION GAP SERPL CALCULATED.3IONS-SCNC: 23 MMOL/L (ref 3–16)
BASOPHILS ABSOLUTE: 0.2 K/UL (ref 0–0.2)
BASOPHILS RELATIVE PERCENT: 1.2 %
BUN BLDV-MCNC: 39 MG/DL (ref 7–20)
C-REACTIVE PROTEIN: 15.7 MG/L (ref 0–5.1)
CALCIUM SERPL-MCNC: 10.4 MG/DL (ref 8.3–10.6)
CHLORIDE BLD-SCNC: 90 MMOL/L (ref 99–110)
CO2: 21 MMOL/L (ref 21–32)
CREAT SERPL-MCNC: 6.2 MG/DL (ref 0.6–1.2)
EOSINOPHILS ABSOLUTE: 0.1 K/UL (ref 0–0.6)
EOSINOPHILS RELATIVE PERCENT: 1.1 %
GFR SERPL CREATININE-BSD FRML MDRD: 6 ML/MIN/{1.73_M2}
GLUCOSE BLD-MCNC: 119 MG/DL (ref 70–99)
HCT VFR BLD CALC: 44.1 % (ref 36–48)
HEMOGLOBIN: 14.3 G/DL (ref 12–16)
LYMPHOCYTES ABSOLUTE: 2 K/UL (ref 1–5.1)
LYMPHOCYTES RELATIVE PERCENT: 15 %
MCH RBC QN AUTO: 30.9 PG (ref 26–34)
MCHC RBC AUTO-ENTMCNC: 32.5 G/DL (ref 31–36)
MCV RBC AUTO: 95.2 FL (ref 80–100)
MONOCYTES ABSOLUTE: 1.2 K/UL (ref 0–1.3)
MONOCYTES RELATIVE PERCENT: 9.1 %
NEUTROPHILS ABSOLUTE: 9.8 K/UL (ref 1.7–7.7)
NEUTROPHILS RELATIVE PERCENT: 73.6 %
PDW BLD-RTO: 17.1 % (ref 12.4–15.4)
PLATELET # BLD: 231 K/UL (ref 135–450)
PMV BLD AUTO: 8 FL (ref 5–10.5)
POTASSIUM SERPL-SCNC: 5 MMOL/L (ref 3.5–5.1)
PROCALCITONIN: 0.74 NG/ML (ref 0–0.15)
RBC # BLD: 4.64 M/UL (ref 4–5.2)
SODIUM BLD-SCNC: 134 MMOL/L (ref 136–145)
WBC # BLD: 13.3 K/UL (ref 4–11)

## 2023-02-12 PROCEDURE — 1200000000 HC SEMI PRIVATE

## 2023-02-12 PROCEDURE — 36415 COLL VENOUS BLD VENIPUNCTURE: CPT

## 2023-02-12 PROCEDURE — 2580000003 HC RX 258: Performed by: INTERNAL MEDICINE

## 2023-02-12 PROCEDURE — 86140 C-REACTIVE PROTEIN: CPT

## 2023-02-12 PROCEDURE — 85025 COMPLETE CBC W/AUTO DIFF WBC: CPT

## 2023-02-12 PROCEDURE — 84145 PROCALCITONIN (PCT): CPT

## 2023-02-12 PROCEDURE — 71046 X-RAY EXAM CHEST 2 VIEWS: CPT

## 2023-02-12 PROCEDURE — 6360000002 HC RX W HCPCS: Performed by: INTERNAL MEDICINE

## 2023-02-12 PROCEDURE — 99233 SBSQ HOSP IP/OBS HIGH 50: CPT | Performed by: INTERNAL MEDICINE

## 2023-02-12 PROCEDURE — 6370000000 HC RX 637 (ALT 250 FOR IP): Performed by: INTERNAL MEDICINE

## 2023-02-12 PROCEDURE — 80048 BASIC METABOLIC PNL TOTAL CA: CPT

## 2023-02-12 RX ORDER — CEPHALEXIN 500 MG/1
500 CAPSULE ORAL 3 TIMES DAILY
Qty: 30 CAPSULE | Refills: 0
Start: 2023-02-12 | End: 2023-02-22

## 2023-02-12 RX ORDER — METOPROLOL SUCCINATE 50 MG/1
50 TABLET, EXTENDED RELEASE ORAL DAILY
Qty: 30 TABLET | Refills: 3
Start: 2023-02-13

## 2023-02-12 RX ADMIN — FAMOTIDINE 10 MG: 20 TABLET, FILM COATED ORAL at 09:02

## 2023-02-12 RX ADMIN — ASPIRIN 81 MG: 81 TABLET, COATED ORAL at 09:01

## 2023-02-12 RX ADMIN — MUPIROCIN: 20 OINTMENT TOPICAL at 09:04

## 2023-02-12 RX ADMIN — CEFTRIAXONE SODIUM 1000 MG: 1 INJECTION, POWDER, FOR SOLUTION INTRAMUSCULAR; INTRAVENOUS at 09:06

## 2023-02-12 RX ADMIN — METOPROLOL SUCCINATE 50 MG: 50 TABLET, EXTENDED RELEASE ORAL at 09:01

## 2023-02-12 RX ADMIN — Medication 1000 UNITS: at 09:01

## 2023-02-12 RX ADMIN — MUPIROCIN: 20 OINTMENT TOPICAL at 23:45

## 2023-02-12 ASSESSMENT — PAIN SCALES - GENERAL
PAINLEVEL_OUTOF10: 0

## 2023-02-12 NOTE — PLAN OF CARE
Problem: Discharge Planning  Goal: Discharge to home or other facility with appropriate resources  Outcome: Progressing     Problem: Safety - Adult  Goal: Free from fall injury  Outcome: Progressing     Problem: Skin/Tissue Integrity  Goal: Absence of new skin breakdown  Description: 1. Monitor for areas of redness and/or skin breakdown  2. Assess vascular access sites hourly  3. Every 4-6 hours minimum:  Change oxygen saturation probe site  4. Every 4-6 hours:  If on nasal continuous positive airway pressure, respiratory therapy assess nares and determine need for appliance change or resting period. Outcome: Progressing     Problem: Pain  Goal: Verbalizes/displays adequate comfort level or baseline comfort level  Outcome: Progressing     Problem: Coping  Goal: Pt/Family able to verbalize concerns and demonstrate effective coping strategies  Description: INTERVENTIONS:  1. Assist patient/family to identify coping skills, available support systems and cultural and spiritual values  2. Provide emotional support, including active listening and acknowledgement of concerns of patient and caregivers  3. Reduce environmental stimuli, as able  4. Instruct patient/family in relaxation techniques, as appropriate  5.  Assess for spiritual pain/suffering and initiate Spiritual Care, Psychosocial Clinical Specialist consults as needed  Outcome: Progressing     Problem: Neurosensory - Adult  Goal: Achieves stable or improved neurological status  Outcome: Progressing     Problem: Respiratory - Adult  Goal: Achieves optimal ventilation and oxygenation  Outcome: Progressing     Problem: Musculoskeletal - Adult  Goal: Return mobility to safest level of function  Outcome: Progressing     Problem: Gastrointestinal - Adult  Goal: Maintains or returns to baseline bowel function  Outcome: Progressing     Problem: Genitourinary - Adult  Goal: Absence of urinary retention  Outcome: Progressing     Problem: Metabolic/Fluid and Electrolytes - Adult  Goal: Electrolytes maintained within normal limits  Outcome: Progressing     Problem: Cardiovascular - Adult  Goal: Maintains optimal cardiac output and hemodynamic stability  2/12/2023 1120 by Chanelle Stone RN  Outcome: Progressing     Problem: Skin/Tissue Integrity - Adult  Goal: Incisions, wounds, or drain sites healing without S/S of infection  Outcome: Progressing

## 2023-02-12 NOTE — PROGRESS NOTES
Office : [phone]     Fax :[phone]       Nephrology progress Note      Patient's Name: Loreta Cabral      Reason for Consult:  ESRD management       Requesting Physician:  Joy Recio MD      Chief Complaint:    Chief Complaint   Patient presents with    Altered Mental Status     Arrived per  EMS from dialysis (pt resides at home with daughter) d/t altered mental status; tearful in triage; unable to state date, time, birth date which is not normal             History of Present Ilness:    Loreta Cabral is a 65 y.o. female with prior history of ESRD, HTN, DM2 came to dialysis unit this morning and was very confused. Her daughter was with her. She was send via EMS service to Emergency room. at present she is lethargic. Daughter mentioned that she did not sleep for last 2 days. She has taken gabapentin last night. Daughter does not know the dose. She did not get her dialysis today     Interval hx :      Had HD yesterday   Mental status improved   No SOB   Decreased edema     Vitals stable   Denies any chest pain at this time       I/O last 3 completed shifts:   In: 0 [P.O.:240]  Out: 0     Past Medical History:   Diagnosis Date    Arthritis     CAD (coronary artery disease)     Diabetes ([facility] [address]     Hemodialysis patient ([facility]     fistula left arm    Hyperlipidemia     Hypertension     Kidney disease     Neuropathy     Pneumonia     IN February, 2021, with COVID    Thyroid disease        Past Surgical History:   Procedure Laterality Date    APPENDECTOMY      CHOLECYSTECTOMY      COLONOSCOPY  11/24/2020    COLONOSCOPY POLYPECTOMY SNARE/COLD BIOPSY performed by Ruth Temple MD at [facility] 05/17/2021    PERITONEAL DIALYSIS CATHETER REMOVAL.  [address] performed by Benjamin Brand DO at [facility] ([facility])      FULL    LAPAROSCOPY INSERTION PERITONEAL CATHETER N/A 04/03/2020    LAPAROSCOPIC LYSIS OF ADHESIONS, LAPAROSCOPIC PERITONEAL DIALYSIS CATHETER PLACEMENT, LAPAROSCOPIC OMENTOPEXY performed by Benjamin Brand DO at [address] BILATERAL      preventive    ROTATOR CUFF REPAIR Left     SHUNT REVISION Left 08/19/2021    LEFT FOREARM ARTERIO VENOUS GRAFT performed by Clary Carrasquillo MD at [facility] N/A 11/24/2020    EGD BIOPSY performed by Yon Carlisle MD at [facility]       Family History   Problem Relation Age of Onset    Cancer Mother         lung, breast, liver    Stroke Father     Stomach Cancer Brother     Cancer Maternal Grandmother     No Known Problems Paternal Grandmother     No Known Problems Paternal Grandfather          Current Medications:    cefTRIAXone (ROCEPHIN) 1,000 mg in sodium chloride 0.9 % 50 mL IVPB (mini-bag), Q24H  lidocaine PF 1 % injection 1 mL, PRN  0.9 % sodium chloride bolus, PRN  ziprasidone (GEODON) injection 20 mg, Q6H PRN  diphenhydrAMINE (BENADRYL) injection 25 mg, Q6H PRN  metoprolol (LOPRESSOR) injection 5 mg, Q6H PRN  metoprolol succinate (TOPROL XL) extended release tablet 50 mg, Daily  famotidine (PEPCID) tablet 10 mg, Daily  acetaminophen (TYLENOL) tablet 650 mg, Q4H PRN  HYDROcodone-acetaminophen (NORCO) 5-325 MG per tablet 1 tablet, Q6H PRN  HYDROmorphone HCl PF (DILAUDID) injection 0.5 mg, Q4H PRN  aspirin EC tablet 81 mg, Daily  mupirocin (BACTROBAN) 2 % ointment, TID  ondansetron (ZOFRAN-ODT) disintegrating tablet 4 mg, Q8H PRN  Vitamin D (CHOLECALCIFEROL) tablet 1,000 Units, Daily            Physical exam:     Vitals:  /67   Pulse 78   Temp 97.3 °F (36.3 °C) (Temporal)   Resp 14   Ht 5' 5\" (1.651 m)   Wt 162 lb 7.7 oz (73.7 kg)   LMP  (LMP Unknown)   SpO2 96%   BMI 27.04 kg/m²   Constitutional:  lethargic   Skin: no rash, turgor wnl  Heent:  eomi, mmm  Neck: no bruits or jvd noted  Cardiovascular:  S1, S2 without m/r/g  Respiratory: CTA B without w/r/r  Abdomen:  +bs, soft, nt, nd  Ext: no  lower extremity edema  Psychiatric: deferred   Musculoskeletal:  deferred     Labs:  CBC:   Recent Labs     02/10/23  0508 02/11/23  0558 02/12/23  0528   WBC 13.3* 14.6* 13.3*   HGB 13.1 13.9 14.3    287 231       BMP:    Recent Labs     02/10/23  0508 02/11/23 0558 02/12/23 0528    138 134*   K 4.5 4.4 5.0   CL 94* 93* 90*   CO2 24 24 21   BUN 44* 39* 39*   CREATININE 6.2* 6.8* 6.2*   GLUCOSE 122* 107* 119*       Ca/Mg/Phos:   Recent Labs     02/10/23  0508 02/11/23  0558 02/12/23  0528   CALCIUM 10.2 10.5 10.4       Hepatic:   No results for input(s): AST, ALT, ALB, BILITOT, ALKPHOS in the last 72 hours. Troponin:   No results for input(s): TROPONINI in the last 72 hours. BNP: No results for input(s): BNP in the last 72 hours. Lipids: No results for input(s): CHOL, TRIG, HDL, LDLCALC, LABVLDL in the last 72 hours. ABGs: No results for input(s): PHART, PO2ART, YHW5AIT in the last 72 hours. INR:   No results for input(s): INR in the last 72 hours. UA:  No results for input(s): Delwin Minion, GLUCOSEU, BILIRUBINUR, KETUA, SPECGRAV, BLOODU, PHUR, PROTEINU, UROBILINOGEN, NITRU, LEUKOCYTESUR, Shirlene Corti in the last 72 hours. Urine Microscopic:   No results for input(s): LABCAST, BACTERIA, COMU, HYALCAST, WBCUA, RBCUA, EPIU in the last 72 hours. Urine Culture: No results for input(s): LABURIN in the last 72 hours. Urine Chemistry: No results for input(s): Volney Ly, PROTEINUR, NAUR in the last 72 hours. IMAGING:  XR CHEST (2 VW)   Final Result   No acute disease         CT HEAD WO CONTRAST   Final Result   No acute intracranial abnormality. XR CHEST PORTABLE   Final Result   1.   No acute abnormality. Assessment/Plan :      1. ESRD   Regular HD tue/ thu/sat   S/p HD yesterday       2. HTN  Controlled     3. AMS. Likely 2/2 meds  Hold gabapentin   Improving mental status     4. Acid- base disorder. Correct with HD     5. Electrolytes  Monitor     6.  DM2     Dialysis nurse notified   Check BMP in am         D/w primary team      Thank you for allowing us to participate in care of [facility]         Electronically signed by: Kelly Rico MD, 2/12/2023, 8:24 AM      [facility] [address]  Office : [phone]  Fax :[phone]

## 2023-02-12 NOTE — PROGRESS NOTES
Progress Note - Dr. Sherrie Patterson - Internal Medicine  PCP: Kristin Avitia MD [address] / [address] [place] [address] [phone]    Hospital Day: 5  Code Status: Full Code  Current Diet: ADULT DIET; Regular; 3 carb choices (45 gm/meal); Low Sodium (2 gm); Low Potassium (Less than 3000 mg/day); Low Phosphorus (Less than 1000 mg); Less than 60 gm        CC: follow up on medical issues    Subjective:   Ella Olivo is a 65 y.o. female. Pt seen and examined  Chart reviewed since last visit, labs and imaging below      Still confused  Pt/ot rec SNF  Family and pt appear to be in agreement that this is a good choice    WBC mildly elevated, but no source of infection found  Will send for repeat cxr  On empiric rocephin    Review of Systems: (1 system for EPF, 2-9 for detailed, 10+ for comprehensive)  Review of Systems - General ROS: negative  Psychological ROS: negative  ENT ROS: negative  Allergy and Immunology ROS: negative  Endocrine ROS: negative  Respiratory ROS: no cough, shortness of breath, or wheezing  Cardiovascular ROS: no chest pain or dyspnea on exertion  Gastrointestinal ROS: no abdominal pain, change in bowel habits, or black or bloody stools  Musculoskeletal ROS: negative        I have reviewed the patient's medical and social history in detail and updated the computerized patient record. To recap: She  has a past medical history of Arthritis, CAD (coronary artery disease), Diabetes ([facility] [address] Hemodialysis patient ([facility] [address] Hyperlipidemia, Hypertension, Kidney disease, Neuropathy, Pneumonia, and Thyroid disease. . She  has a past surgical history that includes Appendectomy; Mastectomy, bilateral; Rotator cuff repair (Left); Cholecystectomy; LAPAROSCOPY INSERTION PERITONEAL CATHETER (N/A, 04/03/2020); Upper gastrointestinal endoscopy (N/A, 11/24/2020); Colonoscopy (11/24/2020); Hysterectomy; Dialysis Catheter Removal (N/A, 05/17/2021); shunt revision (Left, 08/19/2021); and Coronary stent placement. Valorie Little She  reports that she quit smoking about 44 years ago. Her smoking use included cigarettes. She has a 5.00 pack-year smoking history. She has never used smokeless tobacco. She reports current alcohol use. She reports that she does not use drugs. .        Active Hospital Problems    Diagnosis Date Noted    Electrolyte imbalance [E87.8] 02/11/2023     Priority: Medium    Anemia, chronic disease [D63.8] 02/07/2023     Priority: Medium    ESRD on hemodialysis ([facility] [N18.6, Z99.2]      Priority: Medium    Toxic encephalopathy [G92.9]      Priority: Medium    Neuropathy [G62.9]      Priority: Medium    Hypertension [I10] 12/23/2022     Priority: Medium    Acute encephalopathy [G93.40] 12/22/2022     Priority: Medium    CAD (coronary artery disease) [I25.10]        Current Facility-Administered Medications: cefTRIAXone (ROCEPHIN) 1,000 mg in sodium chloride 0.9 % 50 mL IVPB (mini-bag), 1,000 mg, IntraVENous, Q24H  lidocaine PF 1 % injection 1 mL, 1 mL, IntraDERmal, PRN  0.9 % sodium chloride bolus, 500 mL, IntraVENous, PRN  ziprasidone (GEODON) injection 20 mg, 20 mg, IntraMUSCular, Q6H PRN  diphenhydrAMINE (BENADRYL) injection 25 mg, 25 mg, IntraVENous, Q6H PRN  metoprolol (LOPRESSOR) injection 5 mg, 5 mg, IntraVENous, Q6H PRN  metoprolol succinate (TOPROL XL) extended release tablet 50 mg, 50 mg, Oral, Daily  famotidine (PEPCID) tablet 10 mg, 10 mg, Oral, Daily  acetaminophen (TYLENOL) tablet 650 mg, 650 mg, Oral, Q4H PRN  HYDROcodone-acetaminophen (NORCO) 5-325 MG per tablet 1 tablet, 1 tablet, Oral, Q6H PRN  HYDROmorphone HCl PF (DILAUDID) injection 0.5 mg, 0.5 mg, IntraVENous, Q4H PRN  aspirin EC tablet 81 mg, 81 mg, Oral, Daily  mupirocin (BACTROBAN) 2 % ointment, , Topical, TID  ondansetron (ZOFRAN-ODT) disintegrating tablet 4 mg, 4 mg, Oral, Q8H PRN  Vitamin D (CHOLECALCIFEROL) tablet 1,000 Units, 1,000 Units, Oral, Daily         Objective:  /67   Pulse 78   Temp 97.3 °F (36.3 °C) (Temporal)   Resp 14   Ht 5' 5\" (1.651 m)   Wt 163 lb 1.6 oz (74 kg)   LMP  (LMP Unknown)   SpO2 96%   BMI 27.14 kg/m²      Patient Vitals for the past 24 hrs:   BP Temp Temp src Pulse Resp SpO2 Weight   02/12/23 0824 -- -- -- -- -- -- 163 lb 1.6 oz (74 kg)   02/12/23 0702 128/67 97.3 °F (36.3 °C) Temporal 78 14 96 % --   02/12/23 0443 111/78 97.8 °F (36.6 °C) Temporal 81 14 98 % --   02/12/23 0336 -- -- -- 78 -- -- --   02/12/23 0035 113/75 (!) 96.1 °F (35.6 °C) Temporal 85 14 95 % --   02/11/23 2204 -- -- -- 97 -- -- --   02/11/23 1945 128/80 97.9 °F (36.6 °C) Oral (!) 101 16 95 % --   02/11/23 1742 132/62 -- -- (!) 116 -- -- --   02/11/23 1530 (!) 85/50 98.6 °F (37 °C) -- (!) 102 15 97 % --   02/11/23 1523 (!) 114/56 97.6 °F (36.4 °C) -- 79 17 -- 162 lb 7.7 oz (73.7 kg)   02/11/23 1156 (!) 122/101 97.3 °F (36.3 °C) -- 83 17 -- 164 lb 10.9 oz (74.7 kg)       Patient Vitals for the past 96 hrs (Last 3 readings):   Weight   02/12/23 0824 163 lb 1.6 oz (74 kg)   02/11/23 1523 162 lb 7.7 oz (73.7 kg)   02/11/23 1156 164 lb 10.9 oz (74.7 kg)             Intake/Output Summary (Last 24 hours) at 2/12/2023 1024  Last data filed at 2/11/2023 1523  Gross per 24 hour   Intake 400 ml   Output 1400 ml   Net -1000 ml           Physical Exam: (2-7 system for EPF/Detailed, ?8 for Comprehensive)  /67   Pulse 78   Temp 97.3 °F (36.3 °C) (Temporal)   Resp 14   Ht 5' 5\" (1.651 m)   Wt 163 lb 1.6 oz (74 kg)   LMP  (LMP Unknown)   SpO2 96%   BMI 27.14 kg/m²   Constitutional: vitals as above: alert, appears stated age and cooperative    Psychiatric: normal insight and judgment, oriented to person, place, time, and general circumstances    Head: Normocephalic, without obvious abnormality, atraumatic    Eyes:lids and lashes normal, conjunctivae and sclerae normal and pupils equal, round, reactive to light and accomodation    EMNT: external ears normal, nares midline    Neck: no carotid bruit, supple, symmetrical, trachea midline and thyroid not enlarged, symmetric, no tenderness/mass/nodules     Respiratory: clear to auscultation and percussion bilaterally with normal respiratory effort    Cardiovascular: normal rate, regular rhythm, normal S1 and S2 and no murmurs    Gastrointestinal: soft, non-tender, non-distended, normal bowel sounds, no masses or organomegaly    Extremities: no clubbing, 1+ edema    Skin:No rashes or nodules noted. Neurologic:negative         Labs:  Lab Results   Component Value Date    WBC 13.3 (H) 02/12/2023    HGB 14.3 02/12/2023    HCT 44.1 02/12/2023     02/12/2023    CHOL 96 08/12/2021    TRIG 132 08/12/2021    HDL 32 (L) 08/12/2021    LDLDIRECT 124 (H) 08/15/2019    ALT 14 02/07/2023    AST 15 02/07/2023     (L) 02/12/2023    K 5.0 02/12/2023    CL 90 (L) 02/12/2023    CREATININE 6.2 (HH) 02/12/2023    BUN 39 (H) 02/12/2023    CO2 21 02/12/2023    TSH 2.64 08/15/2019    INR 1.06 02/07/2023    LABA1C 5.9 11/21/2022    LABMICR YES 02/07/2023     Lab Results   Component Value Date    CKTOTAL 243 (H) 08/25/2021    TROPONINI 0.04 (H) 02/07/2023       Recent Imaging Results are Reviewed:  CT HEAD WO CONTRAST    Result Date: 2/7/2023  EXAMINATION: CT OF THE HEAD WITHOUT CONTRAST  2/7/2023 9:21 am TECHNIQUE: CT of the head was performed without the administration of intravenous contrast. Automated exposure control, iterative reconstruction, and/or weight based adjustment of the mA/kV was utilized to reduce the radiation dose to as low as reasonably achievable. COMPARISON: 12/22/2022 HISTORY: ORDERING SYSTEM PROVIDED HISTORY: ams TECHNOLOGIST PROVIDED HISTORY: Reason for exam:->ams Has a \"code stroke\" or \"stroke alert\" been called? ->No Decision Support Exception - unselect if not a suspected or confirmed emergency medical condition->Emergency Medical Condition (MA) Reason for Exam: ams FINDINGS: BRAIN/VENTRICLES: There is no acute intracranial hemorrhage, mass effect or midline shift.  No abnormal extra-axial fluid collection. The gray-white differentiation is maintained without evidence of an acute infarct. There is prominence of the ventricles and sulci due to global parenchymal volume loss. There are nonspecific areas of hypoattenuation within the periventricular and subcortical white matter, which likely represent chronic microvascular ischemic change. ORBITS: The visualized portion of the orbits demonstrate no acute abnormality. SINUSES: The visualized paranasal sinuses and mastoid air cells demonstrate no acute abnormality. SOFT TISSUES/SKULL: No acute abnormality of the visualized skull or soft tissues. No acute intracranial abnormality. XR CHEST PORTABLE    Result Date: 2/7/2023  EXAMINATION: ONE XRAY VIEW OF THE CHEST 2/7/2023 7:44 am COMPARISON: 12/23/2022 HISTORY: ORDERING SYSTEM PROVIDED HISTORY: ams TECHNOLOGIST PROVIDED HISTORY: Reason for exam:->ams Reason for Exam: altered mental status FINDINGS: There is biapical scarring. Cardiomegaly is stable. There is no pneumothorax or pleural effusion. Status post left mastectomy. 1.  No acute abnormality. Lab Results   Component Value Date/Time    GLUCOSE 119 02/12/2023 05:28 AM     Lab Results   Component Value Date/Time    POCGLU 138 02/09/2023 04:15 PM     /67   Pulse 78   Temp 97.3 °F (36.3 °C) (Temporal)   Resp 14   Ht 5' 5\" (1.651 m)   Wt 163 lb 1.6 oz (74 kg)   LMP  (LMP Unknown)   SpO2 96%   BMI 27.14 kg/m²     Assessment and Plan:  Principal Problem:    Acute encephalopathy -Established problem. Stable. Still confused. Wbc still elevated but slightly better, on iv abx  Plan: Check cxr, crp, procal  Active Problems:    Hypertension -Established problem. Stable. 128/67  Plan: Continue medications. Continue to check BP q shift. CBC and BMP ordered to monitor for disease progression, medication side effect. Anemia, chronic disease -Established problem. Stable. Hgb 14.3  Plan: No indication for transfusion.  Cont to monitor h/h to assess progression of anemia. Repeat Hgb level ordered. Recommend ferrous sulfate or MVI as outpatient. Hemodialysis patient [facility]) -Established problem. Stable. On TRSa schedule usually, but has been getting it more frequently while here. Creat 6.2  Plan: per Dr Joaquim Rebollar    Toxic encephalopathy    Neuropathy    CAD (coronary artery disease)    Disp - looking towards SNF when stable from renal standpoint.     Case discussed with: renal  Tests ordered/reviewed: cbc, bmp, cxr, procal          (Please note that portions of this note were completed with a voice recognition program.  Efforts were made to edit the dictations but occasionally words are mis-transcribed.)        Chai Torres MD  2/12/2023

## 2023-02-12 NOTE — PLAN OF CARE
Problem: Cardiovascular - Adult  Goal: Maintains optimal cardiac output and hemodynamic stability  Outcome: Progressing     Vitals:    02/12/23 0035   BP: 113/75   Pulse: 85   Resp: 14   Temp: (!) 96.1 °F (35.6 °C)   SpO2: 95%     VSS overnight; see all flowsheets. Will continue to monitor.

## 2023-02-12 NOTE — CARE COORDINATION
Cm called and left  for [place] [phone] at [facility] and home at [facility] following up on referrals and if able to obtain HD transport.     Glenda Gonzalez RN, BSN  [phone]

## 2023-02-13 LAB
ANION GAP SERPL CALCULATED.3IONS-SCNC: 27 MMOL/L (ref 3–16)
BASOPHILS ABSOLUTE: 0 K/UL (ref 0–0.2)
BASOPHILS RELATIVE PERCENT: 0.4 %
BUN BLDV-MCNC: 62 MG/DL (ref 7–20)
CALCIUM SERPL-MCNC: 9.6 MG/DL (ref 8.3–10.6)
CHLORIDE BLD-SCNC: 87 MMOL/L (ref 99–110)
CO2: 21 MMOL/L (ref 21–32)
CREAT SERPL-MCNC: 9.9 MG/DL (ref 0.6–1.2)
EOSINOPHILS ABSOLUTE: 0.2 K/UL (ref 0–0.6)
EOSINOPHILS RELATIVE PERCENT: 1.6 %
GFR SERPL CREATININE-BSD FRML MDRD: 4 ML/MIN/{1.73_M2}
GLUCOSE BLD-MCNC: 92 MG/DL (ref 70–99)
HCT VFR BLD CALC: 43.5 % (ref 36–48)
HEMATOLOGY PATH CONSULT: NORMAL
HEMOGLOBIN: 13.6 G/DL (ref 12–16)
LYMPHOCYTES ABSOLUTE: 1.7 K/UL (ref 1–5.1)
LYMPHOCYTES RELATIVE PERCENT: 12.8 %
MCH RBC QN AUTO: 30 PG (ref 26–34)
MCHC RBC AUTO-ENTMCNC: 31.2 G/DL (ref 31–36)
MCV RBC AUTO: 96.2 FL (ref 80–100)
MONOCYTES ABSOLUTE: 1.4 K/UL (ref 0–1.3)
MONOCYTES RELATIVE PERCENT: 10 %
NEUTROPHILS ABSOLUTE: 10.2 K/UL (ref 1.7–7.7)
NEUTROPHILS RELATIVE PERCENT: 75.2 %
PDW BLD-RTO: 17.3 % (ref 12.4–15.4)
PLATELET # BLD: 246 K/UL (ref 135–450)
PMV BLD AUTO: 8.9 FL (ref 5–10.5)
POTASSIUM SERPL-SCNC: 4 MMOL/L (ref 3.5–5.1)
PROCALCITONIN: 0.72 NG/ML (ref 0–0.15)
RBC # BLD: 4.53 M/UL (ref 4–5.2)
SODIUM BLD-SCNC: 135 MMOL/L (ref 136–145)
WBC # BLD: 13.6 K/UL (ref 4–11)

## 2023-02-13 PROCEDURE — 97129 THER IVNTJ 1ST 15 MIN: CPT

## 2023-02-13 PROCEDURE — 6370000000 HC RX 637 (ALT 250 FOR IP): Performed by: NURSE PRACTITIONER

## 2023-02-13 PROCEDURE — 99233 SBSQ HOSP IP/OBS HIGH 50: CPT | Performed by: INTERNAL MEDICINE

## 2023-02-13 PROCEDURE — 6370000000 HC RX 637 (ALT 250 FOR IP): Performed by: INTERNAL MEDICINE

## 2023-02-13 PROCEDURE — 1200000000 HC SEMI PRIVATE

## 2023-02-13 PROCEDURE — 85025 COMPLETE CBC W/AUTO DIFF WBC: CPT

## 2023-02-13 PROCEDURE — 92526 ORAL FUNCTION THERAPY: CPT

## 2023-02-13 PROCEDURE — 2580000003 HC RX 258: Performed by: INTERNAL MEDICINE

## 2023-02-13 PROCEDURE — 97116 GAIT TRAINING THERAPY: CPT

## 2023-02-13 PROCEDURE — 6360000002 HC RX W HCPCS: Performed by: INTERNAL MEDICINE

## 2023-02-13 PROCEDURE — 97530 THERAPEUTIC ACTIVITIES: CPT

## 2023-02-13 PROCEDURE — 80048 BASIC METABOLIC PNL TOTAL CA: CPT

## 2023-02-13 PROCEDURE — 36415 COLL VENOUS BLD VENIPUNCTURE: CPT

## 2023-02-13 PROCEDURE — 84145 PROCALCITONIN (PCT): CPT

## 2023-02-13 RX ORDER — LOPERAMIDE HYDROCHLORIDE 2 MG/1
2 CAPSULE ORAL
Status: COMPLETED | OUTPATIENT
Start: 2023-02-13 | End: 2023-02-13

## 2023-02-13 RX ADMIN — FAMOTIDINE 10 MG: 20 TABLET, FILM COATED ORAL at 09:54

## 2023-02-13 RX ADMIN — CEFTRIAXONE SODIUM 1000 MG: 1 INJECTION, POWDER, FOR SOLUTION INTRAMUSCULAR; INTRAVENOUS at 10:10

## 2023-02-13 RX ADMIN — MUPIROCIN: 20 OINTMENT TOPICAL at 15:00

## 2023-02-13 RX ADMIN — METOPROLOL SUCCINATE 50 MG: 50 TABLET, EXTENDED RELEASE ORAL at 09:54

## 2023-02-13 RX ADMIN — MUPIROCIN: 20 OINTMENT TOPICAL at 09:56

## 2023-02-13 RX ADMIN — LOPERAMIDE HYDROCHLORIDE 2 MG: 2 CAPSULE ORAL at 22:33

## 2023-02-13 RX ADMIN — ASPIRIN 81 MG: 81 TABLET, COATED ORAL at 09:54

## 2023-02-13 RX ADMIN — Medication 1000 UNITS: at 09:54

## 2023-02-13 NOTE — PROGRESS NOTES
[facility] [address]   Phone: [phone]    Physical Therapy    [] Initial Evaluation            [x] Daily Treatment Note         [] Discharge Summary      Patient: Luz Salinas   : 1945   MRN: [id number]   Date of Service:  2023  Admitting Diagnosis: Acute encephalopathy    Current Admission Summary: Luz Salinas is a 65 y.o. female  who presents to the ED complaining of altered mental status. History is obtained primarily from EMS. She is tearful and moaning but not really making any sense even though she does not have dysarthria on her initial evaluation. She is moving her arms and legs without difficulty. She was on her way to dialysis this morning and did not get it because of her confusion and was rerouted to the emergency department not having started it. She has a history of hypertension hyperlipidemia diabetes and CAD as well as end-stage renal disease on hemodialysis. She was admitted in December for similar encephalopathy that was felt to be hypertensive versus metabolic after having an unremarkable MRI. At 1 point neurology started her on Dilantin but then later discontinued it thinking it was not likely seizure activity after risk benefits were discussed. No reports of recent fevers. Patient does not complain of pain anywhere in her head chest or belly. No reports of vomiting or diarrhea recently. Daughter later states that the patient was fine last night and was confused this morning and she did attempt to bring the patient in dialysis anyway however she continued to get worse with her disorientation and was brought here instead. The patient was apparently fine yesterday earlier in the evening. The patient is on Neurontin but as far as we know there are no recent changes in dose or any other new medications that she is on.     Past Medical History:  has a past medical history of Arthritis, CAD (coronary artery disease), Diabetes [facility]), Hemodialysis patient ([facility] [address] Hyperlipidemia, Hypertension, Kidney disease, Neuropathy, Pneumonia, and Thyroid disease. Past Surgical History:  has a past surgical history that includes Appendectomy; Mastectomy, bilateral; Rotator cuff repair (Left); Cholecystectomy; LAPAROSCOPY INSERTION PERITONEAL CATHETER (N/A, 04/03/2020); Upper gastrointestinal endoscopy (N/A, 11/24/2020); Colonoscopy (11/24/2020); Hysterectomy; Dialysis Catheter Removal (N/A, 05/17/2021); shunt revision (Left, 08/19/2021); and Coronary stent placement. Discharge Recommendations: Kendall Anaya scored a 17/24 on the AM-PAC short mobility form. Current research shows that an AM-PAC score of 17 or less is typically not associated with a discharge to the patient's home setting. Based on the patient's AM-PAC score and their current functional mobility deficits, it is recommended that the patient have 3-5 sessions per week of Physical Therapy at d/c to increase the patient's independence. Please see assessment section for further patient specific details. If patient discharges prior to next session this note will serve as a discharge summary. Please see below for the latest assessment towards goals. DME Required For Discharge: DME to be determined at next level of care  Precautions/Restrictions: medium fall risk  Weight Bearing Restrictions: weight bearing as tolerated  [] Right Upper Extremity  [] Left Upper Extremity [] Right Lower Extremity  [] Left Lower Extremity     Required Braces/Orthotics: no braces required   [] Right  [] Left  Positional Restrictions:no positional restrictions    Pre-Admission Information   Lives With: alone                     Type of Home:  [facility]  Home Layout: one level  Home Access:  1 step step to enter with handrail. Handrails are located on R side.   Bathroom Layout: walk in shower  Bathroom Equipment: grab bars in shower, shower chair, hand held shower head  Home Equipment: rollator - 4 wheeled walker  Transfer Assistance: Independent without use of device  Ambulation Assistance:Independent without use of device  ADL Assistance: independent with all ADL's  IADL Assistance: independent with homemaking tasks  Active : [] yes             [x]no  Current Employment: retired. Occupation:    Hobbies:   Recent Falls: 1 recent fall leading to this admission, no additional falls in the last 6 months   Daughter from [facility] stated that patient in non-compliant with diet and medication due to periods of confusion. Dialysis T/TH/SAT         Subjective  General: Pt standing in room dressing herself upon arrival. Pt very pleasant and agreeable to having therapist assist with getting pt ready to leave and participate in physical therapy. Pain: 0/10  Pain Interventions: RN notified and repositioned        Functional Mobility  Bed Mobility  Comments:  Bed mobility not completed this date, pt standing in room upon arrival and sitting in recliner at EOS. Transfers  Sit to stand transfer: stand by assistance  Stand to sit transfer: stand by assistance  Comments:  Multiple STS transfers completed throughout session, from EOB, recliner, hallway chairs  Ambulation  Surface:level surface  Assistive Device: no device  Assistance: stand by assistance, contact guard assistance  Distance: 200ft+200ft+short distances in room   Gait Mechanics: dec'd agatha, reaches our for hallway handrail, several staggers and sways but self corrects. Comments: Pt ambulates 200ft in hallway with several standing rest breaks then ~10 min seated rest break before ambulating additional 200ft. Pt encouraged to use RW at this time due to occasional staggers/sways and energy conservation however pt refuses at this time stating she does not want to become dependent on walker. Education and encouragement provided. Stair Mobility  Stair mobility not completed on this date.   Comments:  Wheelchair Mobility:  No w/c mobility completed on this date.  Comments:  Balance  Static Sitting Balance: good: independent with functional balance in unsupported position  Dynamic Sitting Balance: good: independent with functional balance in unsupported position  Static Standing Balance: fair: maintains balance at CGA without use of UE support  Dynamic Standing Balance: fair: maintains balance at CGA without use of UE support  Comments: Pt stands for ~15 mins donning clothes and completing hair care with SBA-CGA. Increased time and effort to complete all ADLs      Other Therapeutic Interventions  Pt spends significant times trying to don shoes with shoe horn and pt very persistent on trying to do it on her own however eventually therapist steps in to don shoes as pt unable to at this time.     Functional Outcomes  AM-PAC Inpatient Mobility Raw Score : 17              Cognition  Overall Cognitive Status: Impaired  Arousal/Alterness: appropriate responses to stimuli  Following Commands: follows all commands without difficulty  Memory: decreased recall of recent events, decreased short term memory  Safety Judgement: decreased awareness of need for assistance, decreased awareness of need for safety  Problem Solving: assistance required to generate solutions, assistance required to implement solutions, decreased awareness of errors, assistance required to identify errors made, assistance required to correct errors made  Insights: decreased awareness of deficits  Sequencing: requires cues for some    Orientation:    oriented to person, oriented to place, oriented to time, and oriented to situation  Command Following:   WFL    Education  Barriers To Learning: cognition  Patient Education: patient educated on goals, PT role and benefits, plan of care, general safety, functional mobility training, proper use of assistive device/equipment, energy conservation, family education, transfer training, discharge recommendations  Learning Assessment:  patient  verbalizes understanding, would benefit from continued reinforcement    Assessment  Activity Tolerance: Limited by weakness and cardiovascular endurance. Decreased safety awareness and some confused behaviors noted   Impairments Requiring Therapeutic Intervention: decreased functional mobility, decreased ADL status, decreased strength, decreased safety awareness, decreased cognition, decreased endurance, decreased balance, decreased posture  Prognosis: good  Clinical Assessment: Patient was independent without a device in independent living prior to admission. Pt does demonstrate improvement this date, ambulating further distances with less assistance however pt continues to demonstrate decreased safety awareness, cognitive deficits, and balance/ambulation impairments which places her at risk for falls. Pt reports she had \"collapse\" walking in hallway yesterday with nursing in which she was able to lower herself to the ground. Recommend 24 hour assist and continued therapy at d/c. Safety Interventions: patient left in chair, call light within reach, gait belt, patient at risk for falls, nurse notified, and patient up ad helga Pt up in room without assistance upon arrival     Plan  Frequency: 3-5 x/per week  Current Treatment Recommendations: strengthening, balance training, functional mobility training, transfer training, gait training, endurance training, patient/caregiver education, safety education, and equipment evaluation/education    Goals  Patient Goals:  Get stronger, go home. Short Term Goals:  Time Frame: Discharge. Patient will complete bed mobility at modified independent   Patient will complete transfers at [facility]   Patient will ambulate 50 ft with use of LRAD at [facility]  No goals met 2/13/23    Therapy Session Time      Individual Group Co-treatment   Time In     0826   Time Out     0949   Minutes     83     Timed Code Treatment Minutes:    Total Treatment Minutes:  83 Electronically Signed By: Jocelin Nolan, [address] [facility], DPT [id number]

## 2023-02-13 NOTE — PROGRESS NOTES
Office : [phone]     Fax :[phone]       Nephrology progress Note      Patient's Name: Marlena Hankins      Reason for Consult:  ESRD management       Requesting Physician:  Shahrzad Reynaga MD      Chief Complaint:    Chief Complaint   Patient presents with    Altered Mental Status     Arrived per  EMS from dialysis (pt resides at home with daughter) d/t altered mental status; tearful in triage; unable to state date, time, birth date which is not normal             History of Present Ilness:    Marlena Hankins is a 65 y.o. female with prior history of ESRD, HTN, DM2 came to dialysis unit this morning and was very confused. Her daughter was with her. She was send via EMS service to Emergency room. at present she is lethargic. Daughter mentioned that she did not sleep for last 2 days. She has taken gabapentin last night. Daughter does not know the dose. She did not get her dialysis today     Interval hx :        Mental status improved   No SOB   Decreased edema     Vitals stable         No intake/output data recorded. Past Medical History:   Diagnosis Date    Arthritis     CAD (coronary artery disease)     Diabetes ([facility] [address]     Hemodialysis patient ([facility] [address]     fistula left arm    Hyperlipidemia     Hypertension     Kidney disease     Neuropathy     Pneumonia     IN February, 2021, with COVID    Thyroid disease        Past Surgical History:   Procedure Laterality Date    APPENDECTOMY      CHOLECYSTECTOMY      COLONOSCOPY  11/24/2020    COLONOSCOPY POLYPECTOMY SNARE/COLD BIOPSY performed by Shannon Christianson MD at [address] [facility] N/A 05/17/2021    PERITONEAL DIALYSIS CATHETER REMOVAL. [address] Elsa [facility] DO [email or web address])      FULL    LAPAROSCOPY INSERTION PERITONEAL CATHETER N/A 04/03/2020    LAPAROSCOPIC LYSIS OF ADHESIONS, LAPAROSCOPIC PERITONEAL DIALYSIS CATHETER PLACEMENT, LAPAROSCOPIC OMENTOPEXY performed by Elsa Daniel DO at [address] BILATERAL      preventive    ROTATOR CUFF REPAIR Left     SHUNT REVISION Left 08/19/2021    LEFT FOREARM ARTERIO VENOUS GRAFT performed by Heather Hanson MD at [address] N/A 11/24/2020    EGD BIOPSY performed by Zeus Jansen MD at [address] [facility]       Family History   Problem Relation Age of Onset    Cancer Mother         lung, breast, liver    Stroke Father     Stomach Cancer Brother     Cancer Maternal Grandmother     No Known Problems Paternal Grandmother     No Known Problems Paternal Grandfather          Current Medications:    cefTRIAXone (ROCEPHIN) 1,000 mg in sodium chloride 0.9 % 50 mL IVPB (mini-bag), Q24H  lidocaine PF 1 % injection 1 mL, PRN  0.9 % sodium chloride bolus, PRN  ziprasidone (GEODON) injection 20 mg, Q6H PRN  diphenhydrAMINE (BENADRYL) injection 25 mg, Q6H PRN  metoprolol (LOPRESSOR) injection 5 mg, Q6H PRN  metoprolol succinate (TOPROL XL) extended release tablet 50 mg, Daily  famotidine (PEPCID) tablet 10 mg, Daily  acetaminophen (TYLENOL) tablet 650 mg, Q4H PRN  HYDROcodone-acetaminophen (NORCO) 5-325 MG per tablet 1 tablet, Q6H PRN  HYDROmorphone HCl PF (DILAUDID) injection 0.5 mg, Q4H PRN  aspirin EC tablet 81 mg, Daily  mupirocin (BACTROBAN) 2 % ointment, TID  ondansetron (ZOFRAN-ODT) disintegrating tablet 4 mg, Q8H PRN  Vitamin D (CHOLECALCIFEROL) tablet 1,000 Units, Daily            Physical exam:     Vitals:  /78   Pulse 86   Temp 97.1 °F (36.2 °C) (Temporal)   Resp 16   Ht 5' 5\" (1.651 m)   Wt 163 lb 1.6 oz (74 kg)   LMP  (LMP Unknown)   SpO2 99%   BMI 27.14 kg/m²   Constitutional:  lethargic   Skin: no rash, turgor wnl  Heent:  eomi, mmm  Neck: no bruits or jvd noted  Cardiovascular:  S1, S2 without m/r/g  Respiratory: CTA B without w/r/r  Abdomen:  +bs, soft, nt, nd  Ext: no  lower extremity edema  Psychiatric: deferred   Musculoskeletal:  deferred     Labs:  CBC:   Recent Labs     02/11/23  0558 02/12/23  0528   WBC 14.6* 13.3*   HGB 13.9 14.3    231     BMP:    Recent Labs     02/11/23  0558 02/12/23  0528    134*   K 4.4 5.0   CL 93* 90*   CO2 24 21   BUN 39* 39*   CREATININE 6.8* 6.2*   GLUCOSE 107* 119*     Ca/Mg/Phos:   Recent Labs     02/11/23 0558 02/12/23  0528   CALCIUM 10.5 10.4     Hepatic:   No results for input(s): AST, ALT, ALB, BILITOT, ALKPHOS in the last 72 hours. Troponin:   No results for input(s): TROPONINI in the last 72 hours. BNP: No results for input(s): BNP in the last 72 hours. Lipids: No results for input(s): CHOL, TRIG, HDL, LDLCALC, LABVLDL in the last 72 hours. ABGs: No results for input(s): PHART, PO2ART, KSM2OVM in the last 72 hours. INR:   No results for input(s): INR in the last 72 hours. UA:  No results for input(s): Laverda Rhody, GLUCOSEU, BILIRUBINUR, KETUA, SPECGRAV, BLOODU, PHUR, PROTEINU, UROBILINOGEN, NITRU, LEUKOCYTESUR, Quyen Penta in the last 72 hours. Urine Microscopic:   No results for input(s): LABCAST, BACTERIA, COMU, HYALCAST, WBCUA, RBCUA, EPIU in the last 72 hours. Urine Culture: No results for input(s): LABURIN in the last 72 hours. Urine Chemistry: No results for input(s): Shey Marines, PROTEINUR, NAUR in the last 72 hours. IMAGING:  XR CHEST (2 VW)   Final Result   No acute cardiopulmonary disease. XR CHEST (2 VW)   Final Result   No acute disease         CT HEAD WO CONTRAST   Final Result   No acute intracranial abnormality. XR CHEST PORTABLE   Final Result   1. No acute abnormality. Assessment/Plan :      1. ESRD   Regular HD tue/ thu/sat       2. HTN  Controlled     3. AMS.   Likely 2/2 meds  Hold gabapentin   Improving mental status     4. Acid- base disorder. Correct with HD     5. Electrolytes  Monitor     6.  DM2       Stable from nephrology standpoint   Resume HD outpt tomorrow           D/w primary team      Thank you for allowing us to participate in care of [facility]         Electronically signed by: Terrance Wood MD, 2/13/2023, 8:17 AM      [facility] [address]  Office : [phone]  Fax :[phone]

## 2023-02-13 NOTE — DISCHARGE INSTR - COC
Continuity of Care Form    Patient Name: Fatuma Mclean   :  1945  MRN:  [id number]    Admit date:  2023  Discharge date:  2023    Code Status Order: Full Code   Advance Directives:     Admitting Physician:  Robert Orourke MD  PCP: Ana Vazquez MD    Discharging Nurse: [facility] Unit/Room#: 1IA-6692/8420-63  Discharging Unit Phone Number: [phone]    Emergency Contact:   Extended Emergency Contact Information  Primary Emergency Contact: [address] [facility] Phone: [phone]  Relation: Child  Secondary Emergency Contact: [address] [place] [address] Phone: [phone]  Relation: Child    Past Surgical History:  Past Surgical History:   Procedure Laterality Date    APPENDECTOMY      CHOLECYSTECTOMY      COLONOSCOPY  2020    COLONOSCOPY POLYPECTOMY SNARE/COLD BIOPSY performed by John Mccray MD at [address] [facility] N/A 2021    PERITONEAL DIALYSIS CATHETER REMOVAL.  EXCISSION OF ABDOMINAL CITRIX. performed by Emma Saavedra DO at [address] [facility] ([facility])      FULL    LAPAROSCOPY INSERTION PERITONEAL CATHETER N/A 2020    LAPAROSCOPIC LYSIS OF ADHESIONS, LAPAROSCOPIC PERITONEAL DIALYSIS CATHETER PLACEMENT, LAPAROSCOPIC OMENTOPEXY performed by Emma Saavedra DO at [address] [facility] [address] BILATERAL      preventive    ROTATOR CUFF REPAIR Left     SHUNT REVISION Left 2021    LEFT FOREARM ARTERIO VENOUS GRAFT performed by Esequiel Bonilla MD at [address] [facility] N/A 2020    EGD BIOPSY performed by John Mccray MD at [address] [facility] ENDOSCOPY       Immunization History:   Immunization History   Administered Date(s) Administered    COVID-19, J&J, (age 18y+), IM, 0.5 mL 10/26/2021, 2022    Influenza, High Dose (Fluzone 65 yrs and older) 2017, 2017    Influenza, Triv, inactivated, subunit, adjuvanted, IM (Fluad 65 yrs and older) 10/28/2019    Pneumococcal Conjugate 13-valent ([id number]) 2017    Pneumococcal Polysaccharide ([id number]) 05/10/2019       Active Problems:  Patient Active Problem List   Diagnosis Code    Hyperlipidemia E78.5    Hypothyroidism E03.9    Type 2 diabetes mellitus ([facility]) E11.9    Chronic kidney 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 ([facility] E11.22, N18.6, Z99.2    Primary osteoarthritis of both knees M17.0    Memory loss R41.3    Diabetic polyneuropathy associated with type 2 diabetes mellitus ([facility]) E11.42    CAD (coronary artery disease) I25.10    ESRD (end stage renal disease) ([facility]) N18.6    Obese E66.9    History of anemia due to chronic kidney disease N18.9, Z86.2    Parkinson's disease G20    RLS (restless legs syndrome) G25.81    Encephalopathy, metabolic K42.74    Acute encephalopathy G93.40    Hypertension I10    Bile reflux gastritis K29.60    Delayed wound healing T14. 8XXD    Open wound of right upper arm S41.101A    Anemia, chronic disease D63.8    Arthropathy of lower leg M17.10    CHF (congestive heart failure) ([facility]) I50.9    CKD (chronic kidney disease) N18.9    Gastroparesis K31.84    Psoriasis L40.9    Pure hypertriglyceridemia E78.1    Vitamin D deficiency E55.9    ESRD on hemodialysis ([facility] N18.6, Z99.2    Toxic encephalopathy G92.9    Neuropathy G62.9    Electrolyte imbalance E87.8       Isolation/Infection:   Isolation            No Isolation          Patient Infection Status       Infection Onset Added Last Indicated Last Indicated By Review Planned Expiration Resolved Resolved By    None active    Resolved    COVID-19 22 COVID-19, Rapid   22     COVID-19 (Rule Out) 22 COVID-19, Rapid (Ordered)   22 Rule-Out Test Resulted    COVID-19 (Rule Out) 22 COVID-19 (Ordered)   22 Rule-Out Test Resulted    COVID-19 (Rule Out) 21 COVID-19 (Ordered)   11/30/21 Rule-Out Test Resulted    C-diff Rule Out 08/30/21 08/30/21 08/30/21 Clostridium difficile toxin/antigen (Ordered)   08/30/21 Rule-Out Test Resulted    COVID-19 (Rule Out) 08/25/21 08/25/21 08/25/21 COVID-19 (Ordered)   08/26/21 Rule-Out Test Resulted    COVID-19 (Rule Out) 08/12/21 08/12/21 08/12/21 COVID-19 (Ordered)   08/13/21 Rule-Out Test Resulted    C-diff Rule Out 03/22/21 03/22/21 03/22/21 Clostridium difficile toxin/antigen (Ordered)   05/17/21 Leah Mcdermott RN    This order is discontinued    COVID-19 (Rule Out) 11/24/20 11/24/20 11/24/20 COVID-19 (Ordered)   11/24/20 Rule-Out Test Resulted            Nurse Assessment:  Last Vital Signs: /78   Pulse 86   Temp 97.1 °F (36.2 °C) (Temporal)   Resp 16   Ht 5' 5\" (1.651 m)   Wt 163 lb 1.6 oz (74 kg)   LMP  (LMP Unknown)   SpO2 99%   BMI 27.14 kg/m²     Last documented pain score (0-10 scale): Pain Level: 0  Last Weight:   Wt Readings from Last 1 Encounters:   02/12/23 163 lb 1.6 oz (74 kg)     Mental Status:  oriented and alert    IV Access:  - None  - Fistula for LUE    Nursing Mobility/ADLs:  Walking   Independent  Transfer  Independent  Bathing  Independent  Dressing  Independent  Toileting  Independent  Feeding  Independent  Med Admin  Assisted  Med Delivery   whole    Wound Care Documentation and Therapy:  Wound 01/25/23 Wrist Right; Anterior 1 (Active)   Wound Image   01/25/23 0847   Wound Etiology Traumatic 02/13/23 0420   Dressing Status Other (Comment) 02/13/23 0420   Wound Cleansed Soap and water 02/11/23 2059   Dressing/Treatment Antibacterial ointment; Adhesive bandage 02/11/23 2059   Wound Length (cm) 1.2 cm 02/01/23 0902   Wound Width (cm) 1.2 cm 02/01/23 0902   Wound Depth (cm) 0.2 cm 02/01/23 0902   Wound Surface Area (cm^2) 1.44 cm^2 02/01/23 0902   Wound Volume (cm^3) 0.288 cm^3 02/01/23 0902   Post-Procedure Length (cm) 1.2 cm 02/01/23 0917   Post-Procedure Width (cm) 1.2 cm 02/01/23 0917 Post-Procedure Depth (cm) 0.2 cm 02/01/23 0917   Post-Procedure Surface Area (cm^2) 1.44 cm^2 02/01/23 0917   Post-Procedure Volume (cm^3) 0.288 cm^3 02/01/23 0917   Wound Assessment Dry 02/10/23 0830   Drainage Amount None 02/10/23 1326   Drainage Description Serous 02/09/23 0531   Odor None 02/10/23 1326   Kyra-wound Assessment Intact 02/10/23 1326   Margins Defined edges 02/10/23 1326   Number of days: 19        Elimination:  Continence: Bowel: Yes  Bladder: Yes  Urinary Catheter: None   Colostomy/Ileostomy/Ileal Conduit: No       Date of Last BM: 2/15/2023  No intake or output data in the 24 hours ending 02/13/23 0751  No intake/output data recorded. Safety Concerns: At Risk for Falls    Impairments/Disabilities:      None    Nutrition Therapy:  Current Nutrition Therapy:   - Oral Diet:  Renal    Routes of Feeding: Oral  Liquids: Thin Liquids  Daily Fluid Restriction: no  Last Modified Barium Swallow with Video (Video Swallowing Test): not done    Treatments at the Time of Hospital Discharge:   Respiratory Treatments: none  Oxygen Therapy:  is not on home oxygen therapy.   Ventilator:    - No ventilator support    Rehab Therapies: Physical Therapy, Occupational Therapy, and Speech/Language Therapy  Weight Bearing Status/Restrictions: No weight bearing restrictions  Other Medical Equipment (for information only, NOT a DME order):  walker  Other Treatments: none     Patient's personal belongings (please select all that are sent with patient):  Malinda    RN SIGNATURE:  Electronically signed by Marilee Dietrich RN on 2/15/23 at 11:34 AM EST    CASE MANAGEMENT/SOCIAL WORK SECTION    Inpatient Status Date: ***    Readmission Risk Assessment Score:  Readmission Risk              Risk of Unplanned Readmission:  32           Discharging to Facility/ Agency   Name:   Address:  Phone:  Fax:    Dialysis Facility (if applicable)   Name:  Address:  Dialysis Schedule:  Phone:  Fax:    / signature: {Esignature:[id number]}    PHYSICIAN SECTION    Prognosis: Guarded    Condition at Discharge: Stable    Rehab Potential (if transferring to Rehab): Good    Recommended Labs or Other Treatments After Discharge: ***    Physician Certification: I certify the above information and transfer of Clayton Litten  is necessary for the continuing treatment of the diagnosis listed and that she requires [place] for greater 30 days.      Update Admission H&P: No change in H&P    PHYSICIAN SIGNATURE:  Electronically signed by Iza Dueñas MD on 2/13/23 at 7:51 AM EST

## 2023-02-13 NOTE — DISCHARGE SUMMARY
[facility] -- Physician Discharge Summary     Ella Olivo  1945  MRN: [id number]    Admit Date: 2/7/2023  Discharge Date: No discharge date for patient encounter. Attending MD: Jose Carlos Baez MD  Discharging MD: Vicenta Higginbotham MD  PCP: Kristin Avitia MD [address] [facility] [phone]    Admission Diagnosis: ESRD on hemodialysis ([facility] [N18.6, Z99.2]  Acute encephalopathy [G93.40]  DISCHARGE DIAGNOSIS: same    Full Hospital Problem List:  Active Hospital Problems    Diagnosis Date Noted    Electrolyte imbalance [E87.8] 02/11/2023     Priority: Medium    Anemia, chronic disease [D63.8] 02/07/2023     Priority: Medium    ESRD on hemodialysis ([facility] [N18.6, Z99.2]      Priority: Medium    Toxic encephalopathy [G92.9]      Priority: Medium    Neuropathy [G62.9]      Priority: Medium    Hypertension [I10] 12/23/2022     Priority: Medium    Acute encephalopathy [G93.40] 12/22/2022     Priority: Medium    CAD (coronary artery disease) [I25.10]            Hospital Course:  43-year-old white American lady who came to the emergency room with a history of poor responsiveness, increased somnolence, increased lethargy. The patient was totally disoriented and that is unusual for her. She was tearful and moaning and groaning and did not make any sense. She was moving all her arms and legs without any difficulty. She is a chronic hemodialysis patient and she was on her way to dialysis this morning and did not get it because of increased confusion.       Pt felt to have acute encepahlopathy  Seen by renal, dialized emergently  Gabapentin is stopped    Pt does well on regular HD  Cleared for d/c to snf      Consults made during Hospitalization:  IP CONSULT TO NEPHROLOGY  IP CONSULT TO INTERNAL MEDICINE  IP CONSULT TO NEPHROLOGY  IP CONSULT TO PALLIATIVE CARE    Treatment team at time of Discharge: Treatment Team: Attending Provider: Jose Carlos Baez MD; Consulting Physician: Sissy Carson MD; Consulting Physician: Karely Mckeon MD; Patient Care Tech: Abbypewilly Olivas; Consulting Physician: Jameson Petersen, RN; Consulting Physician: Dung Olivas MD; Patient Care Tech: Antonycindy Dodd; Registered Nurse: Devang Doshi RN; Respiratory Therapist (Day): Delroy Longoria RCP; Utilization Reviewer: Ronald Duque RN; Registered Nurse: Watson Thomas RN    Imaging Results:  XR CHEST (2 VW)    Result Date: 2/12/2023  EXAMINATION: TWO XRAY VIEWS OF THE CHEST 2/12/2023 12:13 pm COMPARISON: 02/10/2023 HISTORY: ORDERING SYSTEM PROVIDED HISTORY: cough TECHNOLOGIST PROVIDED HISTORY: Reason for exam:->cough Reason for Exam: Cough FINDINGS: Attenuation by soft tissue in the lower hemithoraces. No confluent airspace disease. No pleural effusion or pneumothorax. Cardiac and mediastinal silhouettes are unchanged. Calcification of aorta. No acute cardiopulmonary disease. XR CHEST (2 VW)    Result Date: 2/10/2023  EXAMINATION: TWO XRAY VIEWS OF THE CHEST 2/10/2023 3:24 pm COMPARISON: None. HISTORY: ORDERING SYSTEM PROVIDED HISTORY: cough TECHNOLOGIST PROVIDED HISTORY: Reason for exam:->cough Reason for Exam: cough FINDINGS: Lungs are clear without focal consolidation or pulmonary edema. .  The cardiac silhouette is borderline enlarged     No acute disease     CT HEAD WO CONTRAST    Result Date: 2/7/2023  EXAMINATION: CT OF THE HEAD WITHOUT CONTRAST  2/7/2023 9:21 am TECHNIQUE: CT of the head was performed without the administration of intravenous contrast. Automated exposure control, iterative reconstruction, and/or weight based adjustment of the mA/kV was utilized to reduce the radiation dose to as low as reasonably achievable. COMPARISON: 12/22/2022 HISTORY: ORDERING SYSTEM PROVIDED HISTORY: ams TECHNOLOGIST PROVIDED HISTORY: Reason for exam:->ams Has a \"code stroke\" or \"stroke alert\" been called? ->No Decision Support Exception - unselect if not a suspected or confirmed emergency medical condition->Emergency Medical Condition (MA) Reason for Exam: ams FINDINGS: BRAIN/VENTRICLES: There is no acute intracranial hemorrhage, mass effect or midline shift. No abnormal extra-axial fluid collection. The gray-white differentiation is maintained without evidence of an acute infarct. There is prominence of the ventricles and sulci due to global parenchymal volume loss. There are nonspecific areas of hypoattenuation within the periventricular and subcortical white matter, which likely represent chronic microvascular ischemic change. ORBITS: The visualized portion of the orbits demonstrate no acute abnormality. SINUSES: The visualized paranasal sinuses and mastoid air cells demonstrate no acute abnormality. SOFT TISSUES/SKULL: No acute abnormality of the visualized skull or soft tissues. No acute intracranial abnormality. XR CHEST PORTABLE    Result Date: 2/7/2023  EXAMINATION: ONE XRAY VIEW OF THE CHEST 2/7/2023 7:44 am COMPARISON: 12/23/2022 HISTORY: ORDERING SYSTEM PROVIDED HISTORY: ams TECHNOLOGIST PROVIDED HISTORY: Reason for exam:->ams Reason for Exam: altered mental status FINDINGS: There is biapical scarring. Cardiomegaly is stable. There is no pneumothorax or pleural effusion. Status post left mastectomy. 1.  No acute abnormality.          Discharge Exam: (2-7 system for EPF/Detailed, ?8 for Comprehensive)  /78   Pulse 86   Temp 97.1 °F (36.2 °C) (Temporal)   Resp 16   Ht 5' 5\" (1.651 m)   Wt 163 lb 1.6 oz (74 kg)   LMP  (LMP Unknown)   SpO2 99%   BMI 27.14 kg/m²   Constitutional: vitals as above: alert, appears stated age and cooperative    Psychiatric: normal insight and judgment, oriented to person, place, time, and general circumstances    Head: Normocephalic, without obvious abnormality, atraumatic    Eyes:lids and lashes normal, conjunctivae and sclerae normal and pupils equal, round, reactive to light and accomodation    EMNT: external ears normal, nares midline    Neck: no carotid bruit, supple, symmetrical, trachea midline and thyroid not enlarged, symmetric, no tenderness/mass/nodules     Respiratory: clear to auscultation and percussion bilaterally with normal respiratory effort    Cardiovascular: normal rate, regular rhythm, normal S1 and S2 and no murmurs    Gastrointestinal: soft, non-tender, non-distended, normal bowel sounds, no masses or organomegaly    Extremities: no clubbing, 1+ edema    Skin:No rashes or nodules noted. Neurologic:negative           Disposition: SNF    Condition: stable    Discharge Medications:  [unfilled]       Medication List      START taking these medications     cephALEXin 500 MG capsule; Commonly known as: [place] Spiritism; Take 1 capsule by   mouth 3 times daily for 10 days   levothyroxine 150 MCG tablet; Commonly known as: SYNTHROID; Take 1   tablet by mouth every morning (before breakfast)     CHANGE how you take these medications     metoprolol succinate 50 MG extended release tablet; Commonly known as:   TOPROL XL; Take 1 tablet by mouth daily; What changed: medication   strength, See the new instructions. CONTINUE taking these medications     Aspirin Low Dose 81 MG EC tablet; Generic drug: aspirin; TAKE 1 TABLET   BY MOUTH EVERY DAY   mupirocin 2 % ointment; Commonly known as: BACTROBAN   torsemide 10 MG tablet; Commonly known as: DEMADEX   vitamin D3 25 MCG (1000 UT) Tabs tablet; Commonly known as:   CHOLECALCIFEROL; Take 1 tablet by mouth daily     STOP taking these medications     ondansetron 4 MG disintegrating tablet; Commonly known as: ZOFRAN-ODT     ASK your doctor about these medications     cholestyramine 4 g packet; Commonly known as: Questran; Take 1 packet by   mouth daily   lidocaine 5 %;  Commonly known as: Lidoderm; Place 1 patch onto the skin   daily 12 hours on, 12 hours off.   rosuvastatin 20 MG tablet; Commonly known as: CRESTOR; TAKE 1 TABLET BY   MOUTH EVERY DAY Allergies: Allergies   Allergen Reactions    Amoxicillin Anaphylaxis    Demeclocycline Anaphylaxis    Penicillins Anaphylaxis    Tetracyclines & Related Anaphylaxis    Hydralazine Anxiety and Hallucinations    Ozempic (0.25 Or 0.5 Mg-Dose) [Semaglutide(0.25 Or 0.5mg-Dos)] Other (See Comments)     Lost large amount weight and loss  Of appetite    Codeine Itching and Rash       Follow up Instructions: Follow-up with PCP: Keyshawn Smith MD in 2 wk . Total time spent on day of discharge including face-to-face visit, examination, documentation, counseling, preparation of discharge plans and followup, and discharge medicine reconciliation and presciptions is 37 minutes      ---       Subjective from day of d/c:   Sheryl Eid is a 65 y.o. female. Pt seen and examined  Chart reviewed since last visit, labs and imaging below      Doing ok  Mentation better  No complaints    Working on SNF placement that can do HD transport    Review of Systems: (1 system for EPF, 2-9 for detailed, 10+ for comprehensive)  Constitutional: Negative for chills and fever. HENT: Negative for dental problem, nosebleeds and rhinorrhea. Eyes: Negative for photophobia and visual disturbance. Respiratory: Negative for cough, chest tightness and shortness of breath. Cardiovascular: Negative for chest pain and leg swelling. Gastrointestinal: Negative for diarrhea, nausea and vomiting. Endocrine: Negative for polydipsia and polyphagia. Genitourinary: Negative for frequency, hematuria and urgency. Musculoskeletal: Negative for back pain and myalgias. Skin: Negative for rash. Allergic/Immunologic: Negative for food allergies. Neurological: Negative for dizziness, seizures, syncope and facial asymmetry. Hematological: Negative for adenopathy. Psychiatric/Behavioral: Negative for dysphoric mood. The patient is not nervous/anxious.           I have reviewed the patient's medical and social history in detail and updated the computerized patient record. To recap: She  has a past medical history of Arthritis, CAD (coronary artery disease), Diabetes ([facility] Hemodialysis patient ([facility] Hyperlipidemia, Hypertension, Kidney disease, Neuropathy, Pneumonia, and Thyroid disease. . She  has a past surgical history that includes Appendectomy; Mastectomy, bilateral; Rotator cuff repair (Left); Cholecystectomy; LAPAROSCOPY INSERTION PERITONEAL CATHETER (N/A, 04/03/2020); Upper gastrointestinal endoscopy (N/A, 11/24/2020); Colonoscopy (11/24/2020); Hysterectomy; Dialysis Catheter Removal (N/A, 05/17/2021); shunt revision (Left, 08/19/2021); and Coronary stent placement. . She  reports that she quit smoking about 44 years ago. Her smoking use included cigarettes. She has a 5.00 pack-year smoking history. She has never used smokeless tobacco. She reports current alcohol use. She reports that she does not use drugs. .      Current Facility-Administered Medications: cefTRIAXone (ROCEPHIN) 1,000 mg in sodium chloride 0.9 % 50 mL IVPB (mini-bag), 1,000 mg, IntraVENous, Q24H  lidocaine PF 1 % injection 1 mL, 1 mL, IntraDERmal, PRN  0.9 % sodium chloride bolus, 500 mL, IntraVENous, PRN  ziprasidone (GEODON) injection 20 mg, 20 mg, IntraMUSCular, Q6H PRN  diphenhydrAMINE (BENADRYL) injection 25 mg, 25 mg, IntraVENous, Q6H PRN  metoprolol (LOPRESSOR) injection 5 mg, 5 mg, IntraVENous, Q6H PRN  metoprolol succinate (TOPROL XL) extended release tablet 50 mg, 50 mg, Oral, Daily  famotidine (PEPCID) tablet 10 mg, 10 mg, Oral, Daily  acetaminophen (TYLENOL) tablet 650 mg, 650 mg, Oral, Q4H PRN  HYDROcodone-acetaminophen (NORCO) 5-325 MG per tablet 1 tablet, 1 tablet, Oral, Q6H PRN  HYDROmorphone HCl PF (DILAUDID) injection 0.5 mg, 0.5 mg, IntraVENous, Q4H PRN  aspirin EC tablet 81 mg, 81 mg, Oral, Daily  mupirocin (BACTROBAN) 2 % ointment, , Topical, TID  ondansetron (ZOFRAN-ODT) disintegrating tablet 4 mg, 4 mg, Oral, Q8H PRN  Vitamin D (CHOLECALCIFEROL) tablet 1,000 Units, 1,000 Units, Oral, Daily         Objective (exam): see above    Labs at time of d/c:  Lab Results   Component Value Date    WBC 13.3 (H) 02/12/2023    HGB 14.3 02/12/2023    HCT 44.1 02/12/2023     02/12/2023    CHOL 96 08/12/2021    TRIG 132 08/12/2021    HDL 32 (L) 08/12/2021    LDLDIRECT 124 (H) 08/15/2019    ALT 14 02/07/2023    AST 15 02/07/2023     (L) 02/12/2023    K 5.0 02/12/2023    CL 90 (L) 02/12/2023    CREATININE 6.2 (HH) 02/12/2023    BUN 39 (H) 02/12/2023    CO2 21 02/12/2023    TSH 2.64 08/15/2019    INR 1.06 02/07/2023    LABA1C 5.9 11/21/2022    LABMICR YES 02/07/2023     Lab Results   Component Value Date    CKTOTAL 243 (H) 08/25/2021    TROPONINI 0.04 (H) 02/07/2023       (Please note that portions of this note were completed with a voice recognition program.  Efforts were made to edit the dictations but occasionally words are mis-transcribed.)      Signed:  Janna Fowler MD  2/13/2023

## 2023-02-13 NOTE — PROGRESS NOTES
Facility/Department: [address]  Speech Language Pathology   Dysphagia and Speech Language/Cognitive Treatment Note    Patient: Sherrell Paul   : 1945   MRN: [id number]      Evaluation Date: 2023      Admitting Dx: ESRD on hemodialysis ([facility] [address] [N18.6, Z99.2]  Acute encephalopathy [G93.40]  Treatment Diagnosis: Cognitive-Linguistic Deficits , Oropharyngeal Dysphagia   Pain: Denies                                                Subjective:  Pt dressed in her street clothes and sitting in chair upon my arrival. When questioned about this Pt verbalized that she was waiting to leave to \"God knows where. \" Upon discussion with Pt's nurse, RN reports that the Pt does have discharge orders but transport has not been arranged. Nurse explained this to the Pt numerous times but the Pt insisted on dressing herself and refused to return to bed and refused chair alarm on her chair. Pt demonstrates reduced ability to retain this information and to verbalize her interpretation of earlier events. Dysphagia Treatment:   Diet and Treatment Recommendations 2023:  Diet Solids Recommendation:  Regular texture diet  Liquid Consistency Recommendation: Thin liquids  Recommended form of Meds: Meds whole with water        Compensatory strategies: Upright as possible with all PO intake , Small bites/sips , Eat/feed slowly, Remain upright 30-45 min     Assessment of Texture Tolerance:  Diet level prior to treatment: Regular texture diet , Thin liquids   Tolerance of Current Diet Level:RN reported pt appears to be tolerating current diet level . However, Pt reportedly has declined both breakfast and lunch this date due to not liking the food as well as to thinking she was leaving.     -Impressions: Pt was positioned Upright in chair, awake and alert. Currently on room air. Pt initially misunderstood purpose for ongoing SLP assessment of po texture tolerance with re-education required.  Trials of thin liquids and regular solids  were provided to assess swallow function. Pt was able to self feed but appeared somewhat impulsive with self feeding. She demonstrated adequate mastication and bolus propulsion. Swallow appeared fairly timely with adequate laryngeal elevation. Pt accepted only limited po trials and appeared to lose track of why she was taking po trials from SLP at this time. No overt signs of aspiration noted. However, Pt demonstrates increased risk for aspiration due to cognitive state  and co morbidities . Based on today's assessment recommend Regular texture diet  with Thin liquids , Meds whole with water with use of compensatory swallow strategies (see above). Dysphagia Goals:  Pt will functionally tolerate recommended diet with no overt clinical s/s of aspiration (Ongoing 02/13/23)  Pt will advance to least restrictive diet as indicated (Ongoing 02/13/23)      Speech Language/Cognitive Treatment:  Impressions: This date goals were targeted via orientation and short term recall. Upon initial conversational discussion regarding recent events, Pt demonstrated difficulty verbally communicating why she was fully dressed and positioned upright in chair. Prompting was provided for the Pt to clarify and elaborate on details but Pt remained vague in her responses with apparent intermittent word finding noted with self expression. However, within structure narrow scope questioning, Pt was able to recall recent events (ie what was on her lunch tray; her earlier discussion with her daughter and current place and time). Pt was able to follow basic 2-unit command with only mild repetition required. Pt demonstrates reduced awareness of updated education provided by both myself and her nurse regarding her discharge plans.     Speech Language Short Term Goals:  Pt will improve auditory processing/comprehension of commands and questions to 80%, via graded tasks (Ongoing 02/13/23)  Pt will improve orientation and short term recall via graded tasks to 80% (Ongoing 02/13/23)  Pt will improve verbal expression for functional expression via graded tasks to 80% (Ongoing 02/13/23)  Pt will participate in ongoing cognitive assessment with goals to be established as indicated (Ongoing 02/13/23)    Assessment: Patient progressing toward goals    Plan: 3-5 times per week during acute care stay. Patient/Family Education:  Provided education regarding role of SLP, recommendations and general speech pathology plan of care. [] Pt verbalized understanding and agreement   [x] Pt requires ongoing learning   [] No evidence of comprehension     Discharge Recommendations:  Recommend ongoing SLP for speech and dysphagia therapy upon discharge from hospital     Treatment time  Timed Code Treatment Minutes: 15 min  Total Treatment time: 25 min    If patient discharges prior to next session this note will serve as a discharge summary.       Jourdan López [id number]  Speech Language Pathologist    -

## 2023-02-13 NOTE — CARE COORDINATION
Discharge Planning. The SW spoke with lawrence from Home at [facility] [address] who stated Home at [facility] accept and [address] is still reviewing. The SW faxed additional referrals to:    [facility]-Out of network   [facility]- Does not accept dialysis pt's who need transport back and fourth to dialysis. [facility] accept but will need to confirm transport. [facility]-Reviewing  [facility]-Waiting for a response.          Electronically signed by JOSE Burnette on 2/13/2023 at 3:25 PM

## 2023-02-14 LAB
ANION GAP SERPL CALCULATED.3IONS-SCNC: 33 MMOL/L (ref 3–16)
BASOPHILS ABSOLUTE: 0.1 K/UL (ref 0–0.2)
BASOPHILS RELATIVE PERCENT: 0.4 %
BUN BLDV-MCNC: 73 MG/DL (ref 7–20)
CALCIUM SERPL-MCNC: 9 MG/DL (ref 8.3–10.6)
CHLORIDE BLD-SCNC: 90 MMOL/L (ref 99–110)
CO2: 15 MMOL/L (ref 21–32)
CREAT SERPL-MCNC: 11.1 MG/DL (ref 0.6–1.2)
EOSINOPHILS ABSOLUTE: 0.4 K/UL (ref 0–0.6)
EOSINOPHILS RELATIVE PERCENT: 2 %
GFR SERPL CREATININE-BSD FRML MDRD: 3 ML/MIN/{1.73_M2}
GLUCOSE BLD-MCNC: 82 MG/DL (ref 70–99)
HCT VFR BLD CALC: 41.7 % (ref 36–48)
HEMATOLOGY PATH CONSULT: NO
HEMOGLOBIN: 13.5 G/DL (ref 12–16)
LYMPHOCYTES ABSOLUTE: 2.3 K/UL (ref 1–5.1)
LYMPHOCYTES RELATIVE PERCENT: 11.6 %
MCH RBC QN AUTO: 30.6 PG (ref 26–34)
MCHC RBC AUTO-ENTMCNC: 32.3 G/DL (ref 31–36)
MCV RBC AUTO: 94.7 FL (ref 80–100)
MONOCYTES ABSOLUTE: 4.3 K/UL (ref 0–1.3)
MONOCYTES RELATIVE PERCENT: 21 %
NEUTROPHILS ABSOLUTE: 13.2 K/UL (ref 1.7–7.7)
NEUTROPHILS RELATIVE PERCENT: 65 %
PDW BLD-RTO: 17.2 % (ref 12.4–15.4)
PLATELET # BLD: 212 K/UL (ref 135–450)
PMV BLD AUTO: 9.1 FL (ref 5–10.5)
POTASSIUM SERPL-SCNC: 4.1 MMOL/L (ref 3.5–5.1)
RBC # BLD: 4.4 M/UL (ref 4–5.2)
SODIUM BLD-SCNC: 138 MMOL/L (ref 136–145)
WBC # BLD: 20.3 K/UL (ref 4–11)

## 2023-02-14 PROCEDURE — 97530 THERAPEUTIC ACTIVITIES: CPT

## 2023-02-14 PROCEDURE — 97116 GAIT TRAINING THERAPY: CPT

## 2023-02-14 PROCEDURE — 6360000002 HC RX W HCPCS: Performed by: INTERNAL MEDICINE

## 2023-02-14 PROCEDURE — 85025 COMPLETE CBC W/AUTO DIFF WBC: CPT

## 2023-02-14 PROCEDURE — 80048 BASIC METABOLIC PNL TOTAL CA: CPT

## 2023-02-14 PROCEDURE — 2580000003 HC RX 258: Performed by: INTERNAL MEDICINE

## 2023-02-14 PROCEDURE — 90935 HEMODIALYSIS ONE EVALUATION: CPT

## 2023-02-14 PROCEDURE — 90935 HEMODIALYSIS ONE EVALUATION: CPT | Performed by: INTERNAL MEDICINE

## 2023-02-14 PROCEDURE — 6370000000 HC RX 637 (ALT 250 FOR IP): Performed by: INTERNAL MEDICINE

## 2023-02-14 PROCEDURE — 97129 THER IVNTJ 1ST 15 MIN: CPT

## 2023-02-14 PROCEDURE — 97535 SELF CARE MNGMENT TRAINING: CPT

## 2023-02-14 PROCEDURE — 92526 ORAL FUNCTION THERAPY: CPT

## 2023-02-14 PROCEDURE — 1200000000 HC SEMI PRIVATE

## 2023-02-14 PROCEDURE — 36415 COLL VENOUS BLD VENIPUNCTURE: CPT

## 2023-02-14 RX ADMIN — Medication 1000 UNITS: at 12:47

## 2023-02-14 RX ADMIN — ASPIRIN 81 MG: 81 TABLET, COATED ORAL at 12:47

## 2023-02-14 RX ADMIN — MUPIROCIN: 20 OINTMENT TOPICAL at 13:02

## 2023-02-14 RX ADMIN — FAMOTIDINE 10 MG: 20 TABLET, FILM COATED ORAL at 12:46

## 2023-02-14 RX ADMIN — MUPIROCIN: 20 OINTMENT TOPICAL at 14:36

## 2023-02-14 RX ADMIN — CEFTRIAXONE SODIUM 1000 MG: 1 INJECTION, POWDER, FOR SOLUTION INTRAMUSCULAR; INTRAVENOUS at 12:53

## 2023-02-14 RX ADMIN — METOPROLOL SUCCINATE 50 MG: 50 TABLET, EXTENDED RELEASE ORAL at 12:47

## 2023-02-14 NOTE — PROGRESS NOTES
Nutrition Note    RECOMMENDATIONS  PO Diet: Continue current diet     NUTRITION ASSESSMENT   Pt triggered for LOS assessment. Off unit in dialysis upon visiting this morning, information obtained from chart review. On 3 carb, low Na, low phos, low K+ diet with documented intakes >50% throughout admission. Did not report poor po intake or unintentional wt loss upon admission. Wt hx in EMR indicates wt fluctuations, which is likely r/t fluids from HD. RD will continue to monitor for adequate po intake. Nutrition Related Findings: Lytes obtained today WNL. HbA1c of 5.9% on 11/21. LBM 2/11, BS+. Non-pitting BUE edema. Wounds:  (traumatic wound x1)  Nutrition Education:  Education not indicated   Nutrition Goals: PO intake 75% or greater, by next RD assessment     MALNUTRITION ASSESSMENT   Malnutrition Status: No malnutrition    NUTRITION DIAGNOSIS   No nutrition diagnosis at this time     CURRENT NUTRITION THERAPIES  ADULT DIET; Regular; 3 carb choices (45 gm/meal); Low Sodium (2 gm); Low Potassium (Less than 3000 mg/day); Low Phosphorus (Less than 1000 mg); Less than 60 gm     PO Intake: 51-75%, %   PO Supplement Intake:None Ordered    ANTHROPOMETRICS  Current Height: 5' 5\" (165.1 cm)  Current Weight: 165 lb 5.5 oz (75 kg)    Admission weight: 169 lb (76.7 kg) (bed wt)  Ideal Body Weight (IBW): 125 lbs  (57 kg)        BMI: 28    The patient will be monitored per nutrition standards of care. Consult dietitian if additional nutrition interventions are needed prior to RD reassessment.      Dangelo Akers MS, RD, LD    Contact: [phone]

## 2023-02-14 NOTE — PROGRESS NOTES
[facility],[address] Msb Department   Phone: [phone]    Occupational Therapy    [] Initial Evaluation            [x] Daily Treatment Note         [] Discharge Summary      Patient: Clayton Litten   : 1945   MRN: [id number]   Date of Service:  2023    Admitting Diagnosis:  Acute encephalopathy  Current Admission Summary:    Altered Mental Status       Arrived per [facility] from dialysis (pt resides at home with daughter) d/t altered mental status; tearful in triage; unable to state date, time, birth date which is not normal         HISTORY OF Jennifer Vishnu Hernandez is a 65 y.o. female  who presents to the ED complaining of altered mental status. History is obtained primarily from EMS. She is tearful and moaning but not really making any sense even though she does not have dysarthria on her initial evaluation. She is moving her arms and legs without difficulty. She was on her way to dialysis this morning and did not get it because of her confusion and was rerouted to the emergency department not having started it. She has a history of hypertension hyperlipidemia diabetes and CAD as well as end-stage renal disease on hemodialysis. She was admitted in December for similar encephalopathy that was felt to be hypertensive versus metabolic after having an unremarkable MRI. At 1 point neurology started her on Dilantin but then later discontinued it thinking it was not likely seizure activity after risk benefits were discussed. No reports of recent fevers. Patient does not complain of pain anywhere in her head chest or belly. No reports of vomiting or diarrhea recently. Daughter later states that the patient was fine last night and was confused this morning and she did attempt to bring the patient in dialysis anyway however she continued to get worse with her disorientation and was brought here instead.   The patient was apparently fine yesterday earlier in the evening. The patient is on Neurontin but as far as we know there are no recent changes in dose or any other new medications that she is on. Past Medical History:  has a past medical history of Arthritis, CAD (coronary artery disease), Diabetes ([address] Hemodialysis patient ([facility] [address] Hyperlipidemia, Hypertension, Kidney disease, Neuropathy, Pneumonia, and Thyroid disease. Past Surgical History:  has a past surgical history that includes Appendectomy; Mastectomy, bilateral; Rotator cuff repair (Left); Cholecystectomy; LAPAROSCOPY INSERTION PERITONEAL CATHETER (N/A, 04/03/2020); Upper gastrointestinal endoscopy (N/A, 11/24/2020); Colonoscopy (11/24/2020); Hysterectomy; Dialysis Catheter Removal (N/A, 05/17/2021); shunt revision (Left, 08/19/2021); and Coronary stent placement. Discharge Recommendations: Luz Salinas scored a 22/24 on the AM-PAC ADL Inpatient form. Current research shows that an AM-PAC score of 18 or greater is typically associated with a discharge to the patient's home setting. Based on the patient's AM-PAC score, and their current ADL deficits, it is recommended that the patient have 2-3 sessions per week of Occupational Therapy at d/c to increase the patient's independence. At this time, this patient demonstrates the endurance and safety to discharge home with [facility] therapy and a follow up treatment frequency of 2-3x/wk. Please see assessment section for further patient specific details.     HOME HEALTH CARE: LEVEL 3 SAFETY     - Initial home health evaluation to occur within 24-48 hours, in patient home   - Therapy evaluations in home within 24-48 hours of discharge; including DME and home safety   - Frontload therapy 5 days, then 3x a week   - Therapy to evaluate if patient has [address] needs for personal care   -  evaluation within 24-48 hours, includes evaluation of resources and insurance to determine [place], [place], LTC, and Medicaid options     If patient discharges prior to next session this note will serve as a discharge summary. Please see below for the latest assessment towards goals. Patient and daughter both in agreement to go to a SNF, both aware patient is too weak to return home to [place]. DME Required For Discharge: DME to be determined at next level of care, DME to be determined pending patient progress    Precautions/Restrictions: high fall risk  Weight Bearing Restrictions: no restrictions  [] Right Upper Extremity  [] Left Upper Extremity [] Right Lower Extremity  [] Left Lower Extremity     Required Braces/Orthotics: no braces required   [] Right  [] Left  Positional Restrictions:no positional restrictions         Pre-Admission Information   Lives With: alone                     Type of Home:  [facility]  Home Layout: one level  Home Access: all level, 1st floor   Bathroom Layout: walk in shower  Bathroom Equipment: grab bars in shower, shower chair, hand held shower head  Home Equipment: rollator - 4 wheeled walker  Transfer Assistance: Independent without use of device  Ambulation Assistance:Independent without use of device  ADL Assistance: independent with all ADL's  IADL Assistance: independent with homemaking tasks-- daughter stated still cooks but does not follow a renal diet   Active : [] yes             [x]no  Current Employment: retired. Occupation:  administrative assitant  Hobbies: [employer]: 2 falls in the last 6 months  Daughter from [facility] stated that patient in non-compliant with diet and medication due to periods of confusion.    Dialysis T/TH/SAT, daughter stated that patient may do well for several weeks but then becomes confused        Examination   Vision:   Vision Corrective Device: wears glasses at all times  Hearing:   [facility]  Perception:   WFL  Observation:   General Observation:  patient needing to go to the bathroom, just recently returned from dialysis   Posture:   Some posterior leaning when 1st stood up from bed  Sensation:   denies numbness and tingling  Proprioception:    WFL  Tone:   Normotonic  Coordination Testing:   WFL    ROM:   (B) UE AROM WFL  Strength:   (B) UE strength grossly WFL    Therapist Clinical Decision Making (Complexity): medium complexity  Clinical Presentation: evolving-- needing dialysis 4 days in a row per nurse       Subjective  General: Patient seated in chair on arrival and agreeable for session. Pt having just returned from HD tx earlier this date and reports feeling fatigued and unable to tolerate 2 therapy sessions this afternoon. Cotx with PT to maximize function and safety. Pain: 0/10  Pain Interventions: not applicable        Activities of Daily Living  Basic Activities of Daily Living  Grooming: Modified Independent  Grooming Comments: wash hands at sink   Toileting: Supervision  Toileting Comments: Pt void on toilet with no assistance for clothing mgmt nor pericare. Instrumental Activities of Daily Living  No IADL completed on this date. Functional Mobility  Bed Mobility  No bed mobility assessed this date as pt seated in chair on arrival and exit. Transfers  Sit to stand transfer: Stand by assistance  Stand to sit transfer: Stand by assistance  Bed / Chair transfer:  Stand by assistance  Toilet transfer: Supervision  Comments: Toilet transfers on/off standard toilet. Functional Mobility:  Sitting Balance: Modified [place]. Functional Mobility: Stand by assistance  Functional Mobility Device Use: rolling walker  Functional Mobility Comment: Pt ambulated in room and hallway using RW with Supervision x >225' with steady gait. Pt with no c/o dizziness, pain or fatigue. RW removed to assess functional mobility using no AD as per baseline functional report. Pt ambulated x 150' using no AD in room and hallway. Steady gait and no LOB. Pt returned to room and voided in BR. Pt returned to seated in chair with needs and RN aware.      Other Therapeutic Interventions    Functional Outcomes  AM-PAC Inpatient Daily Activity Raw Score: 22    Cognition  Overall Cognitive Status:WFL  Orientation:    alert and oriented x 4  Command Following:   impaired     Education  Barriers To Learning:None  Patient Education: patient educated on goals, OT role and benefits, plan of care, ADL adaptive strategies, transfer training, discharge recommendations  Learning Assessment:  patient verbalizes understanding, would benefit from continued reinforcement    Assessment  Activity Tolerance:Good  Impairments Requiring Therapeutic Intervention: decreased functional mobility, decreased ADL status, decreased safety awareness, decreased cognition, decreased endurance, decreased balance  Prognosis: good  Clinical Assessment: Pt presents with the above deficits impacting daily occupational performance and would benefit from continued skilled OT services. Significant improvement noted this date with overall cognition and requiring little to no assistance with ADL transfers, functional mobility, ADL's and activity tolerance. Pt seen this date as cotreatment with PT secondary to fatigue post HD tx; however, pt still ambulated >300' using/not using AD and completed ADL in bathroom. Change DC recommendation from SNF to [facility]. Continue with present POC.    Safety Interventions: patient left in chair, chair alarm in place, call light within reach, nurse notified, and family/caregiver present    Plan  Frequency: 3-5 x/per week  Current Treatment Recommendations: strengthening, balance training, functional mobility training, ADL/self-care training, cognitive/perceptual training, cognitive reorientation, safety education, and equipment evaluation/education    Goals  Patient Goals: Get stronger (aware and in agreement can not go home)   Short Term Goals:  Time Frame: until discharge   Patient will complete lower body ADL at minimal assistance   Patient will complete toileting at stand by assistance --GOAL MET 2/14--Update to Modified [place]  Patient will complete functional transfers at stand by assistance --GOAL MET 2/14--Update to Modified [place]  Patient will complete functional mobility at stand by assistance --GOAL MET 2/14--Update to Modified [place]  Patient will increase [facility] ADL score = to or > than 18/24--GOAL MET 2/14--Update to 23/24  Patient will follow 1-step commands with 90% accuracy throughout session--GOAL MET 2/14--Discard goal.    5/6 goals met this date. Goals updated on 2/14/2023 to reflect pt's progress.     Therapy Session Time     Individual Group Co-treatment   Time In    1330   Time Out    1401   Minutes    31        Timed Code Treatment Minutes:  31 minutes  Total Treatment Minutes: 31 minutes       Electronically Signed By: HAN ULRICH, OTR/L [id number].

## 2023-02-14 NOTE — PROGRESS NOTES
PROCEDURE:  Patient seen on hemodialysis at 10:12 AM    PHYSICIAN:  Jae Flores MD    INDICATION:  End-stage renal disease    Wt Readings from Last 3 Encounters:   02/14/23 165 lb 5.5 oz (75 kg)   01/20/23 181 lb 3.2 oz (82.2 kg)   12/27/22 160 lb 15 oz (73 kg)     Temp Readings from Last 3 Encounters:   02/14/23 97.5 °F (36.4 °C)   02/01/23 (!) 96.1 °F (35.6 °C) (Infrared)   01/25/23 (!) 95.5 °F (35.3 °C) (Tympanic)     BP Readings from Last 3 Encounters:   02/14/23 (!) 107/48   02/01/23 (!) 172/81   01/25/23 (!) 157/78     Pulse Readings from Last 3 Encounters:   02/14/23 83   02/01/23 79   01/25/23 88      In: 240 [P.O.:240]  Out: -      CBC:   Recent Labs     02/12/23  0528 02/13/23  1055 02/14/23  0517   WBC 13.3* 13.6* 20.3*   HGB 14.3 13.6 13.5    246 212     BMP:    Recent Labs     02/12/23  0528 02/13/23  1056 02/14/23  0517   * 135* 138   K 5.0 4.0 4.1   CL 90* 87* 90*   CO2 21 21 15*   BUN 39* 62* 73*   CREATININE 6.2* 9.9* 11.1*   GLUCOSE 119* 92 82     BMD:   Lab Results   Component Value Date    .3 (H) 02/10/2020    CALCIUM 9.0 02/14/2023    CAION 1.03 (L) 12/22/2022    PHOS 6.9 (H) 12/27/2022       Iron:     Lab Results   Component Value Date    IRON 64 03/29/2018    TIBC 351 03/29/2018            RX:  See dialysis flowsheet for specifics on access, blood flow rate, dialysate baths, duration of dialysis, anticoagulation and other technical information. COMMENTS:          Tolerating dialysis well   Awake and alert at his time   No SOB   No edema     Denies any cramps       Jae Flores MD  [facility] Office : [address] , [address] [place] Office: [facility]  [address] [facility] Jada Agudelo [facility] Office: [address] [address].   [facility] Office: [address] [place] [address] [address] [facility], [address] [facility] [address]  Office : [phone]  Fax :[phone]

## 2023-02-14 NOTE — PROGRESS NOTES
Occupational Therapy  OT tx attempt this morning; however, HOLD tx at this time secondary to pt currently [facility] for HD tx. Thank you, Bob Camejo, [address], OTR/L, [id number].

## 2023-02-14 NOTE — PROGRESS NOTES
Speech Language Pathology  Attempt/Hold Note    Sheryl Eid  1945    SLP attempted to see pt this date for dysphagia intervention. Chart reviewed, spoke with RN. Pt currently receiving dialysis. Will hold and attempt again as pt is medically appropriate.     Evy Arias, [address] [facility]  Speech-Language Pathologist  [phone]

## 2023-02-14 NOTE — PROGRESS NOTES
Report received from day shift RN. Patient resting comfortably in bed watching television. No signs of discomfort or distress. Bed is in lowest position, wheels locked, 2/2 side rails up. Bedside table and call light within reach. White board updated. Will continue to monitor patient. Michelle Weeks RN    9:28 PM  Shift assessment complete. (See findings in flowsheet). Med pass complete. (See MAR). VSS. Patient with no complaints at this time. Michelle Weeks RN    10:36 PM  PRN imodium given for loose stool. Michelle Weeks RN    2:21 AM  Patient resting quietly in bed with eyes closed. No apparent needs at this time.  Michelle Weeks RN

## 2023-02-14 NOTE — PROGRESS NOTES
[facility] [facility]   Phone: [phone]    Physical Therapy    [] Initial Evaluation            [x] Daily Treatment Note         [] Discharge Summary      Patient: Yazmin Maldonado   : 1945   MRN: [id number]   Date of Service:  2023  Admitting Diagnosis: Acute encephalopathy    Current Admission Summary: Yazmin Maldonado is a 65 y.o. female  who presents to the ED complaining of altered mental status. History is obtained primarily from EMS. She is tearful and moaning but not really making any sense even though she does not have dysarthria on her initial evaluation. She is moving her arms and legs without difficulty. She was on her way to dialysis this morning and did not get it because of her confusion and was rerouted to the emergency department not having started it. She has a history of hypertension hyperlipidemia diabetes and CAD as well as end-stage renal disease on hemodialysis. She was admitted in December for similar encephalopathy that was felt to be hypertensive versus metabolic after having an unremarkable MRI. At 1 point neurology started her on Dilantin but then later discontinued it thinking it was not likely seizure activity after risk benefits were discussed. No reports of recent fevers. Patient does not complain of pain anywhere in her head chest or belly. No reports of vomiting or diarrhea recently. Daughter later states that the patient was fine last night and was confused this morning and she did attempt to bring the patient in dialysis anyway however she continued to get worse with her disorientation and was brought here instead. The patient was apparently fine yesterday earlier in the evening. The patient is on Neurontin but as far as we know there are no recent changes in dose or any other new medications that she is on.     Past Medical History:  has a past medical history of Arthritis, CAD (coronary artery disease), Diabetes (HCC), Hemodialysis patient (HCC), Hyperlipidemia, Hypertension, Kidney disease, Neuropathy, Pneumonia, and Thyroid disease.    Past Surgical History:  has a past surgical history that includes Appendectomy; Mastectomy, bilateral; Rotator cuff repair (Left); Cholecystectomy; LAPAROSCOPY INSERTION PERITONEAL CATHETER (N/A, 04/03/2020); Upper gastrointestinal endoscopy (N/A, 11/24/2020); Colonoscopy (11/24/2020); Hysterectomy; Dialysis Catheter Removal (N/A, 05/17/2021); shunt revision (Left, 08/19/2021); and Coronary stent placement.    Discharge Recommendations: Judy Handley scored a 18/24 on the AM-PAC short mobility form. Current research shows that an AM-PAC score of 18 or greater is typically associated with a discharge to the patient's home setting. Based on the patient's AM-PAC score and their current functional mobility deficits, it is recommended that the patient have 2-3 sessions per week of Physical Therapy at d/c to increase the patient's independence.  At this time, this patient demonstrates the endurance and safety to discharge home with PRN assist and home PT and a follow up treatment frequency of 2-3x/wk.  Please see assessment section for further patient specific details.    HOME HEALTH CARE: LEVEL 3 SAFETY  - Initial home health evaluation to occur within 24-48 hours, in patient home   - Therapy evaluations in home within 24-48 hours of discharge; including DME and home safety   - Frontload therapy 5 days, then 3x a week   - Therapy to evaluate if patient has Home Health Aide needs for personal care   -  evaluation within 24-48 hours, includes evaluation of resources and insurance to determine [place], [place], LTC, and Medicaid options     If patient discharges prior to next session this note will serve as a discharge summary.  Please see below for the latest assessment towards goals.      DME Required For Discharge: rollator (4 wheel walker) (pt has RW but she would benefit from Rollator as  she has to walk 375 ft to the laundry room and lobby at her facility, and an additional 250 ft to the dining room with inconsistent places to sit and rest)    Precautions/Restrictions: high fall risk  Weight Bearing Restrictions: weight bearing as tolerated  [] Right Upper Extremity  [] Left Upper Extremity [] Right Lower Extremity  [] Left Lower Extremity     Required Braces/Orthotics: no braces required   [] Right  [] Left  Positional Restrictions:no positional restrictions    Pre-Admission Information   Lives With: alone                     Type of Home:  [facility]  Home Layout: one level  Home Access:  1 step step to enter with handrail. Handrails are located on R side. Bathroom Layout: walk in shower  Bathroom Equipment: grab bars in shower, shower chair, hand held shower head  Home Equipment: rollator - 4 wheeled walker  Transfer Assistance: Independent without use of device  Ambulation Assistance:Independent without use of device  ADL Assistance: independent with all ADL's  IADL Assistance: independent with homemaking tasks  Active : [] yes             [x]no  Current Employment: retired. Occupation:    Hobbies:   Recent Falls: 1 recent fall leading to this admission, no additional falls in the last 6 months   Daughter from [facility] stated that patient in non-compliant with diet and medication due to periods of confusion. Dialysis T/TH/SAT         Subjective  General: Pt reclined in chair upon therapist arrival. Agreeable to PT tx. Seen by PT/OT together as pt reports fatigue and does not feel she will be able to tolerate 2 therapy sessions due to HD earlier this date and notes needed for discharge plan. Pain: 0/10  Pain Interventions: repositioned        Functional Mobility  Bed Mobility  Comments:  Bed mobility not completed this date, pt up in chair at beginning/end of session.   Transfers  Sit to stand transfer: stand by assistance  Stand to sit transfer: stand by assistance  Toilet transfer: supervision  Comments: 2 sit<>stand at recliner throughout session. Supervision for toilet transfer. Ambulation  Surface:level surface  Assistive Device: rolling walker  Assistance: supervision  Distance: 21 ft + 20 ft + 225 ft  Gait Mechanics: slow agatha  Comments: Pt ambulates very well with RW. Ambulation Trial 2  Surface:level surface  Assistive Device: no device  Assistance: stand by assistance  Distance: 150 ft  Gait Mechanics: Increased medial/lateral sway, slow agatha, decreased (L) step length/height (especially as she fatigued)  Comments:  Pt notes decreased (L) step clearance and states this has been baseline since Nov when she had a mini stroke. Pt educated on use of RW for safety with ambulation as such lingering deficits become more exaggerated and a larger safety concern when fatigued (such as after HD). Pt reaching for hallway rail for final 75 ft, states her hallways at home have railings. Stair Mobility  Stair mobility not completed on this date. Comments:  Wheelchair Mobility:  No w/c mobility completed on this date. Comments:  Balance  Static Sitting Balance: good: independent with functional balance in unsupported position  Dynamic Sitting Balance: good: independent with functional balance in unsupported position  Static Standing Balance: fair (+): maintains balance at SBA/supervision without use of UE support  Dynamic Standing Balance: fair (-): maintains balance at SBA with use of UE support  Comments: Pt stands for standing rest breaks x2 in hallway with (B) UE support and no sway. Pt stood at sink x1 minute without UE support for washing hands and applying lotion, no sway noted. Pt very steady with (B) UE support on RW during ambulation. Other Therapeutic Interventions  Pt assisted up to bathroom, voided bladder. See OT note for assist with ADLs.      Functional Outcomes  AM-PAC Inpatient Mobility Raw Score : 18              Cognition  Overall Cognitive Status: Impaired  Arousal/Alterness: appropriate responses to stimuli  Following Commands: follows all commands without difficulty  Memory: decreased recall of recent events, decreased short term memory  Safety Judgement: decreased awareness of need for safety  Problem Solving: assistance required to generate solutions, assistance required to implement solutions, decreased awareness of errors, assistance required to identify errors made, assistance required to correct errors made  Insights: decreased awareness of deficits  Sequencing: requires cues for some  Comment: requires repetition for carry over  Orientation:    oriented to person, oriented to place, oriented to time, and oriented to situation  Command Following:   [facility]    Education  Barriers To Learning: cognition - appears improved 2/14  Patient Education: patient educated on goals, PT role and benefits, plan of care, general safety, functional mobility training, proper use of assistive device/equipment, energy conservation, transfer training, discharge recommendations  Learning Assessment:  patient verbalizes understanding, would benefit from continued reinforcement    Assessment  Activity Tolerance: Pt reporting a lot of fatigue at rest but is able to ambulate 2 partial laps in hallway both with and without AD. The patient had moderate MARROQUIN by the very end of her ambulation trial but this resolved quickly with seated rest. Unable to get SpO2 reading due to pt's cold fingers despite multiple attempts. Impairments Requiring Therapeutic Intervention: decreased functional mobility, decreased ADL status, decreased strength, decreased safety awareness, decreased cognition, decreased endurance, decreased balance, decreased posture  Prognosis: good  Clinical Assessment: Patient was independent without a device in independent living prior to admission.  Pt does demonstrate improvement this date and is able to ambulate household and short community distances both with and without AD, at no more than SBA. The patient has a RW at home and was educated on use of RW for long distances at this time, but she demonstrates good safety for household ambulation in her apartment independently. The patient would benefit from continued skilled PT in a home setting and is agreeable to this, stating she feels back to her baseline. Safety Interventions: patient left in chair, chair alarm in place, call light within reach, gait belt, patient at risk for falls, and nurse notified    Plan  Frequency: 3-5 x/per week  Current Treatment Recommendations: strengthening, balance training, functional mobility training, transfer training, gait training, endurance training, patient/caregiver education, home exercise program, safety education, and equipment evaluation/education    Goals  Patient Goals:  Get stronger, go home. Short Term Goals:  Time Frame: Discharge.   Patient will complete bed mobility at modified independent   Patient will complete transfers at [facility]   Patient will ambulate 50 ft with use of LRAD at [facility]    *No goals met in full 2/14/23    Therapy Session Time      Individual Group Co-treatment   Time In     1330   Time Out     1400   Minutes     30     Timed Code Treatment Minutes: 30 minutes  Total Treatment Minutes:  30 minutes       Electronically Signed By: Gavin Byrd PT, DPT #[id number]

## 2023-02-14 NOTE — PROGRESS NOTES
Facility/Department: [address]  Speech Language Pathology   Dysphagia and Speech Language/Cognitive Treatment Note    Patient: Yazmin Maldonado   : 1945   MRN: [id number]      Evaluation Date: 2023      Admitting Dx: ESRD on hemodialysis ([facility] [address] [N18.6, Z99.2]  Acute encephalopathy [G93.40]  Treatment Diagnosis: Cognitive-Linguistic Deficits , Oropharyngeal Dysphagia   Pain: Denies                                                Subjective:  Pt dressed in her street clothes and sitting in chair upon arrival. Pt reported her swallowing and cognition \"feel fine\" but has difficulty with memory at baseline. RN entered during session to tell pt she was going home with home care. Dysphagia Treatment:   Diet and Treatment Recommendations 2023:  Diet Solids Recommendation:  Regular texture diet  Liquid Consistency Recommendation: Thin liquids  Recommended form of Meds: Meds whole with water      Compensatory strategies: Upright as possible with all PO intake , Small bites/sips , Eat/feed slowly, Remain upright 30-45 min     Assessment of Texture Tolerance:  Diet level prior to treatment: Regular texture diet , Thin liquids   Tolerance of Current Diet Level: No reported difficulty per chart review     -Impressions: Pt was positioned Upright in chair, awake and alert. Currently on room air. Pt receptive to ongoing assessment of swallow function with re-education of purpose of assessment and SLP role. Limited trials of thin liquids, soft solids, and regular solids were provided to assess swallow function. Oral phase characterized by functional oral acceptance and labial seal, with no anterior spillage. Pt exhibited adequate mastication and functional A-P transit of all textures. No overt s/s of aspiration noted this date. However, Pt demonstrates increased risk for aspiration due to cognitive state  and co morbidities .  Based on today's assessment, recommend Regular texture diet  with Thin liquids , Meds whole with water with use of compensatory swallow strategies (see above). Dysphagia Goals:  Pt will functionally tolerate recommended diet with no overt clinical s/s of aspiration (Ongoing 02/14/23)  Pt will advance to least restrictive diet as indicated (Ongoing 02/14/23)      Speech Language/Cognitive Treatment:  Impressions: Cognitive-linguistic targeted this date via orientation, immediate and short-term recall, comparing and contrasting, and problem-solving. Pt oriented x4. Pt with 100% accuracy (4/4) of immediate recall and benefited from education of compensatory strategies for memory (e.g., repetition, talking out loud). Pt with 50% accuracy with delayed recall (5 minutes) independently, increased to 100% accuracy with mod cues from SLP. Pt demonstrated 80% (4/5) accuracy with convergent naming and abstract description task (I.e., comparing and contrasting pictures of two items). Pt benefited from positive reinforcement, visual supports (I.e., pictures), and increased cueing from SLP to complete task as pt's thinking was rigid. Pt with 80% accuracy (4/5) identifying various problems and solutions throughout various problem-solving scenarios. Pt benefited from orientation to task, visual supports, and mod cueing from SLP. Pt continues to demonstrate reduced awareness of education re: rationale for intervention, particularly as it relates to safe discharge home. Recommend ongoing speech intervention targeting above cognitive-linguistic deficits.     Speech Language Short Term Goals:  Pt will improve auditory processing/comprehension of commands and questions to 80%, via graded tasks (Ongoing 02/14/23)  Pt will improve orientation and short term recall via graded tasks to 80% (Ongoing 02/14/23)  Pt will improve verbal expression for functional expression via graded tasks to 80% (Ongoing 02/14/23)  Pt will participate in ongoing cognitive assessment with goals to be established as indicated (Ongoing 02/14/23)    Assessment: Patient progressing toward goals    Plan: 3-5 times per week during acute care stay. Patient/Family Education:  Provided education regarding role of SLP, recommendations and general speech pathology plan of care. [] Pt verbalized understanding and agreement   [x] Pt requires ongoing learning   [] No evidence of comprehension     Discharge Recommendations:  Recommend ongoing SLP for speech and dysphagia therapy upon discharge from hospital     Treatment time  Timed Code Treatment Minutes: 15 min  Total Treatment time: 25 min    If patient discharges prior to next session this note will serve as a discharge summary. BRANDT Pa  Speech-Language Pathology Graduate Clinician    The speech-language pathologist was present, directed the patient's care, made skilled judgment and was responsible for assessment and treatment.     Anam Zhou, [id number] [facility]  Speech-Language Pathologist  [phone]

## 2023-02-14 NOTE — CARE COORDINATION
Discharge Planning     PT/OT worked with the pt and is now recommending the pt return home with [address]. The SW  spoke with the pt and pt's son informing them that since PT/OT have improved and they are now recommending the pt return home with [address] the pre-cert will denied. The pt's son expressed how they felt uncomfortable with the pt going home. The SW informed the pt's son that the SW will set up [address] for PT/OT/NURSE. The pt is already active with [facility] [address] [facility]. The SW will also will send a referral to [facility] senior advisors who can assist getting the patient to a Assisted Living. The SW called and sent a referral to Ajith from [facility] who will follow up with the family regarding their options.      The son stated he is unable to  the patient tonight but can pick the patient up on 2/15/2023 @ 12pm.      Electronically signed by OJSE Burnette on 2/14/2023 at 3:42 PM

## 2023-02-14 NOTE — PROGRESS NOTES
Treatment time: 3 hours  Net UF: 1000 ml     Pre weight: 75 kg  Post weight:74 kg  Access used: llavf    Access function: well with  ml/min     Medications or blood products given: n/a     Regular outpatient schedule: tts     Summary of response to treatment: Patient tolerated treatment well and without any complications.  Patient remained stable throughout entire treatment        02/14/23 0835 02/14/23 1141   Treatment   Time On 0841  --    Time Off  --  1141   Vital Signs   BP (!) 107/48 98/62   Temp 97.5 °F (36.4 °C) 97.8 °F (36.6 °C)   Heart Rate 83 87   Resp 16 16   Weight 165 lb 5.5 oz (75 kg) 163 lb 2.3 oz (74 kg)

## 2023-02-15 VITALS
TEMPERATURE: 97.5 F | OXYGEN SATURATION: 98 % | BODY MASS INDEX: 27.19 KG/M2 | WEIGHT: 163.2 LBS | DIASTOLIC BLOOD PRESSURE: 83 MMHG | SYSTOLIC BLOOD PRESSURE: 124 MMHG | RESPIRATION RATE: 16 BRPM | HEIGHT: 65 IN | HEART RATE: 86 BPM

## 2023-02-15 PROCEDURE — 99233 SBSQ HOSP IP/OBS HIGH 50: CPT | Performed by: INTERNAL MEDICINE

## 2023-02-15 PROCEDURE — 97116 GAIT TRAINING THERAPY: CPT

## 2023-02-15 PROCEDURE — 6370000000 HC RX 637 (ALT 250 FOR IP): Performed by: INTERNAL MEDICINE

## 2023-02-15 PROCEDURE — 2580000003 HC RX 258: Performed by: INTERNAL MEDICINE

## 2023-02-15 PROCEDURE — 97110 THERAPEUTIC EXERCISES: CPT

## 2023-02-15 PROCEDURE — 6360000002 HC RX W HCPCS: Performed by: INTERNAL MEDICINE

## 2023-02-15 PROCEDURE — 97530 THERAPEUTIC ACTIVITIES: CPT

## 2023-02-15 RX ADMIN — Medication 1000 UNITS: at 08:37

## 2023-02-15 RX ADMIN — CEFTRIAXONE SODIUM 1000 MG: 1 INJECTION, POWDER, FOR SOLUTION INTRAMUSCULAR; INTRAVENOUS at 09:34

## 2023-02-15 RX ADMIN — ASPIRIN 81 MG: 81 TABLET, COATED ORAL at 08:37

## 2023-02-15 RX ADMIN — FAMOTIDINE 10 MG: 20 TABLET, FILM COATED ORAL at 08:37

## 2023-02-15 RX ADMIN — MUPIROCIN: 20 OINTMENT TOPICAL at 09:35

## 2023-02-15 RX ADMIN — METOPROLOL SUCCINATE 50 MG: 50 TABLET, EXTENDED RELEASE ORAL at 08:37

## 2023-02-15 NOTE — PROGRESS NOTES
[facility] [address] Department   Phone: [phone]    Physical Therapy    [] Initial Evaluation            [x] Daily Treatment Note         [] Discharge Summary      Patient: Luz Salinas   : 1945   MRN: [id number]   Date of Service:  2/15/2023  Admitting Diagnosis: Acute encephalopathy    Current Admission Summary: uLz Salinas is a 65 y.o. female  who presents to the ED complaining of altered mental status. History is obtained primarily from EMS. She is tearful and moaning but not really making any sense even though she does not have dysarthria on her initial evaluation. She is moving her arms and legs without difficulty. She was on her way to dialysis this morning and did not get it because of her confusion and was rerouted to the emergency department not having started it. She has a history of hypertension hyperlipidemia diabetes and CAD as well as end-stage renal disease on hemodialysis. She was admitted in December for similar encephalopathy that was felt to be hypertensive versus metabolic after having an unremarkable MRI. At 1 point neurology started her on Dilantin but then later discontinued it thinking it was not likely seizure activity after risk benefits were discussed. No reports of recent fevers. Patient does not complain of pain anywhere in her head chest or belly. No reports of vomiting or diarrhea recently. Daughter later states that the patient was fine last night and was confused this morning and she did attempt to bring the patient in dialysis anyway however she continued to get worse with her disorientation and was brought here instead. The patient was apparently fine yesterday earlier in the evening. The patient is on Neurontin but as far as we know there are no recent changes in dose or any other new medications that she is on.     Past Medical History:  has a past medical history of Arthritis, CAD (coronary artery disease), Diabetes [facility]), Hemodialysis patient ([facility] [address] Hyperlipidemia, Hypertension, Kidney disease, Neuropathy, Pneumonia, and Thyroid disease. Past Surgical History:  has a past surgical history that includes Appendectomy; Mastectomy, bilateral; Rotator cuff repair (Left); Cholecystectomy; LAPAROSCOPY INSERTION PERITONEAL CATHETER (N/A, 04/03/2020); Upper gastrointestinal endoscopy (N/A, 11/24/2020); Colonoscopy (11/24/2020); Hysterectomy; Dialysis Catheter Removal (N/A, 05/17/2021); shunt revision (Left, 08/19/2021); and Coronary stent placement. Discharge Recommendations: Francheska Bueno scored a 18/24 on the AM-PAC short mobility form. Current research shows that an AM-PAC score of 18 or greater is typically associated with a discharge to the patient's home setting. Based on the patient's AM-PAC score and their current functional mobility deficits, it is recommended that the patient have 2-3 sessions per week of Physical Therapy at d/c to increase the patient's independence. At this time, this patient demonstrates the endurance and safety to discharge home with PRN assist and home PT and a follow up treatment frequency of 2-3x/wk. Please see assessment section for further patient specific details. HOME HEALTH CARE: LEVEL 3 SAFETY  - Initial home health evaluation to occur within 24-48 hours, in patient home   - Therapy evaluations in home within 24-48 hours of discharge; including DME and home safety   - Frontload therapy 5 days, then 3x a week   - Therapy to evaluate if patient has [address] needs for personal care   -  evaluation within 24-48 hours, includes evaluation of resources and insurance to determine [place], [place], LTC, and Medicaid options     If patient discharges prior to next session this note will serve as a discharge summary. Please see below for the latest assessment towards goals.       DME Required For Discharge: rollator (4 wheel walker) (pt has RW but she would benefit from Rollator as she has to walk 375 ft to the laundry room and lobby at her facility, and an additional 250 ft to the dining room with inconsistent places to sit and rest)    Precautions/Restrictions: medium fall risk  Weight Bearing Restrictions: weight bearing as tolerated  [] Right Upper Extremity  [] Left Upper Extremity [] Right Lower Extremity  [] Left Lower Extremity     Required Braces/Orthotics: no braces required   [] Right  [] Left  Positional Restrictions:no positional restrictions    Pre-Admission Information   Lives With: alone                     Type of Home:  [facility]  Home Layout: one level  Home Access:  1 step step to enter with handrail. Handrails are located on R side. Bathroom Layout: walk in shower  Bathroom Equipment: grab bars in shower, shower chair, hand held shower head  Home Equipment: rollator - 4 wheeled walker  Transfer Assistance: Independent without use of device  Ambulation Assistance:Independent without use of device  ADL Assistance: independent with all ADL's  IADL Assistance: independent with homemaking tasks  Active : [] yes             [x]no  Current Employment: retired. Occupation:    Hobbies:   Recent Falls: 1 recent fall leading to this admission, no additional falls in the last 6 months   Daughter from [facility] stated that patient in non-compliant with diet and medication due to periods of confusion. Dialysis T/TH/SAT         Subjective  General: Pt sitting up in chair upon therapist arrival. Agreeable to PT tx with some encouragement. Heather Coe to go home later today. Reports she has agreed to do another day of dialysis upon discharge. Pain: 0/10  Pain Interventions: repositioned        Functional Mobility  Bed Mobility  Comments:  Bed mobility not completed this date, pt up in chair at beginning/end of session. Transfers  Sit to stand transfer: supervision  Stand to sit transfer: supervision  Comments: 2 sit<>stand at recliner throughout session. Ambulation  Surface:level surface  Assistive Device: no device  Assistance: stand by assistance, CGA-Min A with dynamic challenges  Distance: 170 ft  Gait Mechanics: Increased medial/lateral sway, slow agatha, decreased (L) step length/height (especially as she fatigued)  Comments: Pt with increased unsteadiness as she fatigued. Attempted additional dynamic challenges with patient including head turns right/left with increased lateral sway requiring CGA-Min A to maintain balance. Pt able to stop and turn head without LOB. Ambulation Trial 2  Surface:level surface  Assistive Device: no device  Assistance: stand by assistance  Distance: 150 ft  Gait Mechanics: Slow agatha, decreased (L) step length/height due to fatigue  Comments:  Pt ambulated with RW on 2nd ambulation trial in order to attempt to educate pt on benefits of RW for days when she is fatigued. Pt with 50% improved (L) step length/height and overall 90% improved balance, but with limited insight. Discussed concerns for use of RW - pt afraid she will leave it or forget it somewhere, concerned she cannot carry her dialysis supplies to the transport van. Discussed alternatives such as Rollator, leaving walker at  of her facility like she does on their facility lunch trips, etc. Pt resistant to therapist education, appears to have some improved openness to conversation by the end but suspect she will have limited carry over both due to emotional and cognitive barriers. Stair Mobility  Stair mobility not completed on this date. Comments: Pt does not have to complete stairs at home  Wheelchair Mobility:  No w/c mobility completed on this date.   Comments:  Balance  Static Sitting Balance: good: independent with functional balance in unsupported position  Dynamic Sitting Balance: good: independent with functional balance in unsupported position  Static Standing Balance: fair (+): maintains balance at SBA/supervision without use of UE support  Dynamic Standing Balance: poor (+): requires min (A) to maintain balance  Comments: Pt completed static standing tasks as noted below. Overall fair balance with static standing without UE support, but pt with improved balance for dynamic standing tasks with unilateral or (B) UE support. Other Therapeutic Interventions  Standing exercises (CGA unless otherwise noted): - Rhomberg stance eyes open x30 sec no sway, eyes closed x30 sec very minimal sway, CGA and no UE support   - Tandem stance 2 x10 sec (R) LE leading, mild-moderate sway with LOB, unable to maintain >10 sec, requires HHA to assume position, CGA-Min A   - Tandem stance 2 x7 sec (L) LE leading, mild-moderate sway with LOB, unable to maintain >7 sec, requires HHA to assume position, CGA-Min A   - Marches x10 reps (B) with unilateral UE support   - Hamstring curls x10 reps (B), unilateral UE support   - Heel raises x10 reps (B)   - sit<>stand with arms out at 90 deg shoulder flexion x1 rep Min A, x3 rep with pillow on seat CGA, x3 rep recliner without pillow CGA    Functional Outcomes  AM-PAC Inpatient Mobility Raw Score : 18              Cognition  Overall Cognitive Status: Impaired  Arousal/Alterness: appropriate responses to stimuli  Following Commands: follows all commands without difficulty  Memory: decreased recall of recent events, decreased short term memory  Safety Judgement: decreased awareness of need for safety  Problem Solving: assistance required to generate solutions, assistance required to implement solutions, decreased awareness of errors, assistance required to identify errors made, assistance required to correct errors made  Insights: decreased awareness of deficits  Sequencing: requires cues for some  Comment: Requires repetition for carry over. Some limited insight into safety concerns and potential consequences. Limited problem solving demonstrated with discussing alternative assistive devices options.  Suspected emotional barriers play a role as well as cognitive. Orientation:    oriented to person, oriented to place, oriented to time, and oriented to situation  Command Following:   [facility]    Education  Barriers To Learning: cognition and emotional   Patient Education: patient educated on goals, PT role and benefits, plan of care, HEP, general safety, functional mobility training, proper use of assistive device/equipment, energy conservation, transfer training, discharge recommendations  Learning Assessment:  patient verbalizes understanding, would benefit from continued reinforcement    Assessment  Activity Tolerance: Pt reports fatigue again this date but then completes 2 partial laps in hallway without a seated rest break. Unable to get SpO2 reading due to pt's cold fingers. No MARROQUIN noted, though (L) LE noted to fatigued as observed through gait mechanics. Impairments Requiring Therapeutic Intervention: decreased functional mobility, decreased ADL status, decreased strength, decreased safety awareness, decreased cognition, decreased endurance, decreased balance, decreased posture  Prognosis: good  Clinical Assessment: Patient was independent without a device in independent living prior to admission. Pt continues to demonstrate improved tolerance to ambulation both with and without AD. She is resistant to the idea of using an AD as a preventative tool on days that she is more tired and has difficulty problem solving other alternatives. The patient continues to do well with static standing balance but would benefit from additional dynamic balance training with emphasis on (L) LE strengthening to prevent additional falls. The patient would benefit from continued skilled PT in a home setting and is agreeable to this.    Safety Interventions: patient left in chair, call light within reach, gait belt, patient at risk for falls, and nurse notified    Plan  Frequency: 3-5 x/per week  Current Treatment Recommendations: strengthening, balance training, functional mobility training, transfer training, gait training, endurance training, patient/caregiver education, home exercise program, safety education, and equipment evaluation/education    Goals  Patient Goals:  Get stronger, go home. Short Term Goals:  Time Frame: Discharge.   Patient will complete bed mobility at modified independent   Patient will complete transfers at [facility]   Patient will ambulate 50 ft with use of LRAD at [facility]    *No goals met in full 2/15/23    Therapy Session Time      Individual Group Co-treatment   Time In 1134       Time Out 1212       Minutes 38         Timed Code Treatment Minutes: 38 minutes  Total Treatment Minutes:  38 minutes       Electronically Signed By: Kwasi Samuel, PT, DPT #[id number]

## 2023-02-15 NOTE — PROGRESS NOTES
Department of Internal Medicine  General Internal Medicine   Progress Note      SUBJECTIVE: sitting up in chair , very pleasant cooperative and coherent     History obtained from chart review, the patient, and nursing staff   General ROS: positive for  - fatigue, malaise, and weight loss  negative for - chills, fever, or night sweats  Psychological ROS: positive for mild disorientation   negative for - hallucinations or hostility  Ophthalmic ROS: negative  Respiratory ROS: no cough, shortness of breath, or wheezing  Cardiovascular ROS: no chest pain or dyspnea on exertion  Gastrointestinal ROS: no abdominal pain, change in bowel habits, or black or bloody stools  Genito-Urinary ROS: oliguric , no hematuria ,   Musculoskeletal ROS: positive for - pain in chronic generalized pain    negative for - joint swelling  Neurological ROS: diffuse slowing down of intellectual and sensory motor function  Dermatological ROS: negative    OBJECTIVE      Medications      Current Facility-Administered Medications: cefTRIAXone (ROCEPHIN) 1,000 mg in sodium chloride 0.9 % 50 mL IVPB (mini-bag), 1,000 mg, IntraVENous, Q24H  lidocaine PF 1 % injection 1 mL, 1 mL, IntraDERmal, PRN  0.9 % sodium chloride bolus, 500 mL, IntraVENous, PRN  ziprasidone (GEODON) injection 20 mg, 20 mg, IntraMUSCular, Q6H PRN  diphenhydrAMINE (BENADRYL) injection 25 mg, 25 mg, IntraVENous, Q6H PRN  metoprolol (LOPRESSOR) injection 5 mg, 5 mg, IntraVENous, Q6H PRN  metoprolol succinate (TOPROL XL) extended release tablet 50 mg, 50 mg, Oral, Daily  famotidine (PEPCID) tablet 10 mg, 10 mg, Oral, Daily  acetaminophen (TYLENOL) tablet 650 mg, 650 mg, Oral, Q4H PRN  HYDROcodone-acetaminophen (NORCO) 5-325 MG per tablet 1 tablet, 1 tablet, Oral, Q6H PRN  HYDROmorphone HCl PF (DILAUDID) injection 0.5 mg, 0.5 mg, IntraVENous, Q4H PRN  aspirin EC tablet 81 mg, 81 mg, Oral, Daily  mupirocin (BACTROBAN) 2 % ointment, , Topical, TID  ondansetron (ZOFRAN-ODT) disintegrating tablet 4 mg, 4 mg, Oral, Q8H PRN  Vitamin D (CHOLECALCIFEROL) tablet 1,000 Units, 1,000 Units, Oral, Daily    Physical      Vitals: /69   Pulse 86   Temp 97.2 °F (36.2 °C) (Oral)   Resp 16   Ht 5' 5\" (1.651 m)   Wt 163 lb 3.2 oz (74 kg)   LMP  (LMP Unknown)   SpO2 97%   BMI 27.16 kg/m²   Temp: Temp: 97.2 °F (36.2 °C)  Max: Temp  Av °F (36.7 °C)  Min: 97.2 °F (36.2 °C)  Max: 98.4 °F (36.9 °C)  Respiration range:  Resp  Av.8  Min: 16  Max: 21  Pulse Range:  Pulse  Av.7  Min: 73  Max: 98  Blood pressure range:  Systolic (34ASP), PSJ:591 , Min:98 , BL   , Diastolic (23GST), DXI:10, Min:62, Max:80    SpO2  Av.6 %  Min: 96 %  Max: 99 %    Intake/Output Summary (Last 24 hours) at 2/15/2023 0931  Last data filed at 2023 1331  Gross per 24 hour   Intake 360 ml   Output --   Net 360 ml       Vent settings:  Pulse  Av.7  Min: 65  Max: 128  Resp  Av.6  Min: 12  Max: 24  SpO2  Av.4 %  Min: 92 %  Max: 100 %    CONSTITUTIONAL:  fatigued, somnolent, uncooperative, distracted, mild distress, appears stated age, and mildly obese  EYES:  unremarkable   NECK:  mild JVD   BACK:  symmetric and no curvature  LUNGS:  No increased work of breathing, good air exchange, clear to auscultation bilaterally, no crackles or wheezing  CARDIOVASCULAR:  Normal apical impulse, regular rate and rhythm, normal S1 and S2, no S3 or S4, and no murmur noted  ABDOMEN:  soft BS +   MUSCULOSKELETAL:  trace edema   NEUROLOGIC:  general weakness lack of co-ordination , babinski absent   SKIN:  warm and dry  and no bruising or bleeding    Data      Lab Results   Component Value Date    WBC 20.3 (H) 2023    HGB 13.5 2023    HCT 41.7 2023    MCV 94.7 2023     2023     Lab Results   Component Value Date/Time     2023 05:17 AM    K 4.1 2023 05:17 AM    K 5.4 2023 08:13 AM    CL 90 2023 05:17 AM    CO2 15 2023 05:17 AM    BUN 73 02/14/2023 05:17 AM    CREATININE 11.1 02/14/2023 05:17 AM    GLUCOSE 82 02/14/2023 05:17 AM    CALCIUM 9.0 02/14/2023 05:17 AM            [facility] [address] Problems             Last Modified POA    * (Principal) Acute encephalopathy 2/7/2023 Yes    Hypertension 2/7/2023 Yes    Anemia, chronic disease 2/7/2023 Yes    ESRD on hemodialysis ([facility] [address] 2/11/2023 Yes    Toxic encephalopathy 2/7/2023 Yes    Neuropathy 2/7/2023 Yes    Electrolyte imbalance 2/11/2023 Yes    CAD (coronary artery disease) 2/7/2023 Yes      She is dismissal ready  awaiting NH Placement

## 2023-02-15 NOTE — PROGRESS NOTES
Office : [phone]     Fax :[phone]       Nephrology progress Note      Patient's Name: Giovanny Garza      Reason for Consult:  ESRD management       Requesting Physician:  Ta Barrett MD      Chief Complaint:    Chief Complaint   Patient presents with    Altered Mental Status     Arrived per  EMS from dialysis (pt resides at home with daughter) d/t altered mental status; tearful in triage; unable to state date, time, birth date which is not normal             History of Present Ilness:    Giovanny Garza is a 65 y.o. female with prior history of ESRD, HTN, DM2 came to dialysis unit this morning and was very confused. Her daughter was with her. She was send via EMS service to Emergency room. at present she is lethargic. Daughter mentioned that she did not sleep for last 2 days. She has taken gabapentin last night. Daughter does not know the dose. She did not get her dialysis today     Interval hx :        Feels fine   No SOB   No  edema   Had HD yesterday   Vitals stable         I/O last 3 completed shifts: In: 5 [P.O.:360]  Out: -     Past Medical History:   Diagnosis Date    Arthritis     CAD (coronary artery disease)     Diabetes ([facility] [address]     Hemodialysis patient ([facility] [address]     fistula left arm    Hyperlipidemia     Hypertension     Kidney disease     Neuropathy     Pneumonia     IN February, 2021, with COVID    Thyroid disease        Past Surgical History:   Procedure Laterality Date    APPENDECTOMY      CHOLECYSTECTOMY      COLONOSCOPY  11/24/2020    COLONOSCOPY POLYPECTOMY SNARE/COLD BIOPSY performed by Shashi Morrow MD at [address] [facility] N/A 05/17/2021    PERITONEAL DIALYSIS CATHETER REMOVAL.  EXCISSION OF ABDOMINAL CITRIX. performed by Michelle Cruz DO at [address] ([address] [facility])      FULL    LAPAROSCOPY INSERTION PERITONEAL CATHETER N/A 04/03/2020    LAPAROSCOPIC LYSIS OF ADHESIONS, LAPAROSCOPIC PERITONEAL DIALYSIS CATHETER PLACEMENT, LAPAROSCOPIC OMENTOPEXY performed by Michelle Cruz DO at [address] [address] BILATERAL      preventive    ROTATOR CUFF REPAIR Left     SHUNT REVISION Left 08/19/2021    LEFT FOREARM ARTERIO VENOUS GRAFT performed by Kerry Wallace MD at [address] [facility] N/A 11/24/2020    EGD BIOPSY performed by Heather Sullivan MD at [address]       Family History   Problem Relation Age of Onset    Cancer Mother         lung, breast, liver    Stroke Father     Stomach Cancer Brother     Cancer Maternal Grandmother     No Known Problems Paternal Grandmother     No Known Problems Paternal Grandfather          Current Medications:    cefTRIAXone (ROCEPHIN) 1,000 mg in sodium chloride 0.9 % 50 mL IVPB (mini-bag), Q24H  lidocaine PF 1 % injection 1 mL, PRN  0.9 % sodium chloride bolus, PRN  ziprasidone (GEODON) injection 20 mg, Q6H PRN  diphenhydrAMINE (BENADRYL) injection 25 mg, Q6H PRN  metoprolol (LOPRESSOR) injection 5 mg, Q6H PRN  metoprolol succinate (TOPROL XL) extended release tablet 50 mg, Daily  famotidine (PEPCID) tablet 10 mg, Daily  acetaminophen (TYLENOL) tablet 650 mg, Q4H PRN  HYDROcodone-acetaminophen (NORCO) 5-325 MG per tablet 1 tablet, Q6H PRN  HYDROmorphone HCl PF (DILAUDID) injection 0.5 mg, Q4H PRN  aspirin EC tablet 81 mg, Daily  mupirocin (BACTROBAN) 2 % ointment, TID  ondansetron (ZOFRAN-ODT) disintegrating tablet 4 mg, Q8H PRN  Vitamin D (CHOLECALCIFEROL) tablet 1,000 Units, Daily            Physical exam:     Vitals:  /69   Pulse 86   Temp 97.2 °F (36.2 °C) (Oral)   Resp 16   Ht 5' 5\" (1.651 m)   Wt 163 lb 3.2 oz (74 kg)   LMP  (LMP Unknown)   SpO2 97%   BMI 27.16 kg/m²   Constitutional:  lethargic Skin: no rash, turgor wnl  Heent:  eomi, mmm  Neck: no bruits or jvd noted  Cardiovascular:  S1, S2 without m/r/g  Respiratory: CTA B without w/r/r  Abdomen:  +bs, soft, nt, nd  Ext: no  lower extremity edema  Psychiatric: deferred   Musculoskeletal:  deferred     Labs:  CBC:   Recent Labs     02/13/23  1055 02/14/23  0517   WBC 13.6* 20.3*   HGB 13.6 13.5    212     BMP:    Recent Labs     02/13/23  1056 02/14/23  0517   * 138   K 4.0 4.1   CL 87* 90*   CO2 21 15*   BUN 62* 73*   CREATININE 9.9* 11.1*   GLUCOSE 92 82     Ca/Mg/Phos:   Recent Labs     02/13/23  1056 02/14/23  0517   CALCIUM 9.6 9.0     Hepatic:   No results for input(s): AST, ALT, ALB, BILITOT, ALKPHOS in the last 72 hours. Troponin:   No results for input(s): TROPONINI in the last 72 hours. BNP: No results for input(s): BNP in the last 72 hours. Lipids: No results for input(s): CHOL, TRIG, HDL, LDLCALC, LABVLDL in the last 72 hours. ABGs: No results for input(s): PHART, PO2ART, SYV9NSP in the last 72 hours. INR:   No results for input(s): INR in the last 72 hours. UA:  No results for input(s): Jewelene Harm, GLUCOSEU, BILIRUBINUR, KETUA, SPECGRAV, BLOODU, PHUR, PROTEINU, UROBILINOGEN, NITRU, LEUKOCYTESUR, Baltazar Lulas in the last 72 hours. Urine Microscopic:   No results for input(s): LABCAST, BACTERIA, COMU, HYALCAST, WBCUA, RBCUA, EPIU in the last 72 hours. Urine Culture: No results for input(s): LABURIN in the last 72 hours. Urine Chemistry: No results for input(s): Yvonne High, PROTEINUR, NAUR in the last 72 hours. IMAGING:  XR CHEST (2 VW)   Final Result   No acute cardiopulmonary disease. XR CHEST (2 VW)   Final Result   No acute disease         CT HEAD WO CONTRAST   Final Result   No acute intracranial abnormality. XR CHEST PORTABLE   Final Result   1. No acute abnormality. Assessment/Plan :      1. ESRD   Regular HD tue/ thu/sat   D/w her.  Agreed for 4 days  a week dialysis     2. HTN  Controlled     3. AMS. Likely 2/2 meds  Hold gabapentin   Improving mental status     4. Acid- base disorder. Correct with HD     5. Electrolytes  Monitor     6.  DM2       Stable from nephrology standpoint   Resume HD outpt tomorrow           D/w primary team      Thank you for allowing us to participate in care of [facility]         Electronically signed by: Simone Mcfadden MD, 2/15/2023, 10:02 AM      [facility] [address]  Office : [phone]  Fax :[phone]

## 2023-02-15 NOTE — PROGRESS NOTES
Report received from day shift RN. Patient resting comfortably in bed. No signs of discomfort or distress. Bed is in lowest position, wheels locked, 2/2 side rails up. Bedside table and call light within reach. White board updated. Will continue to monitor patient. Susan Simon RN    9:07 PM  Shift assessment complete. (See findings in flowsheet). Med pass complete. (See MAR). VSS. Patient with no complaints at this time. Susan Simon RN    4:56 AM  Patient resting quietly in bed. No new events overnight. Susan Simon RN

## 2023-02-15 NOTE — PROGRESS NOTES
Data- discharge order received, pt verbalized agreement to discharge, needs for [address] [facility] with [facility] for [address] for PT OT and ST with nsg, CHAITANYA reviewed and signed by MD, to be completed by RN. Action- AVS prepared, discharge instructions prepared and given to pt, medication information packet given r/t NEW or CHANGED prescriptions, pt verbalized understanding further self-review. D/C instruction summary: Diet- renal, Activity- as tolerated, follow up with Primary Care Physician Harjit Walter [phone] appointment as scheduled, immunizations reviewed, medications prescriptions to be filled at preferred outpatient pharmacy. Contact information provided to above agencies used. Response- Case Management/ reported faxing completed CHAITANYA and AVS to needed HHC/DME services stated above. Pt belongings gathered, IV removed, pt dressed. Disposition is home with HHC/DME as stated above, transported with personal belonging, taken to lobby via w/c with RN, no complications. 1. WEIGHT: Admit Weight:  (MALIK no scale on ED stretcher, pt slow to respond unable to stand) (02/07/23 1234)        Today  Weight: 163 lb 3.2 oz (74 kg) (02/15/23 0426)       2.  O2 SAT.: SpO2: 98 % (02/15/23 1059)

## 2023-02-15 NOTE — CARE COORDINATION
Discharge note:      CM/SW has been notified of discharge. Patient noted to have the following needs at discharge. CM/SW has coordinated the following services: [place]  [address], [address] [facility], [address] [place] [address]  Phone: [phone]  Fax:  [phone]       Discharge Destination:  [facility]. Transportation: . Son         Comment: The SW requested [address] [facility] [address] orders be placed for the Attending       [facility] is aware the patient will be discharging home on 2/15/2023       All CM/SW needs met, will sign off.        Electronically signed by JOSE Millard on 2/15/2023 at 11:35 AM

## 2023-02-16 ENCOUNTER — CARE COORDINATION (OUTPATIENT)
Dept: CASE MANAGEMENT | Age: 78
End: 2023-02-16

## 2023-02-16 ENCOUNTER — TELEPHONE (OUTPATIENT)
Dept: FAMILY MEDICINE CLINIC | Age: 78
End: 2023-02-16

## 2023-02-16 NOTE — CARE COORDINATION
Tex Coffee with [facility] called and confirmed that orders for [facility] have been received.

## 2023-02-16 NOTE — CARE COORDINATION
[facility] Care Transitions Initial Follow Up Call    Call within 2 business days of discharge: Yes      First attempt at 24 hour discharge call, no answer, CTN left  with contact information and request for return call. CTN will continue with outreach call attempts. CTN left  for \"Mayda\" with [facility] to confirm they have received orders for [facility]. CTN will remain available.     Follow Up  Future Appointments   Date Time Provider Rod Plummer   4/26/2023  2:15 PM Steen Fry, MD [facility] Thersia Essex, RN

## 2023-02-16 NOTE — TELEPHONE ENCOUNTER
Pt's daughter asking what pt should take for diarrhea  She thinks he told her to stay away from [facility] [phone]

## 2023-02-17 ENCOUNTER — CARE COORDINATION (OUTPATIENT)
Dept: CASE MANAGEMENT | Age: 78
End: 2023-02-17

## 2023-02-17 DIAGNOSIS — G93.40 ACUTE ENCEPHALOPATHY: Primary | ICD-10-CM

## 2023-02-17 PROCEDURE — 1111F DSCHRG MED/CURRENT MED MERGE: CPT | Performed by: FAMILY MEDICINE

## 2023-02-17 NOTE — CARE COORDINATION
[facility] Care Transitions Initial Follow Up Call    Call within 2 business days of discharge: Yes    Patient Current Location:  Home: [address] [place] [address]    Care Transition Nurse contacted the patient by telephone to perform post hospital discharge assessment. Verified name and  with patient as identifiers. Provided introduction to self, and explanation of the Care Transition Nurse role. Patient: Tom Calix Patient : 1945   MRN: [id number]  Reason for Admission: Acute Encephalopathy  Discharge Date: 2/15/23 RARS: Readmission Risk Score: 24.5      Last Discharge  Street       Date Complaint Diagnosis Description Type Department Provider    23 Altered Mental Status Acute encephalopathy . .. ED to Hosp-Admission (Discharged) (ADMITTED) Mary Colon MD; Rodolfo Silvestre. .. Was this an external facility discharge? No Discharge Facility:     Challenges to be reviewed by the provider   Additional needs identified to be addressed with provider: No  none               Method of communication with provider: none. Patient reports that she is doing well. Discussed discharge instructions and reviewed medications, 1111F completed, she has not picked up her Keflex will go to [facility] today. CTN explained importance of getting this medication today and getting it started, patient verbalized understanding. CTN attempted to schedule follow up, nothing available until March. CTN will route a note to PCP requesting outreach to patient to schedule hospital follow up. CTN will continue with outreach follow up calls. Care Transition Nurse reviewed discharge instructions and red flags with patient who verbalized understanding. The patient was given an opportunity to ask questions and does not have any further questions or concerns at this time. Were discharge instructions available to patient? Yes.  Reviewed appropriate site of care based on symptoms and resources available to patient including: PCP  Urgent care clinics  Provider home visits. The patient agrees to contact the PCP office for questions related to their healthcare. Advance Care Planning:   Does patient have an Advance Directive: reviewed and current. Medication reconciliation was performed with patient, who verbalizes understanding of administration of home medications. Medications reviewed, 1111F entered: yes    Was patient discharged with a pulse oximeter? no    Non-face-to-face services provided:  Obtained and reviewed discharge summary and/or continuity of care documents    Offered patient enrollment in the Remote Patient Monitoring (RPM) program for in-home monitoring: Will offer on future follow up call . Care Transitions 24 Hour Call    Do you have a copy of your discharge instructions?: Yes  Do you have all of your prescriptions and are they filled?: Yes  Have you been contacted by a [employer] [address]?: No  Have you scheduled your follow up appointment?: No  Do you have support at home?: Alone  Do you feel like you have everything you need to keep you well at home?: Yes  Are you an active caregiver in your home?: No  Care Transitions Interventions         Discussed follow-up appointments. If no appointment was previously scheduled, appointment scheduling offered: Yes. Is follow up appointment scheduled within 7 days of discharge? No, PCP did not have any avaialbility next week, CTN will route message to office. Follow Up  Future Appointments   Date Time Provider Department Center   4/26/2023  2:15 PM MD Kat Zuniga [facility]       Care Transition Nurse provided contact information. Plan for follow-up call in 5-7 days based on severity of symptoms and risk factors.   Plan for next call: symptom management-.  self management-.  follow-up appointment-.    Karla Fung RN

## 2023-02-20 ENCOUNTER — CARE COORDINATION (OUTPATIENT)
Dept: CASE MANAGEMENT | Age: 78
End: 2023-02-20

## 2023-02-20 ENCOUNTER — OFFICE VISIT (OUTPATIENT)
Dept: FAMILY MEDICINE CLINIC | Age: 78
End: 2023-02-20

## 2023-02-20 VITALS
TEMPERATURE: 97.8 F | WEIGHT: 171 LBS | HEART RATE: 71 BPM | SYSTOLIC BLOOD PRESSURE: 122 MMHG | BODY MASS INDEX: 28.46 KG/M2 | DIASTOLIC BLOOD PRESSURE: 76 MMHG | OXYGEN SATURATION: 95 %

## 2023-02-20 DIAGNOSIS — E11.42 DIABETIC POLYNEUROPATHY ASSOCIATED WITH TYPE 2 DIABETES MELLITUS (HCC): ICD-10-CM

## 2023-02-20 DIAGNOSIS — E11.65 TYPE 2 DIABETES MELLITUS WITH HYPERGLYCEMIA, WITHOUT LONG-TERM CURRENT USE OF INSULIN (HCC): ICD-10-CM

## 2023-02-20 DIAGNOSIS — I50.42 CHRONIC COMBINED SYSTOLIC AND DIASTOLIC CONGESTIVE HEART FAILURE (HCC): ICD-10-CM

## 2023-02-20 DIAGNOSIS — Z09 HOSPITAL DISCHARGE FOLLOW-UP: Primary | ICD-10-CM

## 2023-02-20 DIAGNOSIS — G20 PARKINSON'S DISEASE (HCC): ICD-10-CM

## 2023-02-20 DIAGNOSIS — E11.22 CHRONIC KIDNEY DISEASE WITH END STAGE RENAL DISEASE ON DIALYSIS DUE TO TYPE 2 DIABETES MELLITUS (HCC): ICD-10-CM

## 2023-02-20 DIAGNOSIS — I20.8 OTHER FORMS OF ANGINA PECTORIS (HCC): ICD-10-CM

## 2023-02-20 DIAGNOSIS — N18.6 ESRD ON HEMODIALYSIS (HCC): ICD-10-CM

## 2023-02-20 DIAGNOSIS — Z99.2 ESRD ON HEMODIALYSIS (HCC): ICD-10-CM

## 2023-02-20 DIAGNOSIS — Z99.2 CHRONIC KIDNEY DISEASE WITH END STAGE RENAL DISEASE ON DIALYSIS DUE TO TYPE 2 DIABETES MELLITUS (HCC): ICD-10-CM

## 2023-02-20 DIAGNOSIS — N18.6 CHRONIC KIDNEY DISEASE WITH END STAGE RENAL DISEASE ON DIALYSIS DUE TO TYPE 2 DIABETES MELLITUS (HCC): ICD-10-CM

## 2023-02-20 RX ORDER — SPIRONOLACTONE 25 MG/1
25 TABLET ORAL DAILY
COMMUNITY

## 2023-02-20 RX ORDER — ROPINIROLE 0.25 MG/1
0.25 TABLET, FILM COATED ORAL NIGHTLY
COMMUNITY

## 2023-02-21 ENCOUNTER — TELEPHONE (OUTPATIENT)
Dept: FAMILY MEDICINE CLINIC | Age: 78
End: 2023-02-21

## 2023-02-21 PROBLEM — I20.8 OTHER FORMS OF ANGINA PECTORIS (HCC): Status: ACTIVE | Noted: 2023-02-21

## 2023-02-21 PROBLEM — I20.8 OTHER FORMS OF ANGINA PECTORIS (HCC): Status: RESOLVED | Noted: 2023-02-21 | Resolved: 2023-02-21

## 2023-02-21 NOTE — TELEPHONE ENCOUNTER
[id number] Jigna Cramer called and stated that pt denied PT. [facility] states that she is high functioning and they are releasing her today.

## 2023-02-21 NOTE — PROGRESS NOTES
Post-Discharge Transitional Care  Follow Up      Carly Tello   YOB: 1945    Date of Office Visit:  2/20/2023  Date of Hospital Admission: 2/7/23  Date of Hospital Discharge: 2/15/23  Risk of hospital readmission (high >=14%. Medium >=10%) :Readmission Risk Score: 24.5      Care management risk score Rising risk (score 2-5) and Complex Care (Scores >=6): No Risk Score On File     Non face to face  following discharge, date last encounter closed (first attempt may have been earlier): 02/17/2023    Call initiated 2 business days of discharge: Yes    ASSESSMENT/PLAN:   Hospital discharge follow-up  -     ID DISCHARGE MEDS RECONCILED W/ CURRENT OUTPATIENT MED LIST  Chronic combined systolic and diastolic congestive heart failure (HCC)  Assessment & Plan:   Monitored by specialist- no acute findings meriting change in the plan  Other forms of angina pectoris ([facility]  Assessment & Plan:   Stable, controlled  No changes  Continue current treatment plan     Parkinson's disease ([facility] [facility]  Assessment & Plan:   Monitored by specialist- no acute findings meriting change in the plan  Type 2 diabetes mellitus with hyperglycemia, without long-term current use of insulin (HCC)  Assessment & Plan:   Stable, controlled  No changes  Continue current treatment plan     Chronic kidney 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 ([facility]  Assessment & Plan:   Monitored by specialist- no acute findings meriting change in the plan  Diabetic polyneuropathy associated with type 2 diabetes mellitus ([facility]  Assessment & Plan:   Stable, controlled  No changes  Continue current treatment plan     ESRD on hemodialysis [facility])  Assessment & Plan:   Monitored by specialist- no acute findings meriting change in the plan    Medical Decision Making: high complexity  No follow-ups on file.     On this date 2/20/2023 I have spent 45 minutes reviewing previous notes, test results and face to face with the patient discussing the diagnosis and importance of compliance with the treatment plan as well as documenting on the day of the visit. Subjective:   HPI:  Follow up of Hospital problems/diagnosis(es): metabolic encephalopathy     Inpatient course: Discharge summary reviewed- see chart. Interval history/Current status: Since discharge from the hospital she is at her baseline. She is continue to have dialysis 4 days a week and this has made a major difference in her cognition. She is not home living independently with home health care and has the assistance of her son's and other children as well. She states that she is feeling well overall today without any issues or concerns. Patient Active Problem List   Diagnosis    Hyperlipidemia    Hypothyroidism    Chronic kidney 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 ([facility] [address]    Primary osteoarthritis of both knees    Memory loss    Diabetic polyneuropathy associated with type 2 diabetes mellitus (HCC)    CAD (coronary artery disease)    Obese    History of anemia due to chronic kidney disease    Parkinson's disease    RLS (restless legs syndrome)    Encephalopathy, metabolic    Acute encephalopathy    Hypertension    Bile reflux gastritis    Delayed wound healing    Open wound of right upper arm    Anemia, chronic disease    Arthropathy of lower leg    CHF (congestive heart failure) ([facility])    CKD (chronic kidney disease)    Gastroparesis    Psoriasis    Pure hypertriglyceridemia    Vitamin D deficiency    ESRD on hemodialysis ([facility]    Toxic encephalopathy    Neuropathy    Electrolyte imbalance       Medications listed as ordered at the time of discharge from hospital     Medication List            Accurate as of February 20, 2023 11:59 PM. If you have any questions, ask your nurse or doctor.                 CHANGE how you take these medications      metoprolol succinate 50 MG extended release tablet  Commonly known as: Antonella Mejia XL  Take 1 tablet by mouth daily  What changed: how much to take            CONTINUE taking these medications      Aspirin Low Dose 81 MG EC tablet  Generic drug: aspirin  TAKE 1 TABLET BY MOUTH EVERY DAY     levothyroxine 150 MCG tablet  Commonly known as: SYNTHROID  Take 1 tablet by mouth every morning (before breakfast)     lidocaine 5 %  Commonly known as: Lidoderm  Place 1 patch onto the skin daily 12 hours on, 12 hours off.     mupirocin 2 % ointment  Commonly known as: BACTROBAN     rOPINIRole 0.25 MG tablet  Commonly known as: REQUIP     rosuvastatin 20 MG tablet  Commonly known as: CRESTOR  TAKE 1 TABLET BY MOUTH EVERY DAY     spironolactone 25 MG tablet  Commonly known as: ALDACTONE     torsemide 10 MG tablet  Commonly known as: DEMADEX     vitamin D3 25 MCG (1000 UT) Tabs tablet  Commonly known as: CHOLECALCIFEROL  Take 1 tablet by mouth daily                Medications marked \"taking\" at this time  Outpatient Medications Marked as Taking for the 2/20/23 encounter (Office Visit) with Daisy Billing, APRN - CNP   Medication Sig Dispense Refill    spironolactone (ALDACTONE) 25 MG tablet Take 25 mg by mouth daily      rOPINIRole (REQUIP) 0.25 MG tablet Take 0.25 mg by mouth nightly      metoprolol succinate (TOPROL XL) 50 MG extended release tablet Take 1 tablet by mouth daily (Patient taking differently: Take 25 mg by mouth daily) 30 tablet 3    torsemide (DEMADEX) 10 MG tablet Take 10 mg by mouth daily 10 mg daily      mupirocin (BACTROBAN) 2 % ointment Apply topically 3 times daily Apply topically 3 times daily. levothyroxine (SYNTHROID) 150 MCG tablet Take 1 tablet by mouth every morning (before breakfast) 90 tablet 3    lidocaine (LIDODERM) 5 % Place 1 patch onto the skin daily 12 hours on, 12 hours off.  30 patch 0    vitamin D3 (CHOLECALCIFEROL) 25 MCG (1000 UT) TABS tablet Take 1 tablet by mouth daily 90 tablet 1    ASPIRIN LOW DOSE 81 MG EC tablet TAKE 1 TABLET BY MOUTH EVERY DAY 90 tablet 1    rosuvastatin (CRESTOR) 20 MG tablet TAKE 1 TABLET BY MOUTH EVERY DAY 90 tablet 1        Medications patient taking as of now reconciled against medications ordered at time of hospital discharge: Yes    A comprehensive review of systems was negative except for what was noted in the HPI. Objective:    /76 (Site: Right Upper Arm, Position: Sitting, Cuff Size: Medium Adult)   Pulse 71   Temp 97.8 °F (36.6 °C)   Wt 171 lb (77.6 kg)   LMP  (LMP Unknown)   SpO2 95%   BMI 28.46 kg/m²   General Appearance: alert and oriented to person, place and time, well developed and well- nourished, in no acute distress  Skin: warm and dry, no rash or erythema  Head: normocephalic and atraumatic  Eyes: pupils equal, round, and reactive to light, extraocular eye movements intact, conjunctivae normal  ENT: tympanic membrane, external ear and ear canal normal bilaterally, nose without deformity, nasal mucosa and turbinates normal without polyps  Neck: supple and non-tender without mass, no thyromegaly or thyroid nodules, no cervical lymphadenopathy  Pulmonary/Chest: clear to auscultation bilaterally- no wheezes, rales or rhonchi, normal air movement, no respiratory distress  Cardiovascular: normal rate, regular rhythm, normal S1 and S2, no murmurs, rubs, clicks, or gallops, distal pulses intact, no carotid bruits  Abdomen: soft, non-tender, non-distended, normal bowel sounds, no masses or organomegaly  Extremities: no cyanosis, clubbing or edema  Musculoskeletal: normal range of motion, no joint swelling, deformity or tenderness  Neurologic: reflexes normal and symmetric, no cranial nerve deficit, gait, coordination and speech normal      An electronic signature was used to authenticate this note.   --Maru Francois, MARCIE - CNP

## 2023-02-22 ENCOUNTER — CARE COORDINATION (OUTPATIENT)
Dept: CASE MANAGEMENT | Age: 78
End: 2023-02-22

## 2023-03-10 RX ORDER — ALLOPURINOL 100 MG/1
TABLET ORAL
Qty: 90 TABLET | Refills: 3 | OUTPATIENT
Start: 2023-03-10

## 2023-03-10 RX ORDER — ALLOPURINOL 100 MG/1
100 TABLET ORAL DAILY
Qty: 90 TABLET | Refills: 1 | Status: SHIPPED | OUTPATIENT
Start: 2023-03-10

## 2023-03-15 ENCOUNTER — TELEPHONE (OUTPATIENT)
Dept: FAMILY MEDICINE CLINIC | Age: 78
End: 2023-03-15

## 2023-03-15 RX ORDER — CHOLESTYRAMINE 4 G/9G
1 POWDER, FOR SUSPENSION ORAL 2 TIMES DAILY
Qty: 180 PACKET | Refills: 3 | Status: SHIPPED | OUTPATIENT
Start: 2023-03-15

## 2023-03-15 NOTE — TELEPHONE ENCOUNTER
LMOM for patient to watch for constipation with dose increase and let us know if she experiences this. Advised to call with any further questions or concerns.

## 2023-03-15 NOTE — TELEPHONE ENCOUNTER
Pt's cholestyramine (QUESTRAN) dosage was increased from 1 packet to 2 packets a day  [facility] needs a new prescription with the new dose

## 2023-04-26 ENCOUNTER — OFFICE VISIT (OUTPATIENT)
Dept: DERMATOLOGY | Age: 78
End: 2023-04-26
Payer: MEDICARE

## 2023-04-26 DIAGNOSIS — L57.0 ACTINIC KERATOSIS: Primary | ICD-10-CM

## 2023-04-26 DIAGNOSIS — D48.5 NEOPLASM OF UNCERTAIN BEHAVIOR OF SKIN: ICD-10-CM

## 2023-04-26 PROCEDURE — 11103 TANGNTL BX SKIN EA SEP/ADDL: CPT | Performed by: DERMATOLOGY

## 2023-04-26 PROCEDURE — 17000 DESTRUCT PREMALG LESION: CPT | Performed by: DERMATOLOGY

## 2023-04-26 PROCEDURE — 17003 DESTRUCT PREMALG LES 2-14: CPT | Performed by: DERMATOLOGY

## 2023-04-26 PROCEDURE — 11102 TANGNTL BX SKIN SINGLE LES: CPT | Performed by: DERMATOLOGY

## 2023-04-26 NOTE — PROGRESS NOTES
[facility]  Gem Peter MD  [phone]      Alejandrina Rooney  1945    65 y.o. female     Date of Visit: 4/26/2023    Chief Complaint: skin lesions    History of Present Illness:    1. She presents today for several persistent hyperkeratotic lesions on the left hand and left leg. 2.  She also reports 2 tender lesions on the left lateral cheek and right lateral leg. She also reports a nonhealing lesion on the left medial cheek. 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 Interval past medical history: She is now on dialysis 4 days/week. Review of Systems:  Gen: Feels well, good sense of health. Skin: No new or changing moles. Past Medical History, Family History, Surgical History, Medications and Allergies reviewed. Past Medical History:   Diagnosis Date    Arthritis     CAD (coronary artery disease)     Diabetes ([facility]     Hemodialysis patient ([facility]     fistula left arm    Hyperlipidemia     Hypertension     Kidney disease     Neuropathy     Pneumonia     IN February, 2021, with COVID    Thyroid disease      Past Surgical History:   Procedure Laterality Date    APPENDECTOMY      CHOLECYSTECTOMY      COLONOSCOPY  11/24/2020    COLONOSCOPY POLYPECTOMY SNARE/COLD BIOPSY performed by Fady Rock MD at [facility] N/A 05/17/2021    PERITONEAL DIALYSIS CATHETER REMOVAL.  [address] performed by Kee Gipson DO at [address] ([facility])      FULL    LAPAROSCOPY INSERTION PERITONEAL CATHETER N/A 04/03/2020    LAPAROSCOPIC LYSIS OF ADHESIONS, LAPAROSCOPIC PERITONEAL DIALYSIS CATHETER PLACEMENT, LAPAROSCOPIC OMENTOPEXY performed by Kee Gipson DO at [address] [facility] [address] BILATERAL      preventive    ROTATOR CUFF REPAIR Left     SHUNT REVISION Left 08/19/2021    LEFT FOREARM

## 2023-04-26 NOTE — PATIENT INSTRUCTIONS
Biopsy Wound Care Instructions    Keep the bandage in place for 24 hours. Cleanse the wound with mild soapy water daily  Gently dry the area. Apply Vaseline or petroleum jelly to the wound using a cotton tipped applicator. Cover with a clean bandage. Repeat this process until the biopsy site is healed. If you had stitches placed, continue treating the site until the stitches are removed. Remember to make an appointment to return to have your stitches removed by our staff. You may shower and bathe as usual.       ** Biopsy results generally take around 7 business days to come back. If you have not heard from us by then, please call the office at [phone]. *Please note that biopsy results are released to both the patient and physician at the same time in [address] Please allow time for your physician to review the results. One of our staff members will reach out to you with the results and plan.

## 2023-05-02 DIAGNOSIS — C44.319 BASAL CELL CARCINOMA (BCC) OF LEFT LATERAL CHEEK: Primary | ICD-10-CM

## 2023-05-02 DIAGNOSIS — C44.319 BASAL CELL CARCINOMA (BCC) OF LEFT MEDIAL CHEEK: ICD-10-CM

## 2023-05-02 LAB — DERMATOLOGY PATHOLOGY REPORT: ABNORMAL

## 2023-05-03 ENCOUNTER — TELEPHONE (OUTPATIENT)
Dept: FAMILY MEDICINE CLINIC | Age: 78
End: 2023-05-03

## 2023-05-03 NOTE — TELEPHONE ENCOUNTER
Long asking for a peer to peer with Dr Roseline Ramos regarding home health services  Reference #[id number]    Deadline is 9am tomorrow, has to finished by 1pm tomorrow

## 2023-05-17 ENCOUNTER — TELEPHONE (OUTPATIENT)
Dept: FAMILY MEDICINE CLINIC | Age: 78
End: 2023-05-17

## 2023-05-24 ENCOUNTER — TELEPHONE (OUTPATIENT)
Dept: FAMILY MEDICINE CLINIC | Age: 78
End: 2023-05-24

## 2023-05-24 NOTE — TELEPHONE ENCOUNTER
----- Message from Gill Ritter sent at 5/24/2023 10:09 AM EDT -----  Subject: Message to Provider    QUESTIONS  Information for Provider? Patient would like to sign a DNR and her   daughter would like to know what the process to do that is? Would she have   to schedule an appointment or can the document be emailed or faxed over to   the practice? Sonia Rosario is the one getting the form together for her mom as   she's been very forgetful lately. ---------------------------------------------------------------------------  --------------  Shanda Bonilla INFO  [phone]; OK to leave message on voicemail  ---------------------------------------------------------------------------  --------------  SCRIPT ANSWERS  Relationship to Patient? Other/Third Party  Representative Name? Edmond Velarde daughter  Is the representative on the Communication Release of Information (ASHELY)   form in Epic?  Yes

## 2023-05-31 ENCOUNTER — TELEPHONE (OUTPATIENT)
Dept: FAMILY MEDICINE CLINIC | Age: 78
End: 2023-05-31

## 2023-05-31 NOTE — TELEPHONE ENCOUNTER
Called and spoke with pt. Explained to pt that she will need to take to a medical supply company. Advised that I would mail it to her.

## 2023-05-31 NOTE — TELEPHONE ENCOUNTER
Pt's physical therapist recommend pt getting a script for a rollator walker from PCP. Pt does not know what company to send it to, she needs us to submit the script to her [facility] insurance first to see if/where they cover.

## 2023-06-13 PROBLEM — G93.41 ENCEPHALOPATHY, METABOLIC: Status: RESOLVED | Noted: 2022-11-22 | Resolved: 2023-06-13

## 2023-06-13 PROBLEM — G93.40 ACUTE ENCEPHALOPATHY: Status: RESOLVED | Noted: 2022-12-22 | Resolved: 2023-06-13

## 2023-06-19 ENCOUNTER — PROCEDURE VISIT (OUTPATIENT)
Dept: SURGERY | Age: 78
End: 2023-06-19
Payer: MEDICARE

## 2023-06-19 VITALS — DIASTOLIC BLOOD PRESSURE: 97 MMHG | HEART RATE: 77 BPM | SYSTOLIC BLOOD PRESSURE: 116 MMHG

## 2023-06-19 DIAGNOSIS — D04.39 SQUAMOUS CELL CARCINOMA IN SITU (SCCIS) OF SKIN OF LEFT CHEEK: Primary | ICD-10-CM

## 2023-06-19 PROCEDURE — 12053 INTMD RPR FACE/MM 5.1-7.5 CM: CPT | Performed by: DERMATOLOGY

## 2023-06-19 PROCEDURE — 17311 MOHS 1 STAGE H/N/HF/G: CPT | Performed by: DERMATOLOGY

## 2023-06-19 PROCEDURE — 17312 MOHS ADDL STAGE: CPT | Performed by: DERMATOLOGY

## 2023-06-19 NOTE — PROGRESS NOTES
PRE-PROCEDURE SCREENING    Pacemaker/ICD: No  Difficulty with numbing in the past: No  Local Anesthesia Reaction/passing out: No  Latex or adhesive allergy:  No  Bleeding/Clotting Disorders: No  Anticoagulant Therapy: Yes - Aspirin 81 mg  Joint prosthesis: No  Artificial Heart Valve: No  Stroke or Seizures: No  Organ Transplant or Lymphoma: No  Immunosuppression: No  Respiratory Problems: Yes - at times shortness of breath when has dialysis 4 days weekly

## 2023-06-19 NOTE — PATIENT INSTRUCTIONS
[facility] Office Hours:    Monday-Wednesday  7:30 AM-4:30 PM    Thursday  8:00 AM-4:30 PM    Friday  9:00 AM-3:00 PM     POST-OPERATIVE CARE FOR LIQUID SKIN ADHESIVE             Bandage change after 24 hours    During your procedure today, a liquid skin adhesive was used to close the wound. You do not have to have stiches removed. If has a clear to light purple shiny surface. You do not have to have this removed. It will dissolve (melt away) in about 1-2 weeks. Please follow these instructions to help you recover from your procedure and help your wound heal.    CARING FOR YOUR SURGICAL SITE  The bandage should remain on and completely dry for 24 hours. Do NOT get the bandage wet. After the first 24 hours, gently remove the remaining part of the bandage. It can be helpful to moisten the bandage edges in the shower. Be gentle around the area of the wound. Do not scrub, rub or pick at the skin glue. It will gradually dissolve in 1-2 weeks. Do not shave directly over the wound for one week. You can shave around the area. After one week you can start cleaning the area gently and resume all normal activity. No further restrictions. Use Sunscreen with SPF of at least 30 on the area around the wound. If the dressing comes off or if you have questions, or concerns about the dressing, please call the office for instructions! POST OPERATIVE INSTRUCTIONS    Activity: it is recommended to avoid strenuous activity such as lifting, pushing, pulling, running, power walking or contact sports for at least 2-7 days or as recommended by your provider. Eating and drinking: Do not drink alcohol for 48 hours after your procedure. Alcohol increases the chances of bleeding. Medicines   -If you have discomfort, take Acetaminophen (Tylenol or Extra Strength Tylenol). Follow the instructions and warning on the bottle.     Bleeding: If bleeding occurs, Put firm pressure on the area with gauze for 20

## 2023-06-19 NOTE — PROGRESS NOTES
MOHS PROCEDURE NOTE    PHYSICIAN:  Nora Duke. Sol Starr MD, Who operated in two distinct and integrated capacities as the surgeon removing the tissue and as the pathologist examining the tissue. ASSISTANT: Pool Zarate RN; Linda Dobbs RN; Marcie Yanez     REFERRING PROVIDER:   Latonia Alvarez MD    PREOPERATIVE DIAGNOSIS: Squamous Cell Carcinoma in Situ     SPECIFIC MOHS INDICATIONS:  size, location, and need for tissue conservation    AUC SCORIN/9    POSTOPERATIVE DIAGNOSIS: SAME    LOCATION: Left Lateral Cheek     OPERATIVE PROCEDURE:  MOHS MICROGRAPHIC SURGERY    RECONSTRUCTION OF DEFECT: Intermediate layered closure    PREOPERATIVE SIZE: 23 x 15 MM    DEFECT SIZE: 33 x 18 MM    LENGTH OF REPAIRED WOUND/SIZE OF FLAP/SIZE OF GRAFT:  52 MM    ANESTHESIA:  12 mL 1% lidocaine with epinephrine 1:100,000 buffered. EBL:  MINIMAL    DURATION OF PROCEDURE:  2 HOURS    POSTOPERATIVE OBSERVATION: 37 MIN    SPECIMENS:  SEE MOHS MAP    COMPLICATIONS:  NONE    DESCRIPTION OF PROCEDURE:  The patient was given a mirror, as appropriate, and the biopsy site was identified, marked with a surgical marking pen, and verified by the patient. Options for treatment were discussed and the patient was informed that Mohs surgery was the selected treatment based on its lower recurrence rate, given the features listed above, as compared to other treatment modalities such as excision, radiation, or curettage, and agreed with this treatment plan. Risks and benefits including bruising, swelling, bleeding, infection, nerve injury, recurrence, and scarring were discussed with the patient prior to the procedure and a written consent detailing these and other risks was reviewed with the patient and signed. There was a time out for person and procedure verification. The surgical site was prepped with an antiseptic solution. Application of an antiseptic solution was repeated before each surgical stage.       Stage I:  The

## 2023-06-20 ENCOUNTER — TELEPHONE (OUTPATIENT)
Dept: SURGERY | Age: 78
End: 2023-06-20

## 2023-06-20 ENCOUNTER — TELEPHONE (OUTPATIENT)
Dept: FAMILY MEDICINE CLINIC | Age: 78
End: 2023-06-20

## 2023-06-20 RX ORDER — GABAPENTIN 100 MG/1
100 CAPSULE ORAL NIGHTLY
Qty: 90 CAPSULE | Refills: 1 | Status: SHIPPED | OUTPATIENT
Start: 2023-06-20 | End: 2023-12-17

## 2023-06-20 NOTE — TELEPHONE ENCOUNTER
The patient was in the office on 6/19/2023 for Mohs located on the LT lateral cheek with ILC repair. The patient tolerated the procedure well and left the office in good condition. Pain level on post-operative day 1:  a great deal of pain - 8-10, she has been taking Tylenol with no relief, but due to her dialysis, her daughter called her phy to see if there is anything else she can take w/theTylenol. I advised I will let Dr. Nancy Stevens know this info. Any bleeding episode that required pressure to be held, bandage change or a call to the office or MD?  no     Any other issues?:  no    A post-operative telephone call was placed at 10:07a, 6/20/2023,  in order to check on the patient's recovery process. The patient reported doing well and had no complaints other than those listed above, if any. All of the patient's questions were answered. I advised to call w/any questions/concerns.

## 2023-06-20 NOTE — TELEPHONE ENCOUNTER
Pt's daughter Ni Dahl is calling   A gabapentin prescription was discussed at her appt but Esterpenelope Lorene never received a prescription  If you have any questions contact her daughter (emergency contact) Ni Dahl  [phone]

## 2023-06-21 ENCOUNTER — TELEPHONE (OUTPATIENT)
Dept: SURGERY | Age: 78
End: 2023-06-21

## 2023-06-21 ENCOUNTER — TELEPHONE (OUTPATIENT)
Dept: FAMILY MEDICINE CLINIC | Age: 78
End: 2023-06-21

## 2023-06-21 DIAGNOSIS — D04.39 SQUAMOUS CELL CARCINOMA IN SITU (SCCIS) OF SKIN OF LEFT CHEEK: Primary | ICD-10-CM

## 2023-06-21 RX ORDER — OXYCODONE HYDROCHLORIDE 5 MG/1
5 TABLET ORAL EVERY 6 HOURS PRN
Qty: 12 TABLET | Refills: 0 | Status: SHIPPED | OUTPATIENT
Start: 2023-06-21 | End: 2023-06-24

## 2023-06-21 NOTE — TELEPHONE ENCOUNTER
Called patient to let her know oxycodone has been sent to [address] [place] [address]. No questions at this time.

## 2023-06-23 ENCOUNTER — TELEPHONE (OUTPATIENT)
Dept: FAMILY MEDICINE CLINIC | Age: 78
End: 2023-06-23

## 2023-07-10 ENCOUNTER — TELEPHONE (OUTPATIENT)
Dept: FAMILY MEDICINE CLINIC | Age: 78
End: 2023-07-10

## 2023-07-10 NOTE — TELEPHONE ENCOUNTER
Pt is wondering if Dr. Lizbet Dang could send her something for the pain in her right leg.  Pt said Dr. Arlin Garg prescribed her a low dose of oxycodone for it but she ran out and is in a lot of pain again

## 2023-07-11 NOTE — TELEPHONE ENCOUNTER
Spoke with pt. Advised PCP stated she needed to be seen. Pt stated she could not wait until appointment on 7-24-23.  Pt scheduled sooner with NP.

## 2023-07-14 ENCOUNTER — OFFICE VISIT (OUTPATIENT)
Dept: FAMILY MEDICINE CLINIC | Age: 78
End: 2023-07-14

## 2023-07-14 ENCOUNTER — HOSPITAL ENCOUNTER (OUTPATIENT)
Dept: GENERAL RADIOLOGY | Age: 78
Discharge: HOME | End: 2023-07-14
Payer: MEDICARE

## 2023-07-14 VITALS
BODY MASS INDEX: 29.99 KG/M2 | HEART RATE: 69 BPM | SYSTOLIC BLOOD PRESSURE: 132 MMHG | OXYGEN SATURATION: 94 % | DIASTOLIC BLOOD PRESSURE: 62 MMHG | TEMPERATURE: 98.6 F | HEIGHT: 65 IN | WEIGHT: 180 LBS

## 2023-07-14 DIAGNOSIS — M25.551 PAIN OF RIGHT HIP: ICD-10-CM

## 2023-07-14 DIAGNOSIS — M25.551 PAIN OF RIGHT HIP: Primary | ICD-10-CM

## 2023-07-14 DIAGNOSIS — E11.42 DIABETIC POLYNEUROPATHY ASSOCIATED WITH TYPE 2 DIABETES MELLITUS (HCC): ICD-10-CM

## 2023-07-14 DIAGNOSIS — I10 PRIMARY HYPERTENSION: ICD-10-CM

## 2023-07-14 PROCEDURE — 73502 X-RAY EXAM HIP UNI 2-3 VIEWS: CPT

## 2023-07-14 RX ORDER — TRAMADOL HYDROCHLORIDE 50 MG/1
50 TABLET ORAL EVERY 6 HOURS PRN
Qty: 12 TABLET | Refills: 0 | Status: SHIPPED | OUTPATIENT
Start: 2023-07-14 | End: 2023-07-17

## 2023-07-14 RX ORDER — BLOOD-GLUCOSE SENSOR
EACH MISCELLANEOUS
Qty: 6 EACH | Refills: 3 | Status: SHIPPED | OUTPATIENT
Start: 2023-07-14

## 2023-07-14 ASSESSMENT — ENCOUNTER SYMPTOMS
SHORTNESS OF BREATH: 0
CONSTIPATION: 0
BACK PAIN: 0
SINUS PAIN: 0
COUGH: 0
COLOR CHANGE: 1
ABDOMINAL PAIN: 0
SINUS PRESSURE: 0
DIARRHEA: 0
WHEEZING: 0

## 2023-07-14 NOTE — PROGRESS NOTES
Ry Dunn (:  1945) is a 65 y.o. female,Established patient, here for evaluation of the following chief complaint(s):  Leg Pain (Mo Coil on right side , having trouble lifting leg, no bruise), Mass (Right arm), and Other (Would like rx for blood pressure machine and glucometer)      ASSESSMENT/PLAN:  1. Pain of right hip  Assessment & Plan:  Ordered xray of hip/pelvis  Will follow up results  Ordered tramadol x 3 days as needed for pain  Orders:  -     XR HIP 2-3 VW W PELVIS RIGHT; Future  -     traMADol (ULTRAM) 50 MG tablet; Take 1 tablet by mouth every 6 hours as needed for Pain for up to 3 days. Intended supply: 3 days. Take lowest dose possible to manage pain Max Daily Amount: 200 mg, Disp-12 tablet, R-0Normal  2. Primary hypertension  Assessment & Plan:   Ordered at home BP monitor  Orders:  -     DME Order for (Specify) as OP  3. Diabetic polyneuropathy associated with type 2 diabetes mellitus ([address])  Assessment & Plan:  Ordered continuous blood glucose monitor to decrease finger pricking while taking blood thinners      Return if symptoms worsen or fail to improve. SUBJECTIVE/OBJECTIVE:  Patient presenting with R leg pain. Reports falling on  on concrete. Did not notice any bruising or pain after the fall. States she hit her head in the fall but had no signs of a concussion. Been having pain in her hip/thigh area for 2 weeks. Is having increased trouble with lifting her leg. Pain radiates down the thigh and possibly down to the foot, but unsure due to previous numbness with kidney problems (on dialysis). Describes tightness and pain that comes and goes. Has been using tylenol with mild relief. Has been using heat and ice that did not help much. Tried massaging it and made it worse. It has been hard to sleep at night due to pain. Patient also complaining of bruising and nodule on R forearm. She noticed it about a week ago. It is not painful, has not changed at all.  No trauma to the

## 2023-07-25 RX ORDER — BLOOD-GLUCOSE SENSOR
EACH MISCELLANEOUS
Qty: 6 EACH | Refills: 3 | Status: SHIPPED | OUTPATIENT
Start: 2023-07-25

## 2023-07-28 ENCOUNTER — TELEPHONE (OUTPATIENT)
Dept: FAMILY MEDICINE CLINIC | Age: 78
End: 2023-07-28

## 2023-07-28 RX ORDER — METOPROLOL SUCCINATE 25 MG/1
25 TABLET, EXTENDED RELEASE ORAL DAILY
Qty: 90 TABLET | Refills: 3 | Status: SHIPPED | OUTPATIENT
Start: 2023-07-28

## 2023-07-28 NOTE — TELEPHONE ENCOUNTER
Patient has been taking the whole pill at 50 mg. So the pharmacy is requesting that her new pill be at a 25 mg dosage so she won't have to cut the pill, and she can take them as she has been taking them as one dose.      metoprolol succinate (TOPROL XL) 50 MG extended release tablet [[id number]]     Order Details  Dose: 25 mg Route: Oral Frequency: DAILY   Dispense Quantity: 90 tablet Refills: 3          Sig: Take 0.5 tablets by mouth daily   Patient not taking: Reported on 6/19/2023   [facility] [id number] [facility], [address] [place] [address] [phone] - F [phone]

## 2023-07-31 DIAGNOSIS — M25.551 PAIN OF RIGHT HIP: ICD-10-CM

## 2023-07-31 RX ORDER — TRAMADOL HYDROCHLORIDE 50 MG/1
50 TABLET ORAL EVERY 6 HOURS PRN
Qty: 12 TABLET | Refills: 0 | OUTPATIENT
Start: 2023-07-31 | End: 2023-08-03

## 2023-08-01 ENCOUNTER — TELEPHONE (OUTPATIENT)
Dept: FAMILY MEDICINE CLINIC | Age: 78
End: 2023-08-01

## 2023-08-01 RX ORDER — AZITHROMYCIN 250 MG/1
250 TABLET, FILM COATED ORAL SEE ADMIN INSTRUCTIONS
Qty: 6 TABLET | Refills: 0 | Status: SHIPPED | OUTPATIENT
Start: 2023-08-01 | End: 2023-08-06

## 2023-08-01 NOTE — TELEPHONE ENCOUNTER
----- Message from Adrienne Rodriguez sent at 8/1/2023  9:17 AM EDT -----  Subject: Message to Provider    QUESTIONS  Information for Provider? Pt is having a cough/sore   throat/diarrhea/headache/ she would like to know if she can get medication   for this so she does not miss any more Dialysis. She would like contacted   when medication are called. Erika Baptiste [phone] Serene Vargas, [facility] [address] Kev Bajwa [phone] Cindy Lind [phone]  ---------------------------------------------------------------------------  --------------  Dorene FLOYD  [phone]; OK to leave message on voicemail  ---------------------------------------------------------------------------  --------------  SCRIPT ANSWERS  Relationship to Patient?  Self

## 2023-08-05 ENCOUNTER — TELEPHONE (OUTPATIENT)
Dept: FAMILY MEDICINE CLINIC | Age: 78
End: 2023-08-05

## 2023-08-05 RX ORDER — DEXTROMETHORPHAN HYDROBROMIDE AND PROMETHAZINE HYDROCHLORIDE 15; 6.25 MG/5ML; MG/5ML
5 SYRUP ORAL 4 TIMES DAILY PRN
Qty: 115 ML | Refills: 0 | Status: SHIPPED | OUTPATIENT
Start: 2023-08-05 | End: 2023-08-12

## 2023-08-07 NOTE — TELEPHONE ENCOUNTER
Daughter called after hours on behalf of the patient who is currently being treated for PNA. She states that her cough is so bad that she cannot get sleep, PRN med to pharmacy.

## 2023-09-01 NOTE — PROGRESS NOTES
Spoke with pts daughter/LUAN Davila- stated pt has active DNR messaged ,   Pt [facility] center, if unable to go back, family stated they will look into assisted living for pt No

## 2023-09-07 ENCOUNTER — APPOINTMENT (OUTPATIENT)
Dept: CT IMAGING | Age: 78
DRG: 640 | End: 2023-09-07
Payer: MEDICARE

## 2023-09-07 ENCOUNTER — APPOINTMENT (OUTPATIENT)
Dept: GENERAL RADIOLOGY | Age: 78
DRG: 640 | End: 2023-09-07
Payer: MEDICARE

## 2023-09-07 ENCOUNTER — HOSPITAL ENCOUNTER (INPATIENT)
Age: 78
LOS: 1 days | Discharge: HOSPICE | DRG: 640 | End: 2023-09-07
Attending: EMERGENCY MEDICINE | Admitting: INTERNAL MEDICINE
Payer: MEDICARE

## 2023-09-07 VITALS
DIASTOLIC BLOOD PRESSURE: 72 MMHG | OXYGEN SATURATION: 91 % | SYSTOLIC BLOOD PRESSURE: 191 MMHG | BODY MASS INDEX: 36.33 KG/M2 | RESPIRATION RATE: 22 BRPM | HEIGHT: 65 IN | WEIGHT: 218.03 LBS | TEMPERATURE: 101.1 F | HEART RATE: 85 BPM

## 2023-09-07 DIAGNOSIS — G93.40 ACUTE ENCEPHALOPATHY: Primary | ICD-10-CM

## 2023-09-07 DIAGNOSIS — E87.5 HYPERKALEMIA: ICD-10-CM

## 2023-09-07 DIAGNOSIS — J81.0 ACUTE PULMONARY EDEMA (HCC): ICD-10-CM

## 2023-09-07 DIAGNOSIS — D64.9 ANEMIA, UNSPECIFIED TYPE: ICD-10-CM

## 2023-09-07 DIAGNOSIS — R09.02 HYPOXIA: ICD-10-CM

## 2023-09-07 DIAGNOSIS — E83.39 HYPERPHOSPHATEMIA: ICD-10-CM

## 2023-09-07 PROBLEM — N18.6 ESRD (END STAGE RENAL DISEASE) (HCC): Status: ACTIVE | Noted: 2023-09-07

## 2023-09-07 LAB
ALBUMIN SERPL-MCNC: 3.8 G/DL (ref 3.4–5)
ALBUMIN/GLOB SERPL: 1.6 {RATIO} (ref 1.1–2.2)
ALP SERPL-CCNC: 102 U/L (ref 40–129)
ALT SERPL-CCNC: 11 U/L (ref 10–40)
AMMONIA PLAS-SCNC: 18 UMOL/L (ref 11–51)
ANION GAP SERPL CALCULATED.3IONS-SCNC: 20 MMOL/L (ref 3–16)
AST SERPL-CCNC: 13 U/L (ref 15–37)
BACTERIA URNS QL MICRO: NORMAL /HPF
BASE EXCESS BLDV CALC-SCNC: -6.5 MMOL/L (ref -3–3)
BASOPHILS # BLD: 0 K/UL (ref 0–0.2)
BASOPHILS NFR BLD: 0 %
BILIRUB SERPL-MCNC: 0.5 MG/DL (ref 0–1)
BILIRUB UR QL STRIP.AUTO: NEGATIVE
BUN SERPL-MCNC: 83 MG/DL (ref 7–20)
CALCIUM SERPL-MCNC: 8.2 MG/DL (ref 8.3–10.6)
CHLORIDE SERPL-SCNC: 102 MMOL/L (ref 99–110)
CLARITY UR: CLEAR
CO2 BLDV-SCNC: 44 MMOL/L
CO2 SERPL-SCNC: 16 MMOL/L (ref 21–32)
COHGB MFR BLDV: 3.5 % (ref 0–1.5)
COLOR UR: YELLOW
CREAT SERPL-MCNC: 10 MG/DL (ref 0.6–1.2)
DEPRECATED RDW RBC AUTO: 17.6 % (ref 12.4–15.4)
EKG ATRIAL RATE: 81 BPM
EKG DIAGNOSIS: NORMAL
EKG P AXIS: 52 DEGREES
EKG P-R INTERVAL: 168 MS
EKG Q-T INTERVAL: 408 MS
EKG QRS DURATION: 86 MS
EKG QTC CALCULATION (BAZETT): 473 MS
EKG R AXIS: -16 DEGREES
EKG T AXIS: 51 DEGREES
EKG VENTRICULAR RATE: 81 BPM
EOSINOPHIL # BLD: 0.4 K/UL (ref 0–0.6)
EOSINOPHIL NFR BLD: 3 %
EPI CELLS #/AREA URNS AUTO: 2 /HPF (ref 0–5)
GFR SERPLBLD CREATININE-BSD FMLA CKD-EPI: 4 ML/MIN/{1.73_M2}
GLUCOSE SERPL-MCNC: 91 MG/DL (ref 70–99)
GLUCOSE UR STRIP.AUTO-MCNC: NEGATIVE MG/DL
HCO3 BLDV-SCNC: 18.7 MMOL/L (ref 23–29)
HCT VFR BLD AUTO: 29.7 % (ref 36–48)
HGB BLD-MCNC: 9.6 G/DL (ref 12–16)
HGB UR QL STRIP.AUTO: ABNORMAL
HYALINE CASTS #/AREA URNS AUTO: 0 /LPF (ref 0–8)
HYPERCHROMIA BLD QL SMEAR: ABNORMAL
KETONES UR STRIP.AUTO-MCNC: NEGATIVE MG/DL
LEUKOCYTE ESTERASE UR QL STRIP.AUTO: NEGATIVE
LYMPHOCYTES # BLD: 0.6 K/UL (ref 1–5.1)
LYMPHOCYTES NFR BLD: 5 %
MCH RBC QN AUTO: 30.7 PG (ref 26–34)
MCHC RBC AUTO-ENTMCNC: 32.2 G/DL (ref 31–36)
MCV RBC AUTO: 95.3 FL (ref 80–100)
METHGB MFR BLDV: 0.3 %
MONOCYTES # BLD: 1.1 K/UL (ref 0–1.3)
MONOCYTES NFR BLD: 9 %
NEUTROPHILS # BLD: 9.8 K/UL (ref 1.7–7.7)
NEUTROPHILS NFR BLD: 83 %
NITRITE UR QL STRIP.AUTO: NEGATIVE
NT-PROBNP SERPL-MCNC: ABNORMAL PG/ML (ref 0–449)
O2 CT VFR BLDV CALC: 14 VOL %
O2 THERAPY: ABNORMAL
PCO2 BLDV: 35 MMHG (ref 40–50)
PH BLDV: 7.34 [PH] (ref 7.35–7.45)
PH UR STRIP.AUTO: 8 [PH] (ref 5–8)
PHOSPHATE SERPL-MCNC: 9.1 MG/DL (ref 2.5–4.9)
PLATELET # BLD AUTO: 208 K/UL (ref 135–450)
PLATELET BLD QL SMEAR: ADEQUATE
PMV BLD AUTO: 8.4 FL (ref 5–10.5)
PO2 BLDV: 185 MMHG (ref 25–40)
POTASSIUM SERPL-SCNC: 5.5 MMOL/L (ref 3.5–5.1)
PROT SERPL-MCNC: 6.2 G/DL (ref 6.4–8.2)
PROT UR STRIP.AUTO-MCNC: 100 MG/DL
RBC # BLD AUTO: 3.12 M/UL (ref 4–5.2)
RBC CLUMPS #/AREA URNS AUTO: 0 /HPF (ref 0–4)
REASON FOR REJECTION: NORMAL
REJECTED TEST: NORMAL
SAO2 % BLDV: 100 %
SLIDE REVIEW: ABNORMAL
SODIUM SERPL-SCNC: 138 MMOL/L (ref 136–145)
SP GR UR STRIP.AUTO: 1.01 (ref 1–1.03)
TROPONIN, HIGH SENSITIVITY: 57 NG/L (ref 0–14)
TROPONIN, HIGH SENSITIVITY: 66 NG/L (ref 0–14)
UA COMPLETE W REFLEX CULTURE PNL UR: ABNORMAL
UA DIPSTICK W REFLEX MICRO PNL UR: YES
URN SPEC COLLECT METH UR: ABNORMAL
UROBILINOGEN UR STRIP-ACNC: 0.2 E.U./DL
WBC # BLD AUTO: 11.8 K/UL (ref 4–11)
WBC #/AREA URNS AUTO: 1 /HPF (ref 0–5)

## 2023-09-07 PROCEDURE — 6360000002 HC RX W HCPCS: Performed by: INTERNAL MEDICINE

## 2023-09-07 PROCEDURE — 5A1D70Z PERFORMANCE OF URINARY FILTRATION, INTERMITTENT, LESS THAN 6 HOURS PER DAY: ICD-10-PCS | Performed by: INTERNAL MEDICINE

## 2023-09-07 PROCEDURE — 36415 COLL VENOUS BLD VENIPUNCTURE: CPT

## 2023-09-07 PROCEDURE — 6360000002 HC RX W HCPCS: Performed by: PHYSICIAN ASSISTANT

## 2023-09-07 PROCEDURE — 93005 ELECTROCARDIOGRAM TRACING: CPT | Performed by: EMERGENCY MEDICINE

## 2023-09-07 PROCEDURE — 1200000000 HC SEMI PRIVATE

## 2023-09-07 PROCEDURE — 84100 ASSAY OF PHOSPHORUS: CPT

## 2023-09-07 PROCEDURE — 84484 ASSAY OF TROPONIN QUANT: CPT

## 2023-09-07 PROCEDURE — 96374 THER/PROPH/DIAG INJ IV PUSH: CPT

## 2023-09-07 PROCEDURE — 6360000002 HC RX W HCPCS: Performed by: CLINICAL NURSE SPECIALIST

## 2023-09-07 PROCEDURE — 99285 EMERGENCY DEPT VISIT HI MDM: CPT

## 2023-09-07 PROCEDURE — 80053 COMPREHEN METABOLIC PANEL: CPT

## 2023-09-07 PROCEDURE — 85025 COMPLETE CBC W/AUTO DIFF WBC: CPT

## 2023-09-07 PROCEDURE — 82140 ASSAY OF AMMONIA: CPT

## 2023-09-07 PROCEDURE — 83880 ASSAY OF NATRIURETIC PEPTIDE: CPT

## 2023-09-07 PROCEDURE — 81001 URINALYSIS AUTO W/SCOPE: CPT

## 2023-09-07 PROCEDURE — 70450 CT HEAD/BRAIN W/O DYE: CPT

## 2023-09-07 PROCEDURE — 71045 X-RAY EXAM CHEST 1 VIEW: CPT

## 2023-09-07 PROCEDURE — 82803 BLOOD GASES ANY COMBINATION: CPT

## 2023-09-07 PROCEDURE — 93010 ELECTROCARDIOGRAM REPORT: CPT | Performed by: INTERNAL MEDICINE

## 2023-09-07 RX ORDER — ACETAMINOPHEN 650 MG/1
650 SUPPOSITORY RECTAL EVERY 4 HOURS PRN
Status: DISCONTINUED | OUTPATIENT
Start: 2023-09-07 | End: 2023-09-07 | Stop reason: HOSPADM

## 2023-09-07 RX ORDER — TORSEMIDE 20 MG/1
10 TABLET ORAL DAILY
Status: DISCONTINUED | OUTPATIENT
Start: 2023-09-07 | End: 2023-09-07

## 2023-09-07 RX ORDER — ROPINIROLE 0.25 MG/1
0.25 TABLET, FILM COATED ORAL NIGHTLY
Status: DISCONTINUED | OUTPATIENT
Start: 2023-09-07 | End: 2023-09-07 | Stop reason: HOSPADM

## 2023-09-07 RX ORDER — FENTANYL CITRATE 50 UG/ML
25 INJECTION, SOLUTION INTRAMUSCULAR; INTRAVENOUS ONCE
Status: COMPLETED | OUTPATIENT
Start: 2023-09-07 | End: 2023-09-07

## 2023-09-07 RX ORDER — LORAZEPAM 2 MG/ML
1 INJECTION INTRAMUSCULAR
Status: DISCONTINUED | OUTPATIENT
Start: 2023-09-07 | End: 2023-09-07 | Stop reason: HOSPADM

## 2023-09-07 RX ORDER — LEVOTHYROXINE SODIUM 0.15 MG/1
150 TABLET ORAL
Status: DISCONTINUED | OUTPATIENT
Start: 2023-09-08 | End: 2023-09-07 | Stop reason: HOSPADM

## 2023-09-07 RX ORDER — METOPROLOL SUCCINATE 25 MG/1
25 TABLET, EXTENDED RELEASE ORAL DAILY
Status: DISCONTINUED | OUTPATIENT
Start: 2023-09-07 | End: 2023-09-07 | Stop reason: HOSPADM

## 2023-09-07 RX ORDER — LIDOCAINE 4 G/G
1 PATCH TOPICAL DAILY
Status: DISCONTINUED | OUTPATIENT
Start: 2023-09-07 | End: 2023-09-07 | Stop reason: HOSPADM

## 2023-09-07 RX ORDER — MORPHINE SULFATE 4 MG/ML
8 INJECTION, SOLUTION INTRAMUSCULAR; INTRAVENOUS EVERY 4 HOURS PRN
Status: DISCONTINUED | OUTPATIENT
Start: 2023-09-07 | End: 2023-09-07 | Stop reason: HOSPADM

## 2023-09-07 RX ORDER — HYDROMORPHONE HYDROCHLORIDE 1 MG/ML
1 INJECTION, SOLUTION INTRAMUSCULAR; INTRAVENOUS; SUBCUTANEOUS
Status: DISCONTINUED | OUTPATIENT
Start: 2023-09-07 | End: 2023-09-07 | Stop reason: HOSPADM

## 2023-09-07 RX ORDER — ONDANSETRON 2 MG/ML
4 INJECTION INTRAMUSCULAR; INTRAVENOUS EVERY 6 HOURS PRN
Status: DISCONTINUED | OUTPATIENT
Start: 2023-09-07 | End: 2023-09-07 | Stop reason: HOSPADM

## 2023-09-07 RX ORDER — MORPHINE SULFATE 4 MG/ML
4 INJECTION, SOLUTION INTRAMUSCULAR; INTRAVENOUS
Status: DISCONTINUED | OUTPATIENT
Start: 2023-09-07 | End: 2023-09-07 | Stop reason: HOSPADM

## 2023-09-07 RX ORDER — GABAPENTIN 100 MG/1
100 CAPSULE ORAL NIGHTLY
Status: DISCONTINUED | OUTPATIENT
Start: 2023-09-07 | End: 2023-09-07 | Stop reason: HOSPADM

## 2023-09-07 RX ADMIN — HYDROMORPHONE HYDROCHLORIDE 1 MG: 1 INJECTION, SOLUTION INTRAMUSCULAR; INTRAVENOUS; SUBCUTANEOUS at 15:43

## 2023-09-07 RX ADMIN — HYDROMORPHONE HYDROCHLORIDE 1 MG: 1 INJECTION, SOLUTION INTRAMUSCULAR; INTRAVENOUS; SUBCUTANEOUS at 13:32

## 2023-09-07 RX ADMIN — MORPHINE SULFATE 4 MG: 4 INJECTION, SOLUTION INTRAMUSCULAR; INTRAVENOUS at 17:43

## 2023-09-07 RX ADMIN — FENTANYL CITRATE 25 MCG: 50 INJECTION, SOLUTION INTRAMUSCULAR; INTRAVENOUS at 10:56

## 2023-09-07 ASSESSMENT — PAIN SCALES - GENERAL
PAINLEVEL_OUTOF10: 10
PAINLEVEL_OUTOF10: 9

## 2023-09-07 NOTE — PLAN OF CARE
Problem: Discharge Planning  Goal: Discharge to home or other facility with appropriate resources  Outcome: Adequate for Discharge     Problem: Skin/Tissue Integrity  Goal: Absence of new skin breakdown  Description: 1. Monitor for areas of redness and/or skin breakdown  2. Assess vascular access sites hourly  3. Every 4-6 hours minimum:  Change oxygen saturation probe site  4. Every 4-6 hours:  If on nasal continuous positive airway pressure, respiratory therapy assess nares and determine need for appliance change or resting period.   Outcome: Adequate for Discharge     Problem: Safety - Adult  Goal: Free from fall injury  Outcome: Adequate for Discharge     Problem: ABCDS Injury Assessment  Goal: Absence of physical injury  Outcome: Adequate for Discharge

## 2023-09-07 NOTE — DISCHARGE SUMMARY
100 MG capsule Take 1 capsule by mouth nightly for 180 days. Intended supply: 90 days  Qty: 90 capsule, Refills: 1      rOPINIRole (REQUIP) 0.25 MG tablet Take 1 tablet by mouth nightly  Qty: 90 tablet, Refills: 2      spironolactone (ALDACTONE) 25 MG tablet Take 1 tablet by mouth daily  Qty: 90 tablet, Refills: 2      nitroGLYCERIN (NITROSTAT) 0.4 MG SL tablet Place 1 tablet under the tongue every 5 minutes as needed for Chest pain up to max of 3 total doses. If no relief after 1 dose, call 911. Qty: 25 tablet, Refills: 0      cholestyramine (QUESTRAN) 4 g packet Take 1 packet by mouth 2 times daily  Qty: 180 packet, Refills: 3      allopurinol (ZYLOPRIM) 100 MG tablet Take 1 tablet by mouth daily  Qty: 90 tablet, Refills: 1      torsemide (DEMADEX) 10 MG tablet Take 1 tablet by mouth daily 10 mg daily      mupirocin (BACTROBAN) 2 % ointment Apply topically 3 times daily Apply topically 3 times daily. levothyroxine (SYNTHROID) 150 MCG tablet Take 1 tablet by mouth every morning (before breakfast)  Qty: 90 tablet, Refills: 3      lidocaine (LIDODERM) 5 % Place 1 patch onto the skin daily 12 hours on, 12 hours off. Qty: 30 patch, Refills: 0      vitamin D3 (CHOLECALCIFEROL) 25 MCG (1000 UT) TABS tablet Take 1 tablet by mouth daily  Qty: 90 tablet, Refills: 1    Associated Diagnoses: Vitamin D deficiency      ASPIRIN LOW DOSE 81 MG EC tablet TAKE 1 TABLET BY MOUTH EVERY DAY  Qty: 90 tablet, Refills: 1      rosuvastatin (CRESTOR) 20 MG tablet TAKE 1 TABLET BY MOUTH EVERY DAY  Qty: 90 tablet, Refills: 1           Current Discharge Medication List          Labs:  For convenience and continuity at follow-up the following most recent labs are provided:    Lab Results   Component Value Date/Time    WBC 11.8 09/07/2023 08:57 AM    HGB 9.6 09/07/2023 08:57 AM    HCT 29.7 09/07/2023 08:57 AM    MCV 95.3 09/07/2023 08:57 AM     09/07/2023 08:57 AM     09/07/2023 09:43 AM    K 5.5 09/07/2023 09:43 AM

## 2023-09-07 NOTE — ACP (ADVANCE CARE PLANNING)
Advanced Care Planning Note. Purpose of Encounter: Advanced care planning in light of hospitalization  Parties In Attendance: Patient,  Tejinderugther  Decisional Capacity: Yes  Subjective: Patient  understand that this conversation is to address long term care goal  Objective: Patient to hospital with acute hypoxic respiratory failure secondary to volume overload with history of ESRD on dialysis  Goals of Care Determination: Patient would not pursue CPR and Intubation if required.    Code Status: DNR CC  Time spent on Advanced care Plannin minutes  Advanced Care Planning Documents: documented patient's wishes, would like Armando Mortensen to make medical decisions if unable to make decisions    Noel Flores MD  2023 1:24 PM

## 2023-09-07 NOTE — ED PROVIDER NOTES
[facility]        Pt Name: Asim Lino  MRN: [id number]  [address] [facility] 1945  Date of evaluation: 9/7/2023  Provider: Luis Prado PA-C  PCP: Nohemi Johnson MD  Note Started: 9:22 AM EDT 9/7/23       I have seen and evaluated this patient with my supervising physician Ramon Simon DO. I personally saw the patient and independently provided 33 minutes of non-concurrent critical care time out of the total critical care time provided. This excludes time spent doing separately billable procedures. This includes time at the bedside, data interpretation, medication management, obtaining critical history from collateral sources if the patient is unable to provide it directly, and physician consultation. Specifics of interventions taken and potentially life-threatening diagnostic considerations are listed above in the medical decision making. CHIEF COMPLAINT       Chief Complaint   Patient presents with    Shortness of Breath     Pt via EMS from home, pt missed dialysis this morning due to sob, pt c/ right hand pain also, is on blood thinners, pt 88% ora, 94% on 2 L       HISTORY OF PRESENT ILLNESS: 1 or more Elements     History From: daughter Jacqueline Manual  Limitations to history : Altered Mental Status    Asim Lino is a 65 y.o. female who presents to the emergency department with altered mental status. Patient has history of hypertension, hyperlipidemia, coronary artery disease and end-stage renal disease on dialysis. Had nephrology appointment yesterday and apparently there was some discussion about hospice and stopping dialysis as patient has been refusing recently has not had dialysis in 5 days. Only had 40 minutes of her treatment 2 days ago and was refusing to go today. Has been having some difficulty moving her right arm with some slurred speech for the past couple days.   Yesterday however she was still

## 2023-09-07 NOTE — PROGRESS NOTES
[facility] here for transport to [facility], pt pre medicated for travel.

## 2023-09-07 NOTE — H&P
09/07/2023 08:57 AM     09/07/2023 08:57 AM    MPV 8.4 09/07/2023 08:57 AM     BMP:    Lab Results   Component Value Date/Time     09/07/2023 09:43 AM    K 5.5 09/07/2023 09:43 AM     09/07/2023 09:43 AM    CO2 16 09/07/2023 09:43 AM    BUN 83 09/07/2023 09:43 AM    CREATININE 10.0 09/07/2023 09:43 AM    CALCIUM 8.2 09/07/2023 09:43 AM    GFRAA 8 01/20/2022 06:16 AM    LABGLOM 4 09/07/2023 09:43 AM    GLUCOSE 91 09/07/2023 09:43 AM     CT Head W/O Contrast   Final Result   No acute intracranial abnormality. XR CHEST PORTABLE   Final Result   Findings are most consistent with moderate CHF. Multifocal pneumonia is   thought to be less likely             Recent imaging reviewed    Problem List  Principal Problem:    ESRD (end stage renal disease) ([address])  Resolved Problems:    * No resolved hospital problems. *        Assessment/Plan:   Acute hypoxic resp failure secondary to volume overload from ESRD on HD with missed dialysis  Was nephro ED and patient's family patient does not want to pursue any further dialysis. We will start IV morphine consult hospice and provide comfort care      Code status DNR CC        Admit as inpatient I anticipate hospitalization spanning more than two midnights for investigation and treatment of the above medically necessary diagnoses. Please note that some part of this chart was generated using Dragon dictation software. Although every effort was made to ensure the accuracy of this automated transcription, some errors in transcription may have occurred inadvertently. If you may need any clarification, please do not hesitate to contact me through [facility].        Aleksandar Curiel MD    9/7/2023 1:22 PM

## 2023-09-07 NOTE — PROGRESS NOTES
Data- discharge order received, pt's family verbalized agreement to discharge to inpatient [facility], no needs for HHC/DME. Action- discharge instructions prepared and given to Daughter LUAN, pt verbalized understanding. Medication information packet given r/t NEW and/or CHANGED prescriptions emphasizing name/purpose/side effects, pt verbalized understanding. Discharge instruction summary: Diet- pleasure foods as tolerated, Activity- as tolerated for best comfort care. 1. WEIGHT: Admit Weight - Scale: 189 lb (85.7 kg) (09/07/23 5022)        Today  Weight - Scale: 218 lb 0.6 oz (98.9 kg) (09/07/23 1617)       2. O2 SAT.: SpO2: 91 % (09/07/23 1617)    Response- Pt belongings gathered, IV remains in place for comfort care ,meds. Disposition is npatient [facility], Pt to be transported with [place] via stretcher. Awaiting  between [phone]. Family remains at bedside, emotional support provided.

## 2023-09-07 NOTE — PROGRESS NOTES
[facility]   Nephrology progress Note  [phone]   [facility]           Reason for Consult:  ESRD on HD     The patient is a 65 y.o. female with significant past medical history of ESRD on hemodialysis patient of Dr. Felipe Rueda, T2DM, hyperlipidemia, hypertension, peripheral neuropathy, hypothyroidism, who presents to the ER with shortness of breath and altered mental status. .  Patient has been not very compliant with dialysis of late. I went into the room to see the patient who was seen in ED bed 6. I introduced myself to the family saying that I was covering for the kidney service.   They informed me that patient does not want any  more dialysis and would be going under hospice care    I respected their wishes and withdrew from the room  This is a no charge visit    Sayra Foss MD, MD  9/7/2023  [facility]

## 2023-09-07 NOTE — PROGRESS NOTES
[facility]    Met with family to discuss hospice services. Patient to transfer to [facility] inpatient unit at 6:30pm with [facility].     Thank you for allowing us to be a part of Judy's care.      Jeovanny Christopher RN  [facility] [place] #: [phone]  Cell: [phone]

## 2023-09-07 NOTE — CARE COORDINATION
Discharge Planning:     (CM) Nicole Elmore NP [address] [place] [address] made a referral to [facility]. CM team to follow for any needs.        Electronically signed by JOSE James on 9/7/2023 at 4:36 PM

## 2023-09-07 NOTE — PLAN OF CARE
Problem: Discharge Planning  Goal: Discharge to home or other facility with appropriate resources  9/7/2023 1817 by Imelda Caruos RN  Outcome: Completed  9/7/2023 1813 by Imelda Caruso RN  Outcome: Adequate for Discharge     Problem: Skin/Tissue Integrity  Goal: Absence of new skin breakdown  Description: 1. Monitor for areas of redness and/or skin breakdown  2. Assess vascular access sites hourly  3. Every 4-6 hours minimum:  Change oxygen saturation probe site  4. Every 4-6 hours:  If on nasal continuous positive airway pressure, respiratory therapy assess nares and determine need for appliance change or resting period.   9/7/2023 1817 by Imelda Caruso RN  Outcome: Completed  9/7/2023 1813 by Imelda Caruso RN  Outcome: Adequate for Discharge     Problem: Safety - Adult  Goal: Free from fall injury  9/7/2023 1817 by Imelda Caruos RN  Outcome: Completed  9/7/2023 1813 by Imelda Caruso RN  Outcome: Adequate for Discharge     Problem: ABCDS Injury Assessment  Goal: Absence of physical injury  9/7/2023 1817 by Imelda Caruso RN  Outcome: Completed  9/7/2023 1813 by Imelda Caruso RN  Outcome: Adequate for Discharge

## 2023-09-07 NOTE — ED PROVIDER NOTES
In addition to the advanced practice provider, I personally saw Otoniel Deluca and performed a substantive portion of the visit including all aspects of the medical decision making. Briefly, this is a 65 y.o. female here for confusion and shortness of breath. Patient was notably hypoxic on arrival to the emergency department and placed on supplemental oxygen, does not use supplemental oxygen at baseline. At time of my evaluation patient has received fentanyl for pain and is drowsy, majority of history was obtained from patient's 2 daughters who are present at bedside. Daughter states that the patient has been refusing dialysis as well as withdrawing from treatment sessions early. Daughter states that the patient has been wishing to pursue hospice and does not want any further dialysis performed. On exam, patient afebrile, chronically ill-appearing. No distress. Heart RRR. Lungs diminished at bases with scattered rhonchi, no wheezes. Abdomen soft, nondistended, no distress elicited with deep palpation all quadrants. Patient is drowsy, opens eyes to touch before falling back asleep. EKG  EKG was reviewed by emergency department physician in the absence of a cardiologist    Narrow complex sinus rhythm, rate 81, normal axis, normal AL and QRS intervals, normal Qtc, no ST elevations or depressions, normal t-wave morphology, impression NSR, no STEMI          MDM    Patient afebrile, chronically ill-appearing however nontoxic. At time of my evaluation she has received fentanyl for pain control, she is drowsy however no acute painful or respiratory distress. She is protecting her airway. EKG no STEMI, troponin is elevated however stable on repeat, no reported chest pain, suspect elevation due to ESRD and lower suspicion for ACS. No malignant dysrhythmia. CXR by my interpretation with pulmonary edema, likely secondary to nonadherence to dialysis.   Mild leukocytosis noted which is actually improved from

## 2023-09-07 NOTE — PROGRESS NOTES
Patient seen in ED, room 6. Admission initiated and completed through Belongings. Family tearful and patient sleeping, so with respect for the patient and family, did not push to complete the admission. IP nurse will need to begin with Psychosocial review and complete the admission including the 4 Eyes Assessment, Immunizations, Covid Vaccines, Rights and Responsibilities, Orientation to room, Plan of Care, Education/Learning Assessment and Education Plan, white board, height and weight, pain assessment and head to toe assessment. Patient is sleepy at this time after being given pain medication. Patient is being admitted for end of life care and hospice referral.  Home Medications reviewed with patient's daughter and updated as appropriate and is now Completed. Plan of care updated if indicated. All questions answered.

## 2023-09-07 NOTE — CONSULTS
PALLIATIVE MEDICINE CONSULTATION     Patient name:Judy Martinez   [facility]:[id number]    :1945  Room/Bed:[facility]3319/3319-01   LOS: 0 days         Date of consult:2023    Consult Information  Palliative Medicine Consult performed by: MARCIE Bernardo CNP, CNP    Inpatient consult to Palliative Care  Consult performed by: MARCIE Bernardo CNP  Consult ordered by: Michelle Mccord MD  Reason for consult: GOC and code status             ASSESSMENT/RECOMMENDATIONS     65 y.o. female with AMS and pain with ESRD stopped dialysis       Symptom Management:  ESRD- pt stopped dialysis on Saturday and told family that she didn't want to continue  AMS- pt lethargic, warm to the touch and confused   Goals of Care- talked to pts children at bedside. Specifics of an end-of-life/comfort-focused treatment plan were discussed with the family, including (but not limited to) discontinuation of labs, non-palliative medications, and routine vitals, along with the benefits of hospice enrollment. Family asked multiple appropriate questions, all of which were addressed. Family is unanimous in transitioning to intensive comfort treatment. Referral faxed to [facility] code updated to [facility] and comfort meds ordered. Patient/Family Goals of Care :    talked to pts children at bedside. Specifics of an end-of-life/comfort-focused treatment plan were discussed with the family, including (but not limited to) discontinuation of labs, non-palliative medications, and routine vitals, along with the benefits of hospice enrollment. Family asked multiple appropriate questions, all of which were addressed. Family is unanimous in transitioning to intensive comfort treatment. Referral faxed to [facility] code updated to [facility] and comfort meds ordered. Disposition/Discharge Plan:   pending    Advance Directives:       The patient has the following advanced directives on file:  Advance Directives       Power of [employer] Will

## 2023-09-08 RX ORDER — ALLOPURINOL 100 MG/1
TABLET ORAL
Qty: 90 TABLET | Refills: 1 | Status: SHIPPED | OUTPATIENT
Start: 2023-09-08

## 2023-09-08 NOTE — PROGRESS NOTES
Responded to referral made by SANDOVAL Brady, and Dakota Mckeon, Hospice Case Manager, for spiritual support. Visited with patient and family who was at bedside. Spiritual support provided through active listening, affirmation, comfort, and prayer. No further spiritual care needs expressed by patient and/or family at this time.

## 2023-10-22 NOTE — PROGRESS NOTES
(ALDACTONE) 25 MG tablet Take 1 tablet by mouth daily 30 tablet 3    nortriptyline (PAMELOR) 10 MG capsule TAKE 1 CAPSULE BY MOUTH EVERY EVENING 30 capsule 3    rosuvastatin (CRESTOR) 20 MG tablet Take 1 tablet by mouth daily 90 tablet 1    clopidogrel (PLAVIX) 75 MG tablet Take 1 tablet by mouth daily 90 tablet 3    aspirin EC 81 MG EC tablet Take 1 tablet by mouth daily 90 tablet 1    gabapentin (NEURONTIN) 100 MG capsule Take 2 capsules by mouth nightly for 30 days. 60 capsule 3    loperamide (IMODIUM) 2 MG capsule Take 2 mg by mouth 4 times daily as needed (Patient not taking: Reported on 10/7/2021)       No current facility-administered medications for this visit. Allergies:  Amoxicillin, Demeclocycline, Penicillins, Tetracyclines & related, Ozempic (0.25 or 0.5 mg-dose) [semaglutide(0.25 or 0.5mg-dos)], and Codeine     Review of Systems:   · Constitutional: there has been no unanticipated weight loss. There's been no change in energy level, sleep pattern, or activity level. · Eyes: No visual changes or diplopia. No scleral icterus. · ENT: No Headaches, hearing loss or vertigo. No mouth sores or sore throat. · Cardiovascular: Reviewed in HPI  · Respiratory: No cough or wheezing, no sputum production. No hematemesis. · Gastrointestinal: No abdominal pain, appetite loss, blood in stools. No change in bowel or bladder habits. · Genitourinary: No dysuria, trouble voiding, or hematuria. · Musculoskeletal:  No gait disturbance, weakness or joint complaints. · Integumentary: No rash or pruritis. · Neurological: No headache, diplopia, change in muscle strength, numbness or tingling. No change in gait, balance, coordination, mood, affect, memory, mentation, behavior. · Psychiatric: No anxiety, no depression. · Endocrine: No malaise, fatigue or temperature intolerance. No excessive thirst, fluid intake, or urination. No tremor.   · Hematologic/Lymphatic: No abnormal bruising or bleeding, blood clots or swollen lymph nodes. · Allergic/Immunologic: No nasal congestion or hives. Physical Examination:    There were no vitals filed for this visit. General appearance: alert, appears stated age, cooperative and no distress  Good thrill and bruit in left forearm AV graft.   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 (HCC)    Closed nondisplaced fracture of fifth metatarsal bone of right foot    Arthritis of right knee    Acute pain of both knees    Primary osteoarthritis of both knees    Bursitis    Memory loss    Bilateral leg edema    Primary osteoarthritis of left knee    Atypical chest pain    Encounter for medication counseling    Tubular adenoma of colon    Primary insomnia    Diarrhea of presumed infectious origin    Diabetic polyneuropathy associated with type 2 diabetes mellitus ([address]    COVID-19 virus infection    Coronary artery disease of native artery with stable angina pectoris (HCC)    Pain of left calf    Atherosclerosis of native arteries of extremities with intermittent claudication, bilateral legs ([address]    ESRD (end stage renal disease) ([address]    Arteriovenous fistula infection, initial encounter ([address]    Septicemia ([address]    Pneumonia due to infectious organism    Frequent falls    Hyperkalemia    Hyponatremia    Ex-smoker    Class 1 obesity due to excess calories with body mass index (BMI) of 30.0 to 30.9 in adult    Antibiotic-associated diarrhea    Pericarditis    Pericardial effusion    Chest pain    History of anemia due to chronic kidney disease    Pleural effusion    Hepatitis B immune    Bilateral pleural effusion       Plan:  Anish Lyon is here for catheter removal.  AV access is functioning well and there is no further need for the catheter. Risks and benefits were discussed and She agrees to take it out. The area was prepped appropriately and anesthetized with lidocaine 1%. Catheter was removed without difficulty. Pressure was held for 10 minutes and good hemostasis was obtained. We will see back in follow up in prn. Thank you for allowing me to participate in the care of this individual.  Please do not hesitate to contact me with any questions. Meliza Pabon M.D., FACS.   11/2/2021  8:16 AM no

## 2024-04-22 NOTE — TELEPHONE ENCOUNTER
PCP, Dr. Elsa Walker, ordered Plavix for this pt. Stated she didn't know about it, and can't get in contact with Dr. Elsa Walker, to ask about it. She got it in the mail-it had several pages of instructions/warnings, and she is afraid to take it. Asking for IVY White's opinion. PROVIDER:[TOKEN:[[id number]]]

## 2024-05-28 NOTE — TELEPHONE ENCOUNTER
Patient stated she received permission from Dr Rosie Patel for Dr Katlyn Brown to prescribe Oxycodone for patient. Pt says Angela Nicholson stated it was ok to take with other medications. stated

## 2025-05-01 NOTE — CONSULTS
[address] [facility]  [phone]      Chief Complaint   Patient presents with    Shortness of Breath     pt dc from hospital two weeks ago. still feels short of breath. diagnosed with pneumonia. nephrologist told pt to come in         History of Present Illness:  Avni Estes is a 67 y.o. patient who presented to the hospital with complaints of shortness of breath. I have been asked to provide consultation regarding further management and testing of shortness of breath by Dr. Anthony Payton. She reports that she has been very short of breath for several weeks. She had sharp chest discomfort last week that lasted for hours. No association with emotion, breathing or exertion. She did not take nitroglycerin. She states that she has been adherent with her medications. No chest pain or pressure today. Past Medical History:   has a past medical history of Arthritis, Diabetes ([facility] Hemodialysis patient ([facility] Hyperlipidemia, Hypertension, Kidney disease, Neuropathy, Pneumonia, and Thyroid disease. Surgical History:   has a past surgical history that includes Appendectomy; Mastectomy, bilateral; Rotator cuff repair (Left); Cholecystectomy; LAPAROSCOPY INSERTION PERITONEAL CATHETER (N/A, 4/3/2020); Upper gastrointestinal endoscopy (N/A, 11/24/2020); Colonoscopy (11/24/2020); Hysterectomy; Dialysis Catheter Removal (N/A, 5/17/2021); and shunt revision (Left, 8/19/2021). Social History:   reports that she quit smoking about 43 years ago. Her smoking use included cigarettes. She has a 5.00 pack-year smoking history. She has never used smokeless tobacco. She reports current alcohol use. She reports that she does not use drugs. Family History:  No family history of premature coronary artery disease, aortic disease, or valve disease. Home Medications:  Were reviewed and are listed in nursing record. and/or listed below  Prior to Admission medications    Medication Sig Start Date End Date Taking? Authorizing Provider   levothyroxine (SYNTHROID) 150 MCG tablet Take 1 tablet by mouth every morning (before breakfast) 9/8/21  Yes Ana Vazquez MD   allopurinol (ZYLOPRIM) 100 MG tablet TAKE 1 TABLET EVERY DAY 9/8/21  Yes Ana Vazquez MD   metoprolol succinate (TOPROL XL) 25 MG extended release tablet TAKE 1 TABLET EVERY DAY 9/8/21  Yes Ana Vazquez MD   torsemide (DEMADEX) 20 MG tablet Take 3 tablets by mouth 2 times daily 8/31/21  Yes Damien HERNANDEZ MD   spironolactone (ALDACTONE) 25 MG tablet Take 1 tablet by mouth daily 9/1/21  Yes Damien Cota MD   nortriptyline (PAMELOR) 10 MG capsule TAKE 1 CAPSULE BY MOUTH EVERY EVENING 8/17/21  Yes Ana Vazquez MD   rosuvastatin (CRESTOR) 20 MG tablet Take 1 tablet by mouth daily 7/8/21  Yes Kirill Nieto MD   aspirin EC 81 MG EC tablet Take 1 tablet by mouth daily 7/8/21  Yes Kirill Nieto MD   metoprolol tartrate (LOPRESSOR) 25 MG tablet Take 12.5 mg by mouth 2 times daily 2/14/21 2/14/22 Yes Historical Provider, MD   colchicine (COLCRYS) 0.6 MG tablet Take 1 tablet by mouth daily for 5 days 9/1/21 9/6/21  Maribeth Rios MD   clopidogrel (PLAVIX) 75 MG tablet Take 1 tablet by mouth daily 7/8/21   Kirill Nieto MD   gabapentin (NEURONTIN) 100 MG capsule Take 2 capsules by mouth nightly for 30 days.  7/7/21 8/25/21  Ana Vazquez MD   UNABLE TO FIND Dialysis mon wed fri    Historical Provider, MD   loperamide (IMODIUM) 2 MG capsule Take 2 mg by mouth 4 times daily as needed 2/14/21   Historical Provider, MD        Current Medications:  Current Facility-Administered Medications   Medication Dose Route Frequency Provider Last Rate Last Admin    levoFLOXacin (LEVAQUIN) tablet 250 mg  250 mg Oral Daily Patito Poole MD   250 mg at 09/23/21 1311    ondansetron (ZOFRAN) injection 4 mg  4 mg IntraVENous Once Sigrid Blanchard MD        perflutren lipid microspheres (DEFINITY) injection 1.65 mg  1.5 mL IntraVENous ONCE PRN nodes.  · Allergic/Immunologic: No nasal congestion or hives.   ·     Physical Examination:    Vitals:    09/23/21 1934   BP: 121/72   Pulse: 108   Resp: 16   Temp: 98.2 °F (36.8 °C)   SpO2: 95%    Weight: 159 lb 6.3 oz (72.3 kg)         General Appearance:  Alert, cooperative, no distress, appears stated age   Head:  Normocephalic, without obvious abnormality, atraumatic   Eyes:  PERRL, conjunctiva/corneas clear       Nose: Nares normal, no drainage or sinus tenderness   Throat: Lips, mucosa, and tongue normal   Neck: Supple, symmetrical, trachea midline, no adenopathy, thyroid: not enlarged, symmetric, no tenderness/mass/nodules, no carotid bruit or JVD       Lungs:   crackles   Chest Wall:  HD cath in place   Heart:  Regular rate and rhythm, S1, S2 normal, no murmur, rub or gallop   Abdomen:   Soft, non-tender, bowel sounds active all four quadrants,  no masses, no organomegaly           Extremities: Extremities normal, atraumatic, no cyanosis or edema   Pulses: 2+ and symmetric   Skin: Skin color, texture, turgor normal, no rashes or lesions   Pysch: Normal mood and affect   Neurologic: Normal gross motor and sensory exam.         Labs  CBC:   Lab Results   Component Value Date    WBC 10.5 09/23/2021    RBC 2.73 09/23/2021    HGB 8.4 09/23/2021    HCT 25.0 09/23/2021    MCV 91.5 09/23/2021    RDW 16.9 09/23/2021     09/23/2021     CMP:    Lab Results   Component Value Date     09/23/2021    K 4.2 09/23/2021    CL 95 09/23/2021    CO2 24 09/23/2021    BUN 34 09/23/2021    CREATININE 5.9 09/23/2021    GFRAA 8 09/23/2021    AGRATIO 0.9 09/23/2021    LABGLOM 7 09/23/2021    GLUCOSE 90 09/23/2021    PROT 7.6 09/23/2021    CALCIUM 9.9 09/23/2021    BILITOT 0.4 09/23/2021    ALKPHOS 148 09/23/2021    AST 39 09/23/2021    ALT 32 09/23/2021     PT/INR:  No results found for: PTINR  Lab Results   Component Value Date    CKTOTAL 243 (H) 08/25/2021    TROPONINI 0.03 (H) 09/22/2021       EKG:  I have reviewed EKG with the following interpretation:  Impression:  Sinus tachycardia, nonspecific stt wave hanges    Echo:  -Limited exam for pericardial effusion. Patient had a complete previous exam   9/01/2021.   -Normal global systolic function with an ejection fraction estimated at 60%. -Abnormal septal motion due to left bundle branch block.   -There is trivial to small circumferential pericardial effusion noted. Stress: There is normal isotope uptake at stress and rest. There is no evidence of    myocardial ischemia or scar.  LVEF is normal at 69% with normal LV wall motion. Cath: Anatomy:   LM-distal 20%  LAD-mid 80%  Cx-nml  OM- nml  RCA-distal 80%  RPDA- nml  LVEF- 60  LVG- nml  LVEDP- 6     Intervention  ~Successful PCI to LAD with 2.75x38 MAX. PD with 3.0x15 NC. Prox D1 99% stent intermediate. Resolved with 1.5x12 balloon angioplasty. Successful PCI to RCA with 3.25x23 MAX to 14atm. Excellent Result.    MRI/EP/Other: none    Old notes reviewed  Telemetry reviewd  Ekg personally reviewed  Chest xray personally reviewed  Echo, cath, and   Medications and labs reviewed  modereate complexity/medical decision making due to extensive data review, extensive history review, independent review of data  moderate risk due to acute illness, evaluation of drug-drug interactions, medication management and diagnostic interventions      Assessment  Patient Active Problem List   Diagnosis    Essential hypertension    Hyperlipidemia    Acquired hypothyroidism    Diabetes mellitus type 2 in obese ([facility] [facility]    Chronic kidney 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 ([facility] [facility]    Closed nondisplaced fracture of fifth metatarsal bone of right foot    Arthritis of right knee    Acute pain of both knees    Primary osteoarthritis of both knees    Bursitis    Memory loss    Bilateral leg edema    Primary osteoarthritis of left knee    Atypical chest pain    Weight loss counseling, encounter for    Tubular adenoma of colon    Primary insomnia    Diarrhea of presumed infectious origin    Diabetic polyneuropathy associated with type 2 diabetes mellitus (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 [facility]    [facility]    Septicemia ([facility]    Pneumonia due to infectious organism    Frequent falls    Hyperkalemia    Hyponatremia    Ex-smoker    Class 1 obesity due to excess calories with body mass index (BMI) of 30.0 to 30.9 in adult    Antibiotic-associated diarrhea    Pericarditis    Pericardial effusion    Chest pain    History of anemia due to chronic kidney disease    Pleural effusion    Hepatitis B immune    Bilateral pleural effusion         Plan:    I had the opportunity to review the clinical symptoms and presentation of [facility]. Assessment/Plan:  1. Pericardial effusion  - new problem  Plan:   Echo    2. Fluid overload  - new problem  Plan:  - recommend thoracentesis to evaluate etiology of pleural effusion    3. Elevated troponin  - atypical chest pain last week, none since  - secondary to esrd and demand  Plan:  - monitor on telemetry  - continue aspirin and clopidogrel    4. Esrd on HD  Plan:  - continue to challenge dry weight  Thank you for allowing to us to participate in the care or [facility]. Further evaluation will be based upon the patient's clinical course and testing results. All questions and concerns were addressed to the patient/family. Alternatives to my treatment were discussed. The note was completed using EMR. Every effort was made to ensure accuracy; however, inadvertent computerized transcription errors may be present.        All questions and concerns were addressed to the patient/family. Alternatives to my treatment were discussed. The note was completed using EMR. Every effort was made to ensure accuracy; however, inadvertent computerized transcription errors may be present.   Jerilyn Banegas DO, MD 9/23/2021 10:04 PM Detail Level: Detailed Depth Of Biopsy: dermis Was A Bandage Applied: Yes Size Of Lesion In Cm: 0.4 X Size Of Lesion In Cm: 0 Biopsy Type: H and E Biopsy Method: Personna blade Anesthesia Type: 2% lidocaine with epinephrine Anesthesia Volume In Cc: 0.5 Hemostasis: Drysol Wound Care: Petrolatum Dressing: bandage Destruction After The Procedure: No Type Of Destruction Used: Curettage Curettage Text: The wound bed was treated with curettage after the biopsy was performed. Cryotherapy Text: The wound bed was treated with cryotherapy after the biopsy was performed. Electrodesiccation Text: The wound bed was treated with electrodesiccation after the biopsy was performed. Electrodesiccation And Curettage Text: The wound bed was treated with electrodesiccation and curettage after the biopsy was performed. Silver Nitrate Text: The wound bed was treated with silver nitrate after the biopsy was performed. Lab: [id number] Lab Facility: [id number] Path Notes (To The Dermatopathologist): Please check margins Medical Necessity Information: It is in your best interest to select a reason for this procedure from the list below. All of these items fulfill various CMS LCD requirements except the new and changing color options. Consent: Written consent was obtained and risks were reviewed including but not limited to scarring, infection, bleeding, scabbing, incomplete removal, nerve damage and allergy to anesthesia. Post-Care Instructions: I reviewed with the patient in detail post-care instructions. Patient is to keep the biopsy site dry overnight, and then apply Vaseline once daily until healed. Notification Instructions: Patient will be notified of biopsy results. However, patient instructed to call the office if not contacted within 2 weeks. Billing Type: Third-Party Bill Information: Selecting Yes will display possible errors in your note based on the variables you have selected. This validation is only offered as a suggestion for you. PLEASE NOTE THAT THE VALIDATION TEXT WILL BE REMOVED WHEN YOU FINALIZE YOUR NOTE. IF YOU WANT TO FAX A PRELIMINARY NOTE YOU WILL NEED TO TOGGLE THIS TO 'NO' IF YOU DO NOT WANT IT IN YOUR FAXED NOTE.

## (undated) DEVICE — KIT,ANTI FOG,W/SPONGE & FLUID,SOFT PACK: Brand: MEDLINE

## (undated) DEVICE — GOWN SIRUS NONREIN XL W/TWL: Brand: MEDLINE INDUSTRIES, INC.

## (undated) DEVICE — ELECTROSURGICAL PENCIL ROCKER SWITCH NON COATED BLADE ELECTRODE 10 FT (3 M) CORD HOLSTER: Brand: MEGADYNE

## (undated) DEVICE — SUTURE PROL SZ 6 0 L24IN NONABSORBABLE BLU C 1 L13MM 3 8 CIR EP8726H

## (undated) DEVICE — TROCAR: Brand: KII FIOS FIRST ENTRY

## (undated) DEVICE — THIS ADAPTER IS A DOUBLE SEALING FEMALE LUER LOCK ADAPTER WITH A 2-PIECE, COMBINATION COMPRESSION FIT/BARBED CATHETER CONNECTOR. THE ADAPTER IS USED TO CONNECT THE PD CATHETER TO A SOLUTION TRANSFER SET WITH LOCKING CONNECTOR.: Brand: LOCKING TITANIUM ADAPTER FOR PERITONEAL DIALYSIS CATHETER

## (undated) DEVICE — SUTURE VCRL SZ 3-0 L27IN ABSRB UD L26MM SH 1/2 CIR J416H

## (undated) DEVICE — GLOVE ORANGE PI 7   MSG9070

## (undated) DEVICE — CLIP LIG M BLU TI HRT SHP WIRE HORZ 180 PER BX

## (undated) DEVICE — TUBING IRRIG L77IN DIA0.241IN L BOR FOR CYSTO W/ NVENT

## (undated) DEVICE — BLANKET WRM W40.2XL55.9IN IORT LO BODY + MISTRAL AIR

## (undated) DEVICE — LOTION PREP REMV 5OZ IODO CLR TINC OF BENZ DURAPREP

## (undated) DEVICE — SURGICAL SET UP - SURE SET: Brand: MEDLINE INDUSTRIES, INC.

## (undated) DEVICE — GLOVE SURG SZ 75 L12IN THK75MIL DK GRN LTX FREE

## (undated) DEVICE — CONTROL SYRINGE LUER-LOCK TIP: Brand: MONOJECT

## (undated) DEVICE — SUTURE PERMAHAND SZ 2-0 L12X18IN NONABSORBABLE BLK SILK A185H

## (undated) DEVICE — SUTURE PERMAHAND SZ 4-0 L18IN NONABSORBABLE BLK SILK BRAID A183H

## (undated) DEVICE — SEALER/DIVIDER LAP SHFT L37CM JAW APER 11.4MM 315DEG ROT

## (undated) DEVICE — COVER LT HNDL BLU PLAS

## (undated) DEVICE — Device: Brand: DISPOSABLE ELECTROSURGICAL SNARE

## (undated) DEVICE — 3M™ IOBAN™ 2 ANTIMICROBIAL INCISE DRAPE 6648EZ: Brand: IOBAN™ 2

## (undated) DEVICE — STERILE LATEX POWDER FREE SURGICAL GLOVES WITH HYDROGEL COATING: Brand: PROTEXIS

## (undated) DEVICE — MERCY FAIRFIELD TURNOVER KIT: Brand: MEDLINE INDUSTRIES, INC.

## (undated) DEVICE — PAD N ADH W3XL4IN POLY COT SFT PERF FLM EASILY CUT ABSRB

## (undated) DEVICE — LOOP,VESSEL,MAXI,BLUE,2/PK,STERILE: Brand: MEDLINE

## (undated) DEVICE — INTENDED FOR TISSUE SEPARATION, AND OTHER PROCEDURES THAT REQUIRE A SHARP SURGICAL BLADE TO PUNCTURE OR CUT.: Brand: BARD-PARKER ® STAINLESS STEEL BLADES

## (undated) DEVICE — MAJOR SET UP PK

## (undated) DEVICE — SUTURE PERMA-HAND SZ 2-0 L30IN NONABSORBABLE BLK L26MM SH K833H

## (undated) DEVICE — SURE SET-DOUBLE BASIN-LF: Brand: MEDLINE INDUSTRIES, INC.

## (undated) DEVICE — TRAY CATHETER 16FR F INCLUDE BARDX IC COMPLT CARE DRNGE BG

## (undated) DEVICE — SUTURE BOOT: Brand: DEROYAL

## (undated) DEVICE — Z INACTIVE USE 2660664 SOLUTION IRRIG 3000ML 0.9% SOD CHL USP UROMATIC PLAS CONT

## (undated) DEVICE — FORCEPS BX L240CM WRK CHN 2.8MM STD CAP W/ NDL MIC MESH

## (undated) DEVICE — SOLUTION IV IRRIG WATER 500ML POUR BRL ST 2F7113

## (undated) DEVICE — JEWISH HOSPITAL TURNOVER KIT: Brand: MEDLINE INDUSTRIES, INC.

## (undated) DEVICE — Device

## (undated) DEVICE — STANDARD HYPODERMIC NEEDLE,POLYPROPYLENE HUB: Brand: MONOJECT

## (undated) DEVICE — CLIP INT SM WIDE RED TI TRNSVRS GRV CHEVRON SHP W/ PRECIS

## (undated) DEVICE — ADHESIVE SKIN CLSR 0.7ML TOP DERMBND ADV

## (undated) DEVICE — SYRINGE, LUER LOCK, 10ML: Brand: MEDLINE

## (undated) DEVICE — MOUTHPIECE ENDOSCP L CTRL OPN AND SIDE PORTS DISP

## (undated) DEVICE — DRAPE HND W114XL142IN BLU POLYPR W O PCH FEN CRD AND TB HLDR

## (undated) DEVICE — SUTURE COAT VCRL SZ 4-0 L18IN ABSRB UD L19MM PS-2 1/2 CIR J496G

## (undated) DEVICE — DRAPE,LAP,CHOLE,W/TROUGHS,STERILE: Brand: MEDLINE

## (undated) DEVICE — SUTURE VCRL SZ 0 L54IN ABSRB VLT W/O NDL POLYGLACTIN 910 J616H

## (undated) DEVICE — SYRINGE MED 30ML STD CLR PLAS LUERLOCK TIP N CTRL DISP

## (undated) DEVICE — SYRINGE INFL 60ML DISP ALLIANCE II

## (undated) DEVICE — PROCEDURE KIT ENDOSCP CUST

## (undated) DEVICE — GAUZE,SPONGE,4"X4",16PLY,XRAY,STRL,LF: Brand: MEDLINE

## (undated) DEVICE — APPLICATOR MEDICATED 26 CC SOLUTION HI LT ORNG CHLORAPREP

## (undated) DEVICE — YANKAUER SUCTION INSTRUMENT NO CONTROL VENT, BULB TIP, CLEAR: Brand: YANKAUER

## (undated) DEVICE — Z INACTIVE USE 2735373 APPLICATOR FBR LAIN COT WOOD TIP ECONOMICAL

## (undated) DEVICE — SUTURE MCRYL + SZ 4-0 L27IN ABSRB UD L19MM PS-2 3/8 CIR MCP426H

## (undated) DEVICE — SPONGE,DRAIN,NONWVN,4"X4",6PLY,STRL,LF: Brand: MEDLINE

## (undated) DEVICE — GARMENT,MEDLINE,DVT,INT,CALF,MED, GEN2: Brand: MEDLINE

## (undated) DEVICE — SYRINGE MED 10ML LUERLOCK TIP W/O SFTY DISP

## (undated) DEVICE — BANDAGE,GAUZE,BULKEE II,4.5"X4.1YD,STRL: Brand: MEDLINE

## (undated) DEVICE — SUTURE VCRL SZ 4-0 L18IN ABSRB UD L19MM PS-2 3/8 CIR PRIM J496H

## (undated) DEVICE — SHEET,DRAPE,53X77,STERILE: Brand: MEDLINE

## (undated) DEVICE — INTENDED TO BE USED TO OCCLUDE, RETRACT AND IDENTIFY ARTERIES, VEINS, TENDONS AND NERVES IN SURGICAL PROCEDURES: Brand: STERION®  VESSEL LOOP

## (undated) DEVICE — TOWEL,OR,DSP,ST,WHITE,DLX,XR,4/PK,20PK/C: Brand: MEDLINE

## (undated) DEVICE — SPONGE GZ W4XL8IN COT WVN 12 PLY

## (undated) DEVICE — SYRINGE MED 5ML STD CLR PLAS LUERLOCK TIP N CTRL DISP

## (undated) DEVICE — [HIGH FLOW INSUFFLATOR,  DO NOT USE IF PACKAGE IS DAMAGED,  KEEP DRY,  KEEP AWAY FROM SUNLIGHT,  PROTECT FROM HEAT AND RADIOACTIVE SOURCES.]: Brand: PNEUMOSURE

## (undated) DEVICE — MEDI-VAC NON-CONDUCTIVE SUCTION TUBING: Brand: CARDINAL HEALTH

## (undated) DEVICE — PLATE ES AD W 9FT CRD 2

## (undated) DEVICE — TRAP SPEC RETRV CLR PLAS POLYP IN LN SUCT QUIK CTCH

## (undated) DEVICE — SYSTEM SMK EVAC LAP TBNG FILTER HSNG BENT STYL PNK SEE CLR

## (undated) DEVICE — HYPODERMIC SAFETY NEEDLE: Brand: MAGELLAN

## (undated) DEVICE — SUTURE VCRL + SZ 3-0 L27IN ABSRB UD L26MM SH 1/2 CIR VCP416H

## (undated) DEVICE — ELECTRODE PT RET AD L9FT HI MOIST COND ADH HYDRGEL CORDED

## (undated) DEVICE — APPLICATOR PREP 26ML 0.7% IOD POVACRYLEX 74% ISO ALC ST

## (undated) DEVICE — FOGARTY - HYDRAGRIP SURGICAL - CLAMP INSERTS: Brand: FOGARTY SOFTJAW

## (undated) DEVICE — SET VLV 3 PC AWS DISPOSABLE GRDIAN SCOPEVALET

## (undated) DEVICE — NEEDLE INSUF L120MM DIA2MM DISP FOR PNEUMOPERI ENDOPATH

## (undated) DEVICE — BW-412T DISP COMBO CLEANING BRUSH: Brand: SINGLE USE COMBINATION CLEANING BRUSH

## (undated) DEVICE — 3M™ TEGADERM™ TRANSPARENT FILM DRESSING FRAME STYLE, 1628, 6 IN X 8 IN (15 CM X 20 CM), 10/CT 8CT/CASE: Brand: 3M™ TEGADERM™

## (undated) DEVICE — DECANTER FLD 9IN ST BG FOR ASEP TRNSF OF FLD

## (undated) DEVICE — PAD PREP L 2 PLY 70% ISO ALC NONWOVEN SFT ABSRB TOP ANTISEP

## (undated) DEVICE — SUTURE N ABSRB MONOFILAMENT 6-0 BV1 24 IN DA BLU PROLENE EP8805H

## (undated) DEVICE — THIS DEVICE IS A DOUBLE SEALING MALE LUER LOCK INTENDED FOR USE WITH THE BAXTER LOCKING TITANIUM ADAPTER FOR PERITONEAL DIALYSIS.: Brand: LOCKING CAP FOR PERITONEAL DIALYSIS CATHETER ADAPTER

## (undated) DEVICE — BLADE ES ELASTOMERIC COAT INSUL DURABLE BEND UPTO 90DEG

## (undated) DEVICE — BANDAGE COBAN 4 IN COMPR W4INXL5YD FOAM COHESIVE QUIK STK SELF ADH SFT

## (undated) DEVICE — SOLUTION IRRIG 1000ML 0.9% SOD CHL USP POUR PLAS BTL